# Patient Record
Sex: MALE | Race: WHITE | NOT HISPANIC OR LATINO | URBAN - METROPOLITAN AREA
[De-identification: names, ages, dates, MRNs, and addresses within clinical notes are randomized per-mention and may not be internally consistent; named-entity substitution may affect disease eponyms.]

---

## 2022-11-07 ENCOUNTER — INPATIENT (INPATIENT)
Facility: HOSPITAL | Age: 57
LOS: 4 days | Discharge: HOME | DRG: 988 | End: 2022-11-12
Admitting: STUDENT IN AN ORGANIZED HEALTH CARE EDUCATION/TRAINING PROGRAM
Payer: COMMERCIAL

## 2022-11-07 VITALS
WEIGHT: 199.96 LBS | OXYGEN SATURATION: 98 % | TEMPERATURE: 98 F | RESPIRATION RATE: 16 BRPM | HEIGHT: 72 IN | SYSTOLIC BLOOD PRESSURE: 115 MMHG | DIASTOLIC BLOOD PRESSURE: 64 MMHG | HEART RATE: 62 BPM

## 2022-11-07 DIAGNOSIS — M86.9 OSTEOMYELITIS, UNSPECIFIED: ICD-10-CM

## 2022-11-07 LAB
ALBUMIN SERPL ELPH-MCNC: 4.1 G/DL — SIGNIFICANT CHANGE UP (ref 3.3–5)
ALP SERPL-CCNC: 51 U/L — SIGNIFICANT CHANGE UP (ref 40–120)
ALT FLD-CCNC: 15 U/L — SIGNIFICANT CHANGE UP (ref 10–45)
ANION GAP SERPL CALC-SCNC: 9 MMOL/L — SIGNIFICANT CHANGE UP (ref 5–17)
AST SERPL-CCNC: 23 U/L — SIGNIFICANT CHANGE UP (ref 10–40)
BILIRUB SERPL-MCNC: 0.2 MG/DL — SIGNIFICANT CHANGE UP (ref 0.2–1.2)
BUN SERPL-MCNC: 34 MG/DL — HIGH (ref 7–23)
CALCIUM SERPL-MCNC: 8.5 MG/DL — SIGNIFICANT CHANGE UP (ref 8.4–10.5)
CHLORIDE SERPL-SCNC: 102 MMOL/L — SIGNIFICANT CHANGE UP (ref 96–108)
CO2 SERPL-SCNC: 24 MMOL/L — SIGNIFICANT CHANGE UP (ref 22–31)
CREAT SERPL-MCNC: 1.7 MG/DL — HIGH (ref 0.5–1.3)
CRP SERPL-MCNC: <3 MG/L — SIGNIFICANT CHANGE UP (ref 0–4)
EGFR: 47 ML/MIN/1.73M2 — LOW
GLUCOSE SERPL-MCNC: 199 MG/DL — HIGH (ref 70–99)
HCT VFR BLD CALC: 33.4 % — LOW (ref 39–50)
HGB BLD-MCNC: 11.1 G/DL — LOW (ref 13–17)
MCHC RBC-ENTMCNC: 30.3 PG — SIGNIFICANT CHANGE UP (ref 27–34)
MCHC RBC-ENTMCNC: 33.2 GM/DL — SIGNIFICANT CHANGE UP (ref 32–36)
MCV RBC AUTO: 91.3 FL — SIGNIFICANT CHANGE UP (ref 80–100)
NRBC # BLD: 0 /100 WBCS — SIGNIFICANT CHANGE UP (ref 0–0)
PLATELET # BLD AUTO: 275 K/UL — SIGNIFICANT CHANGE UP (ref 150–400)
POTASSIUM SERPL-MCNC: 4.8 MMOL/L — SIGNIFICANT CHANGE UP (ref 3.5–5.3)
POTASSIUM SERPL-SCNC: 4.8 MMOL/L — SIGNIFICANT CHANGE UP (ref 3.5–5.3)
PROT SERPL-MCNC: 6.8 G/DL — SIGNIFICANT CHANGE UP (ref 6–8.3)
RBC # BLD: 3.66 M/UL — LOW (ref 4.2–5.8)
RBC # FLD: 12.5 % — SIGNIFICANT CHANGE UP (ref 10.3–14.5)
SARS-COV-2 RNA SPEC QL NAA+PROBE: NEGATIVE — SIGNIFICANT CHANGE UP
SODIUM SERPL-SCNC: 135 MMOL/L — SIGNIFICANT CHANGE UP (ref 135–145)
WBC # BLD: 7.15 K/UL — SIGNIFICANT CHANGE UP (ref 3.8–10.5)
WBC # FLD AUTO: 7.15 K/UL — SIGNIFICANT CHANGE UP (ref 3.8–10.5)

## 2022-11-07 PROCEDURE — 73620 X-RAY EXAM OF FOOT: CPT | Mod: 26,RT

## 2022-11-07 PROCEDURE — 99285 EMERGENCY DEPT VISIT HI MDM: CPT

## 2022-11-07 PROCEDURE — 99223 1ST HOSP IP/OBS HIGH 75: CPT | Mod: GC

## 2022-11-07 RX ORDER — SODIUM CHLORIDE 9 MG/ML
1000 INJECTION, SOLUTION INTRAVENOUS
Refills: 0 | Status: DISCONTINUED | OUTPATIENT
Start: 2022-11-07 | End: 2022-11-12

## 2022-11-07 RX ORDER — AMOXICILLIN 250 MG/5ML
1 SUSPENSION, RECONSTITUTED, ORAL (ML) ORAL
Qty: 14 | Refills: 0
Start: 2022-11-07 | End: 2022-11-13

## 2022-11-07 RX ORDER — LANOLIN ALCOHOL/MO/W.PET/CERES
3 CREAM (GRAM) TOPICAL AT BEDTIME
Refills: 0 | Status: DISCONTINUED | OUTPATIENT
Start: 2022-11-07 | End: 2022-11-12

## 2022-11-07 RX ORDER — DEXTROSE 50 % IN WATER 50 %
25 SYRINGE (ML) INTRAVENOUS ONCE
Refills: 0 | Status: DISCONTINUED | OUTPATIENT
Start: 2022-11-07 | End: 2022-11-12

## 2022-11-07 RX ORDER — GLUCAGON INJECTION, SOLUTION 0.5 MG/.1ML
1 INJECTION, SOLUTION SUBCUTANEOUS ONCE
Refills: 0 | Status: DISCONTINUED | OUTPATIENT
Start: 2022-11-07 | End: 2022-11-12

## 2022-11-07 RX ORDER — ONDANSETRON 8 MG/1
4 TABLET, FILM COATED ORAL EVERY 8 HOURS
Refills: 0 | Status: DISCONTINUED | OUTPATIENT
Start: 2022-11-07 | End: 2022-11-12

## 2022-11-07 RX ORDER — DEXTROSE 50 % IN WATER 50 %
12.5 SYRINGE (ML) INTRAVENOUS ONCE
Refills: 0 | Status: DISCONTINUED | OUTPATIENT
Start: 2022-11-07 | End: 2022-11-12

## 2022-11-07 RX ORDER — ATORVASTATIN CALCIUM 80 MG/1
40 TABLET, FILM COATED ORAL AT BEDTIME
Refills: 0 | Status: DISCONTINUED | OUTPATIENT
Start: 2022-11-07 | End: 2022-11-12

## 2022-11-07 RX ORDER — ACETAMINOPHEN 500 MG
650 TABLET ORAL EVERY 6 HOURS
Refills: 0 | Status: DISCONTINUED | OUTPATIENT
Start: 2022-11-07 | End: 2022-11-12

## 2022-11-07 RX ORDER — DEXTROSE 50 % IN WATER 50 %
15 SYRINGE (ML) INTRAVENOUS ONCE
Refills: 0 | Status: DISCONTINUED | OUTPATIENT
Start: 2022-11-07 | End: 2022-11-12

## 2022-11-07 RX ORDER — INSULIN LISPRO 100/ML
VIAL (ML) SUBCUTANEOUS
Refills: 0 | Status: DISCONTINUED | OUTPATIENT
Start: 2022-11-07 | End: 2022-11-12

## 2022-11-07 RX ORDER — INSULIN GLARGINE 100 [IU]/ML
40 INJECTION, SOLUTION SUBCUTANEOUS
Refills: 0 | Status: DISCONTINUED | OUTPATIENT
Start: 2022-11-08 | End: 2022-11-08

## 2022-11-07 NOTE — H&P ADULT - PROBLEM SELECTOR PLAN 5
Well controlled, normotensive on admission. Home medication of lisinopril 25mg daily.   - hold home lisinopril i/s/o NOLAN

## 2022-11-07 NOTE — ED ADULT NURSE NOTE - OBJECTIVE STATEMENT
Pt. a&ox4 ambulatory hx of DM2 and neuropathy comes to ED with worsening ulceration of the 4th toe on the right foot, dxed with osteomyelitis by Podiatrist, put on abx only started one of them @ home. Pt. denies worsening neuropathy / paresthesias, f/c, or additional systemic s/s of infection. Reports checks blood sugars @ home, not well controlled as per pt. Airway patent, breathing spontaneous and unlabored, speaking in clear coherent sentences. Sent in to ED by podiatrist s/p worsening of the infection despite abx initiated, for admission.

## 2022-11-07 NOTE — H&P ADULT - PROBLEM SELECTOR PLAN 3
Unknown control status. Home regimen of Lantus 40U BID, Metformin 1000mg BID and Trulicity weekly.   - f/u A1c  - c/w Lantus 40U BID   - ISS Unknown control status. Home regimen of Lantus 40U BID, Metformin 1000mg BID and Trulicity weekly.   - f/u A1c  - c/w Lantus 40U BID   - ISS  - verify lantus dosing in the AM, pharmacy was closed this PM

## 2022-11-07 NOTE — CONSULT NOTE ADULT - ASSESSMENT
58 y/o male with pmh IDDM with chief complaint of right fourth toe wound. MRI of right foot on 11/4 showed osteomyelitis of the right fourth digit middle and distal phalanx. Pt was instructed to go to ED for admission for IV antibiotics. Podiatry consulted to evaluate OM. Bone biopsy of right distal phalanx performed 11/7.    Plan:  - Wound evaluated, Imaging reviewed & discussed with patient   - Admit to medicine for IV antibiotics.   - Recommend broad spectrum IV antibiotics   - Bone biopsy of Right 4th digit   - F/u bone biopsy path & culture  - Dressed wound with guaze, melany and ACE.  - Pt can WBAT in surgical shoe     Plan discussed with attending and podiatry following 58 y/o male with pmh IDDM with chief complaint of right fourth toe wound. MRI of right foot on 11/4 showed osteomyelitis of the right fourth digit middle and distal phalanx. Pt was instructed to go to ED for admission for IV antibiotics. Podiatry consulted to evaluate OM. Bone biopsy of right distal phalanx performed 11/7.    Plan:  - Wound evaluated, Imaging reviewed & discussed with patient   - Admit to medicine for IV antibiotics.   - Recommend broad spectrum IV antibiotics   - Discussed benefits of bone biopsy with pt. Pt was amenable. Obtained consent.   - Bone biopsy of Right 4th digit performed   - F/u bone biopsy path & culture  - Dressed wound with migue, melany and ACE.  - Pt can WBAT in surgical shoe     Plan discussed with attending and podiatry following

## 2022-11-07 NOTE — ED PROVIDER NOTE - PHYSICAL EXAMINATION
General: Awake, alert and oriented. No acute distress. Well developed, hydrated and nourished. Appears stated age.   Skin: Skin in warm, dry and intact without rashes or lesions. Appropriate color for ethnicity  HENMT: head normocephalic and atraumatic; bilateral external ears without swelling. no nasal discharge. moist oral mucosa. supple neck, trachea midline  EYES: Conjunctiva clear. nonicteric sclera. EOM intact, Eyelids are normal in appearance without swelling or lesions.  Cardiac: well perfused  Respiratory: breathing comfortably on room air. no audible wheezing or stridor  Abdominal: nondistended  MSK: Neck and back are without deformity, visible external skin changes, or signs of trauma. Curvature of the cervical, thoracic, and lumbar spine are within normal limits. open wound to nottom of right 4th toe without significant visible drainage, podiatry at bedside probing wound, detailed exam of wound deferred to experts at bedside. no external signs of trauma. no apparent deficits in ROM of any extremity  Neurological: The patient is awake, alert and oriented to person, place, and time with normal speech. CN 2-12 grossly intact. no apparent deficits. Memory is normal and thought process is intact. No gait abnormalities are appreciated.   Psychiatric: Appropriate mood and affect. Good judgement and insight. No visual or auditory hallucinations. General: Awake, alert and oriented. No acute distress. Well developed, hydrated and nourished. Appears stated age.   Skin: Skin in warm, dry and intact without rashes or lesions. Appropriate color for ethnicity  HENMT: head normocephalic and atraumatic; bilateral external ears without swelling. no nasal discharge. moist oral mucosa. supple neck, trachea midline  EYES: Conjunctiva clear. nonicteric sclera. EOM intact, Eyelids are normal in appearance without swelling or lesions.  Cardiac: well perfused  Respiratory: breathing comfortably on room air. no audible wheezing or stridor  Abdominal: nondistended  MSK: Neck and back are without deformity, visible external skin changes, or signs of trauma. Curvature of the cervical, thoracic, and lumbar spine are within normal limits. 3rd toe on right s/p amputation at ip joint. open wound to bottom of right 4th toe without significant visible drainage, podiatry at bedside probing wound, detailed exam of wound deferred to experts at bedside. no external signs of trauma. no apparent deficits in ROM of any extremity  Neurological: The patient is awake, alert and oriented to person, place, and time with normal speech. CN 2-12 grossly intact. no apparent deficits. Memory is normal and thought process is intact. No gait abnormalities are appreciated.   Psychiatric: Appropriate mood and affect. Good judgement and insight. No visual or auditory hallucinations.

## 2022-11-07 NOTE — ED PROVIDER NOTE - OBJECTIVE STATEMENT
57m hx dm presenting for a wound to the 4th toe of his right foot. wound has progressively worsened, had mri on friday showing osteomyelitis. was prescribed unknown antibiotics but hasn't been able to obtain one of them. sent in by his podiatrist for admission. otherwise feels well.

## 2022-11-07 NOTE — H&P ADULT - PROBLEM SELECTOR PLAN 4
Unknown baseline, Hb of 11.1, normocytic on admission. DDx include hemolysis vs early ARNOL vs ACD.   - f/u iron studies   - no active interventions at this time

## 2022-11-07 NOTE — ED PROVIDER NOTE - CLINICAL SUMMARY MEDICAL DECISION MAKING FREE TEXT BOX
nonseptic, well appearing, podiatry at bedside reccomending against abx for now as they might pursue bone biopsy, as patinet well appearing, no systemic illness will defer care to specialty care. will admit for osteomyelitis

## 2022-11-07 NOTE — H&P ADULT - ATTENDING COMMENTS
#RLE OM: RLE diabetiic foot ulcer w/ cf for OM on outpt MRI. s/p bone biopsy by podiatry in ED. F/up results. Pulses present b/l LE. Pt afebrile, non septic. c/w Vanc/Cefepime for now. ID consult      #NOLAN: vs CKD; s/p 1L NS bolus. F/up UA, urine lytes. Hold lisinopril for now. Collateral in AM. Trend cr      #T2DM: On lantus 40mg BID; missed last nights dose, will cover for now, and continue home lantus. Monitor FSG, f/up A1c.

## 2022-11-07 NOTE — H&P ADULT - HISTORY OF PRESENT ILLNESS
57M with PMH DM, HTN, HLD and anxiety presenting at the behest of outpatient podiatry for MRI indicative of OM. The patient reports development of an ulcer along the right 4th toe a few weeks ago, for which he visited a podiatrist last week who prescribed Abx (topical gentamycin, PO omadacycline and PO cephalexin) and ordered MRI, which has been read as positive for OM. He does not report any purulence over the past few weeks. He denies any ROS of systemic infection. He denies all other ROS at this time.     ED course:   Vitals: 98.4, 62, 115/64, 16, 98% RA   Labs: Hb 11.1, BUN 34, Cr 1.7 (unknown baseline for all)   Imaging: Outpatient MRI of the right foot c/w OM to the 4th digit   Interventions: Podiatry consulted

## 2022-11-07 NOTE — CONSULT NOTE ADULT - SUBJECTIVE AND OBJECTIVE BOX
Attending: Dr. Chakraborty     Patient is a 57y old  Male who presents with a chief complaint of right fourth toe wound     HPI: 58 y/o male with pmh IDDM presents for right fourth toe wound. Pt noticed the toe wound a couple of weeks ago. He states the wound is painful. He has not noticed it worsening. He was given oral antibiotics which did not help. He went for an MRI on Friday (11/4). The MRI showed osteomyelitis of the right fourth digit. Pt was instructed by his podiatrist, Dr. Chakraborty, to go to the ED to get admitted for IV antibiotics. Pt denies numbness or tingling. Pt denies n/v/f/c/sob.       Review of systems negative except per HPI and as stated below  General:	 no weakness; no fevers, no chills  Skin/Breast: no rash  Respiratory and Thorax: no SOB, no cough  Cardiovascular:	No chest pain  Gastrointestinal:	 no nausea, vomiting , diarrhea  Genitourinary:	no dysuria, no difficulty urinating, no hematuria  Musculoskeletal:	no weakness, no joint swelling/pain  Neurological:	no focal weakness/numbness  Endocrine:	no polyuria, no polydipsia    PAST MEDICAL & SURGICAL HISTORY:    Home Medications:    Allergies    No Known Allergies    Intolerances      FAMILY HISTORY:    Social History:       LABS              Vital Signs Last 24 Hrs  T(C): 36.9 (07 Nov 2022 21:33), Max: 36.9 (07 Nov 2022 21:33)  T(F): 98.4 (07 Nov 2022 21:33), Max: 98.4 (07 Nov 2022 21:33)  HR: 62 (07 Nov 2022 21:33) (62 - 62)  BP: 115/64 (07 Nov 2022 21:33) (115/64 - 115/64)  BP(mean): --  RR: 16 (07 Nov 2022 21:33) (16 - 16)  SpO2: 98% (07 Nov 2022 21:33) (98% - 98%)    Parameters below as of 07 Nov 2022 21:33  Patient On (Oxygen Delivery Method): room air        PHYSICAL EXAM  General: NAD, AA0x3    Lower Extremity Focused:  Vasc:  Derm:  Neuro:  MSK:     RADIOLOGY                       Attending: Dr. Chakraborty     Patient is a 57y old  Male who presents with a chief complaint of right fourth toe wound     HPI: 56 y/o male with pmh IDDM presents for right fourth toe wound. Pt noticed the toe wound a couple of weeks ago. He states the wound is painful. He has not noticed it worsening. He was given oral antibiotics which did not help. He went for an MRI on Friday (11/4). The MRI showed osteomyelitis of the right fourth digit. Pt was instructed by his podiatrist, Dr. Chakraborty, to go to the ED to get admitted for IV antibiotics. Pt denies numbness or tingling. Pt denies n/v/f/c/sob.       Review of systems negative except per HPI and as stated below  General:	 no weakness; no fevers, no chills  Skin/Breast: no rash  Respiratory and Thorax: no SOB, no cough  Cardiovascular:	No chest pain  Gastrointestinal:	 no nausea, vomiting , diarrhea  Genitourinary:	no dysuria, no difficulty urinating, no hematuria  Musculoskeletal:	no weakness, no joint swelling/pain  Neurological:	no focal weakness/numbness  Endocrine:	no polyuria, no polydipsia    PAST MEDICAL & SURGICAL HISTORY:    Home Medications:    Allergies    No Known Allergies    Intolerances        LABS                        11.1   7.15  )-----------( 275      ( 07 Nov 2022 22:29 )             33.4       C-Reactive Protein, Serum (11.07.22 @ 22:29)    C-Reactive Protein, Serum: <3.0 mg/L      Vital Signs Last 24 Hrs  T(C): 36.9 (07 Nov 2022 21:33), Max: 36.9 (07 Nov 2022 21:33)  T(F): 98.4 (07 Nov 2022 21:33), Max: 98.4 (07 Nov 2022 21:33)  HR: 62 (07 Nov 2022 21:33) (62 - 62)  BP: 115/64 (07 Nov 2022 21:33) (115/64 - 115/64)  BP(mean): --  RR: 16 (07 Nov 2022 21:33) (16 - 16)  SpO2: 98% (07 Nov 2022 21:33) (98% - 98%)    Parameters below as of 07 Nov 2022 21:33  Patient On (Oxygen Delivery Method): room air        PHYSICAL EXAM  General: NAD, AA0x3    Right Foot Focused Lower Extremity Focused:  Vasc: DP 1/4, PT 2/4, No edema, No erythema, CFT <3 x 5  Derm: 2 mm ulcer on distal tip of 4th digit, +PTB, no pus, no malodor, hyperkeratotic borders; no other open wounds   Neuro: Protective sensation intact  MSK: Partial 3rd digit amputation    RADIOLOGY                         Attending: Dr. Chakraborty     Patient is a 57y old  Male who presents with a chief complaint of right fourth toe wound     HPI: 58 y/o male with pmh IDDM presents for right fourth toe wound. Pt noticed the toe wound a couple of weeks ago. He states the wound is painful. He has not noticed it worsening. He was given oral antibiotics which did not help. He went for an MRI on Friday (11/4). The MRI showed osteomyelitis of the right fourth digit. Pt was instructed by his podiatrist, Dr. Chakraborty, to go to the ED to get admitted for IV antibiotics. Pt denies n/v/f/c/sob.       Review of systems negative except per HPI and as stated below  General:	 no weakness; no fevers, no chills  Skin/Breast: no rash  Respiratory and Thorax: no SOB, no cough  Cardiovascular:	No chest pain  Gastrointestinal:	 no nausea, vomiting , diarrhea  Genitourinary:	no dysuria, no difficulty urinating, no hematuria  Musculoskeletal:	no weakness, no joint swelling/pain  Neurological:	no focal weakness/numbness  Endocrine:	no polyuria, no polydipsia    PAST MEDICAL & SURGICAL HISTORY:    Home Medications:    Allergies    No Known Allergies    Intolerances        LABS                        11.1   7.15  )-----------( 275      ( 07 Nov 2022 22:29 )             33.4       C-Reactive Protein, Serum (11.07.22 @ 22:29)    C-Reactive Protein, Serum: <3.0 mg/L      Vital Signs Last 24 Hrs  T(C): 36.9 (07 Nov 2022 21:33), Max: 36.9 (07 Nov 2022 21:33)  T(F): 98.4 (07 Nov 2022 21:33), Max: 98.4 (07 Nov 2022 21:33)  HR: 62 (07 Nov 2022 21:33) (62 - 62)  BP: 115/64 (07 Nov 2022 21:33) (115/64 - 115/64)  BP(mean): --  RR: 16 (07 Nov 2022 21:33) (16 - 16)  SpO2: 98% (07 Nov 2022 21:33) (98% - 98%)    Parameters below as of 07 Nov 2022 21:33  Patient On (Oxygen Delivery Method): room air        PHYSICAL EXAM  General: NAD, AA0x3    Right Foot Focused Lower Extremity Focused:  Vasc: DP 1/4, PT 2/4, No edema, No erythema, CFT <3 x 5  Derm: 2 mm ulcer on distal tip of 4th digit, +PTB, no pus, no malodor, hyperkeratotic borders; no other open wounds   Neuro: Protective sensation intact  MSK: Partial 3rd digit amputation    RADIOLOGY  X-ray Right Foot:   WET READ: Cortical erosions of right 4th distal phalanx, no signs of gas or foreign body    MRI of Right forefoot: Acute osteomyelitis of 4th distal phalanx. Early OM vs reactive edema in the 4th middle phalanx

## 2022-11-07 NOTE — H&P ADULT - NSHPPHYSICALEXAM_GEN_ALL_CORE
VITAL SIGNS:  T(C): 36.9 (11-07-22 @ 21:33), Max: 36.9 (11-07-22 @ 21:33)  T(F): 98.4 (11-07-22 @ 21:33), Max: 98.4 (11-07-22 @ 21:33)  HR: 62 (11-07-22 @ 21:33) (62 - 62)  BP: 115/64 (11-07-22 @ 21:33) (115/64 - 115/64)  BP(mean): --  RR: 16 (11-07-22 @ 21:33) (16 - 16)  SpO2: 98% (11-07-22 @ 21:33) (98% - 98%)  Wt(kg): --    PHYSICAL EXAM:    Constitutional:  NAD  Head: NC/AT  Eyes: PERRL, EOMI, anicteric sclera  ENT: no nasal discharge;  MMM  Neck: supple; no JVD or thyromegaly  Respiratory: CTA B/L; no W/R/R, no retractions  Cardiac: +S1/S2; RRR; no M/R/G;   Gastrointestinal: soft, NT/ND; no rebound or guarding; +BSx4  Extremities: WWP, no clubbing or cyanosis; RLe wrapped in gauze and ace bandage - can get exam of wound from podiatry note   Musculoskeletal: NROM x4; no joint swelling, tenderness or erythema  Vascular: 2+ radial, PT pulses B/L  Dermatologic: skin warm, dry and intact; no rashes, wounds, or scars  Lymphatic: no submandibular or cervical LAD  Neurologic: AAOx3; CNII-XII grossly intact; no focal deficits  Psychiatric: affect and characteristics of appearance, verbalizations, behaviors are appropriate

## 2022-11-07 NOTE — PROCEDURE NOTE - ADDITIONAL PROCEDURE DETAILS
Patient given digital block around the Right 4th digit consisting of 8 ccs of 2% lidocaine. Prepped area with chlorhexidine. Made incision through wound with sterile #15 blade. Performed bone biopsy using jamshidi needle. Sent one piece of bone for microbiology. Sent one piece of bone for pathology. Dressed wound with sterile gauze, melany, and ACE. Patient tolerated procedure well

## 2022-11-07 NOTE — ED ADULT TRIAGE NOTE - CHIEF COMPLAINT QUOTE
I have osteomyelitis on my right toe; my doctor advised me to come to ER for IV antibiotic;  with  swollen wound to right toe for 2 weeks; denies fever/chills I have osteomyelitis on my right toe; my doctor advised me to come to ER for IV antibiotic, MRI done last Friday that shows osteomyelitis;  with  swollen wound to right toe for 2 weeks; denies fever/chills

## 2022-11-07 NOTE — ED ADULT NURSE NOTE - CHIEF COMPLAINT QUOTE
I have osteomyelitis on my right toe; my doctor advised me to come to ER for IV antibiotic, MRI done last Friday that shows osteomyelitis;  with  swollen wound to right toe for 2 weeks; denies fever/chills

## 2022-11-07 NOTE — H&P ADULT - ASSESSMENT
57M with PMH DM, HTN, HLD and anxiety presenting at the behest of outpatient podiatry for MRI indicative of OM, admitted for OM.

## 2022-11-07 NOTE — H&P ADULT - PROBLEM SELECTOR PLAN 1
Patient with 3 week history of lesion to right 4th toe, confirmed OM on outpatient MRI, sent in by outpatient podiatry for IV Abx and Bone biopsy.   - Podiatry following, performed bone biopsy in the ED   - initiate Vancomycin 15mg/kg q12hrs with trough prior to 4th dose and Cefepime 2g q12hrs (avoid Vanc/Zosyn i/s/o NOLAN), narrow coverage pending bone Cx   - Patient to require PICC line   - Daily ESR/CRP

## 2022-11-07 NOTE — H&P ADULT - NSHPLABSRESULTS_GEN_ALL_CORE
LABS:                         11.1   7.15  )-----------( 275      ( 07 Nov 2022 22:29 )             33.4     11-07    135  |  102  |  34<H>  ----------------------------<  199<H>  4.8   |  24  |  1.70<H>    Ca    8.5      07 Nov 2022 22:29    TPro  6.8  /  Alb  4.1  /  TBili  0.2  /  DBili  x   /  AST  23  /  ALT  15  /  AlkPhos  51  11-07                  RADIOLOGY, EKG & ADDITIONAL TESTS: Reviewed.

## 2022-11-07 NOTE — H&P ADULT - PROBLEM SELECTOR PLAN 2
Unknown baseline renal function, BUN 34 and Cr 1.7 on admission. Could be CKD 2/2 DM vs NOLAN vs NOLAN on CKD. DDx include pre-intra-post renal azotemia if NOLAN.   - follow urine studies to better classify   - ensure adequate hydration

## 2022-11-08 ENCOUNTER — TRANSCRIPTION ENCOUNTER (OUTPATIENT)
Age: 57
End: 2022-11-08

## 2022-11-08 ENCOUNTER — RESULT REVIEW (OUTPATIENT)
Age: 57
End: 2022-11-08

## 2022-11-08 DIAGNOSIS — E11.9 TYPE 2 DIABETES MELLITUS WITHOUT COMPLICATIONS: ICD-10-CM

## 2022-11-08 DIAGNOSIS — N17.9 ACUTE KIDNEY FAILURE, UNSPECIFIED: ICD-10-CM

## 2022-11-08 DIAGNOSIS — E78.5 HYPERLIPIDEMIA, UNSPECIFIED: ICD-10-CM

## 2022-11-08 DIAGNOSIS — R63.8 OTHER SYMPTOMS AND SIGNS CONCERNING FOOD AND FLUID INTAKE: ICD-10-CM

## 2022-11-08 DIAGNOSIS — I10 ESSENTIAL (PRIMARY) HYPERTENSION: ICD-10-CM

## 2022-11-08 DIAGNOSIS — D64.9 ANEMIA, UNSPECIFIED: ICD-10-CM

## 2022-11-08 DIAGNOSIS — F41.9 ANXIETY DISORDER, UNSPECIFIED: ICD-10-CM

## 2022-11-08 LAB
A1C WITH ESTIMATED AVERAGE GLUCOSE RESULT: 11.4 % — HIGH (ref 4–5.6)
ALBUMIN SERPL ELPH-MCNC: 3.7 G/DL — SIGNIFICANT CHANGE UP (ref 3.3–5)
ALP SERPL-CCNC: 49 U/L — SIGNIFICANT CHANGE UP (ref 40–120)
ALT FLD-CCNC: 17 U/L — SIGNIFICANT CHANGE UP (ref 10–45)
ANION GAP SERPL CALC-SCNC: 9 MMOL/L — SIGNIFICANT CHANGE UP (ref 5–17)
AST SERPL-CCNC: 20 U/L — SIGNIFICANT CHANGE UP (ref 10–40)
BASOPHILS # BLD AUTO: 0.03 K/UL — SIGNIFICANT CHANGE UP (ref 0–0.2)
BASOPHILS NFR BLD AUTO: 0.5 % — SIGNIFICANT CHANGE UP (ref 0–2)
BILIRUB SERPL-MCNC: <0.2 MG/DL — SIGNIFICANT CHANGE UP (ref 0.2–1.2)
BUN SERPL-MCNC: 31 MG/DL — HIGH (ref 7–23)
CALCIUM SERPL-MCNC: 8.5 MG/DL — SIGNIFICANT CHANGE UP (ref 8.4–10.5)
CHLORIDE SERPL-SCNC: 104 MMOL/L — SIGNIFICANT CHANGE UP (ref 96–108)
CO2 SERPL-SCNC: 22 MMOL/L — SIGNIFICANT CHANGE UP (ref 22–31)
CREAT ?TM UR-MCNC: 82 MG/DL — SIGNIFICANT CHANGE UP
CREAT SERPL-MCNC: 1.37 MG/DL — HIGH (ref 0.5–1.3)
CRP SERPL-MCNC: <3 MG/L — SIGNIFICANT CHANGE UP (ref 0–4)
EGFR: 61 ML/MIN/1.73M2 — SIGNIFICANT CHANGE UP
EOSINOPHIL # BLD AUTO: 0.33 K/UL — SIGNIFICANT CHANGE UP (ref 0–0.5)
EOSINOPHIL NFR BLD AUTO: 5 % — SIGNIFICANT CHANGE UP (ref 0–6)
ERYTHROCYTE [SEDIMENTATION RATE] IN BLOOD: 15 MM/HR — SIGNIFICANT CHANGE UP
ERYTHROCYTE [SEDIMENTATION RATE] IN BLOOD: 55 MM/HR — HIGH
ESTIMATED AVERAGE GLUCOSE: 280 MG/DL — HIGH (ref 68–114)
FERRITIN SERPL-MCNC: 288 NG/ML — SIGNIFICANT CHANGE UP (ref 30–400)
GLUCOSE BLDC GLUCOMTR-MCNC: 175 MG/DL — HIGH (ref 70–99)
GLUCOSE BLDC GLUCOMTR-MCNC: 55 MG/DL — LOW (ref 70–99)
GLUCOSE BLDC GLUCOMTR-MCNC: 61 MG/DL — LOW (ref 70–99)
GLUCOSE BLDC GLUCOMTR-MCNC: 71 MG/DL — SIGNIFICANT CHANGE UP (ref 70–99)
GLUCOSE BLDC GLUCOMTR-MCNC: 74 MG/DL — SIGNIFICANT CHANGE UP (ref 70–99)
GLUCOSE BLDC GLUCOMTR-MCNC: 75 MG/DL — SIGNIFICANT CHANGE UP (ref 70–99)
GLUCOSE BLDC GLUCOMTR-MCNC: 77 MG/DL — SIGNIFICANT CHANGE UP (ref 70–99)
GLUCOSE SERPL-MCNC: 209 MG/DL — HIGH (ref 70–99)
GRAM STN FLD: SIGNIFICANT CHANGE UP
HCT VFR BLD CALC: 32.2 % — LOW (ref 39–50)
HGB BLD-MCNC: 10.4 G/DL — LOW (ref 13–17)
IMM GRANULOCYTES NFR BLD AUTO: 0.2 % — SIGNIFICANT CHANGE UP (ref 0–0.9)
IRON SATN MFR SERPL: 27 % — SIGNIFICANT CHANGE UP (ref 16–55)
IRON SATN MFR SERPL: 56 UG/DL — SIGNIFICANT CHANGE UP (ref 45–165)
LYMPHOCYTES # BLD AUTO: 1.72 K/UL — SIGNIFICANT CHANGE UP (ref 1–3.3)
LYMPHOCYTES # BLD AUTO: 26.3 % — SIGNIFICANT CHANGE UP (ref 13–44)
MCHC RBC-ENTMCNC: 29.7 PG — SIGNIFICANT CHANGE UP (ref 27–34)
MCHC RBC-ENTMCNC: 32.3 GM/DL — SIGNIFICANT CHANGE UP (ref 32–36)
MCV RBC AUTO: 92 FL — SIGNIFICANT CHANGE UP (ref 80–100)
MONOCYTES # BLD AUTO: 0.43 K/UL — SIGNIFICANT CHANGE UP (ref 0–0.9)
MONOCYTES NFR BLD AUTO: 6.6 % — SIGNIFICANT CHANGE UP (ref 2–14)
NEUTROPHILS # BLD AUTO: 4.02 K/UL — SIGNIFICANT CHANGE UP (ref 1.8–7.4)
NEUTROPHILS NFR BLD AUTO: 61.4 % — SIGNIFICANT CHANGE UP (ref 43–77)
NRBC # BLD: 0 /100 WBCS — SIGNIFICANT CHANGE UP (ref 0–0)
OSMOLALITY UR: 324 MOSM/KG — SIGNIFICANT CHANGE UP (ref 300–900)
PLATELET # BLD AUTO: 248 K/UL — SIGNIFICANT CHANGE UP (ref 150–400)
POTASSIUM SERPL-MCNC: 4.5 MMOL/L — SIGNIFICANT CHANGE UP (ref 3.5–5.3)
POTASSIUM SERPL-SCNC: 4.5 MMOL/L — SIGNIFICANT CHANGE UP (ref 3.5–5.3)
PROT SERPL-MCNC: 6.2 G/DL — SIGNIFICANT CHANGE UP (ref 6–8.3)
RBC # BLD: 3.5 M/UL — LOW (ref 4.2–5.8)
RBC # FLD: 12.5 % — SIGNIFICANT CHANGE UP (ref 10.3–14.5)
SODIUM SERPL-SCNC: 135 MMOL/L — SIGNIFICANT CHANGE UP (ref 135–145)
SODIUM UR-SCNC: 44 MMOL/L — SIGNIFICANT CHANGE UP
SPECIMEN SOURCE: SIGNIFICANT CHANGE UP
TIBC SERPL-MCNC: 211 UG/DL — LOW (ref 220–430)
UIBC SERPL-MCNC: 155 UG/DL — SIGNIFICANT CHANGE UP (ref 110–370)
UUN UR-MCNC: 474 MG/DL — SIGNIFICANT CHANGE UP
WBC # BLD: 6.54 K/UL — SIGNIFICANT CHANGE UP (ref 3.8–10.5)
WBC # FLD AUTO: 6.54 K/UL — SIGNIFICANT CHANGE UP (ref 3.8–10.5)

## 2022-11-08 PROCEDURE — 99233 SBSQ HOSP IP/OBS HIGH 50: CPT | Mod: GC

## 2022-11-08 PROCEDURE — 88305 TISSUE EXAM BY PATHOLOGIST: CPT | Mod: 26

## 2022-11-08 PROCEDURE — 99222 1ST HOSP IP/OBS MODERATE 55: CPT | Mod: GC

## 2022-11-08 PROCEDURE — 88311 DECALCIFY TISSUE: CPT | Mod: 26

## 2022-11-08 RX ORDER — CEFEPIME 1 G/1
2000 INJECTION, POWDER, FOR SOLUTION INTRAMUSCULAR; INTRAVENOUS ONCE
Refills: 0 | Status: COMPLETED | OUTPATIENT
Start: 2022-11-08 | End: 2022-11-08

## 2022-11-08 RX ORDER — CITALOPRAM 10 MG/1
40 TABLET, FILM COATED ORAL DAILY
Refills: 0 | Status: DISCONTINUED | OUTPATIENT
Start: 2022-11-08 | End: 2022-11-12

## 2022-11-08 RX ORDER — HEPARIN SODIUM 5000 [USP'U]/ML
5000 INJECTION INTRAVENOUS; SUBCUTANEOUS EVERY 8 HOURS
Refills: 0 | Status: DISCONTINUED | OUTPATIENT
Start: 2022-11-08 | End: 2022-11-12

## 2022-11-08 RX ORDER — INSULIN LISPRO 100/ML
4 VIAL (ML) SUBCUTANEOUS
Refills: 0 | Status: DISCONTINUED | OUTPATIENT
Start: 2022-11-08 | End: 2022-11-09

## 2022-11-08 RX ORDER — VANCOMYCIN HCL 1 G
1500 VIAL (EA) INTRAVENOUS EVERY 12 HOURS
Refills: 0 | Status: COMPLETED | OUTPATIENT
Start: 2022-11-08 | End: 2022-11-09

## 2022-11-08 RX ORDER — SODIUM CHLORIDE 9 MG/ML
1000 INJECTION, SOLUTION INTRAVENOUS
Refills: 0 | Status: DISCONTINUED | OUTPATIENT
Start: 2022-11-08 | End: 2022-11-10

## 2022-11-08 RX ORDER — SODIUM CHLORIDE 9 MG/ML
1000 INJECTION, SOLUTION INTRAVENOUS
Refills: 0 | Status: DISCONTINUED | OUTPATIENT
Start: 2022-11-08 | End: 2022-11-08

## 2022-11-08 RX ADMIN — INSULIN GLARGINE 40 UNIT(S): 100 INJECTION, SOLUTION SUBCUTANEOUS at 10:14

## 2022-11-08 RX ADMIN — HEPARIN SODIUM 5000 UNIT(S): 5000 INJECTION INTRAVENOUS; SUBCUTANEOUS at 23:05

## 2022-11-08 RX ADMIN — CEFEPIME 100 MILLIGRAM(S): 1 INJECTION, POWDER, FOR SOLUTION INTRAMUSCULAR; INTRAVENOUS at 03:53

## 2022-11-08 RX ADMIN — Medication 1: at 09:38

## 2022-11-08 RX ADMIN — HEPARIN SODIUM 5000 UNIT(S): 5000 INJECTION INTRAVENOUS; SUBCUTANEOUS at 06:12

## 2022-11-08 RX ADMIN — CITALOPRAM 40 MILLIGRAM(S): 10 TABLET, FILM COATED ORAL at 13:32

## 2022-11-08 RX ADMIN — Medication 333.33 MILLIGRAM(S): at 06:12

## 2022-11-08 RX ADMIN — SODIUM CHLORIDE 200 MILLILITER(S): 9 INJECTION, SOLUTION INTRAVENOUS at 04:08

## 2022-11-08 RX ADMIN — HEPARIN SODIUM 5000 UNIT(S): 5000 INJECTION INTRAVENOUS; SUBCUTANEOUS at 13:32

## 2022-11-08 RX ADMIN — ATORVASTATIN CALCIUM 40 MILLIGRAM(S): 80 TABLET, FILM COATED ORAL at 23:05

## 2022-11-08 RX ADMIN — Medication 333.33 MILLIGRAM(S): at 18:13

## 2022-11-08 NOTE — CONSULT NOTE ADULT - ATTENDING COMMENTS
Pt seen on rounds this afternoon.  57-yo man with type 2 DM who was sent to the hospital by his podiatrist after an MRI done for a non-healing ulcer on his R 4th toe showed osteomyelitis.  The pt has already had a bone biopsy done for culture.  Is not clinically septic, and denies fever or chills.    He has a 25-year history of type 2 DM, states that it is chronically under poor control.  He is currently on a regimen of 40 units Lantus BID plus Trulicity (3 mg/week) and metformin.  Will not infrequently miss one of his Lantus doses, but says that when he has been taking both doses during the past 2 weeks he will be hypoglycemic to as low as the 50-60 range in the morning.  Even with these low readings, however, his glucoses will rise to over 200 during the day.    He is not severely non-compliant with his diet, though admits to "occasional sweets" and probably consumes excessive portions of starches.  His diabetes has been complicated by retinopathy (currently requiring intravitreal injections for macular edema) and neuropathy (with subjective numbness in his feet)  Fingersticks in his hospital since admission have been mostly below 100, though these values reflect his having taken lantus last night prior to coming to the hospital, and a 40 unit dose of Lantus this morning.  --Pt's inadequate control is most likely due to lack of premeal insulin--his Lantus dose is almost certainly excessive.  --Will look to stabilize him on a single daily Lantus dose, but would prefer this to be at bedtime.  Will hold off on lantus tonight, give a bridge dose of NPH tomorrow Am, and restart Lantus (prob 40 units) tomorrow night  --Start 4 units of lispro premeal  --Check C-peptide level  --Discussed with the pt his advancing complications, poppy his retinopathy, emphasizing that this is the complication which is most closely tied to glycemic control

## 2022-11-08 NOTE — DISCHARGE NOTE PROVIDER - PROVIDER TOKENS
PROVIDER:[TOKEN:[[id number]]],FREE:[LAST:[Gaming],FIRST:[Tiffanie],PHONE:[(   )    -],FAX:[(   )    -]] PROVIDER:[TOKEN:[[id number]],SCHEDULEDAPPT:[11/30/2022],SCHEDULEDAPPTTIME:[08:00 AM]],FREE:[LAST:[Amberly],FIRST:[Tiffanie],PHONE:[(   )    -],FAX:[(   )    -]] PROVIDER:[TOKEN:[[id number]],SCHEDULEDAPPT:[11/30/2022],SCHEDULEDAPPTTIME:[08:00 AM]],FREE:[LAST:[Amberly],FIRST:[Tiffanie],PHONE:[phone]],FAX:[(   )    -],ADDRESS:[Internal Medicine  [address] [place] [address]],SCHEDULEDAPPT:[11/18/2022],SCHEDULEDAPPTTIME:[11:20 AM]]

## 2022-11-08 NOTE — PROGRESS NOTE ADULT - PROBLEM SELECTOR PLAN 3
Unknown control status. Home regimen of Lantus 40U BID, Metformin 1000mg BID and Trulicity weekly. A1C found to 11.4 this morning with est avg glucose of 280.    - f/u A1c  - c/w Lantus 40U BID   - ISS  - verify lantus dosing in the AM, pharmacy was closed this PM Unknown control status. Home regimen of Lantus 40U BID, Metformin 1000mg BID and Trulicity weekly. A1C found to 11.4 this morning with est avg glucose of 280.    - c/w Lantus 40U BID   - f/u FSG and redose insulin in afternoon  - ISS  - verify lantus dosing in the AM, pharmacy was closed this PM  - endocrine consult

## 2022-11-08 NOTE — DISCHARGE NOTE PROVIDER - NSDCMRMEDTOKEN_GEN_ALL_CORE_FT
atorvastatin 40 mg oral tablet: 1 tab(s) orally once a day  Lantus 100 units/mL subcutaneous solution: 40 unit(s) subcutaneous 2 times a day  lisinopril 40 mg oral tablet: 1 tab(s) orally once a day  metFORMIN 1000 mg oral tablet: 1 tab(s) orally 2 times a day  Trulicity Pen 3 mg/0.5 mL subcutaneous solution: 1 application subcutaneous once a week   citalopram 40 mg oral tablet: 1 tab(s) orally once a day  Lantus 100 units/mL subcutaneous solution: 40 unit(s) subcutaneous 2 times a day  Lipitor 40 mg oral tablet: 1 tab(s) orally once a day  lisinopril 40 mg oral tablet: 1 tab(s) orally once a day  metFORMIN 1000 mg oral tablet: 1 tab(s) orally 2 times a day  Trulicity Pen 3 mg/0.5 mL subcutaneous solution: 1 application subcutaneous once a week   cefTRIAXone: 2 gram(s) intravenous once a day  citalopram 40 mg oral tablet: 1 tab(s) orally once a day  Lipitor 40 mg oral tablet: 1 tab(s) orally once a day  lisinopril 40 mg oral tablet: 1 tab(s) orally once a day  vancomycin 1.25 g intravenous injection: 1.25 gram(s) intravenous every 12 hours   cefTRIAXone: 2 gram(s) intravenous once a day via PICC until 12/20     citalopram 40 mg oral tablet: 1 tab(s) orally once a day  Insulin Lispro KwikPen 100 units/mL injectable solution: 7 unit(s) injectable 3 times a day (before meals)  before meals   Lantus Solostar Pen 100 units/mL subcutaneous solution: 15 unit(s) subcutaneous once a day (at bedtime)  Lipitor 40 mg oral tablet: 1 tab(s) orally once a day  lisinopril 40 mg oral tablet: 1 tab(s) orally once a day  vancomycin 1.25 g intravenous injection: 1.25 gram(s) intravenous every 12 hours via PICC until 12/20   alcohol swabs : Apply topically to affected area 4 times a day   cefTRIAXone: 2 gram(s) intravenous once a day via PICC until 12/20     citalopram 40 mg oral tablet: 1 tab(s) orally once a day  Insulin Lispro KwikPen 100 units/mL injectable solution: 7 unit(s) injectable 3 times a day (before meals)  before meals   lancets: 1 application subcutaneously 4 times a day   Lantus Solostar Pen 100 units/mL subcutaneous solution: 15 unit(s) subcutaneous once a day (at bedtime)  Lipitor 40 mg oral tablet: 1 tab(s) orally once a day  vancomycin 1.25 g intravenous injection: 1.25 gram(s) intravenous every 12 hours via PICC until 12/20   alcohol swabs : Apply topically to affected area 4 times a day   cefTRIAXone: 2 gram(s) intravenous once a day via PICC until 12/20     citalopram 40 mg oral tablet: 1 tab(s) orally once a day  Insulin Lispro KwikPen 100 units/mL injectable solution: 7 unit(s) injectable 3 times a day (before meals)  before meals   lancets: 1 application subcutaneously 4 times a day   Lantus Solostar Pen 100 units/mL subcutaneous solution: 15 unit(s) subcutaneous once a day (at bedtime)  Lipitor 40 mg oral tablet: 1 tab(s) orally once a day  Pen [place] : 1 application subcutaneous 4 times a day   vancomycin 1.25 g intravenous injection: 1.25 gram(s) intravenous every 12 hours via PICC until 12/20

## 2022-11-08 NOTE — DISCHARGE NOTE PROVIDER - CARE PROVIDER_API CALL
Ken Ta)  [address]  [address], [address]  [place] [address]  Phone: [phone]  Fax: [phone]  Follow Up Time:     Tiffanie Gaming  Phone: (   )    -  Fax: (   )    -  Follow Up Time:    Ken Ta)  [address]  [address], [address]  [place] [address]  Phone: [phone]  Fax: [phone]  Scheduled Appointment: 11/30/2022 08:00 AM    Tiffanie Gaming  Phone: (   )    -  Fax: (   )    -  Follow Up Time:    Ken Ta)  [address]  [address], [address]  [place] [address]  Phone: [phone]  Fax: [phone]  Scheduled Appointment: 11/30/2022 08:00 AM    Tiffanie Gaming  Internal Medicine  [address] [place] [address]  Phone: [phone]  Fax: (   )    -  Scheduled Appointment: 11/18/2022 11:20 AM

## 2022-11-08 NOTE — PROGRESS NOTE ADULT - PROBLEM SELECTOR PLAN 4
Unknown baseline, Hb of 11.1 (normocytic) on admission, with follow-up reading 10.4 today. DDx include hemolysis vs early ARNOL vs ACD.   - f/u iron studies   - no active interventions at this time Unknown baseline, Hb of 11.1 (normocytic) on admission, with follow-up reading 10.4 today. DDx include CKD vs hemolysis.   - iron studies show Fe, %sat, ferritin WNL. TIBC low, suggesting etiology less likely ARNOL  - no active interventions at this time

## 2022-11-08 NOTE — DISCHARGE NOTE PROVIDER - HOSPITAL COURSE
#Discharge: do not delete    Patient is __ yo M/F with past medical history of _____, presented with _____, found to have _____      Problem List/Main Diagnoses:     New medications/therapies:   New lines/hardware:  Labs to be followed outpatient:   Exam to be followed outpatient:     Discharge plan: discharge to ______    Physical Exam Upon Discharge:     #Discharge: do not delete    Patient is __ yo M/F with past medical history of _____, presented with _____, found to have _____      Problem List/Main Diagnoses:     #Osteomyelitis    #Type 2 Diabetes, Uncontrolled    New medications/therapies:   New lines/hardware:  Labs to be followed outpatient:   Exam to be followed outpatient:     Discharge plan: discharge to ______    Physical Exam Upon Discharge:     #Discharge: do not delete    Patient is a 57M with PMH DM, HTN, HLD and anxiety disorder presenting from referral by outpatient podiatry due to MRI indicative of OM, admitted for management of osteomyelitis.     PROBLEM LIST:  #Osteomyelitis of fourth toe of right foot  Patient with 3 week history of lesion to right 4th toe, confirmed OM on outpatient MRI, sent in by outpatient podiatry for IV Abx and Bone biopsy. + probe to bone test per podiatry.   - Bone bx and culture: reactive bone, NGTD- likely culture-negative OM given pt was on outpatient Abx in recent weeks  - Treated with Vancomycin 1250mg q12hrs and CTX 2g q12hrs. To be continued for 6 total weeks of antibiotics via PICC  - Follow-up outpatient with infectious disease, podiatry    #DM (diabetes mellitus).   Home regimen of Lantus 40U BID, Metformin 1000mg BID and Trulicity weekly. A1C 11.4.   - Endocrine consulted for regimen management: treated with Lantus and Lispro  - Nutrition consulted for diabetic nutritional counseling  - Discontinue Metformin, Trulicity.  - Manage blood glucose with Lantus 15IU qhs, Lispro 7IU TID with meals, and mISS  - F/u outpatient endocrinology     #Anemia.   Unknown baseline, Hb of 11.1 (normocytic) on admission. Stable, likely 2/2 underlying CKD.   - Iron studies show Fe, %sat, ferritin WNL. TIBC low, suggesting etiology less likely ARNOL  - Follow-up with primary care to ensure age-appropriate screenings (colonoscopy) up to date    #Hypertension  Well controlled, normotensive on admission. Home medication of lisinopril 40mg  - Initially held in setting of NOLAN, resumed with BPs 130s/60s  -c/w home lisinopril 40mg    #HLD (hyperlipidemia).   Well-controlled as per patient.   - c/w home atorvastatin 40mg daily.    #Anxiety Disorder  Well controlled.   - c/w home citalopram 40mg daily.    #Acute Kidney Injury  Unknown baseline renal function, BUN 34 and Cr 1.7 on admission. Downtrended to BUN 19 and Cr 1.09. Likely due to acute infection on CKD  - RESOLVED         New medications/therapies: Vancomycin 1250mg q12h x 6 weeks, Ceftriaxone ____, Lantus 15IU qhs, Lispro 7IU TID before meals, Lispro sliding scale 4x/day  New lines/hardware: PICC line  Labs to be followed outpatient: Vancomycin Trough, BMP  Exam to be followed outpatient: Infectious Disease, Endocrinology, Primary Care    Discharge plan: discharge to home    Physical Exam Upon Discharge:     #Discharge: do not delete    Patient is a 57M with PMH DM, HTN, HLD and anxiety disorder presenting from referral by outpatient podiatry due to MRI indicative of OM, admitted for management of osteomyelitis. Patient was medically optimized, stable and ready for discharge. Plan of care and return precautions were discussed with the patient who verbally stated understanding.     PROBLEM LIST:  #Osteomyelitis of fourth toe of right foot  Patient with 3 week history of lesion to right 4th toe, confirmed OM on outpatient MRI, sent in by outpatient podiatry for IV Abx and Bone biopsy. + probe to bone test per podiatry.   - Bone bx and culture: reactive bone, NGTD- likely culture-negative OM given pt was on outpatient Abx in recent weeks  - Treated with Vancomycin 1250mg q12hrs and CTX 2g qd. To be continued for 6 total weeks of antibiotics via PICC ending on 12/20/2022.   - Follow-up outpatient with infectious disease, podiatry    #DM (diabetes mellitus)  Home regimen of Lantus 40U BID, Metformin 1000mg BID and Trulicity weekly. A1C 11.4.   - Endocrine consulted for regimen management: treated with Lantus and Lispro  - Nutrition consulted for diabetic nutritional counseling  - Discontinue Metformin, Trulicity.  - Manage blood glucose with Lantus 15IU qhs, Lispro 7IU TID with meals, and mISS  - F/u outpatient endocrinology     #Anemia  Unknown baseline, Hb of 11.1 (normocytic) on admission. Stable, likely 2/2 underlying CKD.   - Iron studies show Fe, %sat, ferritin WNL. TIBC low, suggesting etiology less likely ARNOL  - Follow-up with primary care to ensure age-appropriate screenings (colonoscopy) up to date    #Hypertension  Well controlled, normotensive on admission. Home medication of lisinopril 40mg  - Initially held in setting of NOLAN, resumed with BPs 130s/60s  -c/w home lisinopril 40mg    #HLD (hyperlipidemia)  Well-controlled as per patient.   - c/w home atorvastatin 40mg daily.    #Anxiety Disorder  Well controlled.   - c/w home citalopram 40mg daily.    #Acute Kidney Injury  Unknown baseline renal function, BUN 34 and Cr 1.7 on admission. Downtrended to BUN 19 and Cr 1.09. Likely due to acute infection on CKD  - RESOLVED       New medications/therapies: Vancomycin 1250mg q12h x 6 weeks, Ceftriaxone 2g qd x 6 weeks, Lantus 15IU qhs, Lispro 7IU TID before meals, Lispro sliding scale 4x/day  New lines/hardware: PICC line  Labs to be followed outpatient: Weekly CBC, CMP, ESR, CRP, Vancomycin trough. Faxed to Dr. Gallardo: [phone]  Exam to be followed outpatient: Infectious Disease, Endocrinology, Primary Care    Discharge plan: discharge to home    Physical Exam Upon Discharge:     #Discharge: do not delete    Patient is a 57M with PMH DM, HTN, HLD and anxiety disorder presenting from referral by outpatient podiatry due to MRI indicative of OM, admitted for management of osteomyelitis. Patient was medically optimized, stable and ready for discharge. Plan of care and return precautions were discussed with the patient who verbally stated understanding.     PROBLEM LIST:  #Osteomyelitis of fourth toe of right foot  Patient with 3 week history of lesion to right 4th toe, confirmed OM on outpatient MRI, sent in by outpatient podiatry for IV Abx and Bone biopsy. + probe to bone test per podiatry.   - Bone bx and culture: reactive bone, NGTD- likely culture-negative OM given pt was on outpatient Abx in recent weeks  - Treated with Vancomycin 1250mg q12hrs and CTX 2g qd. To be continued for 6 total weeks of antibiotics via PICC ending on 12/20/2022.   - Follow-up outpatient with infectious disease, podiatry  - Wound instructions: Cleanse wound with normal saline daily, pat dry with sterile guaze, apply saline soaked gauze to wound base, cover with dry sterile gauze, ABD pad, wrap with Kerlix and light ACE bandage.   - Patient should follow up with Dr. Jay Chakraborty within 1 week of discharge.    Office information:          [place] Address- [address] [place] [address] (10th Floor [address])[place] [address]          [facility]         [phone]      #DM (diabetes mellitus)  Home regimen of Lantus 40U BID, Metformin 1000mg BID and Trulicity weekly. A1C 11.4.   - Endocrine consulted for regimen management: treated with Lantus and Lispro  - Nutrition consulted for diabetic nutritional counseling  - Discontinue Metformin, Trulicity.  - Manage blood glucose with Lantus 15IU qhs, Lispro 7IU TID with meals, and mISS  - F/u outpatient endocrinology     #Anemia  Unknown baseline, Hb of 11.1 (normocytic) on admission. Stable, likely 2/2 underlying CKD.   - Iron studies show Fe, %sat, ferritin WNL. TIBC low, suggesting etiology less likely ARNOL  - Follow-up with primary care to ensure age-appropriate screenings (colonoscopy) up to date    #Hypertension  Well controlled, normotensive on admission. Home medication of lisinopril 40mg  - Initially held in setting of NOLAN, resumed with BPs 130s/60s  -c/w home lisinopril 40mg    #HLD (hyperlipidemia)  Well-controlled as per patient.   - c/w home atorvastatin 40mg daily.    #Anxiety Disorder  Well controlled.   - c/w home citalopram 40mg daily.    #Acute Kidney Injury  Unknown baseline renal function, BUN 34 and Cr 1.7 on admission. Downtrended to BUN 19 and Cr 1.09. Likely due to acute infection on CKD  - RESOLVED       New medications/therapies: Vancomycin 1250mg q12h x 6 weeks, Ceftriaxone 2g qd x 6 weeks, Lantus 15IU qhs, Lispro 7IU TID before meals, Lispro sliding scale 4x/day  New lines/hardware: PICC line  Labs to be followed outpatient: Weekly CBC, CMP, ESR, CRP, Vancomycin trough. Faxed to Dr. Gallardo: [phone]  Exam to be followed outpatient: Infectious Disease, Endocrinology, Primary Care    Discharge plan: discharge to home    Physical Exam Upon Discharge:     #Discharge: do not delete    Patient is a 57M with PMH DM, HTN, HLD and anxiety disorder presenting from referral by outpatient podiatry due to MRI indicative of OM, admitted for management of osteomyelitis. Patient was medically optimized, stable and ready for discharge. Plan of care and return precautions were discussed with the patient who verbally stated understanding.     PROBLEM LIST:  #Osteomyelitis of fourth toe of right foot  Patient with 3 week history of lesion to right 4th toe, confirmed OM on outpatient MRI, sent in by outpatient podiatry for IV Abx and Bone biopsy. + probe to bone test per podiatry.   - Bone bx and culture: reactive bone, NGTD- likely culture-negative OM given pt was on outpatient Abx in recent weeks  - Treated with Vancomycin 1250mg q12hrs and CTX 2g qd. To be continued for 6 total weeks of antibiotics via PICC ending on 12/20/2022.   - Follow-up outpatient with infectious disease, podiatry  - Wound instructions: Cleanse wound with normal saline daily, pat dry with sterile guaze, apply saline soaked gauze to wound base, cover with dry sterile gauze, ABD pad, wrap with Kerlix and light ACE bandage.   - Patient should follow up with Dr. Jay Chakraborty within 1 week of discharge.    Office information:          [place] Address- [address] [place] [address] (10th Floor [address])[place] [address]          [facility]         [phone]      #DM (diabetes mellitus)  Home regimen of Lantus 40U BID, Metformin 1000mg BID and Trulicity weekly. A1C 11.4.   - Endocrine consulted for regimen management: treated with Lantus and Lispro  - Nutrition consulted for diabetic nutritional counseling  - Discontinue Metformin, Trulicity.  - Manage blood glucose with Lantus 15IU qhs, Lispro 7IU TID with meals, and mISS  - F/u outpatient endocrinology     #Anemia  Unknown baseline, Hb of 11.1 (normocytic) on admission. Stable, likely 2/2 underlying CKD.   - Iron studies show Fe, %sat, ferritin WNL. TIBC low, suggesting etiology less likely ARNOL  - Follow-up with primary care to ensure age-appropriate screenings (colonoscopy) up to date    #Hypertension  Well controlled, normotensive on admission. Home medication of lisinopril 40mg  - Initially held in setting of NOLAN, resumed with BPs 130s/60s  - K was 5.7 upon day of DC. Please continue to hold the lisinopril upon DC and follow up with Dr. Gaming about further adjusting the dose upon discharge.     #HLD (hyperlipidemia)  Well-controlled as per patient.   - c/w home atorvastatin 40mg daily.    #Anxiety Disorder  Well controlled.   - c/w home citalopram 40mg daily.    #Acute Kidney Injury  Unknown baseline renal function, BUN 34 and Cr 1.7 on admission. Downtrended to BUN 19 and Cr 1.09. Likely due to acute infection on CKD  - RESOLVED       New medications/therapies: Vancomycin 1250mg q12h x 6 weeks, Ceftriaxone 2g qd x 6 weeks, Lantus 15IU qhs, Lispro 7IU TID before meals, Lispro sliding scale 4x/day  New lines/hardware: PICC line  Labs to be followed outpatient: Weekly CBC, CMP, ESR, CRP, Vancomycin trough. Faxed to Dr. Gallardo: [phone]  Exam to be followed outpatient: Infectious Disease, Endocrinology, Primary Care    Discharge plan: discharge to home    Physical Exam Upon Discharge:

## 2022-11-08 NOTE — DISCHARGE NOTE PROVIDER - NSDCFUADDAPPT_GEN_ALL_CORE_FT
Please follow-up with the following providers:     1. Dr. Tiffanie Gaming, Primary Care for Post-Hospital Discharge  2. Dr. Ken Ta, Endocrinology for Type 2 Diabetes Management Please follow-up with the following providers:     1. Dr. Tiffanie Gaming, Primary Care for Post-Hospital Discharge  2. Dr. Ken Ta, Endocrinology for Type 2 Diabetes Management    Please bring your Insurance card, Photo ID and Discharge paperwork to the following appointment:    (1) Please follow up with your new Endocrinology Provider, Dr. Ken Ta at [address] [place] [address] on 11/30/2022 at 8:00am.    Appointment was scheduled by Ms. KADIE Kumar, Referral Coordinator.   Please follow-up with the following providers:     1. Dr. Tiffanie Gaming, Primary Care for Post-Hospital Discharge  2. Dr. Ken Ta, Endocrinology for Type 2 Diabetes Management    Please bring your Insurance card, Photo ID and Discharge paperwork to the following appointments:    (1) Please follow up with your Primary Care Provider, Dr. Tiffanie Gaming at [address] [place] [address] on 11/18/2022 at 11:20am.    Appointment was scheduled by Ms. KADIE Kumar, Referral Coordinator.    (2) Please follow up with your new Endocrinology Provider, Dr. Ken Ta at [address] [place] [address], [facility] [place] [address] on 11/30/2022 at 8:00am.    Appointment was scheduled by Ms. KADIE Kumar, Referral Coordinator.   Please follow-up with the following providers:    1. Dr. Jarret Gallardo, Infectious Disease  [address], [place], [place]  Phone: [phone]  *Please call the above phone number to make an appointment and follow-up within 2-3 weeks*    Please bring your Insurance card, Photo ID and Discharge paperwork to the following appointments:    (1) Please follow up with your Primary Care Provider, Dr. Tiffanie Gaming at [address] [place] [address] on 11/18/2022 at 11:20am.    Appointment was scheduled by Ms. KADIE Kumar, Referral Coordinator.    (2) Please follow up with your new Endocrinology Provider, Dr. Ken Ta at [address], [address] [address] on 11/30/2022 at 8:00am.    Appointment was scheduled by Ms. KADIE Kumar, Referral Coordinator.

## 2022-11-08 NOTE — DISCHARGE NOTE PROVIDER - NSDCCPCAREPLAN_GEN_ALL_CORE_FT
PRINCIPAL DISCHARGE DIAGNOSIS  Diagnosis: Acute osteomyelitis  Assessment and Plan of Treatment: Osteomyelitis is inflammation or swelling that occurs in the bone. It can result from an infection somewhere else in the body that has spread to the bone, or it can start in the bone — often as a result of an injury. Treatment of osteomyelitis usually involves long term antibiotics for specific bacteria. In your case, you had osteomyelitis of the bones adjacent to your right shoulder. You were started on IV antibioitcs and will need to continue for 6 weeks to complete the course.      SECONDARY DISCHARGE DIAGNOSES  Diagnosis: Diabetes mellitus type 2, insulin dependent  Assessment and Plan of Treatment: Type 2 diabetes is a disorder that disrupts the way your body uses sugar. It is sometimes called type 2 diabetes mellitus. All the cells in your body need sugar to work normally. Sugar gets into the cells with the help of a hormone called insulin. Insulin is made by the pancreas, an organ in the belly. If there is not enough insulin, or if your body stops responding to insulin, sugar builds up in the blood. That is what happens to people with diabetes.  There are two different types of diabetes:  ?Type 1 diabetes – In type 1 diabetes, the pancreas does not make insulin or makes very little insulin.  ?Type 2 diabetes – In most people with type 2 diabetes, the body stops responding to insulin normally. Then, over time, the pancreas stops making enough insulin.  Being overweight or having obesity increases a person's risk of developing type 2 diabetes. But people who are not overweight can get diabetes, too.  Type 2 diabetes might not make you feel sick. But if it is not managed, it can lead to serious problems over time, such as:  ?Heart attacks  ?Strokes  ?Kidney disease  ?Vision problems (or even blindness)  ?Pain or loss of feeling in the hands and feet  ?Needing to have fingers, toes, or other body parts removed (amputated)  How is type 2 diabetes treated?  The goals of treatment are to control your blood sugar and lower the risk of future problems that can happen in people with diabetes. An important part of managing diabetes is to eat healthy foods and get plenty of physical activity.  There are a few medicines that help control blood sugar. Some people need to take pills that help the body make more insulin or that help insulin do its job. Others need insulin shots.       PRINCIPAL DISCHARGE DIAGNOSIS  Diagnosis: Acute osteomyelitis  Assessment and Plan of Treatment: Osteomyelitis is inflammation or swelling that occurs in the bone. It can result from an infection somewhere else in the body that has spread to the bone, or it can start in the bone — often as a result of an injury or in your case, diabetes mellitus. Treatment of osteomyelitis usually involves long term antibiotics for specific bacteria. In your case, you had osteomyelitis of the bones in your right 4th toe. You were started on IV antibiotics and will need to continue for 6 weeks to complete the course.  -------------------------------------------  You were started on two antibiotics:  1. Ceftriaxone 2g x Once daily  2. Vancomycin 1250mg x every 12 hours  You will receive these antibiotics via a PICC line placed in your arm. You will receive these antibiotics through 12/20/2022. It is very important that you do not miss any of your doses.   You will need to follow up with the infectious disease physicians listed in your follow-up paperwork. You will also need weekly lab work completed and faxed to the Infectious Disease doctor, Dr. Gallardo, at [phone].      SECONDARY DISCHARGE DIAGNOSES  Diagnosis: Diabetes mellitus type 2, insulin dependent  Assessment and Plan of Treatment: Type 2 diabetes is a disorder that disrupts the way your body uses sugar. It is sometimes called type 2 diabetes mellitus. All the cells in your body need sugar to work normally. Sugar gets into the cells with the help of a hormone called insulin. Insulin is made by the pancreas, an organ in the belly. If there is not enough insulin, or if your body stops responding to insulin, sugar builds up in the blood. That is what happens to people with diabetes.  There are two different types of diabetes:  -Type 1 diabetes – In type 1 diabetes, the pancreas does not make insulin or makes very little insulin.  -Type 2 diabetes – In most people with type 2 diabetes, the body stops responding to insulin normally. Then, over time, the pancreas stops making enough insulin.  Type 2 diabetes might not make you feel sick. But if it is not managed, it can lead to serious problems over time, such as: Heart attacks, strokes, kidney disease, vision problems (or even blindness), pain or loss of feeling in the hands or feet, needing to have fingers, toes, or other body parts removed (amputated).   The goals of treatment are to control your blood sugar and lower the risk of future problems that can happen in people with diabetes. An important part of managing diabetes is to eat healthy foods and get plenty of physical activity.  There are a few medicines that help control blood sugar. Some people need to take pills that help the body make more insulin or that help insulin do its job. Others need insulin shots.  -------------------------------------------------  In your case, you need to take insulin. There was a change to your medication regimen. Please follow the below medication plan for your diabetes:  1. STOP taking Metformin  2. STOP taking Trulicity  3. START taking Lantus 15IU once every night  4. START taking Lispro 7IU three times daily before meals  5. START taking Lispro with a sliding scale regimen 4 times daily (3 times with meals, 1 time at bedtime)  It is very important that you atten     PRINCIPAL DISCHARGE DIAGNOSIS  Diagnosis: Acute osteomyelitis  Assessment and Plan of Treatment: Osteomyelitis is inflammation or swelling that occurs in the bone. It can result from an infection somewhere else in the body that has spread to the bone, or it can start in the bone — often as a result of an injury or in your case, diabetes mellitus. Treatment of osteomyelitis usually involves long term antibiotics for specific bacteria. In your case, you had osteomyelitis of the bones in your right 4th toe. You were started on IV antibiotics and will need to continue for 6 weeks to complete the course.  -------------------------------------------  You were started on two antibiotics:  1. Ceftriaxone 2g x Once daily  2. Vancomycin 1250mg x every 12 hours  You will receive these antibiotics via a PICC line placed in your arm. You will receive these antibiotics through 12/20/2022. It is very important that you do not miss any of your doses.   You will need to follow up with the infectious disease physicians listed in your follow-up paperwork. You will also need weekly lab work completed and faxed to the Infectious Disease doctor, Dr. Gallardo, at [phone].      SECONDARY DISCHARGE DIAGNOSES  Diagnosis: Diabetes mellitus type 2, insulin dependent  Assessment and Plan of Treatment: Type 2 diabetes is a disorder that disrupts the way your body uses sugar. It is sometimes called type 2 diabetes mellitus. All the cells in your body need sugar to work normally. Sugar gets into the cells with the help of a hormone called insulin. Insulin is made by the pancreas, an organ in the belly. If there is not enough insulin, or if your body stops responding to insulin, sugar builds up in the blood. That is what happens to people with diabetes.  There are two different types of diabetes:  -Type 1 diabetes – In type 1 diabetes, the pancreas does not make insulin or makes very little insulin.  -Type 2 diabetes – In most people with type 2 diabetes, the body stops responding to insulin normally. Then, over time, the pancreas stops making enough insulin.  Type 2 diabetes might not make you feel sick. But if it is not managed, it can lead to serious problems over time, such as: Heart attacks, strokes, kidney disease, vision problems (or even blindness), pain or loss of feeling in the hands or feet, needing to have fingers, toes, or other body parts removed (amputated).   The goals of treatment are to control your blood sugar and lower the risk of future problems that can happen in people with diabetes. An important part of managing diabetes is to eat healthy foods and get plenty of physical activity.  -------------------------------------------------  In your case, you need to take insulin. There was a change to your medication regimen. Please follow the below medication plan for your diabetes:  1. STOP taking Metformin  2. STOP taking Trulicity  3. START taking Lantus 15IU once every night  4. START taking Lispro 7IU three times daily before meals  5. START taking Lispro with a sliding scale regimen 4 times daily (3 times with meals, 1 time at bedtime)  It is very important that you follow up with an Endocrinologist outpatient for further management.     PRINCIPAL DISCHARGE DIAGNOSIS  Diagnosis: Acute osteomyelitis  Assessment and Plan of Treatment: Osteomyelitis is inflammation or swelling that occurs in the bone. It can result from an infection somewhere else in the body that has spread to the bone, or it can start in the bone — often as a result of an injury or in your case, diabetes mellitus. Treatment of osteomyelitis usually involves long term antibiotics for specific bacteria. In your case, you had osteomyelitis of the bones in your right 4th toe. You were started on IV antibiotics and will need to continue for 6 weeks to complete the course.  -------------------------------------------  You were started on two antibiotics:  1. Ceftriaxone 2g x Once daily  2. Vancomycin 1250mg x every 12 hours  You will receive these antibiotics via a PICC line placed in your arm. You will receive these antibiotics through 12/20/2022. It is very important that you do not miss any of your doses.   You will need to follow up with the infectious disease physicians listed in your follow-up paperwork. You will also need weekly lab work completed and faxed to the Infectious Disease doctor, Dr. Gallardo, at [phone].      SECONDARY DISCHARGE DIAGNOSES  Diagnosis: HTN (hypertension)  Assessment and Plan of Treatment: You have a known history of high blood pressure prior to your admission.  High blood pressure can cause damage to your heart and kidneys and increases your risk of heart attack and stroke. To avoid this, It is important that you continue to take this medication when you are discharged so that you can continue to control your blood pressure. Additionally be sure to follow up with your primary care physician on a regular basis to make sure your blood pressure continues to be well controlled. If you experience symptoms such as but not limited to: sudden onset blurry vision, nausea, vomiting, chest pain, shortness of breath, or palpitations, please go to the nearest emergency room.  Please hold Lisinopril 40mg at this time and follow up with your primary care doctor upon discharge and then re-contiue at possibly lower dose.    Diagnosis: Diabetes mellitus type 2, insulin dependent  Assessment and Plan of Treatment: Type 2 diabetes is a disorder that disrupts the way your body uses sugar. It is sometimes called type 2 diabetes mellitus. All the cells in your body need sugar to work normally. Sugar gets into the cells with the help of a hormone called insulin. Insulin is made by the pancreas, an organ in the belly. If there is not enough insulin, or if your body stops responding to insulin, sugar builds up in the blood. That is what happens to people with diabetes.  There are two different types of diabetes:  -Type 1 diabetes – In type 1 diabetes, the pancreas does not make insulin or makes very little insulin.  -Type 2 diabetes – In most people with type 2 diabetes, the body stops responding to insulin normally. Then, over time, the pancreas stops making enough insulin.  Type 2 diabetes might not make you feel sick. But if it is not managed, it can lead to serious problems over time, such as: Heart attacks, strokes, kidney disease, vision problems (or even blindness), pain or loss of feeling in the hands or feet, needing to have fingers, toes, or other body parts removed (amputated).   The goals of treatment are to control your blood sugar and lower the risk of future problems that can happen in people with diabetes. An important part of managing diabetes is to eat healthy foods and get plenty of physical activity.  -------------------------------------------------  In your case, you need to take insulin. There was a change to your medication regimen. Please follow the below medication plan for your diabetes:  1. STOP taking Metformin  2. STOP taking Trulicity  3. START taking Lantus 15IU once every night  4. START taking Lispro 7IU three times daily before meals  5. START taking Lispro with a sliding scale regimen 4 times daily (3 times with meals, 1 time at bedtime)  It is very important that you follow up with an Endocrinologist outpatient for further management.     PRINCIPAL DISCHARGE DIAGNOSIS  Diagnosis: Acute osteomyelitis  Assessment and Plan of Treatment: Osteomyelitis is inflammation or swelling that occurs in the bone. It can result from an infection somewhere else in the body that has spread to the bone, or it can start in the bone — often as a result of an injury or in your case, diabetes mellitus. Treatment of osteomyelitis usually involves long term antibiotics for specific bacteria. In your case, you had osteomyelitis of the bones in your right 4th toe. You were started on IV antibiotics and will need to continue for 6 weeks to complete the course.  -------------------------------------------  You were started on two antibiotics:  1. Ceftriaxone 2g x Once daily  2. Vancomycin 1250mg x every 12 hours  You will receive these antibiotics via a PICC line placed in your arm. You will receive these antibiotics through 12/20/2022. It is very important that you do not miss any of your doses.   You will need to follow up with the infectious disease physicians listed in your follow-up paperwork. You will also need weekly lab work completed and faxed to the Infectious Disease doctor, Dr. Gallardo, at [phone].  Please continue with the following wound care at home: Cleanse wound with normal saline daily, pat dry with sterile guaze, apply saline soaked gauze to wound base, cover with dry sterile gauze, ABD pad, wrap with Kerlix and light ACE bandage.   Please follow up with Dr. Jay Chakraborty within 1 week of discharge.    Office information:          [place] Address- [address] [place] [address] ([address])[place] [address]          [facility]         [phone]        SECONDARY DISCHARGE DIAGNOSES  Diagnosis: HTN (hypertension)  Assessment and Plan of Treatment: You have a known history of high blood pressure prior to your admission.  High blood pressure can cause damage to your heart and kidneys and increases your risk of heart attack and stroke. To avoid this, It is important that you continue to take this medication when you are discharged so that you can continue to control your blood pressure. Additionally be sure to follow up with your primary care physician on a regular basis to make sure your blood pressure continues to be well controlled. If you experience symptoms such as but not limited to: sudden onset blurry vision, nausea, vomiting, chest pain, shortness of breath, or palpitations, please go to the nearest emergency room.  Please hold Lisinopril 40mg at this time and follow up with your primary care doctor upon discharge and then re-contiue at possibly lower dose.    Diagnosis: Diabetes mellitus type 2, insulin dependent  Assessment and Plan of Treatment: Type 2 diabetes is a disorder that disrupts the way your body uses sugar. It is sometimes called type 2 diabetes mellitus. All the cells in your body need sugar to work normally. Sugar gets into the cells with the help of a hormone called insulin. Insulin is made by the pancreas, an organ in the belly. If there is not enough insulin, or if your body stops responding to insulin, sugar builds up in the blood. That is what happens to people with diabetes.  There are two different types of diabetes:  -Type 1 diabetes – In type 1 diabetes, the pancreas does not make insulin or makes very little insulin.  -Type 2 diabetes – In most people with type 2 diabetes, the body stops responding to insulin normally. Then, over time, the pancreas stops making enough insulin.  Type 2 diabetes might not make you feel sick. But if it is not managed, it can lead to serious problems over time, such as: Heart attacks, strokes, kidney disease, vision problems (or even blindness), pain or loss of feeling in the hands or feet, needing to have fingers, toes, or other body parts removed (amputated).   The goals of treatment are to control your blood sugar and lower the risk of future problems that can happen in people with diabetes. An important part of managing diabetes is to eat healthy foods and get plenty of physical activity.  -------------------------------------------------  In your case, you need to take insulin. There was a change to your medication regimen. Please follow the below medication plan for your diabetes:  1. STOP taking Metformin  2. STOP taking Trulicity  3. START taking Lantus 15IU once every night  4. START taking Lispro 7IU three times daily before meals  5. START taking Lispro with a sliding scale regimen 4 times daily (3 times with meals, 1 time at bedtime)  It is very important that you follow up with an Endocrinologist outpatient for further management.

## 2022-11-08 NOTE — PROGRESS NOTE ADULT - PROBLEM SELECTOR PLAN 2
Unknown baseline renal function, BUN 34 and Cr 1.7 on admission. Could be CKD 2/2 DM vs NOLAN vs NOLAN on CKD. DDx include pre-intra-post renal azotemia if NOLAN.   - follow urine studies to better classify   - ensure adequate hydration  - trend BUN + Cr

## 2022-11-08 NOTE — DISCHARGE NOTE PROVIDER - CARE PROVIDERS DIRECT ADDRESSES
,[email or web address]

## 2022-11-08 NOTE — PROGRESS NOTE ADULT - SUBJECTIVE AND OBJECTIVE BOX
INTERVAL HPI/OVERNIGHT EVENTS:  Patient was seen and examined at bedside. As per nurse and patient, no o/n events, patient resting comfortably. Mild pain in right 3rd toe. Patient denies: fever, chills, lightheadedness, weakness, CP, palpitations, SOB,, N/V. ROS otherwise negative aside from dry cough described by patient as chronic w/ unknown source.     VITAL SIGNS:  T(F): 98 (11-08-22 @ 06:32)  HR: 64 (11-08-22 @ 06:32)  BP: 129/64 (11-08-22 @ 06:32)  RR: 17 (11-08-22 @ 06:32)  SpO2: 100% (11-08-22 @ 06:32)  Wt(kg): --        PHYSICAL EXAM:    Constitutional: resting comfortably in bed; mild distress due to pain in 3rd right toe  HEENT: NC/AT, PER, anicteric sclera, no nasal discharge;   Neck: supple; no JVD or thyromegaly, no lymphadenopathy   Respiratory: CTA B/L; no W/R/R, no retractions, occasional dry non-productive cough most prominent in upper airway  Cardiac: +S1/S2; RRR; no M/R/G  Gastrointestinal: soft, NT/ND; no rebound or guarding, bowel sounds active in all four quadrants   Back: spine midline, no bony tenderness or step-offs; no CVAT B/L  Extremities: WWP, no clubbing or cyanosis; no peripheral edema, + tenderness at 3rd digit of right foot  Musculoskeletal: NROM x4; no joint swelling, tenderness or erythema  Vascular: 2+ radial, DP/PT pulses B/L  Dermatologic: skin warm, dry and intact; no rashes, wounds, or scars  Neurologic: AAOx3; CNII-XII grossly intact; no focal deficits  Psychiatric: affect and characteristics of appearance, verbalizations, behaviors are appropriate, congruent affect     MEDICATIONS  (STANDING):  atorvastatin 40 milliGRAM(s) Oral at bedtime  citalopram 40 milliGRAM(s) Oral daily  dextrose 5%. 1000 milliLiter(s) (100 mL/Hr) IV Continuous <Continuous>  dextrose 5%. 1000 milliLiter(s) (50 mL/Hr) IV Continuous <Continuous>  dextrose 50% Injectable 25 Gram(s) IV Push once  dextrose 50% Injectable 12.5 Gram(s) IV Push once  dextrose 50% Injectable 25 Gram(s) IV Push once  glucagon  Injectable 1 milliGRAM(s) IntraMuscular once  heparin   Injectable 5000 Unit(s) SubCutaneous every 8 hours  insulin glargine Injectable (LANTUS) 40 Unit(s) SubCutaneous two times a day  insulin lispro (ADMELOG) corrective regimen sliding scale   SubCutaneous Before meals and at bedtime  lactated ringers. 1000 milliLiter(s) (200 mL/Hr) IV Continuous <Continuous>  vancomycin  IVPB 1500 milliGRAM(s) IV Intermittent every 12 hours    MEDICATIONS  (PRN):  acetaminophen     Tablet .. 650 milliGRAM(s) Oral every 6 hours PRN Temp greater or equal to 38C (100.4F), Mild Pain (1 - 3)  aluminum hydroxide/magnesium hydroxide/simethicone Suspension 30 milliLiter(s) Oral every 4 hours PRN Dyspepsia  dextrose Oral Gel 15 Gram(s) Oral once PRN Blood Glucose LESS THAN 70 milliGRAM(s)/deciliter  melatonin 3 milliGRAM(s) Oral at bedtime PRN Insomnia  ondansetron Injectable 4 milliGRAM(s) IV Push every 8 hours PRN Nausea and/or Vomiting      Allergies    No Known Allergies    Intolerances        LABS:                        10.4   6.54  )-----------( 248      ( 08 Nov 2022 05:30 )             32.2     11-08    135  |  104  |  31<H>  ----------------------------<  209<H>  4.5   |  22  |  1.37<H>    Ca    8.5      08 Nov 2022 05:30    TPro  6.2  /  Alb  3.7  /  TBili  <0.2  /  DBili  x   /  AST  20  /  ALT  17  /  AlkPhos  49  11-08          RADIOLOGY & ADDITIONAL TESTS:  Reviewed

## 2022-11-08 NOTE — DISCHARGE NOTE PROVIDER - NSDCFUSCHEDAPPT_GEN_ALL_CORE_FT
Ken Ta  [facility]  [facility] [address]  Scheduled Appointment: 11/30/2022     Tiffanie Gaming  [facility]  INTMED [address]  Scheduled Appointment: 11/18/2022    Ken Ta  [facility]  ENDOCRIN [address]  Scheduled Appointment: 11/30/2022

## 2022-11-08 NOTE — PROGRESS NOTE ADULT - SUBJECTIVE AND OBJECTIVE BOX
Patient is a 57y old  Male who presents with a chief complaint of OM (07 Nov 2022 23:40)      INTERVAL HPI/ OVERNIGHT EVENTS: Pt seen and evaluated by podiatry this AM. Pts dressing is C/D/I.       LABS                        10.4   6.54  )-----------( 248      ( 08 Nov 2022 05:30 )             32.2     11-07    135  |  102  |  34<H>  ----------------------------<  199<H>  4.8   |  24  |  1.70<H>    Ca    8.5      07 Nov 2022 22:29    TPro  6.8  /  Alb  4.1  /  TBili  0.2  /  DBili  x   /  AST  23  /  ALT  15  /  AlkPhos  51  11-07      ESR: 55  CRP: --  11-07 @ 22:29    ICU Vital Signs Last 24 Hrs  T(C): 36.7 (08 Nov 2022 06:32), Max: 36.9 (07 Nov 2022 21:33)  T(F): 98 (08 Nov 2022 06:32), Max: 98.4 (07 Nov 2022 21:33)  HR: 64 (08 Nov 2022 06:32) (60 - 64)  BP: 129/64 (08 Nov 2022 06:32) (115/64 - 133/66)  BP(mean): --  ABP: --  ABP(mean): --  RR: 17 (08 Nov 2022 06:32) (16 - 18)  SpO2: 100% (08 Nov 2022 06:32) (98% - 100%)    O2 Parameters below as of 08 Nov 2022 06:32  Patient On (Oxygen Delivery Method): room air            MICROBIOLOGY:  bone biopsy cx and path pending     PHYSICAL EXAM  Lower Extremity Focused  Vasc: DP 1/4, PT 2/4, No edema, No erythema, CFT <3 x 5  Derm: 2 mm ulcer on distal tip of 4th digit, +PTB, no pus, no malodor, hyperkeratotic borders; no other open wounds   Neuro: Protective sensation intact  MSK: Partial 3rd digit amputation    RADIOLOGY  X-ray Right Foot:   WET READ: Cortical erosions of right 4th distal phalanx, no signs of gas or foreign body    outpatient MRI of Right forefoot: Acute osteomyelitis of 4th distal phalanx. Early OM vs reactive edema in the 4th middle phalanx

## 2022-11-08 NOTE — PROGRESS NOTE ADULT - PROBLEM SELECTOR PLAN 1
Patient with 3 week history of lesion to right 4th toe, confirmed OM on outpatient MRI, sent in by outpatient podiatry for IV Abx and Bone biopsy.   - Podiatry following, performed bone biopsy in the ED   - initiate Vancomycin 15mg/kg q12hrs with trough prior to 4th dose and Cefepime 2g q12hrs (avoid Vanc/Zosyn i/s/o NOLAN), narrow coverage pending bone Cx   - Patient to require PICC line   - trend ESR/CRP every other day Patient with 3 week history of lesion to right 4th toe, confirmed OM on outpatient MRI, sent in by outpatient podiatry for IV Abx and Bone biopsy. CRP WNL. ESR downtrended after Abx initiation and fluids.     - Podiatry following, performed bone biopsy in the ED, f/u WCx  - initiate Vancomycin 15mg/kg q12hrs with trough prior to 4th dose and Cefepime 2g q12hrs (avoid Vanc/Zosyn i/s/o NOLAN), narrow coverage pending bone Cx   - Patient to require PICC line

## 2022-11-08 NOTE — CONSULT NOTE ADULT - SUBJECTIVE AND OBJECTIVE BOX
HISTORY OF PRESENT ILLNESS  57M with PMH DM, HTN, HLD and anxiety presenting at the behest of outpatient podiatry for MRI indicative of OM. The patient reports development of an ulcer along the right 4th toe a few weeks ago, for which he visited a podiatrist last week who prescribed Abx (topical gentamycin, PO omadacycline and PO cephalexin) and ordered MRI, which has been read as positive for OM. He does not report any purulence over the past few weeks. He denies any ROS of systemic infection. He denies all other ROS at this time.     ED course:   Vitals: 98.4, 62, 115/64, 16, 98% RA   Labs: Hb 11.1, BUN 34, Cr 1.7 (unknown baseline for all)   Imaging: Outpatient MRI of the right foot c/w OM to the 4th digit   Interventions: Podiatry consulted       DIABETES HISTORY  - Age at diagnosis:   - Symptoms at time of diagnosis:   - Current Therapy:  - History of other regimens:   - History of hypoglycemia:   - History of DKA/HHS:   - Complications:   - Home FSG:        > Fasting: *** mg/dL.        > Before meals: *** mg/dL.        > Bedtime: *** mg/dL.  - Diet:          > Breakfast:         > Lunch:        > Dinner:        > Snacks:  - Physical activity:    - Outpatient follow-up:     PAST MEDICAL & SURGICAL HISTORY  As per history of present illness.     FAMILY HISTORY  - Diabetes:  - Thyroid:  - Autoimmune:  - Other:    SOCIAL HISTORY  - Work:  - Alcohol:  - Smoking:  - Recreational Drugs:    ALLERGIES  No Known Allergies    CURRENT MEDICATIONS  acetaminophen     Tablet .. 650 milliGRAM(s) Oral every 6 hours PRN  aluminum hydroxide/magnesium hydroxide/simethicone Suspension 30 milliLiter(s) Oral every 4 hours PRN  atorvastatin 40 milliGRAM(s) Oral at bedtime  citalopram 40 milliGRAM(s) Oral daily  dextrose 5%. 1000 milliLiter(s) IV Continuous <Continuous>  dextrose 5%. 1000 milliLiter(s) IV Continuous <Continuous>  dextrose 50% Injectable 25 Gram(s) IV Push once  dextrose 50% Injectable 12.5 Gram(s) IV Push once  dextrose 50% Injectable 25 Gram(s) IV Push once  dextrose Oral Gel 15 Gram(s) Oral once PRN  glucagon  Injectable 1 milliGRAM(s) IntraMuscular once  heparin   Injectable 5000 Unit(s) SubCutaneous every 8 hours  insulin glargine Injectable (LANTUS) 40 Unit(s) SubCutaneous two times a day  insulin lispro (ADMELOG) corrective regimen sliding scale   SubCutaneous Before meals and at bedtime  lactated ringers. 1000 milliLiter(s) IV Continuous <Continuous>  melatonin 3 milliGRAM(s) Oral at bedtime PRN  ondansetron Injectable 4 milliGRAM(s) IV Push every 8 hours PRN  vancomycin  IVPB 1500 milliGRAM(s) IV Intermittent every 12 hours    REVIEW OF SYSTEMS  Constitutional:  Negative fever, chills or loss of appetite.  Eyes:  Negative blurry vision or double vision.  Cardiovascular:  Negative for chest pain or palpitations.  Respiratory:  Negative for cough, wheezing, or shortness of breath.   Gastrointestinal:  Negative for nausea, vomiting, diarrhea, constipation, or abdominal pain.  Genitourinary:  Negative frequency, urgency or dysuria.  Neurologic:  No headache, confusion, dizziness, lightheadedness.    PHYSICAL EXAM  Vital Signs Last 24 Hrs  T(C): 36.7 (2022 06:32), Max: 36.9 (2022 21:33)  T(F): 98 (2022 06:32), Max: 98.4 (2022 21:33)  HR: 64 (2022 06:32) (60 - 64)  BP: 129/64 (2022 06:32) (115/64 - 133/66)  BP(mean): --  RR: 17 (2022 06:32) (16 - 18)  SpO2: 100% (2022 06:32) (98% - 100%)    Parameters below as of 2022 06:32  Patient On (Oxygen Delivery Method): room air    Constitutional: Awake, alert, in no acute distress.   HEENT: Normocephalic, atraumatic, HALLEY, no proptosis or lid retraction.   Neck: supple, no acanthosis, no thyromegaly or palpable thyroid nodules.  Respiratory: Lungs clear to ausculation bilaterally.   Cardiovascular: regular rhythm, normal S1 and S2, no audible murmurs.   GI: soft, non-tender, non-distended, bowel sounds present, no masses appreciated.  Extremities: No lower extremity edema, peripheral pulses present.   Skin: no rashes.   Psychiatric: AAO x 3. Normal affect/mood.     LABS  CBC - WBC/HGB/HTC/PLT: 6.54/10.4/32.2/248 (22)  BMP: Na/K/Cl/Bicarb/BUN/Cr/Gluc: 135/4.5/104/22/31/1.37/209 (22)  Anion Gap: 9 (22)  eGFR: 61 (22)  Calcium: 8.5 (22)  Phosphorus: -- (22)  Magnesium: -- (22)  LFT - Alb/Tprot/Tbili/Dbili/AlkPhos/ALT/AST: 3.7/--/<0.2/--/49/ (22)        CAPILLARY BLOOD GLUCOSE & INSULIN RECEIVED  Yesterday  - Dinner FSG: *** mg/dL = *** units of premeal Lispro + *** units of Lispro sliding scale.   - Bedtime FS mg/dL = *** units of Lantus + *** units Lispro sliding scale.     Today  - Breakfast FS mg/dL = *** units of premeal Lispro + *** units of Lispro sliding scale.   - Lunch FS mg/dL = *** units of premeal Lispro + *** units of Lispro sliding scale.     77 mg/dL ( @ 12:22)  175 mg/dL ( @ 09:07)  74 mg/dL ( @ 01:52)    ASSESSMENT / RECOMMENDATIONS  57M with PMH DM, HTN, HLD and anxiety presenting at the behest of outpatient podiatry for MRI indicative of OM, admitted for OM.     A1C: 11.4 %  BUN: 31  Creatinine: 1.37  eGFR: 61Weight (kg): 90.7  BMI (kg/m2): 27.1  Ejection Fraction:     # Type 2 diabetes mellitus  - Please continue lantus *** units at bedtime.   - Continue lispro *** units before each meal.  - Continue lispro moderate / low dose sliding scale four times daily with meals and at bedtime.  - Patient's fingerstick glucose goal is 100-180 mg/dL.    - For discharge, patient can ***.    - Patient can follow up at discharge with [facility] by calling [phone] to make an appointment.      Case discussed with Dr. Bustillo. Primary team updated.       Neema Abernathy   Endocrinology Fellow    Service Pager: [phone]  HISTORY OF PRESENT ILLNESS  57M with PMH DM, HTN, HLD and anxiety presenting at the behest of outpatient podiatry for MRI indicative of OM. The patient reports development of an ulcer along the right 4th toe a few weeks ago, for which he visited a podiatrist last week who prescribed Abx (topical gentamycin, PO omadacycline and PO cephalexin) and ordered MRI, which has been read as positive for OM. He does not report any purulence over the past few weeks. He denies any ROS of systemic infection. He denies all other ROS at this time.     ED course:   Vitals: 98.4, 62, 115/64, 16, 98% RA   Labs: Hb 11.1, BUN 34, Cr 1.7 (unknown baseline for all)   Imaging: Outpatient MRI of the right foot c/w OM to the 4th digit   Interventions: Podiatry consulted       DIABETES HISTORY  - diagnosis: 25 years ago, was on oral regimen but had lantus added on shortly after, reports alway being uncontrolled.   - Current Therapy: Lantus 40u BID , Metformin 1g BID, Trulicity 3mg weekly(Sat), Reports having missed Lantus doses frequently.   - History of other regimens:   - History of hypoglycemia: wakes up occasionally low in 50s, but is mostly   - History of DKA/HHS:   - Complications: +Neuropathy and retinopathy   - Home FSG: has a elizabeth device         > Fastin-100 mg/dL.        > Before meals: 250s mg/dL.        > Bedtime: 200s mg/dL.  - Diet:          > Breakfast:         > Lunch: [place]         > Dinner: Rice, Pasta, protein (does not limit carbs)        > Snacks: chips or cheese and crackers before dinner, only drinks diet soda   - Physical activity:    - Outpatient follow-up: does not have endocrinologist, states practice he goes to in [place] does not have Endo    Additional information: Reports a month ago, went to [place] and forgot to take insulin and elizabeth sensors with him.   Was without insulin for 2 weeks. State when compliant with regimen blood sugars are lower.      PAST MEDICAL & SURGICAL HISTORY  As per history of present illness.     FAMILY HISTORY  - Diabetes:Father, and grandparents   - Thyroid: none  - Autoimmune:  - Other:    SOCIAL HISTORY  - Work:  - Alcohol: drinks 3-4 glasses of red wine 4 days a week, no hard liquor or beer   - Smoking: non-smoker   - Recreational Drugs: none    ALLERGIES  No Known Allergies    CURRENT MEDICATIONS  acetaminophen     Tablet .. 650 milliGRAM(s) Oral every 6 hours PRN  aluminum hydroxide/magnesium hydroxide/simethicone Suspension 30 milliLiter(s) Oral every 4 hours PRN  atorvastatin 40 milliGRAM(s) Oral at bedtime  citalopram 40 milliGRAM(s) Oral daily  dextrose 5%. 1000 milliLiter(s) IV Continuous <Continuous>  dextrose 5%. 1000 milliLiter(s) IV Continuous <Continuous>  dextrose 50% Injectable 25 Gram(s) IV Push once  dextrose 50% Injectable 12.5 Gram(s) IV Push once  dextrose 50% Injectable 25 Gram(s) IV Push once  dextrose Oral Gel 15 Gram(s) Oral once PRN  glucagon  Injectable 1 milliGRAM(s) IntraMuscular once  heparin   Injectable 5000 Unit(s) SubCutaneous every 8 hours  insulin glargine Injectable (LANTUS) 40 Unit(s) SubCutaneous two times a day  insulin lispro (ADMELOG) corrective regimen sliding scale   SubCutaneous Before meals and at bedtime  lactated ringers. 1000 milliLiter(s) IV Continuous <Continuous>  melatonin 3 milliGRAM(s) Oral at bedtime PRN  ondansetron Injectable 4 milliGRAM(s) IV Push every 8 hours PRN  vancomycin  IVPB 1500 milliGRAM(s) IV Intermittent every 12 hours    REVIEW OF SYSTEMS  Constitutional:  Negative fever, chills or loss of appetite.  Eyes:  blurry vision   Cardiovascular:  Negative for chest pain or palpitations.  Respiratory:  Negative for cough, wheezing, or shortness of breath.   Gastrointestinal:  Negative for nausea, vomiting, diarrhea, constipation, or abdominal pain.  Genitourinary:  Negative frequency, urgency or dysuria.  Neurologic:  No headache, confusion, dizziness, lightheadedness. +numbness of feet     PHYSICAL EXAM  Vital Signs Last 24 Hrs  T(C): 36.7 (2022 06:32), Max: 36.9 (2022 21:33)  T(F): 98 (2022 06:32), Max: 98.4 (2022 21:33)  HR: 64 (2022 06:32) (60 - 64)  BP: 129/64 (2022 06:32) (115/64 - 133/66)  BP(mean): --  RR: 17 (2022 06:32) (16 - 18)  SpO2: 100% (2022 06:32) (98% - 100%)    Parameters below as of 2022 06:32  Patient On (Oxygen Delivery Method): room air    Constitutional: Awake, alert, in no acute distress.   HEENT: Normocephalic, atraumatic, HALLEY, no proptosis or lid retraction.   Neck: supple, no acanthosis, no thyromegaly or palpable thyroid nodules.  Respiratory: Lungs clear to ausculation bilaterally.   Cardiovascular: regular rhythm, normal S1 and S2, no audible murmurs.   GI: soft, non-tender, non-distended, bowel sounds present, no masses appreciated.  Extremities: 2 mm ulcer on distal tip of R 4th digit, +PTB, no pus, no malodor, hyperkeratotic borders; no other open wounds   Skin: no rashes.   Psychiatric: AAO x 3. Normal affect/mood.     LABS  CBC - WBC/HGB/HTC/PLT: 6.54/10.4/32.2/248 (22)  BMP: Na/K/Cl/Bicarb/BUN/Cr/Gluc: 135/4.5/104/22/31/1.37/209 (22)  Anion Gap: 9 (22)  eGFR: 61 (22)  Calcium: 8.5 (22)  Phosphorus: -- (22)  Magnesium: -- (22)  LFT - Alb/Tprot/Tbili/Dbili/AlkPhos/ALT/AST: 3.7/--/<0.2/--/49/17/20 (22)        CAPILLARY BLOOD GLUCOSE & INSULIN RECEIVED  Yesterday  - Dinner FSG: *** mg/dL = *** units of premeal Lispro + *** units of Lispro sliding scale.   - Bedtime FS mg/dL = *** units of Lantus + *** units Lispro sliding scale.     Today  - Breakfast FS mg/dL = *** units of premeal Lispro + *** units of Lispro sliding scale.   - Lunch FS mg/dL = *** units of premeal Lispro + *** units of Lispro sliding scale.     77 mg/dL ( @ 12:22)  175 mg/dL ( @ 09:07)  74 mg/dL ( @ 01:52)    ASSESSMENT / RECOMMENDATIONS  57M with PMH DM, HTN, HLD and anxiety presenting at the behest of outpatient podiatry for MRI indicative of OM, admitted for OM.     A1C: 11.4 %  BUN: 31  Creatinine: 1.37  eGFR: 61Weight (kg): 90.7  BMI (kg/m2): 27.1  Ejection Fraction:     # Type 2 diabetes mellitus  - Please continue lantus *** units at bedtime.   - Continue lispro *** units before each meal.  - Continue lispro moderate / low dose sliding scale four times daily with meals and at bedtime.  - Patient's fingerstick glucose goal is 100-180 mg/dL.    - For discharge, patient can ***.    - Patient can follow up at discharge with [facility] by calling [phone] to make an appointment.      Case discussed with Dr. Bustillo. Primary team updated.       Neema Abernathy   Endocrinology Fellow    Service Pager: [phone]  HISTORY OF PRESENT ILLNESS  57M with PMH DM, HTN, HLD and anxiety presenting at the behest of outpatient podiatry for MRI indicative of OM. The patient reports development of an ulcer along the right 4th toe a few weeks ago, for which he visited a podiatrist last week who prescribed Abx (topical gentamycin, PO omadacycline and PO cephalexin) and ordered MRI, which has been read as positive for OM. He does not report any purulence over the past few weeks. He denies any ROS of systemic infection. He denies all other ROS at this time.     ED course:   Vitals: 98.4, 62, 115/64, 16, 98% RA   Labs: Hb 11.1, BUN 34, Cr 1.7 (unknown baseline for all)   Imaging: Outpatient MRI of the right foot c/w OM to the 4th digit   Interventions: Podiatry consulted       DIABETES HISTORY  - diagnosis: 25 years ago, was on oral regimen but had lantus added on shortly after, reports alway being uncontrolled.   - Current Therapy: Lantus 40u BID , Metformin 1g BID, Trulicity 3mg weekly(Sat), Reports having missed Lantus doses frequently.   - History of other regimens:   - History of hypoglycemia: wakes up occasionally low in 50s, but is mostly   - History of DKA/HHS:   - Complications: +Neuropathy and retinopathy   - Home FSG: has a elizabeth device         > Fastin-100 mg/dL.        > Before meals: 250s mg/dL.        > Bedtime: 200s mg/dL.  - Diet:          > Breakfast:         > Lunch: [place]         > Dinner: Rice, Pasta, protein (does not limit carbs)        > Snacks: chips or cheese and crackers before dinner, only drinks diet soda   - Physical activity:    - Outpatient follow-up: does not have endocrinologist, states practice he goes to in [place] does not have Endo    Additional information: Reports a month ago, went to [place] and forgot to take insulin and elizabeth sensors with him.   Was without insulin for 2 weeks. State when compliant with regimen blood sugars are lower.      PAST MEDICAL & SURGICAL HISTORY  As per history of present illness.     FAMILY HISTORY  - Diabetes:Father, and grandparents   - Thyroid: none  - Autoimmune:  - Other:    SOCIAL HISTORY  - Work:  - Alcohol: drinks 3-4 glasses of red wine 4 days a week, no hard liquor or beer   - Smoking: non-smoker   - Recreational Drugs: none    ALLERGIES  No Known Allergies    CURRENT MEDICATIONS  acetaminophen     Tablet .. 650 milliGRAM(s) Oral every 6 hours PRN  aluminum hydroxide/magnesium hydroxide/simethicone Suspension 30 milliLiter(s) Oral every 4 hours PRN  atorvastatin 40 milliGRAM(s) Oral at bedtime  citalopram 40 milliGRAM(s) Oral daily  dextrose 5%. 1000 milliLiter(s) IV Continuous <Continuous>  dextrose 5%. 1000 milliLiter(s) IV Continuous <Continuous>  dextrose 50% Injectable 25 Gram(s) IV Push once  dextrose 50% Injectable 12.5 Gram(s) IV Push once  dextrose 50% Injectable 25 Gram(s) IV Push once  dextrose Oral Gel 15 Gram(s) Oral once PRN  glucagon  Injectable 1 milliGRAM(s) IntraMuscular once  heparin   Injectable 5000 Unit(s) SubCutaneous every 8 hours  insulin glargine Injectable (LANTUS) 40 Unit(s) SubCutaneous two times a day  insulin lispro (ADMELOG) corrective regimen sliding scale   SubCutaneous Before meals and at bedtime  lactated ringers. 1000 milliLiter(s) IV Continuous <Continuous>  melatonin 3 milliGRAM(s) Oral at bedtime PRN  ondansetron Injectable 4 milliGRAM(s) IV Push every 8 hours PRN  vancomycin  IVPB 1500 milliGRAM(s) IV Intermittent every 12 hours    REVIEW OF SYSTEMS  Constitutional:  Negative fever, chills or loss of appetite.  Eyes:  blurry vision   Cardiovascular:  Negative for chest pain or palpitations.  Respiratory:  Negative for cough, wheezing, or shortness of breath.   Gastrointestinal:  Negative for nausea, vomiting, diarrhea, constipation, or abdominal pain.  Genitourinary:  Negative frequency, urgency or dysuria.  Neurologic:  No headache, confusion, dizziness, lightheadedness. +numbness of feet     PHYSICAL EXAM  Vital Signs Last 24 Hrs  T(C): 36.7 (2022 06:32), Max: 36.9 (2022 21:33)  T(F): 98 (2022 06:32), Max: 98.4 (2022 21:33)  HR: 64 (2022 06:32) (60 - 64)  BP: 129/64 (2022 06:32) (115/64 - 133/66)  BP(mean): --  RR: 17 (2022 06:32) (16 - 18)  SpO2: 100% (2022 06:32) (98% - 100%)    Parameters below as of 2022 06:32  Patient On (Oxygen Delivery Method): room air    Constitutional: Awake, alert, in no acute distress.   HEENT: Normocephalic, atraumatic, HALLEY, no proptosis or lid retraction.   Neck: supple, no acanthosis, no thyromegaly or palpable thyroid nodules.  Respiratory: Lungs clear to ausculation bilaterally.   Cardiovascular: regular rhythm, normal S1 and S2, no audible murmurs.   GI: soft, non-tender, non-distended, bowel sounds present, no masses appreciated.  Extremities: 2 mm ulcer on distal tip of R 4th digit, +PTB, no pus, no malodor, hyperkeratotic borders; no other open wounds   Skin: no rashes.   Psychiatric: AAO x 3. Normal affect/mood.     LABS  CBC - WBC/HGB/HTC/PLT: 6.54/10.4/32.2/248 (22)  BMP: Na/K/Cl/Bicarb/BUN/Cr/Gluc: 135/4.5/104/22/31/1.37/209 (22)  Anion Gap: 9 (22)  eGFR: 61 (22)  Calcium: 8.5 (22)  Phosphorus: -- (22)  Magnesium: -- (22)  LFT - Alb/Tprot/Tbili/Dbili/AlkPhos/ALT/AST: 3.7/--/<0.2/--/49/17/20 (22)          77 mg/dL ( @ 12:22)  175 mg/dL ( @ 09:07)  74 mg/dL ( @ 01:52)    ASSESSMENT / RECOMMENDATIONS  57M with PMH DM, HTN, HLD and anxiety presenting at the behest of outpatient podiatry for MRI indicative of OM, admitted for OM.     A1C: 11.4 %  BUN: 31  Creatinine: 1.37  eGFR: 61Weight (kg): 90.7  BMI (kg/m2): 27.1  Ejection Fraction:     # Type 2 diabetes mellitus  -Patient's outpatient regimen is too high of a basal insulin dose  At this time he has total 80 unit Lantus on board and blood sugars are dropping despite good oral intake   Would discontinue the Lantus order at this time. In the morning, will decide on morning NPH dosing to bridge for lower adjusted evening Lantus dose  Please contact Endocrinology tomorrow at 9AM for recommendation.  Start Lispro 4units pre-meal and keep on low dose sliding scale. May change to moderate dose scale with improvement in sugars   - Patient can follow up at discharge with [facility] by calling [phone] to make an appointment.      Case discussed with Dr. Bustillo. Primary team updated.       Neema Abernathy   Endocrinology Fellow    Service Pager: [phone]

## 2022-11-08 NOTE — PATIENT PROFILE ADULT - FALL HARM RISK - UNIVERSAL INTERVENTIONS
Bed in lowest position, wheels locked, appropriate side rails in place/Call bell, personal items and telephone in reach/Instruct patient to call for assistance before getting out of bed or chair/Non-slip footwear when patient is out of bed/[place] to call system/Physically safe environment - no spills, clutter or unnecessary equipment/Purposeful Proactive Rounding/Room/bathroom lighting operational, light cord in reach

## 2022-11-08 NOTE — PROGRESS NOTE ADULT - ASSESSMENT
58 y/o male with pmh IDDM with chief complaint of right fourth toe wound. MRI of right foot on 11/4 showed osteomyelitis of the right fourth digit middle and distal phalanx. Pt was instructed to go to ED for admission for IV antibiotics. Podiatry consulted to evaluate OM. Bone biopsy of right distal phalanx performed 11/7.    Plan:  - Cont broad spectrum IV antibiotics until cx results  - F/u bone biopsy path & culture  - Dressed wound with WTD migue, melany and ACE.  - Pt can WBAT in surgical shoe     Plan discussed with attending and podiatry following.

## 2022-11-09 LAB
ANION GAP SERPL CALC-SCNC: 7 MMOL/L — SIGNIFICANT CHANGE UP (ref 5–17)
BASOPHILS # BLD AUTO: 0.04 K/UL — SIGNIFICANT CHANGE UP (ref 0–0.2)
BASOPHILS NFR BLD AUTO: 0.6 % — SIGNIFICANT CHANGE UP (ref 0–2)
BUN SERPL-MCNC: 19 MG/DL — SIGNIFICANT CHANGE UP (ref 7–23)
C PEPTIDE SERPL-MCNC: 1.7 NG/ML — SIGNIFICANT CHANGE UP (ref 1.1–4.4)
CALCIUM SERPL-MCNC: 8.8 MG/DL — SIGNIFICANT CHANGE UP (ref 8.4–10.5)
CHLORIDE SERPL-SCNC: 109 MMOL/L — HIGH (ref 96–108)
CO2 SERPL-SCNC: 26 MMOL/L — SIGNIFICANT CHANGE UP (ref 22–31)
CREAT SERPL-MCNC: 1.09 MG/DL — SIGNIFICANT CHANGE UP (ref 0.5–1.3)
EGFR: 80 ML/MIN/1.73M2 — SIGNIFICANT CHANGE UP
EOSINOPHIL # BLD AUTO: 0.37 K/UL — SIGNIFICANT CHANGE UP (ref 0–0.5)
EOSINOPHIL NFR BLD AUTO: 5.4 % — SIGNIFICANT CHANGE UP (ref 0–6)
GLUCOSE BLDC GLUCOMTR-MCNC: 128 MG/DL — HIGH (ref 70–99)
GLUCOSE BLDC GLUCOMTR-MCNC: 218 MG/DL — HIGH (ref 70–99)
GLUCOSE BLDC GLUCOMTR-MCNC: 251 MG/DL — HIGH (ref 70–99)
GLUCOSE BLDC GLUCOMTR-MCNC: 83 MG/DL — SIGNIFICANT CHANGE UP (ref 70–99)
GLUCOSE SERPL-MCNC: 65 MG/DL — LOW (ref 70–99)
HCT VFR BLD CALC: 34.3 % — LOW (ref 39–50)
HGB BLD-MCNC: 11 G/DL — LOW (ref 13–17)
IMM GRANULOCYTES NFR BLD AUTO: 0.3 % — SIGNIFICANT CHANGE UP (ref 0–0.9)
LYMPHOCYTES # BLD AUTO: 1.93 K/UL — SIGNIFICANT CHANGE UP (ref 1–3.3)
LYMPHOCYTES # BLD AUTO: 27.9 % — SIGNIFICANT CHANGE UP (ref 13–44)
MAGNESIUM SERPL-MCNC: 2.1 MG/DL — SIGNIFICANT CHANGE UP (ref 1.6–2.6)
MCHC RBC-ENTMCNC: 29.8 PG — SIGNIFICANT CHANGE UP (ref 27–34)
MCHC RBC-ENTMCNC: 32.1 GM/DL — SIGNIFICANT CHANGE UP (ref 32–36)
MCV RBC AUTO: 93 FL — SIGNIFICANT CHANGE UP (ref 80–100)
MONOCYTES # BLD AUTO: 0.51 K/UL — SIGNIFICANT CHANGE UP (ref 0–0.9)
MONOCYTES NFR BLD AUTO: 7.4 % — SIGNIFICANT CHANGE UP (ref 2–14)
NEUTROPHILS # BLD AUTO: 4.04 K/UL — SIGNIFICANT CHANGE UP (ref 1.8–7.4)
NEUTROPHILS NFR BLD AUTO: 58.4 % — SIGNIFICANT CHANGE UP (ref 43–77)
NRBC # BLD: 0 /100 WBCS — SIGNIFICANT CHANGE UP (ref 0–0)
PHOSPHATE SERPL-MCNC: 3.6 MG/DL — SIGNIFICANT CHANGE UP (ref 2.5–4.5)
PLATELET # BLD AUTO: 246 K/UL — SIGNIFICANT CHANGE UP (ref 150–400)
POTASSIUM SERPL-MCNC: 4.7 MMOL/L — SIGNIFICANT CHANGE UP (ref 3.5–5.3)
POTASSIUM SERPL-SCNC: 4.7 MMOL/L — SIGNIFICANT CHANGE UP (ref 3.5–5.3)
RBC # BLD: 3.69 M/UL — LOW (ref 4.2–5.8)
RBC # FLD: 12.4 % — SIGNIFICANT CHANGE UP (ref 10.3–14.5)
SODIUM SERPL-SCNC: 142 MMOL/L — SIGNIFICANT CHANGE UP (ref 135–145)
SURGICAL PATHOLOGY STUDY: SIGNIFICANT CHANGE UP
VANCOMYCIN TROUGH SERPL-MCNC: 20.3 UG/ML — HIGH (ref 10–20)
WBC # BLD: 6.91 K/UL — SIGNIFICANT CHANGE UP (ref 3.8–10.5)
WBC # FLD AUTO: 6.91 K/UL — SIGNIFICANT CHANGE UP (ref 3.8–10.5)

## 2022-11-09 PROCEDURE — 99233 SBSQ HOSP IP/OBS HIGH 50: CPT | Mod: GC

## 2022-11-09 PROCEDURE — 99231 SBSQ HOSP IP/OBS SF/LOW 25: CPT

## 2022-11-09 PROCEDURE — 99222 1ST HOSP IP/OBS MODERATE 55: CPT

## 2022-11-09 RX ORDER — INSULIN GLARGINE 100 [IU]/ML
15 INJECTION, SOLUTION SUBCUTANEOUS AT BEDTIME
Refills: 0 | Status: DISCONTINUED | OUTPATIENT
Start: 2022-11-09 | End: 2022-11-12

## 2022-11-09 RX ORDER — CEFTRIAXONE 500 MG/1
2000 INJECTION, POWDER, FOR SOLUTION INTRAMUSCULAR; INTRAVENOUS EVERY 24 HOURS
Refills: 0 | Status: DISCONTINUED | OUTPATIENT
Start: 2022-11-09 | End: 2022-11-12

## 2022-11-09 RX ORDER — VANCOMYCIN HCL 1 G
1250 VIAL (EA) INTRAVENOUS EVERY 12 HOURS
Refills: 0 | Status: DISCONTINUED | OUTPATIENT
Start: 2022-11-09 | End: 2022-11-10

## 2022-11-09 RX ORDER — LISINOPRIL 2.5 MG/1
40 TABLET ORAL DAILY
Refills: 0 | Status: DISCONTINUED | OUTPATIENT
Start: 2022-11-09 | End: 2022-11-12

## 2022-11-09 RX ORDER — INSULIN LISPRO 100/ML
5 VIAL (ML) SUBCUTANEOUS
Refills: 0 | Status: DISCONTINUED | OUTPATIENT
Start: 2022-11-09 | End: 2022-11-10

## 2022-11-09 RX ADMIN — Medication 2: at 22:31

## 2022-11-09 RX ADMIN — Medication 4 UNIT(S): at 13:02

## 2022-11-09 RX ADMIN — Medication 333.33 MILLIGRAM(S): at 06:10

## 2022-11-09 RX ADMIN — Medication 166.67 MILLIGRAM(S): at 19:43

## 2022-11-09 RX ADMIN — CITALOPRAM 40 MILLIGRAM(S): 10 TABLET, FILM COATED ORAL at 13:44

## 2022-11-09 RX ADMIN — HEPARIN SODIUM 5000 UNIT(S): 5000 INJECTION INTRAVENOUS; SUBCUTANEOUS at 22:32

## 2022-11-09 RX ADMIN — LISINOPRIL 40 MILLIGRAM(S): 2.5 TABLET ORAL at 13:44

## 2022-11-09 RX ADMIN — HEPARIN SODIUM 5000 UNIT(S): 5000 INJECTION INTRAVENOUS; SUBCUTANEOUS at 13:44

## 2022-11-09 RX ADMIN — ATORVASTATIN CALCIUM 40 MILLIGRAM(S): 80 TABLET, FILM COATED ORAL at 22:31

## 2022-11-09 RX ADMIN — Medication 3: at 13:02

## 2022-11-09 RX ADMIN — CEFTRIAXONE 100 MILLIGRAM(S): 500 INJECTION, POWDER, FOR SOLUTION INTRAMUSCULAR; INTRAVENOUS at 18:23

## 2022-11-09 RX ADMIN — Medication 4 UNIT(S): at 17:06

## 2022-11-09 RX ADMIN — HEPARIN SODIUM 5000 UNIT(S): 5000 INJECTION INTRAVENOUS; SUBCUTANEOUS at 06:10

## 2022-11-09 RX ADMIN — INSULIN GLARGINE 15 UNIT(S): 100 INJECTION, SOLUTION SUBCUTANEOUS at 22:32

## 2022-11-09 NOTE — PROGRESS NOTE ADULT - SUBJECTIVE AND OBJECTIVE BOX
OVERNIGHT: No acute events overnight.   SUBJECTIVE: Patient was seen and examined this morning. He didn't have any new concerns or complaints.     CAPILLARY BLOOD GLUCOSE & INSULIN RECEIVED  Yesterday: Lantus 40 units in AM and 40 units the night before.  - Dinner FS-> 75  mg/dL. Can't remember what he ate.  - Bedtime FS ->71 mg/dL Ate ham sandwich.    Today  - Breakfast FS mg/dL. Ate english muffin, overnight oats, and fruit.  - Lunch FS mg/dL.    251 mg/dL ( @ 12:20)  83 mg/dL ( @ 08:20)  71 mg/dL ( @ 22:34)  61 mg/dL ( @ 22:08)  75 mg/dL ( @ 17:14)  55 mg/dL ( @ 16:50)    REVIEW OF SYSTEMS  Constitutional:  Negative fever, chills or loss of appetite.  Eyes:  Negative blurry vision or double vision.  Cardiovascular:  Negative for chest pain or palpitations.  Respiratory:  Negative for cough, wheezing, or shortness of breath.    Gastrointestinal:  Negative for nausea, vomiting, diarrhea, constipation, or abdominal pain.  Genitourinary:  Negative frequency, urgency or dysuria.  Neurologic:  No headache, confusion, dizziness, lightheadedness.    PHYSICAL EXAM  Vital Signs Last 24 Hrs  T(C): 36.9 (2022 13:40), Max: 36.9 (2022 13:40)  T(F): 98.4 (2022 13:40), Max: 98.4 (2022 13:40)  HR: 61 (2022 13:40) (51 - 64)  BP: 126/68 (2022 13:40) (126/68 - 179/79)  BP(mean): --  RR: 18 (2022 13:40) (16 - 18)  SpO2: 98% (2022 13:40) (98% - 99%)    Parameters below as of 2022 13:40  Patient On (Oxygen Delivery Method): room air    Constitutional: Awake, alert, in no acute distress.   HEENT: Normocephalic, atraumatic, HALLEY, no proptosis or lid retraction.   Neck: supple, no acanthosis, no thyromegaly or palpable thyroid nodules.  Respiratory: Lungs clear to ausculation bilaterally.   Cardiovascular: regular rhythm, normal S1 and S2, no audible murmurs.   GI: soft, non-tender, non-distended, bowel sounds present, no masses appreciated.  Extremities: No lower extremity edema, peripheral pulses present.   Skin: no rashes.   Psychiatric: AAO x 3. Normal affect/mood.     LABS  CBC - WBC/HGB/HTC/PLT: 6.91/11.0/34.3/246 (22)  BMP - Na/K/Cl/Bicarb/BUN/Cr/Gluc: 142/4.7/109/26/19/1.09/65 (22)  Anion Gap: 7 (22)  eGFR: 80 (22)  Calcium: 8.8 (22)  Phosphorus: 3.6 (22)  Magnesium: 2.1 (22)  LFT - Alb/Tprot/Tbili/Dbili/AlkPhos/ALT/AST: 3.7/--/<0.2/--/49/17/20 (22)          MEDICATIONS  MEDICATIONS  (STANDING):  atorvastatin 40 milliGRAM(s) Oral at bedtime  citalopram 40 milliGRAM(s) Oral daily  dextrose 5%. 1000 milliLiter(s) (100 mL/Hr) IV Continuous <Continuous>  dextrose 5%. 1000 milliLiter(s) (50 mL/Hr) IV Continuous <Continuous>  dextrose 50% Injectable 25 Gram(s) IV Push once  dextrose 50% Injectable 12.5 Gram(s) IV Push once  dextrose 50% Injectable 25 Gram(s) IV Push once  glucagon  Injectable 1 milliGRAM(s) IntraMuscular once  heparin   Injectable 5000 Unit(s) SubCutaneous every 8 hours  insulin lispro (ADMELOG) corrective regimen sliding scale   SubCutaneous Before meals and at bedtime  insulin lispro Injectable (ADMELOG) 4 Unit(s) SubCutaneous three times a day before meals  lactated ringers. 1000 milliLiter(s) (200 mL/Hr) IV Continuous <Continuous>  lisinopril 40 milliGRAM(s) Oral daily    MEDICATIONS  (PRN):  acetaminophen     Tablet .. 650 milliGRAM(s) Oral every 6 hours PRN Temp greater or equal to 38C (100.4F), Mild Pain (1 - 3)  aluminum hydroxide/magnesium hydroxide/simethicone Suspension 30 milliLiter(s) Oral every 4 hours PRN Dyspepsia  dextrose Oral Gel 15 Gram(s) Oral once PRN Blood Glucose LESS THAN 70 milliGRAM(s)/deciliter  melatonin 3 milliGRAM(s) Oral at bedtime PRN Insomnia  ondansetron Injectable 4 milliGRAM(s) IV Push every 8 hours PRN Nausea and/or Vomiting

## 2022-11-09 NOTE — CONSULT NOTE ADULT - SUBJECTIVE AND OBJECTIVE BOX
HPI:  57M with PMH DM, HTN, HLD and anxiety presenting at the behest of outpatient podiatry for MRI indicative of OM. The patient reports development of an ulcer along the right 4th toe a few weeks ago, for which he visited a podiatrist last week who prescribed Abx (topical gentamycin, PO omadacycline and PO cephalexin) and ordered MRI, which has been read as positive for OM. He does not report any purulence over the past few weeks. He denies any ROS of systemic infection. He denies all other ROS at this time.     ED course:   Vitals: 98.4, 62, 115/64, 16, 98% RA   Labs: Hb 11.1, BUN 34, Cr 1.7 (unknown baseline for all)   Imaging: Outpatient MRI of the right foot c/w OM to the 4th digit   Interventions: Podiatry consulted  (07 Nov 2022 23:40)      PAST MEDICAL & SURGICAL HISTORY:        REVIEW OF SYSTEMS:    General:	 no weakness; no fevers, no chills  Skin/Breast: no rash  Respiratory and Thorax: no SOB, no cough  Cardiovascular:	No chest pain  Gastrointestinal:	 no nausea, vomiting , diarrhea  Genitourinary:	no dysuria, no difficulty urinating, no hematuria  Musculoskeletal:	no weakness, no joint swelling/pain  Neurological:	no focal weakness/numbness  Endocrine:	no polyuria, no polydipsia      ANTIBIOTICS:  MEDICATIONS  (STANDING):  atorvastatin 40 milliGRAM(s) Oral at bedtime  citalopram 40 milliGRAM(s) Oral daily  dextrose 5%. 1000 milliLiter(s) (100 mL/Hr) IV Continuous <Continuous>  dextrose 5%. 1000 milliLiter(s) (50 mL/Hr) IV Continuous <Continuous>  dextrose 50% Injectable 25 Gram(s) IV Push once  dextrose 50% Injectable 12.5 Gram(s) IV Push once  dextrose 50% Injectable 25 Gram(s) IV Push once  glucagon  Injectable 1 milliGRAM(s) IntraMuscular once  heparin   Injectable 5000 Unit(s) SubCutaneous every 8 hours  insulin lispro (ADMELOG) corrective regimen sliding scale   SubCutaneous Before meals and at bedtime  insulin lispro Injectable (ADMELOG) 4 Unit(s) SubCutaneous three times a day before meals  lactated ringers. 1000 milliLiter(s) (200 mL/Hr) IV Continuous <Continuous>  lisinopril 40 milliGRAM(s) Oral daily    MEDICATIONS  (PRN):  acetaminophen     Tablet .. 650 milliGRAM(s) Oral every 6 hours PRN Temp greater or equal to 38C (100.4F), Mild Pain (1 - 3)  aluminum hydroxide/magnesium hydroxide/simethicone Suspension 30 milliLiter(s) Oral every 4 hours PRN Dyspepsia  dextrose Oral Gel 15 Gram(s) Oral once PRN Blood Glucose LESS THAN 70 milliGRAM(s)/deciliter  melatonin 3 milliGRAM(s) Oral at bedtime PRN Insomnia  ondansetron Injectable 4 milliGRAM(s) IV Push every 8 hours PRN Nausea and/or Vomiting      Allergies    No Known Allergies    Intolerances        SOCIAL HISTORY:    FAMILY HISTORY:      Vital Signs Last 24 Hrs  T(C): 36.6 (09 Nov 2022 15:26), Max: 36.9 (09 Nov 2022 13:40)  T(F): 97.9 (09 Nov 2022 15:26), Max: 98.4 (09 Nov 2022 13:40)  HR: 54 (09 Nov 2022 15:26) (51 - 61)  BP: 126/68 (09 Nov 2022 13:40) (126/68 - 179/79)  BP(mean): --  RR: 18 (09 Nov 2022 15:26) (16 - 18)  SpO2: 99% (09 Nov 2022 15:26) (98% - 99%)    Parameters below as of 09 Nov 2022 15:26  Patient On (Oxygen Delivery Method): room air        PHYSICAL EXAM:  Constitutional: NAD  Eyes: HALLEY, EOMI  Ear/Nose/Throat: no oral lesion, no sinus tenderness on percussion	  Neck: no JVD, no lymphadenopathy, supple  Respiratory: CTA sina  Cardiovascular: S1S2 RRR, no murmurs  Gastrointestinal: soft, (+) BS, no HSM  Extremities:no e/e/c  Vascular: DP Pulse: right normal; left normal            LABS:                        11.0   6.91  )-----------( 246      ( 09 Nov 2022 05:30 )             34.3     11-09    142  |  109<H>  |  19  ----------------------------<  65<L>  4.7   |  26  |  1.09    Ca    8.8      09 Nov 2022 05:30  Phos  3.6     11-09  Mg     2.1     11-09    TPro  6.2  /  Alb  3.7  /  TBili  <0.2  /  DBili  x   /  AST  20  /  ALT  17  /  AlkPhos  49  11-08          MICROBIOLOGY: Culture - Tissue with Gram Stain (11.07.22 @ 23:21)    Gram Stain:   No organisms seen  No WBC's seen.    Specimen Source: .Tissue Wound    Culture Results:   No growth to date    Surgical Pathology Report (11.08.22 @ 06:27)    Surgical Pathology Report:   ACCESSION No:  [id number]  Patient:   VALENTIN CABALLERO      Accession:                             [id number]    Collected Date/Time:                   11/8/2022 06:27 EST  Received Date/Time:                    11/8/2022 06:27 EST    Surgical Pathology Report - Auth (Verified)    Specimen(s) Submitted  1  Right bone biopsy right foot 4th digit osteomyelitis    Final Diagnosis  Fourth digit of right foot, bone biopsy:  - Fragmented intact reactive bone    Case reviewed within the department for  purposes on    11/9/2022.  Verified by: Logan Huber M.D.  (Electronic Signature)  Reported on: 11/09/22 14:41 [facility], [facility], [address] [place] [address]  Phone: [phone]   Fax: [phone]  _________________________________________________________________      Clinical Information  Right foot fourth digit osteomyelitis    Gross Description  Received:  In formalin labeled  "right fourth digit"  Size:  One core measuring 0.4 x 0.2 cm  Description:  Red-tan firm bony tissue  Submitted:  Entirely following immunodecalcification in one cassette: 1A    aMry Demar 11/08/2022 04:30 PM    Disclaimer  In addition to other data that may appear on the specimen containers, all  labels have been inspected to confirm the presence of the patient's name  and date of birth.    Histological Processing Performed at [facility], Department of  Pathology, [address] [place] [address].      RADIOLOGY & ADDITIONAL STUDIES: outside MRI 11/4 reportedly with R 4th toe osteomyelitis

## 2022-11-09 NOTE — PROGRESS NOTE ADULT - PROBLEM SELECTOR PLAN 2
Unknown control status. Home regimen of Lantus 40U BID, Metformin 1000mg BID and Trulicity weekly. A1C found to 11.4 yesterday morning with est avg glucose of 280. Endocrine consulted with and feels pt has too high of basal insulin dose at home and wants to start Lispro 4 units pre-meal and continue on low dose sliding scale. D/c Lantus 40 mg BID yesterday.       - f/u FSG and redose insulin in afternoon  - ISS  - f/u with endocrine reccs  - consider nutritional consult for pt dietary counseling

## 2022-11-09 NOTE — PROGRESS NOTE ADULT - SUBJECTIVE AND OBJECTIVE BOX
Patient is a 57y old  Male who presents with a chief complaint of Osteomyelitis (09 Nov 2022 06:45)      INTERVAL HPI/ OVERNIGHT EVENTS: PARKER O/N. Pt doing well, no complaints.       LABS                        11.0   6.91  )-----------( 246      ( 09 Nov 2022 05:30 )             34.3     11-08    135  |  104  |  31<H>  ----------------------------<  209<H>  4.5   |  22  |  1.37<H>    Ca    8.5      08 Nov 2022 05:30    TPro  6.2  /  Alb  3.7  /  TBili  <0.2  /  DBili  x   /  AST  20  /  ALT  17  /  AlkPhos  49  11-08        ICU Vital Signs Last 24 Hrs  T(C): 36.7 (09 Nov 2022 05:11), Max: 36.7 (09 Nov 2022 05:11)  T(F): 98 (09 Nov 2022 05:11), Max: 98 (09 Nov 2022 05:11)  HR: 51 (09 Nov 2022 05:11) (51 - 64)  BP: 161/73 (09 Nov 2022 05:11) (161/73 - 179/79)  BP(mean): --  ABP: --  ABP(mean): --  RR: 16 (09 Nov 2022 05:11) (16 - 18)  SpO2: 98% (09 Nov 2022 05:11) (98% - 99%)    O2 Parameters below as of 09 Nov 2022 05:11  Patient On (Oxygen Delivery Method): room air      RADIOLOGY  < from: Xray Foot AP + Lateral, Right (11.07.22 @ 22:36) >  IMPRESSION: Frontal and lateral views of the right foot demonstrate a   screw traversing the second PIP joint with surrounding lucency,   suggesting loosening. The tarsometatarsal joint is in appropriate   alignment. No fracture. No dislocation. There is amputation of the distal   aspect of the third digit.    < end of copied text >      MICROBIOLOGY  Culture - Tissue with Gram Stain (collected 07 Nov 2022 23:21)  Source: .Tissue Wound  Gram Stain (08 Nov 2022 17:33):    No organisms seen    No WBC's seen.        PHYSICAL EXAM  Right Lower Extremity Focused  Vasc: DP 1/4, PT 2/4, No edema, No erythema, CFT <3 x 5  Derm: 2 mm ulcer on distal tip of 4th digit, +PTB, no pus, no malodor, hyperkeratotic borders; no other open wounds   Neuro: Protective sensation intact  MSK: Partial 3rd digit amputation Patient is a 57y old  Male who presents with a chief complaint of Osteomyelitis (09 Nov 2022 06:45)      INTERVAL HPI/ OVERNIGHT EVENTS: PARKER O/N. Pt doing well, no complaints. Pt seen bedside with Dr. Chakraborty attending podiatrist.       LABS                        11.0   6.91  )-----------( 246      ( 09 Nov 2022 05:30 )             34.3     11-08    135  |  104  |  31<H>  ----------------------------<  209<H>  4.5   |  22  |  1.37<H>    Ca    8.5      08 Nov 2022 05:30    TPro  6.2  /  Alb  3.7  /  TBili  <0.2  /  DBili  x   /  AST  20  /  ALT  17  /  AlkPhos  49  11-08        ICU Vital Signs Last 24 Hrs  T(C): 36.7 (09 Nov 2022 05:11), Max: 36.7 (09 Nov 2022 05:11)  T(F): 98 (09 Nov 2022 05:11), Max: 98 (09 Nov 2022 05:11)  HR: 51 (09 Nov 2022 05:11) (51 - 64)  BP: 161/73 (09 Nov 2022 05:11) (161/73 - 179/79)  BP(mean): --  ABP: --  ABP(mean): --  RR: 16 (09 Nov 2022 05:11) (16 - 18)  SpO2: 98% (09 Nov 2022 05:11) (98% - 99%)    O2 Parameters below as of 09 Nov 2022 05:11  Patient On (Oxygen Delivery Method): room air      RADIOLOGY  < from: Xray Foot AP + Lateral, Right (11.07.22 @ 22:36) >  IMPRESSION: Frontal and lateral views of the right foot demonstrate a   screw traversing the second PIP joint with surrounding lucency,   suggesting loosening. The tarsometatarsal joint is in appropriate   alignment. No fracture. No dislocation. There is amputation of the distal   aspect of the third digit.    < end of copied text >      MICROBIOLOGY  Culture - Tissue with Gram Stain (collected 07 Nov 2022 23:21)  Source: .Tissue Wound  Gram Stain (08 Nov 2022 17:33):    No organisms seen    No WBC's seen.        PHYSICAL EXAM  Right Lower Extremity Focused  Vasc: DP 1/4, PT 2/4, No edema, No erythema, CFT <3 x 5  Derm: 2 mm ulcer on distal tip of 4th digit, +PTB, no pus, no malodor, hyperkeratotic borders; no other open wounds   Neuro: Protective sensation intact  MSK: Partial 3rd digit amputation

## 2022-11-09 NOTE — PROGRESS NOTE ADULT - PROBLEM SELECTOR PLAN 4
Well controlled, normotensive on admission. Home medication of lisinopril 40mg and held i/s/o NOLAN, which is now resolved.   -initiate home lisinopril 40mg today

## 2022-11-09 NOTE — PROGRESS NOTE ADULT - SUBJECTIVE AND OBJECTIVE BOX
** INCOMPLETE **    OVERNIGHT EVENTS:     SUBJECTIVE:    VITAL SIGNS:  Vital Signs Last 24 Hrs  T(C): 36.7 (09 Nov 2022 05:11), Max: 36.7 (09 Nov 2022 05:11)  T(F): 98 (09 Nov 2022 05:11), Max: 98 (09 Nov 2022 05:11)  HR: 51 (09 Nov 2022 05:11) (51 - 64)  BP: 161/73 (09 Nov 2022 05:11) (161/73 - 179/79)  BP(mean): --  RR: 16 (09 Nov 2022 05:11) (16 - 18)  SpO2: 98% (09 Nov 2022 05:11) (98% - 99%)    Parameters below as of 09 Nov 2022 05:11  Patient On (Oxygen Delivery Method): room air        PHYSICAL EXAM:  General: NAD; speaking in full sentences  HEENT: NC/AT; PERRL; EOMI; MMM  Neck: supple; no JVD  Cardiac: RRR; +S1/S2  Pulm: CTA B/L; no W/R/R  GI: soft, NT/ND, +BS  Extremities: WWP; no edema, clubbing or cyanosis  Vasc: 2+ radial, DP pulses B/L  Neuro: AAOx3; no focal deficits    MEDICATIONS:  MEDICATIONS  (STANDING):  atorvastatin 40 milliGRAM(s) Oral at bedtime  citalopram 40 milliGRAM(s) Oral daily  dextrose 5%. 1000 milliLiter(s) (100 mL/Hr) IV Continuous <Continuous>  dextrose 5%. 1000 milliLiter(s) (50 mL/Hr) IV Continuous <Continuous>  dextrose 50% Injectable 25 Gram(s) IV Push once  dextrose 50% Injectable 12.5 Gram(s) IV Push once  dextrose 50% Injectable 25 Gram(s) IV Push once  glucagon  Injectable 1 milliGRAM(s) IntraMuscular once  heparin   Injectable 5000 Unit(s) SubCutaneous every 8 hours  insulin lispro (ADMELOG) corrective regimen sliding scale   SubCutaneous Before meals and at bedtime  insulin lispro Injectable (ADMELOG) 4 Unit(s) SubCutaneous three times a day before meals  lactated ringers. 1000 milliLiter(s) (200 mL/Hr) IV Continuous <Continuous>    MEDICATIONS  (PRN):  acetaminophen     Tablet .. 650 milliGRAM(s) Oral every 6 hours PRN Temp greater or equal to 38C (100.4F), Mild Pain (1 - 3)  aluminum hydroxide/magnesium hydroxide/simethicone Suspension 30 milliLiter(s) Oral every 4 hours PRN Dyspepsia  dextrose Oral Gel 15 Gram(s) Oral once PRN Blood Glucose LESS THAN 70 milliGRAM(s)/deciliter  melatonin 3 milliGRAM(s) Oral at bedtime PRN Insomnia  ondansetron Injectable 4 milliGRAM(s) IV Push every 8 hours PRN Nausea and/or Vomiting      ALLERGIES:  Allergies    No Known Allergies    Intolerances        LABS:                        10.4   6.54  )-----------( 248      ( 08 Nov 2022 05:30 )             32.2     11-08    135  |  104  |  31<H>  ----------------------------<  209<H>  4.5   |  22  |  1.37<H>    Ca    8.5      08 Nov 2022 05:30    TPro  6.2  /  Alb  3.7  /  TBili  <0.2  /  DBili  x   /  AST  20  /  ALT  17  /  AlkPhos  49  11-08        RADIOLOGY & ADDITIONAL TESTS: Reviewed. OVERNIGHT EVENTS:   Pt seen and examined at bedside. As per nurse and patient, no overnight events aside from disturbed sleep due to loud neighboring patient. Resting comfortably. Mild pain in right 4th toe. Pt denies: fever, chills, lightheadedness, weakness, CP, palpitations, SOB, N/V, ROS otherwise negative aside from dry cough described by patient as chronic w/ unknown source.       VITAL SIGNS:  Vital Signs Last 24 Hrs  T(C): 36.7 (09 Nov 2022 05:11), Max: 36.7 (09 Nov 2022 05:11)  T(F): 98 (09 Nov 2022 05:11), Max: 98 (09 Nov 2022 05:11)  HR: 51 (09 Nov 2022 05:11) (51 - 64)  BP: 161/73 (09 Nov 2022 05:11) (161/73 - 179/79)  BP(mean): --  RR: 16 (09 Nov 2022 05:11) (16 - 18)  SpO2: 98% (09 Nov 2022 05:11) (98% - 99%)    Parameters below as of 09 Nov 2022 05:11  Patient On (Oxygen Delivery Method): room air        PHYSICAL EXAM:  Constitutional: resting comfortably in bed; calm, cooperative, NAD  HEENT: NC/AT, PER, anicteric sclera, no nasal discharge;   Neck: supple; no JVD or thyromegaly, no lymphadenopathy   Respiratory: CTA B/L; no W/R/R, no retractions, occasional dry non-productive cough most prominent in upper airway  Cardiac: +S1/S2; RRR; no M/R/G  Gastrointestinal: soft, NT/ND; no rebound or guarding, bowel sounds active in all four quadrants   Back: spine midline, no bony tenderness or step-offs; no CVAT B/L  Extremities: WWP, no clubbing or cyanosis; no peripheral edema, + tenderness at 3rd digit of right foot  Musculoskeletal: NROM x4; no joint swelling, tenderness or erythema  Vascular: 2+ radial, DP/PT pulses B/L  Dermatologic: skin warm, dry and intact; no rashes, wounds, or scars  Neurologic: AAOx3; CNII-XII grossly intact; no focal deficits  Psychiatric: affect and characteristics of appearance, verbalizations, behaviors are appropriate, congruent affect       MEDICATIONS:  MEDICATIONS  (STANDING):  atorvastatin 40 milliGRAM(s) Oral at bedtime  citalopram 40 milliGRAM(s) Oral daily  dextrose 5%. 1000 milliLiter(s) (100 mL/Hr) IV Continuous <Continuous>  dextrose 5%. 1000 milliLiter(s) (50 mL/Hr) IV Continuous <Continuous>  dextrose 50% Injectable 25 Gram(s) IV Push once  dextrose 50% Injectable 12.5 Gram(s) IV Push once  dextrose 50% Injectable 25 Gram(s) IV Push once  glucagon  Injectable 1 milliGRAM(s) IntraMuscular once  heparin   Injectable 5000 Unit(s) SubCutaneous every 8 hours  insulin lispro (ADMELOG) corrective regimen sliding scale   SubCutaneous Before meals and at bedtime  insulin lispro Injectable (ADMELOG) 4 Unit(s) SubCutaneous three times a day before meals  lactated ringers. 1000 milliLiter(s) (200 mL/Hr) IV Continuous <Continuous>    MEDICATIONS  (PRN):  acetaminophen     Tablet .. 650 milliGRAM(s) Oral every 6 hours PRN Temp greater or equal to 38C (100.4F), Mild Pain (1 - 3)  aluminum hydroxide/magnesium hydroxide/simethicone Suspension 30 milliLiter(s) Oral every 4 hours PRN Dyspepsia  dextrose Oral Gel 15 Gram(s) Oral once PRN Blood Glucose LESS THAN 70 milliGRAM(s)/deciliter  melatonin 3 milliGRAM(s) Oral at bedtime PRN Insomnia  ondansetron Injectable 4 milliGRAM(s) IV Push every 8 hours PRN Nausea and/or Vomiting      ALLERGIES:  Allergies    No Known Allergies    Intolerances        LABS:                        10.4   6.54  )-----------( 248      ( 08 Nov 2022 05:30 )             32.2     11-08    135  |  104  |  31<H>  ----------------------------<  209<H>  4.5   |  22  |  1.37<H>    Ca    8.5      08 Nov 2022 05:30    TPro  6.2  /  Alb  3.7  /  TBili  <0.2  /  DBili  x   /  AST  20  /  ALT  17  /  AlkPhos  49  11-08        RADIOLOGY & ADDITIONAL TESTS: Reviewed. OVERNIGHT EVENTS:   Pt seen and examined at bedside. As per nurse and patient, no overnight events aside from disturbed sleep due to loud neighboring patient. Resting comfortably. Mild pain in right 4th toe. Pt denies: fever, chills, lightheadedness, weakness, CP, palpitations, SOB, N/V, ROS otherwise negative aside from dry cough described by patient as chronic w/ unknown source.       VITAL SIGNS:  Vital Signs Last 24 Hrs  T(C): 36.7 (09 Nov 2022 05:11), Max: 36.7 (09 Nov 2022 05:11)  T(F): 98 (09 Nov 2022 05:11), Max: 98 (09 Nov 2022 05:11)  HR: 51 (09 Nov 2022 05:11) (51 - 64)  BP: 161/73 (09 Nov 2022 05:11) (161/73 - 179/79)  BP(mean): --  RR: 16 (09 Nov 2022 05:11) (16 - 18)  SpO2: 98% (09 Nov 2022 05:11) (98% - 99%)    Parameters below as of 09 Nov 2022 05:11  Patient On (Oxygen Delivery Method): room air        PHYSICAL EXAM:  Constitutional: resting comfortably in bed; calm, cooperative, NAD  HEENT: NC/AT, PER, anicteric sclera, no nasal discharge;   Neck: supple; no JVD or thyromegaly, no lymphadenopathy   Respiratory: CTA B/L; no W/R/R, no retractions, occasional dry non-productive cough most prominent in upper airway  Cardiac: +S1/S2; RRR; no M/R/G  Gastrointestinal: soft, NT/ND; no rebound or guarding, bowel sounds active in all four quadrants   Back: spine midline, no bony tenderness or step-offs; no CVAT B/L  Extremities: WWP, no clubbing or cyanosis; no peripheral edema, + tenderness at 3rd digit of right foot  Musculoskeletal: NROM x4; no joint swelling, tenderness or erythema  Vascular: 2+ radial, DP/PT pulses B/L  Psychiatric: affect and characteristics of appearance, verbalizations, behaviors are appropriate, congruent affect       MEDICATIONS:  MEDICATIONS  (STANDING):  atorvastatin 40 milliGRAM(s) Oral at bedtime  citalopram 40 milliGRAM(s) Oral daily  dextrose 5%. 1000 milliLiter(s) (100 mL/Hr) IV Continuous <Continuous>  dextrose 5%. 1000 milliLiter(s) (50 mL/Hr) IV Continuous <Continuous>  dextrose 50% Injectable 25 Gram(s) IV Push once  dextrose 50% Injectable 12.5 Gram(s) IV Push once  dextrose 50% Injectable 25 Gram(s) IV Push once  glucagon  Injectable 1 milliGRAM(s) IntraMuscular once  heparin   Injectable 5000 Unit(s) SubCutaneous every 8 hours  insulin lispro (ADMELOG) corrective regimen sliding scale   SubCutaneous Before meals and at bedtime  insulin lispro Injectable (ADMELOG) 4 Unit(s) SubCutaneous three times a day before meals  lactated ringers. 1000 milliLiter(s) (200 mL/Hr) IV Continuous <Continuous>    MEDICATIONS  (PRN):  acetaminophen     Tablet .. 650 milliGRAM(s) Oral every 6 hours PRN Temp greater or equal to 38C (100.4F), Mild Pain (1 - 3)  aluminum hydroxide/magnesium hydroxide/simethicone Suspension 30 milliLiter(s) Oral every 4 hours PRN Dyspepsia  dextrose Oral Gel 15 Gram(s) Oral once PRN Blood Glucose LESS THAN 70 milliGRAM(s)/deciliter  melatonin 3 milliGRAM(s) Oral at bedtime PRN Insomnia  ondansetron Injectable 4 milliGRAM(s) IV Push every 8 hours PRN Nausea and/or Vomiting      ALLERGIES:  Allergies    No Known Allergies    Intolerances        LABS:                        10.4   6.54  )-----------( 248      ( 08 Nov 2022 05:30 )             32.2     11-08    135  |  104  |  31<H>  ----------------------------<  209<H>  4.5   |  22  |  1.37<H>    Ca    8.5      08 Nov 2022 05:30    TPro  6.2  /  Alb  3.7  /  TBili  <0.2  /  DBili  x   /  AST  20  /  ALT  17  /  AlkPhos  49  11-08        RADIOLOGY & ADDITIONAL TESTS: Reviewed.

## 2022-11-09 NOTE — PROGRESS NOTE ADULT - PROBLEM SELECTOR PLAN 1
- Please continue lantus *** units at bedtime.   - Continue lispro *** units before each meal.  - Continue lispro moderate / low dose sliding scale four times daily with meals and at bedtime.  - Patient's fingerstick glucose goal is 100-180 mg/dL.    - For discharge, patient can ***.    - Patient can follow up at discharge with [facility] by calling [phone] to make an appointment.      Case discussed with Dr. Bustillo. Primary team updated. - Please give lantus 15 units at bedtime.   - Increase lispro to 5 units before each meal.  - Continue lispro  low dose sliding scale four times daily with meals and at bedtime.  - Patient's fingerstick glucose goal is 100-180 mg/dL.    - For discharge, patient can TBD.    - Patient can follow up at discharge with [facility] by calling [phone] to make an appointment.      Case discussed with Dr. Bustillo. Primary team updated.

## 2022-11-09 NOTE — DIETITIAN INITIAL EVALUATION ADULT - PERSON TAUGHT/METHOD
Diabetes education provided. Discussed CHO examples, checking finger sticks, role of fiber, plate model, basic CHO counting. Reviewed healthy eating education. Supplemental nutrition education handouts provided by RD for reinforcement. Pt was receptive and verbalized understanding. RD will remain available per protocol. Additional recommendations below to follow./verbal instruction/patient instructed

## 2022-11-09 NOTE — PROGRESS NOTE ADULT - ASSESSMENT
56 y/o male with pmh IDDM with chief complaint of right fourth toe wound. MRI of right foot on 11/4 showed osteomyelitis of the right fourth digit middle and distal phalanx. Pt was instructed to go to ED for admission for IV antibiotics. Podiatry consulted to evaluate OM. Bone biopsy of right distal phalanx performed 11/7.    Plan:  - Cont broad spectrum IV antibiotics until cx results  - F/u bone biopsy path & culture  - Dressed wound with WTD gauze, melany and ACE.  - Pt can WBAT in surgical shoe     Plan discussed with attending and podiatry following.

## 2022-11-09 NOTE — CONSULT NOTE ADULT - ASSESSMENT
58 yo male with uncontrolled DM A1c 11.4, hx R 3rd toe osteomyelitis s/p amputation, now with R 4th toe diabetic foot ulcer--+PTB, MRI suggestive of underlying osteomyelitis and bone biopsy 11/7 with "reactive bone" presumably consistent with osteomyelitis. Bone culture negative in setting of recent omadacycline exposure (rx ?4d by his podiatrist).  - continue vancomycin 1.5g IV q12h; trough prior to evening dose  - start ceftriaxone 2g IV q24h  - documentation of poor DP pulse--consider vascular evaluation to determine need for reperfusion if indicated    Dr. Gallardo assumes care tomorrow

## 2022-11-09 NOTE — PROGRESS NOTE ADULT - PROBLEM SELECTOR PLAN 3
Unknown baseline, Hb of 11.1 (normocytic) on admission, with follow-up readings of 10.4 yesterday and 11.0 today. DDx include CKD vs hemolysis.   - iron studies show Fe, %sat, ferritin WNL. TIBC low, suggesting etiology less likely ARNOL  - continue to monitor hgb   - no active interventions at this time

## 2022-11-09 NOTE — DIETITIAN INITIAL EVALUATION ADULT - OTHER INFO
58 yo male with PMHx uncontrolled DM sent in from outpatient podiatry office for MRI findings concerning for OM. S/p bone bx in ED, pending final culture data and ID recs.     Pt seen for nutrition interview and education r/t DM management on 4U. Pt endorsed he has a good appetite, which was also good PTA. Pt endorsed he eats all foods, including meat, fish, eggs, fruits/vegetables, pt's wife does most of the cooking and pt and his wife go out to restaurants and eat frequently. Pt had no c/o N/V/D/C, latest BM noted on 11/09/22. Pt endorsed UBW is 210#, no recent known weight changes; wt on admission is 200#, slightly below UBW. IBW is 178#, pt is 112% IBW. Satya: 21. No edema noted. Skin: wound noted to R 4th toe (osteomyelitis). Labs reviewed: pt noted with elevated POCT BG, with fluctuations of hyper and hypoglycemia. Pt is currently receiving a Consistent Carbohydrate diet with no snacks. Pt endorsed he has been eating % of meals overall during current admission and PTA. Pt endorsed he takes insulin/his DM medications but does not monitor his blood sugars closely. Pt noted his portion control is poor and he does not have set meal/snack times or assess the amount of carbohydrates he intakes per meal/snack. Diabetes education provided. Discussed CHO examples, checking finger sticks, role of fiber, plate model, basic CHO counting. Reviewed healthy eating education. Supplemental nutrition education handouts provided by RD for reinforcement of education. Pt was receptive and verbalized understanding. RD will remain available per protocol. Additional recommendations below to follow.

## 2022-11-09 NOTE — PROGRESS NOTE ADULT - PROBLEM SELECTOR PLAN 1
Patient with 3 week history of lesion to right 4th toe, confirmed OM on outpatient MRI, sent in by outpatient podiatry for IV Abx and Bone biopsy. Initial cultures show no organism growth. Negative on gram stain. CRP WNL. ESR downtrended after Abx initiation and fluids.     - Podiatry following, performed bone biopsy in the ED, f/u WCx  - initiate Vancomycin 15mg/kg q12hrs with trough prior to 4th dose and Cefepime 2g q12hrs (avoid Vanc/Zosyn i/s/o NOLAN), narrow coverage pending bone Cx   - Patient to require PICC line

## 2022-11-09 NOTE — PROGRESS NOTE ADULT - NS ATTEND AMEND GEN_ALL_CORE FT
Pt seen on rounds this afternoon.  Glucose was down to 83 this morning with no Lantus having been given last night--just the 40 units he received yesterday AM.  He also reports having eaten a sandwich at bedtime.  Did not receive any pre-breakfast lispro, then spiked to 251 before lunch.    --Very difficult to determine his true basal requirements, though he was clearly over-basalized at home.  Will start 15 units Lantus at bedtime  --Will increase the premeal slightly to 5 units lispro  --Continue low-dose correction scale

## 2022-11-09 NOTE — DIETITIAN INITIAL EVALUATION ADULT - ADD RECOMMEND
1. Continue with current diet order  2. Encourage pt to meet nutritional needs as able   3. Monitor PO intakes, trend weights (weekly), monitor skin integrity, monitor labs (electrolytes, CMP), monitor GI fxn   4. Encourage adherence to diet education (reinforce as able)   5. Continue insulin regimen to promote euglycemia  6. Pain and bowel regimen per team   7. Will continue to assess/honor preferences as able  8. Align nutrition interventions with GOC at all times

## 2022-11-09 NOTE — DIETITIAN INITIAL EVALUATION ADULT - PERTINENT MEDS FT
MEDICATIONS  (STANDING):  atorvastatin 40 milliGRAM(s) Oral at bedtime  citalopram 40 milliGRAM(s) Oral daily  dextrose 5%. 1000 milliLiter(s) (100 mL/Hr) IV Continuous <Continuous>  dextrose 5%. 1000 milliLiter(s) (50 mL/Hr) IV Continuous <Continuous>  dextrose 50% Injectable 25 Gram(s) IV Push once  dextrose 50% Injectable 12.5 Gram(s) IV Push once  dextrose 50% Injectable 25 Gram(s) IV Push once  glucagon  Injectable 1 milliGRAM(s) IntraMuscular once  heparin   Injectable 5000 Unit(s) SubCutaneous every 8 hours  insulin lispro (ADMELOG) corrective regimen sliding scale   SubCutaneous Before meals and at bedtime  insulin lispro Injectable (ADMELOG) 4 Unit(s) SubCutaneous three times a day before meals  lactated ringers. 1000 milliLiter(s) (200 mL/Hr) IV Continuous <Continuous>  lisinopril 40 milliGRAM(s) Oral daily    MEDICATIONS  (PRN):  acetaminophen     Tablet .. 650 milliGRAM(s) Oral every 6 hours PRN Temp greater or equal to 38C (100.4F), Mild Pain (1 - 3)  aluminum hydroxide/magnesium hydroxide/simethicone Suspension 30 milliLiter(s) Oral every 4 hours PRN Dyspepsia  dextrose Oral Gel 15 Gram(s) Oral once PRN Blood Glucose LESS THAN 70 milliGRAM(s)/deciliter  melatonin 3 milliGRAM(s) Oral at bedtime PRN Insomnia  ondansetron Injectable 4 milliGRAM(s) IV Push every 8 hours PRN Nausea and/or Vomiting

## 2022-11-09 NOTE — PROGRESS NOTE ADULT - ASSESSMENT
57M with PMH DM, HTN, HLD and anxiety presenting at the behest of outpatient podiatry for MRI indicative of OM, admitted for OM.       A1C: 11.4 %  BUN: 19 mg/dL  Creatinine: 1.09 mg/dL  GFR: 80 mL/min/1.73m2  Weight (kg): 90.7  BMI (kg/m2): 27.1

## 2022-11-09 NOTE — DIETITIAN INITIAL EVALUATION ADULT - REASON
Observed pt with no overt signs of muscle or fat wasting. Based on ASPEN guidelines, pt does not meet criteria for malnutrition at this time.

## 2022-11-09 NOTE — DIETITIAN INITIAL EVALUATION ADULT - PERTINENT LABORATORY DATA
11-09    142  |  109<H>  |  19  ----------------------------<  65<L>  4.7   |  26  |  1.09    Ca    8.8      09 Nov 2022 05:30  Phos  3.6     11-09  Mg     2.1     11-09    TPro  6.2  /  Alb  3.7  /  TBili  <0.2  /  DBili  x   /  AST  20  /  ALT  17  /  AlkPhos  49  11-08  POCT Blood Glucose.: 251 mg/dL (11-09-22 @ 12:20)  A1C with Estimated Average Glucose Result: 11.4 % (11-08-22 @ 05:30)

## 2022-11-10 LAB
ANION GAP SERPL CALC-SCNC: 6 MMOL/L — SIGNIFICANT CHANGE UP (ref 5–17)
BASOPHILS # BLD AUTO: 0.05 K/UL — SIGNIFICANT CHANGE UP (ref 0–0.2)
BASOPHILS NFR BLD AUTO: 0.7 % — SIGNIFICANT CHANGE UP (ref 0–2)
BUN SERPL-MCNC: 18 MG/DL — SIGNIFICANT CHANGE UP (ref 7–23)
C PEPTIDE SERPL-MCNC: 1.6 NG/ML — SIGNIFICANT CHANGE UP (ref 1.1–4.4)
CALCIUM SERPL-MCNC: 8.9 MG/DL — SIGNIFICANT CHANGE UP (ref 8.4–10.5)
CHLORIDE SERPL-SCNC: 105 MMOL/L — SIGNIFICANT CHANGE UP (ref 96–108)
CO2 SERPL-SCNC: 27 MMOL/L — SIGNIFICANT CHANGE UP (ref 22–31)
CREAT SERPL-MCNC: 1.18 MG/DL — SIGNIFICANT CHANGE UP (ref 0.5–1.3)
EGFR: 73 ML/MIN/1.73M2 — SIGNIFICANT CHANGE UP
EOSINOPHIL # BLD AUTO: 0.35 K/UL — SIGNIFICANT CHANGE UP (ref 0–0.5)
EOSINOPHIL NFR BLD AUTO: 5.1 % — SIGNIFICANT CHANGE UP (ref 0–6)
GLUCOSE BLDC GLUCOMTR-MCNC: 128 MG/DL — HIGH (ref 70–99)
GLUCOSE BLDC GLUCOMTR-MCNC: 151 MG/DL — HIGH (ref 70–99)
GLUCOSE BLDC GLUCOMTR-MCNC: 157 MG/DL — HIGH (ref 70–99)
GLUCOSE BLDC GLUCOMTR-MCNC: 202 MG/DL — HIGH (ref 70–99)
GLUCOSE SERPL-MCNC: 148 MG/DL — HIGH (ref 70–99)
HCT VFR BLD CALC: 35 % — LOW (ref 39–50)
HGB BLD-MCNC: 11.4 G/DL — LOW (ref 13–17)
IMM GRANULOCYTES NFR BLD AUTO: 0.1 % — SIGNIFICANT CHANGE UP (ref 0–0.9)
LYMPHOCYTES # BLD AUTO: 1.97 K/UL — SIGNIFICANT CHANGE UP (ref 1–3.3)
LYMPHOCYTES # BLD AUTO: 28.6 % — SIGNIFICANT CHANGE UP (ref 13–44)
MAGNESIUM SERPL-MCNC: 1.9 MG/DL — SIGNIFICANT CHANGE UP (ref 1.6–2.6)
MCHC RBC-ENTMCNC: 30.2 PG — SIGNIFICANT CHANGE UP (ref 27–34)
MCHC RBC-ENTMCNC: 32.6 GM/DL — SIGNIFICANT CHANGE UP (ref 32–36)
MCV RBC AUTO: 92.8 FL — SIGNIFICANT CHANGE UP (ref 80–100)
MONOCYTES # BLD AUTO: 0.46 K/UL — SIGNIFICANT CHANGE UP (ref 0–0.9)
MONOCYTES NFR BLD AUTO: 6.7 % — SIGNIFICANT CHANGE UP (ref 2–14)
NEUTROPHILS # BLD AUTO: 4.04 K/UL — SIGNIFICANT CHANGE UP (ref 1.8–7.4)
NEUTROPHILS NFR BLD AUTO: 58.8 % — SIGNIFICANT CHANGE UP (ref 43–77)
NRBC # BLD: 0 /100 WBCS — SIGNIFICANT CHANGE UP (ref 0–0)
PHOSPHATE SERPL-MCNC: 3.5 MG/DL — SIGNIFICANT CHANGE UP (ref 2.5–4.5)
PLATELET # BLD AUTO: 249 K/UL — SIGNIFICANT CHANGE UP (ref 150–400)
POTASSIUM SERPL-MCNC: 5.2 MMOL/L — SIGNIFICANT CHANGE UP (ref 3.5–5.3)
POTASSIUM SERPL-SCNC: 5.2 MMOL/L — SIGNIFICANT CHANGE UP (ref 3.5–5.3)
RBC # BLD: 3.77 M/UL — LOW (ref 4.2–5.8)
RBC # FLD: 12.2 % — SIGNIFICANT CHANGE UP (ref 10.3–14.5)
SODIUM SERPL-SCNC: 138 MMOL/L — SIGNIFICANT CHANGE UP (ref 135–145)
VANCOMYCIN TROUGH SERPL-MCNC: 22.2 UG/ML — HIGH (ref 10–20)
WBC # BLD: 6.88 K/UL — SIGNIFICANT CHANGE UP (ref 3.8–10.5)
WBC # FLD AUTO: 6.88 K/UL — SIGNIFICANT CHANGE UP (ref 3.8–10.5)

## 2022-11-10 PROCEDURE — 99233 SBSQ HOSP IP/OBS HIGH 50: CPT | Mod: GC

## 2022-11-10 PROCEDURE — 99232 SBSQ HOSP IP/OBS MODERATE 35: CPT

## 2022-11-10 PROCEDURE — 99231 SBSQ HOSP IP/OBS SF/LOW 25: CPT | Mod: GC

## 2022-11-10 RX ORDER — INSULIN LISPRO 100/ML
7 VIAL (ML) SUBCUTANEOUS
Refills: 0 | Status: DISCONTINUED | OUTPATIENT
Start: 2022-11-10 | End: 2022-11-12

## 2022-11-10 RX ADMIN — HEPARIN SODIUM 5000 UNIT(S): 5000 INJECTION INTRAVENOUS; SUBCUTANEOUS at 05:58

## 2022-11-10 RX ADMIN — Medication 1: at 22:30

## 2022-11-10 RX ADMIN — Medication 5 UNIT(S): at 12:39

## 2022-11-10 RX ADMIN — Medication 166.67 MILLIGRAM(S): at 05:57

## 2022-11-10 RX ADMIN — Medication 1: at 09:20

## 2022-11-10 RX ADMIN — CEFTRIAXONE 100 MILLIGRAM(S): 500 INJECTION, POWDER, FOR SOLUTION INTRAMUSCULAR; INTRAVENOUS at 16:22

## 2022-11-10 RX ADMIN — LISINOPRIL 40 MILLIGRAM(S): 2.5 TABLET ORAL at 05:58

## 2022-11-10 RX ADMIN — Medication 5 UNIT(S): at 17:33

## 2022-11-10 RX ADMIN — ATORVASTATIN CALCIUM 40 MILLIGRAM(S): 80 TABLET, FILM COATED ORAL at 22:30

## 2022-11-10 RX ADMIN — CITALOPRAM 40 MILLIGRAM(S): 10 TABLET, FILM COATED ORAL at 12:38

## 2022-11-10 RX ADMIN — Medication 2: at 12:39

## 2022-11-10 RX ADMIN — HEPARIN SODIUM 5000 UNIT(S): 5000 INJECTION INTRAVENOUS; SUBCUTANEOUS at 22:30

## 2022-11-10 RX ADMIN — INSULIN GLARGINE 15 UNIT(S): 100 INJECTION, SOLUTION SUBCUTANEOUS at 22:30

## 2022-11-10 RX ADMIN — Medication 5 UNIT(S): at 09:20

## 2022-11-10 RX ADMIN — HEPARIN SODIUM 5000 UNIT(S): 5000 INJECTION INTRAVENOUS; SUBCUTANEOUS at 13:06

## 2022-11-10 NOTE — PROGRESS NOTE ADULT - ASSESSMENT
56 y/o male with pmh IDDM with chief complaint of right fourth toe wound. MRI of right foot on 11/4 showed osteomyelitis of the right fourth digit middle and distal phalanx. Pt was instructed to go to ED for admission for IV antibiotics. Podiatry consulted to evaluate OM. Bone biopsy of right distal phalanx performed 11/7.     Plan:  - Cont broad spectrum IV antibiotics per ID recs  - Bone path shows "reactive bone," per ID could be a/w OM  - Bone cx NGTD prelim   - Dressed wound with WTD gauze, melany and ACE.  - Pt can WBAT in surgical shoe     Plan discussed with attending and podiatry following.

## 2022-11-10 NOTE — PROGRESS NOTE ADULT - ASSESSMENT
IMPRESSION:  Concern for osteomyelitis of the lower extremity.  Cultures negative to date    Recommend:  1.  Continue Vancomycin 1250 mg IV q12.  Follow up trough (goal:  13-18)  2.  Continue Ceftriaxone 2 grams IV daily   3. Follow up wound culture  4.  Ok to place PICC.  Will need 6 weeks of IV antibiotics     ID team 2 will follow

## 2022-11-10 NOTE — PROGRESS NOTE ADULT - PROBLEM SELECTOR PLAN 4
Well controlled, normotensive on admission. Home medication of lisinopril 40mg and held i/s/o NOLAN, which is now resolved.   -initiate home lisinopril 40mg today Well controlled, normotensive on admission. Home medication of lisinopril 40mg previously held i/s/o NOLAN, which is now resolved. Initiated home lisinopril 40 mg yesterday: most recent /68.     -c/w home lisinopril 40mg today

## 2022-11-10 NOTE — PROGRESS NOTE ADULT - SUBJECTIVE AND OBJECTIVE BOX
** INCOMPLETE **    OVERNIGHT EVENTS:     SUBJECTIVE:    VITAL SIGNS:  Vital Signs Last 24 Hrs  T(C): 36.6 (10 Nov 2022 05:38), Max: 36.9 (09 Nov 2022 13:40)  T(F): 97.8 (10 Nov 2022 05:38), Max: 98.4 (09 Nov 2022 13:40)  HR: 59 (10 Nov 2022 05:38) (51 - 61)  BP: 134/68 (10 Nov 2022 05:38) (126/68 - 158/67)  BP(mean): --  RR: 16 (10 Nov 2022 05:38) (16 - 18)  SpO2: 97% (10 Nov 2022 05:38) (97% - 99%)    Parameters below as of 10 Nov 2022 05:38  Patient On (Oxygen Delivery Method): room air        PHYSICAL EXAM:  General: NAD; speaking in full sentences  HEENT: NC/AT; PERRL; EOMI; MMM  Neck: supple; no JVD  Cardiac: RRR; +S1/S2  Pulm: CTA B/L; no W/R/R  GI: soft, NT/ND, +BS  Extremities: WWP; no edema, clubbing or cyanosis  Vasc: 2+ radial, DP pulses B/L  Neuro: AAOx3; no focal deficits    MEDICATIONS:  MEDICATIONS  (STANDING):  atorvastatin 40 milliGRAM(s) Oral at bedtime  cefTRIAXone   IVPB 2000 milliGRAM(s) IV Intermittent every 24 hours  citalopram 40 milliGRAM(s) Oral daily  dextrose 5%. 1000 milliLiter(s) (100 mL/Hr) IV Continuous <Continuous>  dextrose 5%. 1000 milliLiter(s) (50 mL/Hr) IV Continuous <Continuous>  dextrose 50% Injectable 25 Gram(s) IV Push once  dextrose 50% Injectable 12.5 Gram(s) IV Push once  dextrose 50% Injectable 25 Gram(s) IV Push once  glucagon  Injectable 1 milliGRAM(s) IntraMuscular once  heparin   Injectable 5000 Unit(s) SubCutaneous every 8 hours  insulin glargine Injectable (LANTUS) 15 Unit(s) SubCutaneous at bedtime  insulin lispro (ADMELOG) corrective regimen sliding scale   SubCutaneous Before meals and at bedtime  insulin lispro Injectable (ADMELOG) 5 Unit(s) SubCutaneous three times a day before meals  lactated ringers. 1000 milliLiter(s) (200 mL/Hr) IV Continuous <Continuous>  lisinopril 40 milliGRAM(s) Oral daily    MEDICATIONS  (PRN):  acetaminophen     Tablet .. 650 milliGRAM(s) Oral every 6 hours PRN Temp greater or equal to 38C (100.4F), Mild Pain (1 - 3)  aluminum hydroxide/magnesium hydroxide/simethicone Suspension 30 milliLiter(s) Oral every 4 hours PRN Dyspepsia  dextrose Oral Gel 15 Gram(s) Oral once PRN Blood Glucose LESS THAN 70 milliGRAM(s)/deciliter  melatonin 3 milliGRAM(s) Oral at bedtime PRN Insomnia  ondansetron Injectable 4 milliGRAM(s) IV Push every 8 hours PRN Nausea and/or Vomiting      ALLERGIES:  Allergies    No Known Allergies    Intolerances        LABS:                        11.4   6.88  )-----------( 249      ( 10 Nov 2022 05:30 )             35.0     11-10    138  |  105  |  18  ----------------------------<  148<H>  5.2   |  27  |  1.18    Ca    8.9      10 Nov 2022 05:30  Phos  3.5     11-10  Mg     1.9     11-10          RADIOLOGY & ADDITIONAL TESTS: Reviewed. OVERNIGHT EVENTS:   Pt seen and examined at bedside. As per nurse and patient, no overnight events aside from occasionally disturbed sleep due to loud neighboring patient. Resting comfortably. Mild 2/10 pain in right 4th toe. Pt denies: fever, chills, lightheadedness, weakness, CP, palpitations, SOB, N/V, ROS otherwise negative aside from intermittent dry cough described by patient as chronic w/ unknown source.     VITAL SIGNS:  Vital Signs Last 24 Hrs  T(C): 36.6 (10 Nov 2022 05:38), Max: 36.9 (09 Nov 2022 13:40)  T(F): 97.8 (10 Nov 2022 05:38), Max: 98.4 (09 Nov 2022 13:40)  HR: 59 (10 Nov 2022 05:38) (51 - 61)  BP: 134/68 (10 Nov 2022 05:38) (126/68 - 158/67)  BP(mean): --  RR: 16 (10 Nov 2022 05:38) (16 - 18)  SpO2: 97% (10 Nov 2022 05:38) (97% - 99%)    Parameters below as of 10 Nov 2022 05:38  Patient On (Oxygen Delivery Method): room air        PHYSICAL EXAM:  Constitutional: resting comfortably in bed; calm, cooperative, NAD  HEENT: NC/AT, PER, anicteric sclera, no nasal discharge;   Neck: supple; no JVD or thyromegaly, no lymphadenopathy   Respiratory: CTA B/L; no W/R/R, no retractions, occasional dry non-productive cough most prominent in upper airway  Cardiac: +S1/S2; RRR; no M/R/G  Gastrointestinal: soft, NT/ND; no rebound or guarding, bowel sounds active in all four quadrants   Back: spine midline, no bony tenderness or step-offs; no CVAT B/L  Extremities: WWP, no clubbing or cyanosis; no peripheral edema, + mild tenderness at 3rd digit of right foot  Musculoskeletal:  no joint swelling, tenderness or erythema  Vascular: 2+ radial, DP/PT pulses B/L  Psychiatric: affect and characteristics of appearance, verbalizations, behaviors are appropriate, congruent affect       MEDICATIONS:  MEDICATIONS  (STANDING):  atorvastatin 40 milliGRAM(s) Oral at bedtime  cefTRIAXone   IVPB 2000 milliGRAM(s) IV Intermittent every 24 hours  citalopram 40 milliGRAM(s) Oral daily  dextrose 5%. 1000 milliLiter(s) (100 mL/Hr) IV Continuous <Continuous>  dextrose 5%. 1000 milliLiter(s) (50 mL/Hr) IV Continuous <Continuous>  dextrose 50% Injectable 25 Gram(s) IV Push once  dextrose 50% Injectable 12.5 Gram(s) IV Push once  dextrose 50% Injectable 25 Gram(s) IV Push once  glucagon  Injectable 1 milliGRAM(s) IntraMuscular once  heparin   Injectable 5000 Unit(s) SubCutaneous every 8 hours  insulin glargine Injectable (LANTUS) 15 Unit(s) SubCutaneous at bedtime  insulin lispro (ADMELOG) corrective regimen sliding scale   SubCutaneous Before meals and at bedtime  insulin lispro Injectable (ADMELOG) 5 Unit(s) SubCutaneous three times a day before meals  lactated ringers. 1000 milliLiter(s) (200 mL/Hr) IV Continuous <Continuous>  lisinopril 40 milliGRAM(s) Oral daily    MEDICATIONS  (PRN):  acetaminophen     Tablet .. 650 milliGRAM(s) Oral every 6 hours PRN Temp greater or equal to 38C (100.4F), Mild Pain (1 - 3)  aluminum hydroxide/magnesium hydroxide/simethicone Suspension 30 milliLiter(s) Oral every 4 hours PRN Dyspepsia  dextrose Oral Gel 15 Gram(s) Oral once PRN Blood Glucose LESS THAN 70 milliGRAM(s)/deciliter  melatonin 3 milliGRAM(s) Oral at bedtime PRN Insomnia  ondansetron Injectable 4 milliGRAM(s) IV Push every 8 hours PRN Nausea and/or Vomiting      ALLERGIES:  Allergies    No Known Allergies    Intolerances        LABS:                        11.4   6.88  )-----------( 249      ( 10 Nov 2022 05:30 )             35.0     11-10    138  |  105  |  18  ----------------------------<  148<H>  5.2   |  27  |  1.18    Ca    8.9      10 Nov 2022 05:30  Phos  3.5     11-10  Mg     1.9     11-10          RADIOLOGY & ADDITIONAL TESTS: Reviewed. OVERNIGHT EVENTS:   Pt seen and examined at bedside. As per nurse and patient, no overnight events.    Resting comfortably. Mild 2/10 pain in right 4th toe. Pt denies: fever, chills, lightheadedness, weakness, CP, palpitations, SOB, N/V, ROS otherwise negative.     VITAL SIGNS:  Vital Signs Last 24 Hrs  T(C): 36.6 (10 Nov 2022 05:38), Max: 36.9 (09 Nov 2022 13:40)  T(F): 97.8 (10 Nov 2022 05:38), Max: 98.4 (09 Nov 2022 13:40)  HR: 59 (10 Nov 2022 05:38) (51 - 61)  BP: 134/68 (10 Nov 2022 05:38) (126/68 - 158/67)  BP(mean): --  RR: 16 (10 Nov 2022 05:38) (16 - 18)  SpO2: 97% (10 Nov 2022 05:38) (97% - 99%)    Parameters below as of 10 Nov 2022 05:38  Patient On (Oxygen Delivery Method): room air        PHYSICAL EXAM:  Constitutional: resting comfortably in bed; calm, cooperative, NAD  HEENT: NC/AT, PER, anicteric sclera, no nasal discharge;   Neck: supple; no JVD or thyromegaly, no lymphadenopathy   Respiratory: CTA B/L; no W/R/R, no retractions, occasional dry non-productive cough most prominent in upper airway  Cardiac: +S1/S2; RRR; no M/R/G  Gastrointestinal: soft, NT/ND; no rebound or guarding, bowel sounds active in all four quadrants   Back: spine midline, no bony tenderness or step-offs; no CVAT B/L  Extremities: WWP, no clubbing or cyanosis; no peripheral edema, + mild tenderness at 3rd digit of right foot  Musculoskeletal:  no joint swelling, tenderness or erythema  Vascular: 2+ radial, DP/PT pulses B/L  Psychiatric: affect and characteristics of appearance, verbalizations, behaviors are appropriate, congruent affect       MEDICATIONS:  MEDICATIONS  (STANDING):  atorvastatin 40 milliGRAM(s) Oral at bedtime  cefTRIAXone   IVPB 2000 milliGRAM(s) IV Intermittent every 24 hours  citalopram 40 milliGRAM(s) Oral daily  dextrose 5%. 1000 milliLiter(s) (100 mL/Hr) IV Continuous <Continuous>  dextrose 5%. 1000 milliLiter(s) (50 mL/Hr) IV Continuous <Continuous>  dextrose 50% Injectable 25 Gram(s) IV Push once  dextrose 50% Injectable 12.5 Gram(s) IV Push once  dextrose 50% Injectable 25 Gram(s) IV Push once  glucagon  Injectable 1 milliGRAM(s) IntraMuscular once  heparin   Injectable 5000 Unit(s) SubCutaneous every 8 hours  insulin glargine Injectable (LANTUS) 15 Unit(s) SubCutaneous at bedtime  insulin lispro (ADMELOG) corrective regimen sliding scale   SubCutaneous Before meals and at bedtime  insulin lispro Injectable (ADMELOG) 5 Unit(s) SubCutaneous three times a day before meals  lactated ringers. 1000 milliLiter(s) (200 mL/Hr) IV Continuous <Continuous>  lisinopril 40 milliGRAM(s) Oral daily    MEDICATIONS  (PRN):  acetaminophen     Tablet .. 650 milliGRAM(s) Oral every 6 hours PRN Temp greater or equal to 38C (100.4F), Mild Pain (1 - 3)  aluminum hydroxide/magnesium hydroxide/simethicone Suspension 30 milliLiter(s) Oral every 4 hours PRN Dyspepsia  dextrose Oral Gel 15 Gram(s) Oral once PRN Blood Glucose LESS THAN 70 milliGRAM(s)/deciliter  melatonin 3 milliGRAM(s) Oral at bedtime PRN Insomnia  ondansetron Injectable 4 milliGRAM(s) IV Push every 8 hours PRN Nausea and/or Vomiting      ALLERGIES:  Allergies    No Known Allergies    Intolerances        LABS:                        11.4   6.88  )-----------( 249      ( 10 Nov 2022 05:30 )             35.0     11-10    138  |  105  |  18  ----------------------------<  148<H>  5.2   |  27  |  1.18    Ca    8.9      10 Nov 2022 05:30  Phos  3.5     11-10  Mg     1.9     11-10          RADIOLOGY & ADDITIONAL TESTS: Reviewed.

## 2022-11-10 NOTE — PROGRESS NOTE ADULT - PROBLEM SELECTOR PLAN 1
Patient with 3 week history of lesion to right 4th toe, confirmed OM on outpatient MRI, sent in by outpatient podiatry for IV Abx and Bone biopsy. Initial cultures show no organism growth  and negative gram stain. +"reactive bone" on biopsy suggestive of OM. CRP WNL. ESR downtrended after Abx initiation and fluids. Began Vancomycin 15mg/kg q12hrs with trough prior to 4th dose (20.3) and ceftriaxone 2g q12hrs. Well-tolerated by pt.       - Podiatry following, performed bone biopsy in the ED, f/u WCx  i/s/o NOLAN), narrow coverage pending bone Cx   - requires PICC line prior to d/c : f/u with PICC team for soonest possible placement Patient with 3 week history of lesion to right 4th toe, confirmed OM on outpatient MRI, sent in by outpatient podiatry for IV Abx and Bone biopsy. Initial cultures show no organism growth  and negative gram stain. +"reactive bone" on biopsy suggestive of OM. CRP WNL. ESR downtrended after Abx initiation and fluids. Began Vancomycin 15mg/kg q12hrs with trough prior to 4th dose (20.3) and ceftriaxone 2g q12hrs. Well-tolerated by pt.        - Podiatry following, performed bone biopsy in the ED  - c/w Vancomycin 15mg/kg q12hrs with trough at 4pm and ceftriaxone 2g q12hrs  - narrow Antbx coverage pending bone Cx   - requires PICC line prior to d/c : f/u with PICC team for soonest possible placement Patient with 3 week history of lesion to right 4th toe, confirmed OM on outpatient MRI, sent in by outpatient podiatry for IV Abx and Bone biopsy. Initial cultures show no organism growth  and negative gram stain. +"reactive bone" on biopsy suggestive of OM. CRP WNL. ESR downtrended after Abx initiation and fluids. Began Vancomycin 1250mg q12h and ceftriaxone 2g q12hrs. Well-tolerated by pt.        - Podiatry following, performed bone biopsy in the ED  - c/w Vancomycin 1250mg q12hrs with trough at 4pm and ceftriaxone 2g q12hrs  - narrow Antbx coverage pending bone Cx   - requires PICC line prior to d/c : f/u with PICC team for soonest possible placement  - f/u ID reccs Patient with 3 week history of lesion to right 4th toe, confirmed OM on outpatient MRI, sent in by outpatient podiatry for IV Abx and Bone biopsy. Initial cultures show no organism growth  and negative gram stain. +"reactive bone" on biopsy suggestive of OM. CRP WNL. ESR downtrended after Abx initiation and fluids.     - Podiatry following, performed bone biopsy in the ED  - c/w Vancomycin 1250mg q12hrs with trough at 4pm and ceftriaxone 2g q12hrs  - narrow Antbx coverage pending bone Cx   - requires PICC line prior to d/c : f/u with PICC team for soonest possible placement  - f/u ID reccs

## 2022-11-10 NOTE — PROGRESS NOTE ADULT - PROBLEM SELECTOR PLAN 2
Unknown control status. Home regimen of Lantus 40U BID, Metformin 1000mg BID and Trulicity weekly. A1C found to 11.4 yesterday morning with est avg glucose of 280. Endocrine consulted with and feels pt has too high of basal insulin dose at home and wants to start Lispro 4 units pre-meal and continue on low dose sliding scale. D/c Lantus 40 mg BID yesterday.       - f/u FSG and redose insulin in afternoon  - ISS  - f/u with endocrine reccs  - consider nutritional consult for pt dietary counseling Unknown control status. Home regimen of Lantus 40U BID, Metformin 1000mg BID and Trulicity weekly. A1C found to 11.4 two days ago with avg glucose of 280. Endocrine consulted with and feels pt has too high of basal insulin dose at home and wants to start Lispro 5 units pre-meal and continue on moderate dose sliding scale. D/c Lantus 40 mg BID yesterday and began Lantus 15 units.       - f/u FSG and redose insulin in afternoon  - ISS  - f/u with endocrine reccs  - consider nutritional consult for pt dietary counseling Unknown control status. Home regimen of Lantus 40U BID, Metformin 1000mg BID and Trulicity weekly. A1C found to 11.4 two days ago with avg glucose of 280. Endocrine consulted with and feels pt has too high of basal insulin dose at home and wants to start Lispro 5 units pre-meal and continue on moderate dose sliding scale. D/c Lantus 40 mg BID yesterday and began Lantus 15 units.       - f/u FSG and redose insulin in afternoon  - ISS  - f/u with endocrine reccs  - f/u outpatient endocrinology   - consider nutritional consult for pt dietary counseling Home regimen of Lantus 40U BID, Metformin 1000mg BID and Trulicity weekly. A1C found to 11.4 two days ago with avg glucose of 280. Endocrine consulted with and feels pt has too high of basal insulin dose at home and wants to start Lispro 5 units pre-meal and continue on moderate dose sliding scale. D/c Lantus 40 mg BID yesterday and began Lantus 15 units.       - f/u FSG  - mISS  - f/u with endocrine reccs  - f/u outpatient endocrinology   - nutrition saw pt for dietary counseling Home regimen of Lantus 40U BID, Metformin 1000mg BID and Trulicity weekly. A1C found to 11.4 two days ago with avg glucose of 280. Endocrine consulted with and feels pt has too high of basal insulin dose at home and wants to start Lispro 5 units pre-meal and continue on moderate dose sliding scale. D/c Lantus 40 mg BID yesterday and began Lantus 15 units.       - f/u FSG  - c/w Lantus 15IU qhs, Lispro 5IU TID with meals   - mISS  - f/u with endocrine reccs  - f/u outpatient endocrinology   - nutrition saw pt for dietary counseling

## 2022-11-10 NOTE — PROGRESS NOTE ADULT - PROBLEM SELECTOR PLAN 3
Unknown baseline, Hb of 11.1 (normocytic) on admission, with follow-up readings of 10.4 yesterday and 11.0 today. DDx include CKD vs hemolysis.   - iron studies show Fe, %sat, ferritin WNL. TIBC low, suggesting etiology less likely ARNOL  - continue to monitor hgb   - no active interventions at this time Unknown baseline, Hb of 11.1 (normocytic) on admission, with follow-up readings of 11 yesterday and 11.4 today. DDx include CKD vs hemolysis.     - iron studies show Fe, %sat, ferritin WNL. TIBC low, suggesting etiology less likely ARNOL  - continue to monitor hgb   - no active interventions at this time

## 2022-11-10 NOTE — PROGRESS NOTE ADULT - ATTENDING COMMENTS
Pt seen on rounds this afternoon.  Still awaiting results of cultures.  He denies any pain in the toe.  Has been eating well, with an appropriate amount of carbs at meals.  Glucoses are still high post-prandially--218 after supper last night, 202 after breakfast today  Will increase the premeal lispro to 7 units
Patient was seen and examined with the resident team today.  I agree with the above assessment and plan with the following exceptions/additions:     Briefly, this is a 56yo gentleman with a PMH of poorly controlled IDDM2 c/b hyperglycemia and prior DM foot infections/OM, essential HTN and HLD who p/w an outpatient MRI c/f R-foot OM, now s/p bone biopsy w/Podiatry on 11/7.  Also noted to have an NOLAN that has now resolved.     #Recurrent DM foot infection c/b OM - Podiatry following, f/u bone biopsy from 11/7; ID consult to assist w/long-term IV abx    #Poorly controlled IDDM2 - f/u Endo recs; Nutrition consult; will need new Endo referral w/in [facility] at time of discharge    #NOLAN - resolving; does not appear to have CKD   #Essential HTN - restart Lisinopril 40mg daily   #HLD - c/w statin  #DVT PPx - SQH  #Dispo - pending long-term plans for IV abx as would need 6wks for OM     Deanne Solitario  [phone]
Patient was seen and examined with the resident team today.  I agree with the above assessment and plan with the following exceptions/additions:     Briefly, this is a 56yo gentleman with a PMH of poorly controlled IDDM2 c/b hyperglycemia and prior DM foot infections/OM, essential HTN and HLD who p/w an outpatient MRI c/f R-foot OM, now s/p bone biopsy w/Podiatry on 11/7.  Also noted to have an NOLAN.     #Recurrent DM foot infection c/b OM - Podiatry following, f/u bone biopsy; ID consult   #Poorly controlled IDDM2 - Endo consult; pt interested in engaging w/outpt care within [facility]   #NOLAN - resolving; however, unclear if he has underlying CKD as well   #Essential HTN - normotensive here; will re-assess as would qualify for an ACEi  #DVT PPx - SQH  #Dispo - pending long-term plans for IV abx as would need 6wks for OM     Deanne Solitario  [phone]
Patient was seen and examined with the resident team today.  I agree with the above assessment and plan with the following exceptions/additions:     Briefly, this is a 58yo gentleman with a PMH of poorly controlled IDDM2 c/b hyperglycemia and prior DM foot infections/OM, essential HTN and HLD who p/w an outpatient MRI c/f R-foot OM, now s/p bone biopsy w/Podiatry on 11/7.  Also noted to have an NOLAN that has now resolved.  Currently awaiting finalization of cultures in order to obtain final ID recs.     #Recurrent DM foot infection c/b OM - Podiatry and ID following; c/w Vanc and CTX; f/u bone biopsy from 11/7; will need PICC and will ask PICC Team to place on schedule for today v tomorrow   #Poorly controlled IDDM2 - Endo and Nutrition consulted; f/u daily Endo recs; needs new Endo referral w/in [facility] at time of discharge    #NOLAN - resolving; does not appear to have CKD   #Essential HTN - c/w Lisinopril 40mg daily   #HLD - c/w statin  #DVT PPx - SQ[facility]  #Dispo - likely home w/home infusions for OM     Deanne Solitario  [phone]

## 2022-11-10 NOTE — PROGRESS NOTE ADULT - ASSESSMENT
57M with PMH DM, HTN, HLD and anxiety presenting at the behest of outpatient podiatry for MRI indicative of OM, admitted for OM.  57M with PMH DM, HTN, HLD and anxiety presenting at the behest of outpatient podiatry for MRI indicative of OM, admitted for management of osteomyelitis.

## 2022-11-10 NOTE — PROGRESS NOTE ADULT - SUBJECTIVE AND OBJECTIVE BOX
Patient is a 57y old  Male who presents with a chief complaint of OM (09 Nov 2022 16:30)      INTERVAL HPI/ OVERNIGHT EVENTS: PARKER O/N. Infectious disease consulted yesterday, path result shows "reactive bone" which is being deemed as possibly a/w OM, microbiology cx still shows NGTD as of 8AM 11/10/22.      LABS                        11.4   6.88  )-----------( 249      ( 10 Nov 2022 05:30 )             35.0     11-10    138  |  105  |  18  ----------------------------<  148<H>  5.2   |  27  |  1.18    Ca    8.9      10 Nov 2022 05:30  Phos  3.5     11-10  Mg     1.9     11-10          ICU Vital Signs Last 24 Hrs  T(C): 36.6 (10 Nov 2022 05:38), Max: 36.9 (09 Nov 2022 13:40)  T(F): 97.8 (10 Nov 2022 05:38), Max: 98.4 (09 Nov 2022 13:40)  HR: 59 (10 Nov 2022 05:38) (51 - 61)  BP: 134/68 (10 Nov 2022 05:38) (126/68 - 158/67)  BP(mean): --  ABP: --  ABP(mean): --  RR: 16 (10 Nov 2022 05:38) (16 - 18)  SpO2: 97% (10 Nov 2022 05:38) (97% - 99%)    O2 Parameters below as of 10 Nov 2022 05:38  Patient On (Oxygen Delivery Method): room air      RADIOLOGY  < from: Xray Foot AP + Lateral, Right (11.07.22 @ 22:36) >    IMPRESSION: Frontal and lateral views of the right foot demonstrate a   screw traversing the second PIP joint with surrounding lucency,   suggesting loosening. The tarsometatarsal joint is in appropriate   alignment. No fracture. No dislocation. There is amputation of the distal   aspect of the third digit.    < end of copied text >      MICROBIOLOGY  Culture - Tissue with Gram Stain (collected 07 Nov 2022 23:21)  Source: .Tissue Wound  Gram Stain (08 Nov 2022 17:33):    No organisms seen    No WBC's seen.  Preliminary Report (09 Nov 2022 08:47):    No growth to date        PHYSICAL EXAM  Lower Extremity Focused  Vasc: DP 1/4, PT 2/4, No edema, No erythema, CFT <3 x 5  Derm: 2 mm ulcer on distal tip of 4th digit, +PTB, no pus, no malodor, hyperkeratotic borders; no other open wounds   Neuro: Protective sensation intact  MSK: Partial 3rd digit amputation

## 2022-11-10 NOTE — PROGRESS NOTE ADULT - SUBJECTIVE AND OBJECTIVE BOX
INTERVAL HPI/OVERNIGHT EVENTS:    Patient was seen and examined at bedside.  No complaints    CONSTITUTIONAL:  Negative fever or chills, feels well, good appetite  EYES:  Negative  blurry vision or double vision  CARDIOVASCULAR:  Negative for chest pain or palpitations  RESPIRATORY:  Negative for cough, wheezing, or SOB   GASTROINTESTINAL:  Negative for nausea, vomiting, diarrhea, constipation, or abdominal pain  GENITOURINARY:  Negative frequency, urgency or dysuria  NEUROLOGIC:  No headache, confusion, dizziness, lightheadedness      ANTIBIOTICS/RELEVANT:    MEDICATIONS  (STANDING):  atorvastatin 40 milliGRAM(s) Oral at bedtime  cefTRIAXone   IVPB 2000 milliGRAM(s) IV Intermittent every 24 hours  citalopram 40 milliGRAM(s) Oral daily  dextrose 5%. 1000 milliLiter(s) (100 mL/Hr) IV Continuous <Continuous>  dextrose 5%. 1000 milliLiter(s) (50 mL/Hr) IV Continuous <Continuous>  dextrose 50% Injectable 25 Gram(s) IV Push once  dextrose 50% Injectable 12.5 Gram(s) IV Push once  dextrose 50% Injectable 25 Gram(s) IV Push once  glucagon  Injectable 1 milliGRAM(s) IntraMuscular once  heparin   Injectable 5000 Unit(s) SubCutaneous every 8 hours  insulin glargine Injectable (LANTUS) 15 Unit(s) SubCutaneous at bedtime  insulin lispro (ADMELOG) corrective regimen sliding scale   SubCutaneous Before meals and at bedtime  insulin lispro Injectable (ADMELOG) 5 Unit(s) SubCutaneous three times a day before meals  lisinopril 40 milliGRAM(s) Oral daily    MEDICATIONS  (PRN):  acetaminophen     Tablet .. 650 milliGRAM(s) Oral every 6 hours PRN Temp greater or equal to 38C (100.4F), Mild Pain (1 - 3)  aluminum hydroxide/magnesium hydroxide/simethicone Suspension 30 milliLiter(s) Oral every 4 hours PRN Dyspepsia  dextrose Oral Gel 15 Gram(s) Oral once PRN Blood Glucose LESS THAN 70 milliGRAM(s)/deciliter  melatonin 3 milliGRAM(s) Oral at bedtime PRN Insomnia  ondansetron Injectable 4 milliGRAM(s) IV Push every 8 hours PRN Nausea and/or Vomiting        Vital Signs Last 24 Hrs  T(C): 36.6 (10 Nov 2022 11:12), Max: 36.9 (09 Nov 2022 21:23)  T(F): 97.9 (10 Nov 2022 11:12), Max: 98.4 (09 Nov 2022 21:23)  HR: 53 (10 Nov 2022 11:12) (53 - 59)  BP: 158/73 (10 Nov 2022 11:12) (134/68 - 158/73)  BP(mean): --  RR: 18 (10 Nov 2022 11:12) (16 - 18)  SpO2: 100% (10 Nov 2022 11:12) (97% - 100%)    Parameters below as of 10 Nov 2022 11:12  Patient On (Oxygen Delivery Method): room air        PHYSICAL EXAM:  Constitutional:Well-developed, well nourished  Eyes:HALLEY, EOMI  Ear/Nose/Throat: no oral lesion  Neck:  supple  Respiratory: CTA sina  Cardiovascular: S1S2 RRR, no murmurs  Gastrointestinal:soft, (+) BS, no HSM  Extremities: left foot with dressing in place   Vascular: DP Pulse:	right normal; left normal      LABS:                        11.4   6.88  )-----------( 249      ( 10 Nov 2022 05:30 )             35.0     11-10    138  |  105  |  18  ----------------------------<  148<H>  5.2   |  27  |  1.18    Ca    8.9      10 Nov 2022 05:30  Phos  3.5     11-10  Mg     1.9     11-10            MICROBIOLOGY:    Culture - Tissue with Gram Stain (11.07.22 @ 23:21)    Gram Stain:   No organisms seen  No WBC's seen.    Specimen Source: .Tissue Wound    Culture Results:   No growth to date        RADIOLOGY & ADDITIONAL STUDIES:

## 2022-11-10 NOTE — PROGRESS NOTE ADULT - SUBJECTIVE AND OBJECTIVE BOX
OVERNIGHT: No acute events overnight.   SUBJECTIVE: Patient was seen and examined this morning. He didn't have any new concerns or complaints.     CAPILLARY BLOOD GLUCOSE & INSULIN RECEIVED  Yesterday  - Dinner FSG: *** mg/dL = *** units of premeal Lispro + *** units of Lispro sliding scale.   - Bedtime FSG: *** mg/dL = *** units of Lantus + *** units Lispro sliding scale.     Today  - Breakfast FSG: *** mg/dL = *** units of premeal Lispro + *** units of Lispro sliding scale.   - Lunch FSG: *** mg/dL = *** units of premeal Lispro + *** units of Lispro sliding scale.     128 mg/dL (11-10 @ 17:16)  202 mg/dL (11-10 @ 12:22)  151 mg/dL (11-10 @ 08:36)  218 mg/dL (11-09 @ 21:57)    REVIEW OF SYSTEMS  Constitutional:  Negative fever, chills or loss of appetite.  Eyes:  Negative blurry vision or double vision.  Cardiovascular:  Negative for chest pain or palpitations.  Respiratory:  Negative for cough, wheezing, or shortness of breath.    Gastrointestinal:  Negative for nausea, vomiting, diarrhea, constipation, or abdominal pain.  Genitourinary:  Negative frequency, urgency or dysuria.  Neurologic:  No headache, confusion, dizziness, lightheadedness.    PHYSICAL EXAM  Vital Signs Last 24 Hrs  T(C): 36.8 (10 Nov 2022 15:25), Max: 36.9 (09 Nov 2022 21:23)  T(F): 98.3 (10 Nov 2022 15:25), Max: 98.4 (09 Nov 2022 21:23)  HR: 53 (10 Nov 2022 15:25) (53 - 59)  BP: 119/60 (10 Nov 2022 15:25) (119/60 - 158/73)  BP(mean): --  RR: 18 (10 Nov 2022 15:25) (16 - 18)  SpO2: 99% (10 Nov 2022 15:25) (97% - 100%)    Parameters below as of 10 Nov 2022 15:25  Patient On (Oxygen Delivery Method): room air        Constitutional: Awake, alert, in no acute distress.   HEENT: Normocephalic, atraumatic, HALLEY, no proptosis or lid retraction.   Neck: supple, no acanthosis, no thyromegaly or palpable thyroid nodules.  Respiratory: Lungs clear to ausculation bilaterally.   Cardiovascular: regular rhythm, normal S1 and S2, no audible murmurs.   GI: soft, non-tender, non-distended, bowel sounds present, no masses appreciated.  Extremities: No lower extremity edema, peripheral pulses present.   Skin: no rashes.   Psychiatric: AAO x 3. Normal affect/mood.     LABS  CBC - WBC/HGB/HTC/PLT: 6.88/11.4/35.0/249 (11-10-22)  BMP - Na/K/Cl/Bicarb/BUN/Cr/Gluc/AG/eGFR: 138/5.2/105/27/18/1.18/148/6/73 (11-10-22)  Ca - 8.9 (11-10-22)  Phos - 3.5 (11-10-22)  Mg - 1.9 (11-10-22)  LFT - Alb/Tprot/Tbili/Dbili/AlkPhos/ALT/AST: 3.7/--/<0.2/--/49/17/20 (11-08-22)        MEDICATIONS  MEDICATIONS  (STANDING):  atorvastatin 40 milliGRAM(s) Oral at bedtime  cefTRIAXone   IVPB 2000 milliGRAM(s) IV Intermittent every 24 hours  citalopram 40 milliGRAM(s) Oral daily  dextrose 5%. 1000 milliLiter(s) (100 mL/Hr) IV Continuous <Continuous>  dextrose 5%. 1000 milliLiter(s) (50 mL/Hr) IV Continuous <Continuous>  dextrose 50% Injectable 25 Gram(s) IV Push once  dextrose 50% Injectable 12.5 Gram(s) IV Push once  dextrose 50% Injectable 25 Gram(s) IV Push once  glucagon  Injectable 1 milliGRAM(s) IntraMuscular once  heparin   Injectable 5000 Unit(s) SubCutaneous every 8 hours  insulin glargine Injectable (LANTUS) 15 Unit(s) SubCutaneous at bedtime  insulin lispro (ADMELOG) corrective regimen sliding scale   SubCutaneous Before meals and at bedtime  insulin lispro Injectable (ADMELOG) 7 Unit(s) SubCutaneous three times a day before meals  lisinopril 40 milliGRAM(s) Oral daily    MEDICATIONS  (PRN):  acetaminophen     Tablet .. 650 milliGRAM(s) Oral every 6 hours PRN Temp greater or equal to 38C (100.4F), Mild Pain (1 - 3)  aluminum hydroxide/magnesium hydroxide/simethicone Suspension 30 milliLiter(s) Oral every 4 hours PRN Dyspepsia  dextrose Oral Gel 15 Gram(s) Oral once PRN Blood Glucose LESS THAN 70 milliGRAM(s)/deciliter  melatonin 3 milliGRAM(s) Oral at bedtime PRN Insomnia  ondansetron Injectable 4 milliGRAM(s) IV Push every 8 hours PRN Nausea and/or Vomiting    ASSESSMENT / RECOMMENDATIONS    A1C: 11.4 %  BUN: 18 mg/dL  Creatinine: 1.18 mg/dL  eGFR: 73 mL/min/1.73m2  Weight (kg): 90.7  BMI (kg/m2): 27.1  Ejection Fraction:     # Type 2 diabetes mellitus  - Please continue lantus *** units at bedtime.   - Continue lispro *** units before each meal.  - Continue lispro moderate / low dose sliding scale four times daily with meals and at bedtime.  - Patient's fingerstick glucose goal is 100-180 mg/dL.    - For discharge, patient can ***.    - Patient can follow up at discharge with [facility] by calling [phone] to make an appointment.      Case discussed with Dr. Bustillo. Primary team updated.       Konrad Turner    Endocrinology Fellow    Service Pager: [phone]    OVERNIGHT: No acute events overnight.   SUBJECTIVE: Patient was seen and examined this morning. He didn't have any new concerns or complaints. Patient's appetite is good, and he reports eating 100% of his meals. He denies eating food from outside.     CAPILLARY BLOOD GLUCOSE & INSULIN RECEIVED  Yesterday  - Dinner FS mg/dL = 4 units of premeal Lispro. He ate chicken, mashed potatoes, vegetables and a fruit cup.   - Bedtime FS mg/dL = 15 units of Lantus + 2 units Lispro sliding scale.     Today  - Breakfast FS mg/dL = 5 units of premeal Lispro + 1 units of Lispro sliding scale. He ate oats, fruit cup and an english muffin.   - Lunch FS mg/dL = 5 units of premeal Lispro + 2 units of Lispro sliding scale.     REVIEW OF SYSTEMS  Constitutional:  Negative fever, chills or loss of appetite.  Eyes:  Negative blurry vision or double vision.  Cardiovascular:  Negative for chest pain or palpitations.  Respiratory:  Negative for cough, wheezing, or shortness of breath.    Gastrointestinal:  Negative for nausea, vomiting, diarrhea, constipation, or abdominal pain.  Neurologic:  No headache, confusion, dizziness, lightheadedness.    PHYSICAL EXAM  Vital Signs Last 24 Hrs  T(C): 36.8 (10 Nov 2022 15:25), Max: 36.9 (2022 21:23)  T(F): 98.3 (10 Nov 2022 15:25), Max: 98.4 (2022 21:23)  HR: 53 (10 Nov 2022 15:25) (53 - 59)  BP: 119/60 (10 Nov 2022 15:25) (119/60 - 158/73)  BP(mean): --  RR: 18 (10 Nov 2022 15:25) (16 - 18)  SpO2: 99% (10 Nov 2022 15:25) (97% - 100%)    Parameters below as of 10 Nov 2022 15:25  Patient On (Oxygen Delivery Method): room air    Constitutional: Awake, alert, male in no acute distress.   HEENT: Normocephalic, atraumatic, HALLEY.  Respiratory: Lungs clear to ausculation bilaterally.   Cardiovascular: regular rhythm, normal S1 and S2, no audible murmurs.   GI: soft, non-tender, non-distended, bowel sounds present, no masses appreciated.  Extremities: No lower extremity edema. Right foot wrapped in ACE bandage.   Psychiatric: AAO x 3. Normal affect/mood.     LABS  CBC - WBC/HGB/HTC/PLT: 6.88/11.4/35.0/249 (11-10-22)  BMP - Na/K/Cl/Bicarb/BUN/Cr/Gluc/AG/eGFR: 138/5.2/105/27/18/1.18/148/6/73 (11-10-22)  Ca - 8.9 (11-10-22)  Phos - 3.5 (11-10-22)  Mg - 1.9 (11-10-22)  LFT - Alb/Tprot/Tbili/Dbili/AlkPhos/ALT/AST: 3.7/--/<0.2/--/49/17/20 (22)    MEDICATIONS  MEDICATIONS  (STANDING):  atorvastatin 40 milliGRAM(s) Oral at bedtime  cefTRIAXone   IVPB 2000 milliGRAM(s) IV Intermittent every 24 hours  citalopram 40 milliGRAM(s) Oral daily  dextrose 5%. 1000 milliLiter(s) (100 mL/Hr) IV Continuous <Continuous>  dextrose 5%. 1000 milliLiter(s) (50 mL/Hr) IV Continuous <Continuous>  dextrose 50% Injectable 25 Gram(s) IV Push once  dextrose 50% Injectable 12.5 Gram(s) IV Push once  dextrose 50% Injectable 25 Gram(s) IV Push once  glucagon  Injectable 1 milliGRAM(s) IntraMuscular once  heparin   Injectable 5000 Unit(s) SubCutaneous every 8 hours  insulin glargine Injectable (LANTUS) 15 Unit(s) SubCutaneous at bedtime  insulin lispro (ADMELOG) corrective regimen sliding scale   SubCutaneous Before meals and at bedtime  insulin lispro Injectable (ADMELOG) 7 Unit(s) SubCutaneous three times a day before meals  lisinopril 40 milliGRAM(s) Oral daily    MEDICATIONS  (PRN):  acetaminophen     Tablet .. 650 milliGRAM(s) Oral every 6 hours PRN Temp greater or equal to 38C (100.4F), Mild Pain (1 - 3)  aluminum hydroxide/magnesium hydroxide/simethicone Suspension 30 milliLiter(s) Oral every 4 hours PRN Dyspepsia  dextrose Oral Gel 15 Gram(s) Oral once PRN Blood Glucose LESS THAN 70 milliGRAM(s)/deciliter  melatonin 3 milliGRAM(s) Oral at bedtime PRN Insomnia  ondansetron Injectable 4 milliGRAM(s) IV Push every 8 hours PRN Nausea and/or Vomiting    ASSESSMENT / RECOMMENDATIONS  57M with PMH DM, HTN, HLD and anxiety presenting at the behest of outpatient podiatry for MRI indicative of OM, admitted for OM.     A1C: 11.4 %  BUN: 18 mg/dL  Creatinine: 1.18 mg/dL  eGFR: 73 mL/min/1.73m2  Weight (kg): 90.7  BMI (kg/m2): 27.1    # Type 2 diabetes mellitus  - Please continue lantus 15 units at bedtime.   - Increase lispro to 8 units before each meal.  - Continue lispro moderate dose sliding scale four times daily with meals and at bedtime.  - Patient's fingerstick glucose goal is 100-180 mg/dL.      Case discussed with Dr. Bustillo. Primary team updated.       Konrad Turner    Endocrinology Fellow    Service Pager: [phone]    OVERNIGHT: No acute events overnight.   SUBJECTIVE: Patient was seen and examined this morning. He didn't have any new concerns or complaints. Patient's appetite is good, and he reports eating 100% of his meals. He denies eating food from outside.     CAPILLARY BLOOD GLUCOSE & INSULIN RECEIVED  Yesterday  - Dinner FS mg/dL = 4 units of premeal Lispro. He ate chicken, mashed potatoes, vegetables and a fruit cup.   - Bedtime FS mg/dL = 15 units of Lantus + 2 units Lispro sliding scale.     Today  - Breakfast FS mg/dL = 5 units of premeal Lispro + 1 units of Lispro sliding scale. He ate oats, fruit cup and an english muffin.   - Lunch FS mg/dL = 5 units of premeal Lispro + 2 units of Lispro sliding scale.     REVIEW OF SYSTEMS  Constitutional:  Negative fever, chills or loss of appetite.  Eyes:  Negative blurry vision or double vision.  Cardiovascular:  Negative for chest pain or palpitations.  Respiratory:  Negative for cough, wheezing, or shortness of breath.    Gastrointestinal:  Negative for nausea, vomiting, diarrhea, constipation, or abdominal pain.  Neurologic:  No headache, confusion, dizziness, lightheadedness.    PHYSICAL EXAM  Vital Signs Last 24 Hrs  T(C): 36.8 (10 Nov 2022 15:25), Max: 36.9 (2022 21:23)  T(F): 98.3 (10 Nov 2022 15:25), Max: 98.4 (2022 21:23)  HR: 53 (10 Nov 2022 15:25) (53 - 59)  BP: 119/60 (10 Nov 2022 15:25) (119/60 - 158/73)  BP(mean): --  RR: 18 (10 Nov 2022 15:25) (16 - 18)  SpO2: 99% (10 Nov 2022 15:25) (97% - 100%)    Parameters below as of 10 Nov 2022 15:25  Patient On (Oxygen Delivery Method): room air    Constitutional: Awake, alert, male in no acute distress.   HEENT: Normocephalic, atraumatic, HALLEY.  Respiratory: Lungs clear to ausculation bilaterally.   Cardiovascular: regular rhythm, normal S1 and S2, no audible murmurs.   GI: soft, non-tender, non-distended, bowel sounds present, no masses appreciated.  Extremities: No lower extremity edema. Right foot wrapped in ACE bandage.   Psychiatric: AAO x 3. Normal affect/mood.     LABS  CBC - WBC/HGB/HTC/PLT: 6.88/11.4/35.0/249 (11-10-22)  BMP - Na/K/Cl/Bicarb/BUN/Cr/Gluc/AG/eGFR: 138/5.2/105/27/18/1.18/148/6/73 (11-10-22)  Ca - 8.9 (11-10-22)  Phos - 3.5 (11-10-22)  Mg - 1.9 (11-10-22)  LFT - Alb/Tprot/Tbili/Dbili/AlkPhos/ALT/AST: 3.7/--/<0.2/--/49/17/20 (22)    MEDICATIONS  MEDICATIONS  (STANDING):  atorvastatin 40 milliGRAM(s) Oral at bedtime  cefTRIAXone   IVPB 2000 milliGRAM(s) IV Intermittent every 24 hours  citalopram 40 milliGRAM(s) Oral daily  dextrose 5%. 1000 milliLiter(s) (100 mL/Hr) IV Continuous <Continuous>  dextrose 5%. 1000 milliLiter(s) (50 mL/Hr) IV Continuous <Continuous>  dextrose 50% Injectable 25 Gram(s) IV Push once  dextrose 50% Injectable 12.5 Gram(s) IV Push once  dextrose 50% Injectable 25 Gram(s) IV Push once  glucagon  Injectable 1 milliGRAM(s) IntraMuscular once  heparin   Injectable 5000 Unit(s) SubCutaneous every 8 hours  insulin glargine Injectable (LANTUS) 15 Unit(s) SubCutaneous at bedtime  insulin lispro (ADMELOG) corrective regimen sliding scale   SubCutaneous Before meals and at bedtime  insulin lispro Injectable (ADMELOG) 7 Unit(s) SubCutaneous three times a day before meals  lisinopril 40 milliGRAM(s) Oral daily    MEDICATIONS  (PRN):  acetaminophen     Tablet .. 650 milliGRAM(s) Oral every 6 hours PRN Temp greater or equal to 38C (100.4F), Mild Pain (1 - 3)  aluminum hydroxide/magnesium hydroxide/simethicone Suspension 30 milliLiter(s) Oral every 4 hours PRN Dyspepsia  dextrose Oral Gel 15 Gram(s) Oral once PRN Blood Glucose LESS THAN 70 milliGRAM(s)/deciliter  melatonin 3 milliGRAM(s) Oral at bedtime PRN Insomnia  ondansetron Injectable 4 milliGRAM(s) IV Push every 8 hours PRN Nausea and/or Vomiting    ASSESSMENT / RECOMMENDATIONS  57M with PMH DM, HTN, HLD and anxiety presenting at the behest of outpatient podiatry for MRI indicative of OM, admitted for OM.     A1C: 11.4 %  BUN: 18 mg/dL  Creatinine: 1.18 mg/dL  eGFR: 73 mL/min/1.73m2  Weight (kg): 90.7  BMI (kg/m2): 27.1    # Type 2 diabetes mellitus  - Please continue lantus 15 units at bedtime.   - Increase lispro to 7 units before each meal.  - Continue lispro moderate dose sliding scale four times daily with meals and at bedtime.  - Patient's fingerstick glucose goal is 100-180 mg/dL.      Case discussed with Dr. Bustillo. Primary team updated.       Konrad Turner    Endocrinology Fellow    Service Pager: [phone]

## 2022-11-11 PROBLEM — Z00.00 ENCOUNTER FOR PREVENTIVE HEALTH EXAMINATION: Status: ACTIVE | Noted: 2022-11-11

## 2022-11-11 LAB
ANION GAP SERPL CALC-SCNC: 7 MMOL/L — SIGNIFICANT CHANGE UP (ref 5–17)
BASOPHILS # BLD AUTO: 0.03 K/UL — SIGNIFICANT CHANGE UP (ref 0–0.2)
BASOPHILS NFR BLD AUTO: 0.4 % — SIGNIFICANT CHANGE UP (ref 0–2)
BUN SERPL-MCNC: 21 MG/DL — SIGNIFICANT CHANGE UP (ref 7–23)
CALCIUM SERPL-MCNC: 9 MG/DL — SIGNIFICANT CHANGE UP (ref 8.4–10.5)
CHLORIDE SERPL-SCNC: 106 MMOL/L — SIGNIFICANT CHANGE UP (ref 96–108)
CO2 SERPL-SCNC: 24 MMOL/L — SIGNIFICANT CHANGE UP (ref 22–31)
CREAT SERPL-MCNC: 1.23 MG/DL — SIGNIFICANT CHANGE UP (ref 0.5–1.3)
EGFR: 69 ML/MIN/1.73M2 — SIGNIFICANT CHANGE UP
EOSINOPHIL # BLD AUTO: 0.38 K/UL — SIGNIFICANT CHANGE UP (ref 0–0.5)
EOSINOPHIL NFR BLD AUTO: 5.6 % — SIGNIFICANT CHANGE UP (ref 0–6)
GLUCOSE BLDC GLUCOMTR-MCNC: 106 MG/DL — HIGH (ref 70–99)
GLUCOSE BLDC GLUCOMTR-MCNC: 122 MG/DL — HIGH (ref 70–99)
GLUCOSE BLDC GLUCOMTR-MCNC: 160 MG/DL — HIGH (ref 70–99)
GLUCOSE BLDC GLUCOMTR-MCNC: 171 MG/DL — HIGH (ref 70–99)
GLUCOSE SERPL-MCNC: 124 MG/DL — HIGH (ref 70–99)
HCT VFR BLD CALC: 35.2 % — LOW (ref 39–50)
HGB BLD-MCNC: 11.3 G/DL — LOW (ref 13–17)
IMM GRANULOCYTES NFR BLD AUTO: 0.3 % — SIGNIFICANT CHANGE UP (ref 0–0.9)
LYMPHOCYTES # BLD AUTO: 1.88 K/UL — SIGNIFICANT CHANGE UP (ref 1–3.3)
LYMPHOCYTES # BLD AUTO: 27.6 % — SIGNIFICANT CHANGE UP (ref 13–44)
MAGNESIUM SERPL-MCNC: 2.1 MG/DL — SIGNIFICANT CHANGE UP (ref 1.6–2.6)
MCHC RBC-ENTMCNC: 29.8 PG — SIGNIFICANT CHANGE UP (ref 27–34)
MCHC RBC-ENTMCNC: 32.1 GM/DL — SIGNIFICANT CHANGE UP (ref 32–36)
MCV RBC AUTO: 92.9 FL — SIGNIFICANT CHANGE UP (ref 80–100)
MONOCYTES # BLD AUTO: 0.45 K/UL — SIGNIFICANT CHANGE UP (ref 0–0.9)
MONOCYTES NFR BLD AUTO: 6.6 % — SIGNIFICANT CHANGE UP (ref 2–14)
NEUTROPHILS # BLD AUTO: 4.05 K/UL — SIGNIFICANT CHANGE UP (ref 1.8–7.4)
NEUTROPHILS NFR BLD AUTO: 59.5 % — SIGNIFICANT CHANGE UP (ref 43–77)
NRBC # BLD: 0 /100 WBCS — SIGNIFICANT CHANGE UP (ref 0–0)
PHOSPHATE SERPL-MCNC: 4.1 MG/DL — SIGNIFICANT CHANGE UP (ref 2.5–4.5)
PLATELET # BLD AUTO: 251 K/UL — SIGNIFICANT CHANGE UP (ref 150–400)
POTASSIUM SERPL-MCNC: 5.2 MMOL/L — SIGNIFICANT CHANGE UP (ref 3.5–5.3)
POTASSIUM SERPL-SCNC: 5.2 MMOL/L — SIGNIFICANT CHANGE UP (ref 3.5–5.3)
RBC # BLD: 3.79 M/UL — LOW (ref 4.2–5.8)
RBC # FLD: 12.2 % — SIGNIFICANT CHANGE UP (ref 10.3–14.5)
SARS-COV-2 RNA SPEC QL NAA+PROBE: NEGATIVE — SIGNIFICANT CHANGE UP
SODIUM SERPL-SCNC: 137 MMOL/L — SIGNIFICANT CHANGE UP (ref 135–145)
VANCOMYCIN FLD-MCNC: 15.8 UG/ML — SIGNIFICANT CHANGE UP
WBC # BLD: 6.81 K/UL — SIGNIFICANT CHANGE UP (ref 3.8–10.5)
WBC # FLD AUTO: 6.81 K/UL — SIGNIFICANT CHANGE UP (ref 3.8–10.5)

## 2022-11-11 PROCEDURE — 99232 SBSQ HOSP IP/OBS MODERATE 35: CPT

## 2022-11-11 PROCEDURE — 99231 SBSQ HOSP IP/OBS SF/LOW 25: CPT

## 2022-11-11 PROCEDURE — 99232 SBSQ HOSP IP/OBS MODERATE 35: CPT | Mod: GC

## 2022-11-11 PROCEDURE — 36573 INSJ PICC RS&I 5 YR+: CPT

## 2022-11-11 RX ORDER — ENOXAPARIN SODIUM 100 MG/ML
15 INJECTION SUBCUTANEOUS
Qty: 450 | Refills: 0
Start: 2022-11-11 | End: 2022-12-10

## 2022-11-11 RX ORDER — ATORVASTATIN CALCIUM 80 MG/1
1 TABLET, FILM COATED ORAL
Qty: 0 | Refills: 0 | DISCHARGE

## 2022-11-11 RX ORDER — LISINOPRIL 2.5 MG/1
1 TABLET ORAL
Qty: 0 | Refills: 0 | DISCHARGE

## 2022-11-11 RX ORDER — INSULIN LISPRO 100/ML
1 VIAL (ML) SUBCUTANEOUS
Qty: 6 | Refills: 0
Start: 2022-11-11

## 2022-11-11 RX ORDER — DULAGLUTIDE 4.5 MG/.5ML
1 INJECTION, SOLUTION SUBCUTANEOUS
Qty: 0 | Refills: 0 | DISCHARGE

## 2022-11-11 RX ORDER — VANCOMYCIN HCL 1 G
1.25 VIAL (EA) INTRAVENOUS
Qty: 0 | Refills: 0 | DISCHARGE
Start: 2022-11-11

## 2022-11-11 RX ORDER — INSULIN LISPRO 100/ML
7 VIAL (ML) SUBCUTANEOUS
Qty: 9 | Refills: 0
Start: 2022-11-11 | End: 2022-12-10

## 2022-11-11 RX ORDER — CEFTRIAXONE 500 MG/1
2 INJECTION, POWDER, FOR SOLUTION INTRAMUSCULAR; INTRAVENOUS
Qty: 0 | Refills: 0 | DISCHARGE
Start: 2022-11-11

## 2022-11-11 RX ORDER — METFORMIN HYDROCHLORIDE 850 MG/1
1 TABLET ORAL
Qty: 0 | Refills: 0 | DISCHARGE

## 2022-11-11 RX ORDER — VANCOMYCIN HCL 1 G
1250 VIAL (EA) INTRAVENOUS EVERY 12 HOURS
Refills: 0 | Status: COMPLETED | OUTPATIENT
Start: 2022-11-11 | End: 2022-11-12

## 2022-11-11 RX ORDER — CITALOPRAM 10 MG/1
1 TABLET, FILM COATED ORAL
Qty: 30 | Refills: 0
Start: 2022-11-11 | End: 2022-12-10

## 2022-11-11 RX ORDER — ATORVASTATIN CALCIUM 80 MG/1
1 TABLET, FILM COATED ORAL
Qty: 30 | Refills: 0
Start: 2022-11-11 | End: 2022-12-10

## 2022-11-11 RX ORDER — LISINOPRIL 2.5 MG/1
1 TABLET ORAL
Qty: 30 | Refills: 0
Start: 2022-11-11 | End: 2022-12-10

## 2022-11-11 RX ORDER — INSULIN GLARGINE 100 [IU]/ML
40 INJECTION, SOLUTION SUBCUTANEOUS
Qty: 0 | Refills: 0 | DISCHARGE

## 2022-11-11 RX ADMIN — HEPARIN SODIUM 5000 UNIT(S): 5000 INJECTION INTRAVENOUS; SUBCUTANEOUS at 07:39

## 2022-11-11 RX ADMIN — Medication 1: at 17:03

## 2022-11-11 RX ADMIN — LISINOPRIL 40 MILLIGRAM(S): 2.5 TABLET ORAL at 07:39

## 2022-11-11 RX ADMIN — Medication 7 UNIT(S): at 08:56

## 2022-11-11 RX ADMIN — Medication 166.67 MILLIGRAM(S): at 22:51

## 2022-11-11 RX ADMIN — INSULIN GLARGINE 15 UNIT(S): 100 INJECTION, SOLUTION SUBCUTANEOUS at 22:50

## 2022-11-11 RX ADMIN — CEFTRIAXONE 100 MILLIGRAM(S): 500 INJECTION, POWDER, FOR SOLUTION INTRAMUSCULAR; INTRAVENOUS at 15:31

## 2022-11-11 RX ADMIN — CITALOPRAM 40 MILLIGRAM(S): 10 TABLET, FILM COATED ORAL at 11:22

## 2022-11-11 RX ADMIN — HEPARIN SODIUM 5000 UNIT(S): 5000 INJECTION INTRAVENOUS; SUBCUTANEOUS at 14:28

## 2022-11-11 RX ADMIN — Medication 166.67 MILLIGRAM(S): at 11:21

## 2022-11-11 RX ADMIN — ATORVASTATIN CALCIUM 40 MILLIGRAM(S): 80 TABLET, FILM COATED ORAL at 22:50

## 2022-11-11 RX ADMIN — Medication 7 UNIT(S): at 17:05

## 2022-11-11 RX ADMIN — Medication 1: at 22:50

## 2022-11-11 RX ADMIN — HEPARIN SODIUM 5000 UNIT(S): 5000 INJECTION INTRAVENOUS; SUBCUTANEOUS at 22:51

## 2022-11-11 NOTE — PROGRESS NOTE ADULT - PROBLEM SELECTOR PLAN 8
F: tolerating PO  E: replete prn   N: consistent carb   DVT: Sub-Q heparin   GI: none   Dispo: home

## 2022-11-11 NOTE — PROGRESS NOTE ADULT - PROBLEM SELECTOR PLAN 3
Unknown baseline, Hb of 11.1 (normocytic) on admission, with follow-up readings of 11 yesterday and 11.4 today. Possibly 2/2 CKD.     - iron studies show Fe, %sat, ferritin WNL. TIBC low, suggesting etiology less likely ARNOL  - continue to monitor hgb   - no active interventions at this time

## 2022-11-11 NOTE — PROGRESS NOTE ADULT - PROBLEM SELECTOR PLAN 4
Well controlled, normotensive on admission. Home medication of lisinopril 40mg previously held i/s/o NOLAN, which is now resolved. Initiated home lisinopril 40 mg yesterday: most recent /68.     -c/w home lisinopril 40mg

## 2022-11-11 NOTE — PROGRESS NOTE ADULT - SUBJECTIVE AND OBJECTIVE BOX
OVERNIGHT: No acute events overnight.   SUBJECTIVE: Patient was seen and examined this morning. He didn't have any new concerns or complaints. Patient's appetite is good, and he reports eating 100% of his meals. Basically eating the same for each meals. Awaiting PICC line placement.    CAPILLARY BLOOD GLUCOSE & INSULIN RECEIVED  Yesterday  - Dinner FS mg/dL = 5 units of premeal Lispro. He ate chicken, mashed potatoes, vegetables and a fruit cup.   - Bedtime FS mg/dL = 15 units of Lantus + 1 units Lispro sliding scale.     Today  - Breakfast FS mg/dL = 7 units of premeal Lispro + 0 units of Lispro sliding scale. He ate oats, fruit cup and an english muffin.   - Lunch FSG: mg/dL = 7 units of premeal Lispro + 2 units of Lispro sliding scale.     REVIEW OF SYSTEMS  Constitutional:  Negative fever, chills or loss of appetite.  Eyes:  Negative blurry vision or double vision.  Cardiovascular:  Negative for chest pain or palpitations.  Respiratory:  Negative for cough, wheezing, or shortness of breath.    Gastrointestinal:  Negative for nausea, vomiting, diarrhea, constipation, or abdominal pain.  Neurologic:  No headache, confusion, dizziness, lightheadedness.      PHYSICAL EXAM  Vital Signs Last 24 Hrs  T(C): 36.6 (2022 08:51), Max: 36.8 (10 Nov 2022 15:25)  T(F): 97.8 (2022 08:51), Max: 98.3 (10 Nov 2022 15:25)  HR: 51 (2022 08:51) (51 - 55)  BP: 139/63 (2022 08:51) (119/60 - 139/63)  BP(mean): --  RR: 16 (2022 08:51) (16 - 18)  SpO2: 99% (2022 08:51) (97% - 99%)    Parameters below as of 2022 08:51  Patient On (Oxygen Delivery Method): room air    Constitutional: Awake, alert, male in no acute distress.   HEENT: Normocephalic, atraumatic, HALLEY.  Respiratory: Lungs clear to ausculation bilaterally.   Cardiovascular: regular rhythm, normal S1 and S2, no audible murmurs.   GI: soft, non-tender, non-distended, bowel sounds present, no masses appreciated.  Extremities: No lower extremity edema. Right foot wrapped in ACE bandage.   Psychiatric: AAO x 3. Normal affect/mood.      LABS  CBC - WBC/HGB/HTC/PLT: 6.81/11.3/35.2/251 (22)  BMP - Na/K/Cl/Bicarb/BUN/Cr/Gluc: 137/5.2/106/24/21/1.23/124 (22)  Anion Gap: 7 (22)  eGFR: 69 (22)  Calcium: 9.0 (22)  Phosphorus: 4.1 (22)  Magnesium: 2.1 (22)  LFT - Alb/Tprot/Tbili/Dbili/AlkPhos/ALT/AST: 3.7/--/<0.2/--/49/17/20 (22)          MEDICATIONS  MEDICATIONS  (STANDING):  atorvastatin 40 milliGRAM(s) Oral at bedtime  cefTRIAXone   IVPB 2000 milliGRAM(s) IV Intermittent every 24 hours  citalopram 40 milliGRAM(s) Oral daily  dextrose 5%. 1000 milliLiter(s) (100 mL/Hr) IV Continuous <Continuous>  dextrose 5%. 1000 milliLiter(s) (50 mL/Hr) IV Continuous <Continuous>  dextrose 50% Injectable 25 Gram(s) IV Push once  dextrose 50% Injectable 12.5 Gram(s) IV Push once  dextrose 50% Injectable 25 Gram(s) IV Push once  glucagon  Injectable 1 milliGRAM(s) IntraMuscular once  heparin   Injectable 5000 Unit(s) SubCutaneous every 8 hours  insulin glargine Injectable (LANTUS) 15 Unit(s) SubCutaneous at bedtime  insulin lispro (ADMELOG) corrective regimen sliding scale   SubCutaneous Before meals and at bedtime  insulin lispro Injectable (ADMELOG) 7 Unit(s) SubCutaneous three times a day before meals  lisinopril 40 milliGRAM(s) Oral daily  vancomycin  IVPB 1250 milliGRAM(s) IV Intermittent every 12 hours    MEDICATIONS  (PRN):  acetaminophen     Tablet .. 650 milliGRAM(s) Oral every 6 hours PRN Temp greater or equal to 38C (100.4F), Mild Pain (1 - 3)  aluminum hydroxide/magnesium hydroxide/simethicone Suspension 30 milliLiter(s) Oral every 4 hours PRN Dyspepsia  dextrose Oral Gel 15 Gram(s) Oral once PRN Blood Glucose LESS THAN 70 milliGRAM(s)/deciliter  melatonin 3 milliGRAM(s) Oral at bedtime PRN Insomnia  ondansetron Injectable 4 milliGRAM(s) IV Push every 8 hours PRN Nausea and/or Vomiting

## 2022-11-11 NOTE — PROGRESS NOTE ADULT - PROBLEM SELECTOR PLAN 7
Unknown baseline renal function, BUN 34 and Cr 1.7 on admission. Could have been CKD 2/2 DM vs NOLAN vs NOLAN on CKD. Downtrended to BUN 19 and Cr 1.09 today.   - RESOLVED   - ensure adequate hydration  - monitor BUN + Cr
Unknown baseline renal function, BUN 34 and Cr 1.7 on admission. Could have been CKD 2/2 DM vs NOLAN vs NOLAN on CKD. Downtrended to BUN 19 and Cr 1.09 today.   - RESOLVED   - ensure adequate hydration  - monitor BUN + Cr
Well controlled.   - c/w home citalopram 40mg daily
Unknown baseline renal function, BUN 34 and Cr 1.7 on admission. Could have been CKD 2/2 DM vs NOLAN vs NOLAN on CKD. Downtrended to BUN 19 and Cr 1.09 today.   - RESOLVED   - ensure adequate hydration  - monitor BUN + Cr

## 2022-11-11 NOTE — PROGRESS NOTE ADULT - PROBLEM SELECTOR PLAN 1
Type 2 diabetes mellitus  - Please continue lantus  units at bedtime.   - Increase lispro to  units before each meal.  - Continue lispro moderate dose sliding scale four times daily with meals and at bedtime.  - Patient's fingerstick glucose goal is 100-180 mg/dL.      Case discussed with Dr. Bustillo. Primary team updated. Type 2 diabetes mellitus  - Please continue lantus 15 units at bedtime.   - Continue lispro 7  units before each meal.  - Continue lispro moderate dose sliding scale four times daily with meals and at bedtime.  - Patient's fingerstick glucose goal is 100-180 mg/dL.      For discharge: Basal/bolus - Please contact endocrine for dosage prior to dc. Stop metformin and trulicity - can be restarted as OP.  Case discussed with Dr. Bustillo. Primary team updated. Type 2 diabetes mellitus  - Please continue lantus 15 units at bedtime.   - Continue lispro 7  units before each meal.  - Continue lispro moderate dose sliding scale four times daily with meals and at bedtime.  - Patient's fingerstick glucose goal is 100-180 mg/dL.      For discharge: Basal/bolus - Please contact endocrine for dosage prior to dc. Stop metformin and trulicity - can be restarted as OP.  Case discussed with Dr. Bustillo. Primary team updated.    Attending:  Above discussed with Ms. Connorpuramilton and with the pt, who seems willing to follow through with the pre-meal doses.  His only concern seems to be whether he will be able to use a pen for these in case he is out of the house--we assured him that he can    DANIEL Bustillo MD

## 2022-11-11 NOTE — PROGRESS NOTE ADULT - PROBLEM SELECTOR PLAN 1
Patient with 3 week history of lesion to right 4th toe, confirmed OM on outpatient MRI, sent in by outpatient podiatry for IV Abx and Bone biopsy. Initial cultures show no organism growth  and negative gram stain. +"reactive bone" on biopsy suggestive of OM.     - Bone bx NGTD- likely culture-negative OM given pt was on outpt Abx in recent weeks  - c/w Vancomycin 1250mg q12hrs with trough at 10am and CTX 2g q12hrs per ID  - PICC placed, pending discharge in AM with 6 weeks of antibiotics

## 2022-11-11 NOTE — PROGRESS NOTE ADULT - SUBJECTIVE AND OBJECTIVE BOX
OVERNIGHT EVENTS: PARKER    SUBJECTIVE: Spoke to pt at bedside where he states he feels unchanged from yesterday. No acute complaints. Denies HA, F/C, chest pain, LE edema/pain.     VITAL SIGNS:  Vital Signs Last 24 Hrs  T(C): 36.8 (11 Nov 2022 15:19), Max: 36.8 (11 Nov 2022 15:19)  T(F): 98.3 (11 Nov 2022 15:19), Max: 98.3 (11 Nov 2022 15:19)  HR: 54 (11 Nov 2022 15:19) (51 - 55)  BP: 141/62 (11 Nov 2022 15:19) (124/66 - 141/62)  BP(mean): --  RR: 17 (11 Nov 2022 15:19) (16 - 18)  SpO2: 99% (11 Nov 2022 15:19) (97% - 99%)    Parameters below as of 11 Nov 2022 15:19  Patient On (Oxygen Delivery Method): room air        PHYSICAL EXAM:  General: NAD; speaking in full sentences  HEENT: NC/AT; PERRL; EOMI; MMM  Neck: supple; no JVD  Cardiac: RRR; +S1/S2  Pulm: CTA B/L; no W/R/R  GI: soft, NT/ND, +BS  Extremities: WWP; no edema, clubbing or cyanosis, R foot wrapped  Vasc: 2+ radial, DP pulses B/L  Neuro: AAOx3; no focal deficits    MEDICATIONS:  MEDICATIONS  (STANDING):  atorvastatin 40 milliGRAM(s) Oral at bedtime  cefTRIAXone   IVPB 2000 milliGRAM(s) IV Intermittent every 24 hours  citalopram 40 milliGRAM(s) Oral daily  dextrose 5%. 1000 milliLiter(s) (100 mL/Hr) IV Continuous <Continuous>  dextrose 5%. 1000 milliLiter(s) (50 mL/Hr) IV Continuous <Continuous>  dextrose 50% Injectable 25 Gram(s) IV Push once  dextrose 50% Injectable 12.5 Gram(s) IV Push once  dextrose 50% Injectable 25 Gram(s) IV Push once  glucagon  Injectable 1 milliGRAM(s) IntraMuscular once  heparin   Injectable 5000 Unit(s) SubCutaneous every 8 hours  insulin glargine Injectable (LANTUS) 15 Unit(s) SubCutaneous at bedtime  insulin lispro (ADMELOG) corrective regimen sliding scale   SubCutaneous Before meals and at bedtime  insulin lispro Injectable (ADMELOG) 7 Unit(s) SubCutaneous three times a day before meals  lisinopril 40 milliGRAM(s) Oral daily  vancomycin  IVPB 1250 milliGRAM(s) IV Intermittent every 12 hours    MEDICATIONS  (PRN):  acetaminophen     Tablet .. 650 milliGRAM(s) Oral every 6 hours PRN Temp greater or equal to 38C (100.4F), Mild Pain (1 - 3)  aluminum hydroxide/magnesium hydroxide/simethicone Suspension 30 milliLiter(s) Oral every 4 hours PRN Dyspepsia  dextrose Oral Gel 15 Gram(s) Oral once PRN Blood Glucose LESS THAN 70 milliGRAM(s)/deciliter  melatonin 3 milliGRAM(s) Oral at bedtime PRN Insomnia  ondansetron Injectable 4 milliGRAM(s) IV Push every 8 hours PRN Nausea and/or Vomiting      ALLERGIES:  Allergies    No Known Allergies    Intolerances        LABS:                        11.3   6.81  )-----------( 251      ( 11 Nov 2022 07:42 )             35.2     11-11    137  |  106  |  21  ----------------------------<  124<H>  5.2   |  24  |  1.23    Ca    9.0      11 Nov 2022 07:42  Phos  4.1     11-11  Mg     2.1     11-11          RADIOLOGY & ADDITIONAL TESTS: Reviewed.

## 2022-11-11 NOTE — PROGRESS NOTE ADULT - PROBLEM SELECTOR PLAN 2
Home regimen of Lantus 40U BID, Metformin 1000mg BID and Trulicity weekly. A1C found to 11.4 two days ago with avg glucose of 280. Endocrine consulted with and feels pt has too high of basal insulin dose at home and wants to start Lispro 5 units pre-meal and continue on moderate dose sliding scale. D/c Lantus 40 mg BID yesterday and began Lantus 15 units.       - c/w Lantus 15IU qhs, Lispro 7IU TID with meals   - mISS  - obtain final endocrine reccs for discharge  - f/u outpatient endocrinology   - nutrition saw pt for dietary counseling

## 2022-11-11 NOTE — PROGRESS NOTE ADULT - PROBLEM SELECTOR PROBLEM 1
DM (diabetes mellitus)
DM (diabetes mellitus)
Osteomyelitis of fourth toe of right foot

## 2022-11-11 NOTE — PROGRESS NOTE ADULT - ASSESSMENT
IMPRESSION:  57 year old male with uncontrolled DM A1c 11.4, hx R 3rd toe osteomyelitis s/p amputation, now with R 4th toe diabetic foot ulcer--+PTB, MRI suggestive of underlying osteomyelitis and bone biopsy 11/7 with "reactive bone" presumably consistent with osteomyelitis. Bone culture negative in setting of recent omadacycline exposure    Recommend:  1.  Continue Vancomycin 1250 mg IV q12 + Ceftriaxone 2 grams IV daily  2.  Will need 6 weeks of IV antibiotics:  day 1 = 11/8/22; final day is 12/20/22  3.  Needs weekly CBC, CMP, ESR, CRP, vanco trough.  Fax to Dr. Gallardo:  [phone]  4.  Can follow up with Dr. Gallardo in 2-3 weeks:  [address] [place] [address], 4th [facility], [place], [phone], option 2    ID team 2 will sign off.  Reconsult as needed

## 2022-11-11 NOTE — PROGRESS NOTE ADULT - PROBLEM SELECTOR PLAN 5
Well-controlled as per patient   - c/w home atorvastatin 40mg daily
Well controlled, normotensive on admission. Home medication of lisinopril 25mg daily.   - hold home lisinopril i/s/o NOLAN
Well-controlled as per patient   - c/w home atorvastatin 40mg daily
Well-controlled as per patient   - c/w home atorvastatin 40mg daily

## 2022-11-11 NOTE — PROGRESS NOTE ADULT - SUBJECTIVE AND OBJECTIVE BOX
INTERVAL HPI/OVERNIGHT EVENTS:    Patient was seen and examined at bedside.  No complaints. Awaiting PICC placement    CONSTITUTIONAL:  Negative fever or chills, feels well, good appetite  EYES:  Negative  blurry vision or double vision  CARDIOVASCULAR:  Negative for chest pain or palpitations  RESPIRATORY:  Negative for cough, wheezing, or SOB   GASTROINTESTINAL:  Negative for nausea, vomiting, diarrhea, constipation, or abdominal pain  GENITOURINARY:  Negative frequency, urgency or dysuria  NEUROLOGIC:  No headache, confusion, dizziness, lightheadedness      ANTIBIOTICS/RELEVANT:    MEDICATIONS  (STANDING):  atorvastatin 40 milliGRAM(s) Oral at bedtime  cefTRIAXone   IVPB 2000 milliGRAM(s) IV Intermittent every 24 hours  citalopram 40 milliGRAM(s) Oral daily  dextrose 5%. 1000 milliLiter(s) (100 mL/Hr) IV Continuous <Continuous>  dextrose 5%. 1000 milliLiter(s) (50 mL/Hr) IV Continuous <Continuous>  dextrose 50% Injectable 25 Gram(s) IV Push once  dextrose 50% Injectable 12.5 Gram(s) IV Push once  dextrose 50% Injectable 25 Gram(s) IV Push once  glucagon  Injectable 1 milliGRAM(s) IntraMuscular once  heparin   Injectable 5000 Unit(s) SubCutaneous every 8 hours  insulin glargine Injectable (LANTUS) 15 Unit(s) SubCutaneous at bedtime  insulin lispro (ADMELOG) corrective regimen sliding scale   SubCutaneous Before meals and at bedtime  insulin lispro Injectable (ADMELOG) 7 Unit(s) SubCutaneous three times a day before meals  lisinopril 40 milliGRAM(s) Oral daily  vancomycin  IVPB 1250 milliGRAM(s) IV Intermittent every 12 hours    MEDICATIONS  (PRN):  acetaminophen     Tablet .. 650 milliGRAM(s) Oral every 6 hours PRN Temp greater or equal to 38C (100.4F), Mild Pain (1 - 3)  aluminum hydroxide/magnesium hydroxide/simethicone Suspension 30 milliLiter(s) Oral every 4 hours PRN Dyspepsia  dextrose Oral Gel 15 Gram(s) Oral once PRN Blood Glucose LESS THAN 70 milliGRAM(s)/deciliter  melatonin 3 milliGRAM(s) Oral at bedtime PRN Insomnia  ondansetron Injectable 4 milliGRAM(s) IV Push every 8 hours PRN Nausea and/or Vomiting        Vital Signs Last 24 Hrs  T(C): 36.6 (11 Nov 2022 08:51), Max: 36.8 (10 Nov 2022 15:25)  T(F): 97.8 (11 Nov 2022 08:51), Max: 98.3 (10 Nov 2022 15:25)  HR: 51 (11 Nov 2022 08:51) (51 - 55)  BP: 139/63 (11 Nov 2022 08:51) (119/60 - 139/63)  BP(mean): --  RR: 16 (11 Nov 2022 08:51) (16 - 18)  SpO2: 99% (11 Nov 2022 08:51) (97% - 99%)    Parameters below as of 11 Nov 2022 08:51  Patient On (Oxygen Delivery Method): room air        PHYSICAL EXAM:  Constitutional:  non-toxic, no distress  Eyes:  no icterus   Ear/Nose/Throat: no oral lesion, no sinus tenderness on percussion	  Neck:  supple  Respiratory: CTA sina  Cardiovascular: S1S2 RRR, no murmurs  Gastrointestinal:soft, (+) BS, no HSM  Extremities:  right foot with dressing in place   Vascular: DP Pulse:	right normal; left normal      LABS:                        11.3   6.81  )-----------( 251      ( 11 Nov 2022 07:42 )             35.2     11-11    137  |  106  |  21  ----------------------------<  124<H>  5.2   |  24  |  1.23    Ca    9.0      11 Nov 2022 07:42  Phos  4.1     11-11  Mg     2.1     11-11            MICROBIOLOGY:    Culture - Blood (11.08.22 @ 01:12)    Specimen Source: .Blood Blood-Peripheral    Culture Results:   No growth at 3 days.    Culture - Blood (11.08.22 @ 01:12)    Specimen Source: .Blood Blood-Peripheral    Culture Results:   No growth at 3 days.        RADIOLOGY & ADDITIONAL STUDIES:

## 2022-11-11 NOTE — PROGRESS NOTE ADULT - ASSESSMENT
56 y/o male with pmh IDDM with chief complaint of right fourth toe wound. MRI of right foot on 11/4 showed osteomyelitis of the right fourth digit middle and distal phalanx. Pt was instructed to go to ED for admission for IV antibiotics. Podiatry consulted to evaluate OM. Bone biopsy of right distal phalanx performed 11/7-NGTD.     Plan:  - Cont broad spectrum IV antibiotics per ID recs  - Bone path shows "reactive bone," per ID could be a/w OM  - Bone cx NGTD prelim   - Dressed wound with WTD gauze, melany and ACE.  -Pt will f/u with Dr. Chakraborty outpatient   - Pt can WBAT in surgical shoe     Discharge dressing instructions: Cleanse wound with normal saline daily, pat dry with sterile guaze, apply saline soaked gauze to wound base, cover with dry sterile gauze, ABD pad, wrap with Kerlix and light ACE bandage.     Plan discussed with attending and podiatry following.    56 y/o male with pmh IDDM with chief complaint of right fourth toe wound. MRI of right foot on 11/4 showed osteomyelitis of the right fourth digit middle and distal phalanx. Pt was instructed to go to ED for admission for IV antibiotics. Podiatry consulted to evaluate OM. Bone biopsy of right distal phalanx performed 11/7-NGTD.     Plan:  - Cont broad spectrum IV antibiotics per ID recs  - Bone path shows "reactive bone," per ID could be a/w OM  - Bone cx NGTD prelim   - Dressed wound with WTD gauze, melany and ACE.  -Pt will f/u with Dr. Chakraborty outpatient   - Pt can WBAT in surgical shoe     Plan discussed with attending and podiatry following.     Will be discharge with PICC line and abx selected per ID.     Discharge dressing instructions: Cleanse wound with normal saline daily, pat dry with sterile guaze, apply saline soaked gauze to wound base, cover with dry sterile gauze, ABD pad, wrap with Kerlix and light ACE bandage.     Patient should follow up with Dr. Jay Chakraborty within 1 week of discharge.    Office information:          [place] Address- [address] ([address]), [place] [address]          [facility]         [phone]           56 y/o male with pmh IDDM with chief complaint of right fourth toe wound. MRI of right foot on 11/4 showed osteomyelitis of the right fourth digit middle and distal phalanx. Pt was instructed to go to ED for admission for IV antibiotics. Podiatry consulted to evaluate OM. Bone biopsy of right distal phalanx performed 11/7-NGTD.     Plan:  - Cont broad spectrum IV antibiotics per ID recs  - Bone path shows "reactive bone," per ID could be a/w OM  - Bone cx NGTD prelim   - Dressed wound with WTD gauze, melany and ACE.  -Pt will f/u with Dr. Chakraborty outpatient   - Pt can WBAT in surgical shoe     Plan discussed with attending and podiatry following.     Will be discharged with PICC line and abx selected per ID.     Discharge dressing instructions: Cleanse wound with normal saline daily, pat dry with sterile guaze, apply saline soaked gauze to wound base, cover with dry sterile gauze, ABD pad, wrap with Kerlix and light ACE bandage.     Patient should follow up with Dr. Jay Chakraborty within 1 week of discharge.    Office information:          [place] Address- [address] ([address]), [place] [address]          [facility]         [phone]

## 2022-11-11 NOTE — PROGRESS NOTE ADULT - ASSESSMENT
57M with PMH DM, HTN, HLD and anxiety presenting at the behest of outpatient podiatry for MRI indicative of OM, admitted for management of osteomyelitis.

## 2022-11-11 NOTE — PROGRESS NOTE ADULT - PROBLEM SELECTOR PLAN 6
Well-controlled as per patient   - c/w home atorvastatin 40mg daily
Well controlled.   - c/w home citalopram 40mg daily

## 2022-11-11 NOTE — PROGRESS NOTE ADULT - ASSESSMENT
57M with PMH DM, HTN, HLD and anxiety presenting at the behest of outpatient podiatry for MRI indicative of OM, admitted for OM.     A1C: 11.4 %  BUN: 21 mg/dL  Creatinine: 1.23 mg/dL  GFR: 69 mL/min/1.73m2  Weight (kg): 90.7  BMI (kg/m2): 27.1  C-peptide 1.6 with

## 2022-11-11 NOTE — PROGRESS NOTE ADULT - SUBJECTIVE AND OBJECTIVE BOX
Patient is a 57y old  Male who presents with a chief complaint of OM (10 Nov 2022 18:56)      INTERVAL HPI/ OVERNIGHT EVENTS: Pt seen and examined bedside by podiatry. Pts dressing is clean, dry and intact. Pt understands he is waiting for PICC line placement.       LABS                        11.3   6.81  )-----------( 251      ( 11 Nov 2022 07:42 )             35.2     11-11    137  |  106  |  21  ----------------------------<  124<H>  5.2   |  24  |  1.23    Ca    9.0      11 Nov 2022 07:42  Phos  4.1     11-11  Mg     2.1     11-11          ICU Vital Signs Last 24 Hrs  T(C): 36.6 (11 Nov 2022 08:51), Max: 36.8 (10 Nov 2022 15:25)  T(F): 97.8 (11 Nov 2022 08:51), Max: 98.3 (10 Nov 2022 15:25)  HR: 51 (11 Nov 2022 08:51) (51 - 55)  BP: 139/63 (11 Nov 2022 08:51) (119/60 - 158/73)  BP(mean): --  ABP: --  ABP(mean): --  RR: 16 (11 Nov 2022 08:51) (16 - 18)  SpO2: 99% (11 Nov 2022 08:51) (97% - 100%)    O2 Parameters below as of 11 Nov 2022 08:51  Patient On (Oxygen Delivery Method): room air      RADIOLOGY:  < from: Xray Foot AP + Lateral, Right (11.07.22 @ 22:36) >    IMPRESSION: Frontal and lateral views of the right foot demonstrate a   screw traversing the second PIP joint with surrounding lucency,   suggesting loosening. The tarsometatarsal joint is in appropriate   alignment. No fracture. No dislocation. There is amputation of the distal   aspect of the third digit.      < end of copied text >      MICROBIOLOGY:  Culture - Tissue with Gram Stain (11.07.22 @ 23:21)    Gram Stain:   No organisms seen  No WBC's seen.    Specimen Source: .Tissue Wound    Culture Results:   No growth to date        Surgical Pathology Report (11.08.22 @ 06:27)    Surgical Pathology Report:   ACCESSION No:  [id number]  Patient:   VALENTIN CABALLERO      Accession:                             [id number]    Collected Date/Time:                   11/8/2022 06:27 EST  Received Date/Time:                    11/8/2022 06:27 EST    Surgical Pathology Report - Auth (Verified)    Specimen(s) Submitted  1  Right bone biopsy right foot 4th digit osteomyelitis    Final Diagnosis  Fourth digit of right foot, bone biopsy:  - Fragmented intact reactive bone    Case reviewed within the department for  purposes on    11/9/2022.  Verified by: Logan Huber M.D.  (Electronic Signature)  Reported on: 11/09/22 14:41 EST, [facility], [address] [place] [address]  Phone: [phone]   Fax: [phone]  _________________________________________________________________      Clinical Information  Right foot fourth digit osteomyelitis    Gross Description  Received:  In formalin labeled  "right fourth digit"  Size:  One core measuring 0.4 x 0.2 cm  Description:  Red-tan firm bony tissue  Submitted:  Entirely following immunodecalcification in one cassette: 1A    Mary Benz 11/08/2022 04:30 PM    Disclaimer  In addition to other data that may appear on the specimen containers, all  labels have been inspected to confirm the presence of the patient's name  and date of birth.    Histological Processing Performed at [facility], Department of  Pathology, [address] [place] [address].    PHYSICAL EXAM  Lower Extremity Focused  Vasc: DP 1/4, PT 2/4, No edema, No erythema, CFT <3 x 5  Derm: 2 mm ulcer on distal tip of 4th digit, no pus, no malodor, hyperkeratotic borders; no other open wounds   Neuro: Protective sensation intact  MSK: Partial 3rd digit amputation   Patient is a 57y old  Male who presents with a chief complaint of OM (10 Nov 2022 18:56)      INTERVAL HPI/ OVERNIGHT EVENTS: Pt seen and examined bedside by podiatry with attending Dr. Chakraborty. Pts dressing is clean, dry and intact. Pt understands he is waiting for PICC line placement.       LABS                        11.3   6.81  )-----------( 251      ( 11 Nov 2022 07:42 )             35.2     11-11    137  |  106  |  21  ----------------------------<  124<H>  5.2   |  24  |  1.23    Ca    9.0      11 Nov 2022 07:42  Phos  4.1     11-11  Mg     2.1     11-11          ICU Vital Signs Last 24 Hrs  T(C): 36.6 (11 Nov 2022 08:51), Max: 36.8 (10 Nov 2022 15:25)  T(F): 97.8 (11 Nov 2022 08:51), Max: 98.3 (10 Nov 2022 15:25)  HR: 51 (11 Nov 2022 08:51) (51 - 55)  BP: 139/63 (11 Nov 2022 08:51) (119/60 - 158/73)  BP(mean): --  ABP: --  ABP(mean): --  RR: 16 (11 Nov 2022 08:51) (16 - 18)  SpO2: 99% (11 Nov 2022 08:51) (97% - 100%)    O2 Parameters below as of 11 Nov 2022 08:51  Patient On (Oxygen Delivery Method): room air      RADIOLOGY:  < from: Xray Foot AP + Lateral, Right (11.07.22 @ 22:36) >    IMPRESSION: Frontal and lateral views of the right foot demonstrate a   screw traversing the second PIP joint with surrounding lucency,   suggesting loosening. The tarsometatarsal joint is in appropriate   alignment. No fracture. No dislocation. There is amputation of the distal   aspect of the third digit.      < end of copied text >      MICROBIOLOGY:  Culture - Tissue with Gram Stain (11.07.22 @ 23:21)    Gram Stain:   No organisms seen  No WBC's seen.    Specimen Source: .Tissue Wound    Culture Results:   No growth to date        Surgical Pathology Report (11.08.22 @ 06:27)    Surgical Pathology Report:   ACCESSION No:  [id number]  Patient:   VALENTIN CABALLERO      Accession:                             [id number]    Collected Date/Time:                   11/8/2022 06:27 EST  Received Date/Time:                    11/8/2022 06:27 EST    Surgical Pathology Report - Auth (Verified)    Specimen(s) Submitted  1  Right bone biopsy right foot 4th digit osteomyelitis    Final Diagnosis  Fourth digit of right foot, bone biopsy:  - Fragmented intact reactive bone    Case reviewed within the department for  purposes on    11/9/2022.  Verified by: Logan Huber M.D.  (Electronic Signature)  Reported on: 11/09/22 14:41 [facility], [facility], [address] [place] [address]  Phone: [phone]   Fax: [phone]  _________________________________________________________________      Clinical Information  Right foot fourth digit osteomyelitis    Gross Description  Received:  In formalin labeled  "right fourth digit"  Size:  One core measuring 0.4 x 0.2 cm  Description:  Red-tan firm bony tissue  Submitted:  Entirely following immunodecalcification in one cassette: BIJU Hernandez Benz 11/08/2022 04:30 PM    Disclaimer  In addition to other data that may appear on the specimen containers, all  labels have been inspected to confirm the presence of the patient's name  and date of birth.    Histological Processing Performed at [facility], Department of  Pathology, [address] [place] [address].    PHYSICAL EXAM  Lower Extremity Focused  Vasc: DP 1/4, PT 2/4, No edema, No erythema, CFT <3 x 5  Derm: 2 mm ulcer on distal tip of 4th digit, no pus, no malodor, hyperkeratotic borders; no other open wounds   Neuro: Protective sensation intact  MSK: Partial 3rd digit amputation

## 2022-11-12 ENCOUNTER — TRANSCRIPTION ENCOUNTER (OUTPATIENT)
Age: 57
End: 2022-11-12

## 2022-11-12 VITALS
DIASTOLIC BLOOD PRESSURE: 57 MMHG | SYSTOLIC BLOOD PRESSURE: 131 MMHG | RESPIRATION RATE: 16 BRPM | HEART RATE: 49 BPM | OXYGEN SATURATION: 99 % | TEMPERATURE: 98 F

## 2022-11-12 DIAGNOSIS — I10 ESSENTIAL (PRIMARY) HYPERTENSION: ICD-10-CM

## 2022-11-12 LAB
ANION GAP SERPL CALC-SCNC: 8 MMOL/L — SIGNIFICANT CHANGE UP (ref 5–17)
BASOPHILS # BLD AUTO: 0.03 K/UL — SIGNIFICANT CHANGE UP (ref 0–0.2)
BASOPHILS NFR BLD AUTO: 0.4 % — SIGNIFICANT CHANGE UP (ref 0–2)
BUN SERPL-MCNC: 22 MG/DL — SIGNIFICANT CHANGE UP (ref 7–23)
CALCIUM SERPL-MCNC: 9.1 MG/DL — SIGNIFICANT CHANGE UP (ref 8.4–10.5)
CHLORIDE SERPL-SCNC: 101 MMOL/L — SIGNIFICANT CHANGE UP (ref 96–108)
CO2 SERPL-SCNC: 27 MMOL/L — SIGNIFICANT CHANGE UP (ref 22–31)
CREAT SERPL-MCNC: 1.22 MG/DL — SIGNIFICANT CHANGE UP (ref 0.5–1.3)
EGFR: 70 ML/MIN/1.73M2 — SIGNIFICANT CHANGE UP
EOSINOPHIL # BLD AUTO: 0.35 K/UL — SIGNIFICANT CHANGE UP (ref 0–0.5)
EOSINOPHIL NFR BLD AUTO: 4.9 % — SIGNIFICANT CHANGE UP (ref 0–6)
GLUCOSE BLDC GLUCOMTR-MCNC: 162 MG/DL — HIGH (ref 70–99)
GLUCOSE BLDC GLUCOMTR-MCNC: 209 MG/DL — HIGH (ref 70–99)
GLUCOSE SERPL-MCNC: 257 MG/DL — HIGH (ref 70–99)
HCT VFR BLD CALC: 38.3 % — LOW (ref 39–50)
HGB BLD-MCNC: 12.2 G/DL — LOW (ref 13–17)
IMM GRANULOCYTES NFR BLD AUTO: 0.1 % — SIGNIFICANT CHANGE UP (ref 0–0.9)
LYMPHOCYTES # BLD AUTO: 1.26 K/UL — SIGNIFICANT CHANGE UP (ref 1–3.3)
LYMPHOCYTES # BLD AUTO: 17.7 % — SIGNIFICANT CHANGE UP (ref 13–44)
MCHC RBC-ENTMCNC: 30 PG — SIGNIFICANT CHANGE UP (ref 27–34)
MCHC RBC-ENTMCNC: 31.9 GM/DL — LOW (ref 32–36)
MCV RBC AUTO: 94.3 FL — SIGNIFICANT CHANGE UP (ref 80–100)
MONOCYTES # BLD AUTO: 0.47 K/UL — SIGNIFICANT CHANGE UP (ref 0–0.9)
MONOCYTES NFR BLD AUTO: 6.6 % — SIGNIFICANT CHANGE UP (ref 2–14)
NEUTROPHILS # BLD AUTO: 5.01 K/UL — SIGNIFICANT CHANGE UP (ref 1.8–7.4)
NEUTROPHILS NFR BLD AUTO: 70.3 % — SIGNIFICANT CHANGE UP (ref 43–77)
NRBC # BLD: 0 /100 WBCS — SIGNIFICANT CHANGE UP (ref 0–0)
PLATELET # BLD AUTO: 268 K/UL — SIGNIFICANT CHANGE UP (ref 150–400)
POTASSIUM SERPL-MCNC: 5.7 MMOL/L — HIGH (ref 3.5–5.3)
POTASSIUM SERPL-SCNC: 5.7 MMOL/L — HIGH (ref 3.5–5.3)
RBC # BLD: 4.06 M/UL — LOW (ref 4.2–5.8)
RBC # FLD: 12.2 % — SIGNIFICANT CHANGE UP (ref 10.3–14.5)
SODIUM SERPL-SCNC: 136 MMOL/L — SIGNIFICANT CHANGE UP (ref 135–145)
VANCOMYCIN TROUGH SERPL-MCNC: 14.3 UG/ML — SIGNIFICANT CHANGE UP (ref 10–20)
WBC # BLD: 7.13 K/UL — SIGNIFICANT CHANGE UP (ref 3.8–10.5)
WBC # FLD AUTO: 7.13 K/UL — SIGNIFICANT CHANGE UP (ref 3.8–10.5)

## 2022-11-12 PROCEDURE — 87070 CULTURE OTHR SPECIMN AEROBIC: CPT

## 2022-11-12 PROCEDURE — 82728 ASSAY OF FERRITIN: CPT

## 2022-11-12 PROCEDURE — 83935 ASSAY OF URINE OSMOLALITY: CPT

## 2022-11-12 PROCEDURE — 85025 COMPLETE CBC W/AUTO DIFF WBC: CPT

## 2022-11-12 PROCEDURE — 88311 DECALCIFY TISSUE: CPT

## 2022-11-12 PROCEDURE — 84681 ASSAY OF C-PEPTIDE: CPT

## 2022-11-12 PROCEDURE — 99285 EMERGENCY DEPT VISIT HI MDM: CPT | Mod: 25

## 2022-11-12 PROCEDURE — 84540 ASSAY OF URINE/UREA-N: CPT

## 2022-11-12 PROCEDURE — 36573 INSJ PICC RS&I 5 YR+: CPT

## 2022-11-12 PROCEDURE — 88305 TISSUE EXAM BY PATHOLOGIST: CPT

## 2022-11-12 PROCEDURE — 87040 BLOOD CULTURE FOR BACTERIA: CPT

## 2022-11-12 PROCEDURE — 82962 GLUCOSE BLOOD TEST: CPT

## 2022-11-12 PROCEDURE — 85027 COMPLETE CBC AUTOMATED: CPT

## 2022-11-12 PROCEDURE — 83735 ASSAY OF MAGNESIUM: CPT

## 2022-11-12 PROCEDURE — 84300 ASSAY OF URINE SODIUM: CPT

## 2022-11-12 PROCEDURE — 73620 X-RAY EXAM OF FOOT: CPT

## 2022-11-12 PROCEDURE — C1751: CPT

## 2022-11-12 PROCEDURE — 80202 ASSAY OF VANCOMYCIN: CPT

## 2022-11-12 PROCEDURE — 36415 COLL VENOUS BLD VENIPUNCTURE: CPT

## 2022-11-12 PROCEDURE — 85652 RBC SED RATE AUTOMATED: CPT

## 2022-11-12 PROCEDURE — 80048 BASIC METABOLIC PNL TOTAL CA: CPT

## 2022-11-12 PROCEDURE — 87075 CULTR BACTERIA EXCEPT BLOOD: CPT

## 2022-11-12 PROCEDURE — 83550 IRON BINDING TEST: CPT

## 2022-11-12 PROCEDURE — 82570 ASSAY OF URINE CREATININE: CPT

## 2022-11-12 PROCEDURE — 83036 HEMOGLOBIN GLYCOSYLATED A1C: CPT

## 2022-11-12 PROCEDURE — 84100 ASSAY OF PHOSPHORUS: CPT

## 2022-11-12 PROCEDURE — 99239 HOSP IP/OBS DSCHRG MGMT >30: CPT | Mod: GC

## 2022-11-12 PROCEDURE — 80053 COMPREHEN METABOLIC PANEL: CPT

## 2022-11-12 PROCEDURE — 83540 ASSAY OF IRON: CPT

## 2022-11-12 PROCEDURE — 87635 SARS-COV-2 COVID-19 AMP PRB: CPT

## 2022-11-12 PROCEDURE — 86140 C-REACTIVE PROTEIN: CPT

## 2022-11-12 RX ORDER — INSULIN LISPRO 100/ML
7 VIAL (ML) SUBCUTANEOUS
Qty: 1 | Refills: 0
Start: 2022-11-12 | End: 2022-12-11

## 2022-11-12 RX ORDER — INSULIN GLARGINE 100 [IU]/ML
15 INJECTION, SOLUTION SUBCUTANEOUS
Qty: 450 | Refills: 0
Start: 2022-11-12 | End: 2022-12-11

## 2022-11-12 RX ORDER — ISOPROPYL ALCOHOL, BENZOCAINE .7; .06 ML/ML; ML/ML
1 SWAB TOPICAL
Qty: 100 | Refills: 1
Start: 2022-11-12 | End: 2022-12-31

## 2022-11-12 RX ORDER — INSULIN LISPRO 100/ML
5 VIAL (ML) SUBCUTANEOUS
Qty: 0 | Refills: 0 | DISCHARGE
Start: 2022-11-12 | End: 2022-12-11

## 2022-11-12 RX ADMIN — Medication 166.67 MILLIGRAM(S): at 13:25

## 2022-11-12 RX ADMIN — Medication 7 UNIT(S): at 11:49

## 2022-11-12 RX ADMIN — CITALOPRAM 40 MILLIGRAM(S): 10 TABLET, FILM COATED ORAL at 11:54

## 2022-11-12 RX ADMIN — LISINOPRIL 40 MILLIGRAM(S): 2.5 TABLET ORAL at 06:13

## 2022-11-12 RX ADMIN — Medication 1: at 09:02

## 2022-11-12 RX ADMIN — Medication 2: at 11:50

## 2022-11-12 RX ADMIN — HEPARIN SODIUM 5000 UNIT(S): 5000 INJECTION INTRAVENOUS; SUBCUTANEOUS at 14:21

## 2022-11-12 RX ADMIN — Medication 7 UNIT(S): at 09:02

## 2022-11-12 RX ADMIN — HEPARIN SODIUM 5000 UNIT(S): 5000 INJECTION INTRAVENOUS; SUBCUTANEOUS at 06:13

## 2022-11-12 NOTE — PROGRESS NOTE ADULT - SUBJECTIVE AND OBJECTIVE BOX
Patient is a 57y old  Male who presents with a chief complaint of Osteomyelitis (11 Nov 2022 17:01)      INTERVAL HPI/ OVERNIGHT EVENTS: Pt seen and evaluated bedside this AM. Pts dressing is C/D/I. Pt understands we are waiting on his vancomycin trough this AM.       LABS                        11.3   6.81  )-----------( 251      ( 11 Nov 2022 07:42 )             35.2     11-11    137  |  106  |  21  ----------------------------<  124<H>  5.2   |  24  |  1.23    Ca    9.0      11 Nov 2022 07:42  Phos  4.1     11-11  Mg     2.1     11-11          ICU Vital Signs Last 24 Hrs  T(C): 36.4 (12 Nov 2022 09:08), Max: 36.8 (11 Nov 2022 15:19)  T(F): 97.6 (12 Nov 2022 09:08), Max: 98.3 (11 Nov 2022 15:19)  HR: 49 (12 Nov 2022 09:08) (49 - 54)  BP: 131/57 (12 Nov 2022 09:08) (131/57 - 180/63)  BP(mean): --  ABP: --  ABP(mean): --  RR: 16 (12 Nov 2022 09:08) (16 - 18)  SpO2: 99% (12 Nov 2022 09:08) (97% - 99%)    O2 Parameters below as of 12 Nov 2022 09:08  Patient On (Oxygen Delivery Method): room air      RADIOLOGY:  reviewed     MICROBIOLOGY:  Culture - Tissue with Gram Stain (11.07.22 @ 23:21)    Gram Stain:   No organisms seen  No WBC's seen.    Specimen Source: .Tissue Wound    Culture Results:   No growth to date        PHYSICAL EXAM  Lower Extremity Focused  Vasc: DP 1/4, PT 2/4, No edema, No erythema, CFT <3 x 5  Derm: 1 mm ulcer on distal tip of 4th digit, no pus, no malodor, hyperkeratotic borders; no other open wounds   Neuro: Protective sensation intact  MSK: Partial 3rd digit amputation

## 2022-11-12 NOTE — DISCHARGE NOTE NURSING/CASE MANAGEMENT/SOCIAL WORK - NSDCFUADDAPPT_GEN_ALL_CORE_FT
Please follow-up with the following providers:    1. Dr. Jarret Gallardo, Infectious Disease  [address] Floor, [place], [place]  Phone: [phone]  *Please call the above phone number to make an appointment and follow-up within 2-3 weeks*    Please bring your Insurance card, Photo ID and Discharge paperwork to the following appointments:    (1) Please follow up with your Primary Care Provider, Dr. Tiffanie Gaming at [address] [place] [address] on 11/18/2022 at 11:20am.    Appointment was scheduled by Ms. KADIE Kumar, Referral Coordinator.    (2) Please follow up with your new Endocrinology Provider, Dr. Ken Ta at [address], [address], [place] [address] on 11/30/2022 at 8:00am.    Appointment was scheduled by Ms. KADIE Kumar, Referral Coordinator.

## 2022-11-12 NOTE — DISCHARGE NOTE NURSING/CASE MANAGEMENT/SOCIAL WORK - NSDCPEFALRISK_GEN_ALL_CORE
For information on Fall & Injury Prevention, visit: [email or web address] OR  [email or web address] OR  https://www.cdc.gov/steadi/patient.html

## 2022-11-12 NOTE — PROGRESS NOTE ADULT - PROVIDER SPECIALTY LIST ADULT
Internal Medicine
Podiatry
Endocrinology
Endocrinology
Podiatry
Podiatry
Infectious Disease
Internal Medicine
Infectious Disease
Podiatry
Podiatry
Endocrinology
Internal Medicine
Internal Medicine

## 2022-11-12 NOTE — DISCHARGE NOTE NURSING/CASE MANAGEMENT/SOCIAL WORK - PATIENT PORTAL LINK FT
You can access the FollowMyHealth Patient Portal offered by [facility] by registering at the following website: [email or web address]. By joining [employer]’s FollowMyHealth portal, you will also be able to view your health information using other applications (apps) compatible with our system.

## 2022-11-12 NOTE — PROGRESS NOTE ADULT - ASSESSMENT
58 y/o male with pmh IDDM with chief complaint of right fourth toe wound. MRI of right foot on 11/4 showed osteomyelitis of the right fourth digit middle and distal phalanx. Pt was instructed to go to ED for admission for IV antibiotics. Podiatry consulted to evaluate OM. Bone biopsy of right distal phalanx performed 11/7-NGTD, pathology showing reactive bone will treat as OM.     Plan:  - Cont broad spectrum IV antibiotics per ID recs  - Bone cx NGTD prelim   - Dressed wound with WTD gauze, melany and ACE.  - Pt will f/u with Dr. Chakraborty outpatient   - Pt can WBAT in surgical shoe     Plan discussed with attending and podiatry following.     Will be discharged with PICC line and abx selected per ID.     Discharge dressing instructions: Cleanse wound with normal saline daily, pat dry with sterile guaze, apply saline soaked gauze to wound base, cover with dry sterile gauze, ABD pad, wrap with Kerlix and light ACE bandage.     Patient should follow up with Dr. Jay Chakraborty within 1 week of discharge.    Office information:          [place] Address- [address] [place] [address] (10th Floor [address])[place] [address]          [facility] [employer]         [phone]

## 2022-11-13 LAB
CULTURE RESULTS: SIGNIFICANT CHANGE UP
CULTURE RESULTS: SIGNIFICANT CHANGE UP
SPECIMEN SOURCE: SIGNIFICANT CHANGE UP
SPECIMEN SOURCE: SIGNIFICANT CHANGE UP

## 2022-11-14 LAB
CULTURE RESULTS: NO GROWTH — SIGNIFICANT CHANGE UP
SPECIMEN SOURCE: SIGNIFICANT CHANGE UP

## 2022-11-15 ENCOUNTER — NON-APPOINTMENT (OUTPATIENT)
Age: 57
End: 2022-11-15

## 2022-11-17 DIAGNOSIS — N17.9 ACUTE KIDNEY FAILURE, UNSPECIFIED: ICD-10-CM

## 2022-11-17 DIAGNOSIS — E11.40 TYPE 2 DIABETES MELLITUS WITH DIABETIC NEUROPATHY, UNSPECIFIED: ICD-10-CM

## 2022-11-17 DIAGNOSIS — E78.5 HYPERLIPIDEMIA, UNSPECIFIED: ICD-10-CM

## 2022-11-17 DIAGNOSIS — E11.69 TYPE 2 DIABETES MELLITUS WITH OTHER SPECIFIED COMPLICATION: ICD-10-CM

## 2022-11-17 DIAGNOSIS — Z79.4 LONG TERM (CURRENT) USE OF INSULIN: ICD-10-CM

## 2022-11-17 DIAGNOSIS — D63.1 ANEMIA IN CHRONIC KIDNEY DISEASE: ICD-10-CM

## 2022-11-17 DIAGNOSIS — E11.621 TYPE 2 DIABETES MELLITUS WITH FOOT ULCER: ICD-10-CM

## 2022-11-17 DIAGNOSIS — F41.9 ANXIETY DISORDER, UNSPECIFIED: ICD-10-CM

## 2022-11-17 DIAGNOSIS — M86.171 OTHER ACUTE OSTEOMYELITIS, RIGHT ANKLE AND FOOT: ICD-10-CM

## 2022-11-17 DIAGNOSIS — E11.65 TYPE 2 DIABETES MELLITUS WITH HYPERGLYCEMIA: ICD-10-CM

## 2022-11-17 DIAGNOSIS — L97.519 NON-PRESSURE CHRONIC ULCER OF OTHER PART OF RIGHT FOOT WITH UNSPECIFIED SEVERITY: ICD-10-CM

## 2022-11-17 DIAGNOSIS — I10 ESSENTIAL (PRIMARY) HYPERTENSION: ICD-10-CM

## 2022-11-18 ENCOUNTER — APPOINTMENT (OUTPATIENT)
Dept: INTERNAL MEDICINE | Facility: CLINIC | Age: 57
End: 2022-11-18

## 2022-11-18 VITALS
OXYGEN SATURATION: 99 % | BODY MASS INDEX: 28.04 KG/M2 | HEIGHT: 72 IN | WEIGHT: 207 LBS | HEART RATE: 47 BPM | TEMPERATURE: 97.3 F | DIASTOLIC BLOOD PRESSURE: 75 MMHG | SYSTOLIC BLOOD PRESSURE: 192 MMHG

## 2022-11-18 VITALS — SYSTOLIC BLOOD PRESSURE: 160 MMHG | DIASTOLIC BLOOD PRESSURE: 74 MMHG

## 2022-11-18 DIAGNOSIS — N13.8 BENIGN PROSTATIC HYPERPLASIA WITH LOWER URINARY TRACT SYMPMS: ICD-10-CM

## 2022-11-18 DIAGNOSIS — N40.1 BENIGN PROSTATIC HYPERPLASIA WITH LOWER URINARY TRACT SYMPMS: ICD-10-CM

## 2022-11-18 DIAGNOSIS — F41.9 ANXIETY DISORDER, UNSPECIFIED: ICD-10-CM

## 2022-11-18 PROCEDURE — 99496 TRANSJ CARE MGMT HIGH F2F 7D: CPT | Mod: 25

## 2022-11-18 PROCEDURE — 36415 COLL VENOUS BLD VENIPUNCTURE: CPT

## 2022-11-18 RX ORDER — DULAGLUTIDE 3 MG/.5ML
3 INJECTION, SOLUTION SUBCUTANEOUS
Refills: 0 | Status: COMPLETED | COMMUNITY
Start: 2022-02-14 | End: 2022-11-18

## 2022-11-18 RX ORDER — METFORMIN ER 500 MG 500 MG/1
500 TABLET ORAL
Refills: 0 | Status: COMPLETED | COMMUNITY
Start: 2022-02-14 | End: 2022-11-18

## 2022-11-18 NOTE — PHYSICAL EXAM
[Pedal Pulses Present] : the pedal pulses are present [No Edema] : there was no peripheral edema [Soft] : abdomen soft [Non Tender] : non-tender [Non-distended] : non-distended [Normal] : no joint swelling and grossly normal strength and tone [de-identified] : +right fourth toe with healing ulder on tip, nontender, no erythema, drainage

## 2022-11-18 NOTE — HISTORY OF PRESENT ILLNESS
[Post-hospitalization from ___ Hospital] : Post-hospitalization from [unfilled] Hospital [Admitted on: ___] : The patient was admitted on [unfilled] [Discharged on ___] : discharged on [unfilled] [Discharge Summary] : discharge summary [Pertinent Labs] : pertinent labs [Discharge Med List] : discharge medication list [FreeTextEntry2] : 57M with PMH DM, HTN, HLD and anxiety disorder presenting for post discharge check. \par Recently admitted for OM for fourth toe of right foot, DC on IV vanc and CTX infusions at home, following with ID.\par Notes minimal pain, has been able to bear weight, no discharge noted from toe. Overall feels well \par \par #OM- takes CTX and vanc at home infusions, has five more weeks of therapy, followed by ID \par #HTN- anti-htn held during hospitalization 2/2 NOLAN, nolan resolved during hospitalization, has not restarted at home \par #IDDM- notes has been on insulin for 2-+ years, regimen adjustment inaptient with endo, has f/u in 2 weeks \par #HLD- states statin daily \par #Dry cough- notes has had it for many years, has tried nasal sprays in the past, CXRs have been negative in the past, worse in morning, constant in nature, sometimes notes morning coffee gets stuck in his throat in AM

## 2022-11-18 NOTE — HEALTH RISK ASSESSMENT
[Never] : Never [Yes] : Yes [4 or more  times a week (4 pts)] : 4 or more  times a week (4 points) [1 or 2 (0 pts)] : 1 or 2 (0 points) [Never (0 pts)] : Never (0 points) [No falls in past year] : Patient reported no falls in the past year [0] : 2) Feeling down, depressed, or hopeless: Not at all (0) [PHQ-2 Negative - No further assessment needed] : PHQ-2 Negative - No further assessment needed [Audit-CScore] : 4 [[id number]] : 0 [Language] : denies difficulty with language [None] : None [Feels Safe at Home] : Feels safe at home [Fully functional (bathing, dressing, toileting, transferring, walking, feeding)] : Fully functional (bathing, dressing, toileting, transferring, walking, feeding) [Fully functional (using the telephone, shopping, preparing meals, housekeeping, doing laundry, using] : Fully functional and needs no help or supervision to perform IADLs (using the telephone, shopping, preparing meals, housekeeping, doing laundry, using transportation, managing medications and managing finances)

## 2022-11-21 LAB
ANION GAP SERPL CALC-SCNC: 8 MMOL/L
BUN SERPL-MCNC: 23 MG/DL
CALCIUM SERPL-MCNC: 9.5 MG/DL
CHLORIDE SERPL-SCNC: 107 MMOL/L
CO2 SERPL-SCNC: 26 MMOL/L
CREAT SERPL-MCNC: 1.25 MG/DL
EGFR: 68 ML/MIN/1.73M2
GLUCOSE SERPL-MCNC: 140 MG/DL
POTASSIUM SERPL-SCNC: 5.1 MMOL/L
SODIUM SERPL-SCNC: 141 MMOL/L

## 2022-11-30 ENCOUNTER — APPOINTMENT (OUTPATIENT)
Dept: ENDOCRINOLOGY | Facility: CLINIC | Age: 57
End: 2022-11-30

## 2022-12-03 ENCOUNTER — INPATIENT (INPATIENT)
Facility: HOSPITAL | Age: 57
LOS: 6 days | Discharge: HOME | DRG: 246 | End: 2022-12-10
Attending: INTERNAL MEDICINE | Admitting: INTERNAL MEDICINE
Payer: COMMERCIAL

## 2022-12-03 VITALS
OXYGEN SATURATION: 99 % | WEIGHT: 233.69 LBS | SYSTOLIC BLOOD PRESSURE: 198 MMHG | TEMPERATURE: 98 F | RESPIRATION RATE: 17 BRPM | HEIGHT: 72 IN | DIASTOLIC BLOOD PRESSURE: 78 MMHG | HEART RATE: 57 BPM

## 2022-12-03 DIAGNOSIS — E11.9 TYPE 2 DIABETES MELLITUS WITHOUT COMPLICATIONS: ICD-10-CM

## 2022-12-03 DIAGNOSIS — E78.5 HYPERLIPIDEMIA, UNSPECIFIED: ICD-10-CM

## 2022-12-03 DIAGNOSIS — I16.0 HYPERTENSIVE URGENCY: ICD-10-CM

## 2022-12-03 DIAGNOSIS — M86.9 OSTEOMYELITIS, UNSPECIFIED: ICD-10-CM

## 2022-12-03 DIAGNOSIS — I50.9 HEART FAILURE, UNSPECIFIED: ICD-10-CM

## 2022-12-03 LAB
ALBUMIN SERPL ELPH-MCNC: 3.7 G/DL — SIGNIFICANT CHANGE UP (ref 3.3–5)
ALP SERPL-CCNC: 98 U/L — SIGNIFICANT CHANGE UP (ref 40–120)
ALT FLD-CCNC: 72 U/L — HIGH (ref 10–45)
ANION GAP SERPL CALC-SCNC: 6 MMOL/L — SIGNIFICANT CHANGE UP (ref 5–17)
APTT BLD: 29.9 SEC — SIGNIFICANT CHANGE UP (ref 27.5–35.5)
AST SERPL-CCNC: 35 U/L — SIGNIFICANT CHANGE UP (ref 10–40)
B-OH-BUTYR SERPL-SCNC: 0.1 MMOL/L — SIGNIFICANT CHANGE UP
BASOPHILS # BLD AUTO: 0.06 K/UL — SIGNIFICANT CHANGE UP (ref 0–0.2)
BASOPHILS NFR BLD AUTO: 0.8 % — SIGNIFICANT CHANGE UP (ref 0–2)
BILIRUB SERPL-MCNC: 0.2 MG/DL — SIGNIFICANT CHANGE UP (ref 0.2–1.2)
BUN SERPL-MCNC: 27 MG/DL — HIGH (ref 7–23)
CALCIUM SERPL-MCNC: 8.6 MG/DL — SIGNIFICANT CHANGE UP (ref 8.4–10.5)
CHLORIDE SERPL-SCNC: 105 MMOL/L — SIGNIFICANT CHANGE UP (ref 96–108)
CK MB CFR SERPL CALC: 5.4 NG/ML — SIGNIFICANT CHANGE UP (ref 0–6.7)
CK SERPL-CCNC: 208 U/L — HIGH (ref 30–200)
CO2 SERPL-SCNC: 26 MMOL/L — SIGNIFICANT CHANGE UP (ref 22–31)
CREAT SERPL-MCNC: 1.3 MG/DL — SIGNIFICANT CHANGE UP (ref 0.5–1.3)
EGFR: 65 ML/MIN/1.73M2 — SIGNIFICANT CHANGE UP
EOSINOPHIL # BLD AUTO: 1 K/UL — HIGH (ref 0–0.5)
EOSINOPHIL NFR BLD AUTO: 13 % — HIGH (ref 0–6)
GAS PNL BLDV: SIGNIFICANT CHANGE UP
GLUCOSE BLDC GLUCOMTR-MCNC: 90 MG/DL — SIGNIFICANT CHANGE UP (ref 70–99)
GLUCOSE SERPL-MCNC: 351 MG/DL — HIGH (ref 70–99)
HCT VFR BLD CALC: 30.7 % — LOW (ref 39–50)
HGB BLD-MCNC: 10 G/DL — LOW (ref 13–17)
IMM GRANULOCYTES NFR BLD AUTO: 0.4 % — SIGNIFICANT CHANGE UP (ref 0–0.9)
INR BLD: 1.05 — SIGNIFICANT CHANGE UP (ref 0.88–1.16)
LACTATE SERPL-SCNC: 1.1 MMOL/L — SIGNIFICANT CHANGE UP (ref 0.5–2)
LYMPHOCYTES # BLD AUTO: 1.11 K/UL — SIGNIFICANT CHANGE UP (ref 1–3.3)
LYMPHOCYTES # BLD AUTO: 14.4 % — SIGNIFICANT CHANGE UP (ref 13–44)
MCHC RBC-ENTMCNC: 30.7 PG — SIGNIFICANT CHANGE UP (ref 27–34)
MCHC RBC-ENTMCNC: 32.6 GM/DL — SIGNIFICANT CHANGE UP (ref 32–36)
MCV RBC AUTO: 94.2 FL — SIGNIFICANT CHANGE UP (ref 80–100)
MONOCYTES # BLD AUTO: 0.64 K/UL — SIGNIFICANT CHANGE UP (ref 0–0.9)
MONOCYTES NFR BLD AUTO: 8.3 % — SIGNIFICANT CHANGE UP (ref 2–14)
NEUTROPHILS # BLD AUTO: 4.88 K/UL — SIGNIFICANT CHANGE UP (ref 1.8–7.4)
NEUTROPHILS NFR BLD AUTO: 63.1 % — SIGNIFICANT CHANGE UP (ref 43–77)
NRBC # BLD: 0 /100 WBCS — SIGNIFICANT CHANGE UP (ref 0–0)
PLATELET # BLD AUTO: 267 K/UL — SIGNIFICANT CHANGE UP (ref 150–400)
POTASSIUM SERPL-MCNC: 4.8 MMOL/L — SIGNIFICANT CHANGE UP (ref 3.5–5.3)
POTASSIUM SERPL-SCNC: 4.8 MMOL/L — SIGNIFICANT CHANGE UP (ref 3.5–5.3)
PROT SERPL-MCNC: 6.2 G/DL — SIGNIFICANT CHANGE UP (ref 6–8.3)
PROTHROM AB SERPL-ACNC: 12.5 SEC — SIGNIFICANT CHANGE UP (ref 10.5–13.4)
RBC # BLD: 3.26 M/UL — LOW (ref 4.2–5.8)
RBC # FLD: 13.9 % — SIGNIFICANT CHANGE UP (ref 10.3–14.5)
SARS-COV-2 RNA SPEC QL NAA+PROBE: NEGATIVE — SIGNIFICANT CHANGE UP
SODIUM SERPL-SCNC: 137 MMOL/L — SIGNIFICANT CHANGE UP (ref 135–145)
TROPONIN T SERPL-MCNC: 0.01 NG/ML — SIGNIFICANT CHANGE UP (ref 0–0.01)
VANCOMYCIN TROUGH SERPL-MCNC: 18 UG/ML — SIGNIFICANT CHANGE UP (ref 10–20)
WBC # BLD: 7.72 K/UL — SIGNIFICANT CHANGE UP (ref 3.8–10.5)
WBC # FLD AUTO: 7.72 K/UL — SIGNIFICANT CHANGE UP (ref 3.8–10.5)

## 2022-12-03 PROCEDURE — 99291 CRITICAL CARE FIRST HOUR: CPT

## 2022-12-03 PROCEDURE — 93010 ELECTROCARDIOGRAM REPORT: CPT

## 2022-12-03 PROCEDURE — 71045 X-RAY EXAM CHEST 1 VIEW: CPT | Mod: 26

## 2022-12-03 RX ORDER — INSULIN LISPRO 100/ML
VIAL (ML) SUBCUTANEOUS
Refills: 0 | Status: DISCONTINUED | OUTPATIENT
Start: 2022-12-03 | End: 2022-12-10

## 2022-12-03 RX ORDER — INSULIN LISPRO 100/ML
7 VIAL (ML) SUBCUTANEOUS
Refills: 0 | Status: DISCONTINUED | OUTPATIENT
Start: 2022-12-03 | End: 2022-12-05

## 2022-12-03 RX ORDER — LISINOPRIL 2.5 MG/1
40 TABLET ORAL DAILY
Refills: 0 | Status: DISCONTINUED | OUTPATIENT
Start: 2022-12-03 | End: 2022-12-07

## 2022-12-03 RX ORDER — CITALOPRAM 10 MG/1
40 TABLET, FILM COATED ORAL DAILY
Refills: 0 | Status: DISCONTINUED | OUTPATIENT
Start: 2022-12-04 | End: 2022-12-10

## 2022-12-03 RX ORDER — CEFTRIAXONE 500 MG/1
2000 INJECTION, POWDER, FOR SOLUTION INTRAMUSCULAR; INTRAVENOUS EVERY 24 HOURS
Refills: 0 | Status: DISCONTINUED | OUTPATIENT
Start: 2022-12-04 | End: 2022-12-10

## 2022-12-03 RX ORDER — ATORVASTATIN CALCIUM 80 MG/1
40 TABLET, FILM COATED ORAL AT BEDTIME
Refills: 0 | Status: DISCONTINUED | OUTPATIENT
Start: 2022-12-03 | End: 2022-12-10

## 2022-12-03 RX ORDER — VANCOMYCIN HCL 1 G
1250 VIAL (EA) INTRAVENOUS EVERY 12 HOURS
Refills: 0 | Status: DISCONTINUED | OUTPATIENT
Start: 2022-12-03 | End: 2022-12-07

## 2022-12-03 RX ORDER — GLUCAGON INJECTION, SOLUTION 0.5 MG/.1ML
1 INJECTION, SOLUTION SUBCUTANEOUS ONCE
Refills: 0 | Status: DISCONTINUED | OUTPATIENT
Start: 2022-12-03 | End: 2022-12-06

## 2022-12-03 RX ORDER — FUROSEMIDE 40 MG
20 TABLET ORAL ONCE
Refills: 0 | Status: COMPLETED | OUTPATIENT
Start: 2022-12-03 | End: 2022-12-03

## 2022-12-03 RX ORDER — CHLORHEXIDINE GLUCONATE 213 G/1000ML
1 SOLUTION TOPICAL DAILY
Refills: 0 | Status: DISCONTINUED | OUTPATIENT
Start: 2022-12-03 | End: 2022-12-10

## 2022-12-03 RX ORDER — INSULIN GLARGINE 100 [IU]/ML
15 INJECTION, SOLUTION SUBCUTANEOUS AT BEDTIME
Refills: 0 | Status: DISCONTINUED | OUTPATIENT
Start: 2022-12-03 | End: 2022-12-06

## 2022-12-03 RX ORDER — SODIUM CHLORIDE 9 MG/ML
1000 INJECTION, SOLUTION INTRAVENOUS
Refills: 0 | Status: DISCONTINUED | OUTPATIENT
Start: 2022-12-03 | End: 2022-12-06

## 2022-12-03 RX ORDER — CEFTRIAXONE 500 MG/1
2000 INJECTION, POWDER, FOR SOLUTION INTRAMUSCULAR; INTRAVENOUS ONCE
Refills: 0 | Status: COMPLETED | OUTPATIENT
Start: 2022-12-03 | End: 2022-12-03

## 2022-12-03 RX ORDER — DEXTROSE 50 % IN WATER 50 %
12.5 SYRINGE (ML) INTRAVENOUS ONCE
Refills: 0 | Status: DISCONTINUED | OUTPATIENT
Start: 2022-12-03 | End: 2022-12-06

## 2022-12-03 RX ORDER — HYDRALAZINE HCL 50 MG
5 TABLET ORAL ONCE
Refills: 0 | Status: COMPLETED | OUTPATIENT
Start: 2022-12-03 | End: 2022-12-03

## 2022-12-03 RX ORDER — FUROSEMIDE 40 MG
40 TABLET ORAL
Refills: 0 | Status: DISCONTINUED | OUTPATIENT
Start: 2022-12-04 | End: 2022-12-06

## 2022-12-03 RX ORDER — VANCOMYCIN HCL 1 G
VIAL (EA) INTRAVENOUS
Refills: 0 | Status: DISCONTINUED | OUTPATIENT
Start: 2022-12-03 | End: 2022-12-03

## 2022-12-03 RX ORDER — DEXTROSE 50 % IN WATER 50 %
15 SYRINGE (ML) INTRAVENOUS ONCE
Refills: 0 | Status: DISCONTINUED | OUTPATIENT
Start: 2022-12-03 | End: 2022-12-06

## 2022-12-03 RX ORDER — DEXTROSE 50 % IN WATER 50 %
25 SYRINGE (ML) INTRAVENOUS ONCE
Refills: 0 | Status: DISCONTINUED | OUTPATIENT
Start: 2022-12-03 | End: 2022-12-06

## 2022-12-03 RX ORDER — CEFTRIAXONE 500 MG/1
INJECTION, POWDER, FOR SOLUTION INTRAMUSCULAR; INTRAVENOUS
Refills: 0 | Status: DISCONTINUED | OUTPATIENT
Start: 2022-12-03 | End: 2022-12-10

## 2022-12-03 RX ADMIN — ATORVASTATIN CALCIUM 40 MILLIGRAM(S): 80 TABLET, FILM COATED ORAL at 22:23

## 2022-12-03 RX ADMIN — Medication 20 MILLIGRAM(S): at 17:03

## 2022-12-03 RX ADMIN — CEFTRIAXONE 100 MILLIGRAM(S): 500 INJECTION, POWDER, FOR SOLUTION INTRAMUSCULAR; INTRAVENOUS at 19:53

## 2022-12-03 RX ADMIN — Medication 166.67 MILLIGRAM(S): at 23:24

## 2022-12-03 RX ADMIN — Medication 20 MILLIGRAM(S): at 19:06

## 2022-12-03 RX ADMIN — INSULIN GLARGINE 15 UNIT(S): 100 INJECTION, SOLUTION SUBCUTANEOUS at 22:23

## 2022-12-03 RX ADMIN — Medication 5 MILLIGRAM(S): at 17:32

## 2022-12-03 RX ADMIN — LISINOPRIL 40 MILLIGRAM(S): 2.5 TABLET ORAL at 19:08

## 2022-12-03 RX ADMIN — Medication 7 UNIT(S): at 19:53

## 2022-12-03 NOTE — H&P ADULT - ASSESSMENT
58 yo M with PMHx HTN, HLD, IDDM, anxiety, OM R 4th toe (undergoing treatment with Vanc [id number], CTX 2gm IV qD x 6 weeks via PICC, ending 12/20/22) who presented to [facility] ED c/o progressively worsening ROBBINS on minimal exertion, LE edema, feeling of generalized fatigue over past 2 weeks but progressively worsening over past 1 week. Also endorses increasing abdominal girth with 25# weight gain over past 3 weeks. Admitted to [facility] for management of management of hypertensive urgency & acute decompensated CHF.

## 2022-12-03 NOTE — H&P ADULT - PROBLEM SELECTOR PLAN 5
OM R 4th toe, without leukocytosis, afebrile  - f/u Vanc trough and then c/w Vanc [id number]. c/w CTX 2 gm IV QD (plan to continue x 6 week course through 12/20/22)  - Consult ID - known to Dr. Gallardo in AM    VTE ppx: low risk  Dispo: pending clinical course

## 2022-12-03 NOTE — H&P ADULT - NSHPSOCIALHISTORY_GEN_ALL_CORE
- Alcohol: drinks 3-4 glasses of red wine 4 days a week, no hard liquor or beer   - Smoking: non-smoker   - Denies recreational drug use

## 2022-12-03 NOTE — H&P ADULT - NSICDXFAMILYHX_GEN_ALL_CORE_FT
FAMILY HISTORY:  Father  Still living? Unknown  FH: myocardial infarction, Age at diagnosis: 81-90    Grandparent  Still living? Unknown  FH: myocardial infarction, Age at diagnosis: 51-60

## 2022-12-03 NOTE — H&P ADULT - GASTROINTESTINAL
normal/soft/nontender/nondistended/normal active bowel sounds nontender/normal active bowel sounds/ascites

## 2022-12-03 NOTE — H&P ADULT - PROBLEM SELECTOR PLAN 3
FS 200s-300s  - f/u AM HgbA1C, HgbA1C 11.4 11/8/22  - c/w home Lispro 7 units TID, Lantus 15 units qHS for now (Metformin & Trulicity previously discharged)  - Has appointment with o/p Endocrinologist next week. Seen by inpatient Endocrinologist last admit.

## 2022-12-03 NOTE — ED ADULT NURSE NOTE - OBJECTIVE STATEMENT
57y M, PMHx osteomyelitis (PICC line in rt arm), presents to the ED SOB x1 week. Pt states,  "I have shortness of breath for almost a week and I was admitted recently for osteomyelitis and I am still getting antibiotics but I just don't feel well". Denies chest pain, palpitations, fever, chills, dizziness, lightheadedness. Pt A&Ox4, ambulatory with steady gait, speaking in clear/full sentences.

## 2022-12-03 NOTE — H&P ADULT - HISTORY OF PRESENT ILLNESS
INCOMPLETE    56 yo M with PMHx HTN, HLD, IDDM, anxiety, OM R 4th toe (undergoing treatment with Vanc [id number], CTX 2gm IV qD x 6 weeks via PICC, ending 12/20/22) who presented to [facility] ED c/o progressively worsening SOB and increasing abdominal girth x 1 week with 25# weight gain over past 3 weeks. Endorses compliance with medications.     Denies CP, dizziness, diaphoresis, palpitations, fatigue, LE edema, orthopnea, PND, syncope, N/V, abdominal pain.     On arrival, VS - T 97.9F,  /78, HR 57, RR 17, SpO2 99% on RA.  Labs significant for H/H 10.0/37.7 (baseline 10-11s),  fingerstick 313,  Trop/CKMB negative, , BNP 1536, Covid-19 negative. CXR 12/3/22: possible small R pleural effusion. While in ED, pt received Lasix 20 mg IVP x1, Hydralazine 5 mg IVP x1. Admitted to [facility] for management of hypertensive urgency, new CHF.       Citalopram 40 mg PO QD  Lispro 7 units TID, Lantus 15 units qHS  Lipitor 40 mg PO QD    Still off Lisinopril?   Still off ?Trulicity/metformin  What is Insulin dose     58 yo M with PMHx HTN, HLD, IDDM, anxiety, OM R 4th toe (undergoing treatment with Vanc [id number], CTX 2gm IV qD x 6 weeks via PICC, ending 12/20/22) who presented to [facility] ED c/o progressively worsening ROBBINS on minimal exertion, LE edema, feeling of generalized fatigue over past 2 weeks but progressively worsening over past 1 week. Also endorses increasing abdominal girth with 25# weight gain over past 3 weeks. Denies CP, dizziness, diaphoresis, palpitations, PND, syncope, N/V, abdominal pain, BRBPR, melena, recent flight/travel. On arrival, VS - T 97.9F,  /78, HR 57, RR 17, SpO2 99% on RA.  Labs significant for H/H 10.0/37.7 (baseline 10-11s),  fingerstick 313,  Trop/CKMB negative, , BNP 1536, Covid-19 negative. CXR 12/3/22: possible small R pleural effusion. EKG 12/3/22: SB @ 55 without ST/T segment changes. While in ED, pt received Lasix 20 mg IVP x1, Hydralazine 5 mg IVP x1.  Admitted to [facility] for management of hypertensive urgency, acute decompensated CHF.      Citalopram 40 mg PO QD  Lispro 7 units TID, Lantus 15 units qHS  Lipitor 40 mg PO QD AM  Still onLisinopri     56 yo M with PMHx HTN, HLD, IDDM, anxiety, OM R 4th toe (undergoing treatment with Vanc [id number], CTX 2gm IV qD x 6 weeks via PICC, ending 12/20/22) who presented to [facility] ED c/o progressively worsening ROBBINS on minimal exertion, LE edema, feeling of generalized fatigue over past 2 weeks but progressively worsening over past 1 week. Also endorses increasing abdominal girth with 25# weight gain over past 3 weeks. Denies CP, dizziness, diaphoresis, palpitations, PND, syncope, N/V, abdominal pain, BRBPR, melena, recent flight/travel. On arrival, VS - T 97.9F,  /78, HR 57, RR 17, SpO2 99% on RA.  Labs significant for H/H 10.0/37.7 (baseline 10-11s),  fingerstick 313,  Trop/CKMB negative, , BNP 1536, Covid-19 negative. CXR 12/3/22: possible small R pleural effusion. EKG 12/3/22: SB @ 55 without ST/T segment changes. While in ED, pt received Lasix 20 mg IVP x1, Hydralazine 5 mg IVP x1.  Admitted to [facility] for management of hypertensive urgency, acute decompensated CHF.

## 2022-12-03 NOTE — H&P ADULT - NSHPLABSRESULTS_GEN_ALL_CORE
10.0   7.72  )-----------( 267      ( 03 Dec 2022 16:08 )             30.7       12-03    137  |  105  |  27<H>  ----------------------------<  351<H>  4.8   |  26  |  1.30    Ca    8.6      03 Dec 2022 16:08    TPro  6.2  /  Alb  3.7  /  TBili  0.2  /  DBili  x   /  AST  35  /  ALT  72<H>  /  AlkPhos  98  12-03      PT/INR - ( 03 Dec 2022 16:08 )   PT: 12.5 sec;   INR: 1.05          PTT - ( 03 Dec 2022 16:08 )  PTT:29.9 sec    CARDIAC MARKERS ( 03 Dec 2022 16:08 )  x     / 0.01 ng/mL / 207 U/L / x     / 5.5 ng/mL

## 2022-12-03 NOTE — PATIENT PROFILE ADULT - FUNCTIONAL ASSESSMENT - DAILY ACTIVITY 1.
Was seen on 2/12 for the flu    Dr Tessie Thao wrote a note for her to return to work 2/14  He wanted her to stay out until Monday  She thought she would be better  Still not feeling up to par  Can he redo the letter stating that she may return to work on 2/17  Thanks  Please fax it to [phone]    4 = No assist / stand by assistance

## 2022-12-03 NOTE — ED PROVIDER NOTE - CARE PLAN
1 Principal Discharge DX:	Shortness of breath   Principal Discharge DX:	Shortness of breath  Secondary Diagnosis:	Acute systolic congestive heart failure  Secondary Diagnosis:	HTN (hypertension)

## 2022-12-03 NOTE — H&P ADULT - CARDIOVASCULAR
normal/regular rate and rhythm/S1 S2 present/no murmur/no JVD/no pedal edema +JVD, 2+ pitting edema up into B/L thighs/normal/regular rate and rhythm/S1 S2 present/no murmur

## 2022-12-03 NOTE — H&P ADULT - PROBLEM SELECTOR PLAN 2
Patient with anasarca, +JVD, +Ascites, 2+ pitting edema up into dependent thighs, 25# weight gain over past 3 weeks, saturating well on RA  - Trop neg x1, f/u CE 8pm, AM  - EKG 12/3/22: SB @ 55 BPM without ST/T segment changes  - CXR 12/3/22: possible small R pleural effusion  - ECHO ordered, f/u  - Last EST 25 years ago. Will likely need ischemic w/u prior to discharge.  Grandfather with MI 50s. Father with MI 80s.  - Started on home Lisinopril 40 mg PO QD.   - Plan to initiate BB.   - c/w Lasix 40 mg IV BID.   - Strict I&Os, daily weights, 1.5L fluid restriction, core measures ordered Patient with anasarca, +JVD, +Ascites, 2+ pitting edema up into dependent thighs, 25# weight gain over past 3 weeks, saturating well on RA  - Trop neg x1, f/u CE 8pm, AM  - EKG 12/3/22: SB @ 55 BPM without ST/T segment changes  - CXR 12/3/22: possible small R pleural effusion  - ECHO ordered, f/u  - Last EST 25 years ago. Will likely need ischemic w/u prior to discharge.  Grandfather with MI 50s. Father with MI 80s.  - Started on home Lisinopril 40 mg PO QD.   - Plan to initiate BB when nearing euvolemia.  - c/w Lasix 40 mg IV BID.   - Strict I&Os, daily weights, 1.5L fluid restriction, core measures ordered

## 2022-12-03 NOTE — ED ADULT TRIAGE NOTE - ARRIVAL FROM
INTERVENTIONAL RADIOLOGY CLINIC FOLLOWUP VISIT  Dipti Mayorga DO   Performed and dictated by Raiza Olivo PA-C      20 minutes of time was spent with this patient with majority of the time pent counseling, coordinating care and clinical decision-making.    PMH:  Past Medical History:   Diagnosis Date   • Sinusitis, chronic    • Varicosity     Right leg        HISTORY:   41 YOF with PMH of varicose vein disease s/p EVLA of right GSV with supplemental sclerotherapy and microphlebectomy to include anterior accessory GSV in [id number] by Dr Sue, s/p sclerotherapy of right anterior and lateral shin varicosities by Dr Camejo on 2/15/21 presents to clinic for interval follow up. She did complete her compression stocking therapy post treatment without issue. Initially noted resolution of prior right lower extremity pain (with exception of healing in procedure area) however, the past few days pain has returned. She does note sometimes this pain would coincide with her menstrual cycle, unsure if that is related. Notes veins appear much less prominent. No wound issues present.     PHYSICAL EXAMINATION:  VITAL SIGNS :    Visit Vitals  /70 (BP Location: LUE - Left upper extremity, Patient Position: Sitting)   Pulse 80   Temp 96.1 °F (35.6 °C) (Temporal)   Resp 16   LMP 2020 (Approximate)   SpO2 100%       GENERAL:  Alert and oriented x3. Ambulating on own accord. In no apparent acute distress  Pulmonary: Clear lung sounds, no wheezes, rales, or crackles.  No accessory muscle use.  Symmetric expansion  Cardiac: Regular rate and rhythm  Abdomen: soft non-tender.  No rebound tenderness or guarding  Procedure Site: right le+ pulse DP and PT. No edema. No wounds. Note varicosities on anterior shin and anterior foot appear much less prominent. Still some tenderness to touch in lateral shin area        MEDICATIONS:   Prior to Admission medications    Medication Sig Start Date End Date Taking? Authorizing Provider    Omega-3 Fatty Acids (FISH OIL) 1000 MG CAPSULE DELAYED RELEASE     Outside Provider   Multiple Vitamin (MULTI-VITAMIN DAILY PO) Take 1 tablet by mouth daily.    Outside Provider   Cholecalciferol (VITAMIN D3) 5000 UNITS Cap Take 1 tablet by mouth daily.     Outside Provider        LABS :  Lab Results   Component Value Date    SODIUM 142 08/26/2020    POTASSIUM 4.2 08/26/2020    BUN 11 08/26/2020    CREATININE 0.72 08/26/2020    WBC 5.5 02/15/2021    HCT 41.2 02/15/2021    HGB 13.6 02/15/2021     02/15/2021    INR 1.0 02/15/2021    GLUCOSE 86 08/26/2020    TSH 2.452 08/26/2020         Imaging:  US LOWER EXTREMITY VENOUS DUPLEX RIGHT     Clinical History: Varicose veins of right lower extremity with other  complications     Comparison: 1/21/2021     TECHNIQUE:  Real-time duplex venous ultrasound is performed of the right lower  extremity deep venous system. Grayscale, color flow, and spectral Doppler  images were obtained.      FINDINGS:   Color-flow is noted within the proximal great saphenous and epigastric  pain. The great saphenous vein is not visualized distal to the proximal  thigh. The patient has a history of prior PSV ablation.     Right:  Common femoral vein: Compressible, internal color flow  Deep femoral vein: Compressible, internal color flow  Proximal femoral vein: Compressible, internal color flow  Mid femoral vein: Compressible, internal color flow  Distal femoral vein: Compressible, internal color flow  Popliteal vein: Compressible, internal color flow  Infrapopliteal deep veins: Internal color flow     Popliteal cyst: No     Contralateral common femoral vein: Compressible, internal color flow        IMPRESSION:   1.  Post ablation changes noted within the right great saphenous vein.  2.  No evidence of deep vein thrombosis.    Assessment/Plan:  41 YOF with PMH of varicose vein disease s/p EVLA of right GSV with supplemental sclerotherapy and microphlebectomy to include anterior accessory GSV  in [id number] by Dr Sue, s/p sclerotherapy of right anterior and lateral shin varicosities by Dr Camejo on 2/15/21 presents to clinic for interval follow up.    Patient seen in conjunction with Dr Camejo. Follow up ultrasound without any evidence of DVT. Veins appear improved on exam. Pain was improved prior to last few days. At this juncture, would just monitor and hold on any further treatment. Recommend continuing to wear compression therapy on more active days to try to promote vein health. She will return to clinic three months post treatment to reassess or sooner should symptoms dictate.    Raiza Olivo PA-C  For Dr Camejo       Home

## 2022-12-03 NOTE — H&P ADULT - PROBLEM SELECTOR PLAN 1
SBP 190s-200s on arrival s/p Hydralazine 5 mg IVP x1 in ED with little response  - Restarted home Lisinopril 40 mg PO QD (held during previous admit for hyperkalemia but resumed after discharge, monitor K+)  - Lasix 40 mg IVP BID SBP 190s-200s on arrival s/p Hydralazine 5 mg IVP x1 in ED with little response  - Restarted home Lisinopril 40 mg PO QD (held during previous admit for hyperkalemia but resumed after discharge, monitor K+)  - Plan to intiate BB when nearing euvolemia  - Lasix 40 mg IVP BID SBP 190s-200s on arrival s/p Hydralazine 5 mg IVP x1 in ED with little response  - Restarted home Lisinopril 40 mg PO QD (held during previous admit for hyperkalemia but resumed after discharge, monitor K+)  - Plan to initiate BB when nearing euvolemia  - Lasix 40 mg IVP BID  - Avoid reduction in BP of no more than 25% in 1st 24 hours.

## 2022-12-03 NOTE — ED ADULT TRIAGE NOTE - CHIEF COMPLAINT QUOTE
"I have shortness of breath for almost a week and I was admitted here not to long ago for osteomyelitis and I am still getting antibiotics but I just don't feel well". Patient has PICC in right arm

## 2022-12-03 NOTE — ED PROVIDER NOTE - CLINICAL SUMMARY MEDICAL DECISION MAKING FREE TEXT BOX
57M with PMH DM, HTN, HLD, anxiety disorder, osteomyelitis of fourth toe of right foot (being treated with Vancomycin 1250mg q12hrs and CTX 2g qd for 6 weeks total via PICC ending on 12/20/2022) reports he has been f/u with Dr. Jay Chakraborty weekly and healing well, who p/w worsening SOB and increasing abdominal distention for the past week. Pt states that when he bends over to put his shoes on he gets very SOB. He also endorses 25lb weight gain in the past 3 weeks. He has been compliant with his medications. State he had an unexpected lunch PTA and didn't take his insulin yet which is why his POC Glu was elevated (pt just took his own 10U on insulin). He denies CP, no dizziness or syncope, no f/c, denies hx of CAD or CHF. No other complaints.    Prior EMR notes as well as external notes reviewed. No recent cardiac w/u noted. Clinical picture c/f new onset CHF, plan for labs, CXR, BP and glu control (pt took his own insulin- will recheck POC glu).  VS notable for HTN, no focal neuro deficits. Per my interpretation: EKG NSR, TWI V2, CXR wnl.  Independent interpretation of labs and imaging performed by me. CXR shows CM, pulm vasc congestion and ?small pleural effusion. 20mg IV lasix ordered.     Case discussed with consultant/PMD.  Shared decision making regarding plan and need for escalation of care/hospitalization discussed with patient.    Pt stable on reevaluation, plan for DC with outpt follow up recommended and return precautions discussed. Pt verbalized understanding DC instruction. 57M with PMH DM, HTN, HLD, anxiety disorder, osteomyelitis of fourth toe of right foot (being treated with Vancomycin 1250mg q12hrs and CTX 2g qd for 6 weeks total via PICC ending on 12/20/2022) reports he has been f/u with Dr. Jya Chakraborty weekly and healing well, who p/w worsening SOB and increasing abdominal distention for the past week. Pt states that when he bends over to put his shoes on he gets very SOB. He also endorses 25lb weight gain in the past 3 weeks. He has been compliant with his medications. State he had an unexpected lunch PTA and didn't take his insulin yet which is why his POC Glu was elevated (pt just took his own 10U on insulin). He denies CP, no dizziness or syncope, no f/c, denies hx of CAD or CHF. No other complaints.    Prior EMR notes as well as external notes reviewed. No recent cardiac w/u noted. Clinical picture c/f new onset CHF, plan for labs, CXR, BP and glu control (pt took his own insulin- will recheck POC glu).  VS notable for HTN, no focal neuro deficits. Per my interpretation: EKG NSR, TWI V2, CXR wnl.  Independent interpretation of labs and imaging performed by me. CXR shows CM, pulm vasc congestion and ?small pleural effusion. 20mg IV lasix ordered.   Pt still HTN so IV hydralazine ordered as well. Of note, pt was on 20mg lisinopril but this was stopped at last admission d/t Мария and hyperK.   Case discussed with consultant/cards and pt admitted to [facility] for further mgmt of new onset CHF and HTN.

## 2022-12-03 NOTE — H&P ADULT - RESPIRATORY
normal/clear to auscultation bilaterally/no wheezes/no rales/no rhonchi Decreased BS to bases R>L/no wheezes/no rales/no rhonchi

## 2022-12-03 NOTE — ED PROVIDER NOTE - OBJECTIVE STATEMENT
57M with PMH DM, HTN, HLD and anxiety disorder, osteomyelitis of fourth toe of right foot (treated with Vancomycin 1250mg q12hrs and CTX 2g qd. To be continued for 6 total weeks of antibiotics via PICC ending on 12/20/2022 (following with Dr. Jay Chakraborty           [place] Address- [address] ([address]), [place] [id number]          [facility]         [phone] 57M with PMH DM, HTN, HLD, anxiety disorder, osteomyelitis of fourth toe of right foot (being treated with Vancomycin 1250mg q12hrs and CTX 2g qd for 6 weeks total via PICC ending on 12/20/2022) reports he has been f/u with Dr. Jay Chkaraborty weekly and healing well, who p/w worsening SOB and increasing abdominal distention for the past week. Pt states that when he bends over to put his shoes on he gets very SOB. He also endorses 25lb weight gain in the past 3 weeks. He has been compliant with his medications. State he had an unexpected lunch PTA and didn't take his insulin yet which is why his POC Glu was elevated (pt just took his own 10U on insulin). He denies CP, no dizziness or syncope, no f/c, denies hx of CAD or CHF. No other complaints.

## 2022-12-04 LAB
A1C WITH ESTIMATED AVERAGE GLUCOSE RESULT: 9.8 % — HIGH (ref 4–5.6)
ALBUMIN SERPL ELPH-MCNC: 3.6 G/DL — SIGNIFICANT CHANGE UP (ref 3.3–5)
ALP SERPL-CCNC: 90 U/L — SIGNIFICANT CHANGE UP (ref 40–120)
ALT FLD-CCNC: 61 U/L — HIGH (ref 10–45)
ANION GAP SERPL CALC-SCNC: 12 MMOL/L — SIGNIFICANT CHANGE UP (ref 5–17)
AST SERPL-CCNC: 28 U/L — SIGNIFICANT CHANGE UP (ref 10–40)
BILIRUB SERPL-MCNC: 0.4 MG/DL — SIGNIFICANT CHANGE UP (ref 0.2–1.2)
BUN SERPL-MCNC: 24 MG/DL — HIGH (ref 7–23)
CALCIUM SERPL-MCNC: 9.2 MG/DL — SIGNIFICANT CHANGE UP (ref 8.4–10.5)
CHLORIDE SERPL-SCNC: 105 MMOL/L — SIGNIFICANT CHANGE UP (ref 96–108)
CHOLEST SERPL-MCNC: 157 MG/DL — SIGNIFICANT CHANGE UP
CK MB CFR SERPL CALC: 4.1 NG/ML — SIGNIFICANT CHANGE UP (ref 0–6.7)
CK SERPL-CCNC: 154 U/L — SIGNIFICANT CHANGE UP (ref 30–200)
CO2 SERPL-SCNC: 23 MMOL/L — SIGNIFICANT CHANGE UP (ref 22–31)
CREAT SERPL-MCNC: 1.23 MG/DL — SIGNIFICANT CHANGE UP (ref 0.5–1.3)
EGFR: 69 ML/MIN/1.73M2 — SIGNIFICANT CHANGE UP
ESTIMATED AVERAGE GLUCOSE: 235 MG/DL — HIGH (ref 68–114)
GLUCOSE BLDC GLUCOMTR-MCNC: 134 MG/DL — HIGH (ref 70–99)
GLUCOSE BLDC GLUCOMTR-MCNC: 143 MG/DL — HIGH (ref 70–99)
GLUCOSE BLDC GLUCOMTR-MCNC: 230 MG/DL — HIGH (ref 70–99)
GLUCOSE BLDC GLUCOMTR-MCNC: 261 MG/DL — HIGH (ref 70–99)
GLUCOSE SERPL-MCNC: 143 MG/DL — HIGH (ref 70–99)
HCT VFR BLD CALC: 32.6 % — LOW (ref 39–50)
HDLC SERPL-MCNC: 53 MG/DL — SIGNIFICANT CHANGE UP
HGB BLD-MCNC: 10.6 G/DL — LOW (ref 13–17)
LIPID PNL WITH DIRECT LDL SERPL: 91 MG/DL — SIGNIFICANT CHANGE UP
MAGNESIUM SERPL-MCNC: 2 MG/DL — SIGNIFICANT CHANGE UP (ref 1.6–2.6)
MCHC RBC-ENTMCNC: 30.3 PG — SIGNIFICANT CHANGE UP (ref 27–34)
MCHC RBC-ENTMCNC: 32.5 GM/DL — SIGNIFICANT CHANGE UP (ref 32–36)
MCV RBC AUTO: 93.1 FL — SIGNIFICANT CHANGE UP (ref 80–100)
NON HDL CHOLESTEROL: 104 MG/DL — SIGNIFICANT CHANGE UP
NRBC # BLD: 0 /100 WBCS — SIGNIFICANT CHANGE UP (ref 0–0)
PLATELET # BLD AUTO: 299 K/UL — SIGNIFICANT CHANGE UP (ref 150–400)
POTASSIUM SERPL-MCNC: 3.9 MMOL/L — SIGNIFICANT CHANGE UP (ref 3.5–5.3)
POTASSIUM SERPL-SCNC: 3.9 MMOL/L — SIGNIFICANT CHANGE UP (ref 3.5–5.3)
PROT SERPL-MCNC: 6.4 G/DL — SIGNIFICANT CHANGE UP (ref 6–8.3)
RBC # BLD: 3.5 M/UL — LOW (ref 4.2–5.8)
RBC # FLD: 13.9 % — SIGNIFICANT CHANGE UP (ref 10.3–14.5)
SODIUM SERPL-SCNC: 140 MMOL/L — SIGNIFICANT CHANGE UP (ref 135–145)
TRIGL SERPL-MCNC: 63 MG/DL — SIGNIFICANT CHANGE UP
TROPONIN T SERPL-MCNC: 0.01 NG/ML — SIGNIFICANT CHANGE UP (ref 0–0.01)
TSH SERPL-MCNC: 1.88 UIU/ML — SIGNIFICANT CHANGE UP (ref 0.27–4.2)
WBC # BLD: 8.83 K/UL — SIGNIFICANT CHANGE UP (ref 3.8–10.5)
WBC # FLD AUTO: 8.83 K/UL — SIGNIFICANT CHANGE UP (ref 3.8–10.5)

## 2022-12-04 PROCEDURE — 99233 SBSQ HOSP IP/OBS HIGH 50: CPT

## 2022-12-04 PROCEDURE — 93010 ELECTROCARDIOGRAM REPORT: CPT

## 2022-12-04 RX ORDER — AMLODIPINE BESYLATE 2.5 MG/1
5 TABLET ORAL DAILY
Refills: 0 | Status: DISCONTINUED | OUTPATIENT
Start: 2022-12-04 | End: 2022-12-05

## 2022-12-04 RX ORDER — HEPARIN SODIUM 5000 [USP'U]/ML
5000 INJECTION INTRAVENOUS; SUBCUTANEOUS EVERY 8 HOURS
Refills: 0 | Status: DISCONTINUED | OUTPATIENT
Start: 2022-12-04 | End: 2022-12-06

## 2022-12-04 RX ORDER — HYDRALAZINE HCL 50 MG
5 TABLET ORAL ONCE
Refills: 0 | Status: COMPLETED | OUTPATIENT
Start: 2022-12-04 | End: 2022-12-04

## 2022-12-04 RX ORDER — HYDRALAZINE HCL 50 MG
25 TABLET ORAL
Refills: 0 | Status: DISCONTINUED | OUTPATIENT
Start: 2022-12-04 | End: 2022-12-04

## 2022-12-04 RX ORDER — ACETAMINOPHEN 500 MG
650 TABLET ORAL ONCE
Refills: 0 | Status: COMPLETED | OUTPATIENT
Start: 2022-12-04 | End: 2022-12-04

## 2022-12-04 RX ORDER — HYDRALAZINE HCL 50 MG
25 TABLET ORAL THREE TIMES A DAY
Refills: 0 | Status: DISCONTINUED | OUTPATIENT
Start: 2022-12-04 | End: 2022-12-05

## 2022-12-04 RX ADMIN — Medication 40 MILLIGRAM(S): at 13:49

## 2022-12-04 RX ADMIN — Medication 7 UNIT(S): at 17:50

## 2022-12-04 RX ADMIN — Medication 7 UNIT(S): at 08:41

## 2022-12-04 RX ADMIN — Medication 7 UNIT(S): at 13:20

## 2022-12-04 RX ADMIN — CEFTRIAXONE 100 MILLIGRAM(S): 500 INJECTION, POWDER, FOR SOLUTION INTRAMUSCULAR; INTRAVENOUS at 18:53

## 2022-12-04 RX ADMIN — ATORVASTATIN CALCIUM 40 MILLIGRAM(S): 80 TABLET, FILM COATED ORAL at 21:45

## 2022-12-04 RX ADMIN — CHLORHEXIDINE GLUCONATE 1 APPLICATION(S): 213 SOLUTION TOPICAL at 11:10

## 2022-12-04 RX ADMIN — HEPARIN SODIUM 5000 UNIT(S): 5000 INJECTION INTRAVENOUS; SUBCUTANEOUS at 13:49

## 2022-12-04 RX ADMIN — HEPARIN SODIUM 5000 UNIT(S): 5000 INJECTION INTRAVENOUS; SUBCUTANEOUS at 21:45

## 2022-12-04 RX ADMIN — Medication 5 MILLIGRAM(S): at 00:23

## 2022-12-04 RX ADMIN — Medication 166.67 MILLIGRAM(S): at 21:45

## 2022-12-04 RX ADMIN — Medication 25 MILLIGRAM(S): at 21:45

## 2022-12-04 RX ADMIN — Medication 6: at 13:19

## 2022-12-04 RX ADMIN — Medication 166.67 MILLIGRAM(S): at 09:50

## 2022-12-04 RX ADMIN — Medication 4: at 21:45

## 2022-12-04 RX ADMIN — Medication 25 MILLIGRAM(S): at 09:50

## 2022-12-04 RX ADMIN — Medication 25 MILLIGRAM(S): at 14:19

## 2022-12-04 RX ADMIN — INSULIN GLARGINE 15 UNIT(S): 100 INJECTION, SOLUTION SUBCUTANEOUS at 21:46

## 2022-12-04 RX ADMIN — Medication 40 MILLIGRAM(S): at 05:19

## 2022-12-04 RX ADMIN — Medication 650 MILLIGRAM(S): at 11:03

## 2022-12-04 RX ADMIN — CITALOPRAM 40 MILLIGRAM(S): 10 TABLET, FILM COATED ORAL at 11:03

## 2022-12-04 RX ADMIN — Medication 650 MILLIGRAM(S): at 13:00

## 2022-12-04 RX ADMIN — AMLODIPINE BESYLATE 5 MILLIGRAM(S): 2.5 TABLET ORAL at 04:11

## 2022-12-04 NOTE — PROGRESS NOTE ADULT - SUBJECTIVE AND OBJECTIVE BOX
Interventional Cardiology PA Adult Progress Note    Subjective Assessment:    ROS neg except as per subjective HPI.     	  MEDICATIONS:  amLODIPine   Tablet 5 milliGRAM(s) Oral daily  furosemide   Injectable 40 milliGRAM(s) IV Push two times a day  lisinopril 40 milliGRAM(s) Oral daily    cefTRIAXone   IVPB      cefTRIAXone   IVPB 2000 milliGRAM(s) IV Intermittent every 24 hours  vancomycin  IVPB 1250 milliGRAM(s) IV Intermittent every 12 hours      citalopram 40 milliGRAM(s) Oral daily      atorvastatin 40 milliGRAM(s) Oral at bedtime  dextrose 50% Injectable 25 Gram(s) IV Push once  dextrose 50% Injectable 12.5 Gram(s) IV Push once  dextrose 50% Injectable 25 Gram(s) IV Push once  dextrose Oral Gel 15 Gram(s) Oral once PRN  glucagon  Injectable 1 milliGRAM(s) IntraMuscular once  insulin glargine Injectable (LANTUS) 15 Unit(s) SubCutaneous at bedtime  insulin lispro (ADMELOG) corrective regimen sliding scale   SubCutaneous Before meals and at bedtime  insulin lispro Injectable (ADMELOG) 7 Unit(s) SubCutaneous three times a day before meals    chlorhexidine 2% Cloths 1 Application(s) Topical daily  dextrose 5%. 1000 milliLiter(s) IV Continuous <Continuous>  dextrose 5%. 1000 milliLiter(s) IV Continuous <Continuous>      	    [PHYSICAL EXAM:  TELEMETRY:  T(C): 36.7 (12-04-22 @ 05:23), Max: 36.8 (12-03-22 @ 22:25)  HR: 62 (12-04-22 @ 05:19) (55 - 62)  BP: 176/73 (12-04-22 @ 05:19) (158/71 - 219/98)  RR: 17 (12-04-22 @ 04:07) (17 - 19)  SpO2: 99% (12-04-22 @ 04:07) (97% - 100%)  Wt(kg): --  I&O's Summary    03 Dec 2022 07:01  -  04 Dec 2022 07:00  --------------------------------------------------------  IN: 250 mL / OUT: 3525 mL / NET: -3275 mL      Height (cm): 182.9 (12-03 @ 14:59)  Weight (kg): 106 (12-03 @ 14:59)  BMI (kg/m2): 31.7 (12-03 @ 14:59)  BSA (m2): 2.28 (12-03 @ 14:59)  Perez:  Central/PICC/Mid Line:                                               	    ECG:  	  RADIOLOGY:   DIAGNOSTIC TESTING:  [ ] Echocardiogram:  [ ]  Catheterization:  [ ] Stress Test:    [ ] LORENE  OTHER: 	    LABS:	 	  CARDIAC MARKERS:                                  10.6   8.83  )-----------( 299      ( 04 Dec 2022 05:30 )             32.6     12-04    140  |  105  |  24<H>  ----------------------------<  143<H>  3.9   |  23  |  1.23    Ca    9.2      04 Dec 2022 05:30  Mg     2.0     12-04    TPro  6.4  /  Alb  3.6  /  TBili  0.4  /  DBili  x   /  AST  28  /  ALT  61<H>  /  AlkPhos  90  12-04    proBNP: Serum Pro-Brain Natriuretic Peptide: 1536 pg/mL (12-03 @ 16:08)    Lipid Profile:   HgA1c:   TSH:   PT/INR - ( 03 Dec 2022 16:08 )   PT: 12.5 sec;   INR: 1.05          PTT - ( 03 Dec 2022 16:08 )  PTT:29.9 sec    ASSESSMENT/PLAN: 	        DVT ppx:  Dispo:     Interventional Cardiology PA Adult Progress Note    Subjective Assessment: Pt seen and examined at bedside today am. Pt denies any CP, SOB, dizziness, palpitation.     ROS neg except as per subjective HPI.     	  MEDICATIONS:  amLODIPine   Tablet 5 milliGRAM(s) Oral daily  furosemide   Injectable 40 milliGRAM(s) IV Push two times a day  lisinopril 40 milliGRAM(s) Oral daily    cefTRIAXone   IVPB      cefTRIAXone   IVPB 2000 milliGRAM(s) IV Intermittent every 24 hours  vancomycin  IVPB 1250 milliGRAM(s) IV Intermittent every 12 hours      citalopram 40 milliGRAM(s) Oral daily      atorvastatin 40 milliGRAM(s) Oral at bedtime  dextrose 50% Injectable 25 Gram(s) IV Push once  dextrose 50% Injectable 12.5 Gram(s) IV Push once  dextrose 50% Injectable 25 Gram(s) IV Push once  dextrose Oral Gel 15 Gram(s) Oral once PRN  glucagon  Injectable 1 milliGRAM(s) IntraMuscular once  insulin glargine Injectable (LANTUS) 15 Unit(s) SubCutaneous at bedtime  insulin lispro (ADMELOG) corrective regimen sliding scale   SubCutaneous Before meals and at bedtime  insulin lispro Injectable (ADMELOG) 7 Unit(s) SubCutaneous three times a day before meals    chlorhexidine 2% Cloths 1 Application(s) Topical daily  dextrose 5%. 1000 milliLiter(s) IV Continuous <Continuous>  dextrose 5%. 1000 milliLiter(s) IV Continuous <Continuous>      	    [PHYSICAL EXAM:  TELEMETRY:  T(C): 36.7 (12-04-22 @ 05:23), Max: 36.8 (12-03-22 @ 22:25)  HR: 62 (12-04-22 @ 05:19) (55 - 62)  BP: 176/73 (12-04-22 @ 05:19) (158/71 - 219/98)  RR: 17 (12-04-22 @ 04:07) (17 - 19)  SpO2: 99% (12-04-22 @ 04:07) (97% - 100%)  Wt(kg): --  I&O's Summary    03 Dec 2022 07:01  -  04 Dec 2022 07:00  --------------------------------------------------------  IN: 250 mL / OUT: 3525 mL / NET: -3275 mL      Height (cm): 182.9 (12-03 @ 14:59)  Weight (kg): 106 (12-03 @ 14:59)  BMI (kg/m2): 31.7 (12-03 @ 14:59)  BSA (m2): 2.28 (12-03 @ 14:59)  Perez:  Central/PICC/Mid Line:                                           Gen: NAD  Neck: no JVD  CV: RRR, s1 and s2 positive, no murmurs appreciated  Pulm: CTA B/L; no w/r/r  GI: soft, NT/ND, + BS X 4  extremities: no clubbing, cyanosis, trace b/l LE edema.   Neuro: AO X 3, no focal deficits appreciated    	    ECG:  	  RADIOLOGY:   DIAGNOSTIC TESTING:  [ ] Echocardiogram:  [ ]  Catheterization:  [ ] Stress Test:    [ ] LORENE  OTHER: 	    LABS:	 	  CARDIAC MARKERS:                                  10.6   8.83  )-----------( 299      ( 04 Dec 2022 05:30 )             32.6     12-04    140  |  105  |  24<H>  ----------------------------<  143<H>  3.9   |  23  |  1.23    Ca    9.2      04 Dec 2022 05:30  Mg     2.0     12-04    TPro  6.4  /  Alb  3.6  /  TBili  0.4  /  DBili  x   /  AST  28  /  ALT  61<H>  /  AlkPhos  90  12-04    proBNP: Serum Pro-Brain Natriuretic Peptide: 1536 pg/mL (12-03 @ 16:08)    Lipid Profile:   HgA1c:   TSH:   PT/INR - ( 03 Dec 2022 16:08 )   PT: 12.5 sec;   INR: 1.05          PTT - ( 03 Dec 2022 16:08 )  PTT:29.9 sec    ASSESSMENT/PLAN: 	        DVT ppx:  Dispo:

## 2022-12-04 NOTE — PROGRESS NOTE ADULT - PROBLEM SELECTOR PLAN 3
- HgbAc 9.8 this admission,  HgbA1C 11.4 11/8/22  - c/w home Lispro 7 units TID, Lantus 15 units qHS for now (Metformin & Trulicity d/c on prior admission)  - Has appointment with o/p Endocrinologist next week.   - endo consulted.

## 2022-12-04 NOTE — PROGRESS NOTE ADULT - PROBLEM SELECTOR PLAN 2
Patient with anasarca, +JVD, +Ascites, 2+ pitting edema up into dependent thighs, 25# weight gain over past 3 weeks, saturating well on RA  - Trop neg x 3   - EKG 12/3/22: SB @ 55 BPM without ST/T segment changes  - CXR 12/3/22: possible small R pleural effusion  - ECHO ordered, f/u  - Last EST 25 years ago.  Grandfather with MI 50s. Father with MI 80s. consider ischemic eval  - Started on home Lisinopril 40 mg PO QD.   - Plan to initiate BB when nearing euvolemia.  - c/w Lasix 40 mg IV BID.   - Strict I&Os, daily weights, 1.5L fluid restriction, core measures ordered Patient with anasarca, +JVD, +Ascites, 2+ pitting edema up into dependent thighs, 25# weight gain over past 3 weeks, saturating well on RA  - Trop neg x 3   - EKG 12/3/22: SB @ 55 BPM without ST/T segment changes  - CXR 12/3/22: possible small R pleural effusion  - ECHO ordered, f/u  - Last EST 25 years ago.  Grandfather with MI 50s. Father with MI 80s. consider ischemic eval  - Started on home Lisinopril 40 mg PO QD.   - Plan to initiate BB when nearing euvolemia.  - c/w Lasix 40 mg IV BID. plan to transition to PO lasix 12/5 .  - Strict I&Os, daily weights, 1.5L fluid restriction, core measures ordered Patient with anasarca, +JVD, +Ascites, 2+ pitting edema up into dependent thighs, 25# weight gain over past 3 weeks, saturating well on RA  - Trop neg x 3   - EKG 12/3/22: SB @ 55 BPM without ST/T segment changes  - CXR 12/3/22: possible small R pleural effusion  - ECHO ordered, f/u  - Last EST 25 years ago.  Grandfather with MI 50s. Father with MI 80s. consider ischemic eval  - Started on home Lisinopril 40 mg PO QD.   - Plan to initiate BB when nearing euvolemia.  - c/w Lasix 40 mg IV BID   - Strict I&Os, daily weights, 1.5L fluid restriction, core measures ordered

## 2022-12-04 NOTE — PROGRESS NOTE ADULT - ASSESSMENT
58 yo M with PMHx HTN, HLD, IDDM, anxiety, OM R 4th toe (undergoing treatment with Vanc [id number], CTX 2gm IV qD x 6 weeks via PICC, ending 12/20/22) who  is admitted to cardiac tele for management of hypertensive urgency & acute decompensated CHF.

## 2022-12-04 NOTE — PROGRESS NOTE ADULT - NS ATTEND AMEND GEN_ALL_CORE FT
Patient seen and examined. Case discussed with ACP    57M HTN HL DM OM toe on abx recent admission presents with worsening dyspnea and LE edema with weight gain  Found to be hypertensive to SBP 200s  admitted for diuresis and CHF work-up  Reports dyspnea has improved but with CP   Today SBP 180s, per pt baseline SBPs are 200s, and that BP meds 'don't do anything' but he is compliant  EKG: SB, nonspecific STA V1-V2  Labs with stable hgb/Cr  Given additional amlodipine and hydral for BP control  On exam NAD lungs clear, + LE edema  -continue IV diuresis today, switch to PO tomorrow  -standing weights  -CP: repeat EKG, trops  -TTE  -BP: amlodipine and hydralazine added today, per pt baseline has been 200s so will not lower aggressively, check renal artery Doppler given sig elevation, outpt sleep study  -ID re: abx regimen  -dispo pending volume and BP optimization, TTE    Karla Gay MD

## 2022-12-04 NOTE — PROGRESS NOTE ADULT - PROBLEM SELECTOR PLAN 5
OM R 4th toe, without leukocytosis, afebrile  - vanc trough 18 12/3 pm, c/w Vanc [id number]. c/w CTX 2 gm IV QD (plan to continue x 6 week course through 12/20/22)  - next vanc trough 12/5 9:30 pm prior to 10 pm dose( needs to be ordered).   - Consult ID - known to Dr. Gallardo in AM    VTE ppx: Heparin subQ  Dispo: pending clinical course

## 2022-12-04 NOTE — PROGRESS NOTE ADULT - PROBLEM SELECTOR PLAN 1
SBP 190s-200s on arrival s/p Hydralazine 5 mg IVP x1 in ED with little response  - Restarted home Lisinopril 40 mg PO QD (held during previous admit for hyperkalemia but resumed after discharge, monitor K+)  - Plan to initiate BB when nearing euvolemia  - Lasix 40 mg IVP BID SBP 190s-200s on arrival s/p Hydralazine 5 mg IVP x1 in ED with little response  - Restarted home Lisinopril 40 mg PO QD (held during previous admit for hyperkalemia but resumed after discharge, monitor K+)  - Plan to initiate BB when nearing euvolemia  - c/w Lasix 40 mg IVP BID  - started Hydralazine 25 mg PO BID (titrate up as tolerated) SBP 190s-200s on arrival s/p Hydralazine 5 mg IVP x1 in ED with little response  - Restarted home Lisinopril 40 mg PO QD (held during previous admit for hyperkalemia but resumed after discharge, monitor K+)  - Plan to initiate BB when nearing euvolemia  - c/w Lasix 40 mg IVP BID  - started Hydralazine 25 mg PO TID (titrate up as tolerated)  - Pt reports BP elevated to 200s at home for a while now ; Retroperoteneal US ordered to r/o ABHINAV.

## 2022-12-05 LAB
ANION GAP SERPL CALC-SCNC: 9 MMOL/L — SIGNIFICANT CHANGE UP (ref 5–17)
BUN SERPL-MCNC: 20 MG/DL — SIGNIFICANT CHANGE UP (ref 7–23)
CALCIUM SERPL-MCNC: 9 MG/DL — SIGNIFICANT CHANGE UP (ref 8.4–10.5)
CHLORIDE SERPL-SCNC: 104 MMOL/L — SIGNIFICANT CHANGE UP (ref 96–108)
CK MB CFR SERPL CALC: 3.4 NG/ML — SIGNIFICANT CHANGE UP (ref 0–6.7)
CK MB CFR SERPL CALC: 3.6 NG/ML — SIGNIFICANT CHANGE UP (ref 0–6.7)
CK SERPL-CCNC: 113 U/L — SIGNIFICANT CHANGE UP (ref 30–200)
CK SERPL-CCNC: 114 U/L — SIGNIFICANT CHANGE UP (ref 30–200)
CO2 SERPL-SCNC: 28 MMOL/L — SIGNIFICANT CHANGE UP (ref 22–31)
CREAT SERPL-MCNC: 1.17 MG/DL — SIGNIFICANT CHANGE UP (ref 0.5–1.3)
EGFR: 74 ML/MIN/1.73M2 — SIGNIFICANT CHANGE UP
GLUCOSE BLDC GLUCOMTR-MCNC: 154 MG/DL — HIGH (ref 70–99)
GLUCOSE BLDC GLUCOMTR-MCNC: 189 MG/DL — HIGH (ref 70–99)
GLUCOSE BLDC GLUCOMTR-MCNC: 270 MG/DL — HIGH (ref 70–99)
GLUCOSE BLDC GLUCOMTR-MCNC: 324 MG/DL — HIGH (ref 70–99)
GLUCOSE SERPL-MCNC: 160 MG/DL — HIGH (ref 70–99)
HCT VFR BLD CALC: 33.5 % — LOW (ref 39–50)
HGB BLD-MCNC: 10.8 G/DL — LOW (ref 13–17)
MAGNESIUM SERPL-MCNC: 1.8 MG/DL — SIGNIFICANT CHANGE UP (ref 1.6–2.6)
MCHC RBC-ENTMCNC: 30.1 PG — SIGNIFICANT CHANGE UP (ref 27–34)
MCHC RBC-ENTMCNC: 32.2 GM/DL — SIGNIFICANT CHANGE UP (ref 32–36)
MCV RBC AUTO: 93.3 FL — SIGNIFICANT CHANGE UP (ref 80–100)
NRBC # BLD: 0 /100 WBCS — SIGNIFICANT CHANGE UP (ref 0–0)
PLATELET # BLD AUTO: 306 K/UL — SIGNIFICANT CHANGE UP (ref 150–400)
POTASSIUM SERPL-MCNC: 3.6 MMOL/L — SIGNIFICANT CHANGE UP (ref 3.5–5.3)
POTASSIUM SERPL-SCNC: 3.6 MMOL/L — SIGNIFICANT CHANGE UP (ref 3.5–5.3)
RBC # BLD: 3.59 M/UL — LOW (ref 4.2–5.8)
RBC # FLD: 13.8 % — SIGNIFICANT CHANGE UP (ref 10.3–14.5)
SODIUM SERPL-SCNC: 141 MMOL/L — SIGNIFICANT CHANGE UP (ref 135–145)
TROPONIN T SERPL-MCNC: 0.02 NG/ML — HIGH (ref 0–0.01)
TROPONIN T SERPL-MCNC: 0.02 NG/ML — HIGH (ref 0–0.01)
VANCOMYCIN TROUGH SERPL-MCNC: 14.7 UG/ML — SIGNIFICANT CHANGE UP (ref 10–20)
WBC # BLD: 6.84 K/UL — SIGNIFICANT CHANGE UP (ref 3.8–10.5)
WBC # FLD AUTO: 6.84 K/UL — SIGNIFICANT CHANGE UP (ref 3.8–10.5)

## 2022-12-05 PROCEDURE — 99233 SBSQ HOSP IP/OBS HIGH 50: CPT

## 2022-12-05 PROCEDURE — 93306 TTE W/DOPPLER COMPLETE: CPT | Mod: 26

## 2022-12-05 PROCEDURE — 99222 1ST HOSP IP/OBS MODERATE 55: CPT | Mod: GC

## 2022-12-05 PROCEDURE — 93010 ELECTROCARDIOGRAM REPORT: CPT

## 2022-12-05 RX ORDER — HYDRALAZINE HCL 50 MG
25 TABLET ORAL ONCE
Refills: 0 | Status: COMPLETED | OUTPATIENT
Start: 2022-12-05 | End: 2022-12-05

## 2022-12-05 RX ORDER — HYDRALAZINE HCL 50 MG
50 TABLET ORAL THREE TIMES A DAY
Refills: 0 | Status: DISCONTINUED | OUTPATIENT
Start: 2022-12-05 | End: 2022-12-07

## 2022-12-05 RX ORDER — AMLODIPINE BESYLATE 2.5 MG/1
5 TABLET ORAL DAILY
Refills: 0 | Status: DISCONTINUED | OUTPATIENT
Start: 2022-12-06 | End: 2022-12-06

## 2022-12-05 RX ORDER — AMLODIPINE BESYLATE 2.5 MG/1
2.5 TABLET ORAL ONCE
Refills: 0 | Status: COMPLETED | OUTPATIENT
Start: 2022-12-05 | End: 2022-12-05

## 2022-12-05 RX ORDER — POTASSIUM CHLORIDE 20 MEQ
40 PACKET (EA) ORAL ONCE
Refills: 0 | Status: COMPLETED | OUTPATIENT
Start: 2022-12-05 | End: 2022-12-05

## 2022-12-05 RX ORDER — INSULIN LISPRO 100/ML
10 VIAL (ML) SUBCUTANEOUS
Refills: 0 | Status: DISCONTINUED | OUTPATIENT
Start: 2022-12-05 | End: 2022-12-06

## 2022-12-05 RX ORDER — MAGNESIUM OXIDE 400 MG ORAL TABLET 241.3 MG
800 TABLET ORAL ONCE
Refills: 0 | Status: COMPLETED | OUTPATIENT
Start: 2022-12-05 | End: 2022-12-05

## 2022-12-05 RX ORDER — AMLODIPINE BESYLATE 2.5 MG/1
5 TABLET ORAL ONCE
Refills: 0 | Status: DISCONTINUED | OUTPATIENT
Start: 2022-12-05 | End: 2022-12-05

## 2022-12-05 RX ADMIN — CEFTRIAXONE 100 MILLIGRAM(S): 500 INJECTION, POWDER, FOR SOLUTION INTRAMUSCULAR; INTRAVENOUS at 19:40

## 2022-12-05 RX ADMIN — INSULIN GLARGINE 15 UNIT(S): 100 INJECTION, SOLUTION SUBCUTANEOUS at 22:03

## 2022-12-05 RX ADMIN — HEPARIN SODIUM 5000 UNIT(S): 5000 INJECTION INTRAVENOUS; SUBCUTANEOUS at 13:21

## 2022-12-05 RX ADMIN — AMLODIPINE BESYLATE 2.5 MILLIGRAM(S): 2.5 TABLET ORAL at 18:30

## 2022-12-05 RX ADMIN — Medication 7 UNIT(S): at 06:54

## 2022-12-05 RX ADMIN — Medication 8: at 12:15

## 2022-12-05 RX ADMIN — Medication 25 MILLIGRAM(S): at 10:22

## 2022-12-05 RX ADMIN — Medication 40 MILLIGRAM(S): at 13:21

## 2022-12-05 RX ADMIN — MAGNESIUM OXIDE 400 MG ORAL TABLET 800 MILLIGRAM(S): 241.3 TABLET ORAL at 10:22

## 2022-12-05 RX ADMIN — Medication 10 UNIT(S): at 18:12

## 2022-12-05 RX ADMIN — Medication 166.67 MILLIGRAM(S): at 13:20

## 2022-12-05 RX ADMIN — Medication 6: at 22:04

## 2022-12-05 RX ADMIN — CHLORHEXIDINE GLUCONATE 1 APPLICATION(S): 213 SOLUTION TOPICAL at 18:11

## 2022-12-05 RX ADMIN — LISINOPRIL 40 MILLIGRAM(S): 2.5 TABLET ORAL at 06:48

## 2022-12-05 RX ADMIN — Medication 40 MILLIGRAM(S): at 06:49

## 2022-12-05 RX ADMIN — Medication 50 MILLIGRAM(S): at 22:02

## 2022-12-05 RX ADMIN — Medication 2: at 06:54

## 2022-12-05 RX ADMIN — HEPARIN SODIUM 5000 UNIT(S): 5000 INJECTION INTRAVENOUS; SUBCUTANEOUS at 06:48

## 2022-12-05 RX ADMIN — ATORVASTATIN CALCIUM 40 MILLIGRAM(S): 80 TABLET, FILM COATED ORAL at 22:03

## 2022-12-05 RX ADMIN — HEPARIN SODIUM 5000 UNIT(S): 5000 INJECTION INTRAVENOUS; SUBCUTANEOUS at 22:03

## 2022-12-05 RX ADMIN — Medication 7 UNIT(S): at 12:16

## 2022-12-05 RX ADMIN — Medication 2: at 18:11

## 2022-12-05 RX ADMIN — Medication 25 MILLIGRAM(S): at 06:48

## 2022-12-05 RX ADMIN — Medication 40 MILLIEQUIVALENT(S): at 10:22

## 2022-12-05 RX ADMIN — Medication 166.67 MILLIGRAM(S): at 23:31

## 2022-12-05 RX ADMIN — AMLODIPINE BESYLATE 5 MILLIGRAM(S): 2.5 TABLET ORAL at 06:48

## 2022-12-05 RX ADMIN — Medication 50 MILLIGRAM(S): at 13:21

## 2022-12-05 RX ADMIN — CITALOPRAM 40 MILLIGRAM(S): 10 TABLET, FILM COATED ORAL at 12:19

## 2022-12-05 NOTE — PROGRESS NOTE ADULT - PROBLEM SELECTOR PLAN 3
- HgbAc 9.8 this admission,  HgbA1C 11.4 11/8/22  - endo consulted. - home Lispro 7 units TID increased to 10 TID ,c/w home Lantus 15 units qHS for now (Metformin & Trulicity d/c on prior admission)

## 2022-12-05 NOTE — PROGRESS NOTE ADULT - ASSESSMENT
56 yo M with PMHx HTN, HLD, IDDM, anxiety, OM R 4th toe (undergoing treatment with Vanc [id number], CTX 2gm IV qD x 6 weeks via PICC, ending 12/20/22) who  is admitted to cardiac tele for management of hypertensive urgency & acute decompensated CHF.

## 2022-12-05 NOTE — PROGRESS NOTE ADULT - SUBJECTIVE AND OBJECTIVE BOX
Interventional Cardiology PA Adult Progress Note    Subjective Assessment: Pt. seen and examined at bedside today am. Pt. reports some chest discomfort in am which has now resolved; pt denies any CP, SOB, dizziness, palpitations, N/V.    	  ROS negative except as per subjective HPI.     MEDICATIONS:  amLODIPine   Tablet 2.5 milliGRAM(s) Oral once  furosemide   Injectable 40 milliGRAM(s) IV Push two times a day  hydrALAZINE 50 milliGRAM(s) Oral three times a day  lisinopril 40 milliGRAM(s) Oral daily  cefTRIAXone   IVPB      cefTRIAXone   IVPB 2000 milliGRAM(s) IV Intermittent every 24 hours  vancomycin  IVPB 1250 milliGRAM(s) IV Intermittent every 12 hours  citalopram 40 milliGRAM(s) Oral daily  atorvastatin 40 milliGRAM(s) Oral at bedtime  dextrose 50% Injectable 25 Gram(s) IV Push once  dextrose 50% Injectable 12.5 Gram(s) IV Push once  dextrose 50% Injectable 25 Gram(s) IV Push once  dextrose Oral Gel 15 Gram(s) Oral once PRN  glucagon  Injectable 1 milliGRAM(s) IntraMuscular once  insulin glargine Injectable (LANTUS) 15 Unit(s) SubCutaneous at bedtime  insulin lispro (ADMELOG) corrective regimen sliding scale   SubCutaneous Before meals and at bedtime  insulin lispro Injectable (ADMELOG) 7 Unit(s) SubCutaneous three times a day before meals    chlorhexidine 2% Cloths 1 Application(s) Topical daily  dextrose 5%. 1000 milliLiter(s) IV Continuous <Continuous>  dextrose 5%. 1000 milliLiter(s) IV Continuous <Continuous>  heparin   Injectable 5000 Unit(s) SubCutaneous every 8 hours      	    [PHYSICAL EXAM:  TELEMETRY:  T(C): 37.4 (12-05-22 @ 13:26), Max: 37.4 (12-05-22 @ 13:26)  HR: 65 (12-05-22 @ 13:01) (53 - 66)  BP: 173/72 (12-05-22 @ 14:32) (157/71 - 198/84)  RR: 18 (12-05-22 @ 13:01) (17 - 18)  SpO2: 98% (12-05-22 @ 13:01) (93% - 98%)  Wt(kg): --  I&O's Summary    04 Dec 2022 07:01  -  05 Dec 2022 07:00  --------------------------------------------------------  IN: 730 mL / OUT: 4350 mL / NET: -3620 mL    05 Dec 2022 07:01  -  05 Dec 2022 16:48  --------------------------------------------------------  IN: 330 mL / OUT: 1565 mL / NET: -1235 mL        Perez:  Central/PICC/Mid Line:                                         Gen: NAD  Neck: no JVD  CV: RRR, s1 and s2 positive, no murmurs appreciated  Pulm: CTA B/L; no w/r/r  GI: soft, NT/ND, + BS X 4  extremities: no clubbing, cyanosis, trace b/l LE edema.   Neuro: AO X 3, no focal deficits appreciated        	    ECG:  	  RADIOLOGY:   DIAGNOSTIC TESTING:  [ ] Echocardiogram:  [ ]  Catheterization:  [ ] Stress Test:    [ ] LORENE  OTHER: 	    LABS:	 	  CARDIAC MARKERS:                                  10.8   6.84  )-----------( 306      ( 05 Dec 2022 05:30 )             33.5     12-05    141  |  104  |  20  ----------------------------<  160<H>  3.6   |  28  |  1.17    Ca    9.0      05 Dec 2022 05:30  Mg     1.8     12-05    TPro  6.4  /  Alb  3.6  /  TBili  0.4  /  DBili  x   /  AST  28  /  ALT  61<H>  /  AlkPhos  90  12-04    proBNP:   Lipid Profile:   HgA1c:   TSH:       ASSESSMENT/PLAN: 	        DVT ppx:  Dispo:

## 2022-12-05 NOTE — CONSULT NOTE ADULT - SUBJECTIVE AND OBJECTIVE BOX
HISTORY OF PRESENT ILLNESS  VALENTIN CABALLERO is a 57y Male with a past medical history of HTN, HLD, DM, and anxiety who prsented with SOB and was admitted for hypertensive urgency and acute decompensated heart failure    Pt had SOB since this past Friday, was admitted on  because he was very SOB and was concerned about this. He also was having insomnia. Currently feeling better, endocrine team consulted for diabetes co-management. Of note, no steroids administered this admission.    Further Diabetes History:  Pt was diagnosed ~25 yrs ago, type II, symptoms at the time were polyuria, weight loss. Currnetly on insulin lispro 7 premeal and lantus 15 at bedtime. This is his most recent regimen since 2 weeks ago when he was admitted for osteomyelitis. He missed ~2 doses per week of premeals in last 2 weeks. he has been on insulin for 20 yrs, was previously on metformin. Has never had severe low or high glucoses, never DKA. Complications include OM x2 (last 4 yrs ago and currently). Uses glucometer, glucose numbers as below. Rest of diabetes history as below.    DIABETES HISTORY  - Age at diagnosis: 32 yrs old  - Symptoms at time of diagnosis: polyuria, weight loss  - Current Therapy: lispro 7 premeal, lantus 15 at bedtime  - History of other regimens: metformin  - History of hypoglycemia: none known; occasionally lightheaded  - History of DKA/HHS: none known  - Complications: osteomyelitis x2  - Home FSG:        > Fastins-150s+mg/dL.        > Before meals: low 200s mg/dL.        > Bedtime: mid 200s mg/dL.  - Diet:          > Breakfast: scrambled eggs, and either toast with butter or English muffin        > Lunch: ham/cheese or other type of sandwhich on wheat bread        > Dinner: pasta or protein such as fish, chicken, or steak vs. spinach salad        > Snacks: cheese and crackers, fruit (banana, apple, bear), or chips; typically snacks before dinner        > drinks: water, coffee, or diet pepsi  - Physical activity:  minimal  - Outpatient follow-up: unsure of provider, but has seen endocrinologist for  ~10 yrs    CAPILLARY BLOOD GLUCOSE & INSULIN RECEIVED  Yesterday  - Dinner FS mg/dL = 7 units of premeal Lispro + 0 units of Lispro sliding scale.   - Bedtime FS mg/dL = 15 units of Lantus + 4 units Lispro sliding scale.     Today  - Breakfast FSG: 160 mg/dL = 7 units of premeal Lispro + 2 units of Lispro sliding scale.   - Lunch FS mg/dL = 7 units of premeal Lispro + 8 units of Lispro sliding scale.     261 mg/dL ( @ 13:13)  134 mg/dL ( @ 17:04)  230 mg/dL ( @ 21:43)  160 mg/dL ( @ 05:30)  154 mg/dL ( @ 06:35)  324 mg/dL ( @ 11:41)    ROS:  General: (+) sweats when eats; (+) ~20 lb weight gain in last week, now improving; (+) fatigue/insomnia; no fevers, chills, or sweats; no dizziness  Neuro: occasional headaches  CV: no chest pain or palpitations  Pulm: (+) dry cough x5 yrs; (+) SOB per HPI  GI: (+) vomiting in AM ~once per week when has a coughing fit; no dysphagia, no abdominal pain, no diarrhea, constipation, or blood in stools  : no dysuria or hematuria  MSK: no arthralgias or myalgias; (+) leg cramps      PAST MEDICAL HISTORY:  - HTN  - HLD  - DM  - anxiety    PAST SURGICAL HISTORY:  - amputation of tip of R 3rd toe (~)  - urethra replacement (~)  - abdominal hernia repair (~)    HOME MEDICATIONS:  - insulin as per HPI  - celexa  - lisinopril  - atorvastatin  - flomax    SOCIAL HISTORY  - Work: owns a small finance company  - Alcohol: drinks ~2 glasses o fwine ~3x/week  - Smoking: never smooker  - Recreational Drugs: no drugs or marijuana    FAMILY HISTORY  - Diabetes: DM in paternal grandparents and father  - Autoimmune: psoriasis in father and brother; Crohns in a niece  - Other:    ALLERGIES  No Known Allergies    CURRENT MEDICATIONS  amLODIPine   Tablet 5 milliGRAM(s) Oral daily  atorvastatin 40 milliGRAM(s) Oral at bedtime  cefTRIAXone   IVPB      cefTRIAXone   IVPB 2000 milliGRAM(s) IV Intermittent every 24 hours  chlorhexidine 2% Cloths 1 Application(s) Topical daily  citalopram 40 milliGRAM(s) Oral daily  dextrose 5%. 1000 milliLiter(s) IV Continuous <Continuous>  dextrose 5%. 1000 milliLiter(s) IV Continuous <Continuous>  dextrose 50% Injectable 25 Gram(s) IV Push once  dextrose 50% Injectable 12.5 Gram(s) IV Push once  dextrose 50% Injectable 25 Gram(s) IV Push once  dextrose Oral Gel 15 Gram(s) Oral once PRN  furosemide   Injectable 40 milliGRAM(s) IV Push two times a day  glucagon  Injectable 1 milliGRAM(s) IntraMuscular once  heparin   Injectable 5000 Unit(s) SubCutaneous every 8 hours  hydrALAZINE 50 milliGRAM(s) Oral three times a day  insulin glargine Injectable (LANTUS) 15 Unit(s) SubCutaneous at bedtime  insulin lispro (ADMELOG) corrective regimen sliding scale   SubCutaneous Before meals and at bedtime  insulin lispro Injectable (ADMELOG) 7 Unit(s) SubCutaneous three times a day before meals  lisinopril 40 milliGRAM(s) Oral daily  vancomycin  IVPB 1250 milliGRAM(s) IV Intermittent every 12 hours    PHYSICAL EXAM  Vital Signs Last 24 Hrs  T(C): 36.7 (05 Dec 2022 11:03), Max: 36.8 (04 Dec 2022 21:45)  T(F): 98.1 (05 Dec 2022 11:03), Max: 98.2 (04 Dec 2022 21:45)  HR: 66 (05 Dec 2022 08:58) (53 - 66)  BP: 172/74 (05 Dec 2022 10:21) (157/71 - 200/91)  BP(mean): 131 (04 Dec 2022 13:54) (131 - 131)  RR: 18 (05 Dec 2022 08:58) (15 - 18)  SpO2: 95% (05 Dec 2022 08:58) (93% - 97%)    Parameters below as of 05 Dec 2022 08:58  Patient On (Oxygen Delivery Method): room air    Constitutional: Awake, alert, in no acute distress.   HEENT: MMM  CV: (+) holosystolic murmur best heard at RUSB and radiating to both carotids  Lungs: CTAB  GI: abd obese, soft, NTND  Extremities: legs wwp, 1+ edema to knees  Vascular: 1+ DP on R, unable to palpate on L  Neuro: A&O x3    LABS  CBC - WBC/HGB/HTC/PLT: 6.84/10.8/33.5/306 (22)  BMP: Na/K/Cl/Bicarb/BUN/Cr/Gluc: 141/3.6/104/28/20/1.17/160 (22)  Anion Gap: 9 (22)  eGFR: 74 (22)  Calcium: 9.0 (22)  Phosphorus: -- (22)  Magnesium: 1.8 (22)  LFT - Alb/Tprot/Tbili/Dbili/AlkPhos/ALT/AST: 3.6/--/0.4/--/90/61/28 (22)  PT/aPTT/INR: 12.5/29.9/1.05 (22)  Thyroid Stimulating Hormone, Serum: 1.880 (22)      ASSESSMENT / RECOMMENDATIONS    A1C: 9.8 %  BUN: 20  Creatinine: 1.17  GFR: 74  Weight: 106  BMI: 31.7  EF:     # Type 2 diabetes mellitus  - Please continue lantus 15 units at bedtime.   - Continue lispro 7 units before each meal.  - Continue lispro moderate / low dose sliding scale before meals and at bedtime.  - Patient's fingerstick glucose goal is 100-180 mg/dL.    - For discharge, patient can ***.    - Patient can follow up at discharge with [facility] by calling [phone] to make an appointment.      Note preliminary until attending attestation. Rest of care per primary team.    Jarrod Brock  Internal Medicine Resident, PGY-3  Service Pager: [phone]  HISTORY OF PRESENT ILLNESS  VALENTIN CABALLERO is a 57y Male with a past medical history of HTN, HLD, DM, and anxiety who prsented with SOB and was admitted for hypertensive urgency and acute decompensated heart failure    Pt had SOB since this past Friday, was admitted on  because he was very SOB and was concerned about this. He also was having insomnia. Currently feeling better, endocrine team consulted for diabetes co-management. Of note, no steroids administered this admission.    Further Diabetes History:  Pt was diagnosed ~25 yrs ago, type II, symptoms at the time were polyuria, weight loss. Currnetly on insulin lispro 7 premeal and lantus 15 at bedtime. This is his most recent regimen since 2 weeks ago when he was admitted for osteomyelitis. He missed ~2 doses per week of premeals in last 2 weeks. he has been on insulin for 20 yrs, was previously on metformin. Has never had severe low or high glucoses, never DKA. Complications include OM x2 (last 4 yrs ago and currently). Uses glucometer, glucose numbers as below. Rest of diabetes history as below.    DIABETES HISTORY  - Age at diagnosis: 32 yrs old  - Symptoms at time of diagnosis: polyuria, weight loss  - Current Therapy: lispro 7 premeal, lantus 15 at bedtime  - History of other regimens: metformin  - History of hypoglycemia: none known; occasionally lightheaded  - History of DKA/HHS: none known  - Complications: osteomyelitis x2, neuropathy, retinopathy  - Home FSG:        > Fastins-150s+mg/dL.        > Before meals: low 200s mg/dL.        > Bedtime: mid 200s mg/dL.  - Diet:          > Breakfast: scrambled eggs, and either toast with butter or English muffin        > Lunch: ham/cheese or other type of sandwhich on wheat bread        > Dinner: pasta or protein such as fish, chicken, or steak vs. spinach salad        > Snacks: cheese and crackers, fruit (banana, apple, bear), or chips; typically snacks before dinner        > drinks: water, coffee, or diet pepsi  - Physical activity:  minimal  - Outpatient follow-up: unsure of provider, but has seen endocrinologist for  ~10 yrs    CAPILLARY BLOOD GLUCOSE & INSULIN RECEIVED  Yesterday  - Dinner FS mg/dL = 7 units of premeal Lispro + 0 units of Lispro sliding scale.   - Bedtime FS mg/dL = 15 units of Lantus + 4 units Lispro sliding scale.     Today  - Breakfast FSG: 160 mg/dL = 7 units of premeal Lispro + 2 units of Lispro sliding scale.   - Lunch FS mg/dL = 7 units of premeal Lispro + 8 units of Lispro sliding scale.     261 mg/dL ( @ 13:13)  134 mg/dL ( @ 17:04)  230 mg/dL ( @ 21:43)  160 mg/dL ( @ 05:30)  154 mg/dL ( @ 06:35)  324 mg/dL ( @ 11:41)    ROS:  General: (+) sweats when eats; (+) ~20 lb weight gain in last week, now improving; (+) fatigue/insomnia; no fevers, chills, or sweats; no dizziness  Neuro: (+) occasional headaches; (+) neuropathy and numbness in feet  CV: no chest pain or palpitations  Pulm: (+) dry cough x5 yrs; (+) SOB per HPI  GI: (+) vomiting in AM ~once per week when has a coughing fit; no dysphagia, no abdominal pain, no diarrhea, constipation, or blood in stools  : no dysuria or hematuria  MSK: no arthralgias or myalgias; (+) leg cramps      PAST MEDICAL HISTORY:  - HTN  - HLD  - DM  - anxiety    PAST SURGICAL HISTORY:  - amputation of tip of R 3rd toe (~)  - urethra replacement (~)  - abdominal hernia repair (~)    HOME MEDICATIONS:  - insulin as per HPI  - celexa  - lisinopril  - atorvastatin  - flomax    SOCIAL HISTORY  - Work: owns a small finance company  - Alcohol: drinks ~2 glasses o fwine ~3x/week  - Smoking: never smooker  - Recreational Drugs: no drugs or marijuana    FAMILY HISTORY  - Diabetes: DM in paternal grandparents and father  - Autoimmune: psoriasis in father and brother; Crohns in a niece  - Other:    ALLERGIES  No Known Allergies    CURRENT MEDICATIONS  amLODIPine   Tablet 5 milliGRAM(s) Oral daily  atorvastatin 40 milliGRAM(s) Oral at bedtime  cefTRIAXone   IVPB      cefTRIAXone   IVPB 2000 milliGRAM(s) IV Intermittent every 24 hours  chlorhexidine 2% Cloths 1 Application(s) Topical daily  citalopram 40 milliGRAM(s) Oral daily  dextrose 5%. 1000 milliLiter(s) IV Continuous <Continuous>  dextrose 5%. 1000 milliLiter(s) IV Continuous <Continuous>  dextrose 50% Injectable 25 Gram(s) IV Push once  dextrose 50% Injectable 12.5 Gram(s) IV Push once  dextrose 50% Injectable 25 Gram(s) IV Push once  dextrose Oral Gel 15 Gram(s) Oral once PRN  furosemide   Injectable 40 milliGRAM(s) IV Push two times a day  glucagon  Injectable 1 milliGRAM(s) IntraMuscular once  heparin   Injectable 5000 Unit(s) SubCutaneous every 8 hours  hydrALAZINE 50 milliGRAM(s) Oral three times a day  insulin glargine Injectable (LANTUS) 15 Unit(s) SubCutaneous at bedtime  insulin lispro (ADMELOG) corrective regimen sliding scale   SubCutaneous Before meals and at bedtime  insulin lispro Injectable (ADMELOG) 7 Unit(s) SubCutaneous three times a day before meals  lisinopril 40 milliGRAM(s) Oral daily  vancomycin  IVPB 1250 milliGRAM(s) IV Intermittent every 12 hours    PHYSICAL EXAM  Vital Signs Last 24 Hrs  T(C): 36.7 (05 Dec 2022 11:03), Max: 36.8 (04 Dec 2022 21:45)  T(F): 98.1 (05 Dec 2022 11:03), Max: 98.2 (04 Dec 2022 21:45)  HR: 66 (05 Dec 2022 08:58) (53 - 66)  BP: 172/74 (05 Dec 2022 10:21) (157/71 - 200/91)  BP(mean): 131 (04 Dec 2022 13:54) (131 - 131)  RR: 18 (05 Dec 2022 08:58) (15 - 18)  SpO2: 95% (05 Dec 2022 08:58) (93% - 97%)    Parameters below as of 05 Dec 2022 08:58  Patient On (Oxygen Delivery Method): room air    Constitutional: Awake, alert, in no acute distress.   HEENT: MMM  CV: (+) holosystolic murmur best heard at RUSB and radiating to both carotids  Lungs: CTAB  GI: abd obese, soft, NTND  Extremities: legs wwp, 1+ edema to knees  Vascular: 1+ DP on R, unable to palpate on L  Neuro: A&O x3    LABS  CBC - WBC/HGB/HTC/PLT: 6.84/10.8/33.5/306 (22)  BMP: Na/K/Cl/Bicarb/BUN/Cr/Gluc: 141/3.6/104/28/20/1.17/160 (22)  Anion Gap: 9 (22)  eGFR: 74 (22)  Calcium: 9.0 (22)  Phosphorus: -- (22)  Magnesium: 1.8 (22)  LFT - Alb/Tprot/Tbili/Dbili/AlkPhos/ALT/AST: 3.6/--/0.4/--/90/61/28 (22)  PT/aPTT/INR: 12.5/29.9/1.05 (22)  Thyroid Stimulating Hormone, Serum: 1.880 (22)      ASSESSMENT / RECOMMENDATIONS    A1C: 9.8 %  BUN: 20  Creatinine: 1.17  GFR: 74  Weight: 106  BMI: 31.7  EF:  TTE: LV EF (MOD BP):  71 %      # Type 2 diabetes mellitus  - Please continue lantus 15 units at bedtime.   - Increase premeal lispro from 7 units to 10 units before each meal.  - Continue lispro moderate dose sliding scale before meals and at bedtime.  - Patient's fingerstick glucose goal is 100-180 mg/dL.    - Discharge regimen to be determined in next few days    - Patient has home endocrinologist to follow up with     Note preliminary until attending attestation. Rest of care per primary team.    Jarrod Brock  Internal Medicine Resident, PGY-3  Service Pager: [phone]

## 2022-12-05 NOTE — PROGRESS NOTE ADULT - PROBLEM SELECTOR PLAN 5
OM R 4th toe, without leukocytosis, afebrile  - vanc trough 18 12/3 pm, c/w Vanc [id number]. c/w CTX 2 gm IV QD (plan to continue x 6 week course through 12/20/22)  - next vanc trough 12/6 9:30 pm prior to 10 pm dose( needs to be ordered). needs vanc trough prior to 4th dose every time inpatient  -  known to Dr. Gallardo, ID consulted 12/5--- stable; will not f/u inpatient as per ID team.     VTE ppx: Heparin subQ  Dispo: pending clinical course

## 2022-12-05 NOTE — PROGRESS NOTE ADULT - PROBLEM SELECTOR PLAN 2
On admission; Patient with anasarca, +JVD, +Ascites, 2+ pitting edema up into dependent thighs, 25# weight gain over past 3 weeks, saturating well on RA  - Trop neg x 3   - EKG 12/3/22: SB @ 55 BPM without ST/T segment changes  - CXR 12/3/22: possible small R pleural effusion  - ECHO 12/5: mild LVH; EF 71 %, mildly dilated LA, PASP 47  - Last EST 25 years ago.  NPO after MN for CCTA  - Started on home Lisinopril 40 mg PO QD.   - Plan to initiate BB when nearing euvolemia.  - c/w Lasix 40 mg IV BID   - Strict I&Os, daily weights, 1.5L fluid restriction, core measures ordered

## 2022-12-05 NOTE — CONSULT NOTE ADULT - ATTENDING COMMENTS
Pt seen on rounds this afternoon.  Is known to me from his previous admission a month ago.  Was admitted that time for toe infection, found to have OM of the R 4th toe on imaging.  Was started on IV antibiotics which has been continued at home.    He is now admitted with worsening SOB, with severe orthopnea, intermittent PND and edema.  Symptoms were fairly acute in onset, started 3 days PTA.  Prior to his last admission DM was being treated with a combination of a large dose of Lantus (40 units BID) plus metformin and Trulicity.  He was controlled in the hospital and discharged on 15 units Lantus, 7 units premeal insulin.  Glucoses at home are apparently in the low 100 range in the AM, but rise to the 200s later in the day.  Review of his diet indicates appropriate choices for his main meals, but what is probably excessive snacking in the late afternoon.  Has been on his home regimen for the past 24 hours, with only mild elevation of his FBS (160 today) but significant post-prandial hyperglycemia.    The affected toe on the R foot is only moderately erythematous, without skin breakdown.  The DP pulse in that foot is 3+  Will continue the Lantus at 15 units given the overnight decrease in his blood sugar, but increase the premeal to 10 units lispro  Given the relative suddenness of his cardiac symptoms and his excellent EF on echo, to consider whether his CHF is on an ischemic basis

## 2022-12-05 NOTE — PROGRESS NOTE ADULT - PROBLEM SELECTOR PLAN 1
SBP 190s-200s on arrival s/p IV hydralazine, -170   - Restarted home Lisinopril 40 mg PO QD (held during previous admit for hyperkalemia but resumed after discharge, monitor K+)  - Plan to initiate BB when nearing euvolemia  - c/w Lasix 40 mg IVP BID  - c/w Hydralazine 50 mg PO TID (titrate up as tolerated)  - c/w Norvasc 5 mg daily; given additional norvasc 2.5 mg PO X 1 12/5.   - Pt reports BP elevated to 200s at home for a while now ; Retroperoteneal US ordered to r/o ABHINAV.    Trop T 0.01 X 3> 0.02 X 2 , no need to trend unless patient has CP  - NPO after MN for CCTA.  please r/o ABHINAV prior to CCTA.

## 2022-12-06 DIAGNOSIS — I50.31 ACUTE DIASTOLIC (CONGESTIVE) HEART FAILURE: ICD-10-CM

## 2022-12-06 DIAGNOSIS — R77.8 OTHER SPECIFIED ABNORMALITIES OF PLASMA PROTEINS: ICD-10-CM

## 2022-12-06 LAB
ANION GAP SERPL CALC-SCNC: 9 MMOL/L — SIGNIFICANT CHANGE UP (ref 5–17)
BUN SERPL-MCNC: 21 MG/DL — SIGNIFICANT CHANGE UP (ref 7–23)
CALCIUM SERPL-MCNC: 9.2 MG/DL — SIGNIFICANT CHANGE UP (ref 8.4–10.5)
CHLORIDE SERPL-SCNC: 102 MMOL/L — SIGNIFICANT CHANGE UP (ref 96–108)
CO2 SERPL-SCNC: 29 MMOL/L — SIGNIFICANT CHANGE UP (ref 22–31)
CREAT SERPL-MCNC: 1.24 MG/DL — SIGNIFICANT CHANGE UP (ref 0.5–1.3)
EGFR: 69 ML/MIN/1.73M2 — SIGNIFICANT CHANGE UP
GLUCOSE BLDC GLUCOMTR-MCNC: 113 MG/DL — HIGH (ref 70–99)
GLUCOSE BLDC GLUCOMTR-MCNC: 142 MG/DL — HIGH (ref 70–99)
GLUCOSE BLDC GLUCOMTR-MCNC: 183 MG/DL — HIGH (ref 70–99)
GLUCOSE BLDC GLUCOMTR-MCNC: 232 MG/DL — HIGH (ref 70–99)
GLUCOSE SERPL-MCNC: 136 MG/DL — HIGH (ref 70–99)
HCT VFR BLD CALC: 35.4 % — LOW (ref 39–50)
HGB BLD-MCNC: 11.7 G/DL — LOW (ref 13–17)
MAGNESIUM SERPL-MCNC: 1.8 MG/DL — SIGNIFICANT CHANGE UP (ref 1.6–2.6)
MCHC RBC-ENTMCNC: 30.4 PG — SIGNIFICANT CHANGE UP (ref 27–34)
MCHC RBC-ENTMCNC: 33.1 GM/DL — SIGNIFICANT CHANGE UP (ref 32–36)
MCV RBC AUTO: 91.9 FL — SIGNIFICANT CHANGE UP (ref 80–100)
NRBC # BLD: 0 /100 WBCS — SIGNIFICANT CHANGE UP (ref 0–0)
NT-PROBNP SERPL-SCNC: 890 PG/ML — HIGH (ref 0–300)
PLATELET # BLD AUTO: 351 K/UL — SIGNIFICANT CHANGE UP (ref 150–400)
POTASSIUM SERPL-MCNC: 3.7 MMOL/L — SIGNIFICANT CHANGE UP (ref 3.5–5.3)
POTASSIUM SERPL-SCNC: 3.7 MMOL/L — SIGNIFICANT CHANGE UP (ref 3.5–5.3)
RBC # BLD: 3.85 M/UL — LOW (ref 4.2–5.8)
RBC # FLD: 13.7 % — SIGNIFICANT CHANGE UP (ref 10.3–14.5)
SODIUM SERPL-SCNC: 140 MMOL/L — SIGNIFICANT CHANGE UP (ref 135–145)
VANCOMYCIN TROUGH SERPL-MCNC: 28.2 UG/ML — CRITICAL HIGH (ref 10–20)
WBC # BLD: 7.97 K/UL — SIGNIFICANT CHANGE UP (ref 3.8–10.5)
WBC # FLD AUTO: 7.97 K/UL — SIGNIFICANT CHANGE UP (ref 3.8–10.5)

## 2022-12-06 PROCEDURE — 99231 SBSQ HOSP IP/OBS SF/LOW 25: CPT

## 2022-12-06 PROCEDURE — 93976 VASCULAR STUDY: CPT | Mod: 26

## 2022-12-06 PROCEDURE — 76775 US EXAM ABDO BACK WALL LIM: CPT | Mod: 26,59

## 2022-12-06 PROCEDURE — 99233 SBSQ HOSP IP/OBS HIGH 50: CPT

## 2022-12-06 PROCEDURE — 75574 CT ANGIO HRT W/3D IMAGE: CPT | Mod: 26

## 2022-12-06 RX ORDER — MAGNESIUM OXIDE 400 MG ORAL TABLET 241.3 MG
800 TABLET ORAL ONCE
Refills: 0 | Status: DISCONTINUED | OUTPATIENT
Start: 2022-12-06 | End: 2022-12-06

## 2022-12-06 RX ORDER — CLOPIDOGREL BISULFATE 75 MG/1
600 TABLET, FILM COATED ORAL ONCE
Refills: 0 | Status: COMPLETED | OUTPATIENT
Start: 2022-12-07 | End: 2022-12-07

## 2022-12-06 RX ORDER — AMLODIPINE BESYLATE 2.5 MG/1
10 TABLET ORAL DAILY
Refills: 0 | Status: DISCONTINUED | OUTPATIENT
Start: 2022-12-07 | End: 2022-12-10

## 2022-12-06 RX ORDER — ASPIRIN/CALCIUM CARB/MAGNESIUM 324 MG
325 TABLET ORAL ONCE
Refills: 0 | Status: COMPLETED | OUTPATIENT
Start: 2022-12-07 | End: 2022-12-07

## 2022-12-06 RX ORDER — INSULIN GLARGINE 100 [IU]/ML
15 INJECTION, SOLUTION SUBCUTANEOUS AT BEDTIME
Refills: 0 | Status: DISCONTINUED | OUTPATIENT
Start: 2022-12-06 | End: 2022-12-08

## 2022-12-06 RX ORDER — CARVEDILOL PHOSPHATE 80 MG/1
3.12 CAPSULE, EXTENDED RELEASE ORAL EVERY 12 HOURS
Refills: 0 | Status: DISCONTINUED | OUTPATIENT
Start: 2022-12-07 | End: 2022-12-10

## 2022-12-06 RX ORDER — INSULIN LISPRO 100/ML
13 VIAL (ML) SUBCUTANEOUS
Refills: 0 | Status: DISCONTINUED | OUTPATIENT
Start: 2022-12-06 | End: 2022-12-08

## 2022-12-06 RX ORDER — POTASSIUM CHLORIDE 20 MEQ
40 PACKET (EA) ORAL ONCE
Refills: 0 | Status: COMPLETED | OUTPATIENT
Start: 2022-12-06 | End: 2022-12-06

## 2022-12-06 RX ORDER — MAGNESIUM OXIDE 400 MG ORAL TABLET 241.3 MG
800 TABLET ORAL ONCE
Refills: 0 | Status: COMPLETED | OUTPATIENT
Start: 2022-12-06 | End: 2022-12-06

## 2022-12-06 RX ORDER — POTASSIUM CHLORIDE 20 MEQ
40 PACKET (EA) ORAL ONCE
Refills: 0 | Status: DISCONTINUED | OUTPATIENT
Start: 2022-12-06 | End: 2022-12-06

## 2022-12-06 RX ORDER — SPIRONOLACTONE 25 MG/1
25 TABLET, FILM COATED ORAL DAILY
Refills: 0 | Status: DISCONTINUED | OUTPATIENT
Start: 2022-12-06 | End: 2022-12-06

## 2022-12-06 RX ORDER — AMLODIPINE BESYLATE 2.5 MG/1
5 TABLET ORAL ONCE
Refills: 0 | Status: COMPLETED | OUTPATIENT
Start: 2022-12-06 | End: 2022-12-06

## 2022-12-06 RX ADMIN — INSULIN GLARGINE 15 UNIT(S): 100 INJECTION, SOLUTION SUBCUTANEOUS at 22:51

## 2022-12-06 RX ADMIN — HEPARIN SODIUM 5000 UNIT(S): 5000 INJECTION INTRAVENOUS; SUBCUTANEOUS at 14:11

## 2022-12-06 RX ADMIN — Medication 40 MILLIGRAM(S): at 05:56

## 2022-12-06 RX ADMIN — Medication 166.67 MILLIGRAM(S): at 10:37

## 2022-12-06 RX ADMIN — ATORVASTATIN CALCIUM 40 MILLIGRAM(S): 80 TABLET, FILM COATED ORAL at 22:47

## 2022-12-06 RX ADMIN — Medication 40 MILLIEQUIVALENT(S): at 08:46

## 2022-12-06 RX ADMIN — CITALOPRAM 40 MILLIGRAM(S): 10 TABLET, FILM COATED ORAL at 12:26

## 2022-12-06 RX ADMIN — MAGNESIUM OXIDE 400 MG ORAL TABLET 800 MILLIGRAM(S): 241.3 TABLET ORAL at 08:46

## 2022-12-06 RX ADMIN — CEFTRIAXONE 100 MILLIGRAM(S): 500 INJECTION, POWDER, FOR SOLUTION INTRAMUSCULAR; INTRAVENOUS at 19:10

## 2022-12-06 RX ADMIN — HEPARIN SODIUM 5000 UNIT(S): 5000 INJECTION INTRAVENOUS; SUBCUTANEOUS at 05:57

## 2022-12-06 RX ADMIN — LISINOPRIL 40 MILLIGRAM(S): 2.5 TABLET ORAL at 05:56

## 2022-12-06 RX ADMIN — Medication 10 UNIT(S): at 18:12

## 2022-12-06 RX ADMIN — Medication 4: at 22:48

## 2022-12-06 RX ADMIN — Medication 50 MILLIGRAM(S): at 05:56

## 2022-12-06 RX ADMIN — Medication 50 MILLIGRAM(S): at 22:47

## 2022-12-06 RX ADMIN — Medication 2: at 18:11

## 2022-12-06 RX ADMIN — AMLODIPINE BESYLATE 5 MILLIGRAM(S): 2.5 TABLET ORAL at 08:45

## 2022-12-06 RX ADMIN — SPIRONOLACTONE 25 MILLIGRAM(S): 25 TABLET, FILM COATED ORAL at 14:11

## 2022-12-06 RX ADMIN — Medication 50 MILLIGRAM(S): at 14:11

## 2022-12-06 RX ADMIN — AMLODIPINE BESYLATE 5 MILLIGRAM(S): 2.5 TABLET ORAL at 06:52

## 2022-12-06 NOTE — PROGRESS NOTE ADULT - PROBLEM SELECTOR PLAN 2
near euvolemic and HD stable, SpO2 94% RA, net neg 1.7L/24H  -BNP downtrending to 890 today  -TTE 12/5: normal LVEF, mild LVH, mildly dilated LA, PASP 47mmHG  -NPO for CCTA today  -Continue Lasix 40mg IV BID, transition to PO in AM  -Continue Hydralazine 50mg TID and Lisinopril 40mg QD  -Consider starting Coreg in AM  -Core measure, daily weight, strict I&Os and 1.5L fluid restriction

## 2022-12-06 NOTE — PROGRESS NOTE ADULT - ASSESSMENT
56 yo M with PMHx HTN, HLD, IDDM, anxiety, OM R 4th toe (undergoing treatment with Vanc [id number], CTX 2gm IV qD x 6 weeks via PICC, ending 12/20/22) who  is admitted to cardiac tele for management of hypertensive urgency & acute decompensated CHF, also with hyperglycemia.    A1C: 9.8 %  BUN: 20  Creatinine: 1.17  GFR: 74  Weight: 106  BMI: 31.7  EF: 12/5 TTE: LV EF (MOD BP):  71 %

## 2022-12-06 NOTE — PROGRESS NOTE ADULT - PROBLEM SELECTOR PLAN 4
h/o OM R 4th toe on IV Abx via PICC - currently non toxic appearing and afebrile  -Continue Vanc 1250mg BID, next trough 12/6 @ 930pm - prior to 10pm dose  -CTX IV 2g QD  -continue w/ outpt follow w/ Dr. Gallardo

## 2022-12-06 NOTE — PROGRESS NOTE ADULT - ASSESSMENT
57M w/ PMHx HTN, HLD, DM-II, OM of R 4th (on Vanc and CTX via PICC, last dose 12/20/22), and Anxiety, presented to [facility] ED w/ HTN Urgency and flash pulm edema, pt now admitted to cardiac telemetry for further management, pending ischemic w/u.

## 2022-12-06 NOTE — PROGRESS NOTE ADULT - PROBLEM SELECTOR PLAN 1
presented w/ SBP 109s, currently 150s - asymptomatic   -Increase Amlodipine to 10mg QD  -Continue Lasix 40mg IV BID and Hydralazine 50mg TID  -Consider starting coreg in AM  -Renal US ordered to r/o ABHINAV

## 2022-12-06 NOTE — PROGRESS NOTE ADULT - SUBJECTIVE AND OBJECTIVE BOX
Cardiology PA Adult Progress Note    SUBJECTIVE ASSESSMENT:  	  MEDICATIONS:  furosemide   Injectable 40 milliGRAM(s) IV Push two times a day  hydrALAZINE 50 milliGRAM(s) Oral three times a day  lisinopril 40 milliGRAM(s) Oral daily  spironolactone 25 milliGRAM(s) Oral daily  cefTRIAXone   IVPB      cefTRIAXone   IVPB 2000 milliGRAM(s) IV Intermittent every 24 hours  vancomycin  IVPB 1250 milliGRAM(s) IV Intermittent every 12 hours  citalopram 40 milliGRAM(s) Oral daily  atorvastatin 40 milliGRAM(s) Oral at bedtime  insulin glargine Injectable (LANTUS) 15 Unit(s) SubCutaneous at bedtime  insulin lispro (ADMELOG) corrective regimen sliding scale   SubCutaneous Before meals and at bedtime  insulin lispro Injectable (ADMELOG) 10 Unit(s) SubCutaneous three times a day before meals  heparin   Injectable 5000 Unit(s) SubCutaneous every 8 hours  	  VITAL SIGNS:  T(C): 36.2 (12-06-22 @ 09:04), Max: 37.4 (12-05-22 @ 13:26)  HR: 69 (12-06-22 @ 08:45) (62 - 69)  BP: 152/65 (12-06-22 @ 08:45) (152/65 - 194/83)  RR: 16 (12-06-22 @ 08:45) (16 - 18)  SpO2: 98% (12-06-22 @ 08:45) (95% - 98%)    I&O's Summary  05 Dec 2022 07:01  -  06 Dec 2022 07:00  --------------------------------------------------------  IN: 820 mL / OUT: 2585 mL / NET: -1765 mL    06 Dec 2022 07:01  -  06 Dec 2022 12:33  --------------------------------------------------------  IN: 0 mL / OUT: 800 mL / NET: -800 mL                                     PHYSICAL EXAM:  Appearance: Normal	  HEENT: Normal oral mucosa, PERRL, EOMI	  Neck: Supple, + JVD/ - JVD; ___ Carotid Bruit   Cardiovascular: Normal S1 S2, No murmurs  Respiratory: Lungs clear to auscultation/Decreased Breath Sounds/No Rales, Rhonchi, Wheezing	  Gastrointestinal:  Soft, Non-tender, + BS	  Skin: No rashes, No ecchymoses, No cyanosis  Extremities: Normal range of motion, No clubbing, cyanosis or edema  Vascular: Peripheral pulses palpable 2+ bilaterally  Neurologic: Non-focal  Psychiatry: A & O x 3, Mood & affect appropriate    LABS:	 	             11.7   7.97  )-----------( 351      ( 06 Dec 2022 07:30 )             35.4     12-06    140  |  102  |  21  ----------------------------<  136<H>  3.7   |  29  |  1.24    Ca    9.2      06 Dec 2022 07:30  Mg     1.8     12-06    proBNP: Serum Pro-Brain Natriuretic Peptide: 890 pg/mL (12-06 @ 07:30) Cardiology PA Adult Progress Note    SUBJECTIVE ASSESSMENT: Pt seen and examined at bedside this AM laying comfortably in bed w/o any complaints or events overnight. He endorses one episode of mild substernal chest pressure when leaning forward to pick something off the floor, but denied any further episodes. He denies any palpitations, dizziness/lightheadedness, fatigue, diaphoresis, SOB, orthopnea, PND, N/V or abd pain.  	  MEDICATIONS:  furosemide   Injectable 40 milliGRAM(s) IV Push two times a day  hydrALAZINE 50 milliGRAM(s) Oral three times a day  lisinopril 40 milliGRAM(s) Oral daily  spironolactone 25 milliGRAM(s) Oral daily  cefTRIAXone   IVPB      cefTRIAXone   IVPB 2000 milliGRAM(s) IV Intermittent every 24 hours  vancomycin  IVPB 1250 milliGRAM(s) IV Intermittent every 12 hours  citalopram 40 milliGRAM(s) Oral daily  atorvastatin 40 milliGRAM(s) Oral at bedtime  insulin glargine Injectable (LANTUS) 15 Unit(s) SubCutaneous at bedtime  insulin lispro (ADMELOG) corrective regimen sliding scale   SubCutaneous Before meals and at bedtime  insulin lispro Injectable (ADMELOG) 10 Unit(s) SubCutaneous three times a day before meals  heparin   Injectable 5000 Unit(s) SubCutaneous every 8 hours  	  VITAL SIGNS:  T(C): 36.2 (12-06-22 @ 09:04), Max: 37.4 (12-05-22 @ 13:26)  HR: 69 (12-06-22 @ 08:45) (62 - 69)  BP: 152/65 (12-06-22 @ 08:45) (152/65 - 194/83)  RR: 16 (12-06-22 @ 08:45) (16 - 18)  SpO2: 98% (12-06-22 @ 08:45) (95% - 98%)    I&O's Summary  05 Dec 2022 07:01  -  06 Dec 2022 07:00  --------------------------------------------------------  IN: 820 mL / OUT: 2585 mL / NET: -1765 mL    06 Dec 2022 07:01  -  06 Dec 2022 12:33  --------------------------------------------------------  IN: 0 mL / OUT: 800 mL / NET: -800 mL                                     PHYSICAL EXAM:  Appearance: Normal	  HEENT: Normal oral mucosa, PERRL, EOMI	  Neck: Supple, - JVD; no Carotid Bruit   Cardiovascular: Normal S1 S2, No murmurs  Respiratory: Decreased Breath Sounds, No Rales, Rhonchi, Wheezing	  Gastrointestinal:  Soft, Non-tender, + BS	  Skin: No rashes, No ecchymoses, No cyanosis  Extremities: Normal range of motion, No clubbing, cyanosis or edema  Vascular: Peripheral pulses palpable 2+ bilaterally  Neurologic: Non-focal  Psychiatry: A & O x 3, Mood & affect appropriate    LABS:	 	             11.7   7.97  )-----------( 351      ( 06 Dec 2022 07:30 )             35.4     12-06    140  |  102  |  21  ----------------------------<  136<H>  3.7   |  29  |  1.24    Ca    9.2      06 Dec 2022 07:30  Mg     1.8     12-06    proBNP: Serum Pro-Brain Natriuretic Peptide: 890 pg/mL (12-06 @ 07:30)

## 2022-12-06 NOTE — PROGRESS NOTE ADULT - PROBLEM SELECTOR PLAN 1
# Type 2 diabetes mellitus  - Please continue lantus 15 units at bedtime.   - Increase premeal lispro to 13 units before each meal.  - Continue lispro moderate dose sliding scale before meals and at bedtime.  - Patient's fingerstick glucose goal is 100-180 mg/dL.    - Discharge regimen to be determined in next few days    - Patient has home endocrinologist to follow up with

## 2022-12-06 NOTE — PROGRESS NOTE ADULT - PROBLEM SELECTOR PLAN 3
Pt endorses exertional substernal chest pressure, currently CP free and HD stable  -Trop 0.02 x 2, EKG non ischemic  -TTE results above  -NPO for CCTA today  -Continue Lipitor 40mg HS  -Consider starting ASA 81mg

## 2022-12-06 NOTE — PROGRESS NOTE ADULT - SUBJECTIVE AND OBJECTIVE BOX
OVERNIGHT: No acute events overnight.   SUBJECTIVE: Patient was seen and examined this morning. He didn't have any new concerns or complaints. CTA today. NPO until dinner.     CAPILLARY BLOOD GLUCOSE & INSULIN RECEIVED  Yesterday  - Dinner FS mg/dL = 10 units of premeal Lispro + 2 units of Lispro sliding scale. Ate lexi, orzo, fruit.  - Bedtime FS mg/dL =15 units of Lantus + 6 units Lispro sliding scale.     Today  - Breakfast FS mg/dL. NPO  - Lunch FS mg/dL. NPO  - Dinner FS mg/dL.    183 mg/dL ( @ 18:01)  142 mg/dL ( @ 12:08)  113 mg/dL ( @ 06:34)  270 mg/dL ( @ 21:53)    REVIEW OF SYSTEMS  Constitutional:  Negative fever, chills or loss of appetite.  Eyes:  Negative blurry vision or double vision.  Cardiovascular:  Negative for chest pain or palpitations.  Respiratory:  Negative for cough, wheezing, or shortness of breath.    Gastrointestinal:  Negative for nausea, vomiting, diarrhea, constipation, or abdominal pain.  Genitourinary:  Negative frequency, urgency or dysuria.  Neurologic:  No headache, confusion, dizziness, lightheadedness.    PHYSICAL EXAM  Vital Signs Last 24 Hrs  T(C): 36.2 (06 Dec 2022 09:04), Max: 36.8 (06 Dec 2022 06:51)  T(F): 97.2 (06 Dec 2022 09:04), Max: 98.3 (06 Dec 2022 06:51)  HR: 88 (06 Dec 2022 18:14) (62 - 90)  BP: 167/70 (06 Dec 2022 18:14) (152/65 - 173/77)  BP(mean): --  RR: 16 (06 Dec 2022 18:14) (16 - 18)  SpO2: 98% (06 Dec 2022 18:14) (95% - 98%)    Parameters below as of 06 Dec 2022 18:14  Patient On (Oxygen Delivery Method): room air    Constitutional: Awake, alert, in no acute distress.   HEENT: Normocephalic, atraumatic, HALLEY, no proptosis or lid retraction.   Neck: supple, no acanthosis, no thyromegaly or palpable thyroid nodules.  Respiratory: Lungs clear to ausculation bilaterally.   Cardiovascular: regular rhythm, normal S1 and S2, no audible murmurs.   GI: soft, non-tender, non-distended, bowel sounds present, no masses appreciated.  Extremities: No lower extremity edema, peripheral pulses present.   Skin: no rashes.   Psychiatric: AAO x 3. Normal affect/mood.     LABS  CBC - WBC/HGB/HTC/PLT: 7.97/11.7/35.4/351 (22)  BMP - Na/K/Cl/Bicarb/BUN/Cr/Gluc: 140/3.7/102/29/21/1.24/136 (22)  Anion Gap: 9 (22)  eGFR: 69 (22)  Calcium: 9.2 (22)  Phosphorus: -- (22)  Magnesium: 1.8 (22)  LFT - Alb/Tprot/Tbili/Dbili/AlkPhos/ALT/AST: 3.6/--/0.4/--/90/61/28 (22)  PT/aPTT/INR: 12.5/29.9/1.05 (22)   Thyroid Stimulating Hormone, Serum: 1.880 (22)        MEDICATIONS  MEDICATIONS  (STANDING):  atorvastatin 40 milliGRAM(s) Oral at bedtime  cefTRIAXone   IVPB      cefTRIAXone   IVPB 2000 milliGRAM(s) IV Intermittent every 24 hours  chlorhexidine 2% Cloths 1 Application(s) Topical daily  citalopram 40 milliGRAM(s) Oral daily  heparin   Injectable 5000 Unit(s) SubCutaneous every 8 hours  hydrALAZINE 50 milliGRAM(s) Oral three times a day  insulin glargine Injectable (LANTUS) 15 Unit(s) SubCutaneous at bedtime  insulin lispro (ADMELOG) corrective regimen sliding scale   SubCutaneous Before meals and at bedtime  insulin lispro Injectable (ADMELOG) 10 Unit(s) SubCutaneous three times a day before meals  lisinopril 40 milliGRAM(s) Oral daily  spironolactone 25 milliGRAM(s) Oral daily  vancomycin  IVPB 1250 milliGRAM(s) IV Intermittent every 12 hours

## 2022-12-07 ENCOUNTER — TRANSCRIPTION ENCOUNTER (OUTPATIENT)
Age: 57
End: 2022-12-07

## 2022-12-07 DIAGNOSIS — N17.9 ACUTE KIDNEY FAILURE, UNSPECIFIED: ICD-10-CM

## 2022-12-07 LAB
ANION GAP SERPL CALC-SCNC: 10 MMOL/L — SIGNIFICANT CHANGE UP (ref 5–17)
APPEARANCE UR: CLEAR — SIGNIFICANT CHANGE UP
APTT BLD: 31.6 SEC — SIGNIFICANT CHANGE UP (ref 27.5–35.5)
BACTERIA # UR AUTO: PRESENT /HPF
BASOPHILS # BLD AUTO: 0.04 K/UL — SIGNIFICANT CHANGE UP (ref 0–0.2)
BASOPHILS NFR BLD AUTO: 0.5 % — SIGNIFICANT CHANGE UP (ref 0–2)
BILIRUB UR-MCNC: NEGATIVE — SIGNIFICANT CHANGE UP
BUN SERPL-MCNC: 33 MG/DL — HIGH (ref 7–23)
CALCIUM SERPL-MCNC: 8.8 MG/DL — SIGNIFICANT CHANGE UP (ref 8.4–10.5)
CHLORIDE SERPL-SCNC: 101 MMOL/L — SIGNIFICANT CHANGE UP (ref 96–108)
CO2 SERPL-SCNC: 30 MMOL/L — SIGNIFICANT CHANGE UP (ref 22–31)
COLOR SPEC: YELLOW — SIGNIFICANT CHANGE UP
CREAT ?TM UR-MCNC: 166 MG/DL — SIGNIFICANT CHANGE UP
CREAT SERPL-MCNC: 1.69 MG/DL — HIGH (ref 0.5–1.3)
DIFF PNL FLD: ABNORMAL
EGFR: 47 ML/MIN/1.73M2 — LOW
EOSINOPHIL # BLD AUTO: 0.32 K/UL — SIGNIFICANT CHANGE UP (ref 0–0.5)
EOSINOPHIL NFR BLD AUTO: 4 % — SIGNIFICANT CHANGE UP (ref 0–6)
GLUCOSE BLDC GLUCOMTR-MCNC: 159 MG/DL — HIGH (ref 70–99)
GLUCOSE BLDC GLUCOMTR-MCNC: 163 MG/DL — HIGH (ref 70–99)
GLUCOSE BLDC GLUCOMTR-MCNC: 167 MG/DL — HIGH (ref 70–99)
GLUCOSE BLDC GLUCOMTR-MCNC: 185 MG/DL — HIGH (ref 70–99)
GLUCOSE SERPL-MCNC: 159 MG/DL — HIGH (ref 70–99)
GLUCOSE UR QL: NEGATIVE — SIGNIFICANT CHANGE UP
HCT VFR BLD CALC: 34.5 % — LOW (ref 39–50)
HGB BLD-MCNC: 11.2 G/DL — LOW (ref 13–17)
IMM GRANULOCYTES NFR BLD AUTO: 0.3 % — SIGNIFICANT CHANGE UP (ref 0–0.9)
INR BLD: 1.05 — SIGNIFICANT CHANGE UP (ref 0.88–1.16)
KETONES UR-MCNC: ABNORMAL MG/DL
LEUKOCYTE ESTERASE UR-ACNC: NEGATIVE — SIGNIFICANT CHANGE UP
LYMPHOCYTES # BLD AUTO: 1.31 K/UL — SIGNIFICANT CHANGE UP (ref 1–3.3)
LYMPHOCYTES # BLD AUTO: 16.4 % — SIGNIFICANT CHANGE UP (ref 13–44)
MAGNESIUM SERPL-MCNC: 2 MG/DL — SIGNIFICANT CHANGE UP (ref 1.6–2.6)
MCHC RBC-ENTMCNC: 30.3 PG — SIGNIFICANT CHANGE UP (ref 27–34)
MCHC RBC-ENTMCNC: 32.5 GM/DL — SIGNIFICANT CHANGE UP (ref 32–36)
MCV RBC AUTO: 93.2 FL — SIGNIFICANT CHANGE UP (ref 80–100)
MONOCYTES # BLD AUTO: 0.75 K/UL — SIGNIFICANT CHANGE UP (ref 0–0.9)
MONOCYTES NFR BLD AUTO: 9.4 % — SIGNIFICANT CHANGE UP (ref 2–14)
NEUTROPHILS # BLD AUTO: 5.53 K/UL — SIGNIFICANT CHANGE UP (ref 1.8–7.4)
NEUTROPHILS NFR BLD AUTO: 69.4 % — SIGNIFICANT CHANGE UP (ref 43–77)
NITRITE UR-MCNC: NEGATIVE — SIGNIFICANT CHANGE UP
NRBC # BLD: 0 /100 WBCS — SIGNIFICANT CHANGE UP (ref 0–0)
OSMOLALITY UR: 497 MOSM/KG — SIGNIFICANT CHANGE UP (ref 300–900)
PH UR: 6 — SIGNIFICANT CHANGE UP (ref 5–8)
PLATELET # BLD AUTO: 344 K/UL — SIGNIFICANT CHANGE UP (ref 150–400)
POTASSIUM SERPL-MCNC: 4.3 MMOL/L — SIGNIFICANT CHANGE UP (ref 3.5–5.3)
POTASSIUM SERPL-SCNC: 4.3 MMOL/L — SIGNIFICANT CHANGE UP (ref 3.5–5.3)
POTASSIUM UR-SCNC: 58 MMOL/L — SIGNIFICANT CHANGE UP
PROT ?TM UR-MCNC: 104 MG/DL — HIGH (ref 0–12)
PROT UR-MCNC: 30 MG/DL
PROT/CREAT UR-RTO: 0.6 RATIO — HIGH (ref 0–0.2)
PROTHROM AB SERPL-ACNC: 12.5 SEC — SIGNIFICANT CHANGE UP (ref 10.5–13.4)
RBC # BLD: 3.7 M/UL — LOW (ref 4.2–5.8)
RBC # FLD: 13.8 % — SIGNIFICANT CHANGE UP (ref 10.3–14.5)
RBC CASTS # UR COMP ASSIST: < 5 /HPF — SIGNIFICANT CHANGE UP
SODIUM SERPL-SCNC: 141 MMOL/L — SIGNIFICANT CHANGE UP (ref 135–145)
SODIUM UR-SCNC: 50 MMOL/L — SIGNIFICANT CHANGE UP
SP GR SPEC: 1.02 — SIGNIFICANT CHANGE UP (ref 1–1.03)
UROBILINOGEN FLD QL: 0.2 E.U./DL — SIGNIFICANT CHANGE UP
UUN UR-MCNC: 760 MG/DL — SIGNIFICANT CHANGE UP
VANCOMYCIN FLD-MCNC: 23.1 UG/ML — SIGNIFICANT CHANGE UP
WBC # BLD: 7.97 K/UL — SIGNIFICANT CHANGE UP (ref 3.8–10.5)
WBC # FLD AUTO: 7.97 K/UL — SIGNIFICANT CHANGE UP (ref 3.8–10.5)
WBC UR QL: < 5 /HPF — SIGNIFICANT CHANGE UP

## 2022-12-07 PROCEDURE — 99222 1ST HOSP IP/OBS MODERATE 55: CPT

## 2022-12-07 PROCEDURE — 99231 SBSQ HOSP IP/OBS SF/LOW 25: CPT

## 2022-12-07 PROCEDURE — 99233 SBSQ HOSP IP/OBS HIGH 50: CPT

## 2022-12-07 RX ORDER — ASPIRIN/CALCIUM CARB/MAGNESIUM 324 MG
81 TABLET ORAL DAILY
Refills: 0 | Status: DISCONTINUED | OUTPATIENT
Start: 2022-12-08 | End: 2022-12-10

## 2022-12-07 RX ORDER — HYDRALAZINE HCL 50 MG
50 TABLET ORAL THREE TIMES A DAY
Refills: 0 | Status: DISCONTINUED | OUTPATIENT
Start: 2022-12-07 | End: 2022-12-10

## 2022-12-07 RX ORDER — CLOPIDOGREL BISULFATE 75 MG/1
75 TABLET, FILM COATED ORAL DAILY
Refills: 0 | Status: DISCONTINUED | OUTPATIENT
Start: 2022-12-08 | End: 2022-12-10

## 2022-12-07 RX ADMIN — Medication 2: at 07:09

## 2022-12-07 RX ADMIN — Medication 2: at 11:58

## 2022-12-07 RX ADMIN — CEFTRIAXONE 100 MILLIGRAM(S): 500 INJECTION, POWDER, FOR SOLUTION INTRAMUSCULAR; INTRAVENOUS at 19:07

## 2022-12-07 RX ADMIN — CLOPIDOGREL BISULFATE 600 MILLIGRAM(S): 75 TABLET, FILM COATED ORAL at 07:10

## 2022-12-07 RX ADMIN — CARVEDILOL PHOSPHATE 3.12 MILLIGRAM(S): 80 CAPSULE, EXTENDED RELEASE ORAL at 22:46

## 2022-12-07 RX ADMIN — CITALOPRAM 40 MILLIGRAM(S): 10 TABLET, FILM COATED ORAL at 12:42

## 2022-12-07 RX ADMIN — ATORVASTATIN CALCIUM 40 MILLIGRAM(S): 80 TABLET, FILM COATED ORAL at 22:46

## 2022-12-07 RX ADMIN — LISINOPRIL 40 MILLIGRAM(S): 2.5 TABLET ORAL at 05:55

## 2022-12-07 RX ADMIN — INSULIN GLARGINE 15 UNIT(S): 100 INJECTION, SOLUTION SUBCUTANEOUS at 22:46

## 2022-12-07 RX ADMIN — Medication 13 UNIT(S): at 12:42

## 2022-12-07 RX ADMIN — Medication 13 UNIT(S): at 17:36

## 2022-12-07 RX ADMIN — Medication 2: at 17:36

## 2022-12-07 RX ADMIN — AMLODIPINE BESYLATE 10 MILLIGRAM(S): 2.5 TABLET ORAL at 05:55

## 2022-12-07 RX ADMIN — Medication 325 MILLIGRAM(S): at 07:09

## 2022-12-07 RX ADMIN — Medication 2: at 22:47

## 2022-12-07 RX ADMIN — Medication 50 MILLIGRAM(S): at 22:46

## 2022-12-07 RX ADMIN — CARVEDILOL PHOSPHATE 3.12 MILLIGRAM(S): 80 CAPSULE, EXTENDED RELEASE ORAL at 09:34

## 2022-12-07 NOTE — PROGRESS NOTE ADULT - PROBLEM SELECTOR PLAN 1
presented w/ SBP 190s, currently 130s  -Continue Amlodipine 10mg QD, Coreg 12.5mg BID and Hydralazine 50mg TID  -Holding Lasix and Lisinopril 40mg i/s/o up coming cath and NOLAN  -Renal duplex negative for ABHINAV

## 2022-12-07 NOTE — PROGRESS NOTE ADULT - ASSESSMENT
57M w/ PMHx HTN, HLD, DM-II, OM of R 4th (on Vanc and CTX via PICC, last dose 12/20/22), and Anxiety, presented to [facility] ED w/ HTN Urgency and flash pulm edema, pt now admitted to cardiac telemetry for further management. Pt now s/p IV lasix and pending cardiac cath i/s/o abnormal CCTA. Hospital course c/b NOLAN, likely 2/2 IV diuresis.

## 2022-12-07 NOTE — DISCHARGE NOTE PROVIDER - NSDCMRMEDTOKEN_GEN_ALL_CORE_FT
cefTRIAXone: 2 gram(s) intravenous once a day via PICC until 12/20     citalopram 40 mg oral tablet: 1 tab(s) orally once a day  Insulin Lispro KwikPen 100 units/mL injectable solution: 7 unit(s) injectable 3 times a day (before meals)  before meals   Lantus Solostar Pen 100 units/mL subcutaneous solution: 15 unit(s) subcutaneous once a day (at bedtime)  Lipitor 40 mg oral tablet: 1 tab(s) orally once a day  lisinopril 40 mg oral tablet: 1 tab(s) orally once a day  vancomycin 1.25 g intravenous injection: 1.25 gram(s) intravenous every 12 hours via PICC until 12/20   amLODIPine 10 mg oral tablet: 1 tab(s) orally once a day  aspirin 81 mg oral delayed release tablet: 1 tab(s) orally once a day  carvedilol 3.125 mg oral tablet: 1 tab(s) orally every 12 hours  cefTRIAXone: 2 gram(s) intravenous once a day via PICC until 12/20     citalopram 40 mg oral tablet: 1 tab(s) orally once a day  hydrALAZINE 50 mg oral tablet: 1 tab(s) orally 3 times a day  Insulin Lispro KwikPen 100 units/mL injectable solution: 7 unit(s) injectable 3 times a day (before meals)  before meals   Lantus Solostar Pen 100 units/mL subcutaneous solution: 15 unit(s) subcutaneous once a day (at bedtime)  Lipitor 40 mg oral tablet: 1 tab(s) orally once a day  lisinopril 40 mg oral tablet: 1 tab(s) orally once a day  vancomycin 1 g intravenous injection: 1 gram(s) intravenous once a day via PICC until 12/20/22   amLODIPine 10 mg oral tablet: 1 tab(s) orally once a day  aspirin 81 mg oral delayed release tablet: 1 tab(s) orally once a day  carvedilol 3.125 mg oral tablet: 1 tab(s) orally every 12 hours  cefTRIAXone: 2 gram(s) intravenous once a day via PICC until 12/20     citalopram 40 mg oral tablet: 1 tab(s) orally once a day  hydrALAZINE 50 mg oral tablet: 1 tab(s) orally 3 times a day  Insulin Lispro KwikPen 100 units/mL injectable solution: 7 unit(s) injectable 3 times a day (before meals)  before meals   Lantus Solostar Pen 100 units/mL subcutaneous solution: 15 unit(s) subcutaneous once a day (at bedtime)  Lipitor 40 mg oral tablet: 1 tab(s) orally once a day  vancomycin 1 g intravenous injection: 1 gram(s) intravenous once a day via PICC until 12/20/22   amLODIPine 10 mg oral tablet: 1 tab(s) orally once a day  cardiac rehab 3 times a week for 12 weeks: cardiac rehab 3  times a week for 12 weeks      Karla Gay MD  Cardiology, Internal Medicine  [phone] [address], [place] [address]    carvedilol 3.125 mg oral tablet: 1 tab(s) orally every 12 hours  cefTRIAXone: 2 gram(s) intravenous once a day via PICC until 12/20     citalopram 40 mg oral tablet: 1 tab(s) orally once a day  doxycycline hyclate 100 mg oral capsule: 1 cap(s) orally 2 times a day  hydrALAZINE 50 mg oral tablet: 1 tab(s) orally 3 times a day  Insulin Lispro KwikPen 100 units/mL injectable solution: 7 unit(s) injectable 3 times a day (before meals)  before meals   Lantus Solostar Pen 100 units/mL subcutaneous solution: 15 unit(s) subcutaneous once a day (at bedtime)  Lipitor 40 mg oral tablet: 1 tab(s) orally once a day  losartan 50 mg oral tablet: 1 tab(s) orally 2 times a day    amLODIPine 10 mg oral tablet: 1 tab(s) orally once a day  carvedilol 3.125 mg oral tablet: 1 tab(s) orally every 12 hours  cefTRIAXone: 2 gram(s) intravenous once a day via PICC until 12/20     citalopram 40 mg oral tablet: 1 tab(s) orally once a day  doxycycline hyclate 100 mg oral capsule: 1 cap(s) orally 2 times a day  hydrALAZINE 50 mg oral tablet: 1 tab(s) orally 3 times a day  Insulin Lispro KwikPen 100 units/mL injectable solution: 7 unit(s) injectable 3 times a day (before meals)  before meals   Lantus Solostar Pen 100 units/mL subcutaneous solution: 15 unit(s) subcutaneous once a day (at bedtime)  Lipitor 40 mg oral tablet: 1 tab(s) orally once a day  losartan 50 mg oral tablet: 1 tab(s) orally 2 times a day    amLODIPine 10 mg oral tablet: 1 tab(s) orally once a day  Aspirin Enteric Coated 81 mg oral delayed release tablet: 1 tab(s) orally once a day   carvedilol 3.125 mg oral tablet: 1 tab(s) orally every 12 hours  cefTRIAXone: 2 gram(s) intravenous once a day via PICC until 12/20     citalopram 40 mg oral tablet: 1 tab(s) orally once a day  clopidogrel 75 mg oral tablet: 1 tab(s) orally once a day   doxycycline hyclate 100 mg oral capsule: 1 cap(s) orally 2 times a day  hydrALAZINE 50 mg oral tablet: 1 tab(s) orally 3 times a day  Insulin Lispro KwikPen 100 units/mL injectable solution: 7 unit(s) injectable 3 times a day (before meals)  before meals   Lantus Solostar Pen 100 units/mL subcutaneous solution: 15 unit(s) subcutaneous once a day (at bedtime)  Lipitor 40 mg oral tablet: 1 tab(s) orally once a day  losartan 50 mg oral tablet: 1 tab(s) orally 2 times a day

## 2022-12-07 NOTE — DIETITIAN INITIAL EVALUATION ADULT - HEIGHT FOR BMI (INCHES)
[facility]  Respiratory Therapy  Assessment Protocol        Name:  Ladonna [address] [address] Record Number:  [id number]  Age: 70 y.o. Gender: female  : 1940  Today's Date:  10/6/2018  Room:  X9F-7902/4271-01    Assessment   Patient Admission Diagnosis      Allergies  Allergies   Allergen Reactions    Ampicillin Hives    Ciprofloxacin Other (See Comments)     Sores in mouth      Nitrofurantoin Other (See Comments)     Unable to recall reaction    Sulfa Antibiotics Other (See Comments)     Unable to recall reaction    Penicillins Hives and Rash       Pulmonary History: Pulmonary Fibrosis   Home Respiratory Therapy Medications None  Home Oxygen Therapy: 4 l/min    Current Respiratory Therapy: Bronchodilator BID  Treatment Type: [facility]  Medications: Albuterol/Ipratropium    Respiratory Severity Index(RSI)   Patients with orders for inhalation medications, oxygen, or any therapeutic treatment modality will be placed on Respiratory Protocol. They will be assessed with the first treatment and at least every 72 hours thereafter. The following severity scale will be used to determine frequency of treatment intervention.     Smoking History: No Smoking History = 0    Social History  Social History   Substance Use Topics    Smoking status: Never Smoker    Smokeless tobacco: Never Used      Comment: encouraged to never smoke     Alcohol use No       Recent Surgical : None = 0  Past Surgical History  Past Surgical History:   Procedure Laterality Date    BACK SURGERY  2004,OCT 02, [place] 09    x4--fusions    BACK SURGERY  2000    laminectomy-lumbar    CARPAL TUNNEL RELEASE  11    Left    COLONOSCOPY      CYSTOSCOPY Left 2016    cysto left ureteral stent placement    CYSTOSCOPY  01/15/2018    Left Stent Insertion    CYSTOSCOPY  2018    cysto, left uretero with laser litho, left stent excahnge    EYE SURGERY Bilateral     cataract removed with lens implants    FRACTURE 0

## 2022-12-07 NOTE — DIETITIAN INITIAL EVALUATION ADULT - OTHER INFO
56 yo M with PMHx HTN, HLD, IDDM, anxiety, OM R 4th toe (undergoing treatment with Vanc [id number], CTX 2gm IV qD x 6 weeks via PICC, ending 12/20/22) who presented to [facility] ED c/o progressively worsening ROBBINS on minimal exertion, LE edema, feeling of generalized fatigue over past 2 weeks but progressively worsening over past 1 week. Admitted to [facility] for management of hypertensive urgency, acute decompensated CHF. S/p TTE 12/5: normal LVEF, mild LVH, mildly dilated LA, PASP 47mmHG. Pt now s/p IV lasix and pending cardiac cath i/s/o abnormal CCTA. Hospital course c/b NOLAN, likely 2/2 IV diuresis-cardiac cath now pending AM Cr.    Pt seen at bedside this AM on 5UR. Good PO PTA/at this time. PTA reports use of salt which he is aware he could use less of. Also eats ham/cheese sandwiches, soups, eggs, toast, salads. Drinks diet soda.  pounds and feels stable however admit wt 233 pounds noted - does report wt varying as of recent in setting of fluid shifts. Ongoing daily wts with most recent wt closer to stated UBW, 205.9 pounds. No edema at this time, +edema o/a. Denies taking daily wts PTA, seen with [facility] scale at bedside. Lipids WDL, A1c 9.8%. No pain. Satya 21. No pressure ulcers. BM+ 12/5.   Please see below for nutritions recommendations.

## 2022-12-07 NOTE — CONSULT NOTE ADULT - SUBJECTIVE AND OBJECTIVE BOX
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  ID consult for antibiotic management.  He had developed swelling and an ulcer of R 4th toe, had seen Podiatry, had MRI that showed OM.  He was prescribed cephalexin (which he took) and omadacycline (did not take).  He was admitted on  for initiation of antibiotics.  Bone biopsy on  showed fragmented intact reactive bone, culture showed NG.  He was seen by ID on  – was started on vancomycin and ceftriaxone with plan for 6-week course (-).  He was d/kelby on  and is being followed by Dr. Gallardo.  Vancomycin dose was changed from 1250 mg q12h to 1000 mg q12h on 11/15.  On 12/3, he was admitted for ROBBINS and increasing abd girth. Had SBP 190s-200s on arrival.  He was found to have acute CHF.  His vancomycin was restarted at 1250 mg IV q12h.  After 2 administrations at this dose, vanc trough was 14.7 (appropriately timed) and the dose was continued.  Last night, he had vanc trough 28.2 (prior dose 10:37) and the dose was held.  He is being diuresed and cath was planned for today but was noted to have NOLAN (Cr 1.7 ß 1.2) with vancomycin level of 23.1 this morning.  ID consult requested.  He has had no fever or chills.  Feels toe is ~80% better.       PAST MEDICAL & SURGICAL HISTORY:  Osteomyelitis      Hypertension      Hyperlipidemia      Diabetes mellitus      No significant past surgical history              MEDICATIONS  (STANDING):  amLODIPine   Tablet 10 milliGRAM(s) Oral daily  atorvastatin 40 milliGRAM(s) Oral at bedtime  carvedilol 3.125 milliGRAM(s) Oral every 12 hours  cefTRIAXone   IVPB      cefTRIAXone   IVPB 2000 milliGRAM(s) IV Intermittent every 24 hours  chlorhexidine 2% Cloths 1 Application(s) Topical daily  citalopram 40 milliGRAM(s) Oral daily  hydrALAZINE 50 milliGRAM(s) Oral three times a day  insulin glargine Injectable (LANTUS) 15 Unit(s) SubCutaneous at bedtime  insulin lispro (ADMELOG) corrective regimen sliding scale   SubCutaneous Before meals and at bedtime  insulin lispro Injectable (ADMELOG) 13 Unit(s) SubCutaneous three times a day before meals    MEDICATIONS  (PRN):      Allergies    No Known Allergies    Intolerances        SOCIAL HISTORY:  , 2 children, no pets/animal contact.  Works as a salesman.  No tobacco, occ alcohol, no recreational drug use.    FAMILY HISTORY:  FH: myocardial infarction (Father, Grandparent)    ROS:  No HA, photophobia, neck stiffness, rhinorrhea, sore throat, has chronic cough X 5 years - etiology unknown, no N, Vomits when coughs, no diarrhea, abd pain, dysuria, hematuria, frequency, muscle/joint symptoms, bruising/bleeding.      Vital Signs Last 24 Hrs  T(C): 36.4 (07 Dec 2022 13:16), Max: 37.1 (07 Dec 2022 09:49)  T(F): 97.5 (07 Dec 2022 13:16), Max: 98.8 (07 Dec 2022 09:49)  HR: 58 (07 Dec 2022 14:30) (55 - 88)  BP: 102/54 (07 Dec 2022 14:30) (102/54 - 167/70)  BP(mean): --  RR: 17 (07 Dec 2022 14:30) (16 - 18)  SpO2: 98% (07 Dec 2022 14:30) (95% - 98%)    Parameters below as of 07 Dec 2022 14:30  Patient On (Oxygen Delivery Method): room air        PE:  WDWN in no distress  HEENT:  NC, PERRL, sclerae anicteric, conjunctivae clear, EOMI.  Sinuses nontender, no nasal exudate.  No buccal or pharyngeal lesions, erythema or exudate  Neck:  Supple, no adenopathy  Lungs:  Clear to auscultation  Cor:  RRR, S1, S2, no murmur appreciated  Abd:  Symmetric, normoactive BS.  Soft, nontender, no masses, guarding or rebound.  Liver and spleen not enlarged  Extrem:  No cyanosis or edema.  s/p R 3rd distal digit amputation, 4th digit with mild edema/erythema.  RUE PICC exit site with mild erythema, no drainage/edema/warmth  Skin:  No rashes.    LABS:                        11.2   7.97  )-----------( 344      ( 07 Dec 2022 05:30 )             34.5     12-    141  |  101  |  33<H>  ----------------------------<  159<H>  4.3   |  30  |  1.69<H>    Ca    8.8      07 Dec 2022 05:30  Mg     2.0     12-      Urinalysis Basic - ( 07 Dec 2022 15:39 )    Color: Yellow / Appearance: Clear / S.025 / pH: x  Gluc: x / Ketone: Trace mg/dL  / Bili: Negative / Urobili: 0.2 E.U./dL   Blood: x / Protein: 30 mg/dL / Nitrite: NEGATIVE   Leuk Esterase: NEGATIVE / RBC: < 5 /HPF / WBC < 5 /HPF   Sq Epi: x / Non Sq Epi: x / Bacteria: Present /HPF    MICROBIOLOGY:    Blood cultures:  12/3 - NGTD    RADIOLOGY & ADDITIONAL STUDIES:

## 2022-12-07 NOTE — DISCHARGE NOTE PROVIDER - HOSPITAL COURSE
***incomplete****      57M w/ PMHx HTN, HLD, DM-II, OM of R 4th (on Vanc and CTX via PICC, last dose 12/20/22), and Anxiety, presented to [facility] ED c/o worsening ROBBINS, LE edema, fatigue and weight gain x 2 weeks. In ED, /78, other VSS, labs significant for Trop neg x 1, BNP 1536, CXR w/ small pleural effusion, EKG non ischemic and pt admitted to cardiac telemetry for HTN urgency w/ flash pulmonary edema.     Pt started on Amlodipine 10mg, Coreg 12.5mg BID, Losartan 40mg QD and Hydralazine 50mg TID w/ improvement of SBP. Pt diuresed well on lasix 40mg IV BID x 2 days, net neg 10L, and pt transitioned to ___ prior to dc. TTE revealed normal LVEF, mild LVH and mildly dilated LA. CCTA revealed severe stenosis of OM1 and other vessels non obstructive. Pt underwent cardiac cath 12/8 ____________. Hospital course c/b NOLAN w/ Cr peak ___, likely 2/2 over diuresis, lasix and losartan was discontinued and Cr downtrended to __ prior to dc. Hospital course also significant for elevated Vanc trough and home dose Vanc decreased to ___ prior to dc.    Pt seen and examined at bedside this AM without any complaints or events overnight, VSS, labs and telemetry reviewed and pt stable for discharge as discussed with  ___. Pt has received appropriate discharge instructions, including medication regimen, access site management and follow up with  __ in 1-2 weeks.    Discharge medications: ASA 81mg QD, Plavix 75mg QD, ______________.       ***incomplete****      57M w/ PMHx HTN, HLD, DM-II, OM of R 4th (on Vanc and CTX via PICC, last dose 12/20/22), and Anxiety, presented to [facility] ED c/o worsening ROBBINS, LE edema, fatigue and weight gain x 2 weeks. In ED, /78, other VSS, labs significant for Trop neg x 1, BNP 1536, CXR w/ small pleural effusion, EKG non ischemic and pt admitted to cardiac telemetry for HTN urgency w/ flash pulmonary edema.     Pt started on Amlodipine 10mg, Coreg 12.5mg BID, Losartan 40mg QD and Hydralazine 50mg TID w/ improvement of SBP. Pt diuresed well on lasix 40mg IV BID x 2 days, net neg 10L, and pt transitioned to ___ prior to dc. TTE revealed normal LVEF, mild LVH and mildly dilated LA. CCTA revealed severe stenosis of OM1 and other vessels non obstructive. Pt underwent cardiac cath 12/8 ____________. Hospital course c/b NOLAN w/ Cr peak ___, likely 2/2 over diuresis, lasix and losartan was discontinued and Cr downtrended to __ prior to dc. Hospital course also significant for elevated Vanc trough and home dose Vanc decreased to ___ prior to dc.    Pt seen and examined at bedside this AM without any complaints or events overnight, VSS, labs and telemetry reviewed and pt stable for discharge as discussed with  ___. Pt has received appropriate discharge instructions, including medication regimen, access site management and follow up with  __ in 1-2 weeks.  ASA 81mg QD, Plavix 75mg QD, ______________.       ***incomplete****      57M w/ PMHx HTN, HLD, DM-II, OM of R 4th (on Vanc and CTX via PICC, last dose 12/20/22), and Anxiety, presented to [facility] ED c/o worsening ROBBINS, LE edema, fatigue and weight gain x 2 weeks. In ED, /78, other VSS, labs significant for Trop neg x 1, BNP 1536, CXR w/ small pleural effusion, EKG non ischemic and pt admitted to cardiac telemetry for HTN urgency w/ flash pulmonary edema.     Pt started on Amlodipine 10mg, Coreg 12.5mg BID, Losartan 40mg QD and Hydralazine 50mg TID w/ improvement of SBP. Pt diuresed well on lasix 40mg IV BID x 2 days, net neg 10L, and pt transitioned to ___ prior to dc. TTE revealed normal LVEF, mild LVH and mildly dilated LA. CCTA revealed severe stenosis of OM1 and other vessels non obstructive. Pt underwent cardiac cath 12/8 ____________. Hospital course c/b NOLAN w/ Cr peak ___, likely 2/2 over diuresis, lasix and losartan was discontinued and Cr downtrended to __ prior to dc. Hospital course also significant for elevated Vanc trough and home dose Vanc decreased to ___ prior to dc.    Pt seen and examined at bedside this AM without any complaints or events overnight, VSS, labs and telemetry reviewed and pt stable for discharge as discussed with  ___. Pt has received appropriate discharge instructions, including medication regimen, access site management and follow up with  __ in 1-2 weeks. 57M w/ PMHx HTN, HLD, DM-II, OM of R 4th (on Vanc and CTX via PICC, last dose 12/20/22), and Anxiety, presented to [facility] ED c/o worsening ROBBINS, LE edema, fatigue and weight gain x 2 weeks. In ED, /78, other VSS, labs significant for Trop neg x 1, BNP 1536, CXR w/ small pleural effusion, EKG non ischemic and pt admitted to cardiac telemetry for HTN urgency w/ flash pulmonary edema.     Pt started on Amlodipine 10mg, Coreg 12.5mg BID, Losartan 40mg QD and Hydralazine 50mg TID w/ improvement of SBP. Pt diuresed well on lasix 40mg IV BID x 2 days, net neg 10L. Pt remained euvolemic off diuretics. TTE revealed normal LVEF, mild LVH and mildly dilated LA. CCTA revealed severe stenosis of OM1 and other vessels non obstructive. Pt underwent cardiac cath 12/9 and received 1 PEPE x 1 OM1 (100% ). Other cath findings: LM: Luminal irregularities, LCx: small, mild diffuse, pRamus: 50%, LAD: mild luminal irregularities, Diag: mild diffuse disease, RCA: 30-50% w/ R--> L collaterals, EDP: 9. RFA AS.   Hospital course c/b NOLAN w/ Cr peak 1.74, likely 2/2 over diuresis, lasix and losartan were discontinued and Cr downtrended to 1.55 prior to dc. Hospital course also significant for elevated Vanc trough. Decision made by ID to discontinue Vancomycin and transition patient from Vanc to Doxycyline 100 mg q12 for 10 days. Given elevated BP (sytolics ranging from 160s-130s) and improved kidney function, patient to resume Losartan 50 mg at time of discharge. Dose can be adjusted in outpatient setting. Decision to initiate PO diuretics also to be discussed in the outpatient setting.     Pt seen and examined at bedside this AM without any complaints or events overnight, VSS, labs and telemetry reviewed and pt stable for discharge as discussed with Dr. Horne. Pt has received appropriate discharge instructions, including medication regimen, access site management and follow up with Dr. Gay and Dr. Gallardo in 1-2 weeks. 57M w/ PMHx HTN, HLD, DM-II, OM of R 4th (on Vanc and CTX via PICC, last dose 12/20/22), and Anxiety, presented to [facility] ED c/o worsening ROBBINS, LE edema, fatigue and weight gain x 2 weeks. In ED, /78, other VSS, labs significant for Trop neg x 1, BNP 1536, CXR w/ small pleural effusion, EKG non ischemic and pt admitted to cardiac telemetry for HTN urgency w/ flash pulmonary edema.     Pt started on Amlodipine 10mg, Coreg 12.5mg BID, Losartan 40mg QD and Hydralazine 50mg TID w/ improvement of SBP. Pt diuresed well on lasix 40mg IV BID x 2 days, net neg 10L. Pt remained euvolemic off diuretics. TTE revealed normal LVEF, mild LVH and mildly dilated LA. CCTA revealed severe stenosis of OM1 and other vessels non obstructive. Pt underwent cardiac cath 12/9 and received 1 PEPE x 1 OM1 (100% ). Other cath findings: LM: Luminal irregularities, LCx: small, mild diffuse, pRamus: 50%, LAD: mild luminal irregularities, Diag: mild diffuse disease, RCA: 30-50% w/ R--> L collaterals, EDP: 9. RFA AS.   Hospital course c/b NOLAN w/ Cr peak 1.74, likely 2/2 over diuresis, lasix and losartan were discontinued and Cr downtrended to 1.55 prior to dc. Hospital course also significant for elevated Vanc trough. Decision made by ID to discontinue Vancomycin and transition patient from Vanc to Doxycyline 100 mg q12 for 10 days. Given elevated BP (sytolics ranging from 160s-130s) and improved kidney function, patient to resume Losartan 50 mg at time of discharge. Dose can be adjusted in outpatient setting. Decision to initiate PO diuretics also to be discussed in the outpatient setting.     Pt seen and examined at bedside this AM without any complaints or events overnight, VSS, labs and telemetry reviewed and pt stable for discharge as discussed with Dr. Horne. Pt has received appropriate discharge instructions, including medication regimen, access site management and follow up with Dr. Gay and Dr. Gallardo in 1-2 weeks.         Cardiac Rehab Indications (Post PCI):            *Education on benefits of Cardiac Rehab provided to patient: Yes         *Referral and Prescription Given for Cardiac Rehab: Yes         *Pt given list of locations & instructed to contact their insurance company to review list of participating providers. Yes         *Pt instructed to bring Cardiac Rehab prescription with them to Cardiology Follow up appointment for assistance with enrollment: Yes    Statin Prescribed (STEMI/NSTEMI/ACS/UA &/OR Post PCI this admission):  Yes  DAPT: Prescriptions for Aspirin/Plavix e-prescribed to patient's pharmacy: Yes                   57M w/ PMHx HTN, HLD, DM-II, OM of R 4th (on Vanc and CTX via PICC, last dose 12/20/22), and Anxiety, presented to [facility] ED c/o worsening ROBBINS, LE edema, fatigue and weight gain x 2 weeks. In ED, /78, other VSS, labs significant for Trop neg x 1, BNP 1536, CXR w/ small pleural effusion, EKG non ischemic and pt admitted to cardiac telemetry for HTN urgency w/ flash pulmonary edema.     Pt started on Amlodipine 10mg, Coreg 12.5mg BID, Losartan 40mg QD and Hydralazine 50mg TID w/ improvement of SBP. Pt diuresed well on lasix 40mg IV BID x 2 days, net neg 10L. Pt remained euvolemic off diuretics. TTE revealed normal LVEF, mild LVH and mildly dilated LA. CCTA revealed severe stenosis of OM1 and other vessels non obstructive. Pt underwent cardiac cath 12/9 and received 1 PEPE x 1 OM1 (100% ). Other cath findings: LM: Luminal irregularities, LCx: small, mild diffuse, pRamus: 50%, LAD: mild luminal irregularities, Diag: mild diffuse disease, RCA: 30-50% w/ R--> L collaterals, EDP: 9. RFA AS.   Hospital course c/b NOLAN w/ Cr peak 1.74, likely 2/2 over diuresis, lasix and losartan were discontinued and Cr downtrended to 1.55 prior to dc. Hospital course also significant for elevated Vanc trough. Decision made by ID to discontinue Vancomycin and transition patient from Vanc to Doxycyline 100 mg q12 for 10 days. Given elevated BP (sytolics ranging from 160s-130s) and improved kidney function, patient to resume Losartan 50 mg at time of discharge. Dose can be adjusted in outpatient setting. Decision to initiate PO diuretics also to be discussed in the outpatient setting. Patient to continue ASA and Plavix.     Pt seen and examined at bedside this AM without any complaints or events overnight, VSS, labs and telemetry reviewed and pt stable for discharge as discussed with Dr. Horne. Pt has received appropriate discharge instructions, including medication regimen, access site management and follow up with Dr. Gay and Dr. Gallardo in 1-2 weeks.         Cardiac Rehab Indications (Post PCI):            *Education on benefits of Cardiac Rehab provided to patient: Yes         *Referral and Prescription Given for Cardiac Rehab: Yes         *Pt given list of locations & instructed to contact their insurance company to review list of participating providers. Yes         *Pt instructed to bring Cardiac Rehab prescription with them to Cardiology Follow up appointment for assistance with enrollment: Yes    Statin Prescribed (STEMI/NSTEMI/ACS/UA &/OR Post PCI this admission):  Yes  DAPT: Prescriptions for Aspirin/Plavix e-prescribed to patient's pharmacy: Yes                   57M w/ PMHx HTN, HLD, DM-II, OM of R 4th (on Vanc and CTX via PICC, last dose 12/20/22), and Anxiety, presented to [facility] ED c/o worsening ROBBINS, LE edema, fatigue and weight gain x 2 weeks. In ED, /78, other VSS, labs significant for Trop neg x 1, BNP 1536, CXR w/ small pleural effusion, EKG non ischemic and pt admitted to cardiac telemetry for HTN urgency w/ flash pulmonary edema.     Pt started on Amlodipine 10mg, Coreg 12.5mg BID, Losartan 40mg QD and Hydralazine 50mg TID w/ improvement of SBP. Pt diuresed well on lasix 40mg IV BID x 2 days, net neg 10L. Pt remained euvolemic off diuretics. TTE revealed normal LVEF, mild LVH and mildly dilated LA. CCTA revealed severe stenosis of OM1 and other vessels non obstructive. Pt underwent cardiac cath 12/9 and received 1 PEPE x 1 OM1 (100% ). Other cath findings: LM: Luminal irregularities, LCx: small, mild diffuse, pRamus: 50%, LAD: mild luminal irregularities, Diag: mild diffuse disease, RCA: 30-50% w/ R--> L collaterals, EDP: 9. RFA AS.   Hospital course c/b NOLAN w/ Cr peak 1.74, likely 2/2 over diuresis, lasix and lisinopril were discontinued and Cr downtrended to 1.55 prior to dc. Hospital course also significant for elevated Vanc trough. Decision made by ID to discontinue Vancomycin and transition patient from Vanc to Doxycyline 100 mg q12 for 10 days. Given elevated BP (sytolics ranging from 160s-130s) and improved kidney function, patient to start Losartan 50 mg at time of discharge. Dose can be adjusted in outpatient setting. Decision to initiate PO diuretics also to be discussed in the outpatient setting. Patient to continue ASA and Plavix.     Pt seen and examined at bedside this AM without any complaints or events overnight, VSS, labs and telemetry reviewed and pt stable for discharge as discussed with Dr. Horne. Pt has received appropriate discharge instructions, including medication regimen, access site management and follow up with Dr. Gay and Dr. Gallardo in 1-2 weeks.         Cardiac Rehab Indications (Post PCI):            *Education on benefits of Cardiac Rehab provided to patient: Yes         *Referral and Prescription Given for Cardiac Rehab: Yes         *Pt given list of locations & instructed to contact their insurance company to review list of participating providers. Yes         *Pt instructed to bring Cardiac Rehab prescription with them to Cardiology Follow up appointment for assistance with enrollment: Yes    Statin Prescribed (STEMI/NSTEMI/ACS/UA &/OR Post PCI this admission):  Yes  DAPT: Prescriptions for Aspirin/Plavix e-prescribed to patient's pharmacy: Yes

## 2022-12-07 NOTE — PROVIDER CONTACT NOTE (OTHER) - ASSESSMENT
See flowsheet for vitals. Pt found sleeping, denies chest pain, SOB, palpitations.
Patient resting comfortably asymptomatic with SBP in the 190's
done

## 2022-12-07 NOTE — PROGRESS NOTE ADULT - PROBLEM SELECTOR PLAN 7
A1c 9.8%  -Continue Lantus 15u HS, Lispro 10u TID and mISS    F: None  E: Replete if K<4 or Mag<2  N: DASH Diet  VTEppx: heparin SQ  Dispo: cardiac tele A1c 9.8%  -Continue Lantus 15u HS, Lispro 13u TID and mISS    F: None  E: Replete if K<4 or Mag<2  N: DASH Diet  VTEppx: heparin SQ  Dispo: cardiac tele A1c 9.8%  -Continue Lantus 15u HS, Lispro 13u TID and mISS    F: None  E: Replete if K<4 or Mag<2  N: DASH Diet  VTEppx: none 2/2 cath  Dispo: cardiac tele

## 2022-12-07 NOTE — DIETITIAN INITIAL EVALUATION ADULT - PERTINENT MEDS FT
MEDICATIONS  (STANDING):  amLODIPine   Tablet 10 milliGRAM(s) Oral daily  atorvastatin 40 milliGRAM(s) Oral at bedtime  carvedilol 3.125 milliGRAM(s) Oral every 12 hours  cefTRIAXone   IVPB      cefTRIAXone   IVPB 2000 milliGRAM(s) IV Intermittent every 24 hours  chlorhexidine 2% Cloths 1 Application(s) Topical daily  citalopram 40 milliGRAM(s) Oral daily  hydrALAZINE 50 milliGRAM(s) Oral three times a day  insulin glargine Injectable (LANTUS) 15 Unit(s) SubCutaneous at bedtime  insulin lispro (ADMELOG) corrective regimen sliding scale   SubCutaneous Before meals and at bedtime  insulin lispro Injectable (ADMELOG) 13 Unit(s) SubCutaneous three times a day before meals  vancomycin  IVPB 1250 milliGRAM(s) IV Intermittent every 12 hours    MEDICATIONS  (PRN):

## 2022-12-07 NOTE — PROGRESS NOTE ADULT - SUBJECTIVE AND OBJECTIVE BOX
OVERNIGHT: No acute events overnight.   SUBJECTIVE: Patient was seen and examined this morning. He didn't have any new concerns or complaints. Cath postponed due to mild NOLAN, Cr 1.69.    CAPILLARY BLOOD GLUCOSE & INSULIN RECEIVED  Yesterday  - Dinner FS mg/dL = 10 units of premeal Lispro + 2 units of Lispro sliding scale. Ate chicken, pot, green beans, fruit.  - Bedtime FS mg/dL =15 units of Lantus + 4 units Lispro sliding scale.     Today  - Breakfast FS mg/dL.  2 units of Lispro sliding scale. NPO  - Lunch FS mg/dL. 13 units of premeal Lispro + 2 units of Lispro sliding scale.   - Dinner FSG:  mg/dL.    167 mg/dL ( @ 11:09)  163 mg/dL ( @ 06:47)  232 mg/dL ( @ 22:03)  183 mg/dL ( @ 18:01)    REVIEW OF SYSTEMS  Constitutional:  Negative fever, chills or loss of appetite.  Eyes:  Negative blurry vision or double vision.  Cardiovascular:  Negative for chest pain or palpitations.  Respiratory:  Negative for cough, wheezing, or shortness of breath.    Gastrointestinal:  Negative for nausea, vomiting, diarrhea, constipation, or abdominal pain.  Genitourinary:  Negative frequency, urgency or dysuria.  Neurologic:  No headache, confusion, dizziness, lightheadedness.    PHYSICAL EXAM  Vital Signs Last 24 Hrs  T(C): 36.4 (07 Dec 2022 13:16), Max: 37.1 (07 Dec 2022 09:49)  T(F): 97.5 (07 Dec 2022 13:16), Max: 98.8 (07 Dec 2022 09:49)  HR: 62 (07 Dec 2022 09:44) (55 - 90)  BP: 156/65 (07 Dec 2022 09:44) (123/57 - 167/70)  BP(mean): --  RR: 17 (07 Dec 2022 09:44) (16 - 18)  SpO2: 97% (07 Dec 2022 09:44) (95% - 98%)    Parameters below as of 07 Dec 2022 09:44  Patient On (Oxygen Delivery Method): room air    Constitutional: Awake, alert, in no acute distress.   HEENT: Normocephalic, atraumatic, HALLEY, no proptosis or lid retraction.   Neck: supple, no acanthosis, no thyromegaly or palpable thyroid nodules.  Respiratory: Lungs clear to ausculation bilaterally.   Cardiovascular: regular rhythm, normal S1 and S2, no audible murmurs.   GI: soft, non-tender, non-distended, bowel sounds present, no masses appreciated.  Extremities: No lower extremity edema, peripheral pulses present.   Skin: no rashes.   Psychiatric: AAO x 3. Normal affect/mood.     LABS  CBC - WBC/HGB/HTC/PLT: 7.97/11.2/34.5/344 (22)  BMP - Na/K/Cl/Bicarb/BUN/Cr/Gluc: 141/4.3/101/30/33/1.69/159 (22)  Anion Gap: 10 (22)  eGFR: 47 (22)  Calcium: 8.8 (22)  Phosphorus: -- (22)  Magnesium: 2.0 (22)  LFT - Alb/Tprot/Tbili/Dbili/AlkPhos/ALT/AST: 3.6/--/0.4/--/90/61/28 (22)  PT/aPTT/INR: 12.5/31.6/1.05 (22)   Thyroid Stimulating Hormone, Serum: 1.880 (22)        MEDICATIONS  MEDICATIONS  (STANDING):  amLODIPine   Tablet 10 milliGRAM(s) Oral daily  atorvastatin 40 milliGRAM(s) Oral at bedtime  carvedilol 3.125 milliGRAM(s) Oral every 12 hours  cefTRIAXone   IVPB      cefTRIAXone   IVPB 2000 milliGRAM(s) IV Intermittent every 24 hours  chlorhexidine 2% Cloths 1 Application(s) Topical daily  citalopram 40 milliGRAM(s) Oral daily  hydrALAZINE 50 milliGRAM(s) Oral three times a day  insulin glargine Injectable (LANTUS) 15 Unit(s) SubCutaneous at bedtime  insulin lispro (ADMELOG) corrective regimen sliding scale   SubCutaneous Before meals and at bedtime  insulin lispro Injectable (ADMELOG) 13 Unit(s) SubCutaneous three times a day before meals  vancomycin  IVPB 1250 milliGRAM(s) IV Intermittent every 12 hours

## 2022-12-07 NOTE — CONSULT NOTE ADULT - ASSESSMENT
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  He has been on empiric vancomycin and ceftriaxone.  Vancomycin dose had been decreased as an outpatient.  Initial trough was okay but after only 2 doses at the higher dose and the level was drawn a little late.  Now supratherapeutic, appropriately timed and with NOLAN in setting of concomitant diuresis.    Suggest:  - Continue to hold vancomycin (d/c current order)  - Random vancomycin level in the morning  - Further dosing to be dependent upon level and serum Cr  - Continue ceftriaxone   Recommendations discussed with primary team.  Will follow with you - team 1.  Dr. Gallardo will resume care tomorrow.

## 2022-12-07 NOTE — DISCHARGE NOTE PROVIDER - NSDCFUSCHEDAPPT_GEN_ALL_CORE_FT
[facility]  [facility] [address]  Scheduled Appointment: 12/22/2022     [facility] Gaming  [facility]  [facility] [address]  Scheduled Appointment: 12/22/2022    Karla Gay  [facility]  [facility] [address]  Scheduled Appointment: 01/04/2023

## 2022-12-07 NOTE — DIETITIAN INITIAL EVALUATION ADULT - PERTINENT LABORATORY DATA
12-07    141  |  101  |  33<H>  ----------------------------<  159<H>  4.3   |  30  |  1.69<H>    Ca    8.8      07 Dec 2022 05:30  Mg     2.0     12-07    POCT Blood Glucose.: 167 mg/dL (12-07-22 @ 11:09)  A1C with Estimated Average Glucose Result: 9.8 % (12-04-22 @ 05:30)  A1C with Estimated Average Glucose Result: 11.4 % (11-08-22 @ 05:30)

## 2022-12-07 NOTE — PROGRESS NOTE ADULT - PROBLEM SELECTOR PLAN 4
uptrending Cr to 1.69 today, likely i/s/o cardio-renal 2/2 IV diuresis  -UA/Ulytes ordered  -Holding Lasix and Lisinopril  -Continue to monitor UOP and BP, avoid nephrotoxic agents

## 2022-12-07 NOTE — DIETITIAN INITIAL EVALUATION ADULT - PERSON TAUGHT/METHOD
CHF diet education provided. Discussed importance of daily weights, choosing low salt foods, following MD determined fluid restrictions. Reviewed foods high/low in salt./verbal instruction/written material/teach back - (Patient repeats in own words)/patient instructed

## 2022-12-07 NOTE — PROGRESS NOTE ADULT - PROBLEM SELECTOR PLAN 2
euvolemic and HD stable, SpO2 94% RA, net neg 10L overall  -BNP 1536 > 890  -TTE 12/5: normal LVEF, mild LVH, mildly dilated LA, PASP 47mmHG  -Continue Lasix 40mg IV BID, transition to PO in AM  -Continue Hydralazine 50mg TID and Lisinopril 40mg QD  -Consider starting Coreg in AM  -Core measure, daily weight, strict I&Os and 1.5L fluid restriction euvolemic and HD stable, SpO2 94% RA, net neg 10L overall  -BNP 1536 > 890  -TTE 12/5: normal LVEF, mild LVH, mildly dilated LA, PASP 47mmHG  -s/p Lasix 40mg IV BID, now held i/s/o upcoming LHC and NOLAN  -Continue Hydralazine 50mg TID and Coreg 3.125mg BID  -Holding Lisinopril 40mg QD 2/2 NOLAN  -Core measure, daily weight, strict I&Os and 1.5L fluid restriction

## 2022-12-07 NOTE — PROGRESS NOTE ADULT - PROBLEM SELECTOR PLAN 5
h/o OM R 4th toe on IV Abx via PICC - currently non toxic appearing and afebrile  -Continue Vanc 1250mg BID, next trough 12/6 @ 930pm - prior to 10pm dose  -CTX IV 2g QD  -continue w/ outpt follow w/ Dr. Gallardo h/o OM R 4th toe on IV Abx via PICC - currently non toxic appearing and afebrile  -Vanc trough 12/6 evening 28.1 and evening dose held, repeat AM random trough level 23.1, will consult ID for decreased dose recommendation  -Decrease Vanc to   -Continue CTX IV 2g QD  -Continue w/ outpt follow w/ Dr. Gallardo h/o OM R 4th toe on IV Abx via PICC until 12/20/22- currently non toxic appearing and afebrile  -Pt on Vanc 1250mg QD at home, on 12/6 evening Vanc trough 28.1 so evening dose held. Repeat 12/7 AM vanc trough 23.1 and ID consulted - will continue to hold Vanc today and f/u random trough in AM  -Continue CTX IV 2g QD  -Outpt ID - Dr. Gallardo

## 2022-12-07 NOTE — DIETITIAN INITIAL EVALUATION ADULT - OTHER CALCULATIONS
6'0''  pounds +-10%  Wt 233 pounds BMI 31.6 %[id number]  IBW used to calculate energy needs due to pt's current body weight exceeding 120% of IBW  Adjust for age CHF/Renal+DM (lower end prot needs); Fluids per team

## 2022-12-07 NOTE — PROGRESS NOTE ADULT - ASSESSMENT
58 yo M with PMHx HTN, HLD, IDDM, anxiety, OM R 4th toe (undergoing treatment with Vanc [id number], CTX 2gm IV qD x 6 weeks via PICC, ending 12/20/22) who  is admitted to cardiac tele for management of hypertensive urgency & acute decompensated CHF, also with hyperglycemia.    A1C: 9.8 %  BUN: 20  Creatinine: 1.17  GFR: 74  Weight: 106  BMI: 31.7  EF: 12/5 TTE: LV EF (MOD BP):  71 %

## 2022-12-07 NOTE — PROGRESS NOTE ADULT - PROBLEM SELECTOR PLAN 3
Pt endorses exertional substernal chest pressure, currently CP free and HD stable  -Trop 0.02 x 2, EKG non ischemic  -TTE results above  -NPO for CCTA today  -Continue Lipitor 40mg HS  -Consider starting ASA 81mg Pt presented w/ exertional substernal chest pressure, currently CP free and HD stable  -Trop 0.02 x 2, EKG non ischemic  -TTE results above  -CCTA 12/6: mod Ca+ score, severe stenosis of OM1, remaining segments non obstructive   -NPO after MN for cardiac cath pending AM Cr, pt consented and s/p ASA 325mg and Plavix 600mg on 12/7  -Continue ASA 81mg QD, Plavix 75mg QD, Lipitor 40mg HS, and Coreg 3.125mg BID

## 2022-12-07 NOTE — PROGRESS NOTE ADULT - PROBLEM SELECTOR PLAN 1
Type 2 diabetes mellitus  - Please continue lantus  units at bedtime.   - Increase premeal lispro to  units before each meal.  - Continue lispro moderate dose sliding scale before meals and at bedtime.  - Patient's fingerstick glucose goal is 100-180 mg/dL.    - Discharge regimen to be determined in next few days    - Patient has home endocrinologist to follow up with Type 2 diabetes mellitus  - Please continue [id number]  units at bedtime. Would increase to 18, but in setting of NPO after MN will keep at 15 tonight.  - Continue premeal lispro 13 units before each meal.  - Continue lispro moderate dose sliding scale before meals and at bedtime.  - Patient's fingerstick glucose goal is 100-180 mg/dL.    - Discharge regimen to be determined in next few days    - Patient has home endocrinologist to follow up with

## 2022-12-07 NOTE — DISCHARGE NOTE PROVIDER - PROVIDER TOKENS
FREE:[LAST:[Victo rHugo],FIRST:[Karla],PHONE:[phone]],FAX:[(   )    -],ADDRESS:[[address] [place] [address]],SCHEDULEDAPPT:[01/04/2023],SCHEDULEDAPPTTIME:[12:30 PM]]

## 2022-12-07 NOTE — PROGRESS NOTE ADULT - SUBJECTIVE AND OBJECTIVE BOX
Cardiology PA Adult Progress Note    SUBJECTIVE ASSESSMENT:  	  MEDICATIONS:  amLODIPine   Tablet 10 milliGRAM(s) Oral daily  carvedilol 3.125 milliGRAM(s) Oral every 12 hours  hydrALAZINE 50 milliGRAM(s) Oral three times a day  cefTRIAXone   IVPB      cefTRIAXone   IVPB 2000 milliGRAM(s) IV Intermittent every 24 hours  vancomycin  IVPB 1250 milliGRAM(s) IV Intermittent every 12 hours  citalopram 40 milliGRAM(s) Oral daily  atorvastatin 40 milliGRAM(s) Oral at bedtime  insulin glargine Injectable (LANTUS) 15 Unit(s) SubCutaneous at bedtime  insulin lispro (ADMELOG) corrective regimen sliding scale   SubCutaneous Before meals and at bedtime  insulin lispro Injectable (ADMELOG) 13 Unit(s) SubCutaneous three times a day before meals  	  VITAL SIGNS:  T(C): 37.1 (12-07-22 @ 09:49), Max: 37.1 (12-07-22 @ 09:49)  HR: 62 (12-07-22 @ 09:44) (55 - 90)  BP: 156/65 (12-07-22 @ 09:44) (123/57 - 167/70)  RR: 17 (12-07-22 @ 09:44) (16 - 18)  SpO2: 97% (12-07-22 @ 09:44) (95% - 98%)    Height (cm): 182.9 (12-06 @ 23:43)  Weight (kg): 106 (12-06 @ 23:43)  BMI (kg/m2): 31.7 (12-06 @ 23:43)  BSA (m2): 2.28 (12-06 @ 23:43)    I&O's Summary  06 Dec 2022 07:01  -  07 Dec 2022 07:00  --------------------------------------------------------  IN: 240 mL / OUT: 1450 mL / NET: -1210 mL    07 Dec 2022 07:01  -  07 Dec 2022 11:44  --------------------------------------------------------  IN: 240 mL / OUT: 600 mL / NET: -360 mL                                     PHYSICAL EXAM:  Appearance: Normal	  HEENT: Normal oral mucosa, PERRL, EOMI	  Neck: Supple, + JVD/ - JVD; ___ Carotid Bruit   Cardiovascular: Normal S1 S2, No murmurs  Respiratory: Lungs clear to auscultation/Decreased Breath Sounds/No Rales, Rhonchi, Wheezing	  Gastrointestinal:  Soft, Non-tender, + BS	  Skin: No rashes, No ecchymoses, No cyanosis  Extremities: Normal range of motion, No clubbing, cyanosis or edema  Vascular: Peripheral pulses palpable 2+ bilaterally  Neurologic: Non-focal  Psychiatry: A & O x 3, Mood & affect appropriate    LABS:	 	             11.2   7.97  )-----------( 344      ( 07 Dec 2022 05:30 )             34.5     12-07    141  |  101  |  33<H>  ----------------------------<  159<H>  4.3   |  30  |  1.69<H>    Ca    8.8      07 Dec 2022 05:30  Mg     2.0     12-07    PT/INR - ( 07 Dec 2022 05:30 )   PT: 12.5 sec;   INR: 1.05       PTT - ( 07 Dec 2022 05:30 )  PTT:31.6 sec Cardiology PA Adult Progress Note    SUBJECTIVE ASSESSMENT: Pt seen and examined at bedside this AM laying comfortably in   	  MEDICATIONS:  amLODIPine   Tablet 10 milliGRAM(s) Oral daily  carvedilol 3.125 milliGRAM(s) Oral every 12 hours  hydrALAZINE 50 milliGRAM(s) Oral three times a day  cefTRIAXone   IVPB      cefTRIAXone   IVPB 2000 milliGRAM(s) IV Intermittent every 24 hours  vancomycin  IVPB 1250 milliGRAM(s) IV Intermittent every 12 hours  citalopram 40 milliGRAM(s) Oral daily  atorvastatin 40 milliGRAM(s) Oral at bedtime  insulin glargine Injectable (LANTUS) 15 Unit(s) SubCutaneous at bedtime  insulin lispro (ADMELOG) corrective regimen sliding scale   SubCutaneous Before meals and at bedtime  insulin lispro Injectable (ADMELOG) 13 Unit(s) SubCutaneous three times a day before meals  	  VITAL SIGNS:  T(C): 37.1 (12-07-22 @ 09:49), Max: 37.1 (12-07-22 @ 09:49)  HR: 62 (12-07-22 @ 09:44) (55 - 90)  BP: 156/65 (12-07-22 @ 09:44) (123/57 - 167/70)  RR: 17 (12-07-22 @ 09:44) (16 - 18)  SpO2: 97% (12-07-22 @ 09:44) (95% - 98%)    Height (cm): 182.9 (12-06 @ 23:43)  Weight (kg): 106 (12-06 @ 23:43)  BMI (kg/m2): 31.7 (12-06 @ 23:43)  BSA (m2): 2.28 (12-06 @ 23:43)    I&O's Summary  06 Dec 2022 07:01  -  07 Dec 2022 07:00  --------------------------------------------------------  IN: 240 mL / OUT: 1450 mL / NET: -1210 mL    07 Dec 2022 07:01  -  07 Dec 2022 11:44  --------------------------------------------------------  IN: 240 mL / OUT: 600 mL / NET: -360 mL                                     PHYSICAL EXAM:  Appearance: Normal	  HEENT: Normal oral mucosa, PERRL, EOMI	  Neck: Supple, + JVD/ - JVD; ___ Carotid Bruit   Cardiovascular: Normal S1 S2, No murmurs  Respiratory: Lungs clear to auscultation/Decreased Breath Sounds/No Rales, Rhonchi, Wheezing	  Gastrointestinal:  Soft, Non-tender, + BS	  Skin: No rashes, No ecchymoses, No cyanosis  Extremities: Normal range of motion, No clubbing, cyanosis or edema  Vascular: Peripheral pulses palpable 2+ bilaterally  Neurologic: Non-focal  Psychiatry: A & O x 3, Mood & affect appropriate    LABS:	 	             11.2   7.97  )-----------( 344      ( 07 Dec 2022 05:30 )             34.5     12-07    141  |  101  |  33<H>  ----------------------------<  159<H>  4.3   |  30  |  1.69<H>    Ca    8.8      07 Dec 2022 05:30  Mg     2.0     12-07    PT/INR - ( 07 Dec 2022 05:30 )   PT: 12.5 sec;   INR: 1.05       PTT - ( 07 Dec 2022 05:30 )  PTT:31.6 sec Cardiology PA Adult Progress Note    SUBJECTIVE ASSESSMENT: Pt seen and examined at bedside this AM laying comfortably in bed w/o any complaints or events overnight. He states that he feels well and denies any CP, palpitations, dizziness/lightheadedness, SOB, orthopnea, PND, fatigue, N/V or abd pain.    overnight events: N/A  Telemetry: Bradycardia ~40s  	  MEDICATIONS:  amLODIPine   Tablet 10 milliGRAM(s) Oral daily  carvedilol 3.125 milliGRAM(s) Oral every 12 hours  hydrALAZINE 50 milliGRAM(s) Oral three times a day  cefTRIAXone   IVPB      cefTRIAXone   IVPB 2000 milliGRAM(s) IV Intermittent every 24 hours  vancomycin  IVPB 1250 milliGRAM(s) IV Intermittent every 12 hours  citalopram 40 milliGRAM(s) Oral daily  atorvastatin 40 milliGRAM(s) Oral at bedtime  insulin glargine Injectable (LANTUS) 15 Unit(s) SubCutaneous at bedtime  insulin lispro (ADMELOG) corrective regimen sliding scale   SubCutaneous Before meals and at bedtime  insulin lispro Injectable (ADMELOG) 13 Unit(s) SubCutaneous three times a day before meals  	  VITAL SIGNS:  T(C): 37.1 (12-07-22 @ 09:49), Max: 37.1 (12-07-22 @ 09:49)  HR: 62 (12-07-22 @ 09:44) (55 - 90)  BP: 156/65 (12-07-22 @ 09:44) (123/57 - 167/70)  RR: 17 (12-07-22 @ 09:44) (16 - 18)  SpO2: 97% (12-07-22 @ 09:44) (95% - 98%)    Height (cm): 182.9 (12-06 @ 23:43)  Weight (kg): 106 (12-06 @ 23:43)  BMI (kg/m2): 31.7 (12-06 @ 23:43)  BSA (m2): 2.28 (12-06 @ 23:43)    I&O's Summary  06 Dec 2022 07:01  -  07 Dec 2022 07:00  --------------------------------------------------------  IN: 240 mL / OUT: 1450 mL / NET: -1210 mL    07 Dec 2022 07:01  -  07 Dec 2022 11:44  --------------------------------------------------------  IN: 240 mL / OUT: 600 mL / NET: -360 mL                                     PHYSICAL EXAM:  Appearance: Normal	  HEENT: Normal oral mucosa, PERRL, EOMI	  Neck: Supple, - JVD; no Carotid Bruit   Cardiovascular: Normal S1 S2, No murmurs  Respiratory: Lungs clear to auscultation, No Rales, Rhonchi, Wheezing	  Gastrointestinal:  Soft, Non-tender, + BS	  Skin: No rashes, No ecchymoses, No cyanosis  Extremities: Normal range of motion, No clubbing, cyanosis or edema  Vascular: Peripheral pulses palpable 2+ bilaterally  Neurologic: Non-focal  Psychiatry: A & O x 3, Mood & affect appropriate    LABS:	 	             11.2   7.97  )-----------( 344      ( 07 Dec 2022 05:30 )             34.5     12-07    141  |  101  |  33<H>  ----------------------------<  159<H>  4.3   |  30  |  1.69<H>    Ca    8.8      07 Dec 2022 05:30  Mg     2.0     12-07    PT/INR - ( 07 Dec 2022 05:30 )   PT: 12.5 sec;   INR: 1.05       PTT - ( 07 Dec 2022 05:30 )  PTT:31.6 sec

## 2022-12-07 NOTE — DISCHARGE NOTE PROVIDER - NSDCCPCAREPLAN_GEN_ALL_CORE_FT
PRINCIPAL DISCHARGE DIAGNOSIS  Diagnosis: Hypertensive urgency  Assessment and Plan of Treatment:        PRINCIPAL DISCHARGE DIAGNOSIS  Diagnosis: Hypertensive urgency  Assessment and Plan of Treatment: You were admitted to the hospital for elevated blood pressure of 190/100s.   Please START Hydralazine 50mg three times a day, Carvedilol 3.125mg twice a day and Amlodipine 10mg daily to keep your blood pressure controlled.  Please follow up with Dr. Mohan__ on __.  For blood pressure that is too high or too low please see your doctor or go to the emergency room as necessary.      SECONDARY DISCHARGE DIAGNOSES  Diagnosis: CAD (coronary artery disease)  Assessment and Plan of Treatment: You had a CT of your heart that revealed a severe blockage in an artery of your heart. We recommend you have a cardiac angiogram to better look at the artery and determine if you need a stent, however you left the hospital against medical advice. Please START Aspirin 81mg daily and Atorvastatin (Lipitor) 40mg at bedtime.  Please follow up with  __ to make an appointment and return to have the angiogram performed.    Diagnosis: NOLAN (acute kidney injury)  Assessment and Plan of Treatment: Your Kidney function (Creatitine) was elevated due to the IV diuretics you received in the hospital. It is important that you drink enough fluid to help hydrate the kidneys. Please follow up with  __ for repeat blood work.    Diagnosis: Osteomyelitis  Assessment and Plan of Treatment: You have a history osteomyelitis of your right 4th toe. Please continue with your IV antibiotics as directed via your PICC line and follow up with Dr. Gallardo as scheduled.     PRINCIPAL DISCHARGE DIAGNOSIS  Diagnosis: Hypertensive urgency  Assessment and Plan of Treatment: You were admitted to the hospital for elevated blood pressure of 190/100s.   Please START Hydralazine 50mg three times a day, Carvedilol 3.125mg twice a day and Amlodipine 10mg daily to keep your blood pressure controlled. Please STOP Lisinopril until you have repeat blood work.  Please follow up with Dr. Gay in 1-2 weeks, for now the soonest appointment she has is 1/4/22 at 12:30pm, her office will call you with a sooner appointment when available.  For blood pressure that is too high or too low please see your doctor or go to the emergency room as necessary.      SECONDARY DISCHARGE DIAGNOSES  Diagnosis: CAD (coronary artery disease)  Assessment and Plan of Treatment: You had a CT of your heart that revealed a severe blockage in an artery of your heart. We recommend you have a cardiac angiogram to better look at the artery and determine if you need a stent, however you left the hospital against medical advice. Please START Aspirin 81mg daily and Atorvastatin (Lipitor) 40mg at bedtime.  Please follow up with Dr. Gay in 1-2 weeks, for now the soonest appointment she has is 1/4/22 at 12:30pm, her office will call you with a sooner appointment when available. You return to have the angiogram performed.    Diagnosis: NOLAN (acute kidney injury)  Assessment and Plan of Treatment: Your Kidney function (Creatitine) was elevated due to the IV diuretics you received in the hospital. It is important that you drink enough fluid to help hydrate the kidneys. Please STOP Lisinopril until you see the cardiologist and have repeat blood work.    Diagnosis: Osteomyelitis  Assessment and Plan of Treatment: You have a history osteomyelitis of your right 4th toe. Please continue with your IV antibiotics as directed via your PICC line and follow up with Dr. Gallardo as scheduled.     PRINCIPAL DISCHARGE DIAGNOSIS  Diagnosis: Hypertensive urgency  Assessment and Plan of Treatment:       SECONDARY DISCHARGE DIAGNOSES  Diagnosis: CAD (coronary artery disease)  Assessment and Plan of Treatment:     Diagnosis: NOLAN (acute kidney injury)  Assessment and Plan of Treatment:     Diagnosis: Osteomyelitis  Assessment and Plan of Treatment:      PRINCIPAL DISCHARGE DIAGNOSIS  Diagnosis: Hypertensive urgency  Assessment and Plan of Treatment: Your blood pressure was dangerously high. Please take your medications daily as listed to keep your blood pressure controlled. You shoudll be taking amlodipine 10 mg daily, carvedilol 3.125 mg twice daily, hydralazine 50 mg three times daily, and losartan 50 mg daily. These medications have been sent to your pharmacy. For blood pressure that is too high or too low please see your doctor or go to the emergency room as necessary.        SECONDARY DISCHARGE DIAGNOSES  Diagnosis: CAD (coronary artery disease)  Assessment and Plan of Treatment: -You underwent a cardiac catheterization 12/9 and the blockage in your artery was opened with stent placement. You are to take ASPIRIN 81 mg daily and Plavix 75 mg daily. These medications work to keep your stents open.  NEVER MISS A DOSE OF ASPIRIN OR PLAVIX; IF YOU DO, YOU ARE AT RISK OF YOUR STENT CLOSING AND HAVING A HEART ATTACK. DO NOT STOP THESE TWO MEDICATIONS UNLESS INSTRUCTED TO DO SO BY YOUR CARDIOLOGIST.  Your procedure was done through your groin or your wrist. You do not need to keep this area covered and you may shower. Please avoid any heavy lifting  (no more than 3 to 5 lbs) or strenuous activity for five days. If you develop any swelling, bleeding, hardening of the skin (hematoma formation), acute pain, numbness/tingling  in your arm or leg please contact your doctor immediately or call our 24/7 line: [phone] Please return to the hospital/seek immediate medical attention if worsening of symptoms- including not limited to chest pain, shortness of breath. Please follow up with Dr. Bailey in 1-2 weeks. Please call his office and make an appointment to see him. Please continue a heart healthy diet low in sodium, cholesterol, and fat.      Diagnosis: NOLAN (acute kidney injury)  Assessment and Plan of Treatment: Please follow up with your PCP to monitor your kidney function    Diagnosis: Osteomyelitis  Assessment and Plan of Treatment: Please continue to take infuse Ceftriaxone 2grams daily for the next 10 days. Stop date: 12/20. You have been prescribed Doxycyline 100 mg twice a day for 10 days. This has been sent to your pharmacy, please  this prescription and begin taking. You can stop your vancomycin infusions.

## 2022-12-07 NOTE — DIETITIAN INITIAL EVALUATION ADULT - PROBLEM/PLAN-3
"Subjective     Darien Martinez is a 54 y.o. male who presents with Eye Problem (X2weeks ago white head on right eye lid, might be a stye )            Eye Problem   The right eye is affected. Episode onset: about 3 weeks. The problem has been gradually worsening. There was no injury mechanism. He does not wear contacts. Associated symptoms include eye redness. Pertinent negatives include no blurred vision, eye discharge, foreign body sensation or itching.   Patient notes longstanding movable lump to right upper eyelid region.  Over the last 3 weeks has become more swollen with overlying redness.  Also notes chronic eye irritation related to dryness/allergies.    Review of Systems   Eyes: Positive for redness. Negative for blurred vision, pain, discharge and itching.              Objective     /82 (BP Location: Right arm, Patient Position: Sitting, BP Cuff Size: Adult)   Pulse 63   Temp 36.7 °C (98 °F) (Temporal)   Resp 16   Ht 1.727 m (5' 8\")   Wt 87.1 kg (192 lb)   SpO2 95%   BMI 29.19 kg/m²      Physical Exam  Constitutional:       Appearance: Normal appearance. He is well-developed.   HENT:      Head: Normocephalic.   Eyes:      General: Lids are everted, no foreign bodies appreciated.         Right eye: No discharge or hordeolum.      Extraocular Movements: Extraocular movements intact.      Conjunctiva/sclera: Conjunctivae normal.      Pupils: Pupils are equal, round, and reactive to light.   Skin:     Comments: Right upper eyelid with apparent subcutaneous, movable nodular approximately 5 mm mass with associated erythema.  No pointing.   Neurological:      Mental Status: He is alert.   Psychiatric:         Behavior: Behavior is cooperative.                             Assessment & Plan        1. Epidermoid cyst of eyelid, right  Warm compresses.  May use OTC ketotifen as needed  - Referral to Ophthalmology  Due to apparent exacerbation:  - doxycycline (VIBRAMYCIN) 100 MG Tab; Take 1 Tablet by " mouth 2 times a day for 5 days.  Dispense: 10 Tablet; Refill: 0                 DISPLAY PLAN FREE TEXT

## 2022-12-07 NOTE — DISCHARGE NOTE PROVIDER - CARE PROVIDER_API CALL
Karla Gay  [address]  Phone: [phone]  Fax: (   )    -  Scheduled Appointment: 01/04/2023 12:30 PM

## 2022-12-08 PROBLEM — E11.9 TYPE 2 DIABETES MELLITUS WITHOUT COMPLICATIONS: Chronic | Status: ACTIVE | Noted: 2022-12-03

## 2022-12-08 PROBLEM — M86.9 OSTEOMYELITIS, UNSPECIFIED: Chronic | Status: ACTIVE | Noted: 2022-12-03

## 2022-12-08 PROBLEM — I10 ESSENTIAL (PRIMARY) HYPERTENSION: Chronic | Status: ACTIVE | Noted: 2022-12-03

## 2022-12-08 PROBLEM — E78.5 HYPERLIPIDEMIA, UNSPECIFIED: Chronic | Status: ACTIVE | Noted: 2022-12-03

## 2022-12-08 LAB
ANION GAP SERPL CALC-SCNC: 8 MMOL/L — SIGNIFICANT CHANGE UP (ref 5–17)
APTT BLD: 30.3 SEC — SIGNIFICANT CHANGE UP (ref 27.5–35.5)
BUN SERPL-MCNC: 36 MG/DL — HIGH (ref 7–23)
CALCIUM SERPL-MCNC: 8.5 MG/DL — SIGNIFICANT CHANGE UP (ref 8.4–10.5)
CHLORIDE SERPL-SCNC: 102 MMOL/L — SIGNIFICANT CHANGE UP (ref 96–108)
CO2 SERPL-SCNC: 28 MMOL/L — SIGNIFICANT CHANGE UP (ref 22–31)
CREAT SERPL-MCNC: 1.74 MG/DL — HIGH (ref 0.5–1.3)
CULTURE RESULTS: SIGNIFICANT CHANGE UP
CULTURE RESULTS: SIGNIFICANT CHANGE UP
EGFR: 46 ML/MIN/1.73M2 — LOW
GLUCOSE BLDC GLUCOMTR-MCNC: 141 MG/DL — HIGH (ref 70–99)
GLUCOSE BLDC GLUCOMTR-MCNC: 190 MG/DL — HIGH (ref 70–99)
GLUCOSE BLDC GLUCOMTR-MCNC: 202 MG/DL — HIGH (ref 70–99)
GLUCOSE BLDC GLUCOMTR-MCNC: 240 MG/DL — HIGH (ref 70–99)
GLUCOSE SERPL-MCNC: 198 MG/DL — HIGH (ref 70–99)
HCT VFR BLD CALC: 31.8 % — LOW (ref 39–50)
HGB BLD-MCNC: 10.3 G/DL — LOW (ref 13–17)
INR BLD: 1.07 — SIGNIFICANT CHANGE UP (ref 0.88–1.16)
MAGNESIUM SERPL-MCNC: 2.1 MG/DL — SIGNIFICANT CHANGE UP (ref 1.6–2.6)
MCHC RBC-ENTMCNC: 30.1 PG — SIGNIFICANT CHANGE UP (ref 27–34)
MCHC RBC-ENTMCNC: 32.4 GM/DL — SIGNIFICANT CHANGE UP (ref 32–36)
MCV RBC AUTO: 93 FL — SIGNIFICANT CHANGE UP (ref 80–100)
NRBC # BLD: 0 /100 WBCS — SIGNIFICANT CHANGE UP (ref 0–0)
PLATELET # BLD AUTO: 292 K/UL — SIGNIFICANT CHANGE UP (ref 150–400)
POTASSIUM SERPL-MCNC: 4.6 MMOL/L — SIGNIFICANT CHANGE UP (ref 3.5–5.3)
POTASSIUM SERPL-SCNC: 4.6 MMOL/L — SIGNIFICANT CHANGE UP (ref 3.5–5.3)
PROTHROM AB SERPL-ACNC: 12.8 SEC — SIGNIFICANT CHANGE UP (ref 10.5–13.4)
RBC # BLD: 3.42 M/UL — LOW (ref 4.2–5.8)
RBC # FLD: 13.2 % — SIGNIFICANT CHANGE UP (ref 10.3–14.5)
SODIUM SERPL-SCNC: 138 MMOL/L — SIGNIFICANT CHANGE UP (ref 135–145)
SPECIMEN SOURCE: SIGNIFICANT CHANGE UP
SPECIMEN SOURCE: SIGNIFICANT CHANGE UP
VANCOMYCIN FLD-MCNC: 12.3 UG/ML — SIGNIFICANT CHANGE UP
WBC # BLD: 6.52 K/UL — SIGNIFICANT CHANGE UP (ref 3.8–10.5)
WBC # FLD AUTO: 6.52 K/UL — SIGNIFICANT CHANGE UP (ref 3.8–10.5)

## 2022-12-08 PROCEDURE — 99233 SBSQ HOSP IP/OBS HIGH 50: CPT

## 2022-12-08 PROCEDURE — 99232 SBSQ HOSP IP/OBS MODERATE 35: CPT

## 2022-12-08 PROCEDURE — 99231 SBSQ HOSP IP/OBS SF/LOW 25: CPT | Mod: GC

## 2022-12-08 RX ORDER — CARVEDILOL PHOSPHATE 80 MG/1
1 CAPSULE, EXTENDED RELEASE ORAL
Qty: 60 | Refills: 2
Start: 2022-12-08 | End: 2023-03-07

## 2022-12-08 RX ORDER — HYDRALAZINE HCL 50 MG
1 TABLET ORAL
Qty: 90 | Refills: 2
Start: 2022-12-08 | End: 2023-03-07

## 2022-12-08 RX ORDER — VANCOMYCIN HCL 1 G
1000 VIAL (EA) INTRAVENOUS ONCE
Refills: 0 | Status: COMPLETED | OUTPATIENT
Start: 2022-12-08 | End: 2022-12-08

## 2022-12-08 RX ORDER — HYDRALAZINE HCL 50 MG
1 TABLET ORAL
Qty: 0 | Refills: 2 | DISCHARGE
Start: 2022-12-08 | End: 2023-03-07

## 2022-12-08 RX ORDER — LISINOPRIL 2.5 MG/1
1 TABLET ORAL
Qty: 0 | Refills: 0 | DISCHARGE

## 2022-12-08 RX ORDER — AMLODIPINE BESYLATE 2.5 MG/1
1 TABLET ORAL
Qty: 30 | Refills: 2
Start: 2022-12-08 | End: 2023-03-07

## 2022-12-08 RX ORDER — INSULIN GLARGINE 100 [IU]/ML
18 INJECTION, SOLUTION SUBCUTANEOUS AT BEDTIME
Refills: 0 | Status: DISCONTINUED | OUTPATIENT
Start: 2022-12-08 | End: 2022-12-09

## 2022-12-08 RX ORDER — VANCOMYCIN HCL 1 G
1 VIAL (EA) INTRAVENOUS
Qty: 0 | Refills: 0 | DISCHARGE
Start: 2022-12-08

## 2022-12-08 RX ORDER — ASPIRIN/CALCIUM CARB/MAGNESIUM 324 MG
1 TABLET ORAL
Qty: 30 | Refills: 2
Start: 2022-12-08 | End: 2023-03-07

## 2022-12-08 RX ORDER — INSULIN LISPRO 100/ML
15 VIAL (ML) SUBCUTANEOUS
Refills: 0 | Status: DISCONTINUED | OUTPATIENT
Start: 2022-12-08 | End: 2022-12-09

## 2022-12-08 RX ADMIN — Medication 4: at 17:45

## 2022-12-08 RX ADMIN — INSULIN GLARGINE 18 UNIT(S): 100 INJECTION, SOLUTION SUBCUTANEOUS at 22:30

## 2022-12-08 RX ADMIN — AMLODIPINE BESYLATE 10 MILLIGRAM(S): 2.5 TABLET ORAL at 05:51

## 2022-12-08 RX ADMIN — CEFTRIAXONE 100 MILLIGRAM(S): 500 INJECTION, POWDER, FOR SOLUTION INTRAMUSCULAR; INTRAVENOUS at 19:23

## 2022-12-08 RX ADMIN — Medication 50 MILLIGRAM(S): at 05:51

## 2022-12-08 RX ADMIN — Medication 250 MILLIGRAM(S): at 13:05

## 2022-12-08 RX ADMIN — CLOPIDOGREL BISULFATE 75 MILLIGRAM(S): 75 TABLET, FILM COATED ORAL at 05:51

## 2022-12-08 RX ADMIN — CITALOPRAM 40 MILLIGRAM(S): 10 TABLET, FILM COATED ORAL at 10:34

## 2022-12-08 RX ADMIN — Medication 13 UNIT(S): at 18:10

## 2022-12-08 RX ADMIN — CARVEDILOL PHOSPHATE 3.12 MILLIGRAM(S): 80 CAPSULE, EXTENDED RELEASE ORAL at 10:34

## 2022-12-08 RX ADMIN — Medication 50 MILLIGRAM(S): at 22:29

## 2022-12-08 RX ADMIN — Medication 4: at 13:14

## 2022-12-08 RX ADMIN — ATORVASTATIN CALCIUM 40 MILLIGRAM(S): 80 TABLET, FILM COATED ORAL at 22:30

## 2022-12-08 RX ADMIN — CARVEDILOL PHOSPHATE 3.12 MILLIGRAM(S): 80 CAPSULE, EXTENDED RELEASE ORAL at 22:29

## 2022-12-08 RX ADMIN — Medication 81 MILLIGRAM(S): at 05:51

## 2022-12-08 RX ADMIN — CHLORHEXIDINE GLUCONATE 1 APPLICATION(S): 213 SOLUTION TOPICAL at 05:39

## 2022-12-08 RX ADMIN — Medication 13 UNIT(S): at 13:48

## 2022-12-08 RX ADMIN — Medication 50 MILLIGRAM(S): at 14:39

## 2022-12-08 NOTE — PROGRESS NOTE ADULT - ASSESSMENT
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  He has been on empiric vancomycin and ceftriaxone.        Recommend:  1.  Continue Ceftriaxone 2 grams IV daily  2.  Given Vancomycin 1 gram IV x 1 today.  Check random level at 1 pm on 12/9/22.  Further dosing based on that level and tomorrow's creatinine clearance  3.  End date for antibiotics is 12/20/22  4. No ID contraindication to catheterization    ID team 1 will follow

## 2022-12-08 NOTE — PROGRESS NOTE ADULT - PROBLEM SELECTOR PLAN 3
Pt presented w/ exertional substernal chest pressure, currently CP free and HD stable  -Trop 0.02 x 2, EKG non ischemic  -TTE results above  -CCTA 12/6: mod Ca+ score, severe stenosis of OM1, remaining segments non obstructive   -NPO after MN for cardiac cath pending AM Cr, pt consented and s/p ASA 325mg and Plavix 600mg on 12/7  -Continue ASA 81mg QD, Plavix 75mg QD, Lipitor 40mg HS, and Coreg 3.125mg BID Pt presented w/ exertional substernal chest pressure, currently CP free and HD stable  -Trop 0.02 x 2, EKG non ischemic  -TTE results above  -CCTA 12/6: mod Ca+ score, severe stenosis of OM1, remaining segments non obstructive   -NPO after MN for cardiac cath pending AM Cr, pt consented and s/p ASA 325mg and Plavix 600mg load  -Continue ASA 81mg QD, Plavix 75mg QD, Lipitor 40mg HS, and Coreg 3.125mg BID

## 2022-12-08 NOTE — PROGRESS NOTE ADULT - PROBLEM SELECTOR PLAN 5
h/o OM R 4th toe on IV Abx via PICC until 12/20/22- currently non toxic appearing and afebrile  -Vanc random trough in AM 12.3, decrease Vanc to 1g QD and f/u AM random trough at 1pm  -Continue CTX IV 2g QD  -ID following h/o OM R 4th toe on IV Abx via PICC until 12/20/22- currently non toxic appearing and afebrile  -Vanc random trough in AM 12.3, decrease Vanc to 1g and f/u AM random trough at 1pm  -Continue CTX IV 2g QD  -ID following

## 2022-12-08 NOTE — PROGRESS NOTE ADULT - PROBLEM SELECTOR PLAN 4
uptrending Cr to 1.69 today, likely i/s/o cardio-renal 2/2 IV diuresis  -UA/Ulytes ordered  -Holding Lasix and Lisinopril  -Continue to monitor UOP and BP, avoid nephrotoxic agents uptrending Cr to 1.69 today, likely i/s/o cardio-renal 2/2 IV diuresis  -UA/Ulytes unremarkable   -Holding Lasix and Lisinopril  -Continue to monitor UOP and BP, avoid nephrotoxic agents

## 2022-12-08 NOTE — PROGRESS NOTE ADULT - ASSESSMENT
57M w/ PMHx HTN, HLD, DM-II, OM of R 4th (on Vanc and CTX via PICC, last dose 12/20/22), and Anxiety, presented to [facility] ED w/ HTN Urgency and flash pulm edema, pt now admitted to cardiac telemetry for further management. Pt now s/p IV lasix and pending cardiac cath i/s/o abnormal CCTA. Hospital course c/b NOLAN, likely 2/2 IV diuresis, pending improvement prior to [facility]. 57M w/ PMHx HTN, HLD, DM-II, OM of R 4th (on Vanc and CTX via PICC, last dose 12/20/22), and Anxiety, presented to [facility] ED w/ HTN Urgency and flash pulm edema, pt now admitted to cardiac telemetry for further management. Hospital course c/b NOLAN 2/2 IV diuresis, pt currently euvolemic off diuretics and pending cardiac cath i/s/o abnormal CCTA, pending Cr improvement.

## 2022-12-08 NOTE — PROGRESS NOTE ADULT - SUBJECTIVE AND OBJECTIVE BOX
INTERVAL HPI/OVERNIGHT EVENTS:    Patient was seen and examined at bedside.  No events    CONSTITUTIONAL:  Negative fever or chills, feels well, good appetite  EYES:  Negative  blurry vision or double vision  CARDIOVASCULAR:  Negative for chest pain or palpitations  RESPIRATORY:  Negative for cough, wheezing, or SOB   GASTROINTESTINAL:  Negative for nausea, vomiting, diarrhea, constipation, or abdominal pain  GENITOURINARY:  Negative frequency, urgency or dysuria  NEUROLOGIC:  No headache, confusion, dizziness, lightheadedness      ANTIBIOTICS/RELEVANT:    MEDICATIONS  (STANDING):  amLODIPine   Tablet 10 milliGRAM(s) Oral daily  aspirin enteric coated 81 milliGRAM(s) Oral daily  atorvastatin 40 milliGRAM(s) Oral at bedtime  carvedilol 3.125 milliGRAM(s) Oral every 12 hours  cefTRIAXone   IVPB      cefTRIAXone   IVPB 2000 milliGRAM(s) IV Intermittent every 24 hours  chlorhexidine 2% Cloths 1 Application(s) Topical daily  citalopram 40 milliGRAM(s) Oral daily  clopidogrel Tablet 75 milliGRAM(s) Oral daily  hydrALAZINE 50 milliGRAM(s) Oral three times a day  insulin glargine Injectable (LANTUS) 15 Unit(s) SubCutaneous at bedtime  insulin lispro (ADMELOG) corrective regimen sliding scale   SubCutaneous Before meals and at bedtime  insulin lispro Injectable (ADMELOG) 13 Unit(s) SubCutaneous three times a day before meals    MEDICATIONS  (PRN):        Vital Signs Last 24 Hrs  T(C): 37.5 (08 Dec 2022 10:14), Max: 37.5 (08 Dec 2022 10:14)  T(F): 99.5 (08 Dec 2022 10:14), Max: 99.5 (08 Dec 2022 10:14)  HR: 60 (08 Dec 2022 09:15) (51 - 60)  BP: 151/65 (08 Dec 2022 09:15) (102/54 - 154/70)  BP(mean): 98 (08 Dec 2022 05:31) (91 - 101)  RR: 18 (08 Dec 2022 09:15) (16 - 18)  SpO2: 97% (08 Dec 2022 09:15) (96% - 99%)    Parameters below as of 08 Dec 2022 09:15  Patient On (Oxygen Delivery Method): room air        PHYSICAL EXAM:  Constitutional:  non-toxic, no distress  Eyes:  no icterus   Ear/Nose/Throat: no oral lesion  Neck:  supple  Respiratory: CTA sina  Cardiovascular: S1S2 RRR, no murmurs  Gastrointestinal:soft, (+) BS, no HSM  Extremities:no edema; right 4th toe without erythema, drainage, warmth, tenderness   Vascular: DP Pulse:	right normal; left normal      LABS:                        10.3   6.52  )-----------( 292      ( 08 Dec 2022 06:46 )             31.8     12    138  |  102  |  36<H>  ----------------------------<  198<H>  4.6   |  28  |  1.74<H>    Ca    8.5      08 Dec 2022 06:46  Mg     2.1     12      PT/INR - ( 08 Dec 2022 06:46 )   PT: 12.8 sec;   INR: 1.07          PTT - ( 08 Dec 2022 06:46 )  PTT:30.3 sec  Urinalysis Basic - ( 07 Dec 2022 15:39 )    Color: Yellow / Appearance: Clear / S.025 / pH: x  Gluc: x / Ketone: Trace mg/dL  / Bili: Negative / Urobili: 0.2 E.U./dL   Blood: x / Protein: 30 mg/dL / Nitrite: NEGATIVE   Leuk Esterase: NEGATIVE / RBC: < 5 /HPF / WBC < 5 /HPF   Sq Epi: x / Non Sq Epi: x / Bacteria: Present /HPF        MICROBIOLOGY:    Culture - Blood (22 @ 15:50)    Specimen Source: .Blood Blood-Peripheral    Culture Results:   No growth at 4 days.        Culture - Blood (22 @ 15:35)    Specimen Source: .Blood Blood-Peripheral    Culture Results:   No growth at 4 days.    RADIOLOGY & ADDITIONAL STUDIES:

## 2022-12-08 NOTE — PROGRESS NOTE ADULT - PROBLEM SELECTOR PLAN 2
euvolemic and HD stable, SpO2 94% RA, net neg 10L overall  -BNP 1536 > 890  -TTE 12/5: normal LVEF, mild LVH, mildly dilated LA, PASP 47mmHG  -s/p Lasix 40mg IV BID, now held i/s/o upcoming LHC and NOLAN  -Continue Hydralazine 50mg TID and Coreg 3.125mg BID  -Holding Lisinopril 40mg QD 2/2 NOLAN  -Core measure, daily weight, strict I&Os and 1.5L fluid restriction

## 2022-12-08 NOTE — PROGRESS NOTE ADULT - PROBLEM SELECTOR PLAN 7
A1c 9.8%  -Continue Lantus 15u HS, Lispro 13u TID and mISS    F: None  E: Replete if K<4 or Mag<2  N: DASH Diet  VTEppx: none 2/2 cath  Dispo: cardiac tele

## 2022-12-08 NOTE — PROGRESS NOTE ADULT - PROBLEM SELECTOR PLAN 1
presented w/ SBP 190s, currently 140s  -Continue Amlodipine 10mg QD, Coreg 12.5mg BID and Hydralazine 50mg TID  -Holding Lasix and Lisinopril i/s/o up coming cath and NOLAN  -Renal duplex negative for ABHINAV

## 2022-12-08 NOTE — PROGRESS NOTE ADULT - SUBJECTIVE AND OBJECTIVE BOX
OVERNIGHT: No acute events overnight.   SUBJECTIVE / INTERVAL HPI: Patient was seen and examined this morning.     CAPILLARY BLOOD GLUCOSE & INSULIN RECEIVED  Yesterday  - Dinner FS mg/dL = *** units of Lispro + *** units of sliding scale.   - Bedtime FS mg/dL = *** units of Lantus + *** units sliding scale.     Today  - Breakfast FS mg/dL = *** units of Lispro + *** units of sliding scale.   - Lunch FSG: *** mg/dL = *** units of Lispro + *** units of sliding scale.     REVIEW OF SYSTEMS  Constitutional:  Negative fever, chills or loss of appetite.  Eyes:  Negative blurry vision or double vision.  Cardiovascular:  Negative for chest pain or palpitations.  Respiratory:  Negative for cough, wheezing, or shortness of breath.    Gastrointestinal:  Negative for nausea, vomiting, diarrhea, constipation, or abdominal pain.  Genitourinary:  Negative frequency, urgency or dysuria.  Neurologic:  No headache, confusion, dizziness, lightheadedness.    PHYSICAL EXAM  Vital Signs Last 24 Hrs  T(C): 37.5 (08 Dec 2022 10:14), Max: 37.5 (08 Dec 2022 10:14)  T(F): 99.5 (08 Dec 2022 10:14), Max: 99.5 (08 Dec 2022 10:14)  HR: 60 (08 Dec 2022 09:15) (51 - 60)  BP: 151/65 (08 Dec 2022 09:15) (102/54 - 154/70)  BP(mean): 98 (08 Dec 2022 05:31) (91 - 101)  RR: 18 (08 Dec 2022 09:15) (16 - 18)  SpO2: 97% (08 Dec 2022 09:15) (96% - 99%)    Parameters below as of 08 Dec 2022 09:15  Patient On (Oxygen Delivery Method): room air        Constitutional: Awake, alert, in no acute distress.   HEENT: Normocephalic, atraumatic, HALLEY, no proptosis or lid retraction.   Neck: supple, no acanthosis, no thyromegaly or palpable thyroid nodules.  Respiratory: Lungs clear to ausculation bilaterally.   Cardiovascular: regular rhythm, normal S1 and S2, no audible murmurs.   GI: soft, non-tender, non-distended, bowel sounds present, no masses appreciated.  Extremities: No lower extremity edema, peripheral pulses present.   Skin: no rashes.   Psychiatric: AAO x 3. Normal affect/mood.     LABS  CBC - WBC/HGB/HTC/PLT: 6.52/10.3/31.8/292 (22)  BMP - Na/K/Cl/Bicarb/BUN/Cr/Gluc/AG/eGFR: 138/4.6/102/28/36/1.74/198/8/46 (22)  Ca - 8.5 (22)  Phos - -- (22)  Mg - 2.1 (22)  LFT - Alb/Tprot/Tbili/Dbili/AlkPhos/ALT/AST: 3.6/--/0.4/--/90/61/28 (22)  PT/aPTT/INR: 12.8/30.3/1.07 (22)   Thyroid Stimulating Hormone, Serum: 1.880 (22)      MEDICATIONS  MEDICATIONS  (STANDING):  amLODIPine   Tablet 10 milliGRAM(s) Oral daily  aspirin enteric coated 81 milliGRAM(s) Oral daily  atorvastatin 40 milliGRAM(s) Oral at bedtime  carvedilol 3.125 milliGRAM(s) Oral every 12 hours  cefTRIAXone   IVPB      cefTRIAXone   IVPB 2000 milliGRAM(s) IV Intermittent every 24 hours  chlorhexidine 2% Cloths 1 Application(s) Topical daily  citalopram 40 milliGRAM(s) Oral daily  clopidogrel Tablet 75 milliGRAM(s) Oral daily  hydrALAZINE 50 milliGRAM(s) Oral three times a day  insulin glargine Injectable (LANTUS) 15 Unit(s) SubCutaneous at bedtime  insulin lispro (ADMELOG) corrective regimen sliding scale   SubCutaneous Before meals and at bedtime  insulin lispro Injectable (ADMELOG) 13 Unit(s) SubCutaneous three times a day before meals  vancomycin  IVPB 1000 milliGRAM(s) IV Intermittent once    MEDICATIONS  (PRN):    ASSESSMENT / RECOMMENDATIONS    A1C: 9.8 %  BUN: 36  Creatinine: 1.74  GFR: 46  Weight: 106  BMI: 31.7  EF: 71 %      # Type 2 diabetes mellitus  - Please continue lantus *** units at bedtime.   - Continue lispro *** units before each meal.  - Continue lispro moderate / low dose sliding scale before meals and at bedtime.  - Patient's fingerstick glucose goal is 100-180 mg/dL.    - For discharge, patient can ***.    - Patient can follow up at discharge with [facility] by calling [phone] to make an appointment.      Case discussed with Dr. Bustillo. Primary team updated.       Konrad Turner    Endocrinology Fellow    Service Pager: [phone]    OVERNIGHT: No acute events overnight.   SUBJECTIVE / INTERVAL HPI: Patient was seen and examined this morning. The patient was kept NPO overnight for possible cath; however, the procedure was cancelled due to elevated creatinine. He will be kept NPO past midnight again, with plans for procedure tentatively tomorrow, pending his creatinine level. Creatinine level is slightly higher than yesterday, but likely starting to plateau (1.74 mg/dL from 1.69 mg/dL).     CAPILLARY BLOOD GLUCOSE & INSULIN RECEIVED  - Dinner FS mg/dL = 13 units of Lispro + 2 units of sliding scale.    - Bedtime FS mg/dL = 15 units of Lantus + 2 units sliding scale.      Today   - Breakfast FS mg/dL = He didn’t received insulin (NPO).    - Lunch FS mg/dL = 13 units of Lispro + 4 units of sliding scale.     REVIEW OF SYSTEMS  Constitutional:  Negative fever, chills or loss of appetite.  Eyes:  Negative blurry vision or double vision.  Cardiovascular:  Negative for chest pain or palpitations.  Respiratory:  Negative for cough, wheezing, or shortness of breath.    Gastrointestinal:  Negative for nausea, vomiting, diarrhea, constipation, or abdominal pain.  Genitourinary:  Negative frequency, urgency or dysuria.  Neurologic:  No headache, confusion, dizziness, lightheadedness.    PHYSICAL EXAM  Vital Signs Last 24 Hrs  T(C): 37.5 (08 Dec 2022 10:14), Max: 37.5 (08 Dec 2022 10:14)  T(F): 99.5 (08 Dec 2022 10:14), Max: 99.5 (08 Dec 2022 10:14)  HR: 60 (08 Dec 2022 09:15) (51 - 60)  BP: 151/65 (08 Dec 2022 09:15) (102/54 - 154/70)  BP(mean): 98 (08 Dec 2022 05:31) (91 - 101)  RR: 18 (08 Dec 2022 09:15) (16 - 18)  SpO2: 97% (08 Dec 2022 09:15) (96% - 99%)    Parameters below as of 08 Dec 2022 09:15  Patient On (Oxygen Delivery Method): room air    Constitutional: Awake, alert, male in no acute distress.   HEENT: Normocephalic, atraumatic, HALLEY.  Respiratory: Lungs clear to ausculation bilaterally.   Cardiovascular: regular rhythm, normal S1 and S2, no audible murmurs.   GI: soft, non-tender, non-distended, bowel sounds present.  Extremities: No lower extremity edema.  Psychiatric: AAO x 3. Normal affect/mood.     LABS  CBC - WBC/HGB/HTC/PLT: 6.52/10.3/31.8/292 (22)  BMP - Na/K/Cl/Bicarb/BUN/Cr/Gluc/AG/eGFR: 138/4.6/102/28/36/1.74/198/8/46 (22)  Ca - 8.5 (22)  Phos - -- (22)  Mg - 2.1 (22)  LFT - Alb/Tprot/Tbili/Dbili/AlkPhos/ALT/AST: 3.6/--/0.4/--/90/61/28 (22)  PT/aPTT/INR: 12.8/30.3/1.07 (22)   Thyroid Stimulating Hormone, Serum: 1.880 (22)    MEDICATIONS  MEDICATIONS  (STANDING):  amLODIPine   Tablet 10 milliGRAM(s) Oral daily  aspirin enteric coated 81 milliGRAM(s) Oral daily  atorvastatin 40 milliGRAM(s) Oral at bedtime  carvedilol 3.125 milliGRAM(s) Oral every 12 hours  cefTRIAXone   IVPB      cefTRIAXone   IVPB 2000 milliGRAM(s) IV Intermittent every 24 hours  chlorhexidine 2% Cloths 1 Application(s) Topical daily  citalopram 40 milliGRAM(s) Oral daily  clopidogrel Tablet 75 milliGRAM(s) Oral daily  hydrALAZINE 50 milliGRAM(s) Oral three times a day  insulin glargine Injectable (LANTUS) 15 Unit(s) SubCutaneous at bedtime  insulin lispro (ADMELOG) corrective regimen sliding scale   SubCutaneous Before meals and at bedtime  insulin lispro Injectable (ADMELOG) 13 Unit(s) SubCutaneous three times a day before meals  vancomycin  IVPB 1000 milliGRAM(s) IV Intermittent once    MEDICATIONS  (PRN):    ASSESSMENT / RECOMMENDATIONS  Mr. Tse is a 58 yo M with PMHx HTN, HLD, IDDM, anxiety, OM R 4th toe (undergoing treatment with Vanc [id number], CTX 2gm IV qD x 6 weeks via PICC, ending 22) who was admitted to cardiac telemetry for management of hypertensive urgency & acute decompensated CHF, also with hyperglycemia. Endocrinology following for recommendations regarding glycemic control.     A1C: 9.8 %  BUN: 36  Creatinine: 1.74  GFR: 46  Weight: 106  BMI: 31.7  EF: 71 %      # Type 2 diabetes mellitus  - Please increase lantus to 18 units at bedtime.   - Increase lispro to 15 units before each meal.  - Continue lispro moderate dose sliding scale before meals and at bedtime.  - Patient's fingerstick glucose goal is 100-180 mg/dL.    - Patient can follow up at discharge with [facility] by calling [phone] to make an appointment.      Case discussed with Dr. Bustillo. Primary team updated.       Konrad Turner    Endocrinology Fellow    Service Pager: [phone]

## 2022-12-08 NOTE — PROGRESS NOTE ADULT - SUBJECTIVE AND OBJECTIVE BOX
Cardiology PA Adult Progress Note    SUBJECTIVE ASSESSMENT:  	  MEDICATIONS:  amLODIPine   Tablet 10 milliGRAM(s) Oral daily  carvedilol 3.125 milliGRAM(s) Oral every 12 hours  hydrALAZINE 50 milliGRAM(s) Oral three times a day   cefTRIAXone   IVPB 2000 milliGRAM(s) IV Intermittent every 24 hours  citalopram 40 milliGRAM(s) Oral daily  atorvastatin 40 milliGRAM(s) Oral at bedtime  insulin glargine Injectable (LANTUS) 15 Unit(s) SubCutaneous at bedtime  insulin lispro (ADMELOG) corrective regimen sliding scale   SubCutaneous Before meals and at bedtime  insulin lispro Injectable (ADMELOG) 13 Unit(s) SubCutaneous three times a day before meals  aspirin enteric coated 81 milliGRAM(s) Oral daily  chlorhexidine 2% Cloths 1 Application(s) Topical daily  clopidogrel Tablet 75 milliGRAM(s) Oral daily  	  VITAL SIGNS:  T(C): 36.2 (12-08-22 @ 14:05), Max: 37.5 (12-08-22 @ 10:14)  HR: 60 (12-08-22 @ 09:15) (51 - 60)  BP: 151/65 (12-08-22 @ 09:15) (125/58 - 154/70)  RR: 18 (12-08-22 @ 09:15) (16 - 18)  SpO2: 97% (12-08-22 @ 09:15) (96% - 99%)                                     PHYSICAL EXAM:  Appearance: Normal	  HEENT: Normal oral mucosa, PERRL, EOMI	  Neck: Supple, + JVD/ - JVD; ___ Carotid Bruit   Cardiovascular: Normal S1 S2, No murmurs  Respiratory: Lungs clear to auscultation/Decreased Breath Sounds/No Rales, Rhonchi, Wheezing	  Gastrointestinal:  Soft, Non-tender, + BS	  Skin: No rashes, No ecchymoses, No cyanosis  Extremities: Normal range of motion, No clubbing, cyanosis or edema  Vascular: Peripheral pulses palpable 2+ bilaterally  Neurologic: Non-focal  Psychiatry: A & O x 3, Mood & affect appropriate    LABS:	 	               10.3   6.52  )-----------( 292      ( 08 Dec 2022 06:46 )             31.8     12-08    138  |  102  |  36<H>  ----------------------------<  198<H>  4.6   |  28  |  1.74<H>    Ca    8.5      08 Dec 2022 06:46  Mg     2.1     12-08    PT/INR - ( 08 Dec 2022 06:46 )   PT: 12.8 sec;   INR: 1.07          PTT - ( 08 Dec 2022 06:46 )  PTT:30.3 sec Cardiology PA Adult Progress Note    SUBJECTIVE ASSESSMENT: Pt seen and examined at bedside this AM sitting up in bed comfortably in bed w/o any complaints or events overnight. He states that he feels fine and denies any CP, palpitations, SOB, orthopnea, PND, fatigue, HA, dizziness/lightheadedness, N/V or abd pain.  	  MEDICATIONS:  amLODIPine   Tablet 10 milliGRAM(s) Oral daily  carvedilol 3.125 milliGRAM(s) Oral every 12 hours  hydrALAZINE 50 milliGRAM(s) Oral three times a day   cefTRIAXone   IVPB 2000 milliGRAM(s) IV Intermittent every 24 hours  citalopram 40 milliGRAM(s) Oral daily  atorvastatin 40 milliGRAM(s) Oral at bedtime  insulin glargine Injectable (LANTUS) 15 Unit(s) SubCutaneous at bedtime  insulin lispro (ADMELOG) corrective regimen sliding scale   SubCutaneous Before meals and at bedtime  insulin lispro Injectable (ADMELOG) 13 Unit(s) SubCutaneous three times a day before meals  aspirin enteric coated 81 milliGRAM(s) Oral daily  chlorhexidine 2% Cloths 1 Application(s) Topical daily  clopidogrel Tablet 75 milliGRAM(s) Oral daily  	  VITAL SIGNS:  T(C): 36.2 (12-08-22 @ 14:05), Max: 37.5 (12-08-22 @ 10:14)  HR: 60 (12-08-22 @ 09:15) (51 - 60)  BP: 151/65 (12-08-22 @ 09:15) (125/58 - 154/70)  RR: 18 (12-08-22 @ 09:15) (16 - 18)  SpO2: 97% (12-08-22 @ 09:15) (96% - 99%)                                     PHYSICAL EXAM:  Appearance: Normal	  HEENT: Normal oral mucosa, PERRL, EOMI	  Neck: Supple,  - JVD; no Carotid Bruit   Cardiovascular: Normal S1 S2, No murmurs  Respiratory: Lungs clear to auscultation, No Rales, Rhonchi, Wheezing	  Gastrointestinal:  Soft, Non-tender, + BS	  Skin: No rashes, No ecchymoses, No cyanosis  Extremities: Normal range of motion, No clubbing, cyanosis or edema  Vascular: Peripheral pulses palpable 2+ bilaterally  Neurologic: Non-focal  Psychiatry: A & O x 3, Mood & affect appropriate    LABS:	 	               10.3   6.52  )-----------( 292      ( 08 Dec 2022 06:46 )             31.8     12-08    138  |  102  |  36<H>  ----------------------------<  198<H>  4.6   |  28  |  1.74<H>    Ca    8.5      08 Dec 2022 06:46  Mg     2.1     12-08    PT/INR - ( 08 Dec 2022 06:46 )   PT: 12.8 sec;   INR: 1.07          PTT - ( 08 Dec 2022 06:46 )  PTT:30.3 sec

## 2022-12-09 DIAGNOSIS — I25.10 ATHEROSCLEROTIC HEART DISEASE OF NATIVE CORONARY ARTERY WITHOUT ANGINA PECTORIS: ICD-10-CM

## 2022-12-09 LAB
ANION GAP SERPL CALC-SCNC: 10 MMOL/L — SIGNIFICANT CHANGE UP (ref 5–17)
APTT BLD: 31.5 SEC — SIGNIFICANT CHANGE UP (ref 27.5–35.5)
BUN SERPL-MCNC: 41 MG/DL — HIGH (ref 7–23)
CALCIUM SERPL-MCNC: 8.7 MG/DL — SIGNIFICANT CHANGE UP (ref 8.4–10.5)
CHLORIDE SERPL-SCNC: 100 MMOL/L — SIGNIFICANT CHANGE UP (ref 96–108)
CO2 SERPL-SCNC: 26 MMOL/L — SIGNIFICANT CHANGE UP (ref 22–31)
COHGB MFR BLDV: 1.8 % — SIGNIFICANT CHANGE UP
COHGB MFR BLDV: 2 % — SIGNIFICANT CHANGE UP
CREAT SERPL-MCNC: 1.59 MG/DL — HIGH (ref 0.5–1.3)
EGFR: 51 ML/MIN/1.73M2 — LOW
GLUCOSE BLDC GLUCOMTR-MCNC: 145 MG/DL — HIGH (ref 70–99)
GLUCOSE BLDC GLUCOMTR-MCNC: 152 MG/DL — HIGH (ref 70–99)
GLUCOSE BLDC GLUCOMTR-MCNC: 155 MG/DL — HIGH (ref 70–99)
GLUCOSE BLDC GLUCOMTR-MCNC: 173 MG/DL — HIGH (ref 70–99)
GLUCOSE BLDC GLUCOMTR-MCNC: 178 MG/DL — HIGH (ref 70–99)
GLUCOSE BLDC GLUCOMTR-MCNC: 203 MG/DL — HIGH (ref 70–99)
GLUCOSE SERPL-MCNC: 190 MG/DL — HIGH (ref 70–99)
HCT VFR BLD CALC: 33.3 % — LOW (ref 39–50)
HGB BLD CALC-MCNC: 10.7 G/DL — LOW (ref 12.6–17.4)
HGB BLD CALC-MCNC: 10.9 G/DL — LOW (ref 12.6–17.4)
HGB BLD-MCNC: 11 G/DL — LOW (ref 13–17)
INR BLD: 1.02 — SIGNIFICANT CHANGE UP (ref 0.88–1.16)
ISTAT ACTK (ACTIVATED CLOTTING TIME KAOLIN): 233 SEC — HIGH (ref 74–137)
ISTAT ACTK (ACTIVATED CLOTTING TIME KAOLIN): 251 SEC — HIGH (ref 74–137)
ISTAT ACTK (ACTIVATED CLOTTING TIME KAOLIN): 269 SEC — HIGH (ref 74–137)
MAGNESIUM SERPL-MCNC: 2.2 MG/DL — SIGNIFICANT CHANGE UP (ref 1.6–2.6)
MCHC RBC-ENTMCNC: 30.8 PG — SIGNIFICANT CHANGE UP (ref 27–34)
MCHC RBC-ENTMCNC: 33 GM/DL — SIGNIFICANT CHANGE UP (ref 32–36)
MCV RBC AUTO: 93.3 FL — SIGNIFICANT CHANGE UP (ref 80–100)
METHGB MFR BLDV: 0.5 % — SIGNIFICANT CHANGE UP
METHGB MFR BLDV: 0.9 % — SIGNIFICANT CHANGE UP
NRBC # BLD: 0 /100 WBCS — SIGNIFICANT CHANGE UP (ref 0–0)
PLATELET # BLD AUTO: 307 K/UL — SIGNIFICANT CHANGE UP (ref 150–400)
POTASSIUM SERPL-MCNC: 4.8 MMOL/L — SIGNIFICANT CHANGE UP (ref 3.5–5.3)
POTASSIUM SERPL-SCNC: 4.8 MMOL/L — SIGNIFICANT CHANGE UP (ref 3.5–5.3)
PROTHROM AB SERPL-ACNC: 12.2 SEC — SIGNIFICANT CHANGE UP (ref 10.5–13.4)
RBC # BLD: 3.57 M/UL — LOW (ref 4.2–5.8)
RBC # FLD: 13.1 % — SIGNIFICANT CHANGE UP (ref 10.3–14.5)
SAO2 % BLDV: 72 % — SIGNIFICANT CHANGE UP (ref 67–88)
SAO2 % BLDV: 97 % — HIGH (ref 67–88)
SARS-COV-2 RNA SPEC QL NAA+PROBE: SIGNIFICANT CHANGE UP
SODIUM SERPL-SCNC: 136 MMOL/L — SIGNIFICANT CHANGE UP (ref 135–145)
VANCOMYCIN FLD-MCNC: 10.5 UG/ML — SIGNIFICANT CHANGE UP
WBC # BLD: 9.14 K/UL — SIGNIFICANT CHANGE UP (ref 3.8–10.5)
WBC # FLD AUTO: 9.14 K/UL — SIGNIFICANT CHANGE UP (ref 3.8–10.5)

## 2022-12-09 PROCEDURE — 99152 MOD SED SAME PHYS/QHP 5/>YRS: CPT

## 2022-12-09 PROCEDURE — 93460 R&L HRT ART/VENTRICLE ANGIO: CPT | Mod: 26,59

## 2022-12-09 PROCEDURE — 99231 SBSQ HOSP IP/OBS SF/LOW 25: CPT | Mod: GC

## 2022-12-09 PROCEDURE — 99233 SBSQ HOSP IP/OBS HIGH 50: CPT

## 2022-12-09 PROCEDURE — 99232 SBSQ HOSP IP/OBS MODERATE 35: CPT

## 2022-12-09 PROCEDURE — 93308 TTE F-UP OR LMTD: CPT | Mod: 26

## 2022-12-09 PROCEDURE — 92943 PRQ TRLUML REVSC CH OCC ANT: CPT | Mod: LC

## 2022-12-09 RX ORDER — LIDOCAINE HYDROCHLORIDE AND EPINEPHRINE 10; 10 MG/ML; UG/ML
1.5 INJECTION, SOLUTION INFILTRATION; PERINEURAL ONCE
Refills: 0 | Status: DISCONTINUED | OUTPATIENT
Start: 2022-12-09 | End: 2022-12-09

## 2022-12-09 RX ORDER — SODIUM CHLORIDE 9 MG/ML
500 INJECTION INTRAMUSCULAR; INTRAVENOUS; SUBCUTANEOUS
Refills: 0 | Status: DISCONTINUED | OUTPATIENT
Start: 2022-12-09 | End: 2022-12-10

## 2022-12-09 RX ORDER — SODIUM CHLORIDE 9 MG/ML
500 INJECTION INTRAMUSCULAR; INTRAVENOUS; SUBCUTANEOUS
Refills: 0 | Status: DISCONTINUED | OUTPATIENT
Start: 2022-12-09 | End: 2022-12-09

## 2022-12-09 RX ORDER — VANCOMYCIN HCL 1 G
1000 VIAL (EA) INTRAVENOUS ONCE
Refills: 0 | Status: COMPLETED | OUTPATIENT
Start: 2022-12-09 | End: 2022-12-09

## 2022-12-09 RX ORDER — INSULIN LISPRO 100/ML
14 VIAL (ML) SUBCUTANEOUS
Refills: 0 | Status: DISCONTINUED | OUTPATIENT
Start: 2022-12-09 | End: 2022-12-10

## 2022-12-09 RX ORDER — VANCOMYCIN HCL 1 G
1500 VIAL (EA) INTRAVENOUS EVERY 12 HOURS
Refills: 0 | Status: DISCONTINUED | OUTPATIENT
Start: 2022-12-09 | End: 2022-12-09

## 2022-12-09 RX ORDER — INSULIN GLARGINE 100 [IU]/ML
22 INJECTION, SOLUTION SUBCUTANEOUS AT BEDTIME
Refills: 0 | Status: DISCONTINUED | OUTPATIENT
Start: 2022-12-09 | End: 2022-12-10

## 2022-12-09 RX ADMIN — Medication 2: at 17:18

## 2022-12-09 RX ADMIN — Medication 2: at 09:04

## 2022-12-09 RX ADMIN — Medication 50 MILLIGRAM(S): at 17:21

## 2022-12-09 RX ADMIN — CHLORHEXIDINE GLUCONATE 1 APPLICATION(S): 213 SOLUTION TOPICAL at 06:27

## 2022-12-09 RX ADMIN — AMLODIPINE BESYLATE 10 MILLIGRAM(S): 2.5 TABLET ORAL at 06:10

## 2022-12-09 RX ADMIN — Medication 50 MILLIGRAM(S): at 06:10

## 2022-12-09 RX ADMIN — SODIUM CHLORIDE 100 MILLILITER(S): 9 INJECTION INTRAMUSCULAR; INTRAVENOUS; SUBCUTANEOUS at 14:35

## 2022-12-09 RX ADMIN — CARVEDILOL PHOSPHATE 3.12 MILLIGRAM(S): 80 CAPSULE, EXTENDED RELEASE ORAL at 21:39

## 2022-12-09 RX ADMIN — Medication 2: at 11:26

## 2022-12-09 RX ADMIN — CITALOPRAM 40 MILLIGRAM(S): 10 TABLET, FILM COATED ORAL at 11:27

## 2022-12-09 RX ADMIN — CARVEDILOL PHOSPHATE 3.12 MILLIGRAM(S): 80 CAPSULE, EXTENDED RELEASE ORAL at 09:07

## 2022-12-09 RX ADMIN — Medication 81 MILLIGRAM(S): at 06:10

## 2022-12-09 RX ADMIN — INSULIN GLARGINE 22 UNIT(S): 100 INJECTION, SOLUTION SUBCUTANEOUS at 22:46

## 2022-12-09 RX ADMIN — Medication 50 MILLIGRAM(S): at 21:39

## 2022-12-09 RX ADMIN — ATORVASTATIN CALCIUM 40 MILLIGRAM(S): 80 TABLET, FILM COATED ORAL at 21:39

## 2022-12-09 RX ADMIN — Medication 15 UNIT(S): at 17:57

## 2022-12-09 RX ADMIN — CEFTRIAXONE 100 MILLIGRAM(S): 500 INJECTION, POWDER, FOR SOLUTION INTRAMUSCULAR; INTRAVENOUS at 19:07

## 2022-12-09 RX ADMIN — SODIUM CHLORIDE 100 MILLILITER(S): 9 INJECTION INTRAMUSCULAR; INTRAVENOUS; SUBCUTANEOUS at 15:06

## 2022-12-09 RX ADMIN — Medication 250 MILLIGRAM(S): at 17:14

## 2022-12-09 RX ADMIN — CLOPIDOGREL BISULFATE 75 MILLIGRAM(S): 75 TABLET, FILM COATED ORAL at 06:10

## 2022-12-09 RX ADMIN — SODIUM CHLORIDE 75 MILLILITER(S): 9 INJECTION INTRAMUSCULAR; INTRAVENOUS; SUBCUTANEOUS at 10:29

## 2022-12-09 RX ADMIN — Medication 4: at 22:48

## 2022-12-09 NOTE — PROGRESS NOTE ADULT - ATTENDING COMMENTS
PT seen on rounds this afternoon.  Underwent successful PCI/stenting of the lesion in the proximal OM1.  Tolerated the procedure well, and had no new complaints.  HIs 4th toe is only slightly erythematous.  There is no skin breakdown or necrosis  Glucose was down to 141 after supper last night, but he had received a total of 17 units lispro (13 standing plus 4 correction).  He then sheila to 178 this morning despite the increase in Lantus ot 18 units.  Will therefore increase the Lantus further to 22 units  Will also increase the premeal slightly to 14 units lispro
Agree with above.  Continue Ceftriaxone.  Follow up vancomycin random level at 1 pm today.  If < 20, give vancomycin 1 gram IV x 1 and check level 24 hrs later. If creatinine continues to improve, he will need q12 hrs dosing
Pt seen on rounds this afternoon.  Cath postponed due to elevated creatinine, though this may be plateauing.  He had no new complaints  Glucoses have been stable but moderately elevated into the 160-200 range, more suggestive of a need for a higher basal insulin dose.    Will increase the Lantus ot 18 units, but leave the premeal at 15 units for now

## 2022-12-09 NOTE — PROGRESS NOTE ADULT - PROBLEM SELECTOR PLAN 5
h/o OM R 4th toe on IV Abx via PICC until 12/20/22- currently non toxic appearing and afebrile  -Vanc random trough in AM 12.3, decrease Vanc to 1g and f/u AM random trough at 1pm  -Continue CTX IV 2g QD  -ID following h/o OM R 4th toe on IV Abx via PICC until 12/20/22, known to Dr. Gallardo - currently non toxic appearing and afebrile  - ID following   -Continue CTX IV 2g QD; Vanc dosing based on Vanc level, dose decreased to 1G on 12.8. F/u Vanc trough 12/10 @ 4:30 PM   - If creatinine continues to improve, he will need q12 hrs dosing

## 2022-12-09 NOTE — PROGRESS NOTE ADULT - PROBLEM SELECTOR PLAN 3
Pt presented w/ exertional substernal chest pressure, currently CP free and HD stable  -Trop 0.02 x 2, EKG non ischemic  -TTE results above  -CCTA 12/6: mod Ca+ score, severe stenosis of OM1, remaining segments non obstructive   -NPO after MN for cardiac cath pending AM Cr, pt consented and s/p ASA 325mg and Plavix 600mg load  -Continue ASA 81mg QD, Plavix 75mg QD, Lipitor 40mg HS, and Coreg 3.125mg BID euvolemic and HD stable, SpO2 94% RA, net neg 11.9L overall  -BNP 1536 > 890  -TTE 12/5: normal LVEF, mild LVH, mildly dilated LA, PASP 47mmHG  -s/p Lasix 40mg IV BID, now held i/s/o NOLAN  -Continue Hydralazine 50mg TID and Coreg 3.125mg BID  -Holding Lisinopril 40mg QD 2/2 NOLAN  -Core measure, daily weight, strict I&Os and 1.5L fluid restriction

## 2022-12-09 NOTE — PROGRESS NOTE ADULT - ASSESSMENT
57M w/ PMHx HTN, HLD, DM-II, OM of R 4th (on Vanc and CTX via PICC, last dose 12/20/22), and Anxiety, presented to [facility] ED w/ HTN Urgency and flash pulm edema, pt now admitted to cardiac telemetry for further management. Hospital course c/b NOLAN 2/2 IV diuresis, pt currently euvolemic off diuretics and pending cardiac cath i/s/o abnormal CCTA, pending Cr improvement.  57M w/ PMHx HTN, HLD, DM-II, OM of R 4th (on Vanc and CTX via PICC, last dose 12/20/22), and Anxiety, presented to [facility] ED w/ HTN Urgency and flash pulm edema, pt now admitted to cardiac telemetry for further management. Hospital course c/b NOLAN 2/2 IV diuresis, pt currently euvolemic off diuretics. CCTA 12/6 was abnormal and pt subsequently underwent cardiac cath 12/9/22 resulting in PEPE OM1. Plan for d/c this weekend pending ID recs regarding Vanc dosing.

## 2022-12-09 NOTE — PROGRESS NOTE ADULT - SUBJECTIVE AND OBJECTIVE BOX
S: Pt seen and examined bedside.  Patient denies C/P, SOB, N/V, dizziness, palpitations, and diaphoresis.  Pt denies fever/chills, dysuria, abdominal pain, diarrhea, and cough  12 Point ROS otherwise negative except as per HPI/subjective.     O: Vital Signs Last 24 Hrs  T(C): 36.9 (09 Dec 2022 09:33), Max: 37 (08 Dec 2022 21:58)  T(F): 98.4 (09 Dec 2022 09:33), Max: 98.6 (08 Dec 2022 21:58)  HR: 60 (09 Dec 2022 08:58) (51 - 66)  BP: 148/68 (09 Dec 2022 08:58) (131/63 - 155/70)  BP(mean): 98 (09 Dec 2022 08:58) (91 - 101)  RR: 18 (09 Dec 2022 08:58) (16 - 18)  SpO2: 96% (09 Dec 2022 08:58) (96% - 98%)    Parameters below as of 09 Dec 2022 08:58  Patient On (Oxygen Delivery Method): room air        PHYSICAL EXAM:  GEN: NAD  HEENT: No JVD  PULM:  CTA B/L  CARD:  RRR, S1 and S2   ABD: +BS, NT, soft/ND	  EXT: No Edema B/L LE  NEURO: A+Ox3, no focal deficit  PSYCH: Mood Appropriate    LABS:                        11.0   9.14  )-----------( 307      ( 09 Dec 2022 04:06 )             33.3         136  |  100  |  41<H>  ----------------------------<  190<H>  4.8   |  26  |  1.59<H>    Ca    8.7      09 Dec 2022 04:06  Mg     2.2     12-      PT/INR - ( 09 Dec 2022 04:06 )   PT: 12.2 sec;   INR: 1.02          PTT - ( 09 Dec 2022 04:06 )  PTT:31.5 sec  Troponin T, Serum: 0.02 ng/mL (22 @ 13:47)  Troponin T, Serum: 0.02 ng/mL (22 @ 10:26)  Troponin T, Serum: 0.01 ng/mL (22 @ 05:30)  Troponin T, Serum: 0.01 ng/mL (22 @ 20:42)  Troponin T, Serum: 0.01 ng/mL (22 @ 16:08)       @ 07:01  -   @ 07:00  --------------------------------------------------------  IN: 240 mL / OUT: 300 mL / NET: -60 mL     @ 07:01  -   @ 12:54  --------------------------------------------------------  IN: 0 mL / OUT: 950 mL / NET: -950 mL      Daily     Daily Weight in k.2 (09 Dec 2022 06:50)   S: Pt seen and examined bedside. Pt denies any complaints, aware of plan for cardiac catheterization today.   Patient denies C/P, SOB, N/V, dizziness, palpitations, and diaphoresis.  Pt denies fever/chills, dysuria, abdominal pain, diarrhea, and cough  12 Point ROS otherwise negative except as per HPI/subjective.     O: Vital Signs Last 24 Hrs  T(C): 36.9 (09 Dec 2022 09:33), Max: 37 (08 Dec 2022 21:58)  T(F): 98.4 (09 Dec 2022 09:33), Max: 98.6 (08 Dec 2022 21:58)  HR: 60 (09 Dec 2022 08:58) (51 - 66)  BP: 148/68 (09 Dec 2022 08:58) (131/63 - 155/70)  BP(mean): 98 (09 Dec 2022 08:58) (91 - 101)  RR: 18 (09 Dec 2022 08:58) (16 - 18)  SpO2: 96% (09 Dec 2022 08:58) (96% - 98%)    Parameters below as of 09 Dec 2022 08:58  Patient On (Oxygen Delivery Method): room air        PHYSICAL EXAM:  Appearance: Normal	  HEENT: Normal oral mucosa, PERRL, EOMI	  Neck: Supple,  - JVD; no Carotid Bruit   Cardiovascular: Normal S1 S2, No murmurs  Respiratory: Lungs clear to auscultation, No Rales, Rhonchi, Wheezing	  Gastrointestinal:  Soft, Non-tender, + BS	  Skin: No rashes, No ecchymoses, No cyanosis  Extremities: Normal range of motion, No clubbing, cyanosis or edema  Vascular: Peripheral pulses palpable 2+ bilaterally  Neurologic: Non-focal  Psychiatry: A & O x 3, Mood & affect appropriate    LABS:                        11.0   9.14  )-----------( 307      ( 09 Dec 2022 04:06 )             33.3         136  |  100  |  41<H>  ----------------------------<  190<H>  4.8   |  26  |  1.59<H>    Ca    8.7      09 Dec 2022 04:06  Mg     2.2           PT/INR - ( 09 Dec 2022 04:06 )   PT: 12.2 sec;   INR: 1.02          PTT - ( 09 Dec 2022 04:06 )  PTT:31.5 sec  Troponin T, Serum: 0.02 ng/mL (22 @ 13:47)  Troponin T, Serum: 0.02 ng/mL (22 @ 10:26)  Troponin T, Serum: 0.01 ng/mL (22 @ 05:30)  Troponin T, Serum: 0.01 ng/mL (22 @ 20:42)  Troponin T, Serum: 0.01 ng/mL (22 @ 16:08)       @ 07:01  -   @ 07:00  --------------------------------------------------------  IN: 240 mL / OUT: 300 mL / NET: -60 mL     @ 07:01  -   @ 12:54  --------------------------------------------------------  IN: 0 mL / OUT: 950 mL / NET: -950 mL      Daily     Daily Weight in k.2 (09 Dec 2022 06:50)

## 2022-12-09 NOTE — PROGRESS NOTE ADULT - PROBLEM SELECTOR PROBLEM 4
Osteomyelitis
NOLAN (acute kidney injury)
Hyperlipidemia
Hyperlipidemia
ONLAN (acute kidney injury)
NOLAN (acute kidney injury)

## 2022-12-09 NOTE — PROGRESS NOTE ADULT - PROBLEM SELECTOR PLAN 2
euvolemic and HD stable, SpO2 94% RA, net neg 10L overall  -BNP 1536 > 890  -TTE 12/5: normal LVEF, mild LVH, mildly dilated LA, PASP 47mmHG  -s/p Lasix 40mg IV BID, now held i/s/o upcoming LHC and NOLAN  -Continue Hydralazine 50mg TID and Coreg 3.125mg BID  -Holding Lisinopril 40mg QD 2/2 NOLAN  -Core measure, daily weight, strict I&Os and 1.5L fluid restriction Pt presented w/ exertional substernal chest pressure, currently CP free and HD stable  -Trop 0.02 x 2, EKG non ischemic  -TTE results above  -CCTA 12/6: mod Ca+ score, severe stenosis of OM1, remaining segments non obstructive   - s/p LHC 12/09/22: PEPE x 1 OM1 (100% ), LM: Luminal irregularities, LCx: small, mild diffuse, pRamus: 50%, LAD: mild luminal irregularities, Diag: mild diffuse disease, RCA: 30-50% w/ R--> L collaterals, EDP: 9. RFA AS.   - RHC: RA: 10, RV: 41/3/12, PA: 39/15/30; PCWP: 17, PASat: 72.4%, Ao: 97.6%, CO/CI: 7/6/3.3; R groin V[place].    - Limited ECHo post procedure neg for pericardial effusion  -Continue ASA 81mg QD, Plavix 75mg QD, Lipitor 40mg HS, and Coreg 3.125mg BID

## 2022-12-09 NOTE — PRE-OP CHECKLIST - SELECT TESTS ORDERED
BMP/CBC/PT/PTT/INR/Hepatic Function/Type and Cross/Type and Screen/Urinalysis/EKG/POCT Blood Glucose/COVID-19
BMP/CBC/PT/PTT/INR/Hepatic Function/Type and Cross/Type and Screen/Urinalysis/EKG/POCT Blood Glucose

## 2022-12-09 NOTE — PROGRESS NOTE ADULT - PROBLEM SELECTOR PLAN 4
uptrending Cr to 1.69 today, likely i/s/o cardio-renal 2/2 IV diuresis  -UA/Ulytes unremarkable   -Holding Lasix and Lisinopril  -Continue to monitor UOP and BP, avoid nephrotoxic agents Cr peaked at 1.74, likely i/s/o cardio-renal 2/2 IV diuresis, now downtrending to 1.59  -UA/Ulytes unremarkable   -Holding Lasix and Lisinopril, resume when appropriate   -Continue to monitor UOP and BP, avoid nephrotoxic agents

## 2022-12-09 NOTE — PROGRESS NOTE ADULT - ASSESSMENT
56yo male with PMHx HTN, DM2, HLD, s/p urethral reconstruction (2021), distal R 3rd toe amputation (2019) for OM, currently being treated for OM R 4th toe admitted 12/3 admitted 12/3 with hypertensive urgency, acute, decompensated CHF.  He has been on empiric vancomycin and ceftriaxone. Plan for cath today.       Plan:   - c/w ceftriaxone 2g IV q24h through 12/20  - f/u random level at 1 pm on 12/9/22.  Further dosing based on that level  - End date for antibiotics is 12/20/22  - No ID contraindication to catheterization    ID team 1 will continue to follow.

## 2022-12-09 NOTE — PROGRESS NOTE ADULT - PROBLEM SELECTOR PLAN 6
lipids wnl  -Continue Lipitor 40mg HS
lipids wnl  -Continue Lipitor 40mg HS
A1c 9.8%  -Continue Lantus 15u HS, Lispro 10u TID and mISS    F: None  E: Replete if K<4 or Mag<2  N: DASH Diet  VTEppx: heparin SQ  Dispo: cardiac tele
lipids wnl  -Continue Lipitor 40mg HS

## 2022-12-09 NOTE — PROGRESS NOTE ADULT - PROBLEM SELECTOR PLAN 7
A1c 9.8%  -Continue Lantus 15u HS, Lispro 13u TID and mISS    F: None  E: Replete if K<4 or Mag<2  N: DASH Diet  VTEppx: none 2/2 cath  Dispo: cardiac tele A1c 9.8%  -Continue Lantus 15u HS, Lispro 13u TID and mISS    F: None  E: Replete if K<4 or Mag<2  N: DASH Diet  VTEppx: none post cath   Dispo: cardiac tele

## 2022-12-09 NOTE — PROGRESS NOTE ADULT - SUBJECTIVE AND OBJECTIVE BOX
OVERNIGHT: No acute events overnight.   SUBJECTIVE / INTERVAL HPI: Patient was seen and examined this morning. His creatinine started to trend down, now 1.59 from 1.74 mg/dL. He has been NPO since midnight as he's planned for cath today.     CAPILLARY BLOOD GLUCOSE & INSULIN RECEIVED  Yesterday  - Dinner FS mg/dL = 13 units of Lispro + 4 units of sliding scale.   - Bedtime FS mg/dL = 18 units of Lantus.    Today  - Breakfast FS mg/dL (NPO) = 2 units of sliding scale.   - Lunch FS mg/dL (NPO) = 2 units of sliding scale.     REVIEW OF SYSTEMS  Constitutional:  Negative fever, chills or loss of appetite.  Eyes:  Negative blurry vision or double vision.  Cardiovascular:  Negative for chest pain or palpitations.  Respiratory:  Negative for cough, wheezing, or shortness of breath.    Gastrointestinal:  Negative for nausea, vomiting, diarrhea, constipation, or abdominal pain.  Genitourinary:  Negative frequency, urgency or dysuria.  Neurologic:  No headache, confusion, dizziness, lightheadedness.    PHYSICAL EXAM  Vital Signs Last 24 Hrs  T(C): 36.9 (09 Dec 2022 09:33), Max: 37 (08 Dec 2022 21:58)  T(F): 98.4 (09 Dec 2022 09:33), Max: 98.6 (08 Dec 2022 21:58)  HR: 60 (09 Dec 2022 08:58) (51 - 66)  BP: 148/68 (09 Dec 2022 08:58) (131/63 - 155/70)  BP(mean): 98 (09 Dec 2022 08:58) (91 - 101)  RR: 18 (09 Dec 2022 08:58) (16 - 18)  SpO2: 96% (09 Dec 2022 08:58) (96% - 98%)    Parameters below as of 09 Dec 2022 08:58  Patient On (Oxygen Delivery Method): room air        Constitutional: Awake, alert, in no acute distress.   HEENT: Normocephalic, atraumatic, HALLEY, no proptosis or lid retraction.   Neck: supple, no acanthosis, no thyromegaly or palpable thyroid nodules.  Respiratory: Lungs clear to ausculation bilaterally.   Cardiovascular: regular rhythm, normal S1 and S2, no audible murmurs.   GI: soft, non-tender, non-distended, bowel sounds present, no masses appreciated.  Extremities: No lower extremity edema, peripheral pulses present.   Skin: no rashes.   Psychiatric: AAO x 3. Normal affect/mood.     LABS  CBC - WBC/HGB/HTC/PLT: 9.14/11.0/33.3/307 (22)  BMP - Na/K/Cl/Bicarb/BUN/Cr/Gluc/AG/eGFR: 136/4.8/100/26/41/1.59/190/10/51 (22)  Ca - 8.7 (22)  Phos - -- (22)  Mg - 2.2 (22)  LFT - Alb/Tprot/Tbili/Dbili/AlkPhos/ALT/AST: 3.6/--/0.4/--/90/61/28 (22)  PT/aPTT/INR: 12.2/31.5/1.02 (22)   Thyroid Stimulating Hormone, Serum: 1.880 (22)      MEDICATIONS  MEDICATIONS  (STANDING):  amLODIPine   Tablet 10 milliGRAM(s) Oral daily  aspirin enteric coated 81 milliGRAM(s) Oral daily  atorvastatin 40 milliGRAM(s) Oral at bedtime  carvedilol 3.125 milliGRAM(s) Oral every 12 hours  cefTRIAXone   IVPB      cefTRIAXone   IVPB 2000 milliGRAM(s) IV Intermittent every 24 hours  chlorhexidine 2% Cloths 1 Application(s) Topical daily  citalopram 40 milliGRAM(s) Oral daily  clopidogrel Tablet 75 milliGRAM(s) Oral daily  hydrALAZINE 50 milliGRAM(s) Oral three times a day  insulin glargine Injectable (LANTUS) 18 Unit(s) SubCutaneous at bedtime  insulin lispro (ADMELOG) corrective regimen sliding scale   SubCutaneous Before meals and at bedtime  insulin lispro Injectable (ADMELOG) 15 Unit(s) SubCutaneous three times a day before meals  sodium chloride 0.9%. 500 milliLiter(s) (75 mL/Hr) IV Continuous <Continuous>    MEDICATIONS  (PRN):    ASSESSMENT / RECOMMENDATIONS    A1C: 9.8 %  BUN: 41  Creatinine: 1.59  GFR: 51  Weight: 106  BMI: 31.7  EF:     # Type 2 diabetes mellitus  - Please continue lantus *** units at bedtime.   - Continue lispro *** units before each meal.  - Continue lispro moderate / low dose sliding scale before meals and at bedtime.  - Patient's fingerstick glucose goal is 100-180 mg/dL.    - For discharge, patient can ***.    - Patient can follow up at discharge with [facility] by calling [phone] to make an appointment.      Case discussed with Dr. Bustillo. Primary team updated.       Konrad Turner    Endocrinology Fellow    Service Pager: [phone]    OVERNIGHT: No acute events overnight.   SUBJECTIVE / INTERVAL HPI: Patient was seen and examined this morning. His creatinine started to trend down, now 1.59 from 1.74 mg/dL. He was made NPO after midnight and underwent PCI with PEPE x 1 OM1. He denied having any complaints at time of evaluation.     CAPILLARY BLOOD GLUCOSE & INSULIN RECEIVED  Yesterday  - Dinner FS mg/dL = 13 units of Lispro + 4 units of sliding scale.   - Bedtime FS mg/dL = 18 units of Lantus.    Today  - Breakfast FS mg/dL (NPO) = 2 units of sliding scale.   - Lunch FS mg/dL (NPO) = 2 units of sliding scale.     REVIEW OF SYSTEMS  Constitutional:  Negative fever, chills or loss of appetite.  Eyes:  Negative blurry vision or double vision.  Cardiovascular:  Negative for chest pain or palpitations.  Respiratory:  Negative for cough, wheezing, or shortness of breath.    Gastrointestinal:  Negative for nausea, vomiting, diarrhea, constipation, or abdominal pain.  Genitourinary:  Negative frequency, urgency or dysuria.  Neurologic:  No headache, confusion, dizziness, lightheadedness.    PHYSICAL EXAM  Vital Signs Last 24 Hrs  T(C): 36.9 (09 Dec 2022 09:33), Max: 37 (08 Dec 2022 21:58)  T(F): 98.4 (09 Dec 2022 09:33), Max: 98.6 (08 Dec 2022 21:58)  HR: 60 (09 Dec 2022 08:58) (51 - 66)  BP: 148/68 (09 Dec 2022 08:58) (131/63 - 155/70)  BP(mean): 98 (09 Dec 2022 08:58) (91 - 101)  RR: 18 (09 Dec 2022 08:58) (16 - 18)  SpO2: 96% (09 Dec 2022 08:58) (96% - 98%)    Parameters below as of 09 Dec 2022 08:58  Patient On (Oxygen Delivery Method): room air    Constitutional: Awake, alert, male in no acute distress.   HEENT: Normocephalic, atraumatic, HALLEY.  Respiratory: Lungs clear to ausculation bilaterally.   Cardiovascular: regular rhythm, normal S1 and S2, no audible murmurs.   GI: soft, non-tender, non-distended, bowel sounds present.  Extremities: No lower extremity edema.  Psychiatric: AAO x 3. Normal affect/mood.     LABS  CBC - WBC/HGB/HTC/PLT: 9.14/11.0/33.3/307 (22)  BMP - Na/K/Cl/Bicarb/BUN/Cr/Gluc/AG/eGFR: 136/4.8/100/26/41/1.59/190/10/51 (22)  Ca - 8.7 (22)  Phos - -- (22)  Mg - 2.2 (22)  LFT - Alb/Tprot/Tbili/Dbili/AlkPhos/ALT/AST: 3.6/--/0.4/--/90/61/28 (22)  PT/aPTT/INR: 12.2/31.5/1.02 (22)   Thyroid Stimulating Hormone, Serum: 1.880 (22)    MEDICATIONS  MEDICATIONS  (STANDING):  amLODIPine   Tablet 10 milliGRAM(s) Oral daily  aspirin enteric coated 81 milliGRAM(s) Oral daily  atorvastatin 40 milliGRAM(s) Oral at bedtime  carvedilol 3.125 milliGRAM(s) Oral every 12 hours  cefTRIAXone   IVPB      cefTRIAXone   IVPB 2000 milliGRAM(s) IV Intermittent every 24 hours  chlorhexidine 2% Cloths 1 Application(s) Topical daily  citalopram 40 milliGRAM(s) Oral daily  clopidogrel Tablet 75 milliGRAM(s) Oral daily  hydrALAZINE 50 milliGRAM(s) Oral three times a day  insulin glargine Injectable (LANTUS) 18 Unit(s) SubCutaneous at bedtime  insulin lispro (ADMELOG) corrective regimen sliding scale   SubCutaneous Before meals and at bedtime  insulin lispro Injectable (ADMELOG) 15 Unit(s) SubCutaneous three times a day before meals  sodium chloride 0.9%. 500 milliLiter(s) (75 mL/Hr) IV Continuous <Continuous>    MEDICATIONS  (PRN):    ASSESSMENT / RECOMMENDATIONS  Mr. Tse is a 58 yo M with PMHx HTN, HLD, IDDM, anxiety, OM R 4th toe (undergoing treatment with Vanc [id number], CTX 2gm IV qD x 6 weeks via PICC, ending 22) who was admitted to cardiac telemetry for management of hypertensive urgency & acute decompensated CHF. Endocrinology following for recommendations regarding glycemic control.     A1C: 9.8 %  BUN: 41  Creatinine: 1.59  GFR: 51  Weight: 106  BMI: 31.7  EF: 71%.    # Type 2 diabetes mellitus  - Patient's glucose levels sheila overnight despite receiving 18 of Lantus at bedtime (denies having snacks overnight). Therefore, will recommend to increase his basal insulin for now.   - Please increase lantus to 22 units at bedtime.   - Decrease lispro to 14 units before each meal.  - Continue lispro moderate dose sliding scale before meals and at bedtime.  - Patient's fingerstick glucose goal is 100-180 mg/dL.    - Patient can follow up at discharge with [facility] by calling [phone] to make an appointment.      Case discussed with Dr. Bustillo. Primary team updated.       Konrad Turner    Endocrinology Fellow    Service Pager: [phone]

## 2022-12-09 NOTE — PROGRESS NOTE ADULT - SUBJECTIVE AND OBJECTIVE BOX
INFECTIOUS DISEASES CONSULT FOLLOW-UP NOTE    INTERVAL HPI/OVERNIGHT EVENTS: No overnight event.    Subjective: Patient seen and examined at bedside. Feels well and without complaints. Denies fevers, chills, cough, abdominal pain, diarrhea.      ANTIBIOTICS/RELEVANT:    MEDICATIONS  (STANDING):  amLODIPine   Tablet 10 milliGRAM(s) Oral daily  aspirin enteric coated 81 milliGRAM(s) Oral daily  atorvastatin 40 milliGRAM(s) Oral at bedtime  carvedilol 3.125 milliGRAM(s) Oral every 12 hours  cefTRIAXone   IVPB      cefTRIAXone   IVPB 2000 milliGRAM(s) IV Intermittent every 24 hours  chlorhexidine 2% Cloths 1 Application(s) Topical daily  citalopram 40 milliGRAM(s) Oral daily  clopidogrel Tablet 75 milliGRAM(s) Oral daily  hydrALAZINE 50 milliGRAM(s) Oral three times a day  insulin glargine Injectable (LANTUS) 18 Unit(s) SubCutaneous at bedtime  insulin lispro (ADMELOG) corrective regimen sliding scale   SubCutaneous Before meals and at bedtime  insulin lispro Injectable (ADMELOG) 15 Unit(s) SubCutaneous three times a day before meals  sodium chloride 0.9%. 500 milliLiter(s) (75 mL/Hr) IV Continuous <Continuous>    MEDICATIONS  (PRN):        Vital Signs Last 24 Hrs  T(C): 36.9 (09 Dec 2022 09:33), Max: 37 (08 Dec 2022 21:58)  T(F): 98.4 (09 Dec 2022 09:33), Max: 98.6 (08 Dec 2022 21:58)  HR: 60 (09 Dec 2022 08:58) (51 - 66)  BP: 148/68 (09 Dec 2022 08:58) (131/63 - 155/70)  BP(mean): 98 (09 Dec 2022 08:58) (91 - 101)  RR: 18 (09 Dec 2022 08:58) (16 - 18)  SpO2: 96% (09 Dec 2022 08:58) (96% - 98%)    Parameters below as of 09 Dec 2022 08:58  Patient On (Oxygen Delivery Method): room air        22 @ 07:01  -  22 @ 07:00  --------------------------------------------------------  IN: 240 mL / OUT: 300 mL / NET: -60 mL    22 @ 07:01  -  22 @ 12:30  --------------------------------------------------------  IN: 0 mL / OUT: 950 mL / NET: -950 mL        PHYSICAL EXAM  Constitutional: alert, NAD  Eyes: the sclera and conjunctiva were normal.   ENT: the ears and nose were normal in appearance.   Neck: the appearance of the neck was normal and the neck was supple.   Pulmonary: no respiratory distress and lungs CTA bilaterally.   Heart: heart rate was normal and rhythm regular, normal S1 and S2  Vascular: no peripheral edema  Abdomen: normal bowel sounds, soft, non-tender  Neurological: no focal deficits  Psychiatric: the affect was normal      LABS:                        11.0   9.14  )-----------( 307      ( 09 Dec 2022 04:06 )             33.3     12    136  |  100  |  41<H>  ----------------------------<  190<H>  4.8   |  26  |  1.59<H>    Ca    8.7      09 Dec 2022 04:06  Mg     2.2     12-09      PT/INR - ( 09 Dec 2022 04:06 )   PT: 12.2 sec;   INR: 1.02          PTT - ( 09 Dec 2022 04:06 )  PTT:31.5 sec  Urinalysis Basic - ( 07 Dec 2022 15:39 )    Color: Yellow / Appearance: Clear / S.025 / pH: x  Gluc: x / Ketone: Trace mg/dL  / Bili: Negative / Urobili: 0.2 E.U./dL   Blood: x / Protein: 30 mg/dL / Nitrite: NEGATIVE   Leuk Esterase: NEGATIVE / RBC: < 5 /HPF / WBC < 5 /HPF   Sq Epi: x / Non Sq Epi: x / Bacteria: Present /HPF        MICROBIOLOGY:      RADIOLOGY & ADDITIONAL STUDIES:  Reviewed

## 2022-12-09 NOTE — PROGRESS NOTE ADULT - PROBLEM SELECTOR PLAN 1
presented w/ SBP 190s, currently 140s  -Continue Amlodipine 10mg QD, Coreg 12.5mg BID and Hydralazine 50mg TID  -Holding Lasix and Lisinopril i/s/o up coming cath and NOLAN  -Renal duplex negative for ABHINAV presented w/ SBP 190s, currently 140s-160's   -Continue Amlodipine 10mg QD, Coreg 12.5mg BID and Hydralazine 50mg TID  -Holding Lasix and Lisinopril i/s/o NOLAN  -Renal duplex negative for ABHINAV

## 2022-12-09 NOTE — PROGRESS NOTE ADULT - PROBLEM SELECTOR PROBLEM 1
Hypertensive urgency
DM (diabetes mellitus)
DM (diabetes mellitus)
Hypertensive urgency

## 2022-12-10 ENCOUNTER — TRANSCRIPTION ENCOUNTER (OUTPATIENT)
Age: 57
End: 2022-12-10

## 2022-12-10 VITALS — TEMPERATURE: 98 F

## 2022-12-10 LAB
ANION GAP SERPL CALC-SCNC: 11 MMOL/L — SIGNIFICANT CHANGE UP (ref 5–17)
BASOPHILS # BLD AUTO: 0.04 K/UL — SIGNIFICANT CHANGE UP (ref 0–0.2)
BASOPHILS NFR BLD AUTO: 0.5 % — SIGNIFICANT CHANGE UP (ref 0–2)
BUN SERPL-MCNC: 33 MG/DL — HIGH (ref 7–23)
CALCIUM SERPL-MCNC: 8.6 MG/DL — SIGNIFICANT CHANGE UP (ref 8.4–10.5)
CHLORIDE SERPL-SCNC: 100 MMOL/L — SIGNIFICANT CHANGE UP (ref 96–108)
CO2 SERPL-SCNC: 24 MMOL/L — SIGNIFICANT CHANGE UP (ref 22–31)
CREAT SERPL-MCNC: 1.55 MG/DL — HIGH (ref 0.5–1.3)
EGFR: 53 ML/MIN/1.73M2 — LOW
EOSINOPHIL # BLD AUTO: 0.35 K/UL — SIGNIFICANT CHANGE UP (ref 0–0.5)
EOSINOPHIL NFR BLD AUTO: 3.9 % — SIGNIFICANT CHANGE UP (ref 0–6)
GLUCOSE BLDC GLUCOMTR-MCNC: 114 MG/DL — HIGH (ref 70–99)
GLUCOSE BLDC GLUCOMTR-MCNC: 162 MG/DL — HIGH (ref 70–99)
GLUCOSE BLDC GLUCOMTR-MCNC: 341 MG/DL — HIGH (ref 70–99)
GLUCOSE BLDC GLUCOMTR-MCNC: 354 MG/DL — HIGH (ref 70–99)
GLUCOSE SERPL-MCNC: 183 MG/DL — HIGH (ref 70–99)
HCT VFR BLD CALC: 32.6 % — LOW (ref 39–50)
HGB BLD-MCNC: 10.5 G/DL — LOW (ref 13–17)
IMM GRANULOCYTES NFR BLD AUTO: 0.2 % — SIGNIFICANT CHANGE UP (ref 0–0.9)
LYMPHOCYTES # BLD AUTO: 1.12 K/UL — SIGNIFICANT CHANGE UP (ref 1–3.3)
LYMPHOCYTES # BLD AUTO: 12.6 % — LOW (ref 13–44)
MAGNESIUM SERPL-MCNC: 2.1 MG/DL — SIGNIFICANT CHANGE UP (ref 1.6–2.6)
MCHC RBC-ENTMCNC: 30.3 PG — SIGNIFICANT CHANGE UP (ref 27–34)
MCHC RBC-ENTMCNC: 32.2 GM/DL — SIGNIFICANT CHANGE UP (ref 32–36)
MCV RBC AUTO: 93.9 FL — SIGNIFICANT CHANGE UP (ref 80–100)
MONOCYTES # BLD AUTO: 0.81 K/UL — SIGNIFICANT CHANGE UP (ref 0–0.9)
MONOCYTES NFR BLD AUTO: 9.1 % — SIGNIFICANT CHANGE UP (ref 2–14)
NEUTROPHILS # BLD AUTO: 6.54 K/UL — SIGNIFICANT CHANGE UP (ref 1.8–7.4)
NEUTROPHILS NFR BLD AUTO: 73.7 % — SIGNIFICANT CHANGE UP (ref 43–77)
NRBC # BLD: 0 /100 WBCS — SIGNIFICANT CHANGE UP (ref 0–0)
PLATELET # BLD AUTO: 315 K/UL — SIGNIFICANT CHANGE UP (ref 150–400)
POTASSIUM SERPL-MCNC: 4.7 MMOL/L — SIGNIFICANT CHANGE UP (ref 3.5–5.3)
POTASSIUM SERPL-SCNC: 4.7 MMOL/L — SIGNIFICANT CHANGE UP (ref 3.5–5.3)
RBC # BLD: 3.47 M/UL — LOW (ref 4.2–5.8)
RBC # FLD: 13.2 % — SIGNIFICANT CHANGE UP (ref 10.3–14.5)
SODIUM SERPL-SCNC: 135 MMOL/L — SIGNIFICANT CHANGE UP (ref 135–145)
VANCOMYCIN TROUGH SERPL-MCNC: 13.3 UG/ML — SIGNIFICANT CHANGE UP (ref 10–20)
WBC # BLD: 8.88 K/UL — SIGNIFICANT CHANGE UP (ref 3.8–10.5)
WBC # FLD AUTO: 8.88 K/UL — SIGNIFICANT CHANGE UP (ref 3.8–10.5)

## 2022-12-10 PROCEDURE — 82553 CREATINE MB FRACTION: CPT

## 2022-12-10 PROCEDURE — 80053 COMPREHEN METABOLIC PANEL: CPT

## 2022-12-10 PROCEDURE — 80202 ASSAY OF VANCOMYCIN: CPT

## 2022-12-10 PROCEDURE — 80061 LIPID PANEL: CPT

## 2022-12-10 PROCEDURE — 81001 URINALYSIS AUTO W/SCOPE: CPT

## 2022-12-10 PROCEDURE — 83036 HEMOGLOBIN GLYCOSYLATED A1C: CPT

## 2022-12-10 PROCEDURE — 84133 ASSAY OF URINE POTASSIUM: CPT

## 2022-12-10 PROCEDURE — 93321 DOPPLER ECHO F-UP/LMTD STD: CPT

## 2022-12-10 PROCEDURE — 84540 ASSAY OF URINE/UREA-N: CPT

## 2022-12-10 PROCEDURE — C1889: CPT

## 2022-12-10 PROCEDURE — C1769: CPT

## 2022-12-10 PROCEDURE — U0003: CPT

## 2022-12-10 PROCEDURE — 96374 THER/PROPH/DIAG INJ IV PUSH: CPT

## 2022-12-10 PROCEDURE — C1894: CPT

## 2022-12-10 PROCEDURE — 36415 COLL VENOUS BLD VENIPUNCTURE: CPT

## 2022-12-10 PROCEDURE — 83735 ASSAY OF MAGNESIUM: CPT

## 2022-12-10 PROCEDURE — 84300 ASSAY OF URINE SODIUM: CPT

## 2022-12-10 PROCEDURE — 85027 COMPLETE CBC AUTOMATED: CPT

## 2022-12-10 PROCEDURE — 82550 ASSAY OF CK (CPK): CPT

## 2022-12-10 PROCEDURE — 82803 BLOOD GASES ANY COMBINATION: CPT

## 2022-12-10 PROCEDURE — 99232 SBSQ HOSP IP/OBS MODERATE 35: CPT

## 2022-12-10 PROCEDURE — 80048 BASIC METABOLIC PNL TOTAL CA: CPT

## 2022-12-10 PROCEDURE — 82962 GLUCOSE BLOOD TEST: CPT

## 2022-12-10 PROCEDURE — 96375 TX/PRO/DX INJ NEW DRUG ADDON: CPT

## 2022-12-10 PROCEDURE — 83935 ASSAY OF URINE OSMOLALITY: CPT

## 2022-12-10 PROCEDURE — 99239 HOSP IP/OBS DSCHRG MGMT >30: CPT

## 2022-12-10 PROCEDURE — 87635 SARS-COV-2 COVID-19 AMP PRB: CPT

## 2022-12-10 PROCEDURE — C1874: CPT

## 2022-12-10 PROCEDURE — 93976 VASCULAR STUDY: CPT

## 2022-12-10 PROCEDURE — C1725: CPT

## 2022-12-10 PROCEDURE — 99291 CRITICAL CARE FIRST HOUR: CPT

## 2022-12-10 PROCEDURE — 84156 ASSAY OF PROTEIN URINE: CPT

## 2022-12-10 PROCEDURE — 85025 COMPLETE CBC W/AUTO DIFF WBC: CPT

## 2022-12-10 PROCEDURE — 82570 ASSAY OF URINE CREATININE: CPT

## 2022-12-10 PROCEDURE — 85610 PROTHROMBIN TIME: CPT

## 2022-12-10 PROCEDURE — 87040 BLOOD CULTURE FOR BACTERIA: CPT

## 2022-12-10 PROCEDURE — 84484 ASSAY OF TROPONIN QUANT: CPT

## 2022-12-10 PROCEDURE — C1760: CPT

## 2022-12-10 PROCEDURE — 93306 TTE W/DOPPLER COMPLETE: CPT

## 2022-12-10 PROCEDURE — 84443 ASSAY THYROID STIM HORMONE: CPT

## 2022-12-10 PROCEDURE — U0005: CPT

## 2022-12-10 PROCEDURE — C1887: CPT

## 2022-12-10 PROCEDURE — 93308 TTE F-UP OR LMTD: CPT | Mod: 26

## 2022-12-10 PROCEDURE — 84295 ASSAY OF SERUM SODIUM: CPT

## 2022-12-10 PROCEDURE — 85347 COAGULATION TIME ACTIVATED: CPT

## 2022-12-10 PROCEDURE — 93005 ELECTROCARDIOGRAM TRACING: CPT

## 2022-12-10 PROCEDURE — 85730 THROMBOPLASTIN TIME PARTIAL: CPT

## 2022-12-10 PROCEDURE — 75574 CT ANGIO HRT W/3D IMAGE: CPT

## 2022-12-10 PROCEDURE — 84132 ASSAY OF SERUM POTASSIUM: CPT

## 2022-12-10 PROCEDURE — 83605 ASSAY OF LACTIC ACID: CPT

## 2022-12-10 PROCEDURE — 71045 X-RAY EXAM CHEST 1 VIEW: CPT

## 2022-12-10 PROCEDURE — 93321 DOPPLER ECHO F-UP/LMTD STD: CPT | Mod: 26

## 2022-12-10 PROCEDURE — 76775 US EXAM ABDO BACK WALL LIM: CPT

## 2022-12-10 PROCEDURE — 82010 KETONE BODYS QUAN: CPT

## 2022-12-10 PROCEDURE — 83880 ASSAY OF NATRIURETIC PEPTIDE: CPT

## 2022-12-10 PROCEDURE — 82330 ASSAY OF CALCIUM: CPT

## 2022-12-10 RX ORDER — LOSARTAN POTASSIUM 100 MG/1
1 TABLET, FILM COATED ORAL
Qty: 60 | Refills: 3
Start: 2022-12-10 | End: 2023-04-08

## 2022-12-10 RX ORDER — ASPIRIN/CALCIUM CARB/MAGNESIUM 324 MG
1 TABLET ORAL
Qty: 30 | Refills: 11
Start: 2022-12-10 | End: 2023-12-04

## 2022-12-10 RX ORDER — CLOPIDOGREL BISULFATE 75 MG/1
1 TABLET, FILM COATED ORAL
Qty: 30 | Refills: 11
Start: 2022-12-10 | End: 2023-12-04

## 2022-12-10 RX ADMIN — Medication 14 UNIT(S): at 14:02

## 2022-12-10 RX ADMIN — Medication 50 MILLIGRAM(S): at 14:02

## 2022-12-10 RX ADMIN — CLOPIDOGREL BISULFATE 75 MILLIGRAM(S): 75 TABLET, FILM COATED ORAL at 12:07

## 2022-12-10 RX ADMIN — CARVEDILOL PHOSPHATE 3.12 MILLIGRAM(S): 80 CAPSULE, EXTENDED RELEASE ORAL at 10:25

## 2022-12-10 RX ADMIN — Medication 8: at 12:59

## 2022-12-10 RX ADMIN — CITALOPRAM 40 MILLIGRAM(S): 10 TABLET, FILM COATED ORAL at 12:07

## 2022-12-10 RX ADMIN — CHLORHEXIDINE GLUCONATE 1 APPLICATION(S): 213 SOLUTION TOPICAL at 06:18

## 2022-12-10 RX ADMIN — Medication 2: at 07:01

## 2022-12-10 RX ADMIN — Medication 100 MILLIGRAM(S): at 18:27

## 2022-12-10 RX ADMIN — Medication 81 MILLIGRAM(S): at 12:07

## 2022-12-10 RX ADMIN — Medication 50 MILLIGRAM(S): at 05:59

## 2022-12-10 RX ADMIN — Medication 14 UNIT(S): at 08:37

## 2022-12-10 RX ADMIN — AMLODIPINE BESYLATE 10 MILLIGRAM(S): 2.5 TABLET ORAL at 05:59

## 2022-12-10 NOTE — PROVIDER CONTACT NOTE (OTHER) - SITUATION
Patient stated he does not want to wait for the trough laboratory draw at 16:30. Patient request discharge at this time and awaiting MD. Patient removed heplock himself and removed telemetry monitor.
Pt converted to junctional rhythm

## 2022-12-10 NOTE — PROGRESS NOTE ADULT - ASSESSMENT
IMPRESSION:  56yo male with PMHx HTN, DM2, HLD, s/p urethral reconstruction (2021), distal R 3rd toe amputation (2019) for OM, currently being treated for OM R 4th toe admitted 12/3 admitted 12/3 with hypertensive urgency, acute, decompensated CHF.  He has been on empiric vancomycin and ceftriaxone      Recommend:  1.  Given difficulty obtaining therapeutic vanco trough and recent renal failure, ok to stop vancomycin at this time and switch to Doxycycline 100 mg PO q12 for staph coverage  2.  Continue Ceftriaxone 2 grams IV daily  3.  Recommend continuing Ceftriaxone + Doxycycline until 12/20/22  4.  Needs weekly CBC, CMP, ESR, CRP.  Can fax to Dr. Gallardo:  [phone]  5.  Can follow up with Dr. Gallardo as an outpatient  6. Ok to discharge from my standpoint provided infusion services are set up    ID team 1 will sign off.  Reconsult as needed

## 2022-12-10 NOTE — PROGRESS NOTE ADULT - PROVIDER SPECIALTY LIST ADULT
Cardiology
Cardiology
Endocrinology
Endocrinology
Cardiology
Infectious Disease
Endocrinology
Infectious Disease
Endocrinology
Infectious Disease
Cardiology

## 2022-12-10 NOTE — DISCHARGE NOTE NURSING/CASE MANAGEMENT/SOCIAL WORK - PATIENT PORTAL LINK FT
You can access the FollowMyHealth Patient Portal offered by [facility] by registering at the following website: [email or web address]. By joining [employer]’s FollowMyHealth portal, you will also be able to view your health information using other applications (apps) compatible with our system.

## 2022-12-10 NOTE — PROGRESS NOTE ADULT - SUBJECTIVE AND OBJECTIVE BOX
INTERVAL HPI/OVERNIGHT EVENTS:    Patient was seen and examined at bedside.  No complaints     CONSTITUTIONAL:  Negative fever or chills, feels well, good appetite  EYES:  Negative  blurry vision or double vision  CARDIOVASCULAR:  Negative for chest pain or palpitations  RESPIRATORY:  Negative for cough, wheezing, or SOB   GASTROINTESTINAL:  Negative for nausea, vomiting, diarrhea, constipation, or abdominal pain  GENITOURINARY:  Negative frequency, urgency or dysuria  NEUROLOGIC:  No headache, confusion, dizziness, lightheadedness      ANTIBIOTICS/RELEVANT:    MEDICATIONS  (STANDING):  amLODIPine   Tablet 10 milliGRAM(s) Oral daily  aspirin enteric coated 81 milliGRAM(s) Oral daily  atorvastatin 40 milliGRAM(s) Oral at bedtime  carvedilol 3.125 milliGRAM(s) Oral every 12 hours  cefTRIAXone   IVPB      cefTRIAXone   IVPB 2000 milliGRAM(s) IV Intermittent every 24 hours  chlorhexidine 2% Cloths 1 Application(s) Topical daily  citalopram 40 milliGRAM(s) Oral daily  clopidogrel Tablet 75 milliGRAM(s) Oral daily  hydrALAZINE 50 milliGRAM(s) Oral three times a day  insulin glargine Injectable (LANTUS) 22 Unit(s) SubCutaneous at bedtime  insulin lispro (ADMELOG) corrective regimen sliding scale   SubCutaneous Before meals and at bedtime  insulin lispro Injectable (ADMELOG) 14 Unit(s) SubCutaneous three times a day before meals  sodium chloride 0.9%. 500 milliLiter(s) (100 mL/Hr) IV Continuous <Continuous>    MEDICATIONS  (PRN):        Vital Signs Last 24 Hrs  T(C): 36.6 (10 Dec 2022 13:39), Max: 37.2 (10 Dec 2022 09:37)  T(F): 97.8 (10 Dec 2022 13:39), Max: 98.9 (10 Dec 2022 09:37)  HR: 57 (10 Dec 2022 17:48) (50 - 62)  BP: 140/63 (10 Dec 2022 17:48) (114/57 - 161/72)  BP(mean): 92 (10 Dec 2022 14:02) (82 - 103)  RR: 18 (10 Dec 2022 17:48) (16 - 18)  SpO2: 97% (10 Dec 2022 17:48) (95% - 98%)    Parameters below as of 10 Dec 2022 17:48  Patient On (Oxygen Delivery Method): room air        PHYSICAL EXAM:  Constitutional:Well-developed, well nourished  Eyes:HALLEY, EOMI  Ear/Nose/Throat: no oral lesion, no sinus tenderness on percussion	  Neck   supple  Respiratory: CTA sina  Cardiovascular: S1S2 RRR, no murmurs  Gastrointestinal:soft, (+) BS, no HSM  Extremities:no e/e/c  Vascular: DP Pulse:	right normal; left normal      LABS:                        10.5   8.88  )-----------( 315      ( 10 Dec 2022 05:30 )             32.6     12-10    135  |  100  |  33<H>  ----------------------------<  183<H>  4.7   |  24  |  1.55<H>    Ca    8.6      10 Dec 2022 05:30  Mg     2.1     12-10      PT/INR - ( 09 Dec 2022 04:06 )   PT: 12.2 sec;   INR: 1.02          PTT - ( 09 Dec 2022 04:06 )  PTT:31.5 sec    Vancomycin Level, Trough (12.10.22 @ 16:40)    Vancomycin Level, Trough: 13.3: Vancomycin trough levels should be rapidly reached and maintained at  15-20 ug/ml for life threatening MRSA  infections such as sepsis, endocarditis, osteomyelitis and pneumonia. A  first trough level should be drawn  before the 3rd or 4th dose.  Risk of renal toxicity is increased for levels >15 ug/ml, in patients on  other nephrotoxic drugs, who are  hemodynamically unstable, have unstable renal function, or are on  Vancomycin therapy for >14 days. Renal function with  creatinine levels should be monitored for those patients. ug/mL        MICROBIOLOGY:    RADIOLOGY & ADDITIONAL STUDIES:

## 2022-12-10 NOTE — PROGRESS NOTE ADULT - TIME BILLING
treatment of osteomyelitis
treatment of osteomyelitis
Insulin adjustment
treatment of osteomyelitis
Insulin adjustment
as above

## 2022-12-10 NOTE — PROVIDER CONTACT NOTE (OTHER) - ACTION/TREATMENT ORDERED:
Notified MELONIE Mcduffie. Placed gauze and no bleeding at heplock site.
PA aware. Will continue to monitor pt.
Hydralazine 5mg IV Push given. BP to be repeated,

## 2022-12-10 NOTE — DISCHARGE NOTE NURSING/CASE MANAGEMENT/SOCIAL WORK - NSDCPEFALRISK_GEN_ALL_CORE
For information on Fall & Injury Prevention, visit: [email or web address] OR  [email or web address] OR  https://www.cdc.gov/steadi/patient.html

## 2022-12-12 ENCOUNTER — NON-APPOINTMENT (OUTPATIENT)
Age: 57
End: 2022-12-12

## 2022-12-16 ENCOUNTER — APPOINTMENT (OUTPATIENT)
Dept: HEART AND VASCULAR | Facility: CLINIC | Age: 57
End: 2022-12-16

## 2022-12-16 ENCOUNTER — NON-APPOINTMENT (OUTPATIENT)
Age: 57
End: 2022-12-16

## 2022-12-16 VITALS — SYSTOLIC BLOOD PRESSURE: 89 MMHG | DIASTOLIC BLOOD PRESSURE: 39 MMHG

## 2022-12-16 VITALS
SYSTOLIC BLOOD PRESSURE: 103 MMHG | DIASTOLIC BLOOD PRESSURE: 45 MMHG | HEART RATE: 54 BPM | BODY MASS INDEX: 28.71 KG/M2 | WEIGHT: 212 LBS | HEIGHT: 72 IN

## 2022-12-16 VITALS — SYSTOLIC BLOOD PRESSURE: 119 MMHG | DIASTOLIC BLOOD PRESSURE: 64 MMHG

## 2022-12-16 DIAGNOSIS — Z78.9 OTHER SPECIFIED HEALTH STATUS: ICD-10-CM

## 2022-12-16 PROCEDURE — 99214 OFFICE O/P EST MOD 30 MIN: CPT | Mod: 25

## 2022-12-16 PROCEDURE — 93000 ELECTROCARDIOGRAM COMPLETE: CPT

## 2022-12-16 PROCEDURE — 36415 COLL VENOUS BLD VENIPUNCTURE: CPT

## 2022-12-16 RX ORDER — LISINOPRIL 40 MG/1
40 TABLET ORAL
Refills: 0 | Status: COMPLETED | COMMUNITY
Start: 2022-11-11 | End: 2022-12-16

## 2022-12-16 NOTE — CARDIOLOGY SUMMARY
[de-identified] : \par 12/16/22 EKG: likely ectopic atrial rhythm at 52bpm, no ischemic ST-T deviations [de-identified] : \par 12/9/22 Limited TTE: normal LV and RV size and fxn, mildly dilated LA, aortic sclerosis, no pericardial effusion\par 12/9/22 Limited TTE: normal LV size/fxn, no pericardial effusion\par 12/5/22 TTE: normal LV size/fxn, normal RV, mild LAE, aortic sclerosis, PASP 47mmHg, no pericardial effusion\par  [de-identified] : \par 12/9/22 LHC: 1vCAD, 100%  OM1, 30-50% pRamus, s/p PEPE of OM; RHC RA 10, RV 41/3/12, PA 39/15/33, PCWP 17, CI 3.3

## 2022-12-16 NOTE — REVIEW OF SYSTEMS
[Heartburn] : heartburn [Fever] : no fever [Chills] : no chills [Earache] : no earache [SOB] : no shortness of breath [Dyspnea on exertion] : not dyspnea during exertion [Chest Discomfort] : no chest discomfort [Lower Ext Edema] : no extremity edema [Leg Claudication] : no intermittent leg claudication [Palpitations] : no palpitations [Orthopnea] : no orthopnea [PND] : no PND [Cough] : no cough [Abdominal Pain] : no abdominal pain [Nausea] : no nausea [Vomiting] : no vomiting [Diarrhea] : diarrhea [Dizziness] : no dizziness [Numbness (Hypoesthesia)] : no numbness [Weakness] : no weakness [Speech Disturbance] : no speech disturbance [Easy Bleeding] : no tendency for easy bleeding

## 2022-12-16 NOTE — HISTORY OF PRESENT ILLNESS
[FreeTextEntry1] : Mr. Tse is a 56yo M with CAD (recent PEPE to OM1 Dec 2022), HTN HL OM of R 4th toe on abx, anxiety who recently was admitted to [facility] and presents to establish care.\par \par Patient was recently went to [facility] for ROBBINS. ROBBINS noted at 50feet, bending forward and orthopnea. \par \par Hospital course from d/c summary: Pt started on Amlodipine 10mg, Coreg 12.5mg BID, Losartan 40mg QD and Hydralazine 50mg TID w/ improvement of SBP. Pt diuresed well on lasix 40mg IV BID x 2 days, net neg 10L. Pt remained euvolemic off diuretics. TTE revealed normal LVEF, mild LVH and mildly dilated LA. CCTA revealed severe stenosis of OM1 and other vessels non obstructive. Pt underwent cardiac cath 12/9 and received 1 PEPE x 1 OM1 (100% ). Other cath findings: LM: Luminal irregularities, LCx: small, mild diffuse, pRamus: 50%, LAD: mild luminal irregularities, Diag: mild diffuse disease, RCA: 30-50% w/ R--> L collaterals, EDP: 9. RFA AS. \par Hospital course c/b NOLAN w/ Cr peak 1.74, likely 2/2 over diuresis, lasix and lisinopril were discontinued and Cr downtrended to 1.55 prior to dc. Hospital course also significant for elevated Vanc trough. Decision made by ID to \par discontinue Vancomycin and transition patient from Vanc to Doxycyline 100 mg q12 for 10 days. Given elevated BP (sytolics ranging from 160s-130s) and improved kidney function, patient to start Losartan 50 mg at time of discharge. Dose can be adjusted in outpatient setting. Decision to initiate PO diuretics also to be discussed in the outpatient setting. Patient to continue ASA and Plavix. \par \par Since discharge:\par \par Patient overall feeling well.  Patient noted to have low blood pressure but denies any fatigue, weakness, dizziness or syncope. He held his Losartan 50mg BID as instructed. \par \par Increase in weight noted but denies any edema, orthopnea, shortness of breath or change in diet. \par \par Patient has been somewhat active. He recently walked 1 mile including subway stairs. He reports no chest pains, out of breath or claudications. No palpitations, dizziness, syncope or neuro focal deficits. \par \par He completed his IV abx for OM. He continues with Doxycycline. He has followed up with Endo for DM. He is following up with PCP next week. \par \par \par PMH/PSH:\par as above\par \par FH:\par Father CAD 70's yo\par \par SH:\par Non smoker, Occasional ETOH 3-4 drinks / week\par \par Dec 2022 Labs:\par Na 135\par K 4.7\par Cr 1.55\par AST 28\par ALT 61\par A1c 9.8%\par Chol 157\par TG 63\par HDL 53\par LDL 91

## 2022-12-16 NOTE — DISCUSSION/SUMMARY
[FreeTextEntry1] : Mr. Tse is a 56yo M with CAD (recent PEPE to OM1 Dec 2022), HTN HL OM of R 4th toe on abx, anxiety who recently was admitted to [facility] and presents to \[facility]\"" care.\par \par CAD: Asymptomatic. EKG HR 52 with no ischemic changes. Continue with ASA 81 mg, Plavix 75mg and Carvedilol 3.125mg BID. They will start looking into cardiac rehab - will hold off on referring until BP stabilized.\par HTN: Low, asymptomatic. Repeat BP improved however low end of normal. Will reduce Hydralazine to BID from TID. Consider ACE / ARB in the future if Cr levels normal. Will check BP at follow up with PCP next week. Start home BP checks with monitor. \par Weight Increase: PE revealed no signs of fluid overload. Will check BNP\par HLD: Goal LDL < 70. Currently at 91 (12/22). Continue with Atorvastatin 40mg and repeat in 2-3 months\par Cr: Trending down with last Cr at 1.51. Labs sent today. \par \par Follow up in 3-4 weeks for BP or sooner as needed.  [EKG obtained to assist in diagnosis and management of assessed problem(s)] : EKG obtained to assist in diagnosis and management of assessed problem(s)

## 2022-12-16 NOTE — PHYSICAL EXAM
[Well Developed] : well developed [Well Nourished] : well nourished [No Acute Distress] : no acute distress [No Carotid Bruit] : no carotid bruit [Normal S1, S2] : normal S1, S2 [No Murmur] : no murmur [Clear Lung Fields] : clear lung fields [Good Air Entry] : good air entry [No Respiratory Distress] : no respiratory distress  [Soft] : abdomen soft [Non Tender] : non-tender [Normal Gait] : normal gait [No Edema] : no edema [Moves all extremities] : moves all extremities [No Focal Deficits] : no focal deficits [Normal Speech] : normal speech [Alert and Oriented] : alert and oriented [Normal memory] : normal memory

## 2022-12-19 ENCOUNTER — APPOINTMENT (OUTPATIENT)
Dept: HEART AND VASCULAR | Facility: CLINIC | Age: 57
End: 2022-12-19

## 2022-12-19 LAB
ANION GAP SERPL CALC-SCNC: 11 MMOL/L
BUN SERPL-MCNC: 57 MG/DL
CALCIUM SERPL-MCNC: 8.7 MG/DL
CHLORIDE SERPL-SCNC: 103 MMOL/L
CO2 SERPL-SCNC: 23 MMOL/L
CREAT SERPL-MCNC: 1.87 MG/DL
EGFR: 42 ML/MIN/1.73M2
GLUCOSE SERPL-MCNC: 53 MG/DL
NT-PROBNP SERPL-MCNC: 623 PG/ML
POTASSIUM SERPL-SCNC: 5.4 MMOL/L
SODIUM SERPL-SCNC: 136 MMOL/L

## 2022-12-19 PROCEDURE — 36415 COLL VENOUS BLD VENIPUNCTURE: CPT

## 2022-12-20 DIAGNOSIS — I11.0 HYPERTENSIVE HEART DISEASE WITH HEART FAILURE: ICD-10-CM

## 2022-12-20 DIAGNOSIS — M86.9 OSTEOMYELITIS, UNSPECIFIED: ICD-10-CM

## 2022-12-20 DIAGNOSIS — I50.33 ACUTE ON CHRONIC DIASTOLIC (CONGESTIVE) HEART FAILURE: ICD-10-CM

## 2022-12-20 DIAGNOSIS — R18.8 OTHER ASCITES: ICD-10-CM

## 2022-12-20 DIAGNOSIS — I16.0 HYPERTENSIVE URGENCY: ICD-10-CM

## 2022-12-20 DIAGNOSIS — I25.10 ATHEROSCLEROTIC HEART DISEASE OF NATIVE CORONARY ARTERY WITHOUT ANGINA PECTORIS: ICD-10-CM

## 2022-12-20 DIAGNOSIS — T46.4X5A ADVERSE EFFECT OF ANGIOTENSIN-CONVERTING-ENZYME INHIBITORS, INITIAL ENCOUNTER: ICD-10-CM

## 2022-12-20 DIAGNOSIS — E11.65 TYPE 2 DIABETES MELLITUS WITH HYPERGLYCEMIA: ICD-10-CM

## 2022-12-20 DIAGNOSIS — R77.8 OTHER SPECIFIED ABNORMALITIES OF PLASMA PROTEINS: ICD-10-CM

## 2022-12-20 DIAGNOSIS — Z20.822 CONTACT WITH AND (SUSPECTED) EXPOSURE TO COVID-19: ICD-10-CM

## 2022-12-20 DIAGNOSIS — E11.69 TYPE 2 DIABETES MELLITUS WITH OTHER SPECIFIED COMPLICATION: ICD-10-CM

## 2022-12-20 DIAGNOSIS — N17.9 ACUTE KIDNEY FAILURE, UNSPECIFIED: ICD-10-CM

## 2022-12-20 DIAGNOSIS — Z91.14 PATIENT'S OTHER NONCOMPLIANCE WITH MEDICATION REGIMEN: ICD-10-CM

## 2022-12-20 DIAGNOSIS — Z89.421 ACQUIRED ABSENCE OF OTHER RIGHT TOE(S): ICD-10-CM
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
## 2022-12-20 DIAGNOSIS — R00.1 BRADYCARDIA, UNSPECIFIED: ICD-10-CM

## 2022-12-20 DIAGNOSIS — J81.0 ACUTE PULMONARY EDEMA: ICD-10-CM

## 2022-12-20 DIAGNOSIS — F41.9 ANXIETY DISORDER, UNSPECIFIED: ICD-10-CM

## 2022-12-20 DIAGNOSIS — T50.2X5A ADVERSE EFFECT OF CARBONIC-ANHYDRASE INHIBITORS, BENZOTHIADIAZIDES AND OTHER DIURETICS, INITIAL ENCOUNTER: ICD-10-CM

## 2022-12-20 DIAGNOSIS — Z79.4 LONG TERM (CURRENT) USE OF INSULIN: ICD-10-CM

## 2022-12-20 DIAGNOSIS — E78.5 HYPERLIPIDEMIA, UNSPECIFIED: ICD-10-CM

## 2022-12-20 DIAGNOSIS — G47.00 INSOMNIA, UNSPECIFIED: ICD-10-CM

## 2022-12-22 ENCOUNTER — APPOINTMENT (OUTPATIENT)
Dept: INTERNAL MEDICINE | Facility: CLINIC | Age: 57
End: 2022-12-22

## 2022-12-22 VITALS
DIASTOLIC BLOOD PRESSURE: 64 MMHG | TEMPERATURE: 97.3 F | HEART RATE: 65 BPM | SYSTOLIC BLOOD PRESSURE: 124 MMHG | OXYGEN SATURATION: 97 % | HEIGHT: 72 IN | WEIGHT: 219 LBS | BODY MASS INDEX: 29.66 KG/M2

## 2022-12-22 PROCEDURE — 36415 COLL VENOUS BLD VENIPUNCTURE: CPT

## 2022-12-22 PROCEDURE — 99214 OFFICE O/P EST MOD 30 MIN: CPT | Mod: 25

## 2022-12-22 NOTE — PHYSICAL EXAM
[Pedal Pulses Present] : the pedal pulses are present [Soft] : abdomen soft [Non Tender] : non-tender [Coordination Grossly Intact] : coordination grossly intact [No Focal Deficits] : no focal deficits [Normal] : affect was normal and insight and judgment were intact [de-identified] : 1+ pitting edema b/l LE

## 2022-12-22 NOTE — HISTORY OF PRESENT ILLNESS
[Post-hospitalization from ___ Hospital] : Post-hospitalization from [unfilled] Hospital [Admitted on: ___] : The patient was admitted on [unfilled] [Discharged on ___] : discharged on [unfilled] [Discharge Summary] : discharge summary [Pertinent Labs] : pertinent labs [Discharge Med List] : discharge medication list [Med Reconciliation] : medication reconciliation has been completed [FreeTextEntry2] : 56yo M with CAD (recent PEPE to OM1 Dec 2022), HTN HL OM of R 4th toe on abx, anxiety who presents for followup after DC from [facility]\par \par Hospital course from d/c summary: Pt started on Amlodipine 10mg, Coreg 12.5mg BID, Losartan 40mg QD and Hydralazine 50mg TID w/ improvement of SBP. Pt diuresed well on lasix 40mg IV BID x 2 days, net neg 10L. Pt remained euvolemic off diuretics. TTE revealed normal LVEF, mild LVH and mildly dilated LA. CCTA revealed severe stenosis of OM1 and other vessels non obstructive. Pt underwent cardiac cath 12/9 and received 1 PEPE x 1 OM1 (100% ). Other cath findings: LM: Luminal irregularities, LCx: small, mild diffuse, pRamus: 50%, LAD: mild luminal irregularities, Diag: mild diffuse disease, RCA: 30-50% w/ R--> L collaterals, EDP: 9. RFA AS. \par Hospital course c/b NOLAN w/ Cr peak 1.74, likely 2/2 over diuresis, lasix and lisinopril were discontinued and Cr downtrended to 1.55 prior to dc. Hospital course also significant for elevated Vanc trough. Decision made by ID to \par discontinue Vancomycin and transition patient from Vanc to Doxycyline 100 mg q12 for 10 days. Given elevated BP (sytolics ranging from 160s-130s) and improved kidney function, patient to start Losartan 50 mg at time of discharge. Dose can be adjusted in outpatient setting. Decision to initiate PO diuretics also to be discussed in the outpatient setting. Patient to continue ASA and Plavix. \par \par Since his discharge, patient has been feeling about the same. Notes that he still feels intermittently SOB, more run down, has had trouble sleeping as he gets nervous he is SOB. Established care with cardiology. Losartan has been held 2/2 NOLAN, with borderline low BP, therefore hydral was cut down to BID. Missed endo appt last time as he went to the wrong address, still looking to establish care. Noticed he has gained weight over the week, however SOB, ROBBINS at baseline has not worsened. No CP, palpitations, n/v/d/c, HA present

## 2022-12-23 LAB
ANION GAP SERPL CALC-SCNC: 10 MMOL/L
ANION GAP SERPL CALC-SCNC: 10 MMOL/L
BUN SERPL-MCNC: 42 MG/DL
BUN SERPL-MCNC: 47 MG/DL
CALCIUM SERPL-MCNC: 9.2 MG/DL
CALCIUM SERPL-MCNC: 9.3 MG/DL
CHLORIDE SERPL-SCNC: 103 MMOL/L
CHLORIDE SERPL-SCNC: 104 MMOL/L
CO2 SERPL-SCNC: 21 MMOL/L
CO2 SERPL-SCNC: 21 MMOL/L
CREAT SERPL-MCNC: 1.56 MG/DL
CREAT SERPL-MCNC: 1.67 MG/DL
EGFR: 48 ML/MIN/1.73M2
EGFR: 52 ML/MIN/1.73M2
GLUCOSE SERPL-MCNC: 247 MG/DL
GLUCOSE SERPL-MCNC: 271 MG/DL
POTASSIUM SERPL-SCNC: 5.7 MMOL/L
POTASSIUM SERPL-SCNC: 6.3 MMOL/L
SODIUM SERPL-SCNC: 134 MMOL/L
SODIUM SERPL-SCNC: 134 MMOL/L

## 2022-12-23 RX ORDER — SODIUM ZIRCONIUM CYCLOSILICATE 10 G/10G
10 POWDER, FOR SUSPENSION ORAL
Qty: 5 | Refills: 0 | Status: COMPLETED | COMMUNITY
Start: 2022-12-22 | End: 2022-12-23

## 2022-12-27 ENCOUNTER — NON-APPOINTMENT (OUTPATIENT)
Age: 57
End: 2022-12-27

## 2022-12-28 ENCOUNTER — INPATIENT (INPATIENT)
Facility: HOSPITAL | Age: 57
LOS: 1 days | Discharge: HOME | DRG: 154 | End: 2022-12-30
Attending: INTERNAL MEDICINE | Admitting: INTERNAL MEDICINE
Payer: COMMERCIAL

## 2022-12-28 VITALS
DIASTOLIC BLOOD PRESSURE: 73 MMHG | HEART RATE: 54 BPM | RESPIRATION RATE: 18 BRPM | SYSTOLIC BLOOD PRESSURE: 125 MMHG | HEIGHT: 72 IN | OXYGEN SATURATION: 97 % | WEIGHT: 220.02 LBS

## 2022-12-28 DIAGNOSIS — I10 ESSENTIAL (PRIMARY) HYPERTENSION: ICD-10-CM

## 2022-12-28 DIAGNOSIS — N17.9 ACUTE KIDNEY FAILURE, UNSPECIFIED: ICD-10-CM

## 2022-12-28 DIAGNOSIS — E11.9 TYPE 2 DIABETES MELLITUS WITHOUT COMPLICATIONS: ICD-10-CM

## 2022-12-28 DIAGNOSIS — I25.10 ATHEROSCLEROTIC HEART DISEASE OF NATIVE CORONARY ARTERY WITHOUT ANGINA PECTORIS: ICD-10-CM

## 2022-12-28 DIAGNOSIS — I50.33 ACUTE ON CHRONIC DIASTOLIC (CONGESTIVE) HEART FAILURE: ICD-10-CM

## 2022-12-28 DIAGNOSIS — D64.9 ANEMIA, UNSPECIFIED: ICD-10-CM

## 2022-12-28 DIAGNOSIS — E87.5 HYPERKALEMIA: ICD-10-CM

## 2022-12-28 DIAGNOSIS — R55 SYNCOPE AND COLLAPSE: ICD-10-CM

## 2022-12-28 DIAGNOSIS — M86.9 OSTEOMYELITIS, UNSPECIFIED: ICD-10-CM

## 2022-12-28 LAB
ALBUMIN SERPL ELPH-MCNC: 3.7 G/DL — SIGNIFICANT CHANGE UP (ref 3.3–5)
ALP SERPL-CCNC: 92 U/L — SIGNIFICANT CHANGE UP (ref 40–120)
ALT FLD-CCNC: 75 U/L — HIGH (ref 10–45)
ANION GAP SERPL CALC-SCNC: 8 MMOL/L — SIGNIFICANT CHANGE UP (ref 5–17)
ANION GAP SERPL CALC-SCNC: 9 MMOL/L — SIGNIFICANT CHANGE UP (ref 5–17)
APPEARANCE UR: CLEAR — SIGNIFICANT CHANGE UP
APTT BLD: 28.5 SEC — SIGNIFICANT CHANGE UP (ref 27.5–35.5)
AST SERPL-CCNC: 46 U/L — HIGH (ref 10–40)
BASOPHILS # BLD AUTO: 0.06 K/UL — SIGNIFICANT CHANGE UP (ref 0–0.2)
BASOPHILS NFR BLD AUTO: 0.9 % — SIGNIFICANT CHANGE UP (ref 0–2)
BILIRUB SERPL-MCNC: 0.2 MG/DL — SIGNIFICANT CHANGE UP (ref 0.2–1.2)
BILIRUB UR-MCNC: NEGATIVE — SIGNIFICANT CHANGE UP
BUN SERPL-MCNC: 65 MG/DL — HIGH (ref 7–23)
BUN SERPL-MCNC: 68 MG/DL — HIGH (ref 7–23)
CALCIUM SERPL-MCNC: 8.6 MG/DL — SIGNIFICANT CHANGE UP (ref 8.4–10.5)
CALCIUM SERPL-MCNC: 8.6 MG/DL — SIGNIFICANT CHANGE UP (ref 8.4–10.5)
CHLORIDE SERPL-SCNC: 107 MMOL/L — SIGNIFICANT CHANGE UP (ref 96–108)
CHLORIDE SERPL-SCNC: 110 MMOL/L — HIGH (ref 96–108)
CK MB CFR SERPL CALC: 7.2 NG/ML — HIGH (ref 0–6.7)
CK SERPL-CCNC: 196 U/L — SIGNIFICANT CHANGE UP (ref 30–200)
CO2 SERPL-SCNC: 19 MMOL/L — LOW (ref 22–31)
CO2 SERPL-SCNC: 19 MMOL/L — LOW (ref 22–31)
COLOR SPEC: YELLOW — SIGNIFICANT CHANGE UP
CREAT SERPL-MCNC: 2.14 MG/DL — HIGH (ref 0.5–1.3)
CREAT SERPL-MCNC: 2.3 MG/DL — HIGH (ref 0.5–1.3)
DIFF PNL FLD: NEGATIVE — SIGNIFICANT CHANGE UP
EGFR: 33 ML/MIN/1.73M2 — LOW
EGFR: 36 ML/MIN/1.73M2 — LOW
EOSINOPHIL # BLD AUTO: 0.32 K/UL — SIGNIFICANT CHANGE UP (ref 0–0.5)
EOSINOPHIL NFR BLD AUTO: 4.8 % — SIGNIFICANT CHANGE UP (ref 0–6)
FLUAV AG NPH QL: SIGNIFICANT CHANGE UP
FLUBV AG NPH QL: SIGNIFICANT CHANGE UP
GLUCOSE SERPL-MCNC: 110 MG/DL — HIGH (ref 70–99)
GLUCOSE SERPL-MCNC: 202 MG/DL — HIGH (ref 70–99)
GLUCOSE UR QL: NEGATIVE — SIGNIFICANT CHANGE UP
HCT VFR BLD CALC: 28.7 % — LOW (ref 39–50)
HGB BLD-MCNC: 9.1 G/DL — LOW (ref 13–17)
IMM GRANULOCYTES NFR BLD AUTO: 0.3 % — SIGNIFICANT CHANGE UP (ref 0–0.9)
INR BLD: 1.06 — SIGNIFICANT CHANGE UP (ref 0.88–1.16)
KETONES UR-MCNC: NEGATIVE — SIGNIFICANT CHANGE UP
LEUKOCYTE ESTERASE UR-ACNC: NEGATIVE — SIGNIFICANT CHANGE UP
LYMPHOCYTES # BLD AUTO: 1.06 K/UL — SIGNIFICANT CHANGE UP (ref 1–3.3)
LYMPHOCYTES # BLD AUTO: 15.9 % — SIGNIFICANT CHANGE UP (ref 13–44)
MAGNESIUM SERPL-MCNC: 2 MG/DL — SIGNIFICANT CHANGE UP (ref 1.6–2.6)
MCHC RBC-ENTMCNC: 30 PG — SIGNIFICANT CHANGE UP (ref 27–34)
MCHC RBC-ENTMCNC: 31.7 GM/DL — LOW (ref 32–36)
MCV RBC AUTO: 94.7 FL — SIGNIFICANT CHANGE UP (ref 80–100)
MONOCYTES # BLD AUTO: 0.54 K/UL — SIGNIFICANT CHANGE UP (ref 0–0.9)
MONOCYTES NFR BLD AUTO: 8.1 % — SIGNIFICANT CHANGE UP (ref 2–14)
NEUTROPHILS # BLD AUTO: 4.65 K/UL — SIGNIFICANT CHANGE UP (ref 1.8–7.4)
NEUTROPHILS NFR BLD AUTO: 70 % — SIGNIFICANT CHANGE UP (ref 43–77)
NITRITE UR-MCNC: NEGATIVE — SIGNIFICANT CHANGE UP
NRBC # BLD: 0 /100 WBCS — SIGNIFICANT CHANGE UP (ref 0–0)
PH UR: 6 — SIGNIFICANT CHANGE UP (ref 5–8)
PLATELET # BLD AUTO: 248 K/UL — SIGNIFICANT CHANGE UP (ref 150–400)
POTASSIUM SERPL-MCNC: 5.8 MMOL/L — HIGH (ref 3.5–5.3)
POTASSIUM SERPL-MCNC: 6.5 MMOL/L — CRITICAL HIGH (ref 3.5–5.3)
POTASSIUM SERPL-SCNC: 5.8 MMOL/L — HIGH (ref 3.5–5.3)
POTASSIUM SERPL-SCNC: 6.5 MMOL/L — CRITICAL HIGH (ref 3.5–5.3)
PROT SERPL-MCNC: 6.3 G/DL — SIGNIFICANT CHANGE UP (ref 6–8.3)
PROT UR-MCNC: NEGATIVE MG/DL — SIGNIFICANT CHANGE UP
PROTHROM AB SERPL-ACNC: 12.6 SEC — SIGNIFICANT CHANGE UP (ref 10.5–13.4)
RBC # BLD: 3.03 M/UL — LOW (ref 4.2–5.8)
RBC # FLD: 13.8 % — SIGNIFICANT CHANGE UP (ref 10.3–14.5)
RSV RNA NPH QL NAA+NON-PROBE: SIGNIFICANT CHANGE UP
SARS-COV-2 RNA SPEC QL NAA+PROBE: SIGNIFICANT CHANGE UP
SODIUM SERPL-SCNC: 135 MMOL/L — SIGNIFICANT CHANGE UP (ref 135–145)
SODIUM SERPL-SCNC: 137 MMOL/L — SIGNIFICANT CHANGE UP (ref 135–145)
SP GR SPEC: 1.02 — SIGNIFICANT CHANGE UP (ref 1–1.03)
TROPONIN T SERPL-MCNC: 0.01 NG/ML — SIGNIFICANT CHANGE UP (ref 0–0.01)
UROBILINOGEN FLD QL: 0.2 E.U./DL — SIGNIFICANT CHANGE UP
WBC # BLD: 6.65 K/UL — SIGNIFICANT CHANGE UP (ref 3.8–10.5)
WBC # FLD AUTO: 6.65 K/UL — SIGNIFICANT CHANGE UP (ref 3.8–10.5)

## 2022-12-28 PROCEDURE — 93010 ELECTROCARDIOGRAM REPORT: CPT

## 2022-12-28 PROCEDURE — 71045 X-RAY EXAM CHEST 1 VIEW: CPT | Mod: 26

## 2022-12-28 PROCEDURE — 99291 CRITICAL CARE FIRST HOUR: CPT

## 2022-12-28 RX ORDER — GLUCAGON INJECTION, SOLUTION 0.5 MG/.1ML
1 INJECTION, SOLUTION SUBCUTANEOUS ONCE
Refills: 0 | Status: DISCONTINUED | OUTPATIENT
Start: 2022-12-28 | End: 2022-12-30

## 2022-12-28 RX ORDER — DEXTROSE 50 % IN WATER 50 %
12.5 SYRINGE (ML) INTRAVENOUS ONCE
Refills: 0 | Status: DISCONTINUED | OUTPATIENT
Start: 2022-12-28 | End: 2022-12-30

## 2022-12-28 RX ORDER — SODIUM ZIRCONIUM CYCLOSILICATE 10 G/10G
10 POWDER, FOR SUSPENSION ORAL ONCE
Refills: 0 | Status: COMPLETED | OUTPATIENT
Start: 2022-12-28 | End: 2022-12-28

## 2022-12-28 RX ORDER — DEXTROSE 50 % IN WATER 50 %
25 SYRINGE (ML) INTRAVENOUS ONCE
Refills: 0 | Status: DISCONTINUED | OUTPATIENT
Start: 2022-12-28 | End: 2022-12-30

## 2022-12-28 RX ORDER — INSULIN GLARGINE 100 [IU]/ML
15 INJECTION, SOLUTION SUBCUTANEOUS AT BEDTIME
Refills: 0 | Status: DISCONTINUED | OUTPATIENT
Start: 2022-12-28 | End: 2022-12-30

## 2022-12-28 RX ORDER — INSULIN LISPRO 100/ML
15 VIAL (ML) SUBCUTANEOUS
Refills: 0 | Status: DISCONTINUED | OUTPATIENT
Start: 2022-12-28 | End: 2022-12-29

## 2022-12-28 RX ORDER — SODIUM CHLORIDE 9 MG/ML
1000 INJECTION, SOLUTION INTRAVENOUS
Refills: 0 | Status: DISCONTINUED | OUTPATIENT
Start: 2022-12-28 | End: 2022-12-30

## 2022-12-28 RX ORDER — SODIUM CHLORIDE 9 MG/ML
500 INJECTION INTRAMUSCULAR; INTRAVENOUS; SUBCUTANEOUS ONCE
Refills: 0 | Status: COMPLETED | OUTPATIENT
Start: 2022-12-28 | End: 2022-12-28

## 2022-12-28 RX ORDER — INSULIN LISPRO 100/ML
VIAL (ML) SUBCUTANEOUS
Refills: 0 | Status: DISCONTINUED | OUTPATIENT
Start: 2022-12-28 | End: 2022-12-30

## 2022-12-28 RX ORDER — CLOPIDOGREL BISULFATE 75 MG/1
75 TABLET, FILM COATED ORAL DAILY
Refills: 0 | Status: DISCONTINUED | OUTPATIENT
Start: 2022-12-28 | End: 2022-12-30

## 2022-12-28 RX ORDER — INSULIN HUMAN 100 [IU]/ML
10 INJECTION, SOLUTION SUBCUTANEOUS ONCE
Refills: 0 | Status: COMPLETED | OUTPATIENT
Start: 2022-12-28 | End: 2022-12-28

## 2022-12-28 RX ORDER — AMLODIPINE BESYLATE 2.5 MG/1
10 TABLET ORAL DAILY
Refills: 0 | Status: DISCONTINUED | OUTPATIENT
Start: 2022-12-28 | End: 2022-12-30

## 2022-12-28 RX ORDER — CALCIUM GLUCONATE 100 MG/ML
1 VIAL (ML) INTRAVENOUS ONCE
Refills: 0 | Status: COMPLETED | OUTPATIENT
Start: 2022-12-28 | End: 2022-12-28

## 2022-12-28 RX ORDER — CARVEDILOL PHOSPHATE 80 MG/1
3.12 CAPSULE, EXTENDED RELEASE ORAL EVERY 12 HOURS
Refills: 0 | Status: DISCONTINUED | OUTPATIENT
Start: 2022-12-28 | End: 2022-12-28

## 2022-12-28 RX ORDER — ASPIRIN/CALCIUM CARB/MAGNESIUM 324 MG
81 TABLET ORAL DAILY
Refills: 0 | Status: DISCONTINUED | OUTPATIENT
Start: 2022-12-28 | End: 2022-12-30

## 2022-12-28 RX ORDER — DEXTROSE 50 % IN WATER 50 %
50 SYRINGE (ML) INTRAVENOUS ONCE
Refills: 0 | Status: COMPLETED | OUTPATIENT
Start: 2022-12-28 | End: 2022-12-28

## 2022-12-28 RX ORDER — DEXTROSE 50 % IN WATER 50 %
15 SYRINGE (ML) INTRAVENOUS ONCE
Refills: 0 | Status: DISCONTINUED | OUTPATIENT
Start: 2022-12-28 | End: 2022-12-30

## 2022-12-28 RX ORDER — ATORVASTATIN CALCIUM 80 MG/1
40 TABLET, FILM COATED ORAL AT BEDTIME
Refills: 0 | Status: DISCONTINUED | OUTPATIENT
Start: 2022-12-28 | End: 2022-12-30

## 2022-12-28 RX ORDER — HYDRALAZINE HCL 50 MG
50 TABLET ORAL
Refills: 0 | Status: DISCONTINUED | OUTPATIENT
Start: 2022-12-28 | End: 2022-12-30

## 2022-12-28 RX ORDER — CITALOPRAM 10 MG/1
40 TABLET, FILM COATED ORAL DAILY
Refills: 0 | Status: DISCONTINUED | OUTPATIENT
Start: 2022-12-28 | End: 2022-12-30

## 2022-12-28 RX ADMIN — Medication 50 GRAM(S): at 18:12

## 2022-12-28 RX ADMIN — Medication 100 GRAM(S): at 18:11

## 2022-12-28 RX ADMIN — SODIUM CHLORIDE 1000 MILLILITER(S): 9 INJECTION INTRAMUSCULAR; INTRAVENOUS; SUBCUTANEOUS at 16:53

## 2022-12-28 RX ADMIN — INSULIN HUMAN 10 UNIT(S): 100 INJECTION, SOLUTION SUBCUTANEOUS at 18:11

## 2022-12-28 RX ADMIN — Medication 50 MILLILITER(S): at 19:00

## 2022-12-28 RX ADMIN — SODIUM ZIRCONIUM CYCLOSILICATE 10 GRAM(S): 10 POWDER, FOR SUSPENSION ORAL at 20:41

## 2022-12-28 RX ADMIN — Medication 50 MILLIGRAM(S): at 23:58

## 2022-12-28 RX ADMIN — SODIUM CHLORIDE 1000 MILLILITER(S): 9 INJECTION INTRAMUSCULAR; INTRAVENOUS; SUBCUTANEOUS at 18:12

## 2022-12-28 NOTE — ED ADULT NURSE NOTE - OBJECTIVE STATEMENT
Pt received aaox3 c/o multiple syncopal episodes today with N/V. Pt also c/o new onset cough. Pt received aaox3 c/o 2x syncopal episodes each lasting 30 sec today with N/V. Pt also c/o new onset cough. Pt noted to be bradycardic on arrival HR in 40's. Pt c/o fatigue. Pt and wife deny head strike/ fall. Pt denies chest pain or SOB. States he just feels weak. PIV placed, labs drawn and sent.

## 2022-12-28 NOTE — H&P ADULT - PROBLEM SELECTOR PLAN 2
+JVD, 2+ bilateral LE pitting edema, BNP uptrended since discharge 890-->1284  --prescribed Lasix 40mg PO daily (on 12/22/22) as outpatient, will start Lasix 40mg IV q 12hrs for now.  --Recent Echo 12/5: normal LVEF, mild LVH, mildly dilated LA, PASP 47mmHG.  --continue Hydralazine 50mg PO TID. Holding home Coreg 2/2 bradycardia. No ACE/ARB 2/2 hyperkalemia/NOLAN.  --Core measures: daily weight, strict I&Os and 1.5L fluid restriction.

## 2022-12-28 NOTE — H&P ADULT - PROBLEM SELECTOR PLAN 7
H/H 9.1/28.7, baseline Hgb 10-11 during recent admission.  --no signs/symptoms of active bleeding.  --will obtain iron studies, fecal occult blood and continue to monitor closely.

## 2022-12-28 NOTE — H&P ADULT - PROBLEM SELECTOR PLAN 1
currently asymptomatic, EKG consistent with junctional bradycardia upon arrival to ED.  --patient now in sinus bradycardia, will continue cardiac telemetry.  --will hold BB for now. Obtain orthostatics.

## 2022-12-28 NOTE — H&P ADULT - NSICDXPASTMEDICALHX_GEN_ALL_CORE_FT
PAST MEDICAL HISTORY:  CAD (coronary artery disease)     Chronic diastolic congestive heart failure     Diabetes mellitus     Hyperlipidemia     Hypertension     Osteomyelitis

## 2022-12-28 NOTE — H&P ADULT - PROBLEM SELECTOR PLAN 6
K 6.5, treated with 1 liter IVFs, Insulin 10units IV, Dextrose 50%, Calcium Gluconate 1g IV x 1 dose and Lokelma 10mg PO x 1 dose in ED.   --repeat K 5.8, will F/U repeat labs @10PM and treat accordingly.

## 2022-12-28 NOTE — H&P ADULT - PROBLEM SELECTOR PLAN 9
-Continue Lantus 15 units qHS, Lispro 15units subcut TID and mISS      N: DASH with consistent carb, 1.5 liter fluid restriction  E: Maintain K>4.0 and Mg >2.0  VTE PPx: Heparin subcut  Code: Full

## 2022-12-28 NOTE — H&P ADULT - PROBLEM SELECTOR PLAN 5
BUN/Cr 68/2.3, Cr peaked to 1.74 during recent admission 12/3-12/10.   --will obtain UA/urine lytes.  --avoid nephrotoxic agents and monitor closely.  --Will consult Renal service in AM given NOLAN and electrolyte imbalance.

## 2022-12-28 NOTE — PATIENT PROFILE ADULT - FLU SEASON?
TRANSFER - OUT REPORT:    Verbal report given to Luciano on Gladis [place]  being transferred to [facility] [place]  for routine progression of care       Report consisted of patients Situation, Background, Assessment and   Recommendations(SBAR). Information from the following report(s) SBAR, Kardex, [facility] and Recent Results was reviewed with the receiving nurse. Lines:   Peripheral IV 67/53/31 Left Cephalic (Active)   Site Assessment Clean, dry, & intact 12/07/20 1124   Phlebitis Assessment 0 12/07/20 1124   Infiltration Assessment 0 12/07/20 1124   Dressing Status Clean, dry, & intact 12/07/20 1124   Dressing Type Tape;Transparent 12/07/20 1124   Hub Color/Line Status Infusing 12/07/20 1124   Action Taken Blood drawn 12/07/20 1124        Opportunity for questions and clarification was provided.       Patient transported with:   Voxel Yes...

## 2022-12-28 NOTE — ED ADULT TRIAGE NOTE - CHIEF COMPLAINT QUOTE
Pt BIBEMS c/o syncope. Had a cardiac stent placed 3 weeks ago. On EMS arrival, pt was hypotensive, diaphoretic, bradycardic. 1mg atropine given by EMS PTA. Pt verbalized feels better on arrival. 12 lead EKG/CCM initiated on arrival.

## 2022-12-28 NOTE — H&P ADULT - ASSESSMENT
57 yr old M with PMHx HTN, hyperlipidemia, DM-II, OM of Right 4th (on Doxycycline PO and CTX via PICC, course completed on 12/20/22), recent admission to [facility] with acute HFpEF (12/3-12/10/22), CAD s/p PCI (12/9/22) who presents to [facility] ED 12/28/22 with syncope and bradycardia PTA. Patient admitted to [facility] cardiac telemetry to R/O cardiac origin of syncope, management of acute on chronic HFpEF and electrolyte imbalance.

## 2022-12-28 NOTE — ED ADULT NURSE NOTE - NSICDXPASTMEDICALHX_GEN_ALL_CORE_FT
PAST MEDICAL HISTORY:  Diabetes mellitus     Hyperlipidemia     Hypertension     Osteomyelitis        PAST MEDICAL HISTORY:  Diabetes mellitus     Hyperlipidemia     Hypertension     Osteomyelitis

## 2022-12-28 NOTE — H&P ADULT - PROBLEM SELECTOR PLAN 8
Known to Dr. Gallardo, hx of OM R 4th toe was on IV Ceftriaxone via picc until 12/20/22 and 10 day course of Doxycycline PO (12/10-12/20).  --currently afebrile, without leukocytosis, nontoxic appearing.

## 2022-12-28 NOTE — H&P ADULT - NSHPADDITIONALINFOADULT_GEN_ALL_CORE
Attending Attestation:  I was physically present for the key portions of the evaluation and management (E/M) service provided.  I agree with the above history, physical, and plan which I have reviewed with the following edits/addendum:    57M w HTN HLP DM2 Osteomyelitis w recent antibx IV tx, recent ADHFpEF admission, CAD s/p PCI 12/9/22 who p/w syncope and bradycardia. Wife witnessed LOC for < 1min x 2 simultaneously at home while sitting on side of bed. Coughed and passed out. EMS found pt bradycardic in the 40s and hypotensive and was given atropine 1mg IV x 1. ED ECG narrow QRS junctional rhythm with no signs of ischemia w a HR of 53 BPM. Other strips reviewed showed isorhythmic dissociation but demonstrates competent sinus node. Crea 1.69, ? new baseline CKD. BNP 1284 from 890 prior. CXR clear. Anemia chronic but slightly lower than baseline. On exam, appears euvolemic. [facility] 12/09/22:  PEPE x 1 OM1 (100% ), LM: Luminal irregularities, LCx: small, mild diffuse, pRamus: 50%, LAD: mild luminal irregularities, Diag: mild diffuse disease, RCA: 30-50% w/ R--> L collaterals. Repeat TTE today 12/29 no pericardial effusion with normal LVEF.    #Syncope - vasovagal ? EP consulted for opinion if may benefit from PPM  #HFpEF - mild vol up. IV diuresis. Reassess daily need.  #CAD - continue med mgt  #NOLAN, CKD, electrolyte disturbance - pre-renal hypotn likely contributed. ? new baseline crea. no MRA  #DMT2 #HTN - cont home mgt  #OM - no sx of active infection    Pt of Dr Gay. Will arrange for within 1 week FU for ADHF.    Darwin Dinero MD  Cardiology    50 minutes spent on total encounter; more than 50% of the visit was spent counseling and/or coordinating care by the attending physician. Attending Attestation:  I was physically present for the key portions of the evaluation and management (E/M) service provided.  I agree with the above history, physical, and plan which I have reviewed with the following edits/addendum:    57M w HTN HLP DM2 Osteomyelitis w recent antibx IV tx, recent ADHFpEF admission, CAD s/p PCI 12/9/22 who p/w syncope and bradycardia. Wife witnessed LOC for < 1min x 2 simultaneously at home while sitting on side of bed. Coughed and passed out. EMS found pt bradycardic in the 40s and hypotensive and was given atropine 1mg IV x 1. ED ECG narrow QRS junctional rhythm with no signs of ischemia w a HR of 53 BPM. Other strips reviewed showed isorhythmic dissociation but demonstrates competent sinus node. Crea 1.69, ? new baseline CKD. BNP 1284 from 890 prior. CXR clear. Anemia chronic but slightly lower than baseline. On exam, appears euvolemic. [facility] 12/09/22:  PEPE x 1 OM1 (100% ), LM: Luminal irregularities, LCx: small, mild diffuse, pRamus: 50%, LAD: mild luminal irregularities, Diag: mild diffuse disease, RCA: 30-50% w/ R--> L collaterals. Repeat TTE today 12/29 no pericardial effusion with normal LVEF.    #Syncope - vasovagal ? EP consulted for opinion if may benefit from PPM  #HFpEF - mild vol up. IV diuresis. Reassess daily need.  #CAD - continue med mgt  #NOLAN, CKD, electrolyte disturbance - pre-renal hypotn likely contributed. ? new baseline crea. no MRA  #DMT2 #HTN - cont home mgt  #OM - no sx of active infection    Pt of Dr Gay. Will arrange for within 1 week FU for ADHF.    Darwin Dinero MD  Cardiology    50 minutes spent on total encounter; more than 50% of the visit was spent counseling and/or coordinating care by the attending physician.  Intial encounter date 12/29

## 2022-12-28 NOTE — ED PROVIDER NOTE - EMPLOYMENT
CHIEF COMPLAINT  Pain left great toe    HISTORY OF PRESENT ILLNESS:  Larry is here for follow up after being in urgent care on 03/13/2022 for pain and swelling of the left great toe. He states that on 03/10 he woke up during the night with pain in the great toe, it was so sensitive that he could not tolerate a sheet touching his foot and had to sleep with his foot uncovered. The next morning the toe was red and swollen, over the course of the next 3 days the pain and swelling increased, it was difficult to wear a sock or shoe and hurt to bear weight. On 03/13 he went to urgent care for evaluation since the pain was so significant, he was diagnosed with gout and started on prednisone. Since starting the prednisone the swelling of the foot has started to improve, the left great toe remains very sensitive but he can bear weight now if he does not have a shoe on. The redness has started to improve but has not fully resolved. He does report a history of trauma to the left great toe 2 years ago and since then the nail has not grown out the same. No recent trauma. He denies any fever or chills. His diet has not changed, in fact they have been avoiding alcohol nearly completely in their home in recent weeks. He did eat sardines two weeks ago with a friend of his.       Past Medical History:   Diagnosis Date   • Allergic rhinitis due to other allergen    • Benign neoplasm of colon 10/27/03    tubular adenoma polyp   • Diverticulosis 08/30/2019    Dr Sung   • Family history of malignant neoplasm of gastrointestinal tract    • Hypercholesterolemia 08/05/2013   • Inguinal hernia without mention of obstruction or gangrene, unilateral or unspecified, (not specified as recurrent) 06/13/2000    left;  s/p repair of LIH   • Presence of dental prosthetic device     lower bridge   • Prostate cancer (CMS/HCC) 02/27/2019   • Residual foreign body in soft tissue 02/12/2019     Social History     Socioeconomic History   • Marital  status: /Civil Union     Spouse name: Carmencita   • Number of children: 3   • Years of education: 14   • Highest education level: High school graduate   Occupational History   • Occupation: mangager for [employer]     Employer: OTHER   Tobacco Use   • Smoking status: Never Smoker   • Smokeless tobacco: Never Used   Substance and Sexual Activity   • Alcohol use: Yes     Alcohol/week: 7.0 standard drinks     Types: 7 Standard drinks or equivalent per week     Comment: 1 wine glasses/day   • Drug use: No   • Sexual activity: Yes     Partners: Female   Other Topics Concern   •  Service Not Asked   • Blood Transfusions Not Asked   • Caffeine Concern Not Asked   • Occupational Exposure Not Asked   • Hobby Hazards Not Asked   • Sleep Concern Not Asked   • Stress Concern Not Asked   • Weight Concern Not Asked   • Special Diet Not Asked   • Back Care Not Asked   • Exercise Not Asked   • Bike Helmet Not Asked   • Seat Belt Yes   • Self-Exams Not Asked   Social History Narrative   • Not on file     Social Determinants of Health     Financial Resource Strain: Not on file   Food Insecurity: Not on file   Transportation Needs: Not on file   Physical Activity: Not on file   Stress: Not on file   Social Connections: Not on file   Intimate Partner Violence: Not on file     ALLERGIES:   Allergen Reactions   • Seasonal Runny Nose       REVIEW OF SYSTEMS:  Constitutional:  Negative for fever and chills. Negative for excessive fatigue or pain.   Skin:  Negative for rash. Negative for open or unhealing skin lesions. Negative for changes to hair pattern or texture.   HEENT:  Negative for eye drainage, rhinorrhea, ear pain or sore throat. Negative for chronic sinus pressure, congestion or pain. Negative for changes to hearing or vision. Negative for oral lesions or dental caries. Negative for tempomandibular pain or locking.   Extremities:  Positive for pain, redness and swelling of the left great toe. Negative for peripheral  edema.   Neurologic:  Negative for change in sensory or motor function.  Negative for headache, dizziness, lightheadedness, syncope or weakness. Negative for changes in speech, memory, gait or balance.   Endocrine:  Negative for heat or cold intolerance, weight loss or gain.  Hematological:  Negative for bleeding, bruising or adenopathy.  Psychiatric:  Negative for change in affect, change in mentation or sleep disturbance. Negative for concerns about depression or anxiety, no thoughts of self harm or concerns about safety in the home or relationships.       HEALTH MAINTENANCE:  Health Maintenance Summary     DTaP/Tdap/Td Vaccine (2 - Td or Tdap)  Overdue since 7/28/2021    COVID-19 Vaccine (3 - Moderna risk 4-dose series)  Overdue since 1/6/2022    Medicare Advantage- Medicare Wellness Visit (Yearly - January to December)  Due since 1/1/2022    Depression Screening (Yearly)  Next due on 11/19/2022    Colonoscopy Risk (Every 5 Years)  Next due on 8/30/2024    Hepatitis C Screening   Completed    Pneumococcal Vaccine 65+   Completed    Shingles Vaccine   Completed    Influenza Vaccine   Completed    Hepatitis B Vaccine   Aged Out    Meningococcal Vaccine   Aged Out    HPV Vaccine   Aged Out          PHYSICAL EXAM:  Vital Signs:    Visit Vitals  BP (!) 142/76   Pulse 82   Wt 95.3 kg (210 lb)   BMI 31.93 kg/m²     General:   Alert, cooperative, conversive in no acute distress.  Skin:  Warm and dry without rash.    Musculoskeletal: The left great toe is swollen and red and purplish in color, the nail is deformed but he indicates that this has been present for 2 years. The toe can be flexed and extended without significant pain and he is able to bear weight without a shoe on. No pain over the metatarsals.   Back:  Normal alignment.  No costovertebral angle tenderness.  Neurologic:  Oriented x4.  No focal deficits.  Psychiatric:  Cooperative.  Appropriate mood and affect.      ASSESSMENT/PLAN:  1. Acute gout involving toe  of left foot, unspecified cause  Slow to resolve with medrol dose omar, discussed and examined with Dr Jeffery who agrees and will add colchicine to help improve his pain and bring about resolution. Will check uric acid level today, if elevated may need to consider allopurinol to help prevent additional flares.   - colchicine (COLCRYS) 0.6 MG tablet; Take 1 tablet by mouth 2 times daily for 6 days.  Dispense: 12 tablet; Refill: 0  - URIC ACID; Future         N/A

## 2022-12-28 NOTE — ED PROVIDER NOTE - OBJECTIVE STATEMENT
56 y/o M with PMHx HTN, HLD, DM-II, OM of R 4th (on Vanc and CTX via PICC, last dose 12/20/22), and anxiety presents to ED with bradycardia pta. Per wife, pt was tired appearing and coughing. He had 2 episodes of syncope, each lasting 30s. No head injury. Wife states pt was out walking for a long period of time last night and had a long shower prior to episodes of syncope. When EMS arrived, pt was hypotensive and bradycardic in 40s though wife states his normal HR is 50s. Denies chest pain or SOB. States he just feels weak. Pt was admitted earlier in December and discharged on 12/7 for flash pulmonary edema. He had stent placed on 12/9.

## 2022-12-28 NOTE — H&P ADULT - PROBLEM SELECTOR PLAN 4
stable, continue HOME MED: Hydralazine 50mg PO BID and Norvasc 10mg PO daily. Holding BB 2/2 junctional bradycardia.

## 2022-12-28 NOTE — H&P ADULT - HISTORY OF PRESENT ILLNESS
57 yr old M with PMHx HTN, hyperlipidemia, DM-II, OM of R 4th (on Doxycycline PO and CTX via PICC, course completed on 12/20/22), recent admission to [facility] with acute HFpEF (12/3-12/10/22), CAD s/p PCI (12/9/22) who presents to [facility] ED 12/28/22 with syncope and bradycardia PTA. Patient had 2 episodes of syncope, each lasting ~30 seconds. Denies head trauma. As per patients wife, patient had went out for a walk and took a long shower prior to syncopal episodes. When EMS arrived, pt was hypotensive and bradycardic in 40s treated with Atropine 1mg IV x 1 dose with improvement. Patient states he just feels weak, denies chest pain, dizziness, SOB, palpitations, headache or visual changes.         In ED, BP: 125/73, HR: 50s, RR:18, O2 sat: 97% RA. EKG revealed Junctional bradycardia@52BPM, no acute ST/T wave changes. CXR unremarkable.    Labs notable for: H/H 9.1/28.7, K 6.5, BUN/Cr 68/2.3, AST 46, ALT 75, Trop 0.01, CKMB 7.2, , BNP 1284, COVID PCR negative. UA negative.     Patient treated with 1 liter IVFs, Insulin 10units IV, Dextrose 50%, Calcium Gluconate 1g IV x 1 dose and Lokelma 10mg PO x 1 dose.    Patient currently stable, admitted to [facility] for cardiac telemetry and management of NOLAN and electrolyte imbalance.   57 yr old M with PMHx HTN, hyperlipidemia, DM-II, OM of Right 4th (on Doxycycline PO and CTX via PICC, course completed on 12/20/22), recent admission to [facility] with acute HFpEF (12/3-12/10/22), CAD s/p PCI (12/9/22) who presents to [facility] ED 12/28/22 with syncope and bradycardia PTA. As per patients wife, patient was sitting on the side of his bed after taking a shower when "he suddenly started coughing and his eyes rolled back for a few seconds". Patient recalls feeling tired more than usual today after walking ~20,000 steps yesterday. When EMS arrived, pt was hypotensive and bradycardic in 40s treated with Atropine 1mg IV x 1 dose with improvement. Patient states he just feels weak and has noticed his bilateral LE have become edematous in the past week. Patient denies chest pain, dizziness, SOB, palpitations, headache or visual changes. Patient was seen by his PMD Dr. Tiffanie Gaming on 12/22 and labs returned with K 6.3, patient was recommended to restart Lasix 40mg PO daily and take Kayexalate 15g PO daily x 5 days.    In ED, BP: 125/73, HR: 50s, RR:18, O2 sat: 97% RA. EKG revealed Junctional bradycardia@53BPM, no acute ST/T wave changes. CXR unremarkable. Labs notable for: H/H 9.1/28.7, K 6.5, BUN/Cr 68/2.3, AST 46, ALT 75, Trop 0.01, CKMB 7.2, , BNP 1284, COVID PCR negative. UA negative. Patient treated with 1 liter IVFs, Insulin 10units IV, Dextrose 50%, Calcium Gluconate 1g IV x 1 dose and Lokelma 10mg PO x 1 dose.    Patient currently stable, [facility] R/O cardiac origin of syncope, management of acute on chronic HFpEF and electrolyte imbalance.

## 2022-12-28 NOTE — ED PROVIDER NOTE - NSICDXPASTMEDICALHX_GEN_ALL_CORE_FT
PAST MEDICAL HISTORY:  Diabetes mellitus     Hyperlipidemia     Hypertension     Osteomyelitis

## 2022-12-28 NOTE — ED PROVIDER NOTE - CLINICAL SUMMARY MEDICAL DECISION MAKING FREE TEXT BOX
Pt with cough, bradycardia, and weakness. Will obtain labs, CXR, EKG, continue to assess on cardiac monitor, and reassess. Pt with cough, bradycardia, and weakness. afebrile, no resp distress, s/p atropine by EMS, junctional on EKG, Will obtain labs, CXR, continue to assess on cardiac monitor, and reassess. treated for hyperK, will admit for further mgt.

## 2022-12-28 NOTE — H&P ADULT - PROBLEM SELECTOR PLAN 3
chest pain free, EKG revealed Junctional bradycardia@53BPM, no acute ST/T wave changes.  --Initial Troponin 0.01, will F/U CE@10PM.  -- Recent [facility] 12/09/22: PEPE x 1 OM1 (100% ), LM: Luminal irregularities, LCx: small, mild diffuse, pRamus: 50%, LAD: mild luminal irregularities, Diag: mild diffuse disease, RCA: 30-50% w/ R--> L collaterals, EDP: 9mmHg.  -- Continue ASA/Plavix/Statin.

## 2022-12-28 NOTE — ED ADULT NURSE NOTE - NSIMPLEMENTINTERV_GEN_ALL_ED
Implemented All Universal Safety Interventions:  [place] to call system. Call bell, personal items and telephone within reach. Instruct patient to call for assistance. Room bathroom lighting operational. Non-slip footwear when patient is off stretcher. Physically safe environment: no spills, clutter or unnecessary equipment. Stretcher in lowest position, wheels locked, appropriate side rails in place.

## 2022-12-29 LAB
ANION GAP SERPL CALC-SCNC: 11 MMOL/L — SIGNIFICANT CHANGE UP (ref 5–17)
ANION GAP SERPL CALC-SCNC: 12 MMOL/L — SIGNIFICANT CHANGE UP (ref 5–17)
BUN SERPL-MCNC: 52 MG/DL — HIGH (ref 7–23)
BUN SERPL-MCNC: 63 MG/DL — HIGH (ref 7–23)
CALCIUM SERPL-MCNC: 8.8 MG/DL — SIGNIFICANT CHANGE UP (ref 8.4–10.5)
CALCIUM SERPL-MCNC: 9.4 MG/DL — SIGNIFICANT CHANGE UP (ref 8.4–10.5)
CHLORIDE SERPL-SCNC: 104 MMOL/L — SIGNIFICANT CHANGE UP (ref 96–108)
CHLORIDE SERPL-SCNC: 108 MMOL/L — SIGNIFICANT CHANGE UP (ref 96–108)
CHOLEST SERPL-MCNC: 150 MG/DL — SIGNIFICANT CHANGE UP
CK MB CFR SERPL CALC: 5.6 NG/ML — SIGNIFICANT CHANGE UP (ref 0–6.7)
CK SERPL-CCNC: 151 U/L — SIGNIFICANT CHANGE UP (ref 30–200)
CO2 SERPL-SCNC: 15 MMOL/L — LOW (ref 22–31)
CO2 SERPL-SCNC: 20 MMOL/L — LOW (ref 22–31)
CREAT ?TM UR-MCNC: 50 MG/DL — SIGNIFICANT CHANGE UP
CREAT SERPL-MCNC: 1.69 MG/DL — HIGH (ref 0.5–1.3)
CREAT SERPL-MCNC: 1.93 MG/DL — HIGH (ref 0.5–1.3)
EGFR: 40 ML/MIN/1.73M2 — LOW
EGFR: 47 ML/MIN/1.73M2 — LOW
FERRITIN SERPL-MCNC: 270 NG/ML — SIGNIFICANT CHANGE UP (ref 30–400)
GLUCOSE BLDC GLUCOMTR-MCNC: 122 MG/DL — HIGH (ref 70–99)
GLUCOSE BLDC GLUCOMTR-MCNC: 126 MG/DL — HIGH (ref 70–99)
GLUCOSE BLDC GLUCOMTR-MCNC: 188 MG/DL — HIGH (ref 70–99)
GLUCOSE BLDC GLUCOMTR-MCNC: 52 MG/DL — CRITICAL LOW (ref 70–99)
GLUCOSE BLDC GLUCOMTR-MCNC: 64 MG/DL — LOW (ref 70–99)
GLUCOSE BLDC GLUCOMTR-MCNC: 85 MG/DL — SIGNIFICANT CHANGE UP (ref 70–99)
GLUCOSE BLDC GLUCOMTR-MCNC: 99 MG/DL — SIGNIFICANT CHANGE UP (ref 70–99)
GLUCOSE SERPL-MCNC: 109 MG/DL — HIGH (ref 70–99)
GLUCOSE SERPL-MCNC: 131 MG/DL — HIGH (ref 70–99)
HCT VFR BLD CALC: 28.5 % — LOW (ref 39–50)
HDLC SERPL-MCNC: 52 MG/DL — SIGNIFICANT CHANGE UP
HGB BLD-MCNC: 9.1 G/DL — LOW (ref 13–17)
IRON SATN MFR SERPL: 29 % — SIGNIFICANT CHANGE UP (ref 16–55)
IRON SATN MFR SERPL: 77 UG/DL — SIGNIFICANT CHANGE UP (ref 45–165)
LIPID PNL WITH DIRECT LDL SERPL: 84 MG/DL — SIGNIFICANT CHANGE UP
MAGNESIUM SERPL-MCNC: 1.9 MG/DL — SIGNIFICANT CHANGE UP (ref 1.6–2.6)
MAGNESIUM SERPL-MCNC: 1.9 MG/DL — SIGNIFICANT CHANGE UP (ref 1.6–2.6)
MCHC RBC-ENTMCNC: 29.9 PG — SIGNIFICANT CHANGE UP (ref 27–34)
MCHC RBC-ENTMCNC: 31.9 GM/DL — LOW (ref 32–36)
MCV RBC AUTO: 93.8 FL — SIGNIFICANT CHANGE UP (ref 80–100)
NON HDL CHOLESTEROL: 98 MG/DL — SIGNIFICANT CHANGE UP
NRBC # BLD: 0 /100 WBCS — SIGNIFICANT CHANGE UP (ref 0–0)
OSMOLALITY UR: 430 MOSM/KG — SIGNIFICANT CHANGE UP (ref 300–900)
PLATELET # BLD AUTO: 268 K/UL — SIGNIFICANT CHANGE UP (ref 150–400)
POTASSIUM SERPL-MCNC: 4.8 MMOL/L — SIGNIFICANT CHANGE UP (ref 3.5–5.3)
POTASSIUM SERPL-MCNC: 5.1 MMOL/L — SIGNIFICANT CHANGE UP (ref 3.5–5.3)
POTASSIUM SERPL-SCNC: 4.8 MMOL/L — SIGNIFICANT CHANGE UP (ref 3.5–5.3)
POTASSIUM SERPL-SCNC: 5.1 MMOL/L — SIGNIFICANT CHANGE UP (ref 3.5–5.3)
POTASSIUM UR-SCNC: 16 MMOL/L — SIGNIFICANT CHANGE UP
PROT ?TM UR-MCNC: 6 MG/DL — SIGNIFICANT CHANGE UP (ref 0–12)
PROT/CREAT UR-RTO: 0.1 RATIO — SIGNIFICANT CHANGE UP (ref 0–0.2)
RBC # BLD: 3.04 M/UL — LOW (ref 4.2–5.8)
RBC # FLD: 14 % — SIGNIFICANT CHANGE UP (ref 10.3–14.5)
SODIUM SERPL-SCNC: 135 MMOL/L — SIGNIFICANT CHANGE UP (ref 135–145)
SODIUM SERPL-SCNC: 135 MMOL/L — SIGNIFICANT CHANGE UP (ref 135–145)
SODIUM UR-SCNC: 91 MMOL/L — SIGNIFICANT CHANGE UP
TIBC SERPL-MCNC: 262 UG/DL — SIGNIFICANT CHANGE UP (ref 220–430)
TRIGL SERPL-MCNC: 69 MG/DL — SIGNIFICANT CHANGE UP
TROPONIN T SERPL-MCNC: <0.01 NG/ML — SIGNIFICANT CHANGE UP (ref 0–0.01)
UIBC SERPL-MCNC: 185 UG/DL — SIGNIFICANT CHANGE UP (ref 110–370)
UUN UR-MCNC: 546 MG/DL — SIGNIFICANT CHANGE UP
WBC # BLD: 7.19 K/UL — SIGNIFICANT CHANGE UP (ref 3.8–10.5)
WBC # FLD AUTO: 7.19 K/UL — SIGNIFICANT CHANGE UP (ref 3.8–10.5)

## 2022-12-29 PROCEDURE — 93308 TTE F-UP OR LMTD: CPT | Mod: 26

## 2022-12-29 PROCEDURE — 99222 1ST HOSP IP/OBS MODERATE 55: CPT

## 2022-12-29 PROCEDURE — 93010 ELECTROCARDIOGRAM REPORT: CPT

## 2022-12-29 RX ORDER — FUROSEMIDE 40 MG
40 TABLET ORAL EVERY 12 HOURS
Refills: 0 | Status: DISCONTINUED | OUTPATIENT
Start: 2022-12-29 | End: 2022-12-30

## 2022-12-29 RX ORDER — CARVEDILOL PHOSPHATE 80 MG/1
3.12 CAPSULE, EXTENDED RELEASE ORAL EVERY 12 HOURS
Refills: 0 | Status: DISCONTINUED | OUTPATIENT
Start: 2022-12-29 | End: 2022-12-30

## 2022-12-29 RX ORDER — HEPARIN SODIUM 5000 [USP'U]/ML
5000 INJECTION INTRAVENOUS; SUBCUTANEOUS EVERY 8 HOURS
Refills: 0 | Status: DISCONTINUED | OUTPATIENT
Start: 2022-12-29 | End: 2022-12-30

## 2022-12-29 RX ADMIN — HEPARIN SODIUM 5000 UNIT(S): 5000 INJECTION INTRAVENOUS; SUBCUTANEOUS at 06:19

## 2022-12-29 RX ADMIN — Medication 50 MILLIGRAM(S): at 18:01

## 2022-12-29 RX ADMIN — CITALOPRAM 40 MILLIGRAM(S): 10 TABLET, FILM COATED ORAL at 12:03

## 2022-12-29 RX ADMIN — CLOPIDOGREL BISULFATE 75 MILLIGRAM(S): 75 TABLET, FILM COATED ORAL at 12:03

## 2022-12-29 RX ADMIN — Medication 15 UNIT(S): at 13:37

## 2022-12-29 RX ADMIN — HEPARIN SODIUM 5000 UNIT(S): 5000 INJECTION INTRAVENOUS; SUBCUTANEOUS at 21:40

## 2022-12-29 RX ADMIN — INSULIN GLARGINE 15 UNIT(S): 100 INJECTION, SOLUTION SUBCUTANEOUS at 21:40

## 2022-12-29 RX ADMIN — Medication 40 MILLIGRAM(S): at 18:01

## 2022-12-29 RX ADMIN — Medication 50 MILLIGRAM(S): at 06:19

## 2022-12-29 RX ADMIN — Medication 81 MILLIGRAM(S): at 12:03

## 2022-12-29 RX ADMIN — HEPARIN SODIUM 5000 UNIT(S): 5000 INJECTION INTRAVENOUS; SUBCUTANEOUS at 13:57

## 2022-12-29 RX ADMIN — INSULIN GLARGINE 15 UNIT(S): 100 INJECTION, SOLUTION SUBCUTANEOUS at 01:26

## 2022-12-29 RX ADMIN — ATORVASTATIN CALCIUM 40 MILLIGRAM(S): 80 TABLET, FILM COATED ORAL at 21:40

## 2022-12-29 RX ADMIN — Medication 2: at 21:39

## 2022-12-29 RX ADMIN — Medication 40 MILLIGRAM(S): at 06:19

## 2022-12-29 RX ADMIN — AMLODIPINE BESYLATE 10 MILLIGRAM(S): 2.5 TABLET ORAL at 06:19

## 2022-12-29 NOTE — PROGRESS NOTE ADULT - PROBLEM SELECTOR PLAN 2
+JVD, 1+ bilateral LE pitting edema, lungs CTA, BNP uptrended since discharge 890-->1284  --prescribed Lasix 40mg PO daily (on 12/22/22) but was not taking per Dr Gay; started on Lasix 40mg IVP BID.  --Recent Echo 12/5: normal LVEF, mild LVH, mildly dilated LA, PASP 47mmHG.  --Limited echo 12/29: normal LVEF, no pericardial effusion  --continue Hydralazine 50mg PO TID, ______ Coreg 3.125 mg BID; No ACE/ARB 2/2 hyperkalemia/NOLAN.  --Core measures: daily weight, strict I&Os and 1.5L fluid restriction.  --Will need HF appt set up 12/29 (home likely over weekend) +JVD, 1+ bilateral LE pitting edema, lungs CTA, BNP uptrended since discharge 890-->1284  --prescribed Lasix 40mg PO daily (on 12/22/22) but was not taking per Dr Gay; started on Lasix 40mg IVP BID.  --Recent Echo 12/5: normal LVEF, mild LVH, mildly dilated LA, PASP 47mmHG.  --Limited echo 12/29: normal LVEF, no pericardial effusion  --continue Hydralazine 50mg PO TID, Coreg 3.125 mg BID; No ACE/ARB 2/2 hyperkalemia/NOLAN.  --Core measures: daily weight, strict I&Os and 1.5L fluid restriction.  --Will need HF appt set up 12/29 (home likely over weekend)

## 2022-12-29 NOTE — DIETITIAN INITIAL EVALUATION ADULT - ENERGY INTAKE
57 yr old M PMHx HTN, hyperlipidemia, DM-II, OM of Right 4th, recent admission to [facility] with acute HFpEF (12/3-12/10/22), CAD s/p PCI (12/9/22) who presents to [facility] ED 12/28/22 with syncope and bradycardia PTA. When EMS arrived, pt was hypotensive and bradycardic in 40s treated with Atropine 1mg IV x 1 dose with improvement. Admitted to [facility] cardiac telemetry to R/O cardiac origin of syncope, management of acute on chronic HFpEF NOLAN and electrolyte imbalance (Hyperkalemia).    Pt seen this AM on 5[facility]. Known to RD from prior admit in which diet education provided. Pt reports diet change since time of prior admit - has been having less deli meats, eating more fish and baked chicken. Does dine out from time to time. Pt taking daily wts however not recording them. Reports wt gain 2/2 fluids;  pounds, now admitted at 219 pounds suggesting 11 pound wt gain. Pt remains with 2+BL ankle edema presently. Reports improved BG PTA, ~. POCT 85 52 99 - assume running low 2/2 insulin use for hyperkalemia o/a (Insulin 10units IV). K now WDL. Other labs of note: Lipids WDL 12/4/2022, BUN/Cr 63/1.93, , AST SGOT 46, ALT SGPT 75, BNP 1284. Good PO this AM. NKFA. No issues chewing/swallowing. No pain. Satya 20. No pressure ulcers.   Please see below for nutritions recommendations.

## 2022-12-29 NOTE — PROGRESS NOTE ADULT - PROBLEM SELECTOR PLAN 4
SBP ranges are 135-154  --continue HOME MED: Hydralazine 50mg PO BID and Norvasc 10mg PO daily, _____ coreg 3.125 mg PO BID SBP ranges are 135-154  --continue HOME MED: Hydralazine 50mg PO BID and Norvasc 10mg PO daily, coreg 3.125 mg PO BID

## 2022-12-29 NOTE — PROGRESS NOTE ADULT - PROBLEM SELECTOR PLAN 7
H/H 9.1/28.7, baseline Hgb 10-11 during recent admission.  --Most recent H/H 9.1 (12/29)  --no signs/symptoms of active bleeding.  --iron studies within normal limits  --f/u fecal occult blood test and continue to monitor closely.

## 2022-12-29 NOTE — PROGRESS NOTE ADULT - NSPROGADDITIONALINFOA_GEN_ALL_CORE
Attending Attestation:  I was physically present for the key portions of the evaluation and management (E/M) service provided.  I agree with the above history, physical, and plan which I have reviewed with the following edits/addendum:    See HnP attestation. initial encounter 12/29/22    Darwin Dinero MD  Cardiology    50 minutes spent on total encounter; more than 50% of the visit was spent counseling and/or coordinating care by the attending physician.

## 2022-12-29 NOTE — PROGRESS NOTE ADULT - PROBLEM SELECTOR PLAN 3
chest pain free, EKG revealed Junctional bradycardia@53BPM, no acute ST/T wave changes.  --trop neg x2  -- Recent [facility] 12/09/22: PEPE x 1 OM1 (100% ), LM: Luminal irregularities, LCx: small, mild diffuse, pRamus: 50%, LAD: mild luminal irregularities, Diag: mild diffuse disease, RCA: 30-50% w/ R--> L collaterals, EDP: 9mmHg.  -- Continue ASA/Plavix/Statin.

## 2022-12-29 NOTE — PROGRESS NOTE ADULT - SUBJECTIVE AND OBJECTIVE BOX
Cardiology PA Adult Progress Note    SUBJECTIVE ASSESSMENT:  	  MEDICATIONS:  amLODIPine   Tablet 10 milliGRAM(s) Oral daily  furosemide   Injectable 40 milliGRAM(s) IV Push every 12 hours  hydrALAZINE 50 milliGRAM(s) Oral two times a day        citalopram 40 milliGRAM(s) Oral daily      atorvastatin 40 milliGRAM(s) Oral at bedtime  dextrose 50% Injectable 25 Gram(s) IV Push once  dextrose 50% Injectable 12.5 Gram(s) IV Push once  dextrose 50% Injectable 25 Gram(s) IV Push once  dextrose Oral Gel 15 Gram(s) Oral once PRN  glucagon  Injectable 1 milliGRAM(s) IntraMuscular once  insulin glargine Injectable (LANTUS) 15 Unit(s) SubCutaneous at bedtime  insulin lispro (ADMELOG) corrective regimen sliding scale   SubCutaneous Before meals and at bedtime    aspirin enteric coated 81 milliGRAM(s) Oral daily  clopidogrel Tablet 75 milliGRAM(s) Oral daily  dextrose 5%. 1000 milliLiter(s) IV Continuous <Continuous>  dextrose 5%. 1000 milliLiter(s) IV Continuous <Continuous>  heparin   Injectable 5000 Unit(s) SubCutaneous every 8 hours    	  VITAL SIGNS:  T(C): 36.4 (12-29-22 @ 13:51), Max: 36.9 (12-29-22 @ 04:20)  HR: 51 (12-29-22 @ 16:13) (51 - 66)  BP: 154/68 (12-29-22 @ 16:13) (135/61 - 154/69)  RR: 17 (12-29-22 @ 16:13) (16 - 18)  SpO2: 97% (12-29-22 @ 16:13) (96% - 98%)  Wt(kg): --    I&O's Summary    28 Dec 2022 07:01  -  29 Dec 2022 07:00  --------------------------------------------------------  IN: 0 mL / OUT: 900 mL / NET: -900 mL    29 Dec 2022 07:01  -  29 Dec 2022 17:31  --------------------------------------------------------  IN: 240 mL / OUT: 2120 mL / NET: -1880 mL      Height (cm): 182.9 (12-28 @ 23:44)  Weight (kg): 99.4 (12-29 @ 06:14)  BMI (kg/m2): 29.7 (12-29 @ 06:14)  BSA (m2): 2.21 (12-29 @ 06:14)                                     PHYSICAL EXAM:  Appearance: Normal	  HEENT: Normal oral mucosa, PERRL, EOMI	  Neck: Supple, + JVD; no Carotid Bruit   Cardiovascular: Normal S1 S2, No murmurs  Respiratory: Lungs clear to auscultation, No Rales, Rhonchi, Wheezing	  Gastrointestinal:  Soft, Non-tender, + BS	  Skin: No rashes, No ecchymoses, No cyanosis  Extremities: 1+ pitting edema bilaterally, Normal range of motion, No clubbing, cyanosis   Vascular: Peripheral pulses palpable 1+ bilateral LE  Neurologic: Non-focal  Psychiatry: A & O x 3, Mood & affect appropriate    LABS:	 	                       9.1    7.19  )-----------( 268      ( 29 Dec 2022 05:30 )             28.5     12-29    135  |  104  |  52<H>  ----------------------------<  131<H>  4.8   |  20<L>  |  1.69<H>    Ca    9.4      29 Dec 2022 05:30  Mg     1.9     12-29    TPro  6.3  /  Alb  3.7  /  TBili  0.2  /  DBili  x   /  AST  46<H>  /  ALT  75<H>  /  AlkPhos  92  12-28  proBNP: 1284  Lipid Profile: TChol 150, TG 69, HDL 52, LDL 84  HgA1c: 11.4  PT/INR - ( 28 Dec 2022 16:47 )   PT: 12.6 sec;   INR: 1.06          PTT - ( 28 Dec 2022 16:47 )  PTT:28.5 sec Cardiology PA Adult Progress Note    SUBJECTIVE ASSESSMENT: Overnight no acute events; Patient laying comfortably in bed. Currently denies CP, dizziness, palpitations, SOB, dyspnea, coughing, headaches, N/V/D/abdominal pain. Patient understands plan for the day.  	  MEDICATIONS:  amLODIPine   Tablet 10 milliGRAM(s) Oral daily  furosemide   Injectable 40 milliGRAM(s) IV Push every 12 hours  hydrALAZINE 50 milliGRAM(s) Oral two times a day  citalopram 40 milliGRAM(s) Oral daily  atorvastatin 40 milliGRAM(s) Oral at bedtime  dextrose 50% Injectable 25 Gram(s) IV Push once  dextrose 50% Injectable 12.5 Gram(s) IV Push once  dextrose 50% Injectable 25 Gram(s) IV Push once  dextrose Oral Gel 15 Gram(s) Oral once PRN  glucagon  Injectable 1 milliGRAM(s) IntraMuscular once  insulin glargine Injectable (LANTUS) 15 Unit(s) SubCutaneous at bedtime  insulin lispro (ADMELOG) corrective regimen sliding scale   SubCutaneous Before meals and at bedtime  aspirin enteric coated 81 milliGRAM(s) Oral daily  clopidogrel Tablet 75 milliGRAM(s) Oral daily  dextrose 5%. 1000 milliLiter(s) IV Continuous <Continuous>  dextrose 5%. 1000 milliLiter(s) IV Continuous <Continuous>  heparin   Injectable 5000 Unit(s) SubCutaneous every 8 hours    	  VITAL SIGNS:  T(C): 36.4 (12-29-22 @ 13:51), Max: 36.9 (12-29-22 @ 04:20)  HR: 51 (12-29-22 @ 16:13) (51 - 66)  BP: 154/68 (12-29-22 @ 16:13) (135/61 - 154/69)  RR: 17 (12-29-22 @ 16:13) (16 - 18)  SpO2: 97% (12-29-22 @ 16:13) (96% - 98%)  Wt(kg): --    I&O's Summary    28 Dec 2022 07:01  -  29 Dec 2022 07:00  --------------------------------------------------------  IN: 0 mL / OUT: 900 mL / NET: -900 mL    29 Dec 2022 07:01  -  29 Dec 2022 17:31  --------------------------------------------------------  IN: 240 mL / OUT: 2120 mL / NET: -1880 mL      Height (cm): 182.9 (12-28 @ 23:44)  Weight (kg): 99.4 (12-29 @ 06:14)  BMI (kg/m2): 29.7 (12-29 @ 06:14)  BSA (m2): 2.21 (12-29 @ 06:14)                                     PHYSICAL EXAM:  Appearance: Normal	  HEENT: Normal oral mucosa, PERRL, EOMI	  Neck: Supple, + JVD; no Carotid Bruit   Cardiovascular: Normal S1 S2, No murmurs  Respiratory: Lungs clear to auscultation, No Rales, Rhonchi, Wheezing	  Gastrointestinal:  Soft, Non-tender, + BS	  Skin: No rashes, No ecchymoses, No cyanosis  Extremities: 1+ pitting edema bilaterally, Normal range of motion, No clubbing, cyanosis   Vascular: Peripheral pulses palpable 1+ bilateral LE  Neurologic: Non-focal  Psychiatry: A & O x 3, Mood & affect appropriate    LABS:	 	                       9.1    7.19  )-----------( 268      ( 29 Dec 2022 05:30 )             28.5     12-29    135  |  104  |  52<H>  ----------------------------<  131<H>  4.8   |  20<L>  |  1.69<H>    Ca    9.4      29 Dec 2022 05:30  Mg     1.9     12-29    TPro  6.3  /  Alb  3.7  /  TBili  0.2  /  DBili  x   /  AST  46<H>  /  ALT  75<H>  /  AlkPhos  92  12-28  proBNP: 1284  Lipid Profile: TChol 150, TG 69, HDL 52, LDL 84  HgA1c: 11.4  PT/INR - ( 28 Dec 2022 16:47 )   PT: 12.6 sec;   INR: 1.06          PTT - ( 28 Dec 2022 16:47 )  PTT:28.5 sec

## 2022-12-29 NOTE — PROGRESS NOTE ADULT - PROBLEM SELECTOR PLAN 1
currently asymptomatic, EKG consistent with junctional bradycardia upon arrival to ED.  --patient now in sinus bradycardia, will continue cardiac telemetry.  --will hold BB for now.   --orthostatics negative  --EP following - patient appears to alternate between NSR and junctional bradycardia; during EP's conversation with patient, he was dosing off, and EP noted that patient went into juncional bradycardia while patient was falling asleep.  --Trial CPAP OVN; can continue home coreg.   --PPM unnecessary at this time as patient does maintain NSR.

## 2022-12-29 NOTE — DIETITIAN INITIAL EVALUATION ADULT - NS FNS DIET ORDER
Diet, DASH/TLC:   Sodium & Cholesterol Restricted  Consistent Carbohydrate {No Snacks} (CSTCHO)  1500mL Fluid Restriction ([id number]) (12-28-22 @ 23:50)

## 2022-12-29 NOTE — DIETITIAN INITIAL EVALUATION ADULT - OTHER CALCULATIONS
6'0''  pounds +-10%  Wt 219 pounds BMI 29.6 %[id number]  IBW used to calculate energy needs due to pt's current body weight exceeding 120% of IBW  Adjust for age CHF renal; fluids per team

## 2022-12-29 NOTE — PROGRESS NOTE ADULT - ASSESSMENT
57 yr old M with PMHx HTN, hyperlipidemia, DM-II, OM of Right 4th (on Doxycycline PO and CTX via PICC, course completed on 12/20/22), recent admission to [facility] with acute HFpEF (12/3-12/10/22), CAD s/p PCI (12/9/22) who presents to [facility] ED 12/28/22 with syncope and bradycardia; Patient admitted to [facility]S cardiac telemetry to R/O cardiac origin of syncope as well as  management of acute on chronic HFpEF

## 2022-12-29 NOTE — DIETITIAN INITIAL EVALUATION ADULT - PERTINENT MEDS FT
MEDICATIONS  (STANDING):  amLODIPine   Tablet 10 milliGRAM(s) Oral daily  aspirin enteric coated 81 milliGRAM(s) Oral daily  atorvastatin 40 milliGRAM(s) Oral at bedtime  citalopram 40 milliGRAM(s) Oral daily  clopidogrel Tablet 75 milliGRAM(s) Oral daily  dextrose 5%. 1000 milliLiter(s) (50 mL/Hr) IV Continuous <Continuous>  dextrose 5%. 1000 milliLiter(s) (100 mL/Hr) IV Continuous <Continuous>  dextrose 50% Injectable 25 Gram(s) IV Push once  dextrose 50% Injectable 12.5 Gram(s) IV Push once  dextrose 50% Injectable 25 Gram(s) IV Push once  furosemide   Injectable 40 milliGRAM(s) IV Push every 12 hours  glucagon  Injectable 1 milliGRAM(s) IntraMuscular once  heparin   Injectable 5000 Unit(s) SubCutaneous every 8 hours  hydrALAZINE 50 milliGRAM(s) Oral two times a day  insulin glargine Injectable (LANTUS) 15 Unit(s) SubCutaneous at bedtime  insulin lispro (ADMELOG) corrective regimen sliding scale   SubCutaneous Before meals and at bedtime  insulin lispro Injectable (ADMELOG) 15 Unit(s) SubCutaneous three times a day before meals    MEDICATIONS  (PRN):  dextrose Oral Gel 15 Gram(s) Oral once PRN Blood Glucose LESS THAN 70 milliGRAM(s)/deciliter

## 2022-12-29 NOTE — PROGRESS NOTE ADULT - PROBLEM SELECTOR PLAN 8
Known to Dr. Gallardo, hx of  R 4th toe, completed IV Ceftriaxone via picc as of 12/20/22 and 10 day course of Doxycycline PO (12/10-12/20).  --currently afebrile, without leukocytosis, nontoxic appearing.

## 2022-12-29 NOTE — DIETITIAN INITIAL EVALUATION ADULT - PERSON TAUGHT/METHOD
CHF diet education given./verbal instruction/teach back - (Patient repeats in own words)/patient instructed

## 2022-12-29 NOTE — CONSULT NOTE ADULT - SUBJECTIVE AND OBJECTIVE BOX
HPI:    57 year old male with BMI 30, HTN, HLD, DM2, OM of the right 4th toe (finished abx on 12/20), recent admission for HFpEF, CAD s/p PCI of OM1  on 12/9, who presents with syncope.    Day prior to event patient had a very strenuous day and walked 20k steps. Then yesterday pt noted that he was very tired, even walking around the house was difficult for him. After he showered he was sitting on the bed and his wife was there, reports that patient lost consciousness for about 20 seconds. He did not fall to the ground, but when he came to he noted he was laying in the bed, then a similar episode happened afterwards.    Denies dizziness, palpitations, chest pain, nausea, vomiting. denies any previous syncopal episodes.    upon arrival to ED patient was noted to be hyperkalemic to 6.5 which was treated, K+ now normalized. Baseline EKG at that time showed junctional rhythm @55bpm. QRS narrow.     Telemetry review reveals mostly sinus rhythm 1:1 conducting with a normal DE interval with HR in the 60s with frequent periods of competing junctional rhythm in the 50s when he has sinus slowing.    During my interview with the patient, he fell asleep multiple times and found it difficult to stay awake even after prompting. When looking at telemetry during the time of the interview he did have intermittent junctional rhythm competing with sinus.     Patient states he "thinks he has" sleep apnea, has a home test at home that he hasnt done yet.       PAST MEDICAL & SURGICAL HISTORY:  Osteomyelitis  Hypertension  Hyperlipidemia  Diabetes mellitus  CAD (coronary artery disease)  Chronic diastolic congestive heart failure    No significant past surgical history    FH: myocardial infarction (Father, Grandparent)    Social History:no smoking, no drugs, no algohol    pertinent home medications:    Inpatient Medications:   amLODIPine   Tablet 10 milliGRAM(s) Oral daily  aspirin enteric coated 81 milliGRAM(s) Oral daily  atorvastatin 40 milliGRAM(s) Oral at bedtime  citalopram 40 milliGRAM(s) Oral daily  clopidogrel Tablet 75 milliGRAM(s) Oral daily  furosemide   Injectable 40 milliGRAM(s) IV Push every 12 hours  glucagon  Injectable 1 milliGRAM(s) IntraMuscular once  heparin   Injectable 5000 Unit(s) SubCutaneous every 8 hours  hydrALAZINE 50 milliGRAM(s) Oral two times a day  insulin glargine Injectable (LANTUS) 15 Unit(s) SubCutaneous at bedtime  insulin lispro (ADMELOG) corrective regimen sliding scale   SubCutaneous Before meals and at bedtime  insulin lispro Injectable (ADMELOG) 15 Unit(s) SubCutaneous three times a day before meals      Allergies: No Known Allergies      ROS:   CONSTITUTIONAL: No fever, weight loss + fatigue  EYES: Pt denies  RESPIRATORY: No cough, wheezing, chills or hemoptysis; No Shortness of Breath  CARDIOVASCULAR: see HPI  GASTROINTESTINAL: Pt denies  NEUROLOGICAL: Pt denies  SKIN: Pt denies   PSYCHIATRIC: Pt denies  HEME/LYMPH: Pt denies    PHYSICAL:  T(C): 36.4 (12-29-22 @ 13:51), Max: 36.9 (12-29-22 @ 04:20)  HR: 53 (12-29-22 @ 12:02) (53 - 66)  BP: 146/66 (12-29-22 @ 12:02) (125/73 - 154/69)  RR: 16 (12-29-22 @ 12:02) (16 - 18)  SpO2: 97% (12-29-22 @ 12:02) (96% - 98%)  Appearance: No acute distress, well developed  Eyes: normal appearing conjunctiva, pupils and eyelids  Cardiovascular: Normal S1 S2, No JVD, No murmurs, No edema  Respiratory: Lungs clear to auscultation	bilaterally.  No wheeze, rhonchi, rales noted  Gastrointestinal:  Soft, NT/ND 	  Neurologic:  No deficit noted  Psych: A&Ox3, normal mood/affect  Musculoskeletal: normal gait  Skin: no rash noted, normal color and pigmentation.        LABS:                        9.1    7.19  )-----------( 268      ( 29 Dec 2022 05:30 )             28.5     12-29    135  |  104  |  52<H>  ----------------------------<  131<H>  4.8   |  20<L>  |  1.69<H>    Ca    9.4      29 Dec 2022 05:30  Mg     1.9     12-29    TPro  6.3  /  Alb  3.7  /  TBili  0.2  /  DBili  x   /  AST  46<H>  /  ALT  75<H>  /  AlkPhos  92  12-28    PT/INR - ( 28 Dec 2022 16:47 )   PT: 12.6 sec;   INR: 1.06          PTT - ( 28 Dec 2022 16:47 )  PTT:28.5 sec  TSH  Troponin- neg     ECHO: 12/9/22  1. Limited study obtained for evaluation of pericardial effusion performed by cardiology fellow on call.  2. Normal left and right ventricular size and systolic function.  3. Mildly dilated left atrium.  4. Aortic sclerosis without significant stenosis.  5. No pericardial effusion.  Cath- s/p PCI of OM1 , 30-50% mRCA disease    Assessment Plan:  57 year old male with BMI 30, HTN, HLD, DM2, OM of the right 4th toe (finished abx on 12/20), recent admission for HFpEF, CAD s/p PCI of OM1  on 12/9, who presents with syncope. Orthostats are negative. EKG and tele with sinus rhythm and intermittent competing junctional rhythm, likely all related to obstructive sleep apnea. Junctional rhythm @50 likely not related to syncopal episode.     -No indication for pacemaker at this time  -as sinus rates are good would continue beta blockers  -trial of CPAP to see if telemetry improves with treatment of possible ANGELICA.  -d/w Dr. Germain

## 2022-12-29 NOTE — DIETITIAN INITIAL EVALUATION ADULT - PERTINENT LABORATORY DATA
12-29    135  |  108  |  63<H>  ----------------------------<  109<H>  5.1   |  15<L>  |  1.93<H>    Ca    8.8      29 Dec 2022 00:20  Mg     1.9     12-29    TPro  6.3  /  Alb  3.7  /  TBili  0.2  /  DBili  x   /  AST  46<H>  /  ALT  75<H>  /  AlkPhos  92  12-28  POCT Blood Glucose.: 85 mg/dL (12-29-22 @ 07:17)  A1C with Estimated Average Glucose Result: 9.8 % (12-04-22 @ 05:30)  A1C with Estimated Average Glucose Result: 11.4 % (11-08-22 @ 05:30)

## 2022-12-29 NOTE — PROGRESS NOTE ADULT - PROBLEM SELECTOR PLAN 6
RESOLVED  --On admission, K 6.5, treated with 1 liter IVFs, Insulin 10units IV, Dextrose 50%, Calcium Gluconate 1g IV x 1 dose and Lokelma 10mg PO x 1 dose in ED.   --downtrended to 4.8

## 2022-12-29 NOTE — PROGRESS NOTE ADULT - PROBLEM SELECTOR PLAN 5
BUN/Cr 68/2.3 on admission, Cr peaked to 1.74 during recent admission 12/3-12/10.   --Cr downtrended, currently 1.69 (peak 2.2)  --avoid nephrotoxic agents and monitor closely.  --Will consult Renal service in AM given NOLAN and electrolyte imbalance.

## 2022-12-30 ENCOUNTER — TRANSCRIPTION ENCOUNTER (OUTPATIENT)
Age: 57
End: 2022-12-30

## 2022-12-30 VITALS — OXYGEN SATURATION: 96 % | RESPIRATION RATE: 18 BRPM | HEART RATE: 61 BPM

## 2022-12-30 LAB
ALBUMIN SERPL ELPH-MCNC: 3.8 G/DL — SIGNIFICANT CHANGE UP (ref 3.3–5)
ALP SERPL-CCNC: 78 U/L — SIGNIFICANT CHANGE UP (ref 40–120)
ALT FLD-CCNC: 51 U/L — HIGH (ref 10–45)
ANION GAP SERPL CALC-SCNC: 9 MMOL/L — SIGNIFICANT CHANGE UP (ref 5–17)
AST SERPL-CCNC: 21 U/L — SIGNIFICANT CHANGE UP (ref 10–40)
BASOPHILS # BLD AUTO: 0.07 K/UL — SIGNIFICANT CHANGE UP (ref 0–0.2)
BASOPHILS NFR BLD AUTO: 1.2 % — SIGNIFICANT CHANGE UP (ref 0–2)
BILIRUB SERPL-MCNC: 0.3 MG/DL — SIGNIFICANT CHANGE UP (ref 0.2–1.2)
BUN SERPL-MCNC: 48 MG/DL — HIGH (ref 7–23)
CALCIUM SERPL-MCNC: 9.1 MG/DL — SIGNIFICANT CHANGE UP (ref 8.4–10.5)
CHLORIDE SERPL-SCNC: 104 MMOL/L — SIGNIFICANT CHANGE UP (ref 96–108)
CO2 SERPL-SCNC: 24 MMOL/L — SIGNIFICANT CHANGE UP (ref 22–31)
CREAT SERPL-MCNC: 1.6 MG/DL — HIGH (ref 0.5–1.3)
EGFR: 51 ML/MIN/1.73M2 — LOW
EOSINOPHIL # BLD AUTO: 0.33 K/UL — SIGNIFICANT CHANGE UP (ref 0–0.5)
EOSINOPHIL NFR BLD AUTO: 5.9 % — SIGNIFICANT CHANGE UP (ref 0–6)
GLUCOSE BLDC GLUCOMTR-MCNC: 129 MG/DL — HIGH (ref 70–99)
GLUCOSE BLDC GLUCOMTR-MCNC: 272 MG/DL — HIGH (ref 70–99)
GLUCOSE SERPL-MCNC: 127 MG/DL — HIGH (ref 70–99)
HCT VFR BLD CALC: 28.4 % — LOW (ref 39–50)
HGB BLD-MCNC: 9.1 G/DL — LOW (ref 13–17)
IMM GRANULOCYTES NFR BLD AUTO: 0.4 % — SIGNIFICANT CHANGE UP (ref 0–0.9)
INR BLD: 1.12 — SIGNIFICANT CHANGE UP (ref 0.88–1.16)
LYMPHOCYTES # BLD AUTO: 1.39 K/UL — SIGNIFICANT CHANGE UP (ref 1–3.3)
LYMPHOCYTES # BLD AUTO: 24.6 % — SIGNIFICANT CHANGE UP (ref 13–44)
MAGNESIUM SERPL-MCNC: 1.9 MG/DL — SIGNIFICANT CHANGE UP (ref 1.6–2.6)
MCHC RBC-ENTMCNC: 30.4 PG — SIGNIFICANT CHANGE UP (ref 27–34)
MCHC RBC-ENTMCNC: 32 GM/DL — SIGNIFICANT CHANGE UP (ref 32–36)
MCV RBC AUTO: 95 FL — SIGNIFICANT CHANGE UP (ref 80–100)
MONOCYTES # BLD AUTO: 0.51 K/UL — SIGNIFICANT CHANGE UP (ref 0–0.9)
MONOCYTES NFR BLD AUTO: 9 % — SIGNIFICANT CHANGE UP (ref 2–14)
NEUTROPHILS # BLD AUTO: 3.32 K/UL — SIGNIFICANT CHANGE UP (ref 1.8–7.4)
NEUTROPHILS NFR BLD AUTO: 58.9 % — SIGNIFICANT CHANGE UP (ref 43–77)
NRBC # BLD: 0 /100 WBCS — SIGNIFICANT CHANGE UP (ref 0–0)
PLATELET # BLD AUTO: 236 K/UL — SIGNIFICANT CHANGE UP (ref 150–400)
POTASSIUM SERPL-MCNC: 4.8 MMOL/L — SIGNIFICANT CHANGE UP (ref 3.5–5.3)
POTASSIUM SERPL-SCNC: 4.8 MMOL/L — SIGNIFICANT CHANGE UP (ref 3.5–5.3)
PROT SERPL-MCNC: 6.2 G/DL — SIGNIFICANT CHANGE UP (ref 6–8.3)
PROTHROM AB SERPL-ACNC: 13.3 SEC — SIGNIFICANT CHANGE UP (ref 10.5–13.4)
RBC # BLD: 2.99 M/UL — LOW (ref 4.2–5.8)
RBC # FLD: 13.8 % — SIGNIFICANT CHANGE UP (ref 10.3–14.5)
SODIUM SERPL-SCNC: 137 MMOL/L — SIGNIFICANT CHANGE UP (ref 135–145)
WBC # BLD: 5.64 K/UL — SIGNIFICANT CHANGE UP (ref 3.8–10.5)
WBC # FLD AUTO: 5.64 K/UL — SIGNIFICANT CHANGE UP (ref 3.8–10.5)

## 2022-12-30 PROCEDURE — 82570 ASSAY OF URINE CREATININE: CPT

## 2022-12-30 PROCEDURE — 84156 ASSAY OF PROTEIN URINE: CPT

## 2022-12-30 PROCEDURE — 83735 ASSAY OF MAGNESIUM: CPT

## 2022-12-30 PROCEDURE — 93005 ELECTROCARDIOGRAM TRACING: CPT

## 2022-12-30 PROCEDURE — 85610 PROTHROMBIN TIME: CPT

## 2022-12-30 PROCEDURE — 96374 THER/PROPH/DIAG INJ IV PUSH: CPT

## 2022-12-30 PROCEDURE — 83550 IRON BINDING TEST: CPT

## 2022-12-30 PROCEDURE — 83935 ASSAY OF URINE OSMOLALITY: CPT

## 2022-12-30 PROCEDURE — 96375 TX/PRO/DX INJ NEW DRUG ADDON: CPT

## 2022-12-30 PROCEDURE — 94660 CPAP INITIATION&MGMT: CPT

## 2022-12-30 PROCEDURE — 80053 COMPREHEN METABOLIC PANEL: CPT

## 2022-12-30 PROCEDURE — 82550 ASSAY OF CK (CPK): CPT

## 2022-12-30 PROCEDURE — 84300 ASSAY OF URINE SODIUM: CPT

## 2022-12-30 PROCEDURE — 82728 ASSAY OF FERRITIN: CPT

## 2022-12-30 PROCEDURE — 93321 DOPPLER ECHO F-UP/LMTD STD: CPT

## 2022-12-30 PROCEDURE — 85730 THROMBOPLASTIN TIME PARTIAL: CPT

## 2022-12-30 PROCEDURE — 80048 BASIC METABOLIC PNL TOTAL CA: CPT

## 2022-12-30 PROCEDURE — 85027 COMPLETE CBC AUTOMATED: CPT

## 2022-12-30 PROCEDURE — 82553 CREATINE MB FRACTION: CPT

## 2022-12-30 PROCEDURE — 84484 ASSAY OF TROPONIN QUANT: CPT

## 2022-12-30 PROCEDURE — 84540 ASSAY OF URINE/UREA-N: CPT

## 2022-12-30 PROCEDURE — 87637 SARSCOV2&INF A&B&RSV AMP PRB: CPT

## 2022-12-30 PROCEDURE — 36415 COLL VENOUS BLD VENIPUNCTURE: CPT

## 2022-12-30 PROCEDURE — 80061 LIPID PANEL: CPT

## 2022-12-30 PROCEDURE — 84133 ASSAY OF URINE POTASSIUM: CPT

## 2022-12-30 PROCEDURE — 85025 COMPLETE CBC W/AUTO DIFF WBC: CPT

## 2022-12-30 PROCEDURE — 83880 ASSAY OF NATRIURETIC PEPTIDE: CPT

## 2022-12-30 PROCEDURE — 83540 ASSAY OF IRON: CPT

## 2022-12-30 PROCEDURE — 81003 URINALYSIS AUTO W/O SCOPE: CPT

## 2022-12-30 PROCEDURE — 71045 X-RAY EXAM CHEST 1 VIEW: CPT

## 2022-12-30 PROCEDURE — 99291 CRITICAL CARE FIRST HOUR: CPT | Mod: 25

## 2022-12-30 PROCEDURE — 82962 GLUCOSE BLOOD TEST: CPT

## 2022-12-30 PROCEDURE — 96376 TX/PRO/DX INJ SAME DRUG ADON: CPT

## 2022-12-30 PROCEDURE — 99239 HOSP IP/OBS DSCHRG MGMT >30: CPT

## 2022-12-30 RX ORDER — SODIUM POLYSTYRENE SULFONATE 4.1 MEQ/G
15 POWDER, FOR SUSPENSION ORAL
Qty: 0 | Refills: 0 | DISCHARGE

## 2022-12-30 RX ADMIN — AMLODIPINE BESYLATE 10 MILLIGRAM(S): 2.5 TABLET ORAL at 05:53

## 2022-12-30 RX ADMIN — HEPARIN SODIUM 5000 UNIT(S): 5000 INJECTION INTRAVENOUS; SUBCUTANEOUS at 05:54

## 2022-12-30 RX ADMIN — CLOPIDOGREL BISULFATE 75 MILLIGRAM(S): 75 TABLET, FILM COATED ORAL at 12:35

## 2022-12-30 RX ADMIN — Medication 40 MILLIGRAM(S): at 05:53

## 2022-12-30 RX ADMIN — CARVEDILOL PHOSPHATE 3.12 MILLIGRAM(S): 80 CAPSULE, EXTENDED RELEASE ORAL at 05:52

## 2022-12-30 RX ADMIN — CITALOPRAM 40 MILLIGRAM(S): 10 TABLET, FILM COATED ORAL at 15:27

## 2022-12-30 RX ADMIN — Medication 50 MILLIGRAM(S): at 05:52

## 2022-12-30 RX ADMIN — Medication 81 MILLIGRAM(S): at 12:34

## 2022-12-30 RX ADMIN — Medication 6: at 12:33

## 2022-12-30 NOTE — DISCHARGE NOTE NURSING/CASE MANAGEMENT/SOCIAL WORK - NSDCPEFALRISK_GEN_ALL_CORE
For information on Fall & Injury Prevention, visit: [email or web address] OR  [email or web address] OR  https://www.cdc.gov/steadi/patient.html

## 2022-12-30 NOTE — DISCHARGE NOTE PROVIDER - PROVIDER TOKENS
PROVIDER:[TOKEN:[[id number]]],PROVIDER:[TOKEN:[[id number]]],PROVIDER:[TOKEN:[[id number]]]

## 2022-12-30 NOTE — DISCHARGE NOTE PROVIDER - NSDCMRMEDTOKEN_GEN_ALL_CORE_FT
amLODIPine 10 mg oral tablet: 1 tab(s) orally once a day  Aspirin Enteric Coated 81 mg oral delayed release tablet: 1 tab(s) orally once a day   carvedilol 3.125 mg oral tablet: 1 tab(s) orally every 12 hours  citalopram 40 mg oral tablet: 1 tab(s) orally once a day  clopidogrel 75 mg oral tablet: 1 tab(s) orally once a day   hydrALAZINE 50 mg oral tablet: 1 tab(s) orally 2 times a day  Insulin Lispro KwikPen 100 units/mL injectable solution: 15 unit(s) injectable 3 times a day (before meals)  Lantus Solostar Pen 100 units/mL subcutaneous solution: 15 unit(s) subcutaneous once a day (at bedtime)  Lasix 40 mg oral tablet: 1 tab(s) orally once a day  Lipitor 40 mg oral tablet: 1 tab(s) orally once a day  sodium polystyrene sulfonate 15 g/60 mL oral suspension: 15 gram(s) orally once a day x 5 days (12/23-12/28)   amLODIPine 10 mg oral tablet: 1 tab(s) orally once a day  Aspirin Enteric Coated 81 mg oral delayed release tablet: 1 tab(s) orally once a day   carvedilol 3.125 mg oral tablet: 1 tab(s) orally every 12 hours  citalopram 40 mg oral tablet: 1 tab(s) orally once a day  clopidogrel 75 mg oral tablet: 1 tab(s) orally once a day   hydrALAZINE 50 mg oral tablet: 1 tab(s) orally 2 times a day  Insulin Lispro KwikPen 100 units/mL injectable solution: 15 unit(s) injectable 3 times a day (before meals)  Lantus Solostar Pen 100 units/mL subcutaneous solution: 15 unit(s) subcutaneous once a day (at bedtime)  Lasix 40 mg oral tablet: 1 tab(s) orally once a day  Lipitor 40 mg oral tablet: 1 tab(s) orally once a day

## 2022-12-30 NOTE — DISCHARGE NOTE PROVIDER - HOSPITAL COURSE
57 year old male with BMI 30, HTN, HLD, DM2, OM of the right 4th toe (finished abx on 12/20), recent admission for HFpEF, CAD s/p PCI of OM1  on 12/9, who presents with syncope. Day prior to event patient had a very strenuous day and walked 20k steps. Then yesterday pt noted that he was very tired, even walking around the house was difficult for him. After he showered he was sitting on the bed and his wife was there, reports that patient lost consciousness for about 20 seconds. He did not fall to the ground, but when he came to he noted he was laying in the bed, then a similar episode happened afterwards. Denies dizziness, palpitations, chest pain, nausea, vomiting. denies any previous syncopal episodes. Upon arrival to ED patient was noted to be hyperkalemic to 6.5 which was treated, K+ now normalized. Baseline EKG at that time showed junctional rhythm @55bpm. QRS narrow.  Telemetry review reveals mostly sinus rhythm 1:1 conducting with a normal MI interval with HR in the 60s with frequent periods of competing junctional rhythm in the 50s when he has sinus slowing. When looking at telemetry during the time of the interview he did have intermittent junctional rhythm competing with sinus. PPM unnecessary at this time as patient does maintain NSR per EP     Pt had echo on last admission 12.5.2022 normal LVEF mild LVH, mild dilated LA, PASP 47mmg Hg. Pt had repeat echo done this admission which was limited on the 12/29 and showed no Pericardial Effusion and normal LVEF. Pt had been DC on Lasix 40mg PO but per Cardiologist Dr. Gay was not taking and was mildly overloaded and given Lasix 40mg IV BID,. Pt had hyperkalemic on admission K was 6.5 with 1 Liter of IVF, insulin, calcium gluconate and dextrose with lokelma 10mg PO. K now downtrended to WNL. 57 year old male with BMI 30, HTN, HLD, DM2, OM of the right 4th toe (finished abx on 12/20), recent admission for HFpEF, CAD s/p PCI of OM1  on 12/9, who presents with syncope. Day prior to event patient had a very strenuous day and walked 20k steps. Then yesterday pt noted that he was very tired, even walking around the house was difficult for him. After he showered he was sitting on the bed and his wife was there, reports that patient lost consciousness for about 20 seconds. He did not fall to the ground, but when he came to he noted he was laying in the bed, then a similar episode happened afterwards. Denies dizziness, palpitations, chest pain, nausea, vomiting. denies any previous syncopal episodes. Upon arrival to ED patient was noted to be hyperkalemic to 6.5 which was treated, K+ now normalized. Baseline EKG at that time showed junctional rhythm @55bpm. QRS narrow.  Telemetry review reveals mostly sinus rhythm 1:1 conducting with a normal NJ interval with HR in the 60s with frequent periods of competing junctional rhythm in the 50s when he has sinus slowing. When looking at telemetry during the time of the interview he did have intermittent junctional rhythm competing with sinus. PPM unnecessary at this time as patient does maintain NSR per EP. Pt had trial of CPAP overnight and rates remained stable. Will be given a ANGELICA sleep study referral outpatient.    Pt had Echo on last admission 12.5.2022 normal LVEF mild LVH, mild dilated LA, PASP 47mmg Hg. Pt had repeat echo done this admission which was limited on the 12/29 and showed no Pericardial Effusion and normal LVEF. Pt had been DC on Lasix 40mg PO but per Cardiologist Dr. Gay was not taking and was mildly overloaded and given Lasix 40mg IV BID,. Pt had hyperkalemic on admission K was 6.5 with 1 Liter of IVF, insulin, calcium gluconate and dextrose with lokelma 10mg PO. K now downtrended to WNL. On day of discharge K was 4.8. And patient DC on home Lasix 40mg PO, Norvasc 10mg, Coreg 3.125 q12, Hydral 50mg, ASA, Plavix and Lipitor 40mg. Pt set up with DC follow up appointment January 19th, 10:00 AM at [address]      57 year old male with BMI 30, HTN, HLD, DM2, OM of the right 4th toe (finished abx on 12/20), recent admission for HFpEF, CAD s/p PCI of OM1  on 12/9, who presents with syncope. Day prior to event patient had a very strenuous day and walked 20k steps. Then yesterday pt noted that he was very tired, even walking around the house was difficult for him. After he showered he was sitting on the bed and his wife was there, reports that patient lost consciousness for about 20 seconds. He did not fall to the ground, but when he came to he noted he was laying in the bed, then a similar episode happened afterwards. Denies dizziness, palpitations, chest pain, nausea, vomiting. denies any previous syncopal episodes. Upon arrival to ED patient was noted to be hyperkalemic to 6.5 which was treated, K+ now normalized. Baseline EKG at that time showed junctional rhythm @55bpm. QRS narrow.  Telemetry review reveals mostly sinus rhythm 1:1 conducting with a normal SD interval with HR in the 60s with frequent periods of competing junctional rhythm in the 50s when he has sinus slowing. When looking at telemetry during the time of the interview he did have intermittent junctional rhythm competing with sinus. PPM unnecessary at this time as patient does maintain NSR per EP. Pt had trial of CPAP overnight and rates remained stable. Will be given a ANGELICA sleep study referral outpatient.    Pt had Echo on last admission 12.5.2022 normal LVEF mild LVH, mild dilated LA, PASP 47mmg Hg. Pt had repeat echo done this admission which was limited on the 12/29 and showed no Pericardial Effusion and normal LVEF. Pt had been DC on Lasix 40mg PO but per Cardiologist Dr. Gay was not taking and was mildly overloaded and given Lasix 40mg IV BID,. Pt had hyperkalemic on admission K was 6.5 with 1 Liter of IVF, insulin, calcium gluconate and dextrose with lokelma 10mg PO. K now downtrended to WNL. On day of discharge K was 4.8 and Cr downtrending on admission from peak 2.3 from to now at 1.60 DC. And patient DC on home Lasix 40mg PO, Norvasc 10mg, Coreg 3.125 q12, Hydral 50mg, ASA, Plavix and Lipitor 40mg. Pt set up with DC follow up appointment January 19th, 10:00 AM at [address].     57 year old male with BMI 30, HTN, HLD, DM2, OM of the right 4th toe (finished abx on 12/20), recent admission for HFpEF, CAD s/p PCI of OM1  on 12/9, who presents with syncope. Day prior to event patient had a very strenuous day and walked 20k steps. Then yesterday pt noted that he was very tired, even walking around the house was difficult for him. After he showered he was sitting on the bed and his wife was there, reports that patient lost consciousness for about 20 seconds. He did not fall to the ground, but when he came to he noted he was laying in the bed, then a similar episode happened afterwards. Denies dizziness, palpitations, chest pain, nausea, vomiting. denies any previous syncopal episodes. Upon arrival to ED patient was noted to be hyperkalemic to 6.5 which was treated, K+ now normalized. Baseline EKG at that time showed junctional rhythm @55bpm. QRS narrow.  Telemetry review reveals mostly sinus rhythm 1:1 conducting with a normal UT interval with HR in the 60s with frequent periods of competing junctional rhythm in the 50s when he has sinus slowing. When looking at telemetry during the time of the interview he did have intermittent junctional rhythm competing with sinus. PPM unnecessary at this time as patient does maintain NSR per EP. Pt had trial of CPAP overnight and rates remained stable. Will be given a ANGELICA sleep study referral outpatient.    Pt had Echo on last admission 12.5.2022 normal LVEF mild LVH, mild dilated LA, PASP 47mmg Hg. Pt had repeat echo done this admission which was limited on the 12/29 and showed no Pericardial Effusion and normal LVEF. Pt had been DC on Lasix 40mg PO but per Cardiologist Dr. Gay was not taking and was mildly overloaded and given Lasix 40mg IV BID,. Pt had hyperkalemic on admission K was 6.5 with 1 Liter of IVF, insulin, calcium gluconate and dextrose with lokelma 10mg PO. K now downtrended to WNL. On day of discharge K was 4.8 and Cr downtrending on admission from peak 2.3 from to now at 1.60 DC. And patient DC on home Lasix 40mg PO, Norvasc 10mg, Coreg 3.125 q12, Hydral 50mg, ASA, Plavix and Lipitor 40mg. Pt set up with DC follow up appointment January 19th, 10:00 AM at [address].  Office will call patient if there is an opening next week.

## 2022-12-30 NOTE — DISCHARGE NOTE PROVIDER - CARE PROVIDER_API CALL
Tiffanie Gaming)  Internal Medicine  [address] [facility], [address]  [place] [address]  Phone: [phone]  Fax: [phone]  Follow Up Time:     Karla Gay)  Cardiology; Internal Medicine  [address], [address] [place] [address]  Phone: [phone]  Fax: [phone]  Follow Up Time:     Kait Winkler)  Critical Care Medicine; Pulmonary Disease  [address], [address] [place] [address]  Phone: [phone]  Fax: [phone]  Follow Up Time:

## 2022-12-30 NOTE — DISCHARGE NOTE PROVIDER - NSDCCPCAREPLAN_GEN_ALL_CORE_FT
PRINCIPAL DISCHARGE DIAGNOSIS  Diagnosis: Syncope  Assessment and Plan of Treatment: You were admitted to the hospital because you lost consciousness. Your heart enzymes (markers of heart injury in the blood) were negative. Your Echocardiogram  (heart Ultrasound) 12/29 was repeated and showed was repeated on showed normal wall motion no changes since. You were seen by the Electrophysiology team who reviewed your heart rates and did not feel the need to to place a pacemaker as your rates were stable and felt it may have been related to undagnosed sleep apnea. You were trialed on CPAP overnight and your heart rate remained stable. Please follow up with your Cardiologist Dr. Gay. You have an appointment made for January 19th at 10:00 AM at the [address] [place] [address] address.      SECONDARY DISCHARGE DIAGNOSES  Diagnosis: Acute on chronic diastolic congestive heart failure  Assessment and Plan of Treatment:     Diagnosis: Hyperkalemia  Assessment and Plan of Treatment:      PRINCIPAL DISCHARGE DIAGNOSIS  Diagnosis: Syncope  Assessment and Plan of Treatment: You were admitted to the hospital because you lost consciousness. Your heart enzymes (markers of heart injury in the blood) were negative. Your Echocardiogram  (heart Ultrasound) 12/29 was repeated and showed was repeated on showed normal wall motion no changes since. You were seen by the Electrophysiology team who reviewed your heart rates and did not feel the need to to place a pacemaker as your rates were stable and felt it may have been related to undagnosed sleep apnea. You were trialed on CPAP overnight and your heart rate remained stable. Please follow up with your Cardiologist Dr. Gay. You have an appointment made for January 19th at 10:00 AM at the [address] [place] [address] address. Someone from the office will call you to set up an earlier appointment.      SECONDARY DISCHARGE DIAGNOSES  Diagnosis: Acute on chronic diastolic congestive heart failure  Assessment and Plan of Treatment: You have a history of heart failure and should continue. You should weigh yourself daily and if you notice a weight gain of more than 2-3 pounds in 2 days, shortness of breath, or lower extremity swelling you should contact your doctor immediately. Please restrict your fluid intake to 1-2L daily. Please continue with your Home Lasix 40 PO daily, Carvedilol 3.125 Twice daily, Hydralazine 50mg    Diagnosis: Hyperkalemia  Assessment and Plan of Treatment: You were noted to have an elevated Potassium upon admission and were treated for it upon this hospital stay. Please follow up with your cardiologist and repeat blood work.     PRINCIPAL DISCHARGE DIAGNOSIS  Diagnosis: Syncope  Assessment and Plan of Treatment: You were admitted to the hospital because you lost consciousness. Your heart enzymes (markers of heart injury in the blood) were negative. Your Echocardiogram  (heart Ultrasound) 12/29 was repeated and showed was repeated on showed normal wall motion no changes since. You were seen by the Electrophysiology team who reviewed your heart rates and did not feel the need to to place a pacemaker as your rates were stable and felt it may have been related to undagnosed sleep apnea. You were trialed on CPAP overnight and your heart rate remained stable. Please follow up with your Cardiologist Dr. Gay. You have an appointment made for January 19th at 10:00 AM at the [address] [place] [address] address. Someone from the office will call you to set up an earlier appointment.      SECONDARY DISCHARGE DIAGNOSES  Diagnosis: Acute on chronic diastolic congestive heart failure  Assessment and Plan of Treatment: You have a history of heart failure and should continue. You should weigh yourself daily and if you notice a weight gain of more than 2-3 pounds in 2 days, shortness of breath, or lower extremity swelling you should contact your doctor immediately. Please restrict your fluid intake to 1-2L daily. Please continue with your Home Lasix 40 PO daily, Carvedilol 3.125 Twice daily, Hydralazine 50mg. Please follow up with Dr. Gay    Diagnosis: Hyperkalemia  Assessment and Plan of Treatment: You were noted to have an elevated Potassium upon admission and were treated for it upon this hospital stay. Please follow up with your cardiologist and repeat blood work.    Diagnosis: CAD (coronary artery disease)  Assessment and Plan of Treatment: You had underwent stent placement on your last admission. You cardiac enzymes were negative and no EKG changes were noted on your admission. Please continue to take your Aspirin, Plavix and Lipitor and follow up with Dr. Gay    Diagnosis: DM (diabetes mellitus)  Assessment and Plan of Treatment: Your Hemoglobin A1c is 9.8 and your goal A1c is less than 7.0%. This number measures your average blood sugar level over the last three months.  Please continue your home regimen of insulin as listed for diabetes. Maintain a low carbohydrate, low sugar diet, exercise, monitor your fingerstick blood sugars regarly and follow up with your Endocrinologist/Primary Care Doctor.    Diagnosis: NOLAN (acute kidney injury)  Assessment and Plan of Treatment: You were noted to have a history of chronic kidney disease (CKD). Chronic kidney disease is the gradual and/or permanent loss of kidney function. Normally, the kidneys remove fluids, chemicals, and waste from your blood and turn these waste chemicals into urine. As kidney disease worsens, one may lose the ability to remove waste from the body. Please continue with a renal diet and follow-up with your primary care physician and/or nephrologist.    Diagnosis: Anemia of chronic disease  Assessment and Plan of Treatment: Hemoglobin is a protein in your red blood cells that carries oxygen to your body's organs and tissues and transports carbon dioxide from your organs and tissues back to your lungs. It was slightly low on this admission from your baseline level. However your Iron studies were normal and no signs of bleeding were noted. Please follow up your bloodwork with your primary doctor

## 2022-12-30 NOTE — DISCHARGE NOTE PROVIDER - NSDCFUSCHEDAPPT_GEN_ALL_CORE_FT
Karla Gay  [facility] Physician [facility]  HEART[facility] [address]  Scheduled Appointment: 01/19/2023

## 2022-12-30 NOTE — DISCHARGE NOTE NURSING/CASE MANAGEMENT/SOCIAL WORK - PATIENT PORTAL LINK FT
You can access the FollowMyHealth Patient Portal offered by [facility] by registering at the following website: [email or web address]. By joining [employer]’s FollowMyHealth portal, you will also be able to view your health information using other applications (apps) compatible with our system.

## 2022-12-30 NOTE — DISCHARGE NOTE PROVIDER - CARE PROVIDERS DIRECT ADDRESSES
,DirectAddress_Unknown,DirectAddress_Unknown,[email or web address]

## 2023-01-03 ENCOUNTER — NON-APPOINTMENT (OUTPATIENT)
Age: 58
End: 2023-01-03

## 2023-01-04 ENCOUNTER — APPOINTMENT (OUTPATIENT)
Dept: HEART AND VASCULAR | Facility: CLINIC | Age: 58
End: 2023-01-04

## 2023-01-05 ENCOUNTER — APPOINTMENT (OUTPATIENT)
Dept: HEART AND VASCULAR | Facility: CLINIC | Age: 58
End: 2023-01-05
Payer: COMMERCIAL

## 2023-01-05 ENCOUNTER — NON-APPOINTMENT (OUTPATIENT)
Age: 58
End: 2023-01-05

## 2023-01-05 VITALS
DIASTOLIC BLOOD PRESSURE: 60 MMHG | OXYGEN SATURATION: 99 % | WEIGHT: 204.38 LBS | HEART RATE: 50 BPM | BODY MASS INDEX: 27.68 KG/M2 | SYSTOLIC BLOOD PRESSURE: 126 MMHG | TEMPERATURE: 97.4 F | HEIGHT: 72 IN

## 2023-01-05 DIAGNOSIS — R55 SYNCOPE AND COLLAPSE: ICD-10-CM

## 2023-01-05 DIAGNOSIS — I13.0 HYPERTENSIVE HEART AND CHRONIC KIDNEY DISEASE WITH HEART FAILURE AND STAGE 1 THROUGH STAGE 4 CHRONIC KIDNEY DISEASE, OR UNSPECIFIED CHRONIC KIDNEY DISEASE: ICD-10-CM

## 2023-01-05 DIAGNOSIS — R00.1 BRADYCARDIA, UNSPECIFIED: ICD-10-CM

## 2023-01-05 DIAGNOSIS — I25.10 ATHEROSCLEROTIC HEART DISEASE OF NATIVE CORONARY ARTERY WITHOUT ANGINA PECTORIS: ICD-10-CM

## 2023-01-05 DIAGNOSIS — E78.5 HYPERLIPIDEMIA, UNSPECIFIED: ICD-10-CM

## 2023-01-05 DIAGNOSIS — Z98.61 CORONARY ANGIOPLASTY STATUS: ICD-10-CM

## 2023-01-05 DIAGNOSIS — E87.5 HYPERKALEMIA: ICD-10-CM

## 2023-01-05 DIAGNOSIS — G47.30 SLEEP APNEA, UNSPECIFIED: ICD-10-CM

## 2023-01-05 DIAGNOSIS — N17.9 ACUTE KIDNEY FAILURE, UNSPECIFIED: ICD-10-CM

## 2023-01-05 DIAGNOSIS — D63.1 ANEMIA IN CHRONIC KIDNEY DISEASE: ICD-10-CM

## 2023-01-05 DIAGNOSIS — E11.22 TYPE 2 DIABETES MELLITUS WITH DIABETIC CHRONIC KIDNEY DISEASE: ICD-10-CM

## 2023-01-05 DIAGNOSIS — I50.33 ACUTE ON CHRONIC DIASTOLIC (CONGESTIVE) HEART FAILURE: ICD-10-CM

## 2023-01-05 DIAGNOSIS — N18.9 CHRONIC KIDNEY DISEASE, UNSPECIFIED: ICD-10-CM

## 2023-01-05 PROBLEM — I50.32 CHRONIC DIASTOLIC (CONGESTIVE) HEART FAILURE: Chronic | Status: ACTIVE | Noted: 2022-12-28

## 2023-01-05 PROCEDURE — 99214 OFFICE O/P EST MOD 30 MIN: CPT | Mod: 25

## 2023-01-05 PROCEDURE — 93000 ELECTROCARDIOGRAM COMPLETE: CPT

## 2023-01-05 PROCEDURE — 36415 COLL VENOUS BLD VENIPUNCTURE: CPT

## 2023-01-06 ENCOUNTER — NON-APPOINTMENT (OUTPATIENT)
Age: 58
End: 2023-01-06

## 2023-01-06 ENCOUNTER — INPATIENT (INPATIENT)
Facility: HOSPITAL | Age: 58
LOS: 0 days | Discharge: HOME | DRG: 683 | End: 2023-01-07
Attending: HOSPITALIST | Admitting: INTERNAL MEDICINE
Payer: COMMERCIAL

## 2023-01-06 VITALS
TEMPERATURE: 98 F | SYSTOLIC BLOOD PRESSURE: 90 MMHG | WEIGHT: 205.03 LBS | DIASTOLIC BLOOD PRESSURE: 50 MMHG | RESPIRATION RATE: 18 BRPM | HEIGHT: 72 IN | OXYGEN SATURATION: 98 % | HEART RATE: 48 BPM

## 2023-01-06 DIAGNOSIS — I50.32 CHRONIC DIASTOLIC (CONGESTIVE) HEART FAILURE: ICD-10-CM

## 2023-01-06 DIAGNOSIS — E11.9 TYPE 2 DIABETES MELLITUS WITHOUT COMPLICATIONS: ICD-10-CM

## 2023-01-06 DIAGNOSIS — I10 ESSENTIAL (PRIMARY) HYPERTENSION: ICD-10-CM

## 2023-01-06 DIAGNOSIS — Z98.890 OTHER SPECIFIED POSTPROCEDURAL STATES: Chronic | ICD-10-CM

## 2023-01-06 DIAGNOSIS — E87.5 HYPERKALEMIA: ICD-10-CM

## 2023-01-06 DIAGNOSIS — Z29.9 ENCOUNTER FOR PROPHYLACTIC MEASURES, UNSPECIFIED: ICD-10-CM

## 2023-01-06 DIAGNOSIS — R77.8 OTHER SPECIFIED ABNORMALITIES OF PLASMA PROTEINS: ICD-10-CM

## 2023-01-06 DIAGNOSIS — N17.9 ACUTE KIDNEY FAILURE, UNSPECIFIED: ICD-10-CM

## 2023-01-06 DIAGNOSIS — E78.5 HYPERLIPIDEMIA, UNSPECIFIED: ICD-10-CM

## 2023-01-06 DIAGNOSIS — D64.9 ANEMIA, UNSPECIFIED: ICD-10-CM

## 2023-01-06 LAB
ANION GAP SERPL CALC-SCNC: 10 MMOL/L — SIGNIFICANT CHANGE UP (ref 5–17)
ANION GAP SERPL CALC-SCNC: 10 MMOL/L — SIGNIFICANT CHANGE UP (ref 5–17)
ANION GAP SERPL CALC-SCNC: 11 MMOL/L
ANION GAP SERPL CALC-SCNC: 8 MMOL/L — SIGNIFICANT CHANGE UP (ref 5–17)
BASE EXCESS BLDV CALC-SCNC: -6.7 MMOL/L — LOW (ref -2–3)
BASOPHILS # BLD AUTO: 0.06 K/UL — SIGNIFICANT CHANGE UP (ref 0–0.2)
BASOPHILS NFR BLD AUTO: 0.7 % — SIGNIFICANT CHANGE UP (ref 0–2)
BUN SERPL-MCNC: 54 MG/DL — HIGH (ref 7–23)
BUN SERPL-MCNC: 58 MG/DL — HIGH (ref 7–23)
BUN SERPL-MCNC: 59 MG/DL — HIGH (ref 7–23)
BUN SERPL-MCNC: 61 MG/DL
CA-I SERPL-SCNC: 1.27 MMOL/L — SIGNIFICANT CHANGE UP (ref 1.15–1.33)
CALCIUM SERPL-MCNC: 9 MG/DL — SIGNIFICANT CHANGE UP (ref 8.4–10.5)
CALCIUM SERPL-MCNC: 9.1 MG/DL — SIGNIFICANT CHANGE UP (ref 8.4–10.5)
CALCIUM SERPL-MCNC: 9.5 MG/DL
CALCIUM SERPL-MCNC: 9.6 MG/DL — SIGNIFICANT CHANGE UP (ref 8.4–10.5)
CHLORIDE SERPL-SCNC: 104 MMOL/L
CHLORIDE SERPL-SCNC: 104 MMOL/L — SIGNIFICANT CHANGE UP (ref 96–108)
CHLORIDE SERPL-SCNC: 105 MMOL/L — SIGNIFICANT CHANGE UP (ref 96–108)
CHLORIDE SERPL-SCNC: 106 MMOL/L — SIGNIFICANT CHANGE UP (ref 96–108)
CO2 BLDV-SCNC: 20.4 MMOL/L — LOW (ref 22–26)
CO2 SERPL-SCNC: 19 MMOL/L — LOW (ref 22–31)
CO2 SERPL-SCNC: 20 MMOL/L
CO2 SERPL-SCNC: 21 MMOL/L — LOW (ref 22–31)
CO2 SERPL-SCNC: 23 MMOL/L — SIGNIFICANT CHANGE UP (ref 22–31)
CREAT SERPL-MCNC: 1.99 MG/DL
CREAT SERPL-MCNC: 2.06 MG/DL — HIGH (ref 0.5–1.3)
CREAT SERPL-MCNC: 2.19 MG/DL — HIGH (ref 0.5–1.3)
CREAT SERPL-MCNC: 2.21 MG/DL — HIGH (ref 0.5–1.3)
EGFR: 34 ML/MIN/1.73M2 — LOW
EGFR: 35 ML/MIN/1.73M2 — LOW
EGFR: 37 ML/MIN/1.73M2 — LOW
EGFR: 39 ML/MIN/1.73M2
EOSINOPHIL # BLD AUTO: 0.46 K/UL — SIGNIFICANT CHANGE UP (ref 0–0.5)
EOSINOPHIL NFR BLD AUTO: 5.4 % — SIGNIFICANT CHANGE UP (ref 0–6)
GAS PNL BLDV: 135 MMOL/L — LOW (ref 136–145)
GAS PNL BLDV: SIGNIFICANT CHANGE UP
GAS PNL BLDV: SIGNIFICANT CHANGE UP
GLUCOSE BLDC GLUCOMTR-MCNC: 127 MG/DL — HIGH (ref 70–99)
GLUCOSE BLDC GLUCOMTR-MCNC: 153 MG/DL — HIGH (ref 70–99)
GLUCOSE SERPL-MCNC: 107 MG/DL — HIGH (ref 70–99)
GLUCOSE SERPL-MCNC: 128 MG/DL — HIGH (ref 70–99)
GLUCOSE SERPL-MCNC: 185 MG/DL
GLUCOSE SERPL-MCNC: 190 MG/DL — HIGH (ref 70–99)
HCO3 BLDV-SCNC: 19 MMOL/L — LOW (ref 22–29)
HCT VFR BLD CALC: 30.2 % — LOW (ref 39–50)
HGB BLD-MCNC: 9.7 G/DL — LOW (ref 13–17)
IMM GRANULOCYTES NFR BLD AUTO: 0.2 % — SIGNIFICANT CHANGE UP (ref 0–0.9)
LACTATE SERPL-SCNC: 0.7 MMOL/L — SIGNIFICANT CHANGE UP (ref 0.5–2)
LYMPHOCYTES # BLD AUTO: 0.95 K/UL — LOW (ref 1–3.3)
LYMPHOCYTES # BLD AUTO: 11.2 % — LOW (ref 13–44)
MAGNESIUM SERPL-MCNC: 2.4 MG/DL — SIGNIFICANT CHANGE UP (ref 1.6–2.6)
MCHC RBC-ENTMCNC: 30.3 PG — SIGNIFICANT CHANGE UP (ref 27–34)
MCHC RBC-ENTMCNC: 32.1 GM/DL — SIGNIFICANT CHANGE UP (ref 32–36)
MCV RBC AUTO: 94.4 FL — SIGNIFICANT CHANGE UP (ref 80–100)
MONOCYTES # BLD AUTO: 0.7 K/UL — SIGNIFICANT CHANGE UP (ref 0–0.9)
MONOCYTES NFR BLD AUTO: 8.3 % — SIGNIFICANT CHANGE UP (ref 2–14)
NEUTROPHILS # BLD AUTO: 6.26 K/UL — SIGNIFICANT CHANGE UP (ref 1.8–7.4)
NEUTROPHILS NFR BLD AUTO: 74.2 % — SIGNIFICANT CHANGE UP (ref 43–77)
NRBC # BLD: 0 /100 WBCS — SIGNIFICANT CHANGE UP (ref 0–0)
NT-PROBNP SERPL-SCNC: 1136 PG/ML — HIGH (ref 0–300)
PCO2 BLDV: 39 MMHG — LOW (ref 42–55)
PH BLDV: 7.3 — LOW (ref 7.32–7.43)
PLATELET # BLD AUTO: 232 K/UL — SIGNIFICANT CHANGE UP (ref 150–400)
PO2 BLDV: 38 MMHG — SIGNIFICANT CHANGE UP (ref 25–45)
POTASSIUM BLDV-SCNC: 5.7 MMOL/L — HIGH (ref 3.5–5.1)
POTASSIUM SERPL-MCNC: 4.8 MMOL/L — SIGNIFICANT CHANGE UP (ref 3.5–5.3)
POTASSIUM SERPL-MCNC: 5.4 MMOL/L — HIGH (ref 3.5–5.3)
POTASSIUM SERPL-MCNC: 5.5 MMOL/L — HIGH (ref 3.5–5.3)
POTASSIUM SERPL-SCNC: 4.8 MMOL/L — SIGNIFICANT CHANGE UP (ref 3.5–5.3)
POTASSIUM SERPL-SCNC: 5.4 MMOL/L — HIGH (ref 3.5–5.3)
POTASSIUM SERPL-SCNC: 5.5 MMOL/L — HIGH (ref 3.5–5.3)
POTASSIUM SERPL-SCNC: 6.2 MMOL/L
RBC # BLD: 3.2 M/UL — LOW (ref 4.2–5.8)
RBC # FLD: 13.2 % — SIGNIFICANT CHANGE UP (ref 10.3–14.5)
SAO2 % BLDV: 63.3 % — LOW (ref 67–88)
SARS-COV-2 RNA SPEC QL NAA+PROBE: SIGNIFICANT CHANGE UP
SODIUM SERPL-SCNC: 133 MMOL/L — LOW (ref 135–145)
SODIUM SERPL-SCNC: 135 MMOL/L
SODIUM SERPL-SCNC: 136 MMOL/L — SIGNIFICANT CHANGE UP (ref 135–145)
SODIUM SERPL-SCNC: 137 MMOL/L — SIGNIFICANT CHANGE UP (ref 135–145)
TROPONIN T SERPL-MCNC: 0.01 NG/ML — SIGNIFICANT CHANGE UP (ref 0–0.01)
TROPONIN T SERPL-MCNC: 0.02 NG/ML — HIGH (ref 0–0.01)
WBC # BLD: 8.45 K/UL — SIGNIFICANT CHANGE UP (ref 3.8–10.5)
WBC # FLD AUTO: 8.45 K/UL — SIGNIFICANT CHANGE UP (ref 3.8–10.5)

## 2023-01-06 PROCEDURE — 99222 1ST HOSP IP/OBS MODERATE 55: CPT

## 2023-01-06 PROCEDURE — 99223 1ST HOSP IP/OBS HIGH 75: CPT

## 2023-01-06 PROCEDURE — 99285 EMERGENCY DEPT VISIT HI MDM: CPT

## 2023-01-06 RX ORDER — SODIUM CHLORIDE 9 MG/ML
1000 INJECTION, SOLUTION INTRAVENOUS
Refills: 0 | Status: DISCONTINUED | OUTPATIENT
Start: 2023-01-06 | End: 2023-01-07

## 2023-01-06 RX ORDER — DEXTROSE 50 % IN WATER 50 %
25 SYRINGE (ML) INTRAVENOUS ONCE
Refills: 0 | Status: DISCONTINUED | OUTPATIENT
Start: 2023-01-06 | End: 2023-01-07

## 2023-01-06 RX ORDER — ALBUTEROL 90 UG/1
2.5 AEROSOL, METERED ORAL
Refills: 0 | Status: DISCONTINUED | OUTPATIENT
Start: 2023-01-06 | End: 2023-01-06

## 2023-01-06 RX ORDER — AMLODIPINE BESYLATE 2.5 MG/1
10 TABLET ORAL DAILY
Refills: 0 | Status: DISCONTINUED | OUTPATIENT
Start: 2023-01-06 | End: 2023-01-07

## 2023-01-06 RX ORDER — CALCIUM GLUCONATE 100 MG/ML
2 VIAL (ML) INTRAVENOUS ONCE
Refills: 0 | Status: COMPLETED | OUTPATIENT
Start: 2023-01-06 | End: 2023-01-06

## 2023-01-06 RX ORDER — INSULIN LISPRO 100/ML
VIAL (ML) SUBCUTANEOUS
Refills: 0 | Status: DISCONTINUED | OUTPATIENT
Start: 2023-01-06 | End: 2023-01-07

## 2023-01-06 RX ORDER — INSULIN GLARGINE 100 [IU]/ML
12 INJECTION, SOLUTION SUBCUTANEOUS AT BEDTIME
Refills: 0 | Status: DISCONTINUED | OUTPATIENT
Start: 2023-01-06 | End: 2023-01-07

## 2023-01-06 RX ORDER — SODIUM CHLORIDE 9 MG/ML
500 INJECTION INTRAMUSCULAR; INTRAVENOUS; SUBCUTANEOUS
Refills: 0 | Status: DISCONTINUED | OUTPATIENT
Start: 2023-01-06 | End: 2023-01-07

## 2023-01-06 RX ORDER — ATORVASTATIN CALCIUM 80 MG/1
40 TABLET, FILM COATED ORAL AT BEDTIME
Refills: 0 | Status: DISCONTINUED | OUTPATIENT
Start: 2023-01-06 | End: 2023-01-07

## 2023-01-06 RX ORDER — HYDRALAZINE HCL 50 MG
50 TABLET ORAL
Refills: 0 | Status: DISCONTINUED | OUTPATIENT
Start: 2023-01-06 | End: 2023-01-07

## 2023-01-06 RX ORDER — GLUCAGON INJECTION, SOLUTION 0.5 MG/.1ML
1 INJECTION, SOLUTION SUBCUTANEOUS ONCE
Refills: 0 | Status: DISCONTINUED | OUTPATIENT
Start: 2023-01-06 | End: 2023-01-07

## 2023-01-06 RX ORDER — DEXTROSE 50 % IN WATER 50 %
12.5 SYRINGE (ML) INTRAVENOUS ONCE
Refills: 0 | Status: DISCONTINUED | OUTPATIENT
Start: 2023-01-06 | End: 2023-01-07

## 2023-01-06 RX ORDER — DEXTROSE 50 % IN WATER 50 %
15 SYRINGE (ML) INTRAVENOUS ONCE
Refills: 0 | Status: DISCONTINUED | OUTPATIENT
Start: 2023-01-06 | End: 2023-01-07

## 2023-01-06 RX ORDER — INSULIN LISPRO 100/ML
12 VIAL (ML) SUBCUTANEOUS
Refills: 0 | Status: DISCONTINUED | OUTPATIENT
Start: 2023-01-06 | End: 2023-01-07

## 2023-01-06 RX ORDER — FUROSEMIDE 40 MG
40 TABLET ORAL ONCE
Refills: 0 | Status: COMPLETED | OUTPATIENT
Start: 2023-01-06 | End: 2023-01-06

## 2023-01-06 RX ORDER — TAMSULOSIN HYDROCHLORIDE 0.4 MG/1
0.4 CAPSULE ORAL AT BEDTIME
Refills: 0 | Status: DISCONTINUED | OUTPATIENT
Start: 2023-01-06 | End: 2023-01-07

## 2023-01-06 RX ORDER — CLOPIDOGREL BISULFATE 75 MG/1
75 TABLET, FILM COATED ORAL DAILY
Refills: 0 | Status: DISCONTINUED | OUTPATIENT
Start: 2023-01-07 | End: 2023-01-07

## 2023-01-06 RX ORDER — CITALOPRAM 10 MG/1
40 TABLET, FILM COATED ORAL DAILY
Refills: 0 | Status: DISCONTINUED | OUTPATIENT
Start: 2023-01-06 | End: 2023-01-07

## 2023-01-06 RX ORDER — SODIUM ZIRCONIUM CYCLOSILICATE 10 G/10G
10 POWDER, FOR SUSPENSION ORAL ONCE
Refills: 0 | Status: DISCONTINUED | OUTPATIENT
Start: 2023-01-06 | End: 2023-01-06

## 2023-01-06 RX ORDER — ASPIRIN/CALCIUM CARB/MAGNESIUM 324 MG
81 TABLET ORAL DAILY
Refills: 0 | Status: DISCONTINUED | OUTPATIENT
Start: 2023-01-07 | End: 2023-01-07

## 2023-01-06 RX ORDER — SODIUM POLYSTYRENE SULFONATE 4.1 MEQ/G
30 POWDER, FOR SUSPENSION ORAL ONCE
Refills: 0 | Status: COMPLETED | OUTPATIENT
Start: 2023-01-06 | End: 2023-01-06

## 2023-01-06 RX ORDER — ALBUTEROL 90 UG/1
2.5 AEROSOL, METERED ORAL
Refills: 0 | Status: COMPLETED | OUTPATIENT
Start: 2023-01-06 | End: 2023-01-06

## 2023-01-06 RX ADMIN — ALBUTEROL 2.5 MILLIGRAM(S): 90 AEROSOL, METERED ORAL at 10:58

## 2023-01-06 RX ADMIN — ATORVASTATIN CALCIUM 40 MILLIGRAM(S): 80 TABLET, FILM COATED ORAL at 22:33

## 2023-01-06 RX ADMIN — ALBUTEROL 2.5 MILLIGRAM(S): 90 AEROSOL, METERED ORAL at 10:55

## 2023-01-06 RX ADMIN — ALBUTEROL 2.5 MILLIGRAM(S): 90 AEROSOL, METERED ORAL at 11:39

## 2023-01-06 RX ADMIN — Medication 12 UNIT(S): at 17:48

## 2023-01-06 RX ADMIN — Medication 40 MILLIGRAM(S): at 11:33

## 2023-01-06 RX ADMIN — Medication 200 GRAM(S): at 10:54

## 2023-01-06 RX ADMIN — Medication 50 MILLIGRAM(S): at 17:49

## 2023-01-06 RX ADMIN — ALBUTEROL 2.5 MILLIGRAM(S): 90 AEROSOL, METERED ORAL at 11:19

## 2023-01-06 RX ADMIN — SODIUM POLYSTYRENE SULFONATE 30 GRAM(S): 4.1 POWDER, FOR SUSPENSION ORAL at 11:33

## 2023-01-06 RX ADMIN — TAMSULOSIN HYDROCHLORIDE 0.4 MILLIGRAM(S): 0.4 CAPSULE ORAL at 22:33

## 2023-01-06 RX ADMIN — SODIUM CHLORIDE 40 MILLILITER(S): 9 INJECTION INTRAMUSCULAR; INTRAVENOUS; SUBCUTANEOUS at 19:47

## 2023-01-06 NOTE — ED ADULT TRIAGE NOTE - CHIEF COMPLAINT QUOTE
Pt presents to ED c/o abnormal lab result. States "I had a stent placed a couple of weeks ago, I went to the cardiologist yesterday and my potassium and creatinine are high". Pt reports generalized weakness, fatigue and "just not feeling right". Denies chest pain, shortness of breath, palpitations. 12 lead EKG done.

## 2023-01-06 NOTE — ED PROVIDER NOTE - OBJECTIVE STATEMENT
57M with recent HF admission found CAD s/p stent, hx urethral replacement for urethral stricture, recent admissions for NOLAN and hyperkalemia 12/28-12/30, discharged with 3 day course of lokema, saw cardiologist with repeat labs showing recurrent Cr 2 and potassium 6.2-6.3, sent to ED for further workup and management.  Patient feels fatigued.  Has been urinating normally.

## 2023-01-06 NOTE — H&P ADULT - PROBLEM SELECTOR PLAN 7
F: None.   E: Replete carefully.   N: consistent carb and low salts.  DVT prophylaxis:   Dispo: Patient with long term IDDM, reports last A1c in the 7 range. Lantus 15 and Lispro 15 TID at home.   - restart Lantus at 12 units and Lispro at 12 units TID  - Sliding scale and Accuchecks  - Consistent carb diet with Low salt/ low potassium restrictions, Home medication of Lipitor 40 PO daily.   - Continue home medication.

## 2023-01-06 NOTE — H&P ADULT - PROBLEM SELECTOR PLAN 9
F: None.   E: Replete carefully.   N: consistent carb and low salts.  DVT prophylaxis: Heparin  Dispo: ??? F: None.   E: Replete carefully.   N: consistent carb, DASH, low potassium.  DVT prophylaxis: Heparin  Dispo: F

## 2023-01-06 NOTE — DISCUSSION/SUMMARY
[FreeTextEntry1] : Mr. Tse is a 56yo M with CAD (recent PEPE to OM1 Dec 2022), HTN HL OM of R 4th toe on abx, anxiety, hospitalization in December initially for HFpEF and CAD s/p diuresis and PCI who presents for follow-up after another hospitalization for syncope/bradycardia/hyperkalemia/NOLAN. \par \par Feeling well today\par Weight stable\par Euvolemic on exam\par \par Continue current meds including furosemide\par Given bradycardia to 50s, though asymptomatic, 24hr monitor placed to assess HR trend - will stop carvedilol if lower HRs while awake\par Continue DAPT, he will look into choosing cardiac rehab center\par BP at goal on current regimen\par Continue statin, check lipid profile in 1-2 months\par BMP checked - noted to have NOLAN and hyperkalemia again to 6.2. Referred to ER on 1/6 -- pt agreeable. Spoke to ER and updated re: pt history and concerns. \par \par Follow-up 1 month or sooner as needed [EKG obtained to assist in diagnosis and management of assessed problem(s)] : EKG obtained to assist in diagnosis and management of assessed problem(s)

## 2023-01-06 NOTE — H&P ADULT - PROBLEM SELECTOR PLAN 6
Patient with newly diagnosed heart failure at previous admission requiring placement of a stent in the OM.   - Continue Aspirin and Plavix.   - Patient with newly diagnosed heart failure at previous admission requiring placement of a stent in the OM.   - Continue Aspirin and Plavix, last dose 1/6 AM.   - GDMT with Losartan and Carvedilol.   - Hydralazine for afterload reduction. Home medication of Lipitor 40 PO daily.   - Continue home medication. Patient with history of significant hypertension with newly diagnosed heart failure. Home meds of Amlodipine 10 PO daily, Losartan 40 mg PO daily, Carvedilol 3.325 mg PO BID and Hydralazine 50 mg PO BID.   - Continue Amlodipine and Hydralazine for blood pressure management.   - Hold Losartan for NOLAN and Carvedilol for Bradycardia.

## 2023-01-06 NOTE — H&P ADULT - NSHPSOCIALHISTORY_GEN_ALL_CORE
Patient reports significantly reduced ETOH intake since december, Exports no smoking/recreational drug use.

## 2023-01-06 NOTE — ED ADULT NURSE NOTE - HISTORY OF COVID-19 VACCINATION
Continue: Pred Forte (prednisolone acetate): drops,suspension: 1% 1 drop three times a day as directed into right eye 02- Yes

## 2023-01-06 NOTE — H&P ADULT - PROBLEM SELECTOR PLAN 5
Patient with long term IDDM, reports last A1c in the 7 range. Lantus 15 and Lispro 15 TID at home.   - restart Lantus at 12 units and Lispro at 12 units TID  - Sliding scale and Accuchecks  - Consistent carb diet with Low salt restrictions, low potassium Patient with long term IDDM, reports last A1c in the 7 range. Lantus 15 and Lispro 15 TID at home.   - restart Lantus at 12 units and Lispro at 12 units TID  - Sliding scale and Accuchecks  - Consistent carb diet with Low salt/ low potassium restrictions, Patient with history of significant hypertension with newly diagnosed heart failure. Home meds of Amlodipine 10 PO daily, Losartan 40 mg PO daily, Carvedilol 3.325 mg PO BID and Hydralazine 50 mg PO BID.   - Continue Amlodipine and Hydralazine for blood pressure management.   - Hold Losartan for NOLAN and Carvedilol for Bradycardia. Patient with history of significant hypertension with newly diagnosed heart failure. Home meds of Amlodipine 10 PO daily, Losartan 40 mg PO daily, Carvedilol 3.325 mg PO BID and Hydralazine 50 mg PO BID. Patient unsure if he is currently taking Losartan at home but planned for this medication in discharge summary.   - Continue Amlodipine and Hydralazine for blood pressure management.   - Hold Losartan for NOLAN and Carvedilol for Bradycardia. Hgb of 9, borderline macrocytic, which appears to be baseline over last few admissions, Iron studies WNL.   - Check Folate and B12 levels. Hgb of 9, borderline macrocytic, which appears to be baseline over last few admissions, Iron studies WNL.   - Check Folate and B12 levels in AM.

## 2023-01-06 NOTE — CARDIOLOGY SUMMARY
[de-identified] : \par 12/16/22 EKG: likely ectopic atrial rhythm at 52bpm, no ischemic ST-T deviations\par 1/5/23 EKG: sinus bradycardia at 50bpm, prominent T waves [de-identified] : \par 12/9/22 Limited TTE: normal LV and RV size and fxn, mildly dilated LA, aortic sclerosis, no pericardial effusion\par 12/9/22 Limited TTE: normal LV size/fxn, no pericardial effusion\par 12/5/22 TTE: normal LV size/fxn, normal RV, mild LAE, aortic sclerosis, PASP 47mmHg, no pericardial effusion\par 12/29/22 TTE: limited study, normal LV and RV size and function, no pericardial effusion [de-identified] : \par 12/9/22 LHC: 1vCAD, 100%  OM1, 30-50% pRamus, s/p PEPE of OM; RHC RA 10, RV 41/3/12, PA 39/15/33, PCWP 17, CI 3.3

## 2023-01-06 NOTE — H&P ADULT - ASSESSMENT
Mr. Hamm is a 57 year old male with a past medical history of recent HFpEF admission (12/3 - 12/10) found CAD s/p stent (12/9), HTN, hyperlipidemia, IDDM-II, OM of the 4 metatarsal, and hx urethral replacement for urethral stricture, recent admissions for NOLAN and hyperkalemia 12/28-12/30, discharged with 3 day course of Lokelma. Patient presents today after seeing his cardiologist Dr. Gay and being noted to have a creatinine elevation with hyperkalemia. Patient was recently admitted last week after syncope with workup and had a complication of NOLAN and hyperkalemia over the course of hospitalization. Patient reports that since his hospitalization his swelling is significantly improved with a reported dry weight of 205 pounds.  Mr. Tse is a 57 year old male with a past medical history of recent HFpEF admission (12/3 - 12/10) found CAD s/p stent (12/9), HTN, hyperlipidemia, IDDM-II, OM of the 4 metatarsal, and hx urethral replacement for urethral stricture, recent admissions for NOLAN and hyperkalemia 12/28-12/30, discharged with 3 day course of Lokelma. Patient presents today after seeing his cardiologist Dr. Gay and being noted to have a creatinine elevation with hyperkalemia. Patient was recently admitted last week after syncope with workup and had a complication of NOLAN and hyperkalemia over the course of hospitalization. Patient reports that since his hospitalization his swelling is significantly improved with a reported dry weight of 205 pounds.

## 2023-01-06 NOTE — H&P ADULT - PROBLEM SELECTOR PLAN 3
Patient with history of significant hypertension with newly diagnosed heart failure. Home meds of Amlodipine 10 PO daily, Losartan 40 mg PO daily, Carvedilol 3.325 mg PO BID and Hydralazine 50 mg PO BID.   - Continue Amlodipine and Hydralazine in the setting of NOLAN and bradycardia.   - Hold Losartan and Carvedilol Patient with history of significant hypertension with newly diagnosed heart failure. Home meds of Amlodipine 10 PO daily, Losartan 40 mg PO daily, Carvedilol 3.325 mg PO BID and Hydralazine 50 mg PO BID.   - Continue Amlodipine and Hydralazine for blood pressure management.   - Hold Losartan for NOLAN and Carvedilol for Bradycardia. Patient with newly diagnosed heart failure at previous admission requiring placement of a stent in the OM.   - Continue Aspirin and Plavix, last dose 1/6 AM.   - GDMT with Losartan and Carvedilol.   - Hydralazine for afterload reduction. Patient with newly diagnosed heart failure at previous admission requiring placement of a stent in the OM.   - Continue Aspirin and Plavix, last dose 1/6 AM.   - GDMT with Losartan and Carvedilol, plan to hold losartan.   - Hydralazine for afterload reduction. Patient with newly diagnosed heart failure at previous admission requiring placement of a stent in the OM. Prior to discharge at last admission the patient had an EF >65%.   - Continue Aspirin and Plavix, last dose 1/6 AM.   - GDMT with Losartan and Carvedilol, plan to hold losartan.   - Hydralazine for afterload reduction. Patient with newly diagnosed heart failure at previous admission requiring placement of a stent in the OM. Prior to discharge at last admission the patient had an EF >65%. Holter monitor in place at this time to determine risk/benefit of carvedilol given recurrent bradycardia.   - Continue Aspirin and Plavix, last dose 1/6 AM.   - GDMT with Losartan and Carvedilol, plan to hold both medications at this time.   - Consult cardiology for Carvedilol plans given 24 hour holter monitor.   - Continue Hydralazine for afterload reduction.

## 2023-01-06 NOTE — H&P ADULT - PROBLEM SELECTOR PLAN 4
Home medication of Lipitor 40 PO daily.   - Continue home medication. Troponin with a mild elevation to 0.02, patient is asymptomatic in the ED with an NOLAN at this time. Patient without symptoms at this time, EKG without ST elevation.  - Plan to repeat with Ck/CKMB to ensure non-uptrending values. Troponin with a mild elevation to 0.02, patient is asymptomatic in the ED with an NOLAN at this time. Patient without symptoms at this time, EKG without ST elevation.  - Plan to repeat with Ck/CKMB to ensure normalized values.    # Bradycardia: Patient with intermittent junctional vs sinus bradycardia, today noted to have hypotension on arrival. Patient is asymptomatic with bradycardia.   - Hold Carvedilol pending cardiology discussion and holter monitor reading.

## 2023-01-06 NOTE — H&P ADULT - NSHPLABSRESULTS_GEN_ALL_CORE
LABS:                         9.7    8.45  )-----------( 232      ( 06 Jan 2023 10:42 )             30.2     01-06    137  |  106  |  59<H>  ----------------------------<  107<H>  5.4<H>   |  23  |  2.21<H>    Ca    9.6      06 Jan 2023 13:10  Mg     2.4     01-06          CARDIAC MARKERS ( 06 Jan 2023 10:42 )  x     / 0.02 ng/mL / x     / x     / x          Serum Pro-Brain Natriuretic Peptide: 1136 pg/mL (01-06 @ 10:42)    Lactate, Blood: 0.7 mmol/L (01-06 @ 10:42)      RADIOLOGY, EKG & ADDITIONAL TESTS: Reviewed.

## 2023-01-06 NOTE — H&P ADULT - NSHPREVIEWOFSYSTEMS_GEN_ALL_CORE
REVIEW OF SYSTEMS:    CONSTITUTIONAL: (+) fatigue. No fever, chills, or weakness.   EYES/ENT: No visual changes;  No ear pain, runny nose, or sore throat.   NECK: No pain or stiffness.  RESPIRATORY: No cough, wheezing, hemoptysis; No shortness of breath.  CARDIOVASCULAR: (+) chest discomfort with coughing and sneezing, No dyspnea on exertion, or palpitations.  GASTROINTESTINAL: No abdominal or epigastric pain. No nausea, vomiting, or hematemesis; No diarrhea or constipation. No melena or hematochezia.  GENITOURINARY: No dysuria, frequency or hematuria.  NEUROLOGICAL: No numbness or weakness.  SKIN: No itching, rashes.

## 2023-01-06 NOTE — H&P ADULT - PROBLEM SELECTOR PLAN 1
Cr of 2.2 today, previously 1.6 on discharge 1 week PTA, baseline Cr of 1.09 in november 2022. Patient is spontaneously urinating at this time. Patient with recurrent NOLAN over the last couple of weeks post PCI placement.   - Urinalysis and urine lytes ordered.   - Ensure adequate hydration. Cr of 2.2 today, previously 1.6 on discharge 1 week PTA, baseline Cr of 1.09 in november 2022 but possibly worsened baseline post CHF development. Patient is spontaneously urinating at this time. Patient with recurrent NOLAN over the last couple of weeks post PCI placement.   - Urinalysis and urine lytes ordered.   - Ensure adequate PO hydration.   - Cr of 2.2 today, previously 1.6 on discharge 1 week PTA, baseline Cr of 1.09 in november 2022 but possibly worsened baseline post CHF development. Patient is spontaneously urinating at this time. Patient with recurrent NOLAN over the last couple of weeks post PCI placement.   - Urinalysis and urine lytes ordered.   - Ensure adequate PO hydration.   - Hold nephrotoxics agents including this patient's Losartan/Lisinopril. Cr of 2.2 today, previously 1.6 on discharge 1 week PTA, baseline Cr of 1.09 in november 2022 but possibly worsened baseline post CHF development. Patient is spontaneously urinating at this time. Patient with recurrent NOLAN over the last couple of weeks post PCI placement.   - Urinalysis and urine lytes ordered.   - Ensure adequate PO hydration.   - Hold nephrotoxics agents including this patient's Losartan. Cr of 2.2 today, previously 1.6 on discharge 1 week PTA, baseline Cr of 1.09 in november 2022 but possibly worsened baseline post CHF development. Patient is spontaneously urinating at this time. Patient with recurrent NOLAN over the last couple of weeks post PCI placement.   - Urinalysis and urine lytes ordered.   - Ensure adequate PO hydration.   - Hold nephrotoxics agents including this patient's Losartan.  - Patient received one dose of lasix in the ED.  - Follow up Cr of 2.2 today, previously 1.6 on discharge 1 week PTA, baseline Cr of 1.09 in november 2022 but possibly worsened baseline post CHF development. Patient is spontaneously urinating at this time. Patient with recurrent NOLAN over the last couple of weeks post PCI placement.   - Urinalysis and urine lytes ordered.   - Ensure adequate PO hydration.   - Hold nephrotoxics agents including this patient's Losartan.  - Patient received one dose of lasix in the ED.  - Follow up nephrology recommendations.

## 2023-01-06 NOTE — PATIENT PROFILE ADULT - LEGAL HELP
Patient seen and examined  Has back and anterior thigh pain worse when laying flat  Has T-12 burst fracture  Needs open MRI with contrast T11-T12 region with pre procedure meds  Not a kyphoplasty candidate (burst fracture) sed rate and crp elevated  Need to rule out infection (discitis/osteomyelitis)  Will order custom fit TLSO brace  Consult ID no

## 2023-01-06 NOTE — H&P ADULT - PROBLEM SELECTOR PLAN 8
F: None.   E: Replete carefully.   N: consistent carb and low salts.  DVT prophylaxis:   Dispo: Patient with long term IDDM, reports last A1c in the 7 range. Lantus 15 and Lispro 15 TID at home.   - restart Lantus at 12 units and Lispro at 12 units TID  - Sliding scale and Accuchecks  - Consistent carb diet with Low salt/ low potassium restrictions,

## 2023-01-06 NOTE — CONSULT NOTE ADULT - ASSESSMENT
58 yo M with recent HFpEF, recent stenting for CAD 12/9, HLD, DM2, hx of uretheral replacement for urtheral stricture admitted for abnormal labs showing elevated Cr and hyperkalemia. Pt with multiple episodes of NOLAN in the recent past. Nephrology consulted for NOLAN and hyperkalemia      #Non-oliguric NOLAN on CKD 3  BCr 1.2-1.3  Admitted with Cr 2.19  Electrolytes significant for K 5.5 and bicarb 19  BP 90/50 on admission  Likely etiology is pre-renal vs iATN due to hypotension       Recommend:   Gentle hydration, NS @ 40 ml/hr for 12 hours  UA  Urine lytes   Renal sono  Ensure no urinary obstruction. PVR Bladder scan   strict I/Os   renal diet   avoid nephrotoxic meds     #Hyperkalemia  Due to NOLAN  No EKG changes  Give lokelma 10g. Can repeat dose Q8hr for K >5.5      Thank you for the opportunity to participate in the care of your patient. The nephrology service remains available to assist with any questions or concerns. Please feel free to reach us by paging the on-call nephrology fellow for urgent issues or as below.     Shaka Mcclendon M.D.  PGY 4 - Nephrology Fellow  [phone]

## 2023-01-06 NOTE — ED ADULT NURSE NOTE - OBJECTIVE STATEMENT
Pt presents to ED c/o abnormal lab result per cardiologist this am. Pt saw cardiologist yesterday who placed Holter monitor for low heart rate per patient. Stent placed several weeks ago. Pt states HR is usually in 50's. /52 upon assessment. Denies pain, dizziness, loss of vision, palpitations. Endorses some chest discomfort "if I take a full breath." Generally feeling "not right." Placed on cardiac monitor and continuous pulse renu garrison at bedside, MD at bedside. Pt presents to ED c/o abnormal lab result per cardiologist this am. Pt saw cardiologist yesterday who placed Holter monitor for low heart rate per patient. Stent placed several weeks ago. Pt states HR is usually in 50's. /52 upon assessment. Denies pain, dizziness, loss of vision, palpitations. Endorses some chest discomfort "if I take a full breath" but denies SOB or difficulty breathing. Generally feeling "not right." Placed on cardiac monitor and continuous pulse renu garrison at bedside, MD at bedside. AAOx4 speaking in full sentences.

## 2023-01-06 NOTE — PHYSICAL EXAM
[Well Developed] : well developed [Well Nourished] : well nourished [No Acute Distress] : no acute distress [No Carotid Bruit] : no carotid bruit [No Murmur] : no murmur [Clear Lung Fields] : clear lung fields [Good Air Entry] : good air entry [No Respiratory Distress] : no respiratory distress  [Soft] : abdomen soft [Non Tender] : non-tender [Normal Gait] : normal gait [No Edema] : no edema [Moves all extremities] : moves all extremities [No Focal Deficits] : no focal deficits [Normal Speech] : normal speech [Alert and Oriented] : alert and oriented [Normal memory] : normal memory [de-identified] : bradycardic

## 2023-01-06 NOTE — CONSULT NOTE ADULT - ATTENDING COMMENTS
I agree with the fellow's findings and plans as written above with the following additions/amendments:    Seen and examined at bedside. Feels overall well, discussed likely underlying kidney disease making him prone to AKIs and hyperkalemia, likely to need close monitoring when current NOLAN resolves to prevent recurrence. Further workup and recs as above

## 2023-01-06 NOTE — H&P ADULT - HISTORY OF PRESENT ILLNESS
Mr. Hamm is a 57 year old male with a past medical history of recent HFpEF admission (12/3 - 12/10) found CAD s/p stent (12/9), HTN, hyperlipidemia, IDDM-II, OM of the 4 metatarsal, and hx urethral replacement for urethral stricture, recent admissions for NOLAN and hyperkalemia 12/28-12/30, discharged with 3 day course of Lokelma. Patient presents today after seeing his cardiologist Dr. Gay and being noted to have a creatinine elevation with hyperkalemia. Patient was recently admitted last week after syncope with workup and had a complication of NOLAN and hyperkalemia over the course of hospitalization. Patient reports that since his hospitalization his swelling is significantly improved with a reported dry weight of 205 pounds.     Patient states underlying fatigue since his hospitalization December 9th but states that it is more like being tired and that he has not been sleeping well recently. Patient states that he has been able to accomplish all of his ADLs, even walking ~10 miles in one day prior to last admission. Patient is also reporting chest discomfort with hiccups and coughing since December 9th.     Patient follows with Dr. Gaming, PCP, and Dr. Gay, cardiology, whom he began seeing post early-December admission. Prior to that only followed with doctors in [place] during the summer. Prior to last admission patient denies history of NOLAN or hyperkalemia. Patient states that he eats normal, healthy diet. Patient has a 24 hour holter monitor in place by Dr. Gay to determine if the patient should continue B-blocker therapy.     In the Ed the patient was found to be afebrile, bradycardic with junctional rhythm noted on EKG 45-50, BP of 90/50 on admission with increase to 164/51, saturating 100% on room air. Patient treated with calcium, albuterol, adn kayexelate for hyperkalemia, now noted to be in the 5 range. Patient with increased cr to 2.2 rom 1.6 on discharge and 1.6 on discharge 1 week PTA, and 1.09 in november 2022.      Mr. Tse is a 57 year old male with a past medical history of recent HFpEF admission (12/3 - 12/10) found CAD s/p stent (12/9), HTN, hyperlipidemia, IDDM-II, OM of the 4 metatarsal, and hx urethral replacement for urethral stricture, recent admissions for NOLAN and hyperkalemia 12/28-12/30, discharged with 3 day course of Lokelma. Patient presents today after seeing his cardiologist Dr. Gay and being noted to have a creatinine elevation with hyperkalemia. Patient was recently admitted last week after syncope with workup and had a complication of NOLAN and hyperkalemia over the course of hospitalization. Patient reports that since his hospitalization his swelling is significantly improved with a reported dry weight of 205 pounds.     Patient states underlying fatigue since his hospitalization December 9th but states that it is more like being tired and that he has not been sleeping well recently. Patient states that he has been able to accomplish all of his ADLs, even walking ~10 miles in one day prior to last admission. Patient is also reporting chest discomfort with hiccups and coughing since December 9th.     Patient follows with Dr. Gaming, PCP, and Dr. Gay, cardiology, whom he began seeing post early-December admission. Prior to that only followed with doctors in [place] during the summer. Prior to last admission patient denies history of NOLAN or hyperkalemia. Patient states that he eats normal, healthy diet. Patient has a 24 hour holter monitor in place by Dr. Gay to determine if the patient should continue B-blocker therapy.     In the Ed the patient was found to be afebrile, bradycardic with junctional rhythm noted on EKG 45-50, BP of 90/50 on admission with increase to 164/51, saturating 100% on room air. Patient treated with calcium, albuterol, and kayexelate for hyperkalemia, now noted to be in the 5 range. Patient with increased cr to 2.2 rom 1.6 on discharge and 1.6 on discharge 1 week PTA, and 1.09 in november 2022.

## 2023-01-06 NOTE — ED PROVIDER NOTE - PROGRESS NOTE DETAILS
Repeat BMP with slightly downtrending potassium level.  Will admit for further workup and management of NOLAN/hyperkalemia.

## 2023-01-06 NOTE — CONSULT NOTE ADULT - SUBJECTIVE AND OBJECTIVE BOX
HPI:  Mr. Tse is a 57 year old male with a past medical history of recent HFpEF admission (12/3 - 12/10) found CAD s/p stent (12/9), HTN, hyperlipidemia, IDDM-II, OM of the 4 metatarsal, and hx urethral replacement for urethral stricture, recent admissions for NOLAN and hyperkalemia 12/28-12/30, discharged with 3 day course of Lokelma. Patient presents today after seeing his cardiologist Dr. Gay and being noted to have a creatinine elevation with hyperkalemia. Patient was recently admitted last week after syncope with workup and had a complication of NOLAN and hyperkalemia over the course of hospitalization. Patient reports that since his hospitalization his swelling is significantly improved with a reported dry weight of 205 pounds.     Patient states underlying fatigue since his hospitalization December 9th but states that it is more like being tired and that he has not been sleeping well recently. Patient states that he has been able to accomplish all of his ADLs. Patient is also reporting chest discomfort with hiccups and coughing since December 9th.     Prior to last admission patient denies history of NOLAN or hyperkalemia. Patient has a 24 hour holter monitor in place by Dr. Gay to determine if the patient should continue B-blocker therapy.     In the ED the patient was found to be afebrile, bradycardic with junctional rhythm noted on EKG 45-50, BP of 90/50 on admission with increase to 164/51, saturating 100% on room air. Patient treated with calcium, albuterol, and kayexelate for hyperkalemia, now noted to be in the 5 range. Patient with increased cr to 2.2 rom 1.6 on discharge and 1.6 on discharge 1 week PTA, and 1.09 in november 2022.    Nephrology conuslted for NOLAN and hyperkalemia. Pt says he has not been taking his Lokelma at home. Also says his BP mostly runs 120s/80s and the 90/50 in the ED was low for him    PAST MEDICAL & SURGICAL HISTORY:  Osteomyelitis      Hypertension      Hyperlipidemia      Diabetes mellitus      CAD (coronary artery disease)      Chronic diastolic congestive heart failure      S/P release of urethral stricture            Allergies:  No Known Allergies      Home Medications:   amLODIPine 10 mg oral tablet: 1 tab(s) orally once a day  Aspirin Enteric Coated 81 mg oral delayed release tablet: 1 tab(s) orally once a day   citalopram 40 mg oral tablet: 1 tab(s) orally once a day  clopidogrel 75 mg oral tablet: 1 tab(s) orally once a day   hydrALAZINE 50 mg oral tablet: 1 tab(s) orally 2 times a day  Insulin Lispro KwikPen 100 units/mL injectable solution: 15 unit(s) injectable 3 times a day (before meals)  Lantus Solostar Pen 100 units/mL subcutaneous solution: 15 unit(s) subcutaneous once a day (at bedtime)  Lasix 40 mg oral tablet: 1 tab(s) orally once a day  Lipitor 40 mg oral tablet: 1 tab(s) orally once a day  losartan 50 mg oral tablet: 1 tab(s) orally once a day  tamsulosin 0.4 mg oral capsule: 1 cap(s) orally once a day      Hospital Medications:   MEDICATIONS  (STANDING):  amLODIPine   Tablet 10 milliGRAM(s) Oral daily  atorvastatin 40 milliGRAM(s) Oral at bedtime  citalopram 40 milliGRAM(s) Oral daily  dextrose 5%. 1000 milliLiter(s) (100 mL/Hr) IV Continuous <Continuous>  dextrose 5%. 1000 milliLiter(s) (50 mL/Hr) IV Continuous <Continuous>  dextrose 50% Injectable 25 Gram(s) IV Push once  dextrose 50% Injectable 12.5 Gram(s) IV Push once  dextrose 50% Injectable 25 Gram(s) IV Push once  glucagon  Injectable 1 milliGRAM(s) IntraMuscular once  hydrALAZINE 50 milliGRAM(s) Oral two times a day  insulin glargine Injectable (LANTUS) 12 Unit(s) SubCutaneous at bedtime  insulin lispro (ADMELOG) corrective regimen sliding scale   SubCutaneous three times a day before meals  insulin lispro Injectable (ADMELOG) 12 Unit(s) SubCutaneous three times a day before meals  tamsulosin 0.4 milliGRAM(s) Oral at bedtime      SOCIAL HISTORY:  Denies ETOh, Smoking    Family History:  FAMILY HISTORY:  FH: myocardial infarction (Father, Grandparent)          VITALS:  T(F): 98.1 (01-06-23 @ 18:02), Max: 98.5 (01-06-23 @ 13:44)  HR: 62 (01-06-23 @ 18:02)  BP: 157/58 (01-06-23 @ 18:02)  RR: 16 (01-06-23 @ 18:02)  SpO2: 98% (01-06-23 @ 18:02)  Wt(kg): --    Height (cm): 182.9 (01-06 @ 10:14)  Weight (kg): 93 (01-06 @ 10:14)  BMI (kg/m2): 27.8 (01-06 @ 10:14)  BSA (m2): 2.15 (01-06 @ 10:14)  CAPILLARY BLOOD GLUCOSE      POCT Blood Glucose.: 127 mg/dL (06 Jan 2023 17:44)      Review of Systems:  Negative except as mentioned in HPI    PHYSICAL EXAM:  GENERAL: Alert, awake, oriented x3   HEENT: EOMI, neck supple, no JVP  CHEST/LUNG: Bilateral clear breath sounds, no accessory muscle use, on room air  HEART: Regular rate and rhythm, no murmur  ABDOMEN: Soft, nontender, non distended  : No flank or supra pubic tenderness  EXTREMITIES: trace pedal edema, warm  Neurology: AAOx3, no focal neurological deficit      LABS:  01-06    137  |  106  |  59<H>  ----------------------------<  107<H>  5.4<H>   |  23  |  2.21<H>    Ca    9.6      06 Jan 2023 13:10  Mg     2.4     01-06      Creatinine Trend: 2.21 <--, 2.19 <--                        9.7    8.45  )-----------( 232      ( 06 Jan 2023 10:42 )             30.2     Urine Studies:

## 2023-01-06 NOTE — HISTORY OF PRESENT ILLNESS
[FreeTextEntry1] : Mr. Tse is a 58yo M with CAD (recent PEPE to OM1 Dec 2022), HTN HL OM of R 4th toe on abx, anxiety, hospitalization in December initially for HFpEF and CAD s/p diuresis and PCI who presents for follow-up after another hospitalization for syncope/bradycardia/hyperkalemia/NOLAN. \par \par Since last visit, labs showed hyperkalemia. Readmitted bc of episode of LOC and bradycardia with junctional rhythm -- found to have NOLAN to Cr 2.3 which improved to 1.6 on discharge and K of 6.5 on presentation which improved to 4.8 on discharge. Seen by EP - thought no need for PPM and ok to continue BB. Given referral for outpt sleep study. Had limited echo done on 12/29 showing normal LV function and no pericardial. Was started on furosemide 40mg daily. \par \par Since then:\par -reports for last hospitalization had been taking Lokelma after K was 6.3 but when presented to ER (had taken 3 days of Lokelma) K was 6.5\par -overall doing ok\par -no chest pain\par -feels heart beating more\par -home BPs are SBP 120s-130s\par -weights are stable at home\par -compliant with meds\par \par PMH/PSH:\par as above\par \par FH:\par Father CAD 70's yo\par \par SH:\par Non smoker, Occasional ETOH 3-4 drinks / week\par \par Dec 2022 Labs:\par Na 135\par K 4.7\par Cr 1.55\par AST 28\par ALT 61\par A1c 9.8%\par Chol 157\par TG 63\par HDL 53\par LDL 91

## 2023-01-06 NOTE — H&P ADULT - NSHPPHYSICALEXAM_GEN_ALL_CORE
VITAL SIGNS:  T(C): 36.9 (01-06-23 @ 13:44), Max: 36.9 (01-06-23 @ 13:44)  T(F): 98.5 (01-06-23 @ 13:44), Max: 98.5 (01-06-23 @ 13:44)  HR: 50 (01-06-23 @ 13:44) (45 - 50)  BP: 164/51 (01-06-23 @ 13:44) (90/50 - 164/51)  BP(mean): --  RR: 16 (01-06-23 @ 13:44) (16 - 18)  SpO2: 100% (01-06-23 @ 13:44) (98% - 100%)  Wt(kg): --    PHYSICAL EXAM:  Constitutional: Male lying in bed in no acute distress, resting comfortably in bed.  HEENT: Atraumatic/normocephalic. PERRL, anicteric sclera, no nasal discharge; uvula midline, no oropharyngeal erythema or exudates; mucous membranes moist.   Neck: supple; no lymphadenopathy.  Respiratory: Clear to auscultation bilaterally; no Wheezing/Crackles/Ronchi, no accessory muscle use.   Cardiac: Regular rate and rhythm, S1/S2; no Murmur/Rub/Gallop;   Gastrointestinal: abdomen firm, non-tender, mildly distended; no rebound or guarding; Normoactive bowel sounds.   Extremities: Warm and Well perfused, no clubbing or cyanosis; cap refill <2, skin turgor normal. Trace pitting edema to the bilateral mid shin, 2+ radial, Dorsalis pedis and posterior tibial pulses bilaterally.  Derm: skin warm, dry and intact; no rashes, wounds, or scars.  Neurologic: AAOx3; no focal deficits.  Psychiatric: affect and characteristics of appearance, verbalizations, behaviors are appropriate

## 2023-01-06 NOTE — H&P ADULT - PROBLEM SELECTOR PLAN 2
Patient received Kayexalate, Albuterol, and calcium. Potassium on repeat noted to be in the 5 range. EKG with junctional bradycardia with no significant T wave elevations. Patient with no bowel movements since Kayexalate.   - Pending nephrology recommendations.  - Continue to assess for the need for insulin or albuterol therapy. Patient received Kayexalate, Albuterol, and calcium. Potassium on repeat noted to be in the 5 range. EKG with junctional bradycardia with no significant T wave elevations. Patient with no bowel movements since Kayexalate.   - Pending nephrology recommendations.  - Continue to assess for the need for insulin, Bicarbonate, or Albuterol.

## 2023-01-06 NOTE — ED PROVIDER NOTE - CLINICAL SUMMARY MEDICAL DECISION MAKING FREE TEXT BOX
Concern for recurrent episodes of NOLAN / hyperkalemia, will initiate hyperkalemia treatment.  Will repeat labs for trend, r/o other gross metabolic abnormality ?anemia ?hypernatremia ?hypoglycemia.  No gross arrythmia on EKG, however patient bradycardic, will monitor in ED.  Unlikely postrenal cause of NOLAN given patient is urinating normally, despite hx urethral replacement.  Case discussed with nephrology, team to evaluate patient.

## 2023-01-06 NOTE — PATIENT PROFILE ADULT - FALL HARM RISK - HARM RISK INTERVENTIONS

## 2023-01-07 ENCOUNTER — TRANSCRIPTION ENCOUNTER (OUTPATIENT)
Age: 58
End: 2023-01-07

## 2023-01-07 VITALS
TEMPERATURE: 98 F | DIASTOLIC BLOOD PRESSURE: 57 MMHG | SYSTOLIC BLOOD PRESSURE: 102 MMHG | RESPIRATION RATE: 18 BRPM | HEART RATE: 60 BPM | OXYGEN SATURATION: 96 %

## 2023-01-07 LAB
ANION GAP SERPL CALC-SCNC: 11 MMOL/L — SIGNIFICANT CHANGE UP (ref 5–17)
APPEARANCE UR: CLEAR — SIGNIFICANT CHANGE UP
BILIRUB UR-MCNC: NEGATIVE — SIGNIFICANT CHANGE UP
BUN SERPL-MCNC: 43 MG/DL — HIGH (ref 7–23)
CALCIUM SERPL-MCNC: 9.3 MG/DL — SIGNIFICANT CHANGE UP (ref 8.4–10.5)
CHLORIDE SERPL-SCNC: 103 MMOL/L — SIGNIFICANT CHANGE UP (ref 96–108)
CO2 SERPL-SCNC: 22 MMOL/L — SIGNIFICANT CHANGE UP (ref 22–31)
COLOR SPEC: YELLOW — SIGNIFICANT CHANGE UP
CREAT ?TM UR-MCNC: 71 MG/DL — SIGNIFICANT CHANGE UP
CREAT SERPL-MCNC: 1.69 MG/DL — HIGH (ref 0.5–1.3)
DIFF PNL FLD: NEGATIVE — SIGNIFICANT CHANGE UP
EGFR: 47 ML/MIN/1.73M2 — LOW
GLUCOSE BLDC GLUCOMTR-MCNC: 139 MG/DL — HIGH (ref 70–99)
GLUCOSE BLDC GLUCOMTR-MCNC: 147 MG/DL — HIGH (ref 70–99)
GLUCOSE BLDC GLUCOMTR-MCNC: 166 MG/DL — HIGH (ref 70–99)
GLUCOSE SERPL-MCNC: 176 MG/DL — HIGH (ref 70–99)
GLUCOSE UR QL: NEGATIVE — SIGNIFICANT CHANGE UP
HCT VFR BLD CALC: 29.4 % — LOW (ref 39–50)
HGB BLD-MCNC: 9.7 G/DL — LOW (ref 13–17)
KETONES UR-MCNC: NEGATIVE — SIGNIFICANT CHANGE UP
LEUKOCYTE ESTERASE UR-ACNC: NEGATIVE — SIGNIFICANT CHANGE UP
MAGNESIUM SERPL-MCNC: 2 MG/DL — SIGNIFICANT CHANGE UP (ref 1.6–2.6)
MCHC RBC-ENTMCNC: 30.5 PG — SIGNIFICANT CHANGE UP (ref 27–34)
MCHC RBC-ENTMCNC: 33 GM/DL — SIGNIFICANT CHANGE UP (ref 32–36)
MCV RBC AUTO: 92.5 FL — SIGNIFICANT CHANGE UP (ref 80–100)
NITRITE UR-MCNC: NEGATIVE — SIGNIFICANT CHANGE UP
NRBC # BLD: 0 /100 WBCS — SIGNIFICANT CHANGE UP (ref 0–0)
OSMOLALITY UR: 423 MOSM/KG — SIGNIFICANT CHANGE UP (ref 300–900)
PH UR: 6 — SIGNIFICANT CHANGE UP (ref 5–8)
PHOSPHATE SERPL-MCNC: 4.2 MG/DL — SIGNIFICANT CHANGE UP (ref 2.5–4.5)
PLATELET # BLD AUTO: 209 K/UL — SIGNIFICANT CHANGE UP (ref 150–400)
POTASSIUM SERPL-MCNC: 4.9 MMOL/L — SIGNIFICANT CHANGE UP (ref 3.5–5.3)
POTASSIUM SERPL-SCNC: 4.9 MMOL/L — SIGNIFICANT CHANGE UP (ref 3.5–5.3)
POTASSIUM UR-SCNC: 20 MMOL/L — SIGNIFICANT CHANGE UP
PROT UR-MCNC: NEGATIVE MG/DL — SIGNIFICANT CHANGE UP
RBC # BLD: 3.18 M/UL — LOW (ref 4.2–5.8)
RBC # FLD: 13.1 % — SIGNIFICANT CHANGE UP (ref 10.3–14.5)
SODIUM SERPL-SCNC: 136 MMOL/L — SIGNIFICANT CHANGE UP (ref 135–145)
SODIUM UR-SCNC: 71 MMOL/L — SIGNIFICANT CHANGE UP
SP GR SPEC: 1.02 — SIGNIFICANT CHANGE UP (ref 1–1.03)
UROBILINOGEN FLD QL: 0.2 E.U./DL — SIGNIFICANT CHANGE UP
UUN UR-MCNC: 641 MG/DL — SIGNIFICANT CHANGE UP
WBC # BLD: 5.74 K/UL — SIGNIFICANT CHANGE UP (ref 3.8–10.5)
WBC # FLD AUTO: 5.74 K/UL — SIGNIFICANT CHANGE UP (ref 3.8–10.5)

## 2023-01-07 PROCEDURE — 84133 ASSAY OF URINE POTASSIUM: CPT

## 2023-01-07 PROCEDURE — 84132 ASSAY OF SERUM POTASSIUM: CPT

## 2023-01-07 PROCEDURE — 81003 URINALYSIS AUTO W/O SCOPE: CPT

## 2023-01-07 PROCEDURE — U0003: CPT

## 2023-01-07 PROCEDURE — 93010 ELECTROCARDIOGRAM REPORT: CPT

## 2023-01-07 PROCEDURE — 84300 ASSAY OF URINE SODIUM: CPT

## 2023-01-07 PROCEDURE — 85027 COMPLETE CBC AUTOMATED: CPT

## 2023-01-07 PROCEDURE — 83880 ASSAY OF NATRIURETIC PEPTIDE: CPT

## 2023-01-07 PROCEDURE — 82330 ASSAY OF CALCIUM: CPT

## 2023-01-07 PROCEDURE — 80048 BASIC METABOLIC PNL TOTAL CA: CPT

## 2023-01-07 PROCEDURE — 84295 ASSAY OF SERUM SODIUM: CPT

## 2023-01-07 PROCEDURE — 94640 AIRWAY INHALATION TREATMENT: CPT

## 2023-01-07 PROCEDURE — 85025 COMPLETE CBC W/AUTO DIFF WBC: CPT

## 2023-01-07 PROCEDURE — 93005 ELECTROCARDIOGRAM TRACING: CPT

## 2023-01-07 PROCEDURE — U0005: CPT

## 2023-01-07 PROCEDURE — 36415 COLL VENOUS BLD VENIPUNCTURE: CPT

## 2023-01-07 PROCEDURE — 82803 BLOOD GASES ANY COMBINATION: CPT

## 2023-01-07 PROCEDURE — 96374 THER/PROPH/DIAG INJ IV PUSH: CPT

## 2023-01-07 PROCEDURE — 99239 HOSP IP/OBS DSCHRG MGMT >30: CPT | Mod: GC

## 2023-01-07 PROCEDURE — 84540 ASSAY OF URINE/UREA-N: CPT

## 2023-01-07 PROCEDURE — 84100 ASSAY OF PHOSPHORUS: CPT

## 2023-01-07 PROCEDURE — 83605 ASSAY OF LACTIC ACID: CPT

## 2023-01-07 PROCEDURE — 84484 ASSAY OF TROPONIN QUANT: CPT

## 2023-01-07 PROCEDURE — 83735 ASSAY OF MAGNESIUM: CPT

## 2023-01-07 PROCEDURE — 83935 ASSAY OF URINE OSMOLALITY: CPT

## 2023-01-07 PROCEDURE — 96375 TX/PRO/DX INJ NEW DRUG ADDON: CPT

## 2023-01-07 PROCEDURE — 82570 ASSAY OF URINE CREATININE: CPT

## 2023-01-07 PROCEDURE — 99285 EMERGENCY DEPT VISIT HI MDM: CPT | Mod: 25

## 2023-01-07 PROCEDURE — 99233 SBSQ HOSP IP/OBS HIGH 50: CPT

## 2023-01-07 PROCEDURE — 82962 GLUCOSE BLOOD TEST: CPT

## 2023-01-07 RX ORDER — SODIUM ZIRCONIUM CYCLOSILICATE 10 G/10G
10 POWDER, FOR SUSPENSION ORAL EVERY 24 HOURS
Refills: 0 | Status: DISCONTINUED | OUTPATIENT
Start: 2023-01-07 | End: 2023-01-07

## 2023-01-07 RX ORDER — SODIUM ZIRCONIUM CYCLOSILICATE 10 G/10G
1 POWDER, FOR SUSPENSION ORAL
Qty: 0 | Refills: 0 | DISCHARGE
Start: 2023-01-07

## 2023-01-07 RX ORDER — CARVEDILOL PHOSPHATE 80 MG/1
1 CAPSULE, EXTENDED RELEASE ORAL
Qty: 0 | Refills: 0 | DISCHARGE

## 2023-01-07 RX ORDER — SODIUM ZIRCONIUM CYCLOSILICATE 10 G/10G
10 POWDER, FOR SUSPENSION ORAL
Qty: 70 | Refills: 0
Start: 2023-01-07 | End: 2023-01-13

## 2023-01-07 RX ORDER — SODIUM ZIRCONIUM CYCLOSILICATE 10 G/10G
10 POWDER, FOR SUSPENSION ORAL
Qty: 300 | Refills: 0
Start: 2023-01-07 | End: 2023-02-05

## 2023-01-07 RX ADMIN — Medication 12 UNIT(S): at 09:06

## 2023-01-07 RX ADMIN — CLOPIDOGREL BISULFATE 75 MILLIGRAM(S): 75 TABLET, FILM COATED ORAL at 12:26

## 2023-01-07 RX ADMIN — AMLODIPINE BESYLATE 10 MILLIGRAM(S): 2.5 TABLET ORAL at 06:16

## 2023-01-07 RX ADMIN — Medication 50 MILLIGRAM(S): at 17:43

## 2023-01-07 RX ADMIN — Medication 50 MILLIGRAM(S): at 06:16

## 2023-01-07 RX ADMIN — INSULIN GLARGINE 12 UNIT(S): 100 INJECTION, SOLUTION SUBCUTANEOUS at 00:26

## 2023-01-07 RX ADMIN — Medication 81 MILLIGRAM(S): at 12:26

## 2023-01-07 RX ADMIN — Medication 2: at 09:06

## 2023-01-07 RX ADMIN — CITALOPRAM 40 MILLIGRAM(S): 10 TABLET, FILM COATED ORAL at 12:26

## 2023-01-07 RX ADMIN — Medication 12 UNIT(S): at 12:56

## 2023-01-07 NOTE — DISCHARGE NOTE PROVIDER - NSDCCPCAREPLAN_GEN_ALL_CORE_FT
PRINCIPAL DISCHARGE DIAGNOSIS  Diagnosis: Hyperkalemia  Assessment and Plan of Treatment: Hyperkalemia is the medical term that describes a potassium level in your blood that's higher than normal. Potassium is a chemical that is critical to the function of nerve and muscle cells, including those in your heart. Your potassium level was elevated because of injury to your kidneys. You were treated with fluids and diuretics and your kidney function has improved.  -please continue taking your lokelma 10mg every day  -please call your primary care doctor on Monday to schedule an appointment within one 1 WEEK to recheck your kidney levels  If you have any chest pain, palpitations, inability to urinate, please call 911 or go to the nearest emergency room       PRINCIPAL DISCHARGE DIAGNOSIS  Diagnosis: Hyperkalemia  Assessment and Plan of Treatment: Hyperkalemia is the medical term that describes a potassium level in your blood that's higher than normal. Potassium is a chemical that is critical to the function of nerve and muscle cells, including those in your heart. Your potassium level was elevated because of injury to your kidneys. You were treated with fluids and diuretics and your kidney function has improved.  -please CONTINUE taking your lokelma 10mg every day  -STOP taking your beta blocker (carvedilol)  -please call your primary care doctor on Monday to schedule an appointment within one 1 WEEK to recheck your kidney levels  -follow up with with your cardiologist Dr. Gay  If you have any chest pain, palpitations, inability to urinate, please call 911 or go to the nearest emergency room

## 2023-01-07 NOTE — DISCHARGE NOTE NURSING/CASE MANAGEMENT/SOCIAL WORK - PATIENT PORTAL LINK FT
You can access the FollowMyHealth Patient Portal offered by [facility] by registering at the following website: [email or web address]. By joining [facility]’s FollowMyHealth portal, you will also be able to view your health information using other applications (apps) compatible with our system.

## 2023-01-07 NOTE — DISCHARGE NOTE PROVIDER - NSDCMRMEDTOKEN_GEN_ALL_CORE_FT
amLODIPine 10 mg oral tablet: 1 tab(s) orally once a day  Aspirin Enteric Coated 81 mg oral delayed release tablet: 1 tab(s) orally once a day   citalopram 40 mg oral tablet: 1 tab(s) orally once a day  clopidogrel 75 mg oral tablet: 1 tab(s) orally once a day   hydrALAZINE 50 mg oral tablet: 1 tab(s) orally 2 times a day  Insulin Lispro KwikPen 100 units/mL injectable solution: 15 unit(s) injectable 3 times a day (before meals)  Lantus Solostar Pen 100 units/mL subcutaneous solution: 15 unit(s) subcutaneous once a day (at bedtime)  Lasix 40 mg oral tablet: 1 tab(s) orally once a day  Lipitor 40 mg oral tablet: 1 tab(s) orally once a day  losartan 50 mg oral tablet: 1 tab(s) orally once a day  tamsulosin 0.4 mg oral capsule: 1 cap(s) orally once a day   amLODIPine 10 mg oral tablet: 1 tab(s) orally once a day  Aspirin Enteric Coated 81 mg oral delayed release tablet: 1 tab(s) orally once a day   citalopram 40 mg oral tablet: 1 tab(s) orally once a day  clopidogrel 75 mg oral tablet: 1 tab(s) orally once a day   hydrALAZINE 50 mg oral tablet: 1 tab(s) orally 2 times a day  Insulin Lispro KwikPen 100 units/mL injectable solution: 15 unit(s) injectable 3 times a day (before meals)  Lantus Solostar Pen 100 units/mL subcutaneous solution: 15 unit(s) subcutaneous once a day (at bedtime)  Lasix 40 mg oral tablet: 1 tab(s) orally once a day  Lipitor 40 mg oral tablet: 1 tab(s) orally once a day  Lokelma 10 g oral powder for reconstitution: 10 gram(s) orally once a day   losartan 50 mg oral tablet: 1 tab(s) orally once a day  tamsulosin 0.4 mg oral capsule: 1 cap(s) orally once a day

## 2023-01-07 NOTE — CONSULT NOTE ADULT - TIME BILLING
-Bradycardia - Pt. p/w junctional bradycardia(HR 40s-50s) i/s/o hyperkalemia and NOLAN. Pt. also w/ recent admission 12/28-12/30 w/ exact same combination of NOLAN, hyperkalemia and Bradycardia - This pattern is highly suspicious for BRASH syndrome in which AV Jose blocking agents(Carvedilol) lead to bradycardia -> renal hypoperfusion -> acute kidney injury -> hyperkalemia -> worsening bradycardia. On both hospitalizations, treatment of hyperkalemia and holding Carvedilol resulted improvement of HR and resolution of NOLAN.  Currently HD stable, HR 70s, SBPs 130s-140s, euvolemic on exam and otherwise asymptomatic. Cr. downtrending 2.2->1.69, K+5.5-> 4.9   -Please repeat EKG today to confirm NSR  -c/w Amlodipine 10 and Hydral 50 BID for BP control  -c/w ASA/Plavix and Lipitor  -Will continue to follow while inpatient  -Pt. will f/u w/ outpatient Cardiologist Dr. Victor Hugo Palumbo MD  Cardiology Attending

## 2023-01-07 NOTE — DISCHARGE NOTE PROVIDER - HOSPITAL COURSE
#Discharge: do not delete    Mr. Tse is a 57 year old male with a past medical history of recent HFpEF admission (12/3 - 12/10) found CAD s/p stent (12/9), HTN, hyperlipidemia, IDDM-II, OM of the 4 metatarsal, and hx urethral replacement for urethral stricture, recent admissions for NOLAN and hyperkalemia 12/28-12/30, admitted with hyperkalemia and NOLAN  Problem List/Main Diagnoses (system-based):     #NOLAN (acute kidney injury) with hyperkalemia  Cr of 2.2 today, previously 1.6 on discharge 1 week PTA, baseline Cr of 1.09 in november 2022 but possibly worsened baseline post CHF development. Patient given 40mg IV lasix, gentle hydration, lokelma, albuterol, calcium. Nephrology consulted, followed during admission. Creatinine improved to 1.69, potassium improved to 4.9.  -follow up with outpatient PCP within 1 week to have BMP rechecked  -reinforced to patient need to take lokelma, [facility]  -okay to restart home losartan and lasix now that creatinine has returned to new baseline    Labs to be followed outpatient: lokelma 10mg    Physical exam at time of discharge:    Constitutional: resting comfortably in bed; NAD  Head: NC/AT  Eyes: anicteric sclera  Respiratory: CTA B/L; no W/R/R, no retractions  Cardiac: +S1/S2; RRR; no M/R/G;   Gastrointestinal: soft, NT/ND; no rebound or guarding;   Extremities: WWP, no clubbing or cyanosis; no peripheral edema  Neurologic: AAOx3; moving all 4 extremeties  Psychiatric: affect and characteristics of appearance, verbalizations, behaviors are appropriate #Discharge: do not delete    Mr. Tse is a 57 year old male with a past medical history of recent HFpEF admission (12/3 - 12/10) found CAD s/p stent (12/9), HTN, hyperlipidemia, IDDM-II, OM of the 4 metatarsal, and hx urethral replacement for urethral stricture, recent admissions for NOLAN and hyperkalemia 12/28-12/30, admitted with hyperkalemia and NOLAN  Problem List/Main Diagnoses (system-based):     #NOLAN (acute kidney injury) with hyperkalemia  Cr of 2.2 today, previously 1.6 on discharge 1 week PTA, baseline Cr of 1.09 in november 2022 but possibly worsened baseline post CHF development. Patient given 40mg IV lasix, gentle hydration, lokelma, albuterol, calcium. Nephrology consulted, followed during admission. Creatinine improved to 1.69, potassium improved to 4.9.  -follow up with outpatient PCP within 1 week to have BMP rechecked  -reinforced to patient need to take lokelma, [facility]  -stop beta blocker  -okay to restart home losartan and lasix now that creatinine has returned to new baseline    #hfpef  Patient presented with bradycardia, cardiology consulted, concern for possible BRASH syndrome. Beta blocker stopped during admission  -carvedilol discontinued, do not restart  -f/u with Dr. Gay and PCP as outpatient    Labs to be followed outpatient: lokelma 10mg    Physical exam at time of discharge:    Constitutional: resting comfortably in bed; NAD  Head: NC/AT  Eyes: anicteric sclera  Respiratory: CTA B/L; no W/R/R, no retractions  Cardiac: +S1/S2; RRR; no M/R/G;   Gastrointestinal: soft, NT/ND; no rebound or guarding;   Extremities: WWP, no clubbing or cyanosis; no peripheral edema  Neurologic: AAOx3; moving all 4 extremeties  Psychiatric: affect and characteristics of appearance, verbalizations, behaviors are appropriate

## 2023-01-07 NOTE — CONSULT NOTE ADULT - SUBJECTIVE AND OBJECTIVE BOX
HPI    ROS: A 10-point review of systems was otherwise negative.    PAST MEDICAL & SURGICAL HISTORY:  Osteomyelitis      Hypertension      Hyperlipidemia      Diabetes mellitus      CAD (coronary artery disease)      Chronic diastolic congestive heart failure      S/P release of urethral stricture          SOCIAL HISTORY:  FAMILY HISTORY:  FH: myocardial infarction (Father, Grandparent)        ALLERGIES: 	  No Known Allergies            MEDICATIONS:  amLODIPine   Tablet 10 milliGRAM(s) Oral daily  aspirin enteric coated 81 milliGRAM(s) Oral daily  atorvastatin 40 milliGRAM(s) Oral at bedtime  citalopram 40 milliGRAM(s) Oral daily  clopidogrel Tablet 75 milliGRAM(s) Oral daily  dextrose 5%. 1000 milliLiter(s) IV Continuous <Continuous>  dextrose 5%. 1000 milliLiter(s) IV Continuous <Continuous>  dextrose 50% Injectable 25 Gram(s) IV Push once  dextrose 50% Injectable 12.5 Gram(s) IV Push once  dextrose 50% Injectable 25 Gram(s) IV Push once  dextrose Oral Gel 15 Gram(s) Oral once PRN  glucagon  Injectable 1 milliGRAM(s) IntraMuscular once  hydrALAZINE 50 milliGRAM(s) Oral two times a day  insulin glargine Injectable (LANTUS) 12 Unit(s) SubCutaneous at bedtime  insulin lispro (ADMELOG) corrective regimen sliding scale   SubCutaneous three times a day before meals  insulin lispro Injectable (ADMELOG) 12 Unit(s) SubCutaneous three times a day before meals  sodium chloride 0.9%. 500 milliLiter(s) IV Continuous <Continuous>  tamsulosin 0.4 milliGRAM(s) Oral at bedtime      PHYSICAL EXAM:  T(C): 36.5 (01-06-23 @ 21:25), Max: 36.9 (01-06-23 @ 13:44)  HR: 57 (01-06-23 @ 21:25) (45 - 67)  BP: 146/62 (01-06-23 @ 21:25) (90/50 - 173/66)  RR: 18 (01-06-23 @ 21:25) (16 - 18)  SpO2: 98% (01-06-23 @ 21:25) (98% - 100%)  Wt(kg): --    GEN: Awake, comfortable. NAD.   HEENT: NCAT, PERRL, EOMI. Mucosa moist. No JVD.   RESP: CTA b/l  CV: RRR, normal s1/s2. No m/r/g.  ABD: Soft, NTND. BS+  EXT: Warm. No edema, clubbing, or cyanosis.   NEURO: AAOx3. No focal deficits.    I&O's Summary    Height (cm): 182.9 (01-06 @ 10:14)  Weight (kg): 93 (01-06 @ 10:14)  BMI (kg/m2): 27.8 (01-06 @ 10:14)  BSA (m2): 2.15 (01-06 @ 10:14)  	  LABS:	 	    CARDIAC MARKERS:                                  9.7    8.45  )-----------( 232      ( 06 Jan 2023 10:42 )             30.2     01-06    136  |  105  |  54<H>  ----------------------------<  190<H>  4.8   |  21<L>  |  2.06<H>    Ca    9.0      06 Jan 2023 20:01  Mg     2.4     01-06      proBNP: Serum Pro-Brain Natriuretic Peptide: 1136 pg/mL (01-06 @ 10:42)    Lipid Profile:   HgA1c:   TSH:     TELEMETRY: 	    ECG:  	  RADIOLOGY:   ECHO:  STRESS:  CATH:   HPI    57M was asked by Dr Gay (his cardiologist) to present to ER after outpt bloodwork showed K of 6.2 and NOLAN. He reports feeling fatigued and hazy since his last admission to hospital recently. Denied significant CP or SOB.   Currently reports feeling better regards to the fatigue. Was admitted last week with syncope and had NOLAN + hyperkalemia at that time as well.   In the ER bradycardic (junctional) to 48 and BP 90/50.     ROS: A 10-point review of systems was otherwise negative.    PAST MEDICAL & SURGICAL HISTORY:  Osteomyelitis      Hypertension      Hyperlipidemia      Diabetes mellitus      CAD (coronary artery disease)      Chronic diastolic congestive heart failure      S/P release of urethral stricture          SOCIAL HISTORY: non smoker  FAMILY HISTORY:  FH: myocardial infarction (Father, Grandparent)        ALLERGIES: 	  No Known Allergies            MEDICATIONS:  amLODIPine   Tablet 10 milliGRAM(s) Oral daily  aspirin enteric coated 81 milliGRAM(s) Oral daily  atorvastatin 40 milliGRAM(s) Oral at bedtime  citalopram 40 milliGRAM(s) Oral daily  clopidogrel Tablet 75 milliGRAM(s) Oral daily  dextrose 5%. 1000 milliLiter(s) IV Continuous <Continuous>  dextrose 5%. 1000 milliLiter(s) IV Continuous <Continuous>  dextrose 50% Injectable 25 Gram(s) IV Push once  dextrose 50% Injectable 12.5 Gram(s) IV Push once  dextrose 50% Injectable 25 Gram(s) IV Push once  dextrose Oral Gel 15 Gram(s) Oral once PRN  glucagon  Injectable 1 milliGRAM(s) IntraMuscular once  hydrALAZINE 50 milliGRAM(s) Oral two times a day  insulin glargine Injectable (LANTUS) 12 Unit(s) SubCutaneous at bedtime  insulin lispro (ADMELOG) corrective regimen sliding scale   SubCutaneous three times a day before meals  insulin lispro Injectable (ADMELOG) 12 Unit(s) SubCutaneous three times a day before meals  sodium chloride 0.9%. 500 milliLiter(s) IV Continuous <Continuous>  tamsulosin 0.4 milliGRAM(s) Oral at bedtime      PHYSICAL EXAM:  T(C): 36.5 (01-06-23 @ 21:25), Max: 36.9 (01-06-23 @ 13:44)  HR: 57 (01-06-23 @ 21:25) (45 - 67)  BP: 146/62 (01-06-23 @ 21:25) (90/50 - 173/66)  RR: 18 (01-06-23 @ 21:25) (16 - 18)  SpO2: 98% (01-06-23 @ 21:25) (98% - 100%)  Wt(kg): --    GEN: Awake, NAD.   HEENTNo JVD.   RESP: CTA b/l  CV: RRR, normal s1/s2.   ABD: Soft,   EXT: Warm. No edema  NEURO: AAOx3.     I&O's Summary    Height (cm): 182.9 (01-06 @ 10:14)  Weight (kg): 93 (01-06 @ 10:14)  BMI (kg/m2): 27.8 (01-06 @ 10:14)  BSA (m2): 2.15 (01-06 @ 10:14)  	  LABS:	 	    CARDIAC MARKERS:                                  9.7    8.45  )-----------( 232      ( 06 Jan 2023 10:42 )             30.2     01-06    136  |  105  |  54<H>  ----------------------------<  190<H>  4.8   |  21<L>  |  2.06<H>    Ca    9.0      06 Jan 2023 20:01  Mg     2.4     01-06      proBNP: Serum Pro-Brain Natriuretic Peptide: 1136 pg/mL (01-06 @ 10:42)    Lipid Profile:   HgA1c:   TSH:     TELEMETRY: 	    ECG:  	  RADIOLOGY:   ECHO:  STRESS:  CATH:

## 2023-01-07 NOTE — DISCHARGE NOTE NURSING/CASE MANAGEMENT/SOCIAL WORK - NSDCPEFALRISK_GEN_ALL_CORE
For information on Fall & Injury Prevention, visit: [email or web address] OR  [email or web address] OR  https://www.cdc.gov/steadi/patient.html

## 2023-01-07 NOTE — CONSULT NOTE ADULT - ASSESSMENT
57M w/ CAD s/p PEPE to OM1  (12/9/22), HFpEF, HTN, HLD, Dm2 p/w hyperkalemia and NOLAN with associated junctional rythmn.     ECG: junctional w/ retrograde p waves    #Junctional rythmn -  HR now 60s. 48 on admission  Rpt ecg in the AM  Pt had holter for 24 hours - he should mail this back as per instruction.   Reasonable to hold carvedilol for now as patient was symptomatic w/ bradycardia - feeling fatigued and "hazy" -- now improved w/ HR resolving.  If anginal symptoms then can trial nitrates  Can consider restarting outpatient when seen by Dr Gay and holter reviewed   Hyperkalemia can cause junctional rythmn and may be the etiology for this    HFpEF - euvolemic  HTN - largely controlled. Can be optimized outpatient. Amlodipine 10mg and hydral 50 bid  CAD s/p PEPE - cont asa, clopidogrel, atorvastatin 40    Hyperkalemia/NOLAN - Mx per nephrology.   57M w/ CAD s/p PEPE to OM1  (12/9/22), HFpEF, HTN, HLD, Dm2 p/w hyperkalemia and NOLAN with associated junctional rythmn.     ECG: junctional w/ retrograde p waves    #Junctional rythmn -  HR now 60s. 48 on admission  Rpt ecg in the AM  Pt had holter for 24 hours - he should mail this back as per instruction.   Reasonable to hold carvedilol for now as patient was symptomatic w/ bradycardia - feeling fatigued and "hazy" -- now improved w/ HR resolving.  If anginal symptoms then can trial nitrates  Can consider restarting outpatient when seen by Dr Gay and holter reviewed   Hyperkalemia can cause junctional rythmn and may be the etiology for this    HFpEF - euvolemic. Not candidate for SGLT-2 w/ labile renal fnx .  HTN - largely controlled. Can be optimized outpatient. Amlodipine 10mg and hydral 50 bid  CAD s/p PEPE - cont asa, clopidogrel, atorvastatin 40    Hyperkalemia/NOLAN - Mx per nephrology.

## 2023-01-09 ENCOUNTER — NON-APPOINTMENT (OUTPATIENT)
Age: 58
End: 2023-01-09

## 2023-01-11 DIAGNOSIS — Z79.82 LONG TERM (CURRENT) USE OF ASPIRIN: ICD-10-CM

## 2023-01-11 DIAGNOSIS — E78.5 HYPERLIPIDEMIA, UNSPECIFIED: ICD-10-CM

## 2023-01-11 DIAGNOSIS — I13.0 HYPERTENSIVE HEART AND CHRONIC KIDNEY DISEASE WITH HEART FAILURE AND STAGE 1 THROUGH STAGE 4 CHRONIC KIDNEY DISEASE, OR UNSPECIFIED CHRONIC KIDNEY DISEASE: ICD-10-CM

## 2023-01-11 DIAGNOSIS — Z79.4 LONG TERM (CURRENT) USE OF INSULIN: ICD-10-CM

## 2023-01-11 DIAGNOSIS — R77.8 OTHER SPECIFIED ABNORMALITIES OF PLASMA PROTEINS: ICD-10-CM

## 2023-01-11 DIAGNOSIS — I25.10 ATHEROSCLEROTIC HEART DISEASE OF NATIVE CORONARY ARTERY WITHOUT ANGINA PECTORIS: ICD-10-CM

## 2023-01-11 DIAGNOSIS — I50.32 CHRONIC DIASTOLIC (CONGESTIVE) HEART FAILURE: ICD-10-CM

## 2023-01-11 DIAGNOSIS — N18.30 CHRONIC KIDNEY DISEASE, STAGE 3 UNSPECIFIED: ICD-10-CM

## 2023-01-11 DIAGNOSIS — N17.9 ACUTE KIDNEY FAILURE, UNSPECIFIED: ICD-10-CM

## 2023-01-11 DIAGNOSIS — E87.5 HYPERKALEMIA: ICD-10-CM

## 2023-01-11 DIAGNOSIS — E11.22 TYPE 2 DIABETES MELLITUS WITH DIABETIC CHRONIC KIDNEY DISEASE: ICD-10-CM

## 2023-01-11 DIAGNOSIS — Z95.5 PRESENCE OF CORONARY ANGIOPLASTY IMPLANT AND GRAFT: ICD-10-CM

## 2023-01-11 DIAGNOSIS — D53.9 NUTRITIONAL ANEMIA, UNSPECIFIED: ICD-10-CM

## 2023-01-13 ENCOUNTER — APPOINTMENT (OUTPATIENT)
Dept: INTERNAL MEDICINE | Facility: CLINIC | Age: 58
End: 2023-01-13
Payer: COMMERCIAL

## 2023-01-13 ENCOUNTER — APPOINTMENT (OUTPATIENT)
Dept: PULMONOLOGY | Facility: CLINIC | Age: 58
End: 2023-01-13
Payer: COMMERCIAL

## 2023-01-13 VITALS
OXYGEN SATURATION: 99 % | SYSTOLIC BLOOD PRESSURE: 142 MMHG | WEIGHT: 208 LBS | HEART RATE: 55 BPM | BODY MASS INDEX: 28.21 KG/M2 | DIASTOLIC BLOOD PRESSURE: 63 MMHG | TEMPERATURE: 97.4 F

## 2023-01-13 VITALS
TEMPERATURE: 98.6 F | BODY MASS INDEX: 24.56 KG/M2 | HEIGHT: 77 IN | DIASTOLIC BLOOD PRESSURE: 57 MMHG | OXYGEN SATURATION: 98 % | HEART RATE: 57 BPM | SYSTOLIC BLOOD PRESSURE: 101 MMHG | WEIGHT: 208 LBS

## 2023-01-13 DIAGNOSIS — R00.1 BRADYCARDIA, UNSPECIFIED: ICD-10-CM

## 2023-01-13 PROCEDURE — 99496 TRANSJ CARE MGMT HIGH F2F 7D: CPT | Mod: 25

## 2023-01-13 PROCEDURE — 99204 OFFICE O/P NEW MOD 45 MIN: CPT

## 2023-01-13 PROCEDURE — 36415 COLL VENOUS BLD VENIPUNCTURE: CPT

## 2023-01-13 RX ORDER — CARVEDILOL 3.12 MG/1
3.12 TABLET, FILM COATED ORAL
Refills: 0 | Status: COMPLETED | COMMUNITY
End: 2023-01-13

## 2023-01-13 NOTE — PHYSICAL EXAM
[Pedal Pulses Present] : the pedal pulses are present [No Edema] : there was no peripheral edema [Soft] : abdomen soft [Non Tender] : non-tender [Coordination Grossly Intact] : coordination grossly intact [No Focal Deficits] : no focal deficits [Normal] : affect was normal and insight and judgment were intact [de-identified] : Euvolemic on exam

## 2023-01-13 NOTE — PHYSICAL EXAM
[General Appearance - Well Developed] : well developed [Normal Appearance] : normal appearance [Well Groomed] : well groomed [General Appearance - Well Nourished] : well nourished [No Deformities] : no deformities [General Appearance - In No Acute Distress] : no acute distress [Low Lying Soft Palate] : low lying soft palate [Erythema] : no erythema of the pharynx [IV] : IV [FreeTextEntry1] : tonsils absent  [Neck Appearance] : the appearance of the neck was normal [Neck Cervical Mass (___cm)] : no neck mass was observed [Jugular Venous Distention Increased] : there was no jugular-venous distention [Thyroid Diffuse Enlargement] : the thyroid was not enlarged [Thyroid Nodule] : there were no palpable thyroid nodules [Heart Rate And Rhythm] : heart rate was normal and rhythm regular [Heart Sounds] : normal S1 and S2 [Heart Sounds Gallop] : no gallops [Murmurs] : no murmurs [Heart Sounds Pericardial Friction Rub] : no pericardial rub [Auscultation Breath Sounds / Voice Sounds] : lungs were clear to auscultation bilaterally [Lungs Percussion] : the lungs were normal to percussion [Abnormal Walk] : normal gait [Musculoskeletal - Swelling] : no joint swelling seen [Motor Tone] : muscle strength and tone were normal [Nail Clubbing] : no clubbing of the fingernails [Cyanosis, Localized] : no localized cyanosis [Petechial Hemorrhages (___cm)] : no petechial hemorrhages [] : no ischemic changes [FreeTextEntry2] : no edema

## 2023-01-13 NOTE — ASSESSMENT
[FreeTextEntry1] : Snoring, witnessed apnea and sleep, but no complaints of daytime sleepiness.  Evidently he was observed to have snoring and irregular breathing while admitted for congestive heart failure, much of this may have resolved.  Low he denies daytime sleepiness he was yawning frequently during this morning's interview/\par \par The patient is advised that in addition to worsening sleep leading to daytime sleepiness, sleep apnea, at least when severe, may be associated with worsening hypertension and diabetes, and may be a risk factor for cardiovascular disease and stroke.  Based on history and physical exam, sleep disordered breathing is  likely.  The patient was advised to have overnight unattended home sleep testing as a first approach.  If this is not definitive may need overnight polysomnography. Will be seen in follow up after testing.

## 2023-01-13 NOTE — HISTORY OF PRESENT ILLNESS
[Post-hospitalization from ___ Hospital] : Post-hospitalization from [unfilled] Hospital [Discharged on ___] : discharged on [unfilled] [Discharge Summary] : discharge summary [Pertinent Labs] : pertinent labs [Discharge Med List] : discharge medication list [Med Reconciliation] : medication reconciliation has been completed [Patient Contacted By: ____] : and contacted by [unfilled] [FreeTextEntry2] : Hospital course in brief: 57 year old male with a past medical history of recent HFpEF, CAD s/p stent (12/9), HTN, hyperlipidemia, IDDM-II, OM of the 4 metatarsal, and hx urethral replacement for urethral stricture, recent admissions for NOLAN and hyperkalemia 12/28-12/30, admitted with hyperkalemia and NOLAN. Was DC on lokelma daily, started on lasix 40mgQd and cw losartan. Beta blocker was DC during hospitalization due to concern for BRASH syndrome \par \par Since DC- feels well overall, fatigue has improved slightly however still has trouble sleeping, establishing care with sleep doctor today. No CP, palpitations, HA, dizziness, weakness, muscle aches today. endorses compliance to medications and taking lokelma daily\par

## 2023-01-13 NOTE — HISTORY OF PRESENT ILLNESS
[FreeTextEntry1] : 01/13/2023 :  VALENTIN CABALLERO is a 57 year old male who is being evaluated for possible sleep disordered breathing.  He has a history of heart failure with preserved ejection fraction and had coronary stenting December 9.  He was admitted again December 28 with hyperkalemia and acute renal failure.  During his recent hospitalization was noted to be snoring severely and was treated with nasal CPAP empirically for at least 1 night.  He found that relatively comfortable and thought it improved his sleep.\par \par His usual bedtime is 10 PM, sleep latency 15 minutes, awakens 4-5 times on a typical night before getting up at 8 AM.  He has been told of severe snoring apnea has been observed and he wakes up choking or gasping on occasion.  He denies significant daytime sleepiness.  There is no history of morning headache, nasal or sinus congestion, sleep paralysis, parasomnias, or cataplexy.  There is no family history of sleep apnea or other sleep problem.  No recent change in weight.

## 2023-01-14 ENCOUNTER — TRANSCRIPTION ENCOUNTER (OUTPATIENT)
Age: 58
End: 2023-01-14

## 2023-01-15 ENCOUNTER — RX CHANGE (OUTPATIENT)
Age: 58
End: 2023-01-15

## 2023-01-15 ENCOUNTER — NON-APPOINTMENT (OUTPATIENT)
Age: 58
End: 2023-01-15

## 2023-01-15 ENCOUNTER — TRANSCRIPTION ENCOUNTER (OUTPATIENT)
Age: 58
End: 2023-01-15

## 2023-01-15 LAB
ALBUMIN SERPL ELPH-MCNC: 4.2 G/DL
ALP BLD-CCNC: 74 U/L
ALT SERPL-CCNC: 26 U/L
ANION GAP SERPL CALC-SCNC: 13 MMOL/L
AST SERPL-CCNC: 20 U/L
BILIRUB SERPL-MCNC: 0.2 MG/DL
BUN SERPL-MCNC: 44 MG/DL
CALCIUM SERPL-MCNC: 9.4 MG/DL
CHLORIDE SERPL-SCNC: 105 MMOL/L
CO2 SERPL-SCNC: 19 MMOL/L
CREAT SERPL-MCNC: 1.69 MG/DL
EGFR: 47 ML/MIN/1.73M2
GLUCOSE SERPL-MCNC: 157 MG/DL
POTASSIUM SERPL-SCNC: 4.9 MMOL/L
PROT SERPL-MCNC: 6.6 G/DL
SODIUM SERPL-SCNC: 137 MMOL/L

## 2023-01-15 RX ORDER — FUROSEMIDE 40 MG/1
40 TABLET ORAL
Qty: 30 | Refills: 0 | Status: DISCONTINUED | COMMUNITY
Start: 2023-01-05 | End: 2023-01-15

## 2023-01-16 ENCOUNTER — TRANSCRIPTION ENCOUNTER (OUTPATIENT)
Age: 58
End: 2023-01-16

## 2023-01-17 ENCOUNTER — TRANSCRIPTION ENCOUNTER (OUTPATIENT)
Age: 58
End: 2023-01-17

## 2023-01-20 ENCOUNTER — APPOINTMENT (OUTPATIENT)
Dept: INTERNAL MEDICINE | Facility: CLINIC | Age: 58
End: 2023-01-20
Payer: COMMERCIAL

## 2023-01-20 PROCEDURE — 36415 COLL VENOUS BLD VENIPUNCTURE: CPT

## 2023-01-21 ENCOUNTER — EMERGENCY (EMERGENCY)
Facility: HOSPITAL | Age: 58
LOS: 1 days | Discharge: HOME | End: 2023-01-21
Attending: EMERGENCY MEDICINE | Admitting: EMERGENCY MEDICINE
Payer: COMMERCIAL

## 2023-01-21 VITALS
HEART RATE: 51 BPM | OXYGEN SATURATION: 99 % | TEMPERATURE: 98 F | SYSTOLIC BLOOD PRESSURE: 155 MMHG | HEIGHT: 72 IN | DIASTOLIC BLOOD PRESSURE: 65 MMHG | RESPIRATION RATE: 16 BRPM | WEIGHT: 210.98 LBS

## 2023-01-21 VITALS
HEART RATE: 59 BPM | SYSTOLIC BLOOD PRESSURE: 152 MMHG | RESPIRATION RATE: 18 BRPM | OXYGEN SATURATION: 96 % | DIASTOLIC BLOOD PRESSURE: 58 MMHG | TEMPERATURE: 97 F

## 2023-01-21 DIAGNOSIS — Z98.890 OTHER SPECIFIED POSTPROCEDURAL STATES: Chronic | ICD-10-CM

## 2023-01-21 LAB
ANION GAP SERPL CALC-SCNC: 7 MMOL/L
ANION GAP SERPL CALC-SCNC: 9 MMOL/L — SIGNIFICANT CHANGE UP (ref 5–17)
BASE EXCESS BLDV CALC-SCNC: -4.3 MMOL/L — LOW (ref -2–3)
BUN SERPL-MCNC: 39 MG/DL — HIGH (ref 7–23)
BUN SERPL-MCNC: 40 MG/DL
CA-I SERPL-SCNC: 1.28 MMOL/L — SIGNIFICANT CHANGE UP (ref 1.15–1.33)
CALCIUM SERPL-MCNC: 10 MG/DL — SIGNIFICANT CHANGE UP (ref 8.4–10.5)
CALCIUM SERPL-MCNC: 9.4 MG/DL
CHLORIDE SERPL-SCNC: 106 MMOL/L
CHLORIDE SERPL-SCNC: 106 MMOL/L — SIGNIFICANT CHANGE UP (ref 96–108)
CO2 BLDV-SCNC: 24.5 MMOL/L — SIGNIFICANT CHANGE UP (ref 22–26)
CO2 SERPL-SCNC: 21 MMOL/L
CO2 SERPL-SCNC: 23 MMOL/L — SIGNIFICANT CHANGE UP (ref 22–31)
CREAT SERPL-MCNC: 1.51 MG/DL — HIGH (ref 0.5–1.3)
CREAT SERPL-MCNC: 1.56 MG/DL
EGFR: 52 ML/MIN/1.73M2
EGFR: 54 ML/MIN/1.73M2 — LOW
GAS PNL BLDV: 134 MMOL/L — LOW (ref 136–145)
GAS PNL BLDV: SIGNIFICANT CHANGE UP
GLUCOSE SERPL-MCNC: 357 MG/DL
GLUCOSE SERPL-MCNC: 80 MG/DL — SIGNIFICANT CHANGE UP (ref 70–99)
HCO3 BLDV-SCNC: 23 MMOL/L — SIGNIFICANT CHANGE UP (ref 22–29)
HCT VFR BLD CALC: 31.6 % — LOW (ref 39–50)
HGB BLD-MCNC: 10 G/DL — LOW (ref 13–17)
MCHC RBC-ENTMCNC: 29.7 PG — SIGNIFICANT CHANGE UP (ref 27–34)
MCHC RBC-ENTMCNC: 31.6 GM/DL — LOW (ref 32–36)
MCV RBC AUTO: 93.8 FL — SIGNIFICANT CHANGE UP (ref 80–100)
NRBC # BLD: 0 /100 WBCS — SIGNIFICANT CHANGE UP (ref 0–0)
PCO2 BLDV: 50 MMHG — SIGNIFICANT CHANGE UP (ref 42–55)
PH BLDV: 7.27 — LOW (ref 7.32–7.43)
PLATELET # BLD AUTO: 244 K/UL — SIGNIFICANT CHANGE UP (ref 150–400)
PO2 BLDV: <33 MMHG — LOW (ref 25–45)
POTASSIUM BLDV-SCNC: 7.8 MMOL/L — CRITICAL HIGH (ref 3.5–5.1)
POTASSIUM SERPL-MCNC: 5.3 MMOL/L — SIGNIFICANT CHANGE UP (ref 3.5–5.3)
POTASSIUM SERPL-SCNC: 5.3 MMOL/L — SIGNIFICANT CHANGE UP (ref 3.5–5.3)
POTASSIUM SERPL-SCNC: 7.2 MMOL/L
RBC # BLD: 3.37 M/UL — LOW (ref 4.2–5.8)
RBC # FLD: 13.6 % — SIGNIFICANT CHANGE UP (ref 10.3–14.5)
SAO2 % BLDV: 28.7 % — LOW (ref 67–88)
SARS-COV-2 RNA SPEC QL NAA+PROBE: NEGATIVE — SIGNIFICANT CHANGE UP
SODIUM SERPL-SCNC: 137 MMOL/L
SODIUM SERPL-SCNC: 138 MMOL/L — SIGNIFICANT CHANGE UP (ref 135–145)
WBC # BLD: 6.96 K/UL — SIGNIFICANT CHANGE UP (ref 3.8–10.5)
WBC # FLD AUTO: 6.96 K/UL — SIGNIFICANT CHANGE UP (ref 3.8–10.5)

## 2023-01-21 PROCEDURE — 82803 BLOOD GASES ANY COMBINATION: CPT

## 2023-01-21 PROCEDURE — 80048 BASIC METABOLIC PNL TOTAL CA: CPT

## 2023-01-21 PROCEDURE — 84132 ASSAY OF SERUM POTASSIUM: CPT

## 2023-01-21 PROCEDURE — 99285 EMERGENCY DEPT VISIT HI MDM: CPT

## 2023-01-21 PROCEDURE — 84295 ASSAY OF SERUM SODIUM: CPT

## 2023-01-21 PROCEDURE — 99283 EMERGENCY DEPT VISIT LOW MDM: CPT

## 2023-01-21 PROCEDURE — 82330 ASSAY OF CALCIUM: CPT

## 2023-01-21 PROCEDURE — 85027 COMPLETE CBC AUTOMATED: CPT

## 2023-01-21 PROCEDURE — 93005 ELECTROCARDIOGRAM TRACING: CPT

## 2023-01-21 PROCEDURE — 36415 COLL VENOUS BLD VENIPUNCTURE: CPT

## 2023-01-21 PROCEDURE — 87635 SARS-COV-2 COVID-19 AMP PRB: CPT

## 2023-01-21 RX ORDER — SODIUM ZIRCONIUM CYCLOSILICATE 10 G/10G
10 POWDER, FOR SUSPENSION ORAL ONCE
Refills: 0 | Status: COMPLETED | OUTPATIENT
Start: 2023-01-21 | End: 2023-01-21

## 2023-01-21 RX ADMIN — SODIUM ZIRCONIUM CYCLOSILICATE 10 GRAM(S): 10 POWDER, FOR SUSPENSION ORAL at 13:52

## 2023-01-21 NOTE — ED ADULT TRIAGE NOTE - CHIEF COMPLAINT QUOTE
Pt states he had blood drawn on Friday and his potassium is "in the mid 7s." Hx of hyperkalemia, was told to stop taking Lokelma. Denies chest pain, shortness of breath.

## 2023-01-21 NOTE — ED PROVIDER NOTE - CARE PROVIDER_API CALL
Mikael Anand)  [facility] Hypertension  [address] [place], [place] [address]  Phone: [phone]  Fax: [phone]  Follow Up Time:

## 2023-01-21 NOTE — ED PROVIDER NOTE - PROGRESS NOTE DETAILS
Cr 1.51, K 5.3.  Pt discussed w renal - recommend restarting lokelma and outpt fu in renal clinic; pt given contact info but will also flag for referral's coordinator.  Also to fu w pmd. Pt states he has a lokelma rx that will be ready today at [facility] and does not need new rx sent now.  Will dc.

## 2023-01-21 NOTE — ED PROVIDER NOTE - OBJECTIVE STATEMENT
Patient is a 57-year-old man with a history of HFpEF, CAD status post stent, HTN, HLD, diabetes osteomyelitis, CKD (Cr 1.6's), s/p recent admissions 12/28 through 30 and 1/6 through 7 for NOLAN and hyperkalemia returning today for high potassium, found on outpatient labs patient states that he had outpatient labs drawn yesterday and was contacted today because of a high potassium 9 and the patient states that his doctor told him to stop taking Lokelma 1 week ago patient is also noted increased lower extremity swelling near his ankles for the past few days without associated dyspnea on exertion shortness of breath chest pain patient denies chest pain palpitations muscle cramps or weakness states he is feeling overall well in addition to stopping little, he was also told to stop his carvedilol patient has been compliant with his Lasix.  1/20 labs w Cr 1.56, K 7.2.

## 2023-01-21 NOTE — ED PROVIDER NOTE - PATIENT PORTAL LINK FT
You can access the FollowMyHealth Patient Portal offered by [facility] by registering at the following website: [email or web address]. By joining [facility]’s FollowMyHealth portal, you will also be able to view your health information using other applications (apps) compatible with our system.

## 2023-01-21 NOTE — ED ADULT NURSE NOTE - OBJECTIVE STATEMENT
Pt with pmx of  HTN, hyperlipidemia, IDDM-II, OM of the 4 metatarsal, and hx urethral replacement for urethral stricture, recent admissions for NOLAN and hyperkalemia 12/28-12/30, discharged with 3 day course of Lokelma, which was discontinued aprox a week ago presented to ED with c/o of elevated K after recent blood draw. AOX4. VSS.  Patient denies chest pain, discomfort, shortness of breath, difficulty breathing and any form of distress not noted. Patient oriented to ED area. All needs attended. Pt on cardiac monitor. Rounding in progress. Fall risk precautions maintained. Pt with pmx of  HTN, hyperlipidemia, IDDM-II, OM of the 4 metatarsal, and hx urethral replacement for urethral stricture, recent admissions for NOLAN and hyperkalemia 12/28-12/30, discharged with 3 day course of [facility], presented to ED with c/o of elevated K after blood draw this Friday. AOX4. Patient denies chest pain, discomfort, shortness of breath, difficulty breathing and any form of distress not noted. Patient oriented to ED area. All needs attended. Pt on cardiac monitor. Rounding in progress. Fall risk precautions maintained.

## 2023-01-21 NOTE — ED PROVIDER NOTE - NSFOLLOWUPINSTRUCTIONS_ED_ALL_ED_FT
Potassium check    Your K was 5.3 today.  The renal doctor recommends you take lokelma 10mg daily and follow up with nephrology - call [phone] to make an appointment.    Follow up with your pmd.    Avoid high potassium foods (citrus, potatoes, dried fruits, bananas - do an internet search for a full list)    Continue your other medications.

## 2023-01-21 NOTE — ED PROVIDER NOTE - CLINICAL SUMMARY MEDICAL DECISION MAKING FREE TEXT BOX
Pt sent for high K on outpt labs, h/o similar 2/2 NOLAN and dc'd w lokelma but stopped lokelma x 1 wk.  ? hemolyzed outpt sample vs true hyperkalemia.  Pt asymptomatic w/o ekg changes.  Plan rpt labs; meds for hyperK if present.  Reassess. See progress notes for further mdm related documentation.

## 2023-01-21 NOTE — ED PROVIDER NOTE - NSICDXPASTMEDICALHX_GEN_ALL_CORE_FT
PAST MEDICAL HISTORY:  CAD (coronary artery disease)     Chronic diastolic congestive heart failure     CKD (chronic kidney disease)     Diabetes mellitus     Hyperlipidemia     Hypertension     Osteomyelitis

## 2023-01-21 NOTE — ED PROVIDER NOTE - PHYSICAL EXAMINATION
VITAL SIGNS: I have reviewed nursing notes and confirm.  CONSTITUTIONAL:  in no acute distress.   SKIN:  warm and dry, no acute rash.   HEAD:  normocephalic, atraumatic.  EYES: PERRL, EOM intact; conjunctiva and sclera clear.  ENT: No nasal discharge; airway clear.   NECK: Supple; non tender.  CARD: S1, S2 normal; no murmurs, gallops, or rubs. Regular rate and rhythm.   RESP:  Clear to auscultation b/l, no wheezes, rales or rhonchi.  ABD: Normal bowel sounds; soft; non-distended; non-tender; no guarding/ rebound.  MSK: Normal ROM. No clubbing, cyanosis. 1+ bilat le edema. no ttp bilat le  NEURO: Alert, oriented, grossly unremarkable  PSYCH: Cooperative, mood and affect appropriate.

## 2023-01-21 NOTE — CHART NOTE - NSCHARTNOTEFT_GEN_A_CORE
The [facility] hospitalist phone was notified of a critical lab value for Joey Pulidoi - potassium of 7.2 without hemolysis.  This lab test was ordered by Dr. Tiffanie Gaming and the critical value was inappropriately relayed to the hospitalist phone.  I was unable to get a hold of Dr. Gaming via her office phone number ([phone]) or the urgent number provided on the office's voicemail ([phone]).  I was able to reach Dr. Gaming via her cell phone.  Dr. Gaming was notified of the critical value and will call the patient for further guidance.

## 2023-01-23 ENCOUNTER — TRANSCRIPTION ENCOUNTER (OUTPATIENT)
Age: 58
End: 2023-01-23

## 2023-01-23 DIAGNOSIS — Z20.822 CONTACT WITH AND (SUSPECTED) EXPOSURE TO COVID-19: ICD-10-CM

## 2023-01-23 DIAGNOSIS — Z79.4 LONG TERM (CURRENT) USE OF INSULIN: ICD-10-CM

## 2023-01-23 DIAGNOSIS — R00.1 BRADYCARDIA, UNSPECIFIED: ICD-10-CM

## 2023-01-23 DIAGNOSIS — Z95.5 PRESENCE OF CORONARY ANGIOPLASTY IMPLANT AND GRAFT: ICD-10-CM

## 2023-01-23 DIAGNOSIS — E87.5 HYPERKALEMIA: ICD-10-CM

## 2023-01-23 DIAGNOSIS — Z79.82 LONG TERM (CURRENT) USE OF ASPIRIN: ICD-10-CM

## 2023-01-23 DIAGNOSIS — I25.10 ATHEROSCLEROTIC HEART DISEASE OF NATIVE CORONARY ARTERY WITHOUT ANGINA PECTORIS: ICD-10-CM

## 2023-01-23 DIAGNOSIS — I13.0 HYPERTENSIVE HEART AND CHRONIC KIDNEY DISEASE WITH HEART FAILURE AND STAGE 1 THROUGH STAGE 4 CHRONIC KIDNEY DISEASE, OR UNSPECIFIED CHRONIC KIDNEY DISEASE: ICD-10-CM

## 2023-01-23 DIAGNOSIS — E11.22 TYPE 2 DIABETES MELLITUS WITH DIABETIC CHRONIC KIDNEY DISEASE: ICD-10-CM

## 2023-01-23 DIAGNOSIS — E11.69 TYPE 2 DIABETES MELLITUS WITH OTHER SPECIFIED COMPLICATION: ICD-10-CM

## 2023-01-23 DIAGNOSIS — E78.5 HYPERLIPIDEMIA, UNSPECIFIED: ICD-10-CM

## 2023-01-23 DIAGNOSIS — M86.9 OSTEOMYELITIS, UNSPECIFIED: ICD-10-CM

## 2023-01-23 DIAGNOSIS — I50.30 UNSPECIFIED DIASTOLIC (CONGESTIVE) HEART FAILURE: ICD-10-CM

## 2023-01-23 DIAGNOSIS — N18.9 CHRONIC KIDNEY DISEASE, UNSPECIFIED: ICD-10-CM

## 2023-01-23 DIAGNOSIS — Z79.02 LONG TERM (CURRENT) USE OF ANTITHROMBOTICS/ANTIPLATELETS: ICD-10-CM

## 2023-01-24 ENCOUNTER — TRANSCRIPTION ENCOUNTER (OUTPATIENT)
Age: 58
End: 2023-01-24

## 2023-01-24 PROBLEM — N18.9 CHRONIC KIDNEY DISEASE, UNSPECIFIED: Chronic | Status: ACTIVE | Noted: 2023-01-21

## 2023-01-30 ENCOUNTER — NON-APPOINTMENT (OUTPATIENT)
Age: 58
End: 2023-01-30

## 2023-01-31 ENCOUNTER — TRANSCRIPTION ENCOUNTER (OUTPATIENT)
Age: 58
End: 2023-01-31

## 2023-01-31 ENCOUNTER — APPOINTMENT (OUTPATIENT)
Dept: INTERNAL MEDICINE | Facility: CLINIC | Age: 58
End: 2023-01-31
Payer: COMMERCIAL

## 2023-01-31 VITALS — DIASTOLIC BLOOD PRESSURE: 70 MMHG | SYSTOLIC BLOOD PRESSURE: 155 MMHG

## 2023-01-31 VITALS
HEART RATE: 62 BPM | SYSTOLIC BLOOD PRESSURE: 165 MMHG | DIASTOLIC BLOOD PRESSURE: 67 MMHG | TEMPERATURE: 97.6 F | OXYGEN SATURATION: 95 % | WEIGHT: 213 LBS | BODY MASS INDEX: 25.26 KG/M2

## 2023-01-31 PROCEDURE — 99214 OFFICE O/P EST MOD 30 MIN: CPT | Mod: 25

## 2023-01-31 PROCEDURE — 36415 COLL VENOUS BLD VENIPUNCTURE: CPT

## 2023-02-01 ENCOUNTER — TRANSCRIPTION ENCOUNTER (OUTPATIENT)
Age: 58
End: 2023-02-01

## 2023-02-01 LAB
ANION GAP SERPL CALC-SCNC: 11 MMOL/L
BUN SERPL-MCNC: 39 MG/DL
CALCIUM SERPL-MCNC: 9.2 MG/DL
CHLORIDE SERPL-SCNC: 99 MMOL/L
CO2 SERPL-SCNC: 23 MMOL/L
CREAT SERPL-MCNC: 1.47 MG/DL
EGFR: 56 ML/MIN/1.73M2
GLUCOSE SERPL-MCNC: 439 MG/DL
NT-PROBNP SERPL-MCNC: 436 PG/ML
POTASSIUM SERPL-SCNC: 4.8 MMOL/L
POTASSIUM SERPL-SCNC: 4.9 MMOL/L
SODIUM SERPL-SCNC: 134 MMOL/L

## 2023-02-01 NOTE — REVIEW OF SYSTEMS
[Paroxysmal Nocturnal Dyspnea] : paroxysmal nocturnal dyspnea [Negative] : Heme/Lymph [Chest Pain] : no chest pain [Palpitations] : no palpitations [Claudication] : no  leg claudication [Lower Ext Edema] : no lower extremity edema [Shortness Of Breath] : no shortness of breath [Wheezing] : no wheezing [Cough] : no cough [Dyspnea on Exertion] : not dyspnea on exertion

## 2023-02-01 NOTE — PHYSICAL EXAM
[Soft] : abdomen soft [Non Tender] : non-tender [Normal] : affect was normal and insight and judgment were intact [de-identified] : b/l pitting edema to mid/upper shin

## 2023-02-01 NOTE — ADDENDUM
[FreeTextEntry1] : Discussed labs with patient. Glucose >400 on labs. He reports FS yesterday 140. He will more closely monitor FS and continue basal/premeal insulin.

## 2023-02-03 ENCOUNTER — APPOINTMENT (OUTPATIENT)
Dept: PULMONOLOGY | Facility: CLINIC | Age: 58
End: 2023-02-03

## 2023-02-09 ENCOUNTER — NON-APPOINTMENT (OUTPATIENT)
Age: 58
End: 2023-02-09

## 2023-02-09 ENCOUNTER — OUTPATIENT (OUTPATIENT)
Dept: OUTPATIENT SERVICES | Facility: HOSPITAL | Age: 58
LOS: 1 days | End: 2023-02-09

## 2023-02-09 ENCOUNTER — APPOINTMENT (OUTPATIENT)
Dept: HEART AND VASCULAR | Facility: CLINIC | Age: 58
End: 2023-02-09
Payer: COMMERCIAL

## 2023-02-09 ENCOUNTER — APPOINTMENT (OUTPATIENT)
Dept: ULTRASOUND IMAGING | Facility: CLINIC | Age: 58
End: 2023-02-09
Payer: COMMERCIAL

## 2023-02-09 ENCOUNTER — APPOINTMENT (OUTPATIENT)
Dept: NEPHROLOGY | Facility: CLINIC | Age: 58
End: 2023-02-09
Payer: COMMERCIAL

## 2023-02-09 VITALS
SYSTOLIC BLOOD PRESSURE: 134 MMHG | BODY MASS INDEX: 25.15 KG/M2 | DIASTOLIC BLOOD PRESSURE: 61 MMHG | HEIGHT: 77 IN | WEIGHT: 213 LBS | HEART RATE: 68 BPM

## 2023-02-09 VITALS
BODY MASS INDEX: 25.15 KG/M2 | WEIGHT: 213 LBS | TEMPERATURE: 98.5 F | SYSTOLIC BLOOD PRESSURE: 154 MMHG | DIASTOLIC BLOOD PRESSURE: 70 MMHG | HEART RATE: 63 BPM | HEIGHT: 77 IN | OXYGEN SATURATION: 98 %

## 2023-02-09 DIAGNOSIS — Z98.890 OTHER SPECIFIED POSTPROCEDURAL STATES: Chronic | ICD-10-CM

## 2023-02-09 PROCEDURE — 93970 EXTREMITY STUDY: CPT | Mod: 26

## 2023-02-09 PROCEDURE — 99204 OFFICE O/P NEW MOD 45 MIN: CPT

## 2023-02-09 PROCEDURE — 99214 OFFICE O/P EST MOD 30 MIN: CPT

## 2023-02-09 NOTE — PHYSICAL EXAM
[Well Developed] : well developed [Well Nourished] : well nourished [No Acute Distress] : no acute distress [No Carotid Bruit] : no carotid bruit [No Murmur] : no murmur [Clear Lung Fields] : clear lung fields [Good Air Entry] : good air entry [No Respiratory Distress] : no respiratory distress  [Soft] : abdomen soft [Non Tender] : non-tender [Normal Gait] : normal gait [Moves all extremities] : moves all extremities [No Focal Deficits] : no focal deficits [Normal Speech] : normal speech [Alert and Oriented] : alert and oriented [Normal memory] : normal memory [de-identified] : mildly elevated JVP [de-identified] : bradycardic [de-identified] : 1-2+ LE edema

## 2023-02-09 NOTE — CARDIOLOGY SUMMARY
[de-identified] : \par 12/16/22 EKG: likely ectopic atrial rhythm at 52bpm, no ischemic ST-T deviations\par 1/5/23 EKG: sinus bradycardia at 50bpm, prominent T waves [de-identified] : \par 12/9/22 Limited TTE: normal LV and RV size and fxn, mildly dilated LA, aortic sclerosis, no pericardial effusion\par 12/9/22 Limited TTE: normal LV size/fxn, no pericardial effusion\par 12/5/22 TTE: normal LV size/fxn, normal RV, mild LAE, aortic sclerosis, PASP 47mmHg, no pericardial effusion\par 12/29/22 TTE: limited study, normal LV and RV size and function, no pericardial effusion [de-identified] : \par 12/9/22 LHC: 1vCAD, 100%  OM1, 30-50% pRamus, s/p PEPE of OM; RHC RA 10, RV 41/3/12, PA 39/15/33, PCWP 17, CI 3.3

## 2023-02-09 NOTE — ASSESSMENT
[FreeTextEntry1] : 57-year-old man with microvascular complications of longstanding type 2 diabetes including retinopathy, neuropathy, and nephropathy.  He had 1+ proteinuria in the hospital, and his latest creatinine was 1.47 with what appears to be a prerenal component.  His K is now acceptable.  I am changing his furosemide to torsemide 40 mg daily, and we can add metolazone if needed.  I was about to replace losartan with Edarbi, but he tells me that he is not on it, pending today's review and repeat labs to see how his potassium is doing.  Since his BP right now is not bad, I will wait to see his labs before making a decision on that.  I have ordered labs for today to include A1c, U ACR, CMP, CBC, PTH, phosphorus, plasma potassium.  He will return in 3 weeks but we will stay in touch in between.  He has a trip to [place] planned between now and then.  He will follow-up with Dr. Gay.

## 2023-02-09 NOTE — DISCUSSION/SUMMARY
[FreeTextEntry1] : Joey Tse is a 56yo M with CAD (recent PEPE to OM1 Dec 2022), HTN HL OM of R 4th toe on abx, anxiety, hospitalization in December initially for HFpEF and CAD s/p diuresis and PCI who presents for follow-up.\par \par Has had hyperkalemia and NOLAN. 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 Since then, K was stable but more volume overload. \par Today still with volume overload despite increasing furosemide dose. Pt seeing Dr. Castillo today. Presumably will have labs done there, will see if BNP and lipid profile can be added. \par For volume overload, would try torsemide if Dr. Castillo agreeable; if still doesn't respond, will need admission for IV diuresis. \par Also BPs elevated - expect to improve with diuresis but if remain elevated, consider re-adding losartan if ok with renal based on K and Cr. No beta blockers as didn't tolerate. \par Unclear etiology of recurrent HFpEF - renal artery Doppler neg as inpt, will consider PYP scan once volume optimized\par CAD: continue DAPT, will set up for cardiac rehab once optimized from volume perspective, recheck lipid profile\par R>L edema: check LE Doppler, to be done today at [facility]\par \par Follow-up 2-3 weeks for BP, volume, med titration\par

## 2023-02-09 NOTE — REVIEW OF SYSTEMS
[Lower Ext Edema] : lower extremity edema [SOB on Exertion] : shortness of breath during exertion [Negative] : Heme/Lymph

## 2023-02-09 NOTE — HISTORY OF PRESENT ILLNESS
[FreeTextEntry1] : 57-year-old man with a complex history that includes hypertension, 25 years of type 2 diabetes with neuropathy, retinopathy, and nephropathy, CAD, HFpEF, hyperlipidemia, who presents following hospitalization during which she had an episode of NOLAN.  He required treatment with IV vancomycin because of a foot ulcer, and Vanco level was 20-28  recently.  He had osteomyelitis of the fourth toe  Of the right foot.  He had a coronary calcium score of 361.  He has echo LVH and LAE, with an EF of 65-70.  He has experienced edema of both lower extremities, not ideally responsive to furosemide 40 mg daily.  His home BP is typically in the 150s, often highest in the morning.  His BP earlier today when he saw Dr. Gay for cardiac visit was 155/67.  It was 126/60 when he saw her on January 5, 142/63 when he saw Dr. Gaming on January 13, but only 101/57 on another visit of January 13.  He has ROBBINS with stairs.  His creatinine peaked at about 1.7 on December 6 and then again at 2.1-2.2 during the period December 30th to January 6.  One Week ago, his sodium was 134, K 4.8-4.9, glucose 439, creatinine 1.47, BUN 39, GFR 56.

## 2023-02-09 NOTE — PHYSICAL EXAM
[General Appearance - Alert] : alert [General Appearance - In No Acute Distress] : in no acute distress [Sclera] : the sclera and conjunctiva were normal [PERRL With Normal Accommodation] : pupils were equal in size, round, and reactive to light [Outer Ear] : the ears and nose were normal in appearance [Neck Appearance] : the appearance of the neck was normal [Neck Cervical Mass (___cm)] : no neck mass was observed [Jugular Venous Distention Increased] : there was no jugular-venous distention [Auscultation Breath Sounds / Voice Sounds] : lungs were clear to auscultation bilaterally [Heart Rate And Rhythm] : heart rate was normal and rhythm regular [Heart Sounds] : normal S1 and S2 [Heart Sounds Gallop] : no gallops [Murmurs] : no murmurs [Heart Sounds Pericardial Friction Rub] : no pericardial rub [Full Pulse] : the pedal pulses are present [FreeTextEntry1] : 2-3+ [Skin Color & Pigmentation] : normal skin color and pigmentation [Skin Turgor] : normal skin turgor [] : no rash [Deep Tendon Reflexes (DTR)] : deep tendon reflexes were 2+ and symmetric [No Focal Deficits] : no focal deficits [Oriented To Time, Place, And Person] : oriented to person, place, and time [Impaired Insight] : insight and judgment were intact [Affect] : the affect was normal

## 2023-02-09 NOTE — HISTORY OF PRESENT ILLNESS
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 Followed-up with PCP and noted to have improving K. However then with symptoms of volume overload so saw PCP last week and furosemide dose increased. \par \par Since then:\par -has appt with Dr. Castillo today\par -home BPs in the SBP 150s\par -for past week increased furosemide to 40mg BID -- has noted decrease in swelling; breathing is ok, was having orthopnea that has since improved; he has noted worse LE edema on rt leg with pain over leg as well (not calf)\par -no chest pain\par \par PMH/PSH:\par as above\par \par FH:\par Father CAD 70's yo\par \par SH:\par Non smoker, Occasional ETOH 3-4 drinks / week

## 2023-02-13 ENCOUNTER — NON-APPOINTMENT (OUTPATIENT)
Age: 58
End: 2023-02-13

## 2023-02-13 LAB
ALBUMIN SERPL ELPH-MCNC: 4.6 G/DL
ALP BLD-CCNC: 81 U/L
ALT SERPL-CCNC: 18 U/L
ANION GAP SERPL CALC-SCNC: 14 MMOL/L
APPEARANCE: CLEAR
AST SERPL-CCNC: 18 U/L
BACTERIA: NEGATIVE
BASOPHILS # BLD AUTO: 0.05 K/UL
BASOPHILS NFR BLD AUTO: 0.7 %
BILIRUB SERPL-MCNC: 0.3 MG/DL
BILIRUBIN URINE: NEGATIVE
BLOOD URINE: NEGATIVE
BUN SERPL-MCNC: 34 MG/DL
CALCIUM SERPL-MCNC: 10 MG/DL
CALCIUM SERPL-MCNC: 10 MG/DL
CHLORIDE SERPL-SCNC: 101 MMOL/L
CO2 SERPL-SCNC: 24 MMOL/L
COLOR: NORMAL
CREAT SERPL-MCNC: 1.41 MG/DL
CREAT SPEC-SCNC: 58 MG/DL
CYSTATIN C SERPL-MCNC: 1.68 MG/L
EGFR: 59 ML/MIN/1.73M2
EOSINOPHIL # BLD AUTO: 0.25 K/UL
EOSINOPHIL NFR BLD AUTO: 3.5 %
ESTIMATED AVERAGE GLUCOSE: 237 MG/DL
GFR/BSA.PRED SERPLBLD CYS-BASED-ARV: 40 ML/MIN/1.73M2
GLUCOSE QUALITATIVE U: NEGATIVE
GLUCOSE SERPL-MCNC: 97 MG/DL
HBA1C MFR BLD HPLC: 9.9 %
HCT VFR BLD CALC: 30.2 %
HGB BLD-MCNC: 9.7 G/DL
HYALINE CASTS: 0 /LPF
IMM GRANULOCYTES NFR BLD AUTO: 0.3 %
KETONES URINE: NEGATIVE
LEUKOCYTE ESTERASE URINE: NEGATIVE
LYMPHOCYTES # BLD AUTO: 1.09 K/UL
LYMPHOCYTES NFR BLD AUTO: 15.3 %
MAN DIFF?: NORMAL
MCHC RBC-ENTMCNC: 29 PG
MCHC RBC-ENTMCNC: 32.1 GM/DL
MCV RBC AUTO: 90.4 FL
MICROALBUMIN 24H UR DL<=1MG/L-MCNC: 27.5 MG/DL
MICROALBUMIN/CREAT 24H UR-RTO: 472 MG/G
MICROSCOPIC-UA: NORMAL
MONOCYTES # BLD AUTO: 0.56 K/UL
MONOCYTES NFR BLD AUTO: 7.8 %
NEUTROPHILS # BLD AUTO: 5.17 K/UL
NEUTROPHILS NFR BLD AUTO: 72.4 %
NITRITE URINE: NEGATIVE
PARATHYROID HORMONE INTACT: 52 PG/ML
PH URINE: 7
PHOSPHATE SERPL-MCNC: 3.5 MG/DL
PLATELET # BLD AUTO: 286 K/UL
POTASSIUM SERPL-SCNC: 4.6 MMOL/L
POTASSIUM SERPL-SCNC: 5 MMOL/L
PROT SERPL-MCNC: 7.3 G/DL
PROTEIN URINE: ABNORMAL
RBC # BLD: 3.34 M/UL
RBC # FLD: 13.5 %
RED BLOOD CELLS URINE: 3 /HPF
SODIUM SERPL-SCNC: 139 MMOL/L
SPECIFIC GRAVITY URINE: 1.01
SQUAMOUS EPITHELIAL CELLS: 1 /HPF
UROBILINOGEN URINE: NORMAL
WBC # FLD AUTO: 7.14 K/UL
WHITE BLOOD CELLS URINE: 0 /HPF

## 2023-02-20 NOTE — ED PROVIDER NOTE - NS ED MD DISPO DIVISION
Problem: Altered Thought Process (Adult/Pediatric)  Goal: *STG: Participates in treatment plan  Outcome: Progressing Towards Goal  Goal: *STG: Attends activities and groups  Outcome: Progressing Towards Goal     Problem: Patient Education: Go to Patient Education Activity  Goal: Patient/Family Education  Outcome: Progressing Towards Goal [facility]

## 2023-03-07 ENCOUNTER — RX CHANGE (OUTPATIENT)
Age: 58
End: 2023-03-07

## 2023-03-07 RX ORDER — TORSEMIDE 20 MG/1
20 TABLET ORAL
Qty: 60 | Refills: 0 | Status: DISCONTINUED | COMMUNITY
Start: 2023-02-09 | End: 2023-03-07

## 2023-03-08 ENCOUNTER — APPOINTMENT (OUTPATIENT)
Dept: SLEEP CENTER | Facility: HOME HEALTH | Age: 58
End: 2023-03-08

## 2023-03-17 ENCOUNTER — NON-APPOINTMENT (OUTPATIENT)
Age: 58
End: 2023-03-17

## 2023-03-20 ENCOUNTER — RX CHANGE (OUTPATIENT)
Age: 58
End: 2023-03-20

## 2023-03-20 RX ORDER — METOLAZONE 5 MG/1
5 TABLET ORAL DAILY
Qty: 30 | Refills: 1 | Status: DISCONTINUED | COMMUNITY
Start: 2023-02-13 | End: 2023-03-20

## 2023-03-21 ENCOUNTER — APPOINTMENT (OUTPATIENT)
Dept: NEPHROLOGY | Facility: CLINIC | Age: 58
End: 2023-03-21

## 2023-03-21 ENCOUNTER — TRANSCRIPTION ENCOUNTER (OUTPATIENT)
Age: 58
End: 2023-03-21

## 2023-03-21 ENCOUNTER — EMERGENCY (EMERGENCY)
Facility: HOSPITAL | Age: 58
LOS: 1 days | Discharge: HOME | End: 2023-03-21
Attending: EMERGENCY MEDICINE | Admitting: EMERGENCY MEDICINE
Payer: COMMERCIAL

## 2023-03-21 VITALS
DIASTOLIC BLOOD PRESSURE: 51 MMHG | HEART RATE: 93 BPM | WEIGHT: 184.97 LBS | RESPIRATION RATE: 18 BRPM | HEIGHT: 72 IN | SYSTOLIC BLOOD PRESSURE: 79 MMHG | OXYGEN SATURATION: 95 %

## 2023-03-21 VITALS
HEART RATE: 53 BPM | TEMPERATURE: 99 F | SYSTOLIC BLOOD PRESSURE: 137 MMHG | RESPIRATION RATE: 17 BRPM | DIASTOLIC BLOOD PRESSURE: 63 MMHG | OXYGEN SATURATION: 98 %

## 2023-03-21 DIAGNOSIS — Z98.890 OTHER SPECIFIED POSTPROCEDURAL STATES: Chronic | ICD-10-CM

## 2023-03-21 LAB
ALBUMIN SERPL ELPH-MCNC: 4.3 G/DL — SIGNIFICANT CHANGE UP (ref 3.3–5)
ALP SERPL-CCNC: 84 U/L — SIGNIFICANT CHANGE UP (ref 40–120)
ALT FLD-CCNC: 19 U/L — SIGNIFICANT CHANGE UP (ref 10–45)
ANION GAP SERPL CALC-SCNC: 13 MMOL/L — SIGNIFICANT CHANGE UP (ref 5–17)
ANION GAP SERPL CALC-SCNC: 20 MMOL/L — HIGH (ref 5–17)
APTT BLD: 27.3 SEC — LOW (ref 27.5–35.5)
AST SERPL-CCNC: 21 U/L — SIGNIFICANT CHANGE UP (ref 10–40)
BASE EXCESS BLDV CALC-SCNC: -6.5 MMOL/L — LOW (ref -2–3)
BASOPHILS # BLD AUTO: 0.06 K/UL — SIGNIFICANT CHANGE UP (ref 0–0.2)
BASOPHILS NFR BLD AUTO: 0.9 % — SIGNIFICANT CHANGE UP (ref 0–2)
BILIRUB SERPL-MCNC: 0.3 MG/DL — SIGNIFICANT CHANGE UP (ref 0.2–1.2)
BUN SERPL-MCNC: 63 MG/DL — HIGH (ref 7–23)
BUN SERPL-MCNC: 65 MG/DL — HIGH (ref 7–23)
CA-I SERPL-SCNC: 1.24 MMOL/L — SIGNIFICANT CHANGE UP (ref 1.15–1.33)
CALCIUM SERPL-MCNC: 10.1 MG/DL — SIGNIFICANT CHANGE UP (ref 8.4–10.5)
CALCIUM SERPL-MCNC: 9.1 MG/DL — SIGNIFICANT CHANGE UP (ref 8.4–10.5)
CHLORIDE SERPL-SCNC: 100 MMOL/L — SIGNIFICANT CHANGE UP (ref 96–108)
CHLORIDE SERPL-SCNC: 94 MMOL/L — LOW (ref 96–108)
CO2 BLDV-SCNC: 23 MMOL/L — SIGNIFICANT CHANGE UP (ref 22–26)
CO2 SERPL-SCNC: 20 MMOL/L — LOW (ref 22–31)
CO2 SERPL-SCNC: 22 MMOL/L — SIGNIFICANT CHANGE UP (ref 22–31)
CREAT SERPL-MCNC: 2.06 MG/DL — HIGH (ref 0.5–1.3)
CREAT SERPL-MCNC: 2.42 MG/DL — HIGH (ref 0.5–1.3)
EGFR: 31 ML/MIN/1.73M2 — LOW
EGFR: 37 ML/MIN/1.73M2 — LOW
EOSINOPHIL # BLD AUTO: 0.49 K/UL — SIGNIFICANT CHANGE UP (ref 0–0.5)
EOSINOPHIL NFR BLD AUTO: 7.3 % — HIGH (ref 0–6)
GAS PNL BLDV: 129 MMOL/L — LOW (ref 136–145)
GAS PNL BLDV: SIGNIFICANT CHANGE UP
GLUCOSE SERPL-MCNC: 199 MG/DL — HIGH (ref 70–99)
GLUCOSE SERPL-MCNC: 82 MG/DL — SIGNIFICANT CHANGE UP (ref 70–99)
HCO3 BLDV-SCNC: 21 MMOL/L — LOW (ref 22–29)
HCT VFR BLD CALC: 41.1 % — SIGNIFICANT CHANGE UP (ref 39–50)
HGB BLD-MCNC: 14.2 G/DL — SIGNIFICANT CHANGE UP (ref 13–17)
IMM GRANULOCYTES NFR BLD AUTO: 0.3 % — SIGNIFICANT CHANGE UP (ref 0–0.9)
INR BLD: 0.96 — SIGNIFICANT CHANGE UP (ref 0.88–1.16)
LACTATE SERPL-SCNC: 1.4 MMOL/L — SIGNIFICANT CHANGE UP (ref 0.5–2)
LACTATE SERPL-SCNC: 4.9 MMOL/L — CRITICAL HIGH (ref 0.5–2)
LIDOCAIN IGE QN: 66 U/L — HIGH (ref 7–60)
LYMPHOCYTES # BLD AUTO: 1.52 K/UL — SIGNIFICANT CHANGE UP (ref 1–3.3)
LYMPHOCYTES # BLD AUTO: 22.6 % — SIGNIFICANT CHANGE UP (ref 13–44)
MAGNESIUM SERPL-MCNC: 2.2 MG/DL — SIGNIFICANT CHANGE UP (ref 1.6–2.6)
MCHC RBC-ENTMCNC: 29.3 PG — SIGNIFICANT CHANGE UP (ref 27–34)
MCHC RBC-ENTMCNC: 34.5 GM/DL — SIGNIFICANT CHANGE UP (ref 32–36)
MCV RBC AUTO: 84.9 FL — SIGNIFICANT CHANGE UP (ref 80–100)
MONOCYTES # BLD AUTO: 0.79 K/UL — SIGNIFICANT CHANGE UP (ref 0–0.9)
MONOCYTES NFR BLD AUTO: 11.7 % — SIGNIFICANT CHANGE UP (ref 2–14)
NEUTROPHILS # BLD AUTO: 3.85 K/UL — SIGNIFICANT CHANGE UP (ref 1.8–7.4)
NEUTROPHILS NFR BLD AUTO: 57.2 % — SIGNIFICANT CHANGE UP (ref 43–77)
NRBC # BLD: 0 /100 WBCS — SIGNIFICANT CHANGE UP (ref 0–0)
NT-PROBNP SERPL-SCNC: 208 PG/ML — SIGNIFICANT CHANGE UP (ref 0–300)
PCO2 BLDV: 51 MMHG — SIGNIFICANT CHANGE UP (ref 42–55)
PH BLDV: 7.23 — LOW (ref 7.32–7.43)
PHOSPHATE SERPL-MCNC: 3.9 MG/DL — SIGNIFICANT CHANGE UP (ref 2.5–4.5)
PLATELET # BLD AUTO: 298 K/UL — SIGNIFICANT CHANGE UP (ref 150–400)
PO2 BLDV: <33 MMHG — SIGNIFICANT CHANGE UP (ref 25–45)
POTASSIUM BLDV-SCNC: 3.7 MMOL/L — SIGNIFICANT CHANGE UP (ref 3.5–5.1)
POTASSIUM SERPL-MCNC: 4 MMOL/L — SIGNIFICANT CHANGE UP (ref 3.5–5.3)
POTASSIUM SERPL-MCNC: 4.1 MMOL/L — SIGNIFICANT CHANGE UP (ref 3.5–5.3)
POTASSIUM SERPL-SCNC: 4 MMOL/L — SIGNIFICANT CHANGE UP (ref 3.5–5.3)
POTASSIUM SERPL-SCNC: 4.1 MMOL/L — SIGNIFICANT CHANGE UP (ref 3.5–5.3)
PROT SERPL-MCNC: 8.5 G/DL — HIGH (ref 6–8.3)
PROTHROM AB SERPL-ACNC: 11.4 SEC — SIGNIFICANT CHANGE UP (ref 10.5–13.4)
RBC # BLD: 4.84 M/UL — SIGNIFICANT CHANGE UP (ref 4.2–5.8)
RBC # FLD: 12.7 % — SIGNIFICANT CHANGE UP (ref 10.3–14.5)
SAO2 % BLDV: 50.2 % — LOW (ref 67–88)
SODIUM SERPL-SCNC: 134 MMOL/L — LOW (ref 135–145)
SODIUM SERPL-SCNC: 135 MMOL/L — SIGNIFICANT CHANGE UP (ref 135–145)
TROPONIN T SERPL-MCNC: 0.03 NG/ML — HIGH (ref 0–0.01)
WBC # BLD: 6.73 K/UL — SIGNIFICANT CHANGE UP (ref 3.8–10.5)
WBC # FLD AUTO: 6.73 K/UL — SIGNIFICANT CHANGE UP (ref 3.8–10.5)

## 2023-03-21 PROCEDURE — 71250 CT THORAX DX C-: CPT | Mod: MA

## 2023-03-21 PROCEDURE — 80048 BASIC METABOLIC PNL TOTAL CA: CPT

## 2023-03-21 PROCEDURE — 96360 HYDRATION IV INFUSION INIT: CPT

## 2023-03-21 PROCEDURE — 84484 ASSAY OF TROPONIN QUANT: CPT

## 2023-03-21 PROCEDURE — 85025 COMPLETE CBC W/AUTO DIFF WBC: CPT

## 2023-03-21 PROCEDURE — 74176 CT ABD & PELVIS W/O CONTRAST: CPT | Mod: MA

## 2023-03-21 PROCEDURE — 83690 ASSAY OF LIPASE: CPT

## 2023-03-21 PROCEDURE — 85610 PROTHROMBIN TIME: CPT

## 2023-03-21 PROCEDURE — 99285 EMERGENCY DEPT VISIT HI MDM: CPT

## 2023-03-21 PROCEDURE — 84295 ASSAY OF SERUM SODIUM: CPT

## 2023-03-21 PROCEDURE — 80053 COMPREHEN METABOLIC PANEL: CPT

## 2023-03-21 PROCEDURE — 83605 ASSAY OF LACTIC ACID: CPT

## 2023-03-21 PROCEDURE — 36415 COLL VENOUS BLD VENIPUNCTURE: CPT

## 2023-03-21 PROCEDURE — 83735 ASSAY OF MAGNESIUM: CPT

## 2023-03-21 PROCEDURE — 82962 GLUCOSE BLOOD TEST: CPT

## 2023-03-21 PROCEDURE — 71250 CT THORAX DX C-: CPT | Mod: 26,MA

## 2023-03-21 PROCEDURE — 85730 THROMBOPLASTIN TIME PARTIAL: CPT

## 2023-03-21 PROCEDURE — 82803 BLOOD GASES ANY COMBINATION: CPT

## 2023-03-21 PROCEDURE — 71046 X-RAY EXAM CHEST 2 VIEWS: CPT | Mod: 26

## 2023-03-21 PROCEDURE — 71046 X-RAY EXAM CHEST 2 VIEWS: CPT

## 2023-03-21 PROCEDURE — 84100 ASSAY OF PHOSPHORUS: CPT

## 2023-03-21 PROCEDURE — 82330 ASSAY OF CALCIUM: CPT

## 2023-03-21 PROCEDURE — 99285 EMERGENCY DEPT VISIT HI MDM: CPT | Mod: 25

## 2023-03-21 PROCEDURE — 84132 ASSAY OF SERUM POTASSIUM: CPT

## 2023-03-21 PROCEDURE — 83880 ASSAY OF NATRIURETIC PEPTIDE: CPT

## 2023-03-21 PROCEDURE — 74176 CT ABD & PELVIS W/O CONTRAST: CPT | Mod: 26,MA

## 2023-03-21 RX ORDER — SODIUM CHLORIDE 9 MG/ML
500 INJECTION INTRAMUSCULAR; INTRAVENOUS; SUBCUTANEOUS ONCE
Refills: 0 | Status: COMPLETED | OUTPATIENT
Start: 2023-03-21 | End: 2023-03-21

## 2023-03-21 RX ORDER — ONDANSETRON 8 MG/1
4 TABLET, FILM COATED ORAL ONCE
Refills: 0 | Status: COMPLETED | OUTPATIENT
Start: 2023-03-21 | End: 2023-03-21

## 2023-03-21 RX ADMIN — SODIUM CHLORIDE 500 MILLILITER(S): 9 INJECTION INTRAMUSCULAR; INTRAVENOUS; SUBCUTANEOUS at 16:36

## 2023-03-21 RX ADMIN — SODIUM CHLORIDE 500 MILLILITER(S): 9 INJECTION INTRAMUSCULAR; INTRAVENOUS; SUBCUTANEOUS at 13:05

## 2023-03-21 RX ADMIN — SODIUM CHLORIDE 500 MILLILITER(S): 9 INJECTION INTRAMUSCULAR; INTRAVENOUS; SUBCUTANEOUS at 14:10

## 2023-03-21 RX ADMIN — SODIUM CHLORIDE 500 MILLILITER(S): 9 INJECTION INTRAMUSCULAR; INTRAVENOUS; SUBCUTANEOUS at 13:34

## 2023-03-21 RX ADMIN — SODIUM CHLORIDE 500 MILLILITER(S): 9 INJECTION INTRAMUSCULAR; INTRAVENOUS; SUBCUTANEOUS at 14:59

## 2023-03-21 NOTE — ED ADULT TRIAGE NOTE - CHIEF COMPLAINT QUOTE
Pt with hx of cardiac stent placement in December, was en-route to nephrology appointment when per wife "he began throwing up and fell." Pt noted to be pale, diaphoretic and vomiting in triage. Endorses yuliana, SOB. UPG to MD.

## 2023-03-21 NOTE — ED PROVIDER NOTE - CLINICAL SUMMARY MEDICAL DECISION MAKING FREE TEXT BOX
6:30pm - pt clinically much improved, feels well. Discussed lab and ct results - admission offered for gentle hydration / keep an eye on fluid status given pts known CHF, but pt declines, prefers to go home and will call Dr. Castillo and/or Dr. Gay for close follow up. DC home in NAD with strict return precautions given. Call pending for Dr. Castillo

## 2023-03-21 NOTE — ED PROVIDER NOTE - NSFOLLOWUPINSTRUCTIONS_ED_ALL_ED_FT
Nausea / Vomiting    Nausea is the feeling that you have to vomit. As nausea gets worse, it can lead to vomiting. Vomiting puts you at an increased risk for dehydration. Older adults and people with other diseases or a weak immune system are at higher risk for dehydration. Drink clear fluids in small but frequent amounts as tolerated. Eat bland, easy-to-digest foods in small amounts as tolerated.    SEEK IMMEDIATE MEDICAL CARE IF YOU HAVE ANY OF THE FOLLOWING SYMPTOMS: fever, inability to keep sufficient fluids down, black or bloody vomitus, black or bloody stools, lightheadedness/dizziness, chest pain, severe headache, rash, shortness of breath, cold or clammy skin, confusion, pain with urination, or any signs of dehydration.     Viral Respiratory Infection    A viral respiratory infection is an illness that affects parts of the body used for breathing, like the lungs, nose, and throat. It is caused by a germ called a virus. Symptoms can include runny nose, coughing, sneezing, fatigue, body aches, sore throat, fever, or headache. Over the counter medicine can be used to manage the symptoms but the infection typically goes away on its own in 5 to 10 days.     SEEK IMMEDIATE MEDICAL CARE IF YOU HAVE ANY OF THE FOLLOWING SYMPTOMS: shortness of breath, chest pain, fever over 10 days, or lightheadedness/dizziness.

## 2023-03-21 NOTE — ED PROVIDER NOTE - PHYSICAL EXAMINATION
CONSTITUTIONAL: tired appearing middle aged male  HEAD: Normocephalic; atraumatic.   EYES:  conjunctiva and sclera clear  ENT: normal nose; no rhinorrhea; normal pharynx with no erythema or lesions.   NECK: Supple; non-tender;   CARDIOVASCULAR: Normal S1, S2; no murmurs, rubs, or gallops. Regular rate and rhythm.   RESPIRATORY: Breathing easily; breath sounds clear and equal bilaterally; no wheezes, rhonchi, or rales.  GI: Soft; non-distended; non-tender; no palpable organomegaly.   EXT: No cyanosis or edema; N/V intact  SKIN: Normal for age and race; warm; dry; good turgor; no apparent lesions or rash.   NEURO: A & O x 3; face symmetric; grossly unremarkable.   PSYCHOLOGICAL: The patient’s mood and manner are appropriate.

## 2023-03-21 NOTE — ED PROVIDER NOTE - DIFFERENTIAL DIAGNOSIS
here w/ hypotension in setting of recent cough, but also w/ hx of CHF and CKD so gentle fluids given in 500cc aliquots.  no bacterial source identified, likely volume related as pt on fluid restriction, diuretics, and bp meds, but unable to take much po for the past few days Differential Diagnosis

## 2023-03-21 NOTE — ED PROVIDER NOTE - PATIENT PORTAL LINK FT
You can access the FollowMyHealth Patient Portal offered by [facility] by registering at the following website: [email or web address]. By joining [employer]’s FollowMyHealth portal, you will also be able to view your health information using other applications (apps) compatible with our system.

## 2023-03-21 NOTE — ED PROVIDER NOTE - OBJECTIVE STATEMENT
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 noted to be hypotensive in triage, so upgraded. pt states he is still on lokelma for hyperK as well. Did not take his BP meds today as was feeling unwell. 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 noted to be hypotensive in triage, so upgraded. pt states he is still on lokelma for hyperK as well. Did not take his BP meds today as was feeling unwell. +intermittent cramping since symptoms started as well.

## 2023-03-22 ENCOUNTER — APPOINTMENT (OUTPATIENT)
Dept: NEPHROLOGY | Facility: CLINIC | Age: 58
End: 2023-03-22
Payer: COMMERCIAL

## 2023-03-22 ENCOUNTER — TRANSCRIPTION ENCOUNTER (OUTPATIENT)
Age: 58
End: 2023-03-22

## 2023-03-22 VITALS
WEIGHT: 185 LBS | DIASTOLIC BLOOD PRESSURE: 58 MMHG | SYSTOLIC BLOOD PRESSURE: 102 MMHG | HEART RATE: 76 BPM | BODY MASS INDEX: 21.84 KG/M2 | HEIGHT: 77 IN

## 2023-03-22 DIAGNOSIS — N17.9 ACUTE KIDNEY FAILURE, UNSPECIFIED: ICD-10-CM

## 2023-03-22 PROCEDURE — 99215 OFFICE O/P EST HI 40 MIN: CPT

## 2023-03-22 NOTE — ASSESSMENT
[FreeTextEntry1] : 57-year-old man history of hypertension, type 2 diabetes, although microvascular complications of the same  , CKD 3 with multiple episodes of NOLAN in the last 3 months.  He is still hypotensive today, so I have asked him to hold amlodipine until his BP is at least 150.  I also asked him to hold torsemide and metolazone until his weight is above 197 and then contact me with his BP is well before restarting either diuretic.  I am concerned that we try to get his creatinine, BUN, GFR back to baseline without any further dehydration episodes.  I suggested he see pulmonary in regard to his worsening cough.  I have asked him to have labs drawn in 9 days, upon return from [place], including U ACR, urinalysis, BMP, Cystatin C, plasma potassium.  He will also hold off on Lokelma, since his K was 4.0 in the ER.  I have asked him to call me if there is any change in status, even in [place].  I gave him a list of instructions regarding when to restart amlodipine, torsemide, metolazone.   Time spent 45 minutes

## 2023-03-22 NOTE — PHYSICAL EXAM
[General Appearance - Alert] : alert [General Appearance - In No Acute Distress] : in no acute distress [Sclera] : the sclera and conjunctiva were normal [PERRL With Normal Accommodation] : pupils were equal in size, round, and reactive to light [Outer Ear] : the ears and nose were normal in appearance [Neck Appearance] : the appearance of the neck was normal [Neck Cervical Mass (___cm)] : no neck mass was observed [Jugular Venous Distention Increased] : there was no jugular-venous distention [Auscultation Breath Sounds / Voice Sounds] : lungs were clear to auscultation bilaterally [Heart Rate And Rhythm] : heart rate was normal and rhythm regular [Heart Sounds] : normal S1 and S2 [Heart Sounds Gallop] : no gallops [Murmurs] : no murmurs [Heart Sounds Pericardial Friction Rub] : no pericardial rub [Full Pulse] : the pedal pulses are present [Skin Color & Pigmentation] : normal skin color and pigmentation [Skin Turgor] : normal skin turgor [] : no rash [Deep Tendon Reflexes (DTR)] : deep tendon reflexes were 2+ and symmetric [No Focal Deficits] : no focal deficits [Oriented To Time, Place, And Person] : oriented to person, place, and time [Impaired Insight] : insight and judgment were intact [Affect] : the affect was normal [FreeTextEntry1] : 2-3+

## 2023-03-22 NOTE — HISTORY OF PRESENT ILLNESS
[FreeTextEntry1] : 57-year-old man with a complex history including hypertension, type 2 diabetes for 25 years with neuropathy, retinopathy, and nephropathy, CAD, HFpEF, and multiple episodes of NOLAN, baseline CKD.  His creatinine and early February was 1.47, BUN 34-39, GFR in the 50s.  He had undergone an episode of NOLAN in December, when he was treated with IV vancomycin for a foot ulcer/osteomyelitis.  He had experienced edema of both legs, not fully responsive to furosemide.  He wound up on the loop distal combination of furosemide and metolazone -I changed furosemide to torsemide for longer half-life, and he took 40 mg daily until recently when it was reduced to 20.  He has had an upper respiratory condition for several weeks, which has made his chronic cough considerably worse.  He was scheduled for an office visit here yesterday, but developed nausea and vomiting with weakness and wisely went to the ER here at [facility].  There, he was found to be extremely hypotensive at about 70/40, and received at least 1-1/2 L of IV fluid.  His creatinine had spiked up to 2.4 and then fell to 2.0 after IV hydration.  His BUN was in the 60s compared to his usual mid 30s and that did not improve by the time of discharge from the ER.  He has held off on amlodipine, torsemide, and metolazone.  His blood pressure cuff read 140/80 earlier today but I question its accuracy, because here now it is 102/58 but without dizziness or lightheadedness.  He is scheduled to leave for [facility] in 4 days.  I suggested he not ski unless he is feeling great.  I mentioned taking precautions against high-altitude particularly in the 48 hours, including no alcohol and increased fluid intake.  In the ER, EKG was normal with no signs of ischemia, and chest x-ray was felt to be unremarkable.  It should be noted that he has lost somewhere between 20 and 30 pounds, much of which was clearly fluid.  He has no edema whatsoever

## 2023-03-23 ENCOUNTER — NON-APPOINTMENT (OUTPATIENT)
Age: 58
End: 2023-03-23

## 2023-03-23 ENCOUNTER — APPOINTMENT (OUTPATIENT)
Dept: HEART AND VASCULAR | Facility: CLINIC | Age: 58
End: 2023-03-23
Payer: COMMERCIAL

## 2023-03-23 ENCOUNTER — TRANSCRIPTION ENCOUNTER (OUTPATIENT)
Age: 58
End: 2023-03-23

## 2023-03-23 VITALS
DIASTOLIC BLOOD PRESSURE: 70 MMHG | WEIGHT: 190.99 LBS | TEMPERATURE: 97.9 F | HEIGHT: 72 IN | SYSTOLIC BLOOD PRESSURE: 120 MMHG | BODY MASS INDEX: 25.87 KG/M2 | HEART RATE: 62 BPM | OXYGEN SATURATION: 99 %

## 2023-03-23 PROCEDURE — 99214 OFFICE O/P EST MOD 30 MIN: CPT | Mod: 25

## 2023-03-23 PROCEDURE — 93000 ELECTROCARDIOGRAM COMPLETE: CPT

## 2023-03-24 ENCOUNTER — TRANSCRIPTION ENCOUNTER (OUTPATIENT)
Age: 58
End: 2023-03-24

## 2023-03-24 ENCOUNTER — APPOINTMENT (OUTPATIENT)
Dept: PULMONOLOGY | Facility: CLINIC | Age: 58
End: 2023-03-24
Payer: COMMERCIAL

## 2023-03-24 VITALS
TEMPERATURE: 97.7 F | HEART RATE: 62 BPM | DIASTOLIC BLOOD PRESSURE: 71 MMHG | HEIGHT: 72 IN | BODY MASS INDEX: 25.73 KG/M2 | SYSTOLIC BLOOD PRESSURE: 165 MMHG | WEIGHT: 190 LBS | OXYGEN SATURATION: 98 %

## 2023-03-24 DIAGNOSIS — Z82.49 FAMILY HISTORY OF ISCHEMIC HEART DISEASE AND OTHER DISEASES OF THE CIRCULATORY SYSTEM: ICD-10-CM

## 2023-03-24 DIAGNOSIS — Z79.4 LONG TERM (CURRENT) USE OF INSULIN: ICD-10-CM

## 2023-03-24 DIAGNOSIS — M86.9 OSTEOMYELITIS, UNSPECIFIED: ICD-10-CM

## 2023-03-24 DIAGNOSIS — R05.1 ACUTE COUGH: ICD-10-CM

## 2023-03-24 DIAGNOSIS — I13.0 HYPERTENSIVE HEART AND CHRONIC KIDNEY DISEASE WITH HEART FAILURE AND STAGE 1 THROUGH STAGE 4 CHRONIC KIDNEY DISEASE, OR UNSPECIFIED CHRONIC KIDNEY DISEASE: ICD-10-CM

## 2023-03-24 DIAGNOSIS — T46.5X6A UNDERDOSING OF OTHER ANTIHYPERTENSIVE DRUGS, INITIAL ENCOUNTER: ICD-10-CM

## 2023-03-24 DIAGNOSIS — Z79.82 LONG TERM (CURRENT) USE OF ASPIRIN: ICD-10-CM

## 2023-03-24 DIAGNOSIS — I25.10 ATHEROSCLEROTIC HEART DISEASE OF NATIVE CORONARY ARTERY WITHOUT ANGINA PECTORIS: ICD-10-CM

## 2023-03-24 DIAGNOSIS — I50.32 CHRONIC DIASTOLIC (CONGESTIVE) HEART FAILURE: ICD-10-CM

## 2023-03-24 DIAGNOSIS — R05.9 COUGH, UNSPECIFIED: ICD-10-CM
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
## 2023-03-24 DIAGNOSIS — Z91.14 PATIENT'S OTHER NONCOMPLIANCE WITH MEDICATION REGIMEN: ICD-10-CM

## 2023-03-24 DIAGNOSIS — Z79.02 LONG TERM (CURRENT) USE OF ANTITHROMBOTICS/ANTIPLATELETS: ICD-10-CM

## 2023-03-24 DIAGNOSIS — Z87.448 PERSONAL HISTORY OF OTHER DISEASES OF URINARY SYSTEM: ICD-10-CM

## 2023-03-24 DIAGNOSIS — E87.5 HYPERKALEMIA: ICD-10-CM

## 2023-03-24 DIAGNOSIS — N18.9 CHRONIC KIDNEY DISEASE, UNSPECIFIED: ICD-10-CM

## 2023-03-24 DIAGNOSIS — E78.5 HYPERLIPIDEMIA, UNSPECIFIED: ICD-10-CM

## 2023-03-24 DIAGNOSIS — F41.9 ANXIETY DISORDER, UNSPECIFIED: ICD-10-CM

## 2023-03-24 DIAGNOSIS — E11.69 TYPE 2 DIABETES MELLITUS WITH OTHER SPECIFIED COMPLICATION: ICD-10-CM

## 2023-03-24 DIAGNOSIS — N17.9 ACUTE KIDNEY FAILURE, UNSPECIFIED: ICD-10-CM

## 2023-03-24 DIAGNOSIS — E11.22 TYPE 2 DIABETES MELLITUS WITH DIABETIC CHRONIC KIDNEY DISEASE: ICD-10-CM

## 2023-03-24 DIAGNOSIS — R07.89 OTHER CHEST PAIN: ICD-10-CM
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
## 2023-03-24 DIAGNOSIS — E86.0 DEHYDRATION: ICD-10-CM

## 2023-03-24 PROCEDURE — 99214 OFFICE O/P EST MOD 30 MIN: CPT

## 2023-03-24 NOTE — HISTORY OF PRESENT ILLNESS
[FreeTextEntry1] : Joey Tse is a 56yo M with CAD (recent PEPE to OM1 Dec 2022), HTN HL OM of R 4th toe on abx, anxiety, hospitalization in December initially for HFpEF and CAD s/p diuresis and PCI who presents for follow-up.\par \par Intolerant of BB (concern for BRASH)\par Last seen Feb 2023. At that time, was volume overloaded. Diuretic adjustments made after seeing Dr. Castillo. LE Doppler checked to r/o DVT - negative. Consideration of PYP once volume optimized.\par \par Since last visit:\par -in ER recently for nausea/hypotension (3/21) - improved with IVF, also had elevated Cr that improved with IVF\par - his wife accompanied pt today; reports that 2 days ago they were waiting for an Uber and he vomited and they went straight to the ER. He reports just feeling tired and thinks he was dehydrated\par - he reports having the cough for years; he reports some mild chest discomfort occasionally when taking a deep breath because of the coughing \par - he thinks he was accidentally taking both furosemide and torsemide together by mistake; as per Dr. Castillo, he was changed from furosemide to torsemide for longer half-life and the dosage was lowered from 40mg qd to 20 mg qd. After found to be hypotensive in the ER, Dr. Castillo said to hold the amlodipine, torsemide, and metolazone for now. Pt reports he has continued to take the amlodipine. \par - some confusion if he was still taking clopidogrel as carvedilol has been stopped. If he did stop the clopidogrel, it was only for a few days, but pt thinks he has been taking the clopidogrel \par - he thinks normal baseline weight for him is 205-210 lbs. \par \par PMH/PSH:\par as above\par \par FH:\par Father CAD 70's yo\par \par SH:\par Non smoker, Occasional ETOH 3-4 drinks / week

## 2023-03-24 NOTE — PHYSICAL EXAM
[General Appearance - Well Developed] : well developed [Normal Appearance] : normal appearance [Well Groomed] : well groomed [General Appearance - Well Nourished] : well nourished [No Deformities] : no deformities [General Appearance - In No Acute Distress] : no acute distress [Normal Conjunctiva] : the conjunctiva exhibited no abnormalities [Eyelids - No Xanthelasma] : the eyelids demonstrated no xanthelasmas [Normal Oropharynx] : normal oropharynx [III] : III [Heart Rate And Rhythm] : heart rate was normal and rhythm regular [Heart Sounds] : normal S1 and S2 [Heart Sounds Gallop] : no gallops [Murmurs] : no murmurs [Heart Sounds Pericardial Friction Rub] : no pericardial rub [] : no respiratory distress [Auscultation Breath Sounds / Voice Sounds] : lungs were clear to auscultation bilaterally [FreeTextEntry1] : No crackles [Abnormal Walk] : normal gait [Nail Clubbing] : no clubbing  or cyanosis of the fingernails [Musculoskeletal - Swelling] : no joint swelling seen [Motor Tone] : muscle strength and tone were normal [FreeTextEntry2] : No edema

## 2023-03-24 NOTE — CARDIOLOGY SUMMARY
[de-identified] : \par 12/16/22 EKG: likely ectopic atrial rhythm at 52bpm, no ischemic ST-T deviations\par 1/5/23 EKG: sinus bradycardia at 50bpm, prominent T waves\par 3/23/23 EKG: SB at 53bpm, prominent T waves similar to prior [de-identified] : \par 12/9/22 Limited TTE: normal LV and RV size and fxn, mildly dilated LA, aortic sclerosis, no pericardial effusion\par 12/9/22 Limited TTE: normal LV size/fxn, no pericardial effusion\par 12/5/22 TTE: normal LV size/fxn, normal RV, mild LAE, aortic sclerosis, PASP 47mmHg, no pericardial effusion\par 12/29/22 TTE: limited study, normal LV and RV size and function, no pericardial effusion [de-identified] : \par 12/9/22 LHC: 1vCAD, 100%  OM1, 30-50% pRamus, s/p PEPE of OM; RHC RA 10, RV 41/3/12, PA 39/15/33, PCWP 17, CI 3.3

## 2023-03-24 NOTE — HISTORY OF PRESENT ILLNESS
[FreeTextEntry1] : 01/13/2023 :  VALENTIN CABALLERO is a 57 year old male who is being evaluated for possible sleep disordered breathing.  He has a history of heart failure with preserved ejection fraction and had coronary stenting December 9.  He was admitted again December 28 with hyperkalemia and acute renal failure.  During his recent hospitalization was noted to be snoring severely and was treated with nasal CPAP empirically for at least 1 night.  He found that relatively comfortable and thought it improved his sleep.\par \par His usual bedtime is 10 PM, sleep latency 15 minutes, awakens 4-5 times on a typical night before getting up at 8 AM.  He has been told of severe snoring apnea has been observed and he wakes up choking or gasping on occasion.  He denies significant daytime sleepiness.  There is no history of morning headache, nasal or sinus congestion, sleep paralysis, parasomnias, or cataplexy.  There is no family history of sleep apnea or other sleep problem.  No recent change in weight.\par \par 3/24/23: Since last seen he did not follow-up with a home sleep study.  However, during that time he has lost 20 pounds.  He no longer has much snoring.  He goes to bed about 10 PM, falls asleep quickly, gets up about 8 AM after brief awakenings.  Does not report daytime sleepiness, and his wife who is here confirms this.  He has had aggressive management for congestive heart failure and fluid overload, at one point evidently becoming dehydrated, but now feels well.  He is here mostly because of a chronic nonproductive cough.  He is not coughing in the office today.  He does not complain of dyspnea.  Does not wheeze.  Most recent chest x-ray available for review from December 2022 shows pulmonary vascular congestion, normal heart size.\par \par He has not been on an ACE inhibitor.  He does not have symptoms of gastroesophageal reflux, he does have some nasal congestion.

## 2023-03-24 NOTE — DISCUSSION/SUMMARY
[EKG obtained to assist in diagnosis and management of assessed problem(s)] : EKG obtained to assist in diagnosis and management of assessed problem(s) [FreeTextEntry1] : Joey Tse is a 58yo M with CAD (recent PEPE to OM1 Dec 2022), HTN HL OM of R 4th toe on abx, anxiety, hospitalization in December initially for HFpEF and CAD s/p diuresis and PCI who presents for follow-up.\par \par Has had hyperkalemia and NOLAN. 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 Since then, K was stable but more volume overload. Dr. Castillo told him to hold Lokelma for now. \par After recent ER visit 3/21 and was found to by hypotensive; Dr. Castillo instructed pt to hold amlodipine until BP >150, but pt has continued to take it. He was also told to stop metolazone and torsemide, which he has been compliant with. \par Advised as BP normal today and pt asymptomatic, ok to continue amlodipine but continue to hold diuretics per Dr. Castillo's instructions. \par Has repeat lab work pending for 3/31 from Dr. Castillo; will add on lipids for that draw (order given to pt)\par Consider w/u for amyloid including PYP scan and lab work\par Pulm referral was already suggested for worsening cough; has appt w/ Dr. Alexandre tomorrow (3/24) \par \par CAD: continue DAPT, will set up for cardiac rehab once optimized from volume perspective, recheck lipid profile as above; will recheck EKG today 2/2 possible confusion over clopidogrel/Plavix dosing according to pt  -- EKG similar to prior\par R>L edema: resolved; LE Doppler was negative.\par \par RTC in one month for BP/volume assessment\par

## 2023-03-24 NOTE — ASSESSMENT
[FreeTextEntry1] : Previous symptoms of snoring disrupted sleep and possible sleep apnea having seemingly resolved with weight loss and resolution of fluid overload\par \par Etiology of his cough is not clear.  He may have some bronchospasm and requested to try empiric albuterol metered-dose inhaler now, as he is leaving town for a week.  I would like him to have pulmonary function testing and we will see him again when he returns.

## 2023-03-26 ENCOUNTER — NON-APPOINTMENT (OUTPATIENT)
Age: 58
End: 2023-03-26

## 2023-03-27 ENCOUNTER — NON-APPOINTMENT (OUTPATIENT)
Age: 58
End: 2023-03-27

## 2023-03-30 ENCOUNTER — TRANSCRIPTION ENCOUNTER (OUTPATIENT)
Age: 58
End: 2023-03-30

## 2023-03-31 ENCOUNTER — LABORATORY RESULT (OUTPATIENT)
Age: 58
End: 2023-03-31

## 2023-03-31 ENCOUNTER — TRANSCRIPTION ENCOUNTER (OUTPATIENT)
Age: 58
End: 2023-03-31

## 2023-04-02 ENCOUNTER — NON-APPOINTMENT (OUTPATIENT)
Age: 58
End: 2023-04-02

## 2023-04-02 LAB
ANION GAP SERPL CALC-SCNC: 11 MMOL/L
APPEARANCE: CLEAR
BACTERIA: NEGATIVE
BILIRUBIN URINE: NEGATIVE
BLOOD URINE: ABNORMAL
BUN SERPL-MCNC: 24 MG/DL
CALCIUM SERPL-MCNC: 9.5 MG/DL
CHLORIDE SERPL-SCNC: 107 MMOL/L
CO2 SERPL-SCNC: 21 MMOL/L
COLOR: NORMAL
CREAT SERPL-MCNC: 1.22 MG/DL
CREAT SPEC-SCNC: 69 MG/DL
CYSTATIN C SERPL-MCNC: 1.26 MG/L
EGFR: 70 ML/MIN/1.73M2
GFR/BSA.PRED SERPLBLD CYS-BASED-ARV: 58 ML/MIN/1.73M2
GLUCOSE QUALITATIVE U: ABNORMAL
GLUCOSE SERPL-MCNC: 306 MG/DL
HYALINE CASTS: 0 /LPF
KETONES URINE: NEGATIVE
LEUKOCYTE ESTERASE URINE: NEGATIVE
MICROALBUMIN 24H UR DL<=1MG/L-MCNC: 73.5 MG/DL
MICROALBUMIN/CREAT 24H UR-RTO: 1070 MG/G
MICROSCOPIC-UA: NORMAL
NITRITE URINE: NEGATIVE
PH URINE: 6
POTASSIUM SERPL-SCNC: 5.5 MMOL/L
POTASSIUM SERPL-SCNC: 5.7 MMOL/L
PROTEIN URINE: ABNORMAL
RED BLOOD CELLS URINE: 1 /HPF
SODIUM SERPL-SCNC: 139 MMOL/L
SPECIFIC GRAVITY URINE: 1.02
SQUAMOUS EPITHELIAL CELLS: 0 /HPF
UROBILINOGEN URINE: NORMAL
WHITE BLOOD CELLS URINE: 0 /HPF

## 2023-04-06 ENCOUNTER — NON-APPOINTMENT (OUTPATIENT)
Age: 58
End: 2023-04-06

## 2023-04-06 ENCOUNTER — TRANSCRIPTION ENCOUNTER (OUTPATIENT)
Age: 58
End: 2023-04-06

## 2023-04-06 ENCOUNTER — APPOINTMENT (OUTPATIENT)
Dept: PULMONOLOGY | Facility: CLINIC | Age: 58
End: 2023-04-06
Payer: COMMERCIAL

## 2023-04-06 VITALS
TEMPERATURE: 97.7 F | OXYGEN SATURATION: 99 % | SYSTOLIC BLOOD PRESSURE: 158 MMHG | DIASTOLIC BLOOD PRESSURE: 75 MMHG | HEIGHT: 72 IN | HEART RATE: 51 BPM | BODY MASS INDEX: 29.93 KG/M2 | WEIGHT: 221 LBS

## 2023-04-06 LAB
CHOLEST SERPL-MCNC: 149 MG/DL
HDLC SERPL-MCNC: 56 MG/DL
LDLC SERPL CALC-MCNC: 82 MG/DL
NONHDLC SERPL-MCNC: 93 MG/DL
NT-PROBNP SERPL-MCNC: 633 PG/ML
TRIGL SERPL-MCNC: 57 MG/DL

## 2023-04-06 PROCEDURE — ZZZZZ: CPT

## 2023-04-06 PROCEDURE — 94729 DIFFUSING CAPACITY: CPT | Mod: 26

## 2023-04-06 PROCEDURE — 99214 OFFICE O/P EST MOD 30 MIN: CPT | Mod: 25

## 2023-04-06 PROCEDURE — 94726 PLETHYSMOGRAPHY LUNG VOLUMES: CPT | Mod: 26

## 2023-04-06 PROCEDURE — 94060 EVALUATION OF WHEEZING: CPT | Mod: 26

## 2023-04-07 ENCOUNTER — NON-APPOINTMENT (OUTPATIENT)
Age: 58
End: 2023-04-07

## 2023-04-07 DIAGNOSIS — R76.8 OTHER SPECIFIED ABNORMAL IMMUNOLOGICAL FINDINGS IN SERUM: ICD-10-CM

## 2023-04-07 NOTE — PHYSICAL EXAM
[General Appearance - Well Developed] : well developed [Normal Appearance] : normal appearance [General Appearance - Well Nourished] : well nourished [Well Groomed] : well groomed [No Deformities] : no deformities [General Appearance - In No Acute Distress] : no acute distress [Normal Oropharynx] : normal oropharynx [III] : III [Heart Rate And Rhythm] : heart rate was normal and rhythm regular [Heart Sounds] : normal S1 and S2 [Heart Sounds Gallop] : no gallops [Murmurs] : no murmurs [Heart Sounds Pericardial Friction Rub] : no pericardial rub [] : no respiratory distress [Auscultation Breath Sounds / Voice Sounds] : lungs were clear to auscultation bilaterally [Nail Clubbing] : no clubbing of the fingernails [Cyanosis, Localized] : no localized cyanosis [Petechial Hemorrhages (___cm)] : no petechial hemorrhages [2+ Pitting] : 2+  pitting [FreeTextEntry1] : No posterior exudate

## 2023-04-07 NOTE — HISTORY OF PRESENT ILLNESS
[FreeTextEntry1] : 01/13/2023 :  VALENTIN CABALLERO is a 57 year old male who is being evaluated for possible sleep disordered breathing.  He has a history of heart failure with preserved ejection fraction and had coronary stenting December 9.  He was admitted again December 28 with hyperkalemia and acute renal failure.  During his recent hospitalization was noted to be snoring severely and was treated with nasal CPAP empirically for at least 1 night.  He found that relatively comfortable and thought it improved his sleep.\par \par His usual bedtime is 10 PM, sleep latency 15 minutes, awakens 4-5 times on a typical night before getting up at 8 AM.  He has been told of severe snoring apnea has been observed and he wakes up choking or gasping on occasion.  He denies significant daytime sleepiness.  There is no history of morning headache, nasal or sinus congestion, sleep paralysis, parasomnias, or cataplexy.  There is no family history of sleep apnea or other sleep problem.  No recent change in weight.\par \par 3/24/23: Since last seen he did not follow-up with a home sleep study.  However, during that time he has lost 20 pounds.  He no longer has much snoring.  He goes to bed about 10 PM, falls asleep quickly, gets up about 8 AM after brief awakenings.  Does not report daytime sleepiness, and his wife who is here confirms this.  He has had aggressive management for congestive heart failure and fluid overload, at one point evidently becoming dehydrated, but now feels well.  He is here mostly because of a chronic nonproductive cough.  He is not coughing in the office today.  He does not complain of dyspnea.  Does not wheeze.  Most recent chest x-ray available for review from December 2022 shows pulmonary vascular congestion, normal heart size.\par \par He has not been on an ACE inhibitor.  He does not have symptoms of gastroesophageal reflux, he does have some nasal congestion.\par \par 4/6/23: Since last seen his diuretics were evidently stopped and he is now gained about 20 pounds.  His cough is worse, and he has symptoms of orthopnea and paroxysmal nocturnal dyspnea.  He also has noted bilateral leg swelling.\par \par

## 2023-04-07 NOTE — ASSESSMENT
[FreeTextEntry1] : He appears to be fluid overloaded once again.  He will be talking to his cardiologist and restarting diuretics.  I have suggested when he gets back to a reasonable weight, perhaps down by 10 to 15 pounds, we evaluate him with home sleep testing to see if he does have significant sleep disordered breathing.  I suspect that any sleep disordered breathing we see at this point might be purely central sleep apnea from his cardiomyopathy.

## 2023-04-07 NOTE — PROCEDURE
[FreeTextEntry1] : pulmonary function testing today:  mild to moderate  restrictive changes, normal DLCO:  c/w CHF

## 2023-04-09 ENCOUNTER — NON-APPOINTMENT (OUTPATIENT)
Age: 58
End: 2023-04-09

## 2023-04-11 ENCOUNTER — APPOINTMENT (OUTPATIENT)
Dept: HEART AND VASCULAR | Facility: CLINIC | Age: 58
End: 2023-04-11

## 2023-04-13 ENCOUNTER — NON-APPOINTMENT (OUTPATIENT)
Age: 58
End: 2023-04-13

## 2023-04-13 ENCOUNTER — TRANSCRIPTION ENCOUNTER (OUTPATIENT)
Age: 58
End: 2023-04-13

## 2023-04-13 LAB
ANION GAP SERPL CALC-SCNC: 11 MMOL/L
BUN SERPL-MCNC: 18 MG/DL
CALCIUM SERPL-MCNC: 9.6 MG/DL
CHLORIDE SERPL-SCNC: 103 MMOL/L
CO2 SERPL-SCNC: 28 MMOL/L
CREAT SERPL-MCNC: 1.28 MG/DL
EGFR: 66 ML/MIN/1.73M2
GLUCOSE SERPL-MCNC: 243 MG/DL
POTASSIUM SERPL-SCNC: 4.7 MMOL/L
POTASSIUM SERPL-SCNC: 4.8 MMOL/L
SODIUM SERPL-SCNC: 143 MMOL/L

## 2023-04-14 ENCOUNTER — TRANSCRIPTION ENCOUNTER (OUTPATIENT)
Age: 58
End: 2023-04-14

## 2023-04-15 ENCOUNTER — TRANSCRIPTION ENCOUNTER (OUTPATIENT)
Age: 58
End: 2023-04-15

## 2023-04-21 ENCOUNTER — TRANSCRIPTION ENCOUNTER (OUTPATIENT)
Age: 58
End: 2023-04-21

## 2023-04-25 ENCOUNTER — NON-APPOINTMENT (OUTPATIENT)
Age: 58
End: 2023-04-25

## 2023-04-26 ENCOUNTER — APPOINTMENT (OUTPATIENT)
Dept: HEART AND VASCULAR | Facility: CLINIC | Age: 58
End: 2023-04-26
Payer: COMMERCIAL

## 2023-04-26 VITALS
HEART RATE: 64 BPM | HEIGHT: 72 IN | DIASTOLIC BLOOD PRESSURE: 68 MMHG | WEIGHT: 201 LBS | OXYGEN SATURATION: 97 % | SYSTOLIC BLOOD PRESSURE: 140 MMHG | BODY MASS INDEX: 27.22 KG/M2

## 2023-04-26 PROCEDURE — 36415 COLL VENOUS BLD VENIPUNCTURE: CPT

## 2023-04-26 PROCEDURE — 99214 OFFICE O/P EST MOD 30 MIN: CPT | Mod: 25

## 2023-04-26 RX ORDER — METOLAZONE 5 MG/1
5 TABLET ORAL
Qty: 90 | Refills: 1 | Status: DISCONTINUED | COMMUNITY
Start: 2023-03-20 | End: 2023-04-26

## 2023-04-26 NOTE — CARDIOLOGY SUMMARY
[de-identified] : \par 12/16/22 EKG: likely ectopic atrial rhythm at 52bpm, no ischemic ST-T deviations\par 1/5/23 EKG: sinus bradycardia at 50bpm, prominent T waves\par 3/23/23 EKG: SB at 53bpm, prominent T waves similar to prior [de-identified] : \par 12/9/22 Limited TTE: normal LV and RV size and fxn, mildly dilated LA, aortic sclerosis, no pericardial effusion\par 12/9/22 Limited TTE: normal LV size/fxn, no pericardial effusion\par 12/5/22 TTE: normal LV size/fxn, normal RV, mild LAE, aortic sclerosis, PASP 47mmHg, no pericardial effusion\par 12/29/22 TTE: limited study, normal LV and RV size and function, no pericardial effusion [de-identified] : \par 12/9/22 LHC: 1vCAD, 100%  OM1, 30-50% pRamus, s/p PEPE of OM; RHC RA 10, RV 41/3/12, PA 39/15/33, PCWP 17, CI 3.3

## 2023-04-26 NOTE — DISCUSSION/SUMMARY
[FreeTextEntry1] : Joey Tse is a 58yo M with CAD (PEPE to OM1 Dec 2022), HFpEF HTN HL OM of R 4th toe on abx, anxiety, hospitalization in December initially for HFpEF and CAD s/p diuresis and PCI who presents for follow-up.\par \par HFpEF: prev hospitalizations for hyperkalemia/NOLAN/volume overload; most recently in ER for hypotension/dehydration\par -now has been losing weight below his baseline on torsemide 20mg every other day (no metolazone) - recommend to stop torsemide until weight is over 210 lbs; he will check daily weights and only take torsemide if weight goes up over 210 lbs and stop if weight under 205 lbs (can use torsemide 20mg every other day during that time)\par -unclear etiology - amyloid labs checked - elevated light chains so referred to heme\par -given brittle status, will refer to Dr. Jordan -- scheduled for next week\par -check BMP, Mg today\par \par Hyperkalemia: stable on Lokelma now -- has follow-up with Dr. Castillo next week\par HTN: BP above goal but given rapid weight loss and recent hypotension admission, will hold off on adding meds as home SBPs are 120s-150. Continue amlodipine 10mg. Not on ARB given hyperkalemia hx. No BB given concern for BRASH syndrome in the past. \par \par CAD: continue DAPT\par -continue statin - pt does not think was taking consistently so recheck lipid profile in ~2 months of consistent statin\par \par Pt will establish care with Dr. Jordan next week and can follow-up with him as needed over the summer

## 2023-04-26 NOTE — HISTORY OF PRESENT ILLNESS
[FreeTextEntry1] : Joey Tse is a 58yo M with CAD (PEPE to OM1 Dec 2022), HTN HL OM of R 4th toe on abx, anxiety, hospitalization in December initially for HFpEF and CAD s/p diuresis and PCI who presents for follow-up.\par \par Intolerant of BB (concern for BRASH)\par Last seen Mar 2023. At that time, had been in ER for hypotension/dehydration - may have been taking dual diuretics (furosemide, torsemide) and metolazone. Diuretics/antihypertensives were stopped. \par \par Since then:\par -meds have been slowly introduced - now on torsemide 20mg every other day, amlodipine 10mg\par -SBPs at home between 120s-150s\par -since starting torsemide at every other day dosing, has noted continued decrease in weight -- now down to 201 lbs -- was up to 220 lbs before starting\par -LDL was above goal, he thinks was missing statin doses - wanted to try consistent dosing before making changes \par -referred to CHF Dr. Jordan given brittle HFpEF; also had amyloid labs sent shown to have increased light chains so referred to heme\par -no chest pain or shortness of breath\par -tolerating and compliant with DAPT\par \par PMH/PSH:\par as above\par \par FH:\par Father CAD 70's yo\par \par SH:\par Non smoker, Occasional ETOH 3-4 drinks / week

## 2023-04-26 NOTE — PHYSICAL EXAM
[Well Developed] : well developed [Well Nourished] : well nourished [No Acute Distress] : no acute distress [Normal Venous Pressure] : normal venous pressure [No Carotid Bruit] : no carotid bruit [Normal S1, S2] : normal S1, S2 [No Murmur] : no murmur [Clear Lung Fields] : clear lung fields [Good Air Entry] : good air entry [No Respiratory Distress] : no respiratory distress  [Soft] : abdomen soft [Non Tender] : non-tender [No Edema] : no edema [Moves all extremities] : moves all extremities [Alert and Oriented] : alert and oriented

## 2023-04-27 LAB
ANION GAP SERPL CALC-SCNC: 12 MMOL/L
BUN SERPL-MCNC: 23 MG/DL
CALCIUM SERPL-MCNC: 9.5 MG/DL
CHLORIDE SERPL-SCNC: 95 MMOL/L
CO2 SERPL-SCNC: 26 MMOL/L
CREAT SERPL-MCNC: 1.31 MG/DL
EGFR: 64 ML/MIN/1.73M2
GLUCOSE SERPL-MCNC: 462 MG/DL
MAGNESIUM SERPL-MCNC: 2.1 MG/DL
POTASSIUM SERPL-SCNC: 5.7 MMOL/L
SODIUM SERPL-SCNC: 133 MMOL/L

## 2023-05-03 ENCOUNTER — APPOINTMENT (OUTPATIENT)
Dept: HEART AND VASCULAR | Facility: CLINIC | Age: 58
End: 2023-05-03
Payer: COMMERCIAL

## 2023-05-03 ENCOUNTER — APPOINTMENT (OUTPATIENT)
Dept: NEPHROLOGY | Facility: CLINIC | Age: 58
End: 2023-05-03
Payer: COMMERCIAL

## 2023-05-03 VITALS
WEIGHT: 205 LBS | DIASTOLIC BLOOD PRESSURE: 70 MMHG | HEIGHT: 72 IN | BODY MASS INDEX: 27.77 KG/M2 | SYSTOLIC BLOOD PRESSURE: 152 MMHG | OXYGEN SATURATION: 99 % | TEMPERATURE: 98.6 F | RESPIRATION RATE: 16 BRPM | HEART RATE: 70 BPM

## 2023-05-03 VITALS
SYSTOLIC BLOOD PRESSURE: 132 MMHG | HEIGHT: 72 IN | WEIGHT: 206 LBS | HEART RATE: 68 BPM | DIASTOLIC BLOOD PRESSURE: 54 MMHG | BODY MASS INDEX: 27.9 KG/M2

## 2023-05-03 PROCEDURE — 99214 OFFICE O/P EST MOD 30 MIN: CPT

## 2023-05-03 PROCEDURE — 36415 COLL VENOUS BLD VENIPUNCTURE: CPT

## 2023-05-03 PROCEDURE — 99214 OFFICE O/P EST MOD 30 MIN: CPT | Mod: 25

## 2023-05-03 NOTE — HISTORY OF PRESENT ILLNESS
[FreeTextEntry1] : 57-year-old man with a history of hypertension, longstanding type 2 diabetes with neuropathy, retinopathy, and nephropathy, CAD/HFpEF and multiple episodes of NOLAN, and CKD 3.  His creatinine is fortunately stable at about 1.3, with a GFR in the 60s.  However, his blood sugar is wildly uncontrolled, and was 462 last week.  Hyperkalemia has been an issue, but there is a problem with pseudohyperkalemia as well.  On April 26, his K was 5.7 on serum, but only 4.7 on plasma.  He takes Lokelma 10 g daily, and sodium bicarbonate 650 mg twice daily.  His CO2 level was 26.  His CHF is currently controlled on torsemide 20 mg every other day.  He skips it if his weight is below 205 and takes it if he is above.  His BP is managed with amlodipine 10 mg daily, but his BP was 158/75 on April 6 when he saw Dr. Meeta Monreal for a sleep visit.  On the other hand he was fluid overloaded at that time.  His BP at home runs around 140.  I would like to get him on an ARB if his potassium is adequately controlled and he continues to exhibit proteinuria.

## 2023-05-03 NOTE — ASSESSMENT
[FreeTextEntry1] : 57-year-old man with stable CHF, stable CKD stage III, controlled hyperkalemia despite the appearance of his serum being high -plasma K is 4.7.  He will remain on torsemide, Lokelma, sodium bicarb and.  I have ordered labs to repeat in 6 weeks, to include A1c, BMP, Cystatin C, plasma potassium, U ACR.  If his K is controlled and he has microalbuminuria, I would like to add an ARB to induce better BP control, and improve proteinuria and perhaps LV function as well.

## 2023-05-08 ENCOUNTER — RX CHANGE (OUTPATIENT)
Age: 58
End: 2023-05-08

## 2023-06-02 NOTE — PATIENT PROFILE ADULT - FUNCTIONAL ASSESSMENT - DAILY ACTIVITY SECTION LABEL
. Topical Metronidazole Pregnancy And Lactation Text: This medication is Pregnancy Category B and considered safe during pregnancy.  It is also considered safe to use while breastfeeding.

## 2023-06-07 ENCOUNTER — APPOINTMENT (OUTPATIENT)
Age: 58
End: 2023-06-07

## 2023-06-07 ENCOUNTER — RX RENEWAL (OUTPATIENT)
Age: 58
End: 2023-06-07

## 2023-06-09 ENCOUNTER — NON-APPOINTMENT (OUTPATIENT)
Age: 58
End: 2023-06-09

## 2023-06-09 ENCOUNTER — TRANSCRIPTION ENCOUNTER (OUTPATIENT)
Age: 58
End: 2023-06-09

## 2023-06-10 ENCOUNTER — RX RENEWAL (OUTPATIENT)
Age: 58
End: 2023-06-10

## 2023-06-10 ENCOUNTER — TRANSCRIPTION ENCOUNTER (OUTPATIENT)
Age: 58
End: 2023-06-10

## 2023-06-12 ENCOUNTER — TRANSCRIPTION ENCOUNTER (OUTPATIENT)
Age: 58
End: 2023-06-12

## 2023-06-12 ENCOUNTER — APPOINTMENT (OUTPATIENT)
Dept: NEPHROLOGY | Facility: CLINIC | Age: 58
End: 2023-06-12
Payer: COMMERCIAL

## 2023-06-12 VITALS — SYSTOLIC BLOOD PRESSURE: 154 MMHG | DIASTOLIC BLOOD PRESSURE: 75 MMHG | HEART RATE: 64 BPM

## 2023-06-12 DIAGNOSIS — R60.0 LOCALIZED EDEMA: ICD-10-CM

## 2023-06-12 LAB
ANION GAP SERPL CALC-SCNC: 12 MMOL/L
BUN SERPL-MCNC: 23 MG/DL
CALCIUM SERPL-MCNC: 9.8 MG/DL
CHLORIDE SERPL-SCNC: 102 MMOL/L
CO2 SERPL-SCNC: 28 MMOL/L
CREAT SERPL-MCNC: 1.36 MG/DL
CYSTATIN C SERPL-MCNC: 1.4 MG/L
EGFR: 61 ML/MIN/1.73M2
GFR/BSA.PRED SERPLBLD CYS-BASED-ARV: 51 ML/MIN/1.73M2
GLUCOSE SERPL-MCNC: 72 MG/DL
POTASSIUM SERPL-SCNC: 4.3 MMOL/L
POTASSIUM SERPL-SCNC: 4.5 MMOL/L
SODIUM SERPL-SCNC: 142 MMOL/L

## 2023-06-12 PROCEDURE — 99214 OFFICE O/P EST MOD 30 MIN: CPT

## 2023-06-12 RX ORDER — PANTOPRAZOLE 40 MG/1
40 TABLET, DELAYED RELEASE ORAL DAILY
Qty: 30 | Refills: 2 | Status: DISCONTINUED | COMMUNITY
Start: 2022-11-18 | End: 2023-06-12

## 2023-06-12 RX ORDER — SODIUM POLYSTYRENE SULFONATE 4.1 MEQ/G
POWDER, FOR SUSPENSION ORAL; RECTAL DAILY
Qty: 45 | Refills: 0 | Status: DISCONTINUED | COMMUNITY
Start: 2022-12-23 | End: 2023-06-12

## 2023-06-12 RX ORDER — ALBUTEROL SULFATE 90 UG/1
108 (90 BASE) INHALANT RESPIRATORY (INHALATION)
Qty: 1 | Refills: 0 | Status: DISCONTINUED | COMMUNITY
Start: 2023-03-24 | End: 2023-06-12

## 2023-06-13 ENCOUNTER — TRANSCRIPTION ENCOUNTER (OUTPATIENT)
Age: 58
End: 2023-06-13

## 2023-06-13 LAB
CREAT SPEC-SCNC: 105 MG/DL
ESTIMATED AVERAGE GLUCOSE: 390 MG/DL
HBA1C MFR BLD HPLC: 15.2 %
MICROALBUMIN 24H UR DL<=1MG/L-MCNC: 370 MG/DL
MICROALBUMIN/CREAT 24H UR-RTO: 3530 MG/G

## 2023-06-13 NOTE — PHYSICAL EXAM
[General Appearance - Alert] : alert [General Appearance - In No Acute Distress] : in no acute distress [Sclera] : the sclera and conjunctiva were normal [PERRL With Normal Accommodation] : pupils were equal in size, round, and reactive to light [Outer Ear] : the ears and nose were normal in appearance [Neck Appearance] : the appearance of the neck was normal [Neck Cervical Mass (___cm)] : no neck mass was observed [Jugular Venous Distention Increased] : there was no jugular-venous distention [Auscultation Breath Sounds / Voice Sounds] : lungs were clear to auscultation bilaterally [Heart Rate And Rhythm] : heart rate was normal and rhythm regular [Heart Sounds] : normal S1 and S2 [Heart Sounds Gallop] : no gallops [Murmurs] : no murmurs [Heart Sounds Pericardial Friction Rub] : no pericardial rub [Full Pulse] : the pedal pulses are present [Skin Color & Pigmentation] : normal skin color and pigmentation [Skin Turgor] : normal skin turgor [] : no rash [No Focal Deficits] : no focal deficits [Oriented To Time, Place, And Person] : oriented to person, place, and time [Impaired Insight] : insight and judgment were intact [Affect] : the affect was normal [Examination Of The Oral Cavity] : the lips and gums were normal [Abnormal Walk] : normal gait [Involuntary Movements] : no involuntary movements were seen [FreeTextEntry1] : trace to 1+ b/l LE edema

## 2023-06-13 NOTE — HISTORY OF PRESENT ILLNESS
[FreeTextEntry1] : 59 yo M with history of HTN, longstanding uncontrolled DM2 with neuropathy, retinopathy, and nephropathy, CAD/HFpEF, and multiple episodes of NOLAN and CKD2/3.\par Notes that for past couple weeks, BP has been much higher at home (>170s systolic) for which he only takes amlodipine 10mg daily.  He is without symptoms and reports feeling fine.  He had seen Dr. Alexandre for sleep disorder evaluation in April, and was fluid overloaded at the time.  \par His creatinine has been stable at about 1.3, with a GFR in the 60s.  However, his blood sugar remains uncontrolled.  Hyperkalemia has been an issue, also with pseudohyperkalemia as well.  He has been off Lokelma but took some yesterday hoping that it would help with BP.  He also takes sodium bicarbonate 650mg but only once daily.  It appears he had previously been on lisinopril, suspect it may have been discontinued due to hyperkalemia. Jardiance prescribed by cardio last month but per pharmacy, they have not even received the order.  \par His CHF is reasonably controlled, taking torsemide 20mg only if weight >205lbs.  Reports being 199lbs today at home.\par

## 2023-06-13 NOTE — ASSESSMENT
[FreeTextEntry1] : 58-year-old man with stable CHF, stable CKD, HTN and uncontrolled DM.  \par Will have him do labs today which Dr. Castillo had ordered last visit.  Would probably start him on an ARB for additional BP and proteinuric control.  He took one dose of Lokelma yesterday, not likely plasma K will be elevated.  If on the high normal end, may have him resume taking K binder when adding ARB.  He can continue torsemide prn, and sodium bicarbonate BID.  Presumably he still has some proteinuria.  In addition to ARB, Jardiance would be a great addition.  Will evaluate the effect of an ARB first.  \par Still needs better blood sugar control.  Will be following up with a new endocrinologist within a couple weeks.  \par \par addendum 6/13: Cr 1.3, stable. K also good.  UACR now >3g/g, likely attributed to uncontrolled DM (a1C 15% !). He has not been compliant with DM meds and said he will be following up with endocrinologist within the month.  Will rx olmesartan 40mg in hopes it will greatly reduce his pressure and proteinuria.  Advised he call back if BP still needs better control.  can f/u 3-4months.

## 2023-07-02 NOTE — ED PROVIDER NOTE - PHYSICAL EXAMINATION
RT LE wound
General: comfortable, resting in ED  HEENT: atraumatic, no eye erythema or discharge  Pulm: no cyanosis, no added work of breathing, small rales bilateral lower lung fields  Cardiac: extremities warm, intact peripheral pulse  GI: no abdominal distension, abdomen nontender  Neuro: alert, conversant  Psych: neutral affect, cooperative  Msk: no gross deformity or instability, 1+ pitting edema bilateral lower extremities  Skin: no erythema or rash

## 2023-07-18 NOTE — CONSULT NOTE ADULT - CONSULT REQUESTED DATE/TIME
08-Nov-2022 12:23
Impression: Hypermetropia, bilateral: H52.03. Plan: Discussed diagnosis in detail with patient. No treatment is required at this time. New glasses Rx was not given today. Recommend yearly exams.
07-Nov-2022 22:20
09-Nov-2022

## 2023-09-07 ENCOUNTER — RX RENEWAL (OUTPATIENT)
Age: 58
End: 2023-09-07

## 2023-09-27 ENCOUNTER — TRANSCRIPTION ENCOUNTER (OUTPATIENT)
Age: 58
End: 2023-09-27

## 2023-10-05 ENCOUNTER — RX RENEWAL (OUTPATIENT)
Age: 58
End: 2023-10-05

## 2023-10-10 ENCOUNTER — TRANSCRIPTION ENCOUNTER (OUTPATIENT)
Age: 58
End: 2023-10-10

## 2023-10-11 ENCOUNTER — APPOINTMENT (OUTPATIENT)
Dept: NEPHROLOGY | Facility: CLINIC | Age: 58
End: 2023-10-11
Payer: COMMERCIAL

## 2023-10-11 VITALS
HEART RATE: 68 BPM | DIASTOLIC BLOOD PRESSURE: 72 MMHG | WEIGHT: 203 LBS | BODY MASS INDEX: 27.5 KG/M2 | SYSTOLIC BLOOD PRESSURE: 146 MMHG | HEIGHT: 72 IN

## 2023-10-11 PROCEDURE — 99214 OFFICE O/P EST MOD 30 MIN: CPT

## 2023-10-12 LAB
ANION GAP SERPL CALC-SCNC: 10 MMOL/L
BUN SERPL-MCNC: 27 MG/DL
CALCIUM SERPL-MCNC: 9 MG/DL
CALCIUM SERPL-MCNC: 9 MG/DL
CHLORIDE SERPL-SCNC: 108 MMOL/L
CO2 SERPL-SCNC: 25 MMOL/L
CREAT SERPL-MCNC: 1.4 MG/DL
CREAT SPEC-SCNC: 80 MG/DL
CYSTATIN C SERPL-MCNC: 1.41 MG/L
EGFR: 59 ML/MIN/1.73M2
ESTIMATED AVERAGE GLUCOSE: 395 MG/DL
GFR/BSA.PRED SERPLBLD CYS-BASED-ARV: 50 ML/MIN/1.73M2
GLUCOSE SERPL-MCNC: 138 MG/DL
HBA1C MFR BLD HPLC: 15.4 %
MICROALBUMIN 24H UR DL<=1MG/L-MCNC: 126.7 MG/DL
MICROALBUMIN/CREAT 24H UR-RTO: 1576 MG/G
PARATHYROID HORMONE INTACT: 36 PG/ML
POTASSIUM SERPL-SCNC: 4.6 MMOL/L
POTASSIUM SERPL-SCNC: 4.8 MMOL/L
SODIUM SERPL-SCNC: 142 MMOL/L

## 2023-11-06 ENCOUNTER — RX RENEWAL (OUTPATIENT)
Age: 58
End: 2023-11-06

## 2023-11-08 NOTE — ED PROVIDER NOTE - EKG #1 DATE/TIME
"PT Name: Marisol Kerr  MR #: [id number]     DOCUMENTATION CLARIFICATION     CDS/: Quiana Larsen Pe               Contact information: [phone]   This form is a permanent document in the medical record.     Query Date: November 8, 2023    By submitting this query, we are merely seeking further clarification of documentation.  Please utilize your independent clinical judgment when addressing the question(s) below.  Provider, please provide the integumentary diagnosis related to the documentation of Various decubitus ulcers:    The Medical Record contains the following:     Decubiti to heels per Pulmonary consult note 11/7 11/7 Wound care MD, Dr Barkley-  pt presented w/ a DTI of Sacrum rapidly progressed to an unstagable necrotic pressure ulcer over three days. Patient's ulcer has the developing "butterfly" shape which is characteristic of a Stephane ulcer.  Per nursing documentation the Left heel and sacral altered skin integrity were present on admit        The clinical guidelines noted are only a system guideline. It does not replace the providers clinical judgment.    Per the National Pressure Injury Advisory Panel:   A pressure injury is localized damage to the skin and underlying soft tissue usually over a bony prominence or related to a medical or other device. The injury can present as intact skin or an open ulcer and may be painful. The injury occurs as a result of intense and/or prolonged pressure or pressure in combination with shear. The tolerance of soft tissue for pressure and shear may also be affected by microclimate, nutrition, perfusion, co-morbidities and condition of the soft tissue.       Stage 1 Pressure Injury:  Intact skin with a localized area of non-blanchable erythema, which may appear differently in darkly pigmented skin. Color changes do not include purple or maroon discoloration; these may indicate deep tissue pressure injury.    Stage 2 Pressure Injury:  Partial-thickness loss of " skin with exposed dermis. The wound bed is viable, pink or red, moist, and may also present as an intact or ruptured serum-filled blister.    Stage 3 Pressure Injury:  Full-thickness loss of skin, in which adipose (fat) is visible in the ulcer and granulation tissue and epibole (rolled wound edges) are often present. Slough and/or eschar may be visible. Undermining and tunneling may occur.    Stage 4 Pressure Injury:  Full-thickness skin and tissue loss with exposed or directly palpable fascia, muscle, tendon, ligament, cartilage or bone in the ulcer. Slough and/or eschar may be visible. Epibole (rolled edges), undermining and/or tunneling often occur.    Unstageable Pressure Injury:  Full-thickness skin and tissue loss in which the extent of tissue damage within the ulcer cannot be confirmed because it is obscured by slough or eschar. If slough or eschar is removed, a Stage 3 or Stage 4 pressure injury will be revealed.    Deep Tissue Pressure Injury:  Intact or non-intact skin with localized area of persistent non-blanchable deep red, maroon, purple discoloration or epidermal separation revealing a dark wound bed or blood filled blister. This injury results from intense and/or prolonged pressure and shear forces at the bone-muscle interface. The wound may evolve rapidly to reveal the actual extent of tissue injury, or may resolve without tissue loss. If necrotic tissue, subcutaneous tissue, granulation tissue, fascia, muscle or other underlying structures are visible, this indicates a full thickness pressure injury (Unstageable, Stage 3 or Stage 4). Do not use DTPI to describe vascular, traumatic, neuropathic, or dermatologic conditions.   Medical Device Related Pressure Injury: This describes an etiology. Medical device related pressure injuries result from the use of devices designed and applied for diagnostic or therapeutic purposes. The resultant pressure injury generally conforms to the pattern or shape of  the device. The injury should be staged using the staging system.    Mucosal Membrane Pressure Injury: Mucosal membrane pressure injury is found on mucous membranes with a history of a medical device in use at the location of the injury. Due to the anatomy of the tissue these ulcers cannot be staged.       Provider, please provide the integumentary diagnosis related to the documentation of Left Heel:     [  X ] Pressure Injury/Decubitus Ulcer, Stage 1   [   ] Pressure Injury/Decubitus Ulcer, Unstageable   [   ] Deep Tissue Pressure Injury   [   ] Other Integumentary Diagnosis (please specify):______________      Provider, please provide the integumentary diagnosis related to the documentation of Right Heel:  [   ] Pressure Injury/Decubitus Ulcer, Stage 1   [  X ] Pressure Injury/Decubitus Ulcer, Unstageable   [   ] Deep Tissue Pressure Injury   [   ] Other Integumentary Diagnosis (please specify):______________     Please, provide POA status of the Right Heel ulcer : POA ___X__ Yes  /  ____ No     Provider, please provide the integumentary diagnosis related to the documentation of the Sacrum:  [   ]  Deep Tissue Pressure Injury that evolved into an unstageable Pressure Injury/Decubitus Ulcer / Stephane ulcer       [   ] Pressure Injury/Decubitus Ulcer, Stage 1   [   ] Pressure Injury/Decubitus Ulcer, Stage 2   [   ] Pressure Injury/Decubitus Ulcer, Stage 3   [   ] Pressure Injury/Decubitus Ulcer, Stage 4   [  X] Pressure Injury/Decubitus Ulcer, Unstageable   [   ] Deep Tissue Pressure Injury   [   ] Other Integumentary Diagnosis (please specify):______________       Please document in your progress notes daily for the duration of treatment until resolved and include in your discharge summary.    Reference:    EMMA Quinones., AIMEE Holland., Goldberg, M., THANG Nunez., EMMA Sewell, & ERIKA Brandon (2016). Revised National Pressure Ulcer Advisory Panel Pressure Injury Staging System: Revised Pressure Injury Staging System.  J Wound Ostomy Continence Nurs, 43(6), 585-597. doi:10.1097/[id number]    Form No.[id number]   28-Dec-2022 16:30

## 2023-12-06 ENCOUNTER — RX RENEWAL (OUTPATIENT)
Age: 58
End: 2023-12-06

## 2023-12-07 ENCOUNTER — TRANSCRIPTION ENCOUNTER (OUTPATIENT)
Age: 58
End: 2023-12-07

## 2024-01-05 ENCOUNTER — NON-APPOINTMENT (OUTPATIENT)
Age: 59
End: 2024-01-05

## 2024-01-06 ENCOUNTER — NON-APPOINTMENT (OUTPATIENT)
Age: 59
End: 2024-01-06

## 2024-01-09 ENCOUNTER — APPOINTMENT (OUTPATIENT)
Dept: ORTHOPEDIC SURGERY | Facility: CLINIC | Age: 59
End: 2024-01-09
Payer: COMMERCIAL

## 2024-01-09 VITALS — WEIGHT: 200 LBS | HEIGHT: 72 IN | BODY MASS INDEX: 27.09 KG/M2

## 2024-01-09 DIAGNOSIS — Q65.89 OTHER SPECIFIED CONGENITAL DEFORMITIES OF HIP: ICD-10-CM

## 2024-01-09 DIAGNOSIS — M70.62 TROCHANTERIC BURSITIS, LEFT HIP: ICD-10-CM

## 2024-01-09 PROCEDURE — 99203 OFFICE O/P NEW LOW 30 MIN: CPT

## 2024-01-09 PROCEDURE — 72100 X-RAY EXAM L-S SPINE 2/3 VWS: CPT

## 2024-01-09 PROCEDURE — 73502 X-RAY EXAM HIP UNI 2-3 VIEWS: CPT

## 2024-01-09 NOTE — PHYSICAL EXAM
[de-identified] : Left hip  Constitutional:  The patient is healthy-appearing and in no apparent distress.   Gait: The patient ambulates with a normal gait and no limp.  Cardiovascular System:  There is capillary refill less than 2 seconds.   Skin:  There is no skin abnormalities.  Lumbar Spine; There is no significance malalignment and no tenderness to the paraspinal musculature.  Left hip:  Bony Palpation:  There is no tenderness of the iliac crest. There is no tenderness of the ASIS. There is no tenderness of the PSIS. There is no tenderness of the SI joint. There is tenderness of the greater trochanter.   Soft Tissue Palpation:  There is no tenderness of the hip adductors. There is no tenderness of the hip abductors. There is no tenderness of the hamstrings.  There is no tenderness of the piriformis.  There is no tenderness of the hip flexors.  Active Range of Motion:  There is decreased hip range of motion with internal rotation to 15 degrees and external rotation to 40  Special Tests:  There is a positive Isa's test. There is a negative Hector-Yessy test.   Strength:  There is 5/5 hip flexion, adduction, abduction.    Psychiatric:  The patient demonstrates a normal mood and affect and is active and alert. 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  Except for mild loss of lordosis

## 2024-01-09 NOTE — ASSESSMENT
[FreeTextEntry1] : Discussed at length with patient hip dysplasia treatment options and tight IT band he elects home exercises and therapy will follow-up if persistent symptoms

## 2024-01-09 NOTE — HISTORY OF PRESENT ILLNESS
[de-identified] : Initial Visit: Left hip-lateral Reason: no injury Duration: months Prior studies: x rays @ [facility] urgent care Symptoms: weakness/ buckling/  Aggravating Fx: walking/ up sown stairs lifting leg Alleviating Fx: heat Medical Hx: diabetes / neuropathy  Surgeries Hx: none Current MEdication: Allergies: NKA

## 2024-01-18 NOTE — ED ADULT TRIAGE NOTE - GLASGOW COMA SCALE: SCORE, MLM
15
[FreeTextEntry3] : Large joint injection was performed of the right left knees. The indication for this procedure was pain, inflammation and x-ray evidence of Osteoarthritis on this or prior visit. The site was prepped with alcohol. An injection of Betamethasone (Celestone) 1cc of 3mg, Lidocaine 7cc of 1%  was used. Patient tolerated procedure well. Patient was advised to call if redness, pain or fever occur and apply ice for 15 minutes out of every hour for the next 12-24 hours as tolerated. Patient has tried OTC's including aspirin, Ibuprofen, Aleve, etc or prescription NSAIDS, and/or exercises at home and/or physical therapy without satisfactory response, patient had decreased mobility in the joint and the risks benefits, and alternatives have been discussed, and verbal consent was obtained.

## 2024-01-23 ENCOUNTER — RX RENEWAL (OUTPATIENT)
Age: 59
End: 2024-01-23

## 2024-01-31 ENCOUNTER — EMERGENCY (EMERGENCY)
Facility: HOSPITAL | Age: 59
LOS: 1 days | Discharge: HOME | End: 2024-01-31
Attending: STUDENT IN AN ORGANIZED HEALTH CARE EDUCATION/TRAINING PROGRAM | Admitting: STUDENT IN AN ORGANIZED HEALTH CARE EDUCATION/TRAINING PROGRAM
Payer: COMMERCIAL

## 2024-01-31 VITALS
HEART RATE: 81 BPM | TEMPERATURE: 98 F | DIASTOLIC BLOOD PRESSURE: 69 MMHG | SYSTOLIC BLOOD PRESSURE: 167 MMHG | OXYGEN SATURATION: 98 % | RESPIRATION RATE: 18 BRPM | WEIGHT: 199.96 LBS

## 2024-01-31 VITALS
RESPIRATION RATE: 17 BRPM | TEMPERATURE: 98 F | DIASTOLIC BLOOD PRESSURE: 78 MMHG | SYSTOLIC BLOOD PRESSURE: 184 MMHG | OXYGEN SATURATION: 98 % | HEART RATE: 68 BPM

## 2024-01-31 DIAGNOSIS — Z79.4 LONG TERM (CURRENT) USE OF INSULIN: ICD-10-CM

## 2024-01-31 DIAGNOSIS — E11.621 TYPE 2 DIABETES MELLITUS WITH FOOT ULCER: ICD-10-CM

## 2024-01-31 DIAGNOSIS — Z98.890 OTHER SPECIFIED POSTPROCEDURAL STATES: Chronic | ICD-10-CM

## 2024-01-31 DIAGNOSIS — Z89.421 ACQUIRED ABSENCE OF OTHER RIGHT TOE(S): ICD-10-CM

## 2024-01-31 DIAGNOSIS — I10 ESSENTIAL (PRIMARY) HYPERTENSION: ICD-10-CM

## 2024-01-31 DIAGNOSIS — Z91.148 PATIENT'S OTHER NONCOMPLIANCE WITH MEDICATION REGIMEN FOR OTHER REASON: ICD-10-CM

## 2024-01-31 DIAGNOSIS — T38.3X6A UNDERDOSING OF INSULIN AND ORAL HYPOGLYCEMIC [ANTIDIABETIC] DRUGS, INITIAL ENCOUNTER: ICD-10-CM

## 2024-01-31 LAB
ANION GAP SERPL CALC-SCNC: 12 MMOL/L — SIGNIFICANT CHANGE UP (ref 5–17)
B-OH-BUTYR SERPL-SCNC: 0.2 MMOL/L — SIGNIFICANT CHANGE UP
BASE EXCESS BLDV CALC-SCNC: -1.5 MMOL/L — SIGNIFICANT CHANGE UP (ref -2–3)
BASOPHILS # BLD AUTO: 0.02 K/UL — SIGNIFICANT CHANGE UP (ref 0–0.2)
BASOPHILS NFR BLD AUTO: 0.2 % — SIGNIFICANT CHANGE UP (ref 0–2)
BUN SERPL-MCNC: 35 MG/DL — HIGH (ref 7–23)
CALCIUM SERPL-MCNC: 9.1 MG/DL — SIGNIFICANT CHANGE UP (ref 8.4–10.5)
CHLORIDE SERPL-SCNC: 94 MMOL/L — LOW (ref 96–108)
CO2 BLDV-SCNC: 25.7 MMOL/L — SIGNIFICANT CHANGE UP (ref 22–26)
CO2 SERPL-SCNC: 23 MMOL/L — SIGNIFICANT CHANGE UP (ref 22–31)
CREAT SERPL-MCNC: 1.41 MG/DL — HIGH (ref 0.5–1.3)
CRP SERPL-MCNC: 18.8 MG/L — HIGH (ref 0–4)
EGFR: 58 ML/MIN/1.73M2 — LOW
EOSINOPHIL # BLD AUTO: 0.09 K/UL — SIGNIFICANT CHANGE UP (ref 0–0.5)
EOSINOPHIL NFR BLD AUTO: 0.8 % — SIGNIFICANT CHANGE UP (ref 0–6)
ERYTHROCYTE [SEDIMENTATION RATE] IN BLOOD: 28 MM/HR — HIGH
GLUCOSE SERPL-MCNC: 507 MG/DL — CRITICAL HIGH (ref 70–99)
HCO3 BLDV-SCNC: 24 MMOL/L — SIGNIFICANT CHANGE UP (ref 22–29)
HCT VFR BLD CALC: 36.9 % — LOW (ref 39–50)
HGB BLD-MCNC: 13 G/DL — SIGNIFICANT CHANGE UP (ref 13–17)
IMM GRANULOCYTES NFR BLD AUTO: 0.4 % — SIGNIFICANT CHANGE UP (ref 0–0.9)
LYMPHOCYTES # BLD AUTO: 0.79 K/UL — LOW (ref 1–3.3)
LYMPHOCYTES # BLD AUTO: 7.5 % — LOW (ref 13–44)
MAGNESIUM SERPL-MCNC: 2 MG/DL — SIGNIFICANT CHANGE UP (ref 1.6–2.6)
MCHC RBC-ENTMCNC: 30.1 PG — SIGNIFICANT CHANGE UP (ref 27–34)
MCHC RBC-ENTMCNC: 35.2 GM/DL — SIGNIFICANT CHANGE UP (ref 32–36)
MCV RBC AUTO: 85.4 FL — SIGNIFICANT CHANGE UP (ref 80–100)
MONOCYTES # BLD AUTO: 0.7 K/UL — SIGNIFICANT CHANGE UP (ref 0–0.9)
MONOCYTES NFR BLD AUTO: 6.6 % — SIGNIFICANT CHANGE UP (ref 2–14)
NEUTROPHILS # BLD AUTO: 8.96 K/UL — HIGH (ref 1.8–7.4)
NEUTROPHILS NFR BLD AUTO: 84.5 % — HIGH (ref 43–77)
NRBC # BLD: 0 /100 WBCS — SIGNIFICANT CHANGE UP (ref 0–0)
PCO2 BLDV: 44 MMHG — SIGNIFICANT CHANGE UP (ref 42–55)
PH BLDV: 7.35 — SIGNIFICANT CHANGE UP (ref 7.32–7.43)
PLATELET # BLD AUTO: 231 K/UL — SIGNIFICANT CHANGE UP (ref 150–400)
PO2 BLDV: 41 MMHG — SIGNIFICANT CHANGE UP (ref 25–45)
POTASSIUM SERPL-MCNC: 4.8 MMOL/L — SIGNIFICANT CHANGE UP (ref 3.5–5.3)
POTASSIUM SERPL-SCNC: 4.8 MMOL/L — SIGNIFICANT CHANGE UP (ref 3.5–5.3)
RBC # BLD: 4.32 M/UL — SIGNIFICANT CHANGE UP (ref 4.2–5.8)
RBC # FLD: 12.3 % — SIGNIFICANT CHANGE UP (ref 10.3–14.5)
SAO2 % BLDV: 65 % — LOW (ref 67–88)
SODIUM SERPL-SCNC: 129 MMOL/L — LOW (ref 135–145)
WBC # BLD: 10.6 K/UL — HIGH (ref 3.8–10.5)
WBC # FLD AUTO: 10.6 K/UL — HIGH (ref 3.8–10.5)

## 2024-01-31 PROCEDURE — 73600 X-RAY EXAM OF ANKLE: CPT

## 2024-01-31 PROCEDURE — 82962 GLUCOSE BLOOD TEST: CPT

## 2024-01-31 PROCEDURE — 73620 X-RAY EXAM OF FOOT: CPT

## 2024-01-31 PROCEDURE — 83735 ASSAY OF MAGNESIUM: CPT

## 2024-01-31 PROCEDURE — 99284 EMERGENCY DEPT VISIT MOD MDM: CPT | Mod: 25

## 2024-01-31 PROCEDURE — 82803 BLOOD GASES ANY COMBINATION: CPT

## 2024-01-31 PROCEDURE — 96375 TX/PRO/DX INJ NEW DRUG ADDON: CPT

## 2024-01-31 PROCEDURE — 82010 KETONE BODYS QUAN: CPT

## 2024-01-31 PROCEDURE — 85025 COMPLETE CBC W/AUTO DIFF WBC: CPT

## 2024-01-31 PROCEDURE — 99285 EMERGENCY DEPT VISIT HI MDM: CPT

## 2024-01-31 PROCEDURE — 80048 BASIC METABOLIC PNL TOTAL CA: CPT

## 2024-01-31 PROCEDURE — 73620 X-RAY EXAM OF FOOT: CPT | Mod: 26,RT

## 2024-01-31 PROCEDURE — 86140 C-REACTIVE PROTEIN: CPT

## 2024-01-31 PROCEDURE — 96361 HYDRATE IV INFUSION ADD-ON: CPT

## 2024-01-31 PROCEDURE — 36415 COLL VENOUS BLD VENIPUNCTURE: CPT

## 2024-01-31 PROCEDURE — 87040 BLOOD CULTURE FOR BACTERIA: CPT

## 2024-01-31 PROCEDURE — 96365 THER/PROPH/DIAG IV INF INIT: CPT

## 2024-01-31 PROCEDURE — 73600 X-RAY EXAM OF ANKLE: CPT | Mod: 26,RT

## 2024-01-31 PROCEDURE — 85652 RBC SED RATE AUTOMATED: CPT

## 2024-01-31 RX ORDER — VANCOMYCIN HCL 1 G
1500 VIAL (EA) INTRAVENOUS ONCE
Refills: 0 | Status: COMPLETED | OUTPATIENT
Start: 2024-01-31 | End: 2024-01-31

## 2024-01-31 RX ORDER — INSULIN LISPRO 100/ML
6 VIAL (ML) SUBCUTANEOUS ONCE
Refills: 0 | Status: COMPLETED | OUTPATIENT
Start: 2024-01-31 | End: 2024-01-31

## 2024-01-31 RX ORDER — VANCOMYCIN HCL 1 G
1000 VIAL (EA) INTRAVENOUS ONCE
Refills: 0 | Status: DISCONTINUED | OUTPATIENT
Start: 2024-01-31 | End: 2024-01-31

## 2024-01-31 RX ORDER — SODIUM CHLORIDE 9 MG/ML
1000 INJECTION INTRAMUSCULAR; INTRAVENOUS; SUBCUTANEOUS ONCE
Refills: 0 | Status: COMPLETED | OUTPATIENT
Start: 2024-01-31 | End: 2024-01-31

## 2024-01-31 RX ORDER — PIPERACILLIN AND TAZOBACTAM 4; .5 G/20ML; G/20ML
3.38 INJECTION, POWDER, LYOPHILIZED, FOR SOLUTION INTRAVENOUS ONCE
Refills: 0 | Status: COMPLETED | OUTPATIENT
Start: 2024-01-31 | End: 2024-01-31

## 2024-01-31 RX ORDER — INSULIN GLARGINE 100 [IU]/ML
40 INJECTION, SOLUTION SUBCUTANEOUS
Qty: 1 | Refills: 0
Start: 2024-01-31 | End: 2024-02-29

## 2024-01-31 RX ADMIN — PIPERACILLIN AND TAZOBACTAM 3.38 GRAM(S): 4; .5 INJECTION, POWDER, LYOPHILIZED, FOR SOLUTION INTRAVENOUS at 14:30

## 2024-01-31 RX ADMIN — Medication 300 MILLIGRAM(S): at 14:43

## 2024-01-31 RX ADMIN — SODIUM CHLORIDE 1000 MILLILITER(S): 9 INJECTION INTRAMUSCULAR; INTRAVENOUS; SUBCUTANEOUS at 11:55

## 2024-01-31 RX ADMIN — PIPERACILLIN AND TAZOBACTAM 200 GRAM(S): 4; .5 INJECTION, POWDER, LYOPHILIZED, FOR SOLUTION INTRAVENOUS at 13:54

## 2024-01-31 RX ADMIN — SODIUM CHLORIDE 1000 MILLILITER(S): 9 INJECTION INTRAMUSCULAR; INTRAVENOUS; SUBCUTANEOUS at 13:01

## 2024-01-31 RX ADMIN — Medication 6 UNIT(S): at 13:47

## 2024-01-31 NOTE — ED PROVIDER NOTE - NSFOLLOWUPINSTRUCTIONS_ED_ALL_ED_FT
You were seen in the Emergency Department for: foot ulcer    You were prescribed to the pharmacy: lantus and augmentin  Please follow the instructions on the container/label and ask your pharmacist for any questions/concerns.    For pain, you may take Tylenol (acetaminophen) 975 mg every 6 hours.    Please follow up with podiatrist Dr. MISHRA                  . If you do not have a primary physician or specialist of your needs, please call [phone] to find one convenient for you. At this number you will be able to locate a provider who accepts your insurance, as well as locate the right specialist for your needs.    You should return to the Emergency Department if you feel any new/worsening/persistent symptoms including but not limited to: fever, chills, vomiting, chest pain, difficulty breathing, loss of consciousness, bleeding, uncontrolled pain, numbness/weakness of a body part You were seen in the Emergency Department for: foot ulcer    You were prescribed to the pharmacy: lantus and augmentin  Please follow the instructions on the container/label and ask your pharmacist for any questions/concerns.    For pain, you may take Tylenol (acetaminophen) 975 mg every 6 hours.    Please follow up with podiatrist Dr. Chakraborty (PLEASE CALL [phone]). If you do not have a primary physician or specialist of your needs, please call [phone] to find one convenient for you. At this number you will be able to locate a provider who accepts your insurance, as well as locate the right specialist for your needs.    You should return to the Emergency Department if you feel any new/worsening/persistent symptoms including but not limited to: fever, chills, vomiting, chest pain, difficulty breathing, loss of consciousness, bleeding, uncontrolled pain, numbness/weakness of a body part

## 2024-01-31 NOTE — ED PROVIDER NOTE - PHYSICAL EXAMINATION
Gen - NAD; well-appearing; A+Ox3   HEENT - NCAT, EOMI, moist mucous membranes  Neck - supple  Resp - CTAB, no increased WOB  CV -  RRR, no m/r/g  Abd - soft, NT, ND; no guarding or rebound  Back - no midline, paraspinous, or CVA tenderness  MSK - FROM of b/l UE and LE, no gross deformities, soft compartments, s/p R 3rd toe amputation  Extrem - +R foot erythema and mild swelling; +palpable PT pulse  Neuro - no focal motor or sensation deficits  Skin - warm, well perfused; +purulent macerated diabetic ulcer to R webbed space between 4th/5th toes

## 2024-01-31 NOTE — ED ADULT NURSE REASSESSMENT NOTE - NS ED NURSE REASSESS COMMENT FT1
Micro lab called to inform RN that they were unable to use wound culture swab due to it not being labeled when obtained by podiatry provider. MD Crowley aware.

## 2024-01-31 NOTE — ED PROVIDER NOTE - PATIENT PORTAL LINK FT
You can access the FollowMyHealth Patient Portal offered by [facility] by registering at the following website: [email or web address]. By joining [employer]’s FollowMyHealth portal, you will also be able to view your health information using other applications (apps) compatible with our system.

## 2024-01-31 NOTE — CONSULT NOTE ADULT - ASSESSMENT
Podiatry was consulted on a pt with complaints of R 4th interspace pain and erythema that began weeks ago. At the time of consult, WBC 10.60, ESR 28, CRP 18.8 glucose 507. On physical exam was remarkable for 4th interspace wound that probes 1cm deep and edema surrounding the R 5th digit. Mild malodor was present when performing the PTB test. Imaging showed possible cortical erosion at the medial aspect of 5th IP bone. Based on the clinical presentation there is suspicion for diabetic soft tissue infection at this time. Pt may not be amendable to being admitted to the hospital for IV abx.      Plan:   ED wound cultures taken   F/U ED cx  May receive a one time dose of broad spectrum IV abx in the ED   May be d/c on Augmentin 875mg for 10 days   Follow up with Dr. Chakraborty - have pt call [phone] to f/u outpatient   Wound care: Area wrapped with betadine soaked gauze, kerlix, and tape

## 2024-01-31 NOTE — CONSULT NOTE ADULT - SUBJECTIVE AND OBJECTIVE BOX
Attending: Dr. Chakraborty     Patient is a 58y old  Male who presents with a chief complaint of     HPI: 58 year old male with history of HTN, IDDM, s/p R 3rd toe amputation, presenting with foot wound. States having progressive wound between his R 4th/5th toes over the past 2 weeks, then over past day has had significant pain and associated swelling/redness to foot. Feels it is draining malodorous pus. No fevers or chills.    Podiatry Addendum: Pt was seen in the ED by on-call resident. Pt states that the pain radiates from his R 4th interspace up the leg. He describes the pain as sharp and has gotten progressively worse in the last few weeks. He is currently being followed by his podiatrist, Dr. Chakraborty outpt. Pt seems very hesitant when the conversation of staying overnight was discussed. Denies any fever/chills/CP/SOB at this time.       Review of systems negative except per HPI and as stated below  General:	 no weakness; no fevers, no chills  Skin/Breast: no rash  Respiratory and Thorax: no SOB, no cough  Cardiovascular:	No chest pain  Gastrointestinal:	 no nausea, vomiting , diarrhea  Genitourinary:	no dysuria, no difficulty urinating, no hematuria  Musculoskeletal:	no weakness, no joint swelling/pain  Neurological:	no focal weakness/numbness  Endocrine:	no polyuria, no polydipsia    PAST MEDICAL & SURGICAL HISTORY:  Osteomyelitis      Hypertension      Hyperlipidemia      Diabetes mellitus      CAD (coronary artery disease)      Chronic diastolic congestive heart failure      CKD (chronic kidney disease)      S/P release of urethral stricture        Home Medications:  hydrALAZINE 50 mg oral tablet: 1 tab(s) orally 2 times a day (21 Jan 2023 12:41)  Insulin Lispro KwikPen 100 units/mL injectable solution: 15 unit(s) injectable 3 times a day (before meals) (21 Jan 2023 12:41)  Lasix 40 mg oral tablet: 1 tab(s) orally once a day (21 Jan 2023 12:41)  losartan 50 mg oral tablet: 1 tab(s) orally once a day (21 Jan 2023 12:41)  sodium zirconium cyclosilicate: 1 tab(s) orally once a day (21 Jan 2023 12:41)  tamsulosin 0.4 mg oral capsule: 1 cap(s) orally once a day (21 Jan 2023 12:41)    Allergies    No Known Allergies    Intolerances    carvedilol (Hypotension)    FAMILY HISTORY:  FH: myocardial infarction (Father, Grandparent)      Social History:       LABS                        13.0   10.60 )-----------( 231      ( 31 Jan 2024 11:44 )             36.9     01-31    129<L>  |  94<L>  |  35<H>  ----------------------------<  507<HH>  4.8   |  23  |  1.41<H>    Ca    9.1      31 Jan 2024 11:44  Mg     2.0     01-31        ESR: 28  CRP: --  01-31 @ 11:44    Vital Signs Last 24 Hrs  T(C): 36.6 (31 Jan 2024 11:01), Max: 36.6 (31 Jan 2024 11:01)  T(F): 97.9 (31 Jan 2024 11:01), Max: 97.9 (31 Jan 2024 11:01)  HR: 81 (31 Jan 2024 11:01) (81 - 81)  BP: 167/69 (31 Jan 2024 11:01) (167/69 - 167/69)  BP(mean): --  RR: 18 (31 Jan 2024 11:01) (18 - 18)  SpO2: 98% (31 Jan 2024 11:01) (98% - 98%)    Parameters below as of 31 Jan 2024 11:01  Patient On (Oxygen Delivery Method): room air        PHYSICAL EXAM  General: NAD, AA0x3    Lower Extremity Focused(Right):  Vasc: DP 1/4, PT 2/4, (+) localized R 5th digit edema, (+) erythema noted on the dorsal aspect of R 5th digit extending proximally to ankle, CFT <3 x 5, TG warm to warm   Derm:  open lesion noted in the R 4th interspace which probes deep (1cm) however does not probe to bone, maceration seen in the 4th interspace, (-)pus, (+)mild malodor, (-)crepitus, (-)fluctuance no other open wounds   Neuro: Protective sensation intact  MSK: Partial 3rd digit amputation      RADIOLOGY  Wet read: No signs of GAS, acute fx. Possible cortical erosion seen at the medial aspect of 5th IP bone

## 2024-01-31 NOTE — ED PROVIDER NOTE - PROGRESS NOTE DETAILS
Adriel Crowley MD: Patient seen by podiatry--recommending 1x dose of vanc/zosyn here then dc on Augmentin x 10d with outpatient f/u with podiatry. Discussed with patient. Patient also stating that he ran out of his lantus rx--asking if I can refill it. Will refill 30d supply of his 40U QHS lantus dose. Adriel Crowley MD: Patient seen by podiatry--recommending 1x dose of vanc/zosyn here then dc on Augmentin x 10d with outpatient f/u with podiatry. Discussed with patient. Patient also stating that he ran out of his lantus rx--asking if I can refill it. Will refill 30d supply of his 40U QHS lantus dose.    Labs not consistent with DKA. Sugars downtrending after IVF/SQ insulin.

## 2024-01-31 NOTE — ED ADULT NURSE NOTE - NSFALLUNIVINTERV_ED_ALL_ED
Bed/Stretcher in lowest position, wheels locked, appropriate side rails in place/Call bell, personal items and telephone in reach/Instruct patient to call for assistance before getting out of bed/chair/stretcher/Non-slip footwear applied when patient is off stretcher/[place] to call system/Physically safe environment - no spills, clutter or unnecessary equipment/Purposeful proactive rounding/Room/bathroom lighting operational, light cord in reach

## 2024-01-31 NOTE — ED PROVIDER NOTE - CLINICAL SUMMARY MEDICAL DECISION MAKING FREE TEXT BOX
58 year old male with history of HTN, IDDM, s/p R 3rd toe amputation, presenting with foot wound x 2 wk. Well appearing overall, vitals wnl, no evidence of systemic infection/sepsis or necrotizing fascitis. Will send labs/cultures/inflammatory markers, podiatry to eval, XR to r/o radiographic signs of OM. Dispo pending work up and podiatry recs.

## 2024-01-31 NOTE — ED PROVIDER NOTE - OBJECTIVE STATEMENT
58 year old male with history of HTN, IDDM, s/p R 3rd toe amputation, presenting with foot wound. States having progressive wound between his R 4th/5th toes over the past 2 weeks, then over past day has had significant pain and associated swelling/redness to foot. Feels it is draining malodorous pus. No fevers or chills.

## 2024-01-31 NOTE — ED ADULT NURSE NOTE - OBJECTIVE STATEMENT
Pt A&Ox4 presents to ED with complaints of foot wound. Pt has diabetic ulcer between 4th and 5th toes on right foot. Pt reports the wound started 2 weeks ago and has increased in pain with swelling and redness. Pt has pus and drainage from site. Pt has amputation of the top of his 3rd toe on his right foot due to a previous ulcer. HX DM with BG= 441mg/dL in triage. Denies fevers/chills, nausea/vomiting.

## 2024-02-05 ENCOUNTER — INPATIENT (INPATIENT)
Facility: HOSPITAL | Age: 59
LOS: 4 days | Discharge: HOME | DRG: 623 | End: 2024-02-10
Attending: STUDENT IN AN ORGANIZED HEALTH CARE EDUCATION/TRAINING PROGRAM | Admitting: INTERNAL MEDICINE
Payer: COMMERCIAL

## 2024-02-05 VITALS
OXYGEN SATURATION: 99 % | SYSTOLIC BLOOD PRESSURE: 180 MMHG | RESPIRATION RATE: 17 BRPM | HEIGHT: 72 IN | HEART RATE: 60 BPM | DIASTOLIC BLOOD PRESSURE: 72 MMHG | WEIGHT: 199.96 LBS | TEMPERATURE: 98 F

## 2024-02-05 DIAGNOSIS — Z98.890 OTHER SPECIFIED POSTPROCEDURAL STATES: Chronic | ICD-10-CM

## 2024-02-05 DIAGNOSIS — F41.9 ANXIETY DISORDER, UNSPECIFIED: ICD-10-CM

## 2024-02-05 DIAGNOSIS — N18.9 CHRONIC KIDNEY DISEASE, UNSPECIFIED: ICD-10-CM

## 2024-02-05 DIAGNOSIS — N40.1 BENIGN PROSTATIC HYPERPLASIA WITH LOWER URINARY TRACT SYMPTOMS: ICD-10-CM

## 2024-02-05 DIAGNOSIS — E78.5 HYPERLIPIDEMIA, UNSPECIFIED: ICD-10-CM

## 2024-02-05 DIAGNOSIS — D63.8 ANEMIA IN OTHER CHRONIC DISEASES CLASSIFIED ELSEWHERE: ICD-10-CM

## 2024-02-05 DIAGNOSIS — I25.10 ATHEROSCLEROTIC HEART DISEASE OF NATIVE CORONARY ARTERY WITHOUT ANGINA PECTORIS: ICD-10-CM

## 2024-02-05 DIAGNOSIS — E11.628 TYPE 2 DIABETES MELLITUS WITH OTHER SKIN COMPLICATIONS: ICD-10-CM

## 2024-02-05 DIAGNOSIS — D64.9 ANEMIA, UNSPECIFIED: ICD-10-CM

## 2024-02-05 DIAGNOSIS — E11.9 TYPE 2 DIABETES MELLITUS WITHOUT COMPLICATIONS: ICD-10-CM

## 2024-02-05 DIAGNOSIS — I50.32 CHRONIC DIASTOLIC (CONGESTIVE) HEART FAILURE: ICD-10-CM

## 2024-02-05 DIAGNOSIS — Z87.438 PERSONAL HISTORY OF OTHER DISEASES OF MALE GENITAL ORGANS: ICD-10-CM

## 2024-02-05 DIAGNOSIS — Z29.9 ENCOUNTER FOR PROPHYLACTIC MEASURES, UNSPECIFIED: ICD-10-CM

## 2024-02-05 DIAGNOSIS — I10 ESSENTIAL (PRIMARY) HYPERTENSION: ICD-10-CM

## 2024-02-05 DIAGNOSIS — I13.0 HYPERTENSIVE HEART AND CHRONIC KIDNEY DISEASE WITH HEART FAILURE AND STAGE 1 THROUGH STAGE 4 CHRONIC KIDNEY DISEASE, OR UNSPECIFIED CHRONIC KIDNEY DISEASE: ICD-10-CM

## 2024-02-05 LAB
BLD GP AB SCN SERPL QL: NEGATIVE — SIGNIFICANT CHANGE UP
CULTURE RESULTS: SIGNIFICANT CHANGE UP
CULTURE RESULTS: SIGNIFICANT CHANGE UP
GRAM STN FLD: SIGNIFICANT CHANGE UP
RH IG SCN BLD-IMP: POSITIVE — SIGNIFICANT CHANGE UP
SPECIMEN SOURCE: SIGNIFICANT CHANGE UP

## 2024-02-05 PROCEDURE — 71045 X-RAY EXAM CHEST 1 VIEW: CPT | Mod: 26

## 2024-02-05 PROCEDURE — 99285 EMERGENCY DEPT VISIT HI MDM: CPT

## 2024-02-05 PROCEDURE — 99223 1ST HOSP IP/OBS HIGH 75: CPT

## 2024-02-05 PROCEDURE — 93010 ELECTROCARDIOGRAM REPORT: CPT

## 2024-02-05 PROCEDURE — 73700 CT LOWER EXTREMITY W/O DYE: CPT | Mod: 26,RT

## 2024-02-05 PROCEDURE — 73630 X-RAY EXAM OF FOOT: CPT | Mod: 26,RT

## 2024-02-05 RX ORDER — CITALOPRAM 10 MG/1
40 TABLET, FILM COATED ORAL DAILY
Refills: 0 | Status: DISCONTINUED | OUTPATIENT
Start: 2024-02-05 | End: 2024-02-07

## 2024-02-05 RX ORDER — LOSARTAN POTASSIUM 100 MG/1
1 TABLET, FILM COATED ORAL
Qty: 0 | Refills: 0 | DISCHARGE

## 2024-02-05 RX ORDER — VANCOMYCIN HCL 1 G
1250 VIAL (EA) INTRAVENOUS ONCE
Refills: 0 | Status: COMPLETED | OUTPATIENT
Start: 2024-02-05 | End: 2024-02-05

## 2024-02-05 RX ORDER — SODIUM CHLORIDE 9 MG/ML
1000 INJECTION, SOLUTION INTRAVENOUS ONCE
Refills: 0 | Status: COMPLETED | OUTPATIENT
Start: 2024-02-05 | End: 2024-02-05

## 2024-02-05 RX ORDER — PIPERACILLIN AND TAZOBACTAM 4; .5 G/20ML; G/20ML
3.38 INJECTION, POWDER, LYOPHILIZED, FOR SOLUTION INTRAVENOUS ONCE
Refills: 0 | Status: COMPLETED | OUTPATIENT
Start: 2024-02-05 | End: 2024-02-05

## 2024-02-05 RX ORDER — PIPERACILLIN AND TAZOBACTAM 4; .5 G/20ML; G/20ML
3.38 INJECTION, POWDER, LYOPHILIZED, FOR SOLUTION INTRAVENOUS EVERY 8 HOURS
Refills: 0 | Status: DISCONTINUED | OUTPATIENT
Start: 2024-02-06 | End: 2024-02-07

## 2024-02-05 RX ORDER — AMLODIPINE BESYLATE 2.5 MG/1
10 TABLET ORAL DAILY
Refills: 0 | Status: DISCONTINUED | OUTPATIENT
Start: 2024-02-05 | End: 2024-02-07

## 2024-02-05 RX ORDER — LOSARTAN POTASSIUM 100 MG/1
100 TABLET, FILM COATED ORAL DAILY
Refills: 0 | Status: DISCONTINUED | OUTPATIENT
Start: 2024-02-05 | End: 2024-02-05

## 2024-02-05 RX ORDER — ASPIRIN/CALCIUM CARB/MAGNESIUM 324 MG
81 TABLET ORAL DAILY
Refills: 0 | Status: DISCONTINUED | OUTPATIENT
Start: 2024-02-05 | End: 2024-02-05

## 2024-02-05 RX ORDER — ATORVASTATIN CALCIUM 80 MG/1
40 TABLET, FILM COATED ORAL AT BEDTIME
Refills: 0 | Status: DISCONTINUED | OUTPATIENT
Start: 2024-02-05 | End: 2024-02-07

## 2024-02-05 RX ORDER — TAMSULOSIN HYDROCHLORIDE 0.4 MG/1
1 CAPSULE ORAL
Qty: 0 | Refills: 0 | DISCHARGE

## 2024-02-05 RX ORDER — FUROSEMIDE 40 MG
1 TABLET ORAL
Qty: 0 | Refills: 0 | DISCHARGE

## 2024-02-05 RX ADMIN — SODIUM CHLORIDE 1000 MILLILITER(S): 9 INJECTION, SOLUTION INTRAVENOUS at 20:31

## 2024-02-05 RX ADMIN — PIPERACILLIN AND TAZOBACTAM 200 GRAM(S): 4; .5 INJECTION, POWDER, LYOPHILIZED, FOR SOLUTION INTRAVENOUS at 19:52

## 2024-02-05 RX ADMIN — Medication 166.67 MILLIGRAM(S): at 20:11

## 2024-02-05 NOTE — CONSULT NOTE ADULT - SUBJECTIVE AND OBJECTIVE BOX
Attending: Dr. Chakraborty    Patient is a 58y old  Male who presents with a chief complaint of     HPI:     58 year old male with history of HTN, IDDM, s/p R 3rd toe amputation, presenting with R 5th digit wound that began weeks ago. He was last here at [facility] ED and was d/c on PO abx and close follow up with his podiatrist, Dr. Chakraborty. He was seen by Dr. Sweeney today who then urged him to come to the ED for admission.  Pt states that he has been compliant with his current PO abx regimen. Endorses significant pain and associated swelling/redness to foot. Complains of a sharp pain that radiates up the leg. States that the pain rogressively worse in the last few weeks. Pt still seems very hesitant about staying overnight due to possible "issues with his roommate". Denies any fever/chills/CP/SOB at this time.           Review of systems negative except per HPI and as stated below  General:	 no weakness; no fevers, no chills  Skin/Breast: no rash  Respiratory and Thorax: no SOB, no cough  Cardiovascular:	No chest pain  Gastrointestinal:	 no nausea, vomiting , diarrhea  Genitourinary:	no dysuria, no difficulty urinating, no hematuria  Musculoskeletal:	no weakness, no joint swelling/pain  Neurological:	no focal weakness/numbness  Endocrine:	no polyuria, no polydipsia    PAST MEDICAL & SURGICAL HISTORY:  Osteomyelitis      Hypertension      Hyperlipidemia      Diabetes mellitus      CAD (coronary artery disease)      Chronic diastolic congestive heart failure      CKD (chronic kidney disease)      S/P release of urethral stricture        Home Medications:  hydrALAZINE 50 mg oral tablet: 1 tab(s) orally 2 times a day (21 Jan 2023 12:41)  Insulin Lispro KwikPen 100 units/mL injectable solution: 15 unit(s) injectable 3 times a day (before meals) (21 Jan 2023 12:41)  Lasix 40 mg oral tablet: 1 tab(s) orally once a day (21 Jan 2023 12:41)  losartan 50 mg oral tablet: 1 tab(s) orally once a day (21 Jan 2023 12:41)  sodium zirconium cyclosilicate: 1 tab(s) orally once a day (21 Jan 2023 12:41)  tamsulosin 0.4 mg oral capsule: 1 cap(s) orally once a day (21 Jan 2023 12:41)    Allergies    No Known Allergies    Intolerances    carvedilol (Hypotension)    FAMILY HISTORY:  FH: myocardial infarction (Father, Grandparent)      Social History:       LABS                        12.1   10.51 )-----------( 281      ( 05 Feb 2024 18:22 )             35.7     02-05    137  |  101  |  30<H>  ----------------------------<  289<H>  5.1   |  24  |  1.31<H>    Ca    9.4      05 Feb 2024 18:22    TPro  6.8  /  Alb  3.6  /  TBili  0.2  /  DBili  x   /  AST  14  /  ALT  12  /  AlkPhos  63  02-05    PT/INR - ( 05 Feb 2024 18:22 )   PT: 10.7 sec;   INR: 0.94          PTT - ( 05 Feb 2024 18:22 )  PTT:31.4 sec    Vital Signs Last 24 Hrs  T(C): 36.9 (05 Feb 2024 18:00), Max: 36.9 (05 Feb 2024 18:00)  T(F): 98.4 (05 Feb 2024 18:00), Max: 98.4 (05 Feb 2024 18:00)  HR: 60 (05 Feb 2024 18:00) (60 - 60)  BP: 180/72 (05 Feb 2024 18:00) (180/72 - 180/72)  BP(mean): --  RR: 17 (05 Feb 2024 18:00) (17 - 17)  SpO2: 99% (05 Feb 2024 18:00) (99% - 99%)    Parameters below as of 05 Feb 2024 18:00  Patient On (Oxygen Delivery Method): room air        PHYSICAL EXAM  General: NAD, AA0x3    Lower Extremity Focused(Right):  Vasc: nonpalpable PT/DP pulses, Monophasic PT/DP on doppler, TG warm to warm, (+)  R foot edema and erythema starting at the 5th digit and extending proximally up the leg, CFT <3 x 4, 5th digit appears to be dusky with delayed CFT  Derm:  open lesion noted in the R 4th interspace which probes deep (1cm) with possible hard stop (possible PTB), maceration seen in the 4th interspace, (-)pus, (-) malodor, (-)crepitus, (+)fluctuance no other open wounds   Neuro: Protective sensation intact  MSK: Partial 3rd digit amputation      RADIOLOGY  Wet read: No GAS, OM or acute fx seen on imaging                      Attending: Dr. Chakraborty    Patient is a 58y old  Male who presents with a chief complaint of     HPI:     58 year old male with history of HTN, IDDM, s/p R 3rd toe amputation, presenting with R 5th digit wound that began weeks ago. He was last here at [facility] ED and was d/c on PO abx and close follow up with his podiatrist, Dr. Chakraborty. He was seen by Dr. Sweeney today who then urged him to come to the ED for admission.  Pt states that he has been compliant with his current PO abx regimen. Endorses significant pain and associated swelling/redness to foot. Complains of a sharp pain that radiates up the leg. States that the pain progressively worse in the last few weeks. Pt still seems very hesitant about staying overnight due to possible "issues with his roommate". Denies any fever/chills/CP/SOB at this time.           Review of systems negative except per HPI and as stated below  General:	 no weakness; no fevers, no chills  Skin/Breast: no rash  Respiratory and Thorax: no SOB, no cough  Cardiovascular:	No chest pain  Gastrointestinal:	 no nausea, vomiting , diarrhea  Genitourinary:	no dysuria, no difficulty urinating, no hematuria  Musculoskeletal:	no weakness, no joint swelling/pain  Neurological:	no focal weakness/numbness  Endocrine:	no polyuria, no polydipsia    PAST MEDICAL & SURGICAL HISTORY:  Osteomyelitis      Hypertension      Hyperlipidemia      Diabetes mellitus      CAD (coronary artery disease)      Chronic diastolic congestive heart failure      CKD (chronic kidney disease)      S/P release of urethral stricture        Home Medications:  hydrALAZINE 50 mg oral tablet: 1 tab(s) orally 2 times a day (21 Jan 2023 12:41)  Insulin Lispro KwikPen 100 units/mL injectable solution: 15 unit(s) injectable 3 times a day (before meals) (21 Jan 2023 12:41)  Lasix 40 mg oral tablet: 1 tab(s) orally once a day (21 Jan 2023 12:41)  losartan 50 mg oral tablet: 1 tab(s) orally once a day (21 Jan 2023 12:41)  sodium zirconium cyclosilicate: 1 tab(s) orally once a day (21 Jan 2023 12:41)  tamsulosin 0.4 mg oral capsule: 1 cap(s) orally once a day (21 Jan 2023 12:41)    Allergies    No Known Allergies    Intolerances    carvedilol (Hypotension)    FAMILY HISTORY:  FH: myocardial infarction (Father, Grandparent)      Social History:       LABS                        12.1   10.51 )-----------( 281      ( 05 Feb 2024 18:22 )             35.7     02-05    137  |  101  |  30<H>  ----------------------------<  289<H>  5.1   |  24  |  1.31<H>    Ca    9.4      05 Feb 2024 18:22    TPro  6.8  /  Alb  3.6  /  TBili  0.2  /  DBili  x   /  AST  14  /  ALT  12  /  AlkPhos  63  02-05    PT/INR - ( 05 Feb 2024 18:22 )   PT: 10.7 sec;   INR: 0.94          PTT - ( 05 Feb 2024 18:22 )  PTT:31.4 sec    Vital Signs Last 24 Hrs  T(C): 36.9 (05 Feb 2024 18:00), Max: 36.9 (05 Feb 2024 18:00)  T(F): 98.4 (05 Feb 2024 18:00), Max: 98.4 (05 Feb 2024 18:00)  HR: 60 (05 Feb 2024 18:00) (60 - 60)  BP: 180/72 (05 Feb 2024 18:00) (180/72 - 180/72)  BP(mean): --  RR: 17 (05 Feb 2024 18:00) (17 - 17)  SpO2: 99% (05 Feb 2024 18:00) (99% - 99%)    Parameters below as of 05 Feb 2024 18:00  Patient On (Oxygen Delivery Method): room air        PHYSICAL EXAM  General: NAD, AA0x3    Lower Extremity Focused(Right):  Vasc: nonpalpable PT/DP pulses, Monophasic PT/DP on doppler, TG warm to warm, (+)  R foot edema and erythema starting at the 5th digit and extending proximally up the leg, CFT <3 x 4, 5th digit appears to be dusky with delayed CFT  Derm:  open lesion noted in the R 4th interspace which probes deep (1cm) with possible hard stop (possible PTB), maceration seen in the 4th interspace, (-)pus, (-) malodor, (-)crepitus, (+)fluctuance no other open wounds   Neuro: Protective sensation intact  MSK: Partial 3rd digit amputation      RADIOLOGY  Wet read: No GAS, OM or acute fx seen on imaging                      Attending: Dr. Chakraborty    Patient is a 58y old  Male who presents with a chief complaint of     HPI:     58 year old male with history of HTN, IDDM, s/p R 3rd toe amputation, presenting with R 5th digit wound that began weeks ago. He was last here at [facility] ED and was d/c on PO abx and close follow up with his podiatrist, Dr. Chakraborty. He was seen by Dr. Sweeney today who then urged him to come to the ED for admission.  Pt states that he has been compliant with his current PO abx regimen. Endorses significant pain and associated swelling/redness to foot. Complains of a sharp pain that radiates up the leg. States that the pain progressively worse in the last few weeks. Pt still seems very hesitant about staying overnight due to possible "issues with his roommate". Denies any fever/chills/CP/SOB at this time.           Review of systems negative except per HPI and as stated below  General:	 no weakness; no fevers, no chills  Skin/Breast: no rash  Respiratory and Thorax: no SOB, no cough  Cardiovascular:	No chest pain  Gastrointestinal:	 no nausea, vomiting , diarrhea  Genitourinary:	no dysuria, no difficulty urinating, no hematuria  Musculoskeletal:	no weakness, no joint swelling/pain  Neurological:	no focal weakness/numbness  Endocrine:	no polyuria, no polydipsia    PAST MEDICAL & SURGICAL HISTORY:  Osteomyelitis      Hypertension      Hyperlipidemia      Diabetes mellitus      CAD (coronary artery disease)      Chronic diastolic congestive heart failure      CKD (chronic kidney disease)      S/P release of urethral stricture        Home Medications:  hydrALAZINE 50 mg oral tablet: 1 tab(s) orally 2 times a day (21 Jan 2023 12:41)  Insulin Lispro KwikPen 100 units/mL injectable solution: 15 unit(s) injectable 3 times a day (before meals) (21 Jan 2023 12:41)  Lasix 40 mg oral tablet: 1 tab(s) orally once a day (21 Jan 2023 12:41)  losartan 50 mg oral tablet: 1 tab(s) orally once a day (21 Jan 2023 12:41)  sodium zirconium cyclosilicate: 1 tab(s) orally once a day (21 Jan 2023 12:41)  tamsulosin 0.4 mg oral capsule: 1 cap(s) orally once a day (21 Jan 2023 12:41)    Allergies    No Known Allergies    Intolerances    carvedilol (Hypotension)    FAMILY HISTORY:  FH: myocardial infarction (Father, Grandparent)      Social History:       LABS                        12.1   10.51 )-----------( 281      ( 05 Feb 2024 18:22 )             35.7     02-05    137  |  101  |  30<H>  ----------------------------<  289<H>  5.1   |  24  |  1.31<H>    Ca    9.4      05 Feb 2024 18:22    TPro  6.8  /  Alb  3.6  /  TBili  0.2  /  DBili  x   /  AST  14  /  ALT  12  /  AlkPhos  63  02-05    PT/INR - ( 05 Feb 2024 18:22 )   PT: 10.7 sec;   INR: 0.94          PTT - ( 05 Feb 2024 18:22 )  PTT:31.4 sec    Vital Signs Last 24 Hrs  T(C): 36.9 (05 Feb 2024 18:00), Max: 36.9 (05 Feb 2024 18:00)  T(F): 98.4 (05 Feb 2024 18:00), Max: 98.4 (05 Feb 2024 18:00)  HR: 60 (05 Feb 2024 18:00) (60 - 60)  BP: 180/72 (05 Feb 2024 18:00) (180/72 - 180/72)  BP(mean): --  RR: 17 (05 Feb 2024 18:00) (17 - 17)  SpO2: 99% (05 Feb 2024 18:00) (99% - 99%)    Parameters below as of 05 Feb 2024 18:00  Patient On (Oxygen Delivery Method): room air        PHYSICAL EXAM  General: NAD, AA0x3    Lower Extremity Focused(Right):  Vasc: nonpalpable PT/DP pulses, Monophasic PT/DP on doppler, TG warm to warm, (+)  R foot edema and erythema starting at the 5th digit and extending proximally up the leg, CFT <3 x 4, 5th digit appears to be dusky with delayed CFT  Derm:  open lesion noted in the R 4th interspace which probes deep (1cm) with possible hard stop (possible PTB), maceration seen in the 4th interspace, (-)pus, (-) malodor, (-)crepitus, (+)fluctuance no other open wounds   Neuro: Protective sensation intact  MSK: Partial 3rd digit amputation      RADIOLOGY  < from: Xray Foot AP + Lateral + Oblique, Right (02.05.24 @ 19:37) >  NTERPRETATION:  Clinical indications: Infection.    3 views the right foot are compared to previous radiographs from   1/31/2024.    IMPRESSION: Again noted is osteolysis consistent with loosening around a   screw extending across the second proximal interphalangeal joint. This   could be mechanical or infectious in etiology. There is subcutaneous   edema dorsally at thefoot. There is an amputation at the head of the   third proximal phalanx. There is mild spurring at the Achilles insertion.    --- End of Report ---      < end of copied text >    Wet Read CT: Soft tissue emphysema noted to be in the TIbFib joint space.

## 2024-02-05 NOTE — ED ADULT NURSE NOTE - OBJECTIVE STATEMENT
58 year old M patient with c/o of  R foot wound.  Patient states he has been having a wound for "days", went to his podiatrist who sent him in.  He states he was walking on it a lot, but it appeared it was healing before today.  No distress noted.  Hx of DM.

## 2024-02-05 NOTE — CONSULT NOTE ADULT - ASSESSMENT
Podiatry was consulted on a pt with complaints of R 4th interspace pain and erythema that began weeks ago. At the time of consult, WBC 10.51, ESR & CRP pending, 18.8 glucose 289. On physical exam was remarkable for 4th interspace wound that probes 1cm deep and edema surrounding the R 5th digit extending proximally. Imaging showed no OM, GAS or acute fx. Based on the clinical presentation there is a strong suspicion for diabetic soft tissue infection at this time. We recommend pt be admitted. At the time of consult, pt was not amendable to admission. When asked why, he states that he "does not want a troublesome roommate". Patient is AAO x 3. I explained at length to the patient the risks of signing out AMA including but not limited to harm, injury or death. I explained the risks of refusing treatment, benefits and alternatives to treatment at this time. I offered to answer any questions and fully answered any such questions. We believe that the patient fully understands what has been explained and answered. I informed his attending podiatrist Dr. Chakraborty of this, and is aware of pt possibly leaving AMA. Patient accepts responsibility for any and all results of this possible decision.    Plan:   Recommend admission to medicine   Plan for OR debridement of R 5th digit on Tuesday/Wednesday pending OR scheduling   Please have patient medically optimized for the OR (CBC,CMP, 2 T&S, Coags, EKG, CXR)  Will inform Medicine as to when NPO order should be placed   May begin broad spectrum IV abx   XR imaging reviewed  F/U ESR, CRP, and XR read   ED wound cultures taken   F/U ED cx  Recommend ID consult for abx regimen  Wound care: Area irrigated with NS and wrapped with betadine soaked gauze, kerlix, and tape   Podiatry was consulted on a pt with complaints of R 4th interspace pain and erythema that began weeks ago. At the time of consult, WBC 10.51, ESR & CRP pending, glucose 289. On physical exam was remarkable for 4th interspace wound that probes 1cm deep and edema surrounding the R 5th digit extending proximally. Imaging showed no OM, GAS or acute fx. Based on the clinical presentation there is a strong suspicion for diabetic soft tissue infection at this time. We recommend admission to medicine. At the time of consult, pt was not amendable to admission. When asked why, he states that he "does not want a troublesome roommate". Patient is AAO x 3. I explained at length to the patient the risks of signing out AMA including but not limited to harm, injury or death. I explained the risks of refusing treatment, benefits and alternatives to treatment at this time. I offered to answer any questions and fully answered any such questions. We believe that the patient fully understands what has been explained and answered. I informed his attending podiatrist Dr. Chakraborty of this, and is aware of pt possibly leaving AMA. Patient accepts responsibility for any and all results of this possible decision.    Plan:   Recommend admission to medicine   Plan for OR debridement of R 5th digit on Tuesday/Wednesday pending OR scheduling   Please have patient medically optimized for the OR (CBC,CMP, 2 T&S, Coags, EKG, CXR)  Will inform Medicine as to when NPO order should be placed   May begin broad spectrum IV abx   XR imaging reviewed  F/U ESR, CRP, and XR read   ED wound cultures taken   F/U ED cx  Recommend ID consult for abx regimen  Wound care: Area irrigated with NS and wrapped with betadine soaked gauze, kerlix, and tape   Podiatry was consulted on a pt with complaints of R 4th interspace pain and erythema that began weeks ago. At the time of consult, WBC 10.51, ESR & CRP pending, glucose 289. On physical exam was remarkable for 4th interspace wound that probes 1cm deep and edema surrounding the R 5th digit extending proximally. Imaging showed no OM, GAS or acute fx. Based on the clinical presentation there is a strong suspicion for diabetic soft tissue infection at this time. We recommend admission to medicine. At the time of consult, pt was not amendable to admission. When asked why, he states that he "does not want a troublesome roommate". Patient is AAO x 3. I explained at length to the patient the risks of signing out AMA including but not limited to harm, injury or death. I explained the risks of refusing treatment, benefits and alternatives to treatment at this time. I offered to answer any questions and fully answered any such questions. We believe that the patient fully understands what has been explained and answered. I informed his attending podiatrist Dr. Chakraborty of this, and is aware of pt possibly leaving AMA. Patient accepts responsibility for any and all results of this possible decision.    Plan:   Recommend admission to medicine   Plan for OR debridement of R 5th digit on Tuesday/Wednesday pending OR scheduling   Please have patient medically optimized for the OR (CBC,CMP, 2 T&S, Coags, EKG, CXR)  Recommend urgent CT to r/o GAS  F/u CT imaging and read   Will inform Medicine as to when NPO order should be placed   May begin broad spectrum IV abx   XR imaging reviewed  F/U ESR, CRP, and XR read   ED wound cultures taken   F/U ED cx  Recommend ID consult for abx regimen  Wound care: Area irrigated with NS and wrapped with betadine soaked gauze, kerlix, and tape   Podiatry was consulted on a pt with complaints of R 4th interspace pain and erythema that began weeks ago. At the time of consult, WBC 10.51, ESR & CRP pending, glucose 289. On physical exam was remarkable for 4th interspace wound that probes 1cm deep and edema surrounding the R 5th digit extending proximally. Imaging showed no OM, GAS or acute fx. Based on the clinical presentation there is a strong suspicion for diabetic soft tissue infection at this time. We recommend admission to medicine. At the time of consult, pt was not amendable to admission. When asked why, he states that he "does not want a troublesome roommate". Patient is AAO x 3. I explained at length to the patient the risks of signing out AMA including but not limited to harm, injury or death. I explained the risks of refusing treatment, benefits and alternatives to treatment at this time. I offered to answer any questions and fully answered any such questions. We believe that the patient fully understands what has been explained and answered. I informed his attending podiatrist Dr. Chakraborty of this, and is aware of pt possibly leaving AMA. Patient accepts responsibility for any and all results of this possible decision.    Plan:   Recommend admission to medicine   Plan for OR debridement of R 5th digit on Tuesday/Wednesday pending OR scheduling   Please have patient medically optimized for the OR (CBC,CMP, 2 T&S, Coags, EKG, CXR)  XR imaging reviewed  CT Imaging reviewed   F/u CT and XR read   Pt should be kept NPO tonight for possible emergent intervention   May begin broad spectrum IV abx     F/U ESR, CRP, and XR read   ED wound cultures taken   F/U ED cx  Recommend ID consult for abx regimen  Wound care: Area irrigated with NS and wrapped with betadine soaked gauze, kerlix, and tape   Podiatry was consulted on a pt with complaints of R 4th interspace pain and erythema that began weeks ago. At the time of consult, WBC 10.51, ESR & CRP pending, glucose 289. On physical exam was remarkable for 4th interspace wound that probes 1cm deep and edema surrounding the R 5th digit extending proximally. Imaging showed no OM, GAS or acute fx. Based on the clinical presentation there is a strong suspicion for diabetic soft tissue infection at this time. We recommend admission to medicine. At the time of consult, pt was not amendable to admission. When asked why, he states that he "does not want a troublesome roommate". Patient is AAO x 3. I explained at length to the patient the risks of signing out AMA including but not limited to harm, injury or death. I explained the risks of refusing treatment, benefits and alternatives to treatment at this time. I offered to answer any questions and fully answered any such questions. We believe that the patient fully understands what has been explained and answered. I informed his attending podiatrist Dr. Chakraborty of this, and is aware of pt possibly leaving AMA. Patient accepts responsibility for any and all results of this possible decision.    Plan:   Recommend admission to medicine   Plan for OR debridement of R 5th digit on Tuesday/Wednesday pending OR scheduling   Please have patient medically optimized for the OR (CBC,CMP, 2 T&S, Coags, EKG, CXR)  XR imaging reviewed  CT Imaging reviewed   F/u CT and XR (foot and ankle) read   Pt should be kept NPO tonight for possible emergent intervention   May begin broad spectrum IV abx   F/U ESR & CRP  ED wound cultures taken   F/U ED cx  Recommend ID consult for abx regimen  Wound care: Area irrigated with NS and wrapped with betadine soaked gauze, kerlix, and tape

## 2024-02-05 NOTE — H&P ADULT - ATTENDING COMMENTS
57 yo M with PMHx HTN, HLD, DM, HFpEF, CAD, CKD, hx OM (s/p R 3rd toe amputation), recent ED visit for R foot wound (1/31) px from outpatient podiatrist (Dr. Chakraborty) with R foot erythema admitted for further management of skin-soft tissue infection with plan for podiatry debridement.      #R foot wound – Not meeting SIRS criteria. Afebrile. No significant leukocytosis/bandemia. CT R foot showing few punctate foci of air within the ankle without destructive/erosive changes likely 2/2 degenerative change vs less likely gas-forming infection and diffuse subcutaneous edema at the foot/ankle. Podiatry consulted in ED. Concern for diabetic soft tissue skin infection without evidence of OM, gas gangrene, etc. Tentative plan for OR debridement 2/6-2/7. F/U ESR/CRP. F/U wound cx sent from ED. Continue broad-spectrum abx. F/U XR final read.     Agree with remainder of resident plan as above.

## 2024-02-05 NOTE — H&P ADULT - NSHPPHYSICALEXAM_GEN_ALL_CORE
VITAL SIGNS:  T(F): 98.5 (02-05-24 @ 20:17)  HR: 64 (02-05-24 @ 20:17)  BP: 168/67 (02-05-24 @ 20:17)  RR: 18 (02-05-24 @ 20:17)  SpO2: 98% (02-05-24 @ 20:17)  Wt(kg): --    PHYSICAL EXAM:  Constitutional: NAD  HEENT: PERRL, EOMI, sclera non-icteric, neck supple, trachea midline, no masses, no JVD, MMM  Respiratory: CTA b/l, good air entry b/l, no wheezing, no rhonchi, no rales, without accessory muscle use and no intercostal retractions  Cardiovascular: RRR, normal S1S2, no M/R/G  Gastrointestinal: soft, NTND, no masses palpable, BS normal  Extremities: Warm, well perfused, pulses equal bilateral upper and lower extremities, no edema, no clubbing  Neurological: AAOx3, CN Grossly intact  Skin: Normal temperature, warm, dry VITAL SIGNS:  T(F): 98.5 (02-05-24 @ 20:17)  HR: 64 (02-05-24 @ 20:17)  BP: 168/67 (02-05-24 @ 20:17)  RR: 18 (02-05-24 @ 20:17)  SpO2: 98% (02-05-24 @ 20:17)  Wt(kg): --    PHYSICAL EXAM:  Constitutional: NAD  HEENT: PERRL, EOMI, sclera non-icteric, neck supple, trachea midline, no masses, no JVD, MMM  Respiratory: CTA b/l, good air entry b/l, no wheezing, no rhonchi, no rales, without accessory muscle use and no intercostal retractions  Cardiovascular:  RRR, normal S1S2, no M/R/G  Gastrointestinal: soft, NTND, no masses palpable, BS normal  Extremities: nonpalpable DP pulses, R foot edema and erythema starting at the 5th digit and extending proximally up the shin, 5th digit appears to be dusky w/ an open lesion in the 4-5th toe interspace w/ loss of sensation distally in 5th toe. R partial 3rd digit amputation.   Neurological: AAOx3, CN Grossly intact  Skin: RLE findings as above otherwise warm and dry

## 2024-02-05 NOTE — H&P ADULT - NSHPLABSRESULTS_GEN_ALL_CORE
LABS:                        12.1   10.51 )-----------( 281      ( 05 Feb 2024 18:22 )             35.7     02-05    137  |  101  |  30<H>  ----------------------------<  289<H>  5.1   |  24  |  1.31<H>    Ca    9.4      05 Feb 2024 18:22    TPro  6.8  /  Alb  3.6  /  TBili  0.2  /  DBili  x   /  AST  14  /  ALT  12  /  AlkPhos  63  02-05    PT/INR - ( 05 Feb 2024 18:22 )   PT: 10.7 sec;   INR: 0.94          PTT - ( 05 Feb 2024 18:22 )  PTT:31.4 sec  Urinalysis Basic - ( 05 Feb 2024 18:22 )    Color: x / Appearance: x / SG: x / pH: x  Gluc: 289 mg/dL / Ketone: x  / Bili: x / Urobili: x   Blood: x / Protein: x / Nitrite: x   Leuk Esterase: x / RBC: x / WBC x   Sq Epi: x / Non Sq Epi: x / Bacteria: x          RADIOLOGY & ADDITIONAL TESTS:  Reviewed

## 2024-02-05 NOTE — H&P ADULT - PROBLEM SELECTOR PLAN 3
Cr of 1.31 w/ eGFR 64 today which appears to be new baseline for the pt. Patient is spontaneously urinating at this time.   Likely multifactorial iso DM2, HTN and HFpEF. Multiple recent admissions for NOLAN on CKD c/b hyperK+ on home lokelma. Pt on lasix 40mg QD, losartan 50mg QD, amlodipine 10mg QD and hydralazine 50mg BID at home. echo from 12/22 w/ LVEF 71%.   - cont home medication as above.  - Ensure adequate PO hydration.   - Limit nephrotoxics agents  - cont to trend Cr Cr of 1.31 w/ eGFR 64 today which appears to be new baseline for the pt. Patient is spontaneously urinating at this time.   Likely multifactorial iso DM2, HTN and HFpEF. Multiple recent admissions for NOLAN on CKD c/b hyperK+. Pt on olmesartan 40mg QD, farxiga 10mg QD and amlodipine 10mg QDat home. echo from 12/22 w/ LVEF 71%.   - cont home amlodipine as above  - TI home olmesartan to losartan 100mg QD -> hold after AM dose in anticipation of OR  - hold home farxiga IP  - Ensure adequate PO hydration.   - Limit nephrotoxics agents  - cont to trend Cr Cr of 1.31 w/ eGFR 64 today which appears to be new baseline for the pt. Patient is spontaneously urinating at this time.   Likely multifactorial iso DM2, HTN and HFpEF. Multiple recent admissions for NOLAN on CKD c/b hyperK+. Pt on olmesartan 40mg QD, farxiga 10mg QD and amlodipine 10mg QDat home. echo from 12/22 w/ LVEF 71%.   - cont home amlodipine as above  - TI home olmesartan to losartan 100mg QD -> hold until after OR  - hold home farxiga IP  - Ensure adequate PO hydration.   - Limit nephrotoxics agents  - cont to trend Cr

## 2024-02-05 NOTE — ED PROVIDER NOTE - PHYSICAL EXAMINATION
CONST: nontoxic NAD speaking in full sentences  HEAD: atraumatic  EYES: conjunctivae clear  NECK: supple  CARD: regular rate  CHEST: breathing comfortably, no stridor/retractions/tripoding  EXT: R foot w/ erythema to dorsum of foot, +wound in between 4th and 5th toes, 5th toe w/ purple and white discoloration  SKIN: warm, dry  NEURO: awake alert answering questions following commands moving all extremities

## 2024-02-05 NOTE — H&P ADULT - PROBLEM SELECTOR PLAN 1
Patient with long term IDDM w/o recent IP A1c. Lantus and Lispro 15 TID at home. hx of OM R 4th toe s/p amputation (cultures w/o growth). Presenting from OP podiatry office w/ diabetic foot infection requiring debridement. Plan for OR debridement of R 5th digit on Tuesday/Wednesday under podiatry pending OR scheduling. Given zosyn and vanc in ED.   - cont zosyn w/ pseudomonal coverage and vancomycin   - OR labs in AM - CBC,CMP, 2 T&S, Coag  - f/u CT R foot  - NPO @ [facility]  - trend CRP and ESR  - f/u wound culture  - restart Lantus at 20 units QHS and Lispro at 7 units TID  - A1c in AM  - Sliding scale and Accuchecks  - Consistent carb diet with DASH Patient with long term IDDM w/ reported a1c 6 mo ago 15. Lantus 40 QHS and Lispro 15 TID at home however pt reports noncompliance. hx of OM R 3th toe s/p amputation (cultures w/o growth). Presenting from OP podiatry office w/ diabetic foot infection requiring debridement. Plan for OR debridement of R 5th digit on Tuesday/Wednesday under podiatry pending OR scheduling. Given zosyn and vanc in ED.   - cont zosyn w/ pseudomonal coverage and vancomycin   - f/u CT R foot read  - trend CRP and ESR  - f/u wound culture  - restart Lantus at 30 units QHS and Lispro at 10 units TID  - A1c in AM  - Sliding scale and Accuchecks  - Consistent carb diet with DASH Patient with long term IDDM w/ reported a1c 6 mo ago 15. Lantus 40 QHS and Lispro 15 TID at home however pt reports noncompliance. hx of OM R 3th toe s/p amputation (cultures w/o growth). Presenting from OP podiatry office w/ diabetic foot infection requiring debridement. Plan for OR debridement of R 5th digit on Tuesday/Wednesday under podiatry pending OR scheduling. Given zosyn and vanc in ED. CT R foot w/ few punctate foci of air are present within the ankle joint at the superolateral mortise and posterior tibial plafond. Diffuse subcutaneous edema about the foot and ankle. Lucency surrounding screw extending across 2nd proximal IPJ. 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  - cont zosyn w/ pseudomonal coverage and vancomycin   - f/u R ankle XR for assessment of gas demonstrated on CT  - trend CRP and ESR  - f/u wound culture  - restart Lantus at 20 units QHS and hold home lispro  - A1c in AM  - Sliding scale and Accuchecks  - Consistent carb diet with DASH

## 2024-02-05 NOTE — ED ADULT TRIAGE NOTE - CHIEF COMPLAINT QUOTE
pt sent by MD Chakraborty c/o right foot redness and possible infection. as per noted " pt for pre-op admission" pt denies any pain at this time.

## 2024-02-05 NOTE — H&P ADULT - PROBLEM SELECTOR PLAN 4
chest pain free, EKG revealed sinus bradycardia @ 59BPM, no acute ST/T wave changes. [facility] 12/09/22: PEPE x 1 OM1. on home asa 81mg QD, clopidogrel 75mgg QD and lipitor 40mg QD.   -- Continue ASA/Plavix/Statin. chest pain free, EKG revealed sinus bradycardia @ 59BPM, no acute ST/T wave changes. [facility] 12/09/22: PEPE x 1 OM1. on home asa 81mg QD, clopidogrel 75mgg QD and lipitor 40mg QD.   - Continue Statin  - hold ASA/clopidogrel in anticipation of OR chest pain free, EKG revealed sinus bradycardia @ 59BPM, no acute ST/T wave changes. [facility] 12/09/22: PEPE x 1 OM1. on home asa 81mg QD, clopidogrel 75mg QD and lipitor 40mg QD.   - Continue Statin  - hold ASA/clopidogrel in anticipation of OR

## 2024-02-05 NOTE — ED PROVIDER NOTE - CLINICAL SUMMARY MEDICAL DECISION MAKING FREE TEXT BOX
VS in the ED- elevated BP, no fever, otherwise unremarkable. Labs w/ WBC 10.51, cultures sent.   Wound has worsened since prior visit on 1/31  pt failed outpt antibiotics  not septic  Seen by podiatry, plan for broad spectrum abx and admission for OR for debridement. Will send preop labs, EKG/cxr/foot XRay to r/o gas VS in the ED- elevated BP, no fever, otherwise unremarkable. Labs w/ WBC 10.51, cultures sent.   Wound has worsened since prior visit on 1/31  pt failed outpt antibiotics  not septic  Seen by podiatry, plan for broad spectrum abx and admission for OR for debridement. Will send preop labs, EKG/cxr/foot XRay to r/o gas    -->podiatry requesting CT, CT ordered, medicine to follow up

## 2024-02-05 NOTE — H&P ADULT - PROBLEM SELECTOR PLAN 7
Well controlled.   - c/w home citalopram 40mg daily. F: s/p 1L LR in ED   E: Replete carefully.   N: consistent carb, DASH,  DVT prophylaxis: ICDs in anticipation of OR  Dispo: [facility]. F: s/p 1L LR in ED   E: Replete carefully.   N: consistent carb, DASH - NPO @[facility]  DVT prophylaxis: ICDs in anticipation of OR  Dispo: [facility].

## 2024-02-05 NOTE — H&P ADULT - NSHPSOCIALHISTORY_GEN_ALL_CORE
Marital Status:  (   )    (  ) Single    (   )    (  )   Lives with: (  ) alone  (  ) children   (  ) spouse   (  ) parents  (  ) other  Recent Travel: No recent travel  Occupation:   Mobility:   Functional status:   Substance Use (street drugs): (  ) never used  (  ) other:  Tobacco Usage:  (   ) never smoked   (   ) former smoker   (   ) current smoker  (     ) pack year  Alcohol Usage: - Marital Status:   Lives with: spouse   Occupation: owns a business  Mobility: no mobility aids - harder to walk recently on R foot  Functional status: independent  Substance Use (street drugs): never used  Tobacco Usage: never smoked  Alcohol Usage: no

## 2024-02-05 NOTE — H&P ADULT - PROBLEM SELECTOR PLAN 2
Hgb of 12.1 w/ baseline 9-10. Likely iso CKD and chronic disease.  - trend Hb   - maintain active T&S and transfuse > 8.

## 2024-02-05 NOTE — H&P ADULT - PROBLEM SELECTOR PROBLEM 3
Hypertensive heart and chronic kidney disease with heart failure and stage 1 through stage 4 chronic kidney disease, or unspecified chronic kidney disease

## 2024-02-05 NOTE — ED PROVIDER NOTE - OBJECTIVE STATEMENT
58yM w/ HTN, IDDM, s/p R 3rd toe amputation, recent visit 1/31 for progressive wound between R 4th and 5th toes, seen by podiatry for diabetic soft tissue infection and dc on Augmentin, now sent in for admission for debridement due to worsening wound. Pt says no fevers/chills, but wound has progressed w/ malodorous discharge, discoloration of toe, and redness over forsum of foot.

## 2024-02-05 NOTE — H&P ADULT - ASSESSMENT
58M w/ a PMH of HFpEF (recent admission for exacerbation Dec 23 found to have CAD s/p stenting 12/9), HTN, HLD, IDDM-II (c/b OM of the 4 metatarsal), and hx urethral replacement for urethral stricture and multiple recent admissions for NOLAN and hyperkalemia (most recent early Jan 24) and recent ED visit for worsening diabetic foot infection (d/cd on Augmentin) presenting from Dr Chakraborty's (podiatry) office for worsening diabetic ulcer infection and need for debridement and admitted for IV abx and podiatry intervention.

## 2024-02-05 NOTE — H&P ADULT - HISTORY OF PRESENT ILLNESS
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     ED Course:  Vitals: 98.4F, HR 60, /72, RR 17 (99%) on RA   Labs: WCC 10.51 w/ 78.5% neutrophils. Hb 12.1, BUN 30, Cr 1.31, glucose 289  Imaging: CXR - w/o acute infiltrates. XR R foot - Osteolysis consistent with loosening around a screw extending across the 2nd proximal IPJ. Subcutaneous edema dorsally. Amputation at the head of the 3rd proximal phalanx. Mild spurring at the Achilles insertion.  EKG: sinus fran  Consults: podiatry  Interventions: zosyn 3.375g, vanc 1.25g, LR 1L   58M w/ a PMH of HFpEF (recent admission for exacerbation Dec 23 found to have CAD s/p stenting 12/9), HTN, HLD, IDDM-II (c/b OM of the 4 metatarsal), and hx urethral replacement for urethral stricture and multiple recent admissions for NOLAN and hyperkalemia (most recent early Jan 24) and recent ED visit for worsening diabetic foot infection (d/cd on Augmentin) presenting from Dr Chakraborty's (podiatry) office for worsening diabetic ulcer and need for debridement. Pt initially described worsening erythema on the dorsal side of R foot w/ warmth and tenderness tracking to the level of the shin w/ associated edema. Pt additionally describing induration at the space between 4th and 5th digit of the RLE w/ purulent discharge. ROS positive for a mild HA.    ED Course:  Vitals: 98.4F, HR 60, /72, RR 17 (99%) on RA   Labs: WCC 10.51 w/ 78.5% neutrophils. Hb 12.1, BUN 30, Cr 1.31, glucose 289  Imaging: CXR - w/o acute infiltrates. XR R foot - Osteolysis consistent with loosening around a screw extending across the 2nd proximal IPJ. Subcutaneous edema dorsally. Amputation at the head of the 3rd proximal phalanx. Mild spurring at the Achilles insertion.  EKG: sinus fran  Consults: podiatry  Interventions: zosyn 3.375g, vanc 1.25g, LR 1L

## 2024-02-05 NOTE — PATIENT PROFILE ADULT - FALL HARM RISK - HARM RISK INTERVENTIONS

## 2024-02-05 NOTE — H&P ADULT - PROBLEM SELECTOR PLAN 8
Attending Only
F: s/p 1L LR in ED   E: Replete carefully.   N: consistent carb, DASH,  DVT prophylaxis: hold for planned OR in AM  Dispo: [facility].

## 2024-02-06 ENCOUNTER — TRANSCRIPTION ENCOUNTER (OUTPATIENT)
Age: 59
End: 2024-02-06

## 2024-02-06 DIAGNOSIS — E11.9 TYPE 2 DIABETES MELLITUS WITHOUT COMPLICATIONS: ICD-10-CM

## 2024-02-06 DIAGNOSIS — L08.9 LOCAL INFECTION OF THE SKIN AND SUBCUTANEOUS TISSUE, UNSPECIFIED: ICD-10-CM

## 2024-02-06 DIAGNOSIS — D63.8 ANEMIA IN OTHER CHRONIC DISEASES CLASSIFIED ELSEWHERE: ICD-10-CM

## 2024-02-06 DIAGNOSIS — Z01.818 ENCOUNTER FOR OTHER PREPROCEDURAL EXAMINATION: ICD-10-CM

## 2024-02-06 LAB
A1C WITH ESTIMATED AVERAGE GLUCOSE RESULT: 14.2 % — HIGH (ref 4–5.6)
ALBUMIN SERPL ELPH-MCNC: 2.9 G/DL — LOW (ref 3.3–5)
ALP SERPL-CCNC: 57 U/L — SIGNIFICANT CHANGE UP (ref 40–120)
ALT FLD-CCNC: 9 U/L — LOW (ref 10–45)
ANION GAP SERPL CALC-SCNC: 11 MMOL/L — SIGNIFICANT CHANGE UP (ref 5–17)
APTT BLD: 30.5 SEC — SIGNIFICANT CHANGE UP (ref 24.5–35.6)
AST SERPL-CCNC: 10 U/L — SIGNIFICANT CHANGE UP (ref 10–40)
BASOPHILS # BLD AUTO: 0.03 K/UL — SIGNIFICANT CHANGE UP (ref 0–0.2)
BASOPHILS NFR BLD AUTO: 0.4 % — SIGNIFICANT CHANGE UP (ref 0–2)
BILIRUB SERPL-MCNC: 0.3 MG/DL — SIGNIFICANT CHANGE UP (ref 0.2–1.2)
BLD GP AB SCN SERPL QL: NEGATIVE — SIGNIFICANT CHANGE UP
BUN SERPL-MCNC: 24 MG/DL — HIGH (ref 7–23)
CALCIUM SERPL-MCNC: 8.9 MG/DL — SIGNIFICANT CHANGE UP (ref 8.4–10.5)
CHLORIDE SERPL-SCNC: 104 MMOL/L — SIGNIFICANT CHANGE UP (ref 96–108)
CO2 SERPL-SCNC: 23 MMOL/L — SIGNIFICANT CHANGE UP (ref 22–31)
CREAT SERPL-MCNC: 1.24 MG/DL — SIGNIFICANT CHANGE UP (ref 0.5–1.3)
CRP SERPL-MCNC: 49.8 MG/L — HIGH (ref 0–4)
EGFR: 68 ML/MIN/1.73M2 — SIGNIFICANT CHANGE UP
EOSINOPHIL # BLD AUTO: 0.18 K/UL — SIGNIFICANT CHANGE UP (ref 0–0.5)
EOSINOPHIL NFR BLD AUTO: 2.3 % — SIGNIFICANT CHANGE UP (ref 0–6)
ERYTHROCYTE [SEDIMENTATION RATE] IN BLOOD: 107 MM/HR — HIGH
ESTIMATED AVERAGE GLUCOSE: 361 MG/DL — HIGH (ref 68–114)
GLUCOSE BLDC GLUCOMTR-MCNC: 157 MG/DL — HIGH (ref 70–99)
GLUCOSE BLDC GLUCOMTR-MCNC: 175 MG/DL — HIGH (ref 70–99)
GLUCOSE BLDC GLUCOMTR-MCNC: 184 MG/DL — HIGH (ref 70–99)
GLUCOSE BLDC GLUCOMTR-MCNC: 241 MG/DL — HIGH (ref 70–99)
GLUCOSE BLDC GLUCOMTR-MCNC: 259 MG/DL — HIGH (ref 70–99)
GLUCOSE BLDC GLUCOMTR-MCNC: 287 MG/DL — HIGH (ref 70–99)
GLUCOSE SERPL-MCNC: 198 MG/DL — HIGH (ref 70–99)
GRAM STN FLD: ABNORMAL
HCT VFR BLD CALC: 33.4 % — LOW (ref 39–50)
HGB BLD-MCNC: 11.3 G/DL — LOW (ref 13–17)
IMM GRANULOCYTES NFR BLD AUTO: 0.3 % — SIGNIFICANT CHANGE UP (ref 0–0.9)
INR BLD: 0.97 — SIGNIFICANT CHANGE UP (ref 0.85–1.18)
LYMPHOCYTES # BLD AUTO: 1.02 K/UL — SIGNIFICANT CHANGE UP (ref 1–3.3)
LYMPHOCYTES # BLD AUTO: 13.2 % — SIGNIFICANT CHANGE UP (ref 13–44)
MCHC RBC-ENTMCNC: 30 PG — SIGNIFICANT CHANGE UP (ref 27–34)
MCHC RBC-ENTMCNC: 33.8 GM/DL — SIGNIFICANT CHANGE UP (ref 32–36)
MCV RBC AUTO: 88.6 FL — SIGNIFICANT CHANGE UP (ref 80–100)
MONOCYTES # BLD AUTO: 0.63 K/UL — SIGNIFICANT CHANGE UP (ref 0–0.9)
MONOCYTES NFR BLD AUTO: 8.2 % — SIGNIFICANT CHANGE UP (ref 2–14)
NEUTROPHILS # BLD AUTO: 5.83 K/UL — SIGNIFICANT CHANGE UP (ref 1.8–7.4)
NEUTROPHILS NFR BLD AUTO: 75.6 % — SIGNIFICANT CHANGE UP (ref 43–77)
NRBC # BLD: 0 /100 WBCS — SIGNIFICANT CHANGE UP (ref 0–0)
PLATELET # BLD AUTO: 256 K/UL — SIGNIFICANT CHANGE UP (ref 150–400)
POTASSIUM SERPL-MCNC: 4.2 MMOL/L — SIGNIFICANT CHANGE UP (ref 3.5–5.3)
POTASSIUM SERPL-SCNC: 4.2 MMOL/L — SIGNIFICANT CHANGE UP (ref 3.5–5.3)
PROT SERPL-MCNC: 6.1 G/DL — SIGNIFICANT CHANGE UP (ref 6–8.3)
PROTHROM AB SERPL-ACNC: 11.1 SEC — SIGNIFICANT CHANGE UP (ref 9.5–13)
RBC # BLD: 3.77 M/UL — LOW (ref 4.2–5.8)
RBC # FLD: 12.6 % — SIGNIFICANT CHANGE UP (ref 10.3–14.5)
RH IG SCN BLD-IMP: POSITIVE — SIGNIFICANT CHANGE UP
SODIUM SERPL-SCNC: 138 MMOL/L — SIGNIFICANT CHANGE UP (ref 135–145)
WBC # BLD: 7.71 K/UL — SIGNIFICANT CHANGE UP (ref 3.8–10.5)
WBC # FLD AUTO: 7.71 K/UL — SIGNIFICANT CHANGE UP (ref 3.8–10.5)

## 2024-02-06 PROCEDURE — 73610 X-RAY EXAM OF ANKLE: CPT | Mod: 26,RT

## 2024-02-06 PROCEDURE — 99233 SBSQ HOSP IP/OBS HIGH 50: CPT | Mod: GC

## 2024-02-06 RX ORDER — INSULIN LISPRO 100/ML
VIAL (ML) SUBCUTANEOUS
Refills: 0 | Status: DISCONTINUED | OUTPATIENT
Start: 2024-02-06 | End: 2024-02-07

## 2024-02-06 RX ORDER — INSULIN GLARGINE 100 [IU]/ML
20 INJECTION, SOLUTION SUBCUTANEOUS AT BEDTIME
Refills: 0 | Status: DISCONTINUED | OUTPATIENT
Start: 2024-02-06 | End: 2024-02-06

## 2024-02-06 RX ORDER — SODIUM CHLORIDE 9 MG/ML
1000 INJECTION, SOLUTION INTRAVENOUS
Refills: 0 | Status: DISCONTINUED | OUTPATIENT
Start: 2024-02-06 | End: 2024-02-07

## 2024-02-06 RX ORDER — INSULIN GLARGINE 100 [IU]/ML
10 INJECTION, SOLUTION SUBCUTANEOUS AT BEDTIME
Refills: 0 | Status: DISCONTINUED | OUTPATIENT
Start: 2024-02-06 | End: 2024-02-07

## 2024-02-06 RX ORDER — VANCOMYCIN HCL 1 G
1750 VIAL (EA) INTRAVENOUS ONCE
Refills: 0 | Status: COMPLETED | OUTPATIENT
Start: 2024-02-06 | End: 2024-02-06

## 2024-02-06 RX ORDER — GLUCAGON INJECTION, SOLUTION 0.5 MG/.1ML
1 INJECTION, SOLUTION SUBCUTANEOUS ONCE
Refills: 0 | Status: DISCONTINUED | OUTPATIENT
Start: 2024-02-06 | End: 2024-02-07

## 2024-02-06 RX ORDER — LOSARTAN POTASSIUM 100 MG/1
100 TABLET, FILM COATED ORAL ONCE
Refills: 0 | Status: COMPLETED | OUTPATIENT
Start: 2024-02-06 | End: 2024-02-06

## 2024-02-06 RX ORDER — DEXTROSE 50 % IN WATER 50 %
12.5 SYRINGE (ML) INTRAVENOUS ONCE
Refills: 0 | Status: DISCONTINUED | OUTPATIENT
Start: 2024-02-06 | End: 2024-02-07

## 2024-02-06 RX ORDER — ENOXAPARIN SODIUM 100 MG/ML
40 INJECTION SUBCUTANEOUS ONCE
Refills: 0 | Status: COMPLETED | OUTPATIENT
Start: 2024-02-06 | End: 2024-02-06

## 2024-02-06 RX ORDER — SODIUM CHLORIDE 9 MG/ML
1000 INJECTION, SOLUTION INTRAVENOUS
Refills: 0 | Status: DISCONTINUED | OUTPATIENT
Start: 2024-02-07 | End: 2024-02-07

## 2024-02-06 RX ORDER — DEXTROSE 50 % IN WATER 50 %
25 SYRINGE (ML) INTRAVENOUS ONCE
Refills: 0 | Status: DISCONTINUED | OUTPATIENT
Start: 2024-02-06 | End: 2024-02-07

## 2024-02-06 RX ORDER — CLOPIDOGREL BISULFATE 75 MG/1
75 TABLET, FILM COATED ORAL ONCE
Refills: 0 | Status: COMPLETED | OUTPATIENT
Start: 2024-02-06 | End: 2024-02-06

## 2024-02-06 RX ORDER — INSULIN LISPRO 100/ML
VIAL (ML) SUBCUTANEOUS AT BEDTIME
Refills: 0 | Status: DISCONTINUED | OUTPATIENT
Start: 2024-02-06 | End: 2024-02-07

## 2024-02-06 RX ORDER — VANCOMYCIN HCL 1 G
1750 VIAL (EA) INTRAVENOUS EVERY 12 HOURS
Refills: 0 | Status: DISCONTINUED | OUTPATIENT
Start: 2024-02-06 | End: 2024-02-06

## 2024-02-06 RX ORDER — ASPIRIN/CALCIUM CARB/MAGNESIUM 324 MG
81 TABLET ORAL EVERY 24 HOURS
Refills: 0 | Status: DISCONTINUED | OUTPATIENT
Start: 2024-02-06 | End: 2024-02-07

## 2024-02-06 RX ORDER — DEXTROSE 50 % IN WATER 50 %
15 SYRINGE (ML) INTRAVENOUS ONCE
Refills: 0 | Status: DISCONTINUED | OUTPATIENT
Start: 2024-02-06 | End: 2024-02-07

## 2024-02-06 RX ADMIN — INSULIN GLARGINE 20 UNIT(S): 100 INJECTION, SOLUTION SUBCUTANEOUS at 02:18

## 2024-02-06 RX ADMIN — ENOXAPARIN SODIUM 40 MILLIGRAM(S): 100 INJECTION SUBCUTANEOUS at 17:16

## 2024-02-06 RX ADMIN — PIPERACILLIN AND TAZOBACTAM 25 GRAM(S): 4; .5 INJECTION, POWDER, LYOPHILIZED, FOR SOLUTION INTRAVENOUS at 11:10

## 2024-02-06 RX ADMIN — Medication 250 MILLIGRAM(S): at 23:24

## 2024-02-06 RX ADMIN — CITALOPRAM 40 MILLIGRAM(S): 10 TABLET, FILM COATED ORAL at 11:09

## 2024-02-06 RX ADMIN — AMLODIPINE BESYLATE 10 MILLIGRAM(S): 2.5 TABLET ORAL at 06:11

## 2024-02-06 RX ADMIN — Medication 2: at 09:22

## 2024-02-06 RX ADMIN — PIPERACILLIN AND TAZOBACTAM 25 GRAM(S): 4; .5 INJECTION, POWDER, LYOPHILIZED, FOR SOLUTION INTRAVENOUS at 19:18

## 2024-02-06 RX ADMIN — INSULIN GLARGINE 10 UNIT(S): 100 INJECTION, SOLUTION SUBCUTANEOUS at 22:19

## 2024-02-06 RX ADMIN — PIPERACILLIN AND TAZOBACTAM 25 GRAM(S): 4; .5 INJECTION, POWDER, LYOPHILIZED, FOR SOLUTION INTRAVENOUS at 04:29

## 2024-02-06 RX ADMIN — Medication 250 MILLIGRAM(S): at 08:50

## 2024-02-06 RX ADMIN — LOSARTAN POTASSIUM 100 MILLIGRAM(S): 100 TABLET, FILM COATED ORAL at 11:09

## 2024-02-06 RX ADMIN — Medication 2: at 12:12

## 2024-02-06 RX ADMIN — CLOPIDOGREL BISULFATE 75 MILLIGRAM(S): 75 TABLET, FILM COATED ORAL at 11:09

## 2024-02-06 RX ADMIN — ATORVASTATIN CALCIUM 40 MILLIGRAM(S): 80 TABLET, FILM COATED ORAL at 22:19

## 2024-02-06 RX ADMIN — Medication 81 MILLIGRAM(S): at 11:09

## 2024-02-06 RX ADMIN — Medication 2: at 18:14

## 2024-02-06 NOTE — PROGRESS NOTE ADULT - SUBJECTIVE AND OBJECTIVE BOX
Patient is a 58y old  Male who presents with a chief complaint of diabetic ulcer debridement (06 Feb 2024 08:50)      INTERVAL HPI/ OVERNIGHT EVENTS. Pt seen and examined with Attending. Pt states erythema improving. Discussed imaging results w/ pt. Patient is amenable to surgical intervention, incision & drainage.      LABS                        11.3   7.71  )-----------( 256      ( 06 Feb 2024 05:30 )             33.4     02-06    138  |  104  |  24<H>  ----------------------------<  198<H>  4.2   |  23  |  1.24    Ca    8.9      06 Feb 2024 05:30    TPro  6.1  /  Alb  2.9<L>  /  TBili  0.3  /  DBili  x   /  AST  10  /  ALT  9<L>  /  AlkPhos  57  02-06    PT/INR - ( 06 Feb 2024 05:30 )   PT: 11.1 sec;   INR: 0.97          PTT - ( 06 Feb 2024 05:30 )  PTT:30.5 sec  ESR: 107  CRP: --  02-06 @ 05:30    ICU Vital Signs Last 24 Hrs  T(C): 37 (06 Feb 2024 05:11), Max: 37 (06 Feb 2024 05:11)  T(F): 98.6 (06 Feb 2024 05:11), Max: 98.6 (06 Feb 2024 05:11)  HR: 60 (06 Feb 2024 05:11) (60 - 64)  BP: 163/73 (06 Feb 2024 05:11) (163/73 - 181/70)  BP(mean): --  ABP: --  ABP(mean): --  RR: 17 (06 Feb 2024 05:11) (17 - 18)  SpO2: 98% (06 Feb 2024 05:11) (98% - 99%)    O2 Parameters below as of 06 Feb 2024 05:11  Patient On (Oxygen Delivery Method): room air        RADIOLOGY  < from: CT Foot No Cont, Right (02.05.24 @ 22:28) >  FINDINGS:  Few punctate foci of air are present within the ankle joint at the   superolateral mortise and posterior tibial plafond. There is no   destructive or erosive change of the adjacent bone. Findings are most   likely degenerative in etiology and representing vacuum change versus  less likely gas-forming infection. There is diffuse subcutaneous edema   about the foot and ankle, this is most pronounced about the plantar   aspect of the fifth MTP joint and around the fifth toe. Redemonstrated   lucency surrounding screw extending across second proximal   interphalangeal joint, reflective of loosening. Status post amputation of   the head of the third proximal phalanx. Vascular calcification is present.    IMPRESSION:  No subcutaneous tracking gas identified.    < end of copied text >    MICROBIOLOGY    Culture - Blood (collected 05 Feb 2024 21:13)  Source: .Blood Blood  Preliminary Report (06 Feb 2024 10:00):    No growth at 12 hours    Culture - Blood (collected 05 Feb 2024 21:13)  Source: .Blood Blood  Preliminary Report (06 Feb 2024 10:00):    No growth at 12 hours    Culture - Surgical Swab (collected 05 Feb 2024 19:12)  Source: .Surgical Swab R 5th digit wound - posible OM/GAS  Gram Stain (05 Feb 2024 21:28):    No organisms seen    Rare WBC's      PHYSICAL EXAM  Right Lower Extremity Focused:  Vasc: nonpalpable PT/DP pulses, Monophasic PT/DP on doppler, TG warm to warm, (+)  R foot edema and erythema starting at the 5th digit and extending proximally up the leg, CFT <3 x 4, 5th digit appears to be dusky with delayed CFT  Derm:  open lesion noted in the R 4th interspace which probes deep (1cm) with possible hard stop (possible PTB), maceration seen in the 4th interspace, (-)pus, (-) malodor, (-)crepitus, (+)fluctuance no other open wounds   Neuro: Protective sensation intact  MSK: Partial 3rd digit amputation Patient is a 58y old  Male who presents with a chief complaint of diabetic ulcer debridement (06 Feb 2024 08:50)      INTERVAL HPI/ OVERNIGHT EVENTS. Pt seen and examined with Attending. Pt states erythema improving. Discussed imaging results w/ pt. Patient is amenable to surgical intervention, incision & drainage.      LABS                        11.3   7.71  )-----------( 256      ( 06 Feb 2024 05:30 )             33.4     02-06    138  |  104  |  24<H>  ----------------------------<  198<H>  4.2   |  23  |  1.24    Ca    8.9      06 Feb 2024 05:30    TPro  6.1  /  Alb  2.9<L>  /  TBili  0.3  /  DBili  x   /  AST  10  /  ALT  9<L>  /  AlkPhos  57  02-06    PT/INR - ( 06 Feb 2024 05:30 )   PT: 11.1 sec;   INR: 0.97          PTT - ( 06 Feb 2024 05:30 )  PTT:30.5 sec  ESR: 107  CRP: --  02-06 @ 05:30    ICU Vital Signs Last 24 Hrs  T(C): 37 (06 Feb 2024 05:11), Max: 37 (06 Feb 2024 05:11)  T(F): 98.6 (06 Feb 2024 05:11), Max: 98.6 (06 Feb 2024 05:11)  HR: 60 (06 Feb 2024 05:11) (60 - 64)  BP: 163/73 (06 Feb 2024 05:11) (163/73 - 181/70)  BP(mean): --  ABP: --  ABP(mean): --  RR: 17 (06 Feb 2024 05:11) (17 - 18)  SpO2: 98% (06 Feb 2024 05:11) (98% - 99%)    O2 Parameters below as of 06 Feb 2024 05:11  Patient On (Oxygen Delivery Method): room air        RADIOLOGY  < from: CT Foot No Cont, Right (02.05.24 @ 22:28) >  FINDINGS:  Few punctate foci of air are present within the ankle joint at the   superolateral mortise and posterior tibial plafond. There is no   destructive or erosive change of the adjacent bone. Findings are most   likely degenerative in etiology and representing vacuum change versus  less likely gas-forming infection. There is diffuse subcutaneous edema   about the foot and ankle, this is most pronounced about the plantar   aspect of the fifth MTP joint and around the fifth toe. Redemonstrated   lucency surrounding screw extending across second proximal   interphalangeal joint, reflective of loosening. Status post amputation of   the head of the third proximal phalanx. Vascular calcification is present.    IMPRESSION:  No subcutaneous tracking gas identified.    < end of copied text >    MICROBIOLOGY    Culture - Blood (collected 05 Feb 2024 21:13)  Source: .Blood Blood  Preliminary Report (06 Feb 2024 10:00):    No growth at 12 hours    Culture - Blood (collected 05 Feb 2024 21:13)  Source: .Blood Blood  Preliminary Report (06 Feb 2024 10:00):    No growth at 12 hours    Culture - Surgical Swab (collected 05 Feb 2024 19:12)  Source: .Surgical Swab R 5th digit wound - posible OM/GAS  Gram Stain (05 Feb 2024 21:28):    No organisms seen    Rare WBC's      PHYSICAL EXAM  Right Lower Extremity Focused:  Vasc: nonpalpable PT/DP pulses, Monophasic PT/DP on doppler, TG warm to warm, (+)  R foot edema and resolving erythema starting at the 5th digit and extending proximally up the leg, CFT <3 x 4, 5th digit appears to be dusky with delayed CFT  Derm:  open lesion noted in the R 4th interspace which probes deep (1cm) with possible hard stop (possible PTB), maceration seen in the 4th interspace, (-)pus, (-) malodor, (-)crepitus, (+)fluctuance no other open wounds   Neuro: Protective sensation intact  MSK: Partial 3rd digit amputation

## 2024-02-06 NOTE — PROGRESS NOTE ADULT - PROBLEM SELECTOR PLAN 2
Hgb of 12.1 w/ baseline 9-10. Likely iso CKD and chronic disease.  - trend Hb   - maintain active T&S and transfuse > 8. SHANNON:  RCRI 2 (Hx CAD w stents, Hx CHF)              Tejeda Score: 0.5% (systemic disease)              METS 9.89  EKG: sinus bradycardia @ 59BPM, no acute ST/T wave changes SHANNON:  RCRI 3 (Hx CAD w stents, Hx CHF, DM with insulin)              Tejeda Score: 0.5% (systemic disease)              METS 9.89  EKG: sinus bradycardia @ 59BPM, no acute ST/T wave changes

## 2024-02-06 NOTE — PROGRESS NOTE ADULT - PROBLEM SELECTOR PLAN 6
Well controlled.   - c/w home citalopram 40mg daily. SHANNON:  RCRI 2 (Hx CAD w stents, Hx CHF)              Tejeda Score: 0.5% (systemic disease)              METS 9.89  EKG: sinus bradycardia @ 59BPM, no acute ST/T wave changes

## 2024-02-06 NOTE — PROGRESS NOTE ADULT - PROBLEM SELECTOR PLAN 5
Home medication of Lipitor 40 PO daily.   - Continue home medication. [facility] 12/09/22: PEPE x 1 OM1  home medication(s): asa 81mg qd, clopidogrel 75mg qd, lipitor 40mg qd     - continue statin  - hold asa/clopidogrel for OR, resume as appropriate

## 2024-02-06 NOTE — PROGRESS NOTE ADULT - PROBLEM SELECTOR PLAN 1
Patient with long term IDDM w/ reported a1c 6 mo ago 15. Lantus 40 QHS and Lispro 15 TID at home however pt reports noncompliance. hx of OM R 3th toe s/p amputation (cultures w/o growth). Presenting from OP podiatry office w/ diabetic foot infection requiring debridement. Plan for OR debridement of R 5th digit on Tuesday/Wednesday under podiatry pending OR scheduling. Given zosyn and vanc in ED. CT R foot w/ few punctate foci of air are present within the ankle joint at the superolateral mortise and posterior tibial plafond. Diffuse subcutaneous edema about the foot and ankle. Lucency surrounding screw extending across 2nd proximal IPJ. 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  - cont zosyn w/ pseudomonal coverage and vancomycin   - f/u R ankle XR for assessment of gas demonstrated on CT  - trend CRP and ESR  - f/u wound culture  - restart Lantus at 20 units QHS and hold home lispro  - A1c in AM  - Sliding scale and Accuchecks  - Consistent carb diet with DASH current diabetic foot infection requiring debridement   hx of OM R 3th toe s/p amputation (then cx w/o growth)  podiatry following, planned for OR debridement 2/7/24    - continue vancomycin 1750 q12 & zosyn at 3.375 q6h (pseudomonal dosing adjusted for pt's CrCl 39)  - follow up OR cultures, narrow abx as-appropriate

## 2024-02-06 NOTE — PROGRESS NOTE ADULT - PROBLEM SELECTOR PLAN 3
Cr of 1.31 w/ eGFR 64 today which appears to be new baseline for the pt. Patient is spontaneously urinating at this time.   Likely multifactorial iso DM2, HTN and HFpEF. Multiple recent admissions for NOLAN on CKD c/b hyperK+. Pt on olmesartan 40mg QD, farxiga 10mg QD and amlodipine 10mg QDat home. echo from 12/22 w/ LVEF 71%.   - cont home amlodipine as above  - TI home olmesartan to losartan 100mg QD -> hold until after OR  - hold home farxiga IP  - Ensure adequate PO hydration.   - Limit nephrotoxics agents  - cont to trend Cr long-term IDDM w reported a1c of 15 6mo ago, 14.2 on 2/6/24  home regimen: lantus 40u qhs, lispro 15u tid, to which pt admits non-adherence    - inpt insulin regimen: hold home lispro, continue lantus 20 qhs (lantus 10 while npo prior to procedure), & iss  - consistent carb diet with DASH

## 2024-02-06 NOTE — PROGRESS NOTE ADULT - ASSESSMENT
58M w/ a PMH of HFpEF (recent admission for exacerbation Dec 23 found to have CAD s/p stenting 12/9), HTN, HLD, IDDM-II (c/b OM of the 4 metatarsal), and hx urethral replacement for urethral stricture and multiple recent admissions for NOLAN and hyperkalemia (most recent early Jan 24) and recent ED visit for worsening diabetic foot infection (d/cd on Augmentin) presenting from Dr Chakraborty's (podiatry) office for worsening diabetic ulcer infection and need for debridement and admitted for IV abx and podiatry intervention. 58M w PMHx , IDDM-II (c/b OM of 4th metatarsal), HFpEF, CAD (s/p stenting 12/9), HTN, HLD, and multiple recent admissions for NOLAN and hyperkalemia (most recent early Jan 2024), admitted for iv abx & surgical debridement of diabetic foot infection.

## 2024-02-06 NOTE — PROGRESS NOTE ADULT - PROBLEM SELECTOR PLAN 4
chest pain free, EKG revealed sinus bradycardia @ 59BPM, no acute ST/T wave changes. [facility] 12/09/22: PEPE x 1 OM1. on home asa 81mg QD, clopidogrel 75mg QD and lipitor 40mg QD.   - Continue Statin  - hold ASA/clopidogrel in anticipation of OR Cr of 1.31 & eGFR 64 on admission, continues to produce urine  multiple recent admissions for NOLAN on CKD complicated by hyperK+  likely multifactorial iso DM2, HTN and HFpEF  echo from 12/22 w/ LVEF 71%  home medication(s): olmesartan 40mg qd, farxiga 10mg qd, amlodipine 10mg QDat home    - cont home amlodipine as above  - continue home olmesartan (as therapeutic interchange losartan 100mg qd, HOLD day of OR)  - hold home farxiga  - encourage PO hydration, ivf as-needed  - limit nephrotoxins  - monitor BMP/Cr

## 2024-02-06 NOTE — PROGRESS NOTE ADULT - ASSESSMENT
Podiatry was consulted on a pt with complaints of R 4th interspace pain and erythema that began weeks ago. At the time of consult, WBC 10.51, ESR & CRP pending, glucose 289. On physical exam was remarkable for 4th interspace wound that probes 1cm deep and edema surrounding the R 5th digit extending proximally. Imaging showed no OM, GAS or acute fx. Based on the clinical presentation there is a strong suspicion for diabetic soft tissue infection at this time. CT final read negative for ST gas. Will plan for surgical intervention, incision & drainage tomorrow, 2/7.     Plan:   - Plan for OR incison & drainage with bone biopsy of R foot tomorrow  - Please medically optimize pt for surgery  - Please obtain pre-op labs/img  - Please make pt NPO after midnight  - C/w broad spectrum abx   - F/u wound cx   - Local wound care, with betadine DSD  -Rest of care per primary team    Plan d/w Attending. Podiatry following.

## 2024-02-06 NOTE — PROGRESS NOTE ADULT - PROBLEM SELECTOR PROBLEM 4
CAD (coronary artery disease) Hypertensive heart and chronic kidney disease with heart failure and stage 1 through stage 4 chronic kidney disease, or unspecified chronic kidney disease

## 2024-02-06 NOTE — PROGRESS NOTE ADULT - SUBJECTIVE AND OBJECTIVE BOX
OVERNIGHT EVENTS: None    SUBJECTIVE:  Patient seen and examined at bedside, comfortable, NAD. Denied fever, chest pain, dyspnea, abdominal pain.     Vital Signs Last 12 Hrs  T(F): 98.6 (02-06-24 @ 05:11), Max: 98.6 (02-06-24 @ 05:11)  HR: 60 (02-06-24 @ 05:11) (60 - 60)  BP: 163/73 (02-06-24 @ 05:11) (163/73 - 181/70)  BP(mean): --  RR: 17 (02-06-24 @ 05:11) (17 - 18)  SpO2: 98% (02-06-24 @ 05:11) (98% - 99%)  I&O's Summary    Physical Exam: VITAL SIGNS:  T(F): 98.5 (02-05-24 @ 20:17)  HR: 64 (02-05-24 @ 20:17)  BP: 168/67 (02-05-24 @ 20:17)  RR: 18 (02-05-24 @ 20:17)  SpO2: 98% (02-05-24 @ 20:17)  Wt(kg): --    PHYSICAL EXAM:  Constitutional: NAD  HEENT: PERRL, EOMI, sclera non-icteric, neck supple, trachea midline, no masses, no JVD, MMM  Respiratory: CTA b/l, good air entry b/l, no wheezing, no rhonchi, no rales, without accessory muscle use and no intercostal retractions  Cardiovascular:  RRR, normal S1S2, no M/R/G  Gastrointestinal: soft, NTND, no masses palpable, BS normal  Extremities: nonpalpable DP pulses, R foot edema and erythema starting at the 5th digit and extending proximally up the shin, 5th digit appears to be dusky w/ an open lesion in the 4-5th toe interspace w/ loss of sensation distally in 5th toe. R partial 3rd digit amputation.   Neurological: AAOx3, CN Grossly intact  Skin: RLE findings as above otherwise warm and dry          LABS:                        12.1   10.51 )-----------( 281      ( 05 Feb 2024 18:22 )             35.7     02-05    137  |  101  |  30<H>  ----------------------------<  289<H>  5.1   |  24  |  1.31<H>    Ca    9.4      05 Feb 2024 18:22    TPro  6.8  /  Alb  3.6  /  TBili  0.2  /  DBili  x   /  AST  14  /  ALT  12  /  AlkPhos  63  02-05    PT/INR - ( 05 Feb 2024 18:22 )   PT: 10.7 sec;   INR: 0.94          PTT - ( 05 Feb 2024 18:22 )  PTT:31.4 sec  Urinalysis Basic - ( 05 Feb 2024 18:22 )    Color: x / Appearance: x / SG: x / pH: x  Gluc: 289 mg/dL / Ketone: x  / Bili: x / Urobili: x   Blood: x / Protein: x / Nitrite: x   Leuk Esterase: x / RBC: x / WBC x   Sq Epi: x / Non Sq Epi: x / Bacteria: x          RADIOLOGY & ADDITIONAL TESTS:    MEDICATIONS  (STANDING):  amLODIPine   Tablet 10 milliGRAM(s) Oral daily  atorvastatin 40 milliGRAM(s) Oral at bedtime  citalopram 40 milliGRAM(s) Oral daily  dextrose 5%. 1000 milliLiter(s) (50 mL/Hr) IV Continuous <Continuous>  dextrose 5%. 1000 milliLiter(s) (100 mL/Hr) IV Continuous <Continuous>  dextrose 50% Injectable 25 Gram(s) IV Push once  dextrose 50% Injectable 12.5 Gram(s) IV Push once  dextrose 50% Injectable 25 Gram(s) IV Push once  glucagon  Injectable 1 milliGRAM(s) IntraMuscular once  insulin glargine Injectable (LANTUS) 20 Unit(s) SubCutaneous at bedtime  insulin lispro (ADMELOG) corrective regimen sliding scale   SubCutaneous three times a day before meals  insulin lispro (ADMELOG) corrective regimen sliding scale   SubCutaneous at bedtime  piperacillin/tazobactam IVPB.. 3.375 Gram(s) IV Intermittent every 8 hours  vancomycin  IVPB 1750 milliGRAM(s) IV Intermittent every 12 hours    MEDICATIONS  (PRN):  dextrose Oral Gel 15 Gram(s) Oral once PRN Blood Glucose LESS THAN 70 milliGRAM(s)/deciliter

## 2024-02-06 NOTE — PROGRESS NOTE ADULT - PROBLEM SELECTOR PLAN 7
F: s/p 1L LR in ED   E: Replete carefully.   N: consistent carb, DASH - NPO @MN  DVT prophylaxis: ICDs in anticipation of OR  Dispo: RMF. Home medication of Lipitor 40 PO daily.   - Continue home medication.

## 2024-02-06 NOTE — PROGRESS NOTE ADULT - PROBLEM SELECTOR PROBLEM 3
Hypertensive heart and chronic kidney disease with heart failure and stage 1 through stage 4 chronic kidney disease, or unspecified chronic kidney disease Diabetes mellitus

## 2024-02-06 NOTE — PROGRESS NOTE ADULT - ATTENDING COMMENTS
57yo M w/ hx of IDDM-II (c/b OM of 4th metatarsal), HFpEF, CAD (s/p stenting 12/9/22), HTN, HLD, who presented from podiatry office for surgical debridement of diabetic foot infection    #DM foot ulcer  - podiatry following, plan for I&D tomorrow.  No OM on imaging  - f/u OR cultures  - c/w broad spectrum abx    #Preop eval  - RCRI 3, class IV risk (DM with insulin, CHF, CAD with stent)  - Tejeda score 0.1-0.5% 30 day MELISSA risk  - METs > 4  - No active chest pain, ACS or fluid overload  - Echo with normal LV function in 12/22  - Cardiac cath with stent of 100% occlusion of OM s/p stent in 12/22  - intermediate risk for intermediate risk procedure, no further cardiac workup indicated    Remainder as above 59yo M w/ hx of IDDM-II (c/b OM of 4th metatarsal), HFpEF, CAD (s/p stenting 12/9/22), HTN, HLD, who presented from podiatry office for surgical debridement of diabetic foot infection    #DM foot ulcer  - podiatry following, plan for I&D tomorrow.  No OM on imaging  - f/u OR cultures  - c/w broad spectrum abx    #Preop eval  - RCRI 3, class IV risk (DM with insulin, CHF, CAD with stent)  - Tejeda score 0.1-0.5% 30 day MELISSA risk  - METs > 4  - EKG   - No active chest pain, ACS or fluid overload  - Echo with normal LV function in 12/22  - Cardiac cath with stent of 100% occlusion of OM s/p stent in 12/22  - intermediate risk for intermediate risk procedure, no further cardiac workup indicated    #CHFpEF, chronic, not in acute exacerbation  - previously on BB but stopped due to bradycardia, junctional rhythm and concern for BRASH  - hold Losartan on AM of OR  - hold Torsemide    #HTN  - c/w amlodipine    #CAD  - c/w ASA, plavix and atorvastatin    Remainder as above 57yo M w/ hx of IDDM-II (c/b OM of 4th metatarsal), HFpEF, CAD (s/p stenting 12/9/22), HTN, HLD, who presented from podiatry office for surgical debridement of diabetic foot infection    #DM foot ulcer  - podiatry following, plan for I&D tomorrow.  No OM on imaging  - f/u OR cultures  - c/w broad spectrum abx    #Preop eval  - RCRI 3, class IV risk (DM with insulin, CHF, CAD with stent)  - Tejeda score 0.1-0.5% 30 day MELISSA risk  - METs > 4  - EKG sinus fran without ischemic changes   - No active chest pain, ACS or fluid overload  - Echo with normal LV function in 12/22  - Cardiac cath with stent of 100% occlusion of OM s/p stent in 12/22  - intermediate risk for intermediate risk procedure, no further cardiac workup indicated    #CHFpEF, chronic, not in acute exacerbation  - follows with Dr Nelson  - previously on BB but stopped due to bradycardia, junctional rhythm and concern for BRASH  - hold Losartan on AM of OR  - hold Torsemide    #HTN  - c/w amlodipine    #CAD  - c/w ASA, plavix and atorvastatin    Remainder as above

## 2024-02-07 ENCOUNTER — APPOINTMENT (OUTPATIENT)
Dept: NEPHROLOGY | Facility: CLINIC | Age: 59
End: 2024-02-07

## 2024-02-07 LAB
ALBUMIN SERPL ELPH-MCNC: 3 G/DL — LOW (ref 3.3–5)
ALP SERPL-CCNC: 60 U/L — SIGNIFICANT CHANGE UP (ref 40–120)
ALT FLD-CCNC: 8 U/L — LOW (ref 10–45)
ANION GAP SERPL CALC-SCNC: 11 MMOL/L — SIGNIFICANT CHANGE UP (ref 5–17)
AST SERPL-CCNC: 11 U/L — SIGNIFICANT CHANGE UP (ref 10–40)
BASOPHILS # BLD AUTO: 0.04 K/UL — SIGNIFICANT CHANGE UP (ref 0–0.2)
BASOPHILS NFR BLD AUTO: 0.4 % — SIGNIFICANT CHANGE UP (ref 0–2)
BILIRUB SERPL-MCNC: 0.3 MG/DL — SIGNIFICANT CHANGE UP (ref 0.2–1.2)
BUN SERPL-MCNC: 26 MG/DL — HIGH (ref 7–23)
CALCIUM SERPL-MCNC: 9 MG/DL — SIGNIFICANT CHANGE UP (ref 8.4–10.5)
CHLORIDE SERPL-SCNC: 99 MMOL/L — SIGNIFICANT CHANGE UP (ref 96–108)
CO2 SERPL-SCNC: 24 MMOL/L — SIGNIFICANT CHANGE UP (ref 22–31)
CREAT SERPL-MCNC: 1.18 MG/DL — SIGNIFICANT CHANGE UP (ref 0.5–1.3)
EGFR: 72 ML/MIN/1.73M2 — SIGNIFICANT CHANGE UP
EOSINOPHIL # BLD AUTO: 0.2 K/UL — SIGNIFICANT CHANGE UP (ref 0–0.5)
EOSINOPHIL NFR BLD AUTO: 2.2 % — SIGNIFICANT CHANGE UP (ref 0–6)
GLUCOSE BLDC GLUCOMTR-MCNC: 124 MG/DL — HIGH (ref 70–99)
GLUCOSE BLDC GLUCOMTR-MCNC: 175 MG/DL — HIGH (ref 70–99)
GLUCOSE BLDC GLUCOMTR-MCNC: 176 MG/DL — HIGH (ref 70–99)
GLUCOSE BLDC GLUCOMTR-MCNC: 230 MG/DL — HIGH (ref 70–99)
GLUCOSE BLDC GLUCOMTR-MCNC: 282 MG/DL — HIGH (ref 70–99)
GLUCOSE SERPL-MCNC: 241 MG/DL — HIGH (ref 70–99)
GRAM STN FLD: SIGNIFICANT CHANGE UP
GRAM STN FLD: SIGNIFICANT CHANGE UP
HCT VFR BLD CALC: 35 % — LOW (ref 39–50)
HGB BLD-MCNC: 11.5 G/DL — LOW (ref 13–17)
IMM GRANULOCYTES NFR BLD AUTO: 0.3 % — SIGNIFICANT CHANGE UP (ref 0–0.9)
LYMPHOCYTES # BLD AUTO: 1.09 K/UL — SIGNIFICANT CHANGE UP (ref 1–3.3)
LYMPHOCYTES # BLD AUTO: 12.2 % — LOW (ref 13–44)
MAGNESIUM SERPL-MCNC: 2.1 MG/DL — SIGNIFICANT CHANGE UP (ref 1.6–2.6)
MCHC RBC-ENTMCNC: 29.4 PG — SIGNIFICANT CHANGE UP (ref 27–34)
MCHC RBC-ENTMCNC: 32.9 GM/DL — SIGNIFICANT CHANGE UP (ref 32–36)
MCV RBC AUTO: 89.5 FL — SIGNIFICANT CHANGE UP (ref 80–100)
MONOCYTES # BLD AUTO: 0.69 K/UL — SIGNIFICANT CHANGE UP (ref 0–0.9)
MONOCYTES NFR BLD AUTO: 7.7 % — SIGNIFICANT CHANGE UP (ref 2–14)
NEUTROPHILS # BLD AUTO: 6.86 K/UL — SIGNIFICANT CHANGE UP (ref 1.8–7.4)
NEUTROPHILS NFR BLD AUTO: 77.2 % — HIGH (ref 43–77)
NRBC # BLD: 0 /100 WBCS — SIGNIFICANT CHANGE UP (ref 0–0)
PHOSPHATE SERPL-MCNC: 3.6 MG/DL — SIGNIFICANT CHANGE UP (ref 2.5–4.5)
PLATELET # BLD AUTO: 312 K/UL — SIGNIFICANT CHANGE UP (ref 150–400)
POTASSIUM SERPL-MCNC: 4.4 MMOL/L — SIGNIFICANT CHANGE UP (ref 3.5–5.3)
POTASSIUM SERPL-SCNC: 4.4 MMOL/L — SIGNIFICANT CHANGE UP (ref 3.5–5.3)
PROT SERPL-MCNC: 6.5 G/DL — SIGNIFICANT CHANGE UP (ref 6–8.3)
RBC # BLD: 3.91 M/UL — LOW (ref 4.2–5.8)
RBC # FLD: 12.8 % — SIGNIFICANT CHANGE UP (ref 10.3–14.5)
SODIUM SERPL-SCNC: 134 MMOL/L — LOW (ref 135–145)
SPECIMEN SOURCE: SIGNIFICANT CHANGE UP
SPECIMEN SOURCE: SIGNIFICANT CHANGE UP
WBC # BLD: 8.91 K/UL — SIGNIFICANT CHANGE UP (ref 3.8–10.5)
WBC # FLD AUTO: 8.91 K/UL — SIGNIFICANT CHANGE UP (ref 3.8–10.5)

## 2024-02-07 PROCEDURE — 88307 TISSUE EXAM BY PATHOLOGIST: CPT | Mod: 26

## 2024-02-07 PROCEDURE — 88304 TISSUE EXAM BY PATHOLOGIST: CPT | Mod: 26

## 2024-02-07 PROCEDURE — 73630 X-RAY EXAM OF FOOT: CPT | Mod: 26,RT

## 2024-02-07 PROCEDURE — 88311 DECALCIFY TISSUE: CPT | Mod: 26

## 2024-02-07 PROCEDURE — 99233 SBSQ HOSP IP/OBS HIGH 50: CPT | Mod: GC

## 2024-02-07 RX ORDER — INSULIN GLARGINE 100 [IU]/ML
20 INJECTION, SOLUTION SUBCUTANEOUS AT BEDTIME
Refills: 0 | Status: DISCONTINUED | OUTPATIENT
Start: 2024-02-07 | End: 2024-02-10

## 2024-02-07 RX ORDER — LOSARTAN POTASSIUM 100 MG/1
100 TABLET, FILM COATED ORAL DAILY
Refills: 0 | Status: DISCONTINUED | OUTPATIENT
Start: 2024-02-07 | End: 2024-02-07

## 2024-02-07 RX ORDER — AMLODIPINE BESYLATE 2.5 MG/1
10 TABLET ORAL ONCE
Refills: 0 | Status: DISCONTINUED | OUTPATIENT
Start: 2024-02-07 | End: 2024-02-07

## 2024-02-07 RX ORDER — AMLODIPINE BESYLATE 2.5 MG/1
10 TABLET ORAL ONCE
Refills: 0 | Status: COMPLETED | OUTPATIENT
Start: 2024-02-07 | End: 2024-02-07

## 2024-02-07 RX ORDER — ATORVASTATIN CALCIUM 80 MG/1
40 TABLET, FILM COATED ORAL AT BEDTIME
Refills: 0 | Status: DISCONTINUED | OUTPATIENT
Start: 2024-02-07 | End: 2024-02-10

## 2024-02-07 RX ORDER — CLOPIDOGREL BISULFATE 75 MG/1
75 TABLET, FILM COATED ORAL DAILY
Refills: 0 | Status: DISCONTINUED | OUTPATIENT
Start: 2024-02-07 | End: 2024-02-10

## 2024-02-07 RX ORDER — PIPERACILLIN AND TAZOBACTAM 4; .5 G/20ML; G/20ML
3.38 INJECTION, POWDER, LYOPHILIZED, FOR SOLUTION INTRAVENOUS EVERY 8 HOURS
Refills: 0 | Status: DISCONTINUED | OUTPATIENT
Start: 2024-02-07 | End: 2024-02-08

## 2024-02-07 RX ORDER — INSULIN LISPRO 100/ML
VIAL (ML) SUBCUTANEOUS
Refills: 0 | Status: DISCONTINUED | OUTPATIENT
Start: 2024-02-07 | End: 2024-02-10

## 2024-02-07 RX ORDER — VANCOMYCIN HCL 1 G
1750 VIAL (EA) INTRAVENOUS EVERY 12 HOURS
Refills: 0 | Status: DISCONTINUED | OUTPATIENT
Start: 2024-02-07 | End: 2024-02-08

## 2024-02-07 RX ORDER — AMLODIPINE BESYLATE 2.5 MG/1
10 TABLET ORAL DAILY
Refills: 0 | Status: DISCONTINUED | OUTPATIENT
Start: 2024-02-07 | End: 2024-02-10

## 2024-02-07 RX ORDER — LOSARTAN POTASSIUM 100 MG/1
100 TABLET, FILM COATED ORAL DAILY
Refills: 0 | Status: DISCONTINUED | OUTPATIENT
Start: 2024-02-07 | End: 2024-02-10

## 2024-02-07 RX ORDER — ASPIRIN/CALCIUM CARB/MAGNESIUM 324 MG
81 TABLET ORAL DAILY
Refills: 0 | Status: DISCONTINUED | OUTPATIENT
Start: 2024-02-07 | End: 2024-02-10

## 2024-02-07 RX ORDER — CITALOPRAM 10 MG/1
40 TABLET, FILM COATED ORAL DAILY
Refills: 0 | Status: DISCONTINUED | OUTPATIENT
Start: 2024-02-07 | End: 2024-02-10

## 2024-02-07 RX ADMIN — PIPERACILLIN AND TAZOBACTAM 25 GRAM(S): 4; .5 INJECTION, POWDER, LYOPHILIZED, FOR SOLUTION INTRAVENOUS at 03:32

## 2024-02-07 RX ADMIN — PIPERACILLIN AND TAZOBACTAM 25 GRAM(S): 4; .5 INJECTION, POWDER, LYOPHILIZED, FOR SOLUTION INTRAVENOUS at 19:00

## 2024-02-07 RX ADMIN — SODIUM CHLORIDE 110 MILLILITER(S): 9 INJECTION, SOLUTION INTRAVENOUS at 03:36

## 2024-02-07 RX ADMIN — Medication 81 MILLIGRAM(S): at 11:53

## 2024-02-07 RX ADMIN — AMLODIPINE BESYLATE 10 MILLIGRAM(S): 2.5 TABLET ORAL at 11:05

## 2024-02-07 RX ADMIN — LOSARTAN POTASSIUM 100 MILLIGRAM(S): 100 TABLET, FILM COATED ORAL at 11:04

## 2024-02-07 RX ADMIN — CITALOPRAM 40 MILLIGRAM(S): 10 TABLET, FILM COATED ORAL at 16:25

## 2024-02-07 RX ADMIN — INSULIN GLARGINE 20 UNIT(S): 100 INJECTION, SOLUTION SUBCUTANEOUS at 21:55

## 2024-02-07 RX ADMIN — Medication 4: at 18:21

## 2024-02-07 RX ADMIN — ATORVASTATIN CALCIUM 40 MILLIGRAM(S): 80 TABLET, FILM COATED ORAL at 21:55

## 2024-02-07 RX ADMIN — Medication 250 MILLIGRAM(S): at 16:25

## 2024-02-07 RX ADMIN — CLOPIDOGREL BISULFATE 75 MILLIGRAM(S): 75 TABLET, FILM COATED ORAL at 18:21

## 2024-02-07 NOTE — PROGRESS NOTE ADULT - ATTENDING COMMENTS
59yo M w/ hx of IDDM-II (c/b OM of 4th metatarsal), HFpEF, CAD (s/p stenting 12/9/22), HTN, HLD, who presented from podiatry office for surgical debridement of diabetic foot infection    #DM foot ulcer  - podiatry following, s/p R 5th digit I&D with bone biopsy  - f/u OR cultures  - c/w broad spectrum abx pending Cx data    #CHFpEF, chronic, not in acute exacerbation  - follows with Dr Nelson  - previously on BB but stopped due to bradycardia, junctional rhythm and concern for BRASH  - resume Losartan post op  - hold Torsemide given euvolemia, takes PRN at home    #HTN  - c/w amlodipine    #CAD  - c/w ASA, plavix and atorvastatin    Remainder as above

## 2024-02-07 NOTE — PROGRESS NOTE ADULT - PROBLEM SELECTOR PLAN 1
current diabetic foot infection requiring debridement   hx of OM R 3th toe s/p amputation (then cx w/o growth)  podiatry following, planned for OR debridement 2/7/24    - continue vancomycin 1750 q12 & zosyn at 3.375 q6h (pseudomonal dosing adjusted for pt's CrCl 39)  - follow up OR cultures, narrow abx as-appropriate  - pt now s/p R5T excision and drainage- f/u podiatry recs

## 2024-02-07 NOTE — PROGRESS NOTE ADULT - ASSESSMENT
58M w PMHx , IDDM-II (c/b OM of 4th metatarsal), HFpEF, CAD (s/p stenting 12/9), HTN, HLD, and multiple recent admissions for NOLAN and hyperkalemia (most recent early Jan 2024), admitted for iv abx & surgical debridement of diabetic foot infection.

## 2024-02-07 NOTE — PROGRESS NOTE ADULT - SUBJECTIVE AND OBJECTIVE BOX
POST OP NOTE    VALENTIN CABALLERO  MRN-[id number]    Procedure: R foot 5th digit I&D with bone biopsy  Surgeon: Jay Chakraborty  Assistants: Jen Mehta    Patient tolerated procedure well without incident.  Patient transferred to PACU in stable condition.       PE / Post Op Check:       GEN: NAD, AAOx3, resting comfortably with pain controlled      LE Focused:  No strikethrough is appreciated on surgical dressings.  Dressings were dry, clean, and intact.  No neuromuscular deficits appreciated.

## 2024-02-07 NOTE — PROGRESS NOTE ADULT - ASSESSMENT
Podiatry was consulted on a pt with complaints of R 4th interspace pain and erythema that began weeks ago. At the time of consult, WBC 10.51, ESR & CRP pending, glucose 289. On physical exam was remarkable for 4th interspace wound that probes 1cm deep and edema surrounding the R 5th digit extending proximally. Imaging showed no OM, GAS or acute fx. Based on the clinical presentation there is a strong suspicion for diabetic soft tissue infection at this time. CT final read negative for ST gas.    Plan:   - Plan for OR incison & drainage with bone biopsy of R foot this 2.7/24 AM at 7:30  - pt is medically optimized and has been NPO since midnight  - ABX per primary team  - Local wound care: betadine DSD, Kerlix, secured with tape  - Rest of care per primary team    Plan d/w Attending. Podiatry following.

## 2024-02-07 NOTE — PROGRESS NOTE ADULT - PROBLEM SELECTOR PLAN 2
SHANNON:  RCRI 3 (Hx CAD w stents, Hx CHF, DM with insulin)              Tejeda Score: 0.5% (systemic disease)              METS 9.89  EKG: sinus bradycardia @ 59BPM, no acute ST/T wave changes

## 2024-02-07 NOTE — PROGRESS NOTE ADULT - SUBJECTIVE AND OBJECTIVE BOX
PRE-OP NOTE    Pre-Op Diagnosis: Right 5th digit abscess  Planned Procedure: R 5th ray I&D with bone biopsy  Scheduled Date / Time: 2/7/24 at 7:30 AM  Indication: infection control    Labs:                       11.3   7.71  )-----------( 256      ( 06 Feb 2024 05:30 )             33.4   02-06    138  |  104  |  24<H>  ----------------------------<  198<H>  4.2   |  23  |  1.24    Ca    8.9      06 Feb 2024 05:30    TPro  6.1  /  Alb  2.9<L>  /  TBili  0.3  /  DBili  x   /  AST  10  /  ALT  9<L>  /  AlkPhos  57  02-06  LIVER FUNCTIONS - ( 06 Feb 2024 05:30 )  Alb: 2.9 g/dL / Pro: 6.1 g/dL / ALK PHOS: 57 U/L / ALT: 9 U/L / AST: 10 U/L / GGT: x           Vitals: Vital Signs Last 24 Hrs  T(C): 37 (07 Feb 2024 06:55), Max: 37.2 (06 Feb 2024 20:58)  T(F): 98.6 (07 Feb 2024 06:12), Max: 98.9 (06 Feb 2024 20:58)  HR: 56 (07 Feb 2024 06:55) (55 - 60)  BP: 170/67 (07 Feb 2024 06:55) (127/63 - 170/67)  BP(mean): --  RR: 18 (07 Feb 2024 06:55) (18 - 18)  SpO2: 97% (07 Feb 2024 06:55) (96% - 99%)    Parameters below as of 07 Feb 2024 06:12  Patient On (Oxygen Delivery Method): room air      PE:   Official CXR reading: (On Chart)  Official EKG reading: (On Chart)  Type and Cross/Screen: in sunrise  NPO after MN  Antibiotics: per primary team  Anesthesia evaluation (on chart)  Operative Consent (on chart)

## 2024-02-07 NOTE — PRE-ANESTHESIA EVALUATION ADULT - NSANTHPMHFT_GEN_ALL_CORE
58M w/ PMH of HFpEF (recent admission for exacerbation Dec 23 found to have CAD s/p stenting 12/9), HTN, HLD, IDDM-II (c/b OM of the 4 metatarsal), and hx urethral replacement for urethral stricture and multiple recent admissions for NOLAN and hyperkalemia (most recent early Jan 24).     Patient with recent ED visit for worsening diabetic foot infection (d/cd on Augmentin) presenting from Dr Chakraborty's (podiatry) office for worsening diabetic ulcer and need for debridement. Admitted 2/5.      XR R foot - Osteolysis consistent with loosening around a screw extending across the 2nd proximal IPJ. Subcutaneous edema dorsally. Amputation at the head of the 3rd proximal phalanx. Mild spurring at the Achilles insertion.    CT R foot w/ few punctate foci of air are present within the ankle joint at the superolateral mortise and posterior tibial plafond. Diffuse subcutaneous edema about the foot and ankle. Lucency surrounding screw extending across 2nd proximal IPJ. 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      EKG: sinus fran        No Known Allergies:   Intolerances:carvedilol: Drug, Hypotension, intolerance to beta blockers, do not restart    Home Medications:   · 	insulin glargine 100 units/mL subcutaneous solution: Last Dose Taken:  , 40 unit(s) subcutaneous once a day (at bedtime)  · 	amoxicillin-clavulanate 875 mg-125 mg oral tablet: Last Dose Taken:  , 1 tab(s) orally 2 times a day  · 	clopidogrel 75 mg oral tablet: Last Dose Taken:  , 1 tab(s) orally once a day   · 	Aspirin Enteric Coated 81 mg oral delayed release tablet: Last Dose Taken:  , 1 tab(s) orally once a day   · 	amLODIPine 10 mg oral tablet: Last Dose Taken:  , 1 tab(s) orally once a day  · 	Insulin Lispro KwikPen 100 units/mL injectable solution: Last Dose Taken:  , 15 unit(s) injectable 3 times a day (before meals)  · 	Lipitor 40 mg oral tablet: Last Dose Taken:  , 1 tab(s) orally once a day  · 	citalopram 40 mg oral tablet: Last Dose Taken:  , 1 tab(s) orally once a day  · 	olmesartan 40 mg oral tablet: 1 tab(s) orally once a day  · 	Farxiga 10 mg oral tablet: 1 tab(s) orally once a day        PAST MEDICAL HISTORY:  CAD (coronary artery disease)   Chronic diastolic congestive heart failure   CKD (chronic kidney disease)   Diabetes mellitus  Hyperlipidemia   Hypertension   Osteomyelitis.     PAST SURGICAL HISTORY:  S/P release of urethral stricture.

## 2024-02-07 NOTE — PROGRESS NOTE ADULT - PROBLEM SELECTOR PLAN 4
Cr of 1.31 & eGFR 64 on admission, continues to produce urine  multiple recent admissions for NOLAN on CKD complicated by hyperK+  likely multifactorial iso DM2, HTN and HFpEF  echo from 12/22 w/ LVEF 71%  home medication(s): olmesartan 40mg qd, farxiga 10mg qd, amlodipine 10mg QDat home    - cont home amlodipine as above  - continue home olmesartan (as therapeutic interchange losartan 100mg qd, HOLD day of OR)  - hold home farxiga  - encourage PO hydration, ivf as-needed  - limit nephrotoxins  - monitor BMP/Cr

## 2024-02-07 NOTE — PRE-OP CHECKLIST - AS TEMP SITE
Call Center TCM Note      Responses   [facility] patient discharged from?  [facility]   Does the patient have one of the following disease processes/diagnoses(primary or secondary)?  Other   TCM attempt successful?  No   Unsuccessful attempts  Attempt 2          Celi Garcia LPN    7/5/2021, 15:49 EDT       oral

## 2024-02-07 NOTE — PROGRESS NOTE ADULT - SUBJECTIVE AND OBJECTIVE BOX
INTERVAL HPI/OVERNIGHT EVENTS:  Pt in OR for planned debridement.     VITAL SIGNS:  T(F): 97.6 (02-07-24 @ 10:21)  HR: 52 (02-07-24 @ 10:21)  BP: 174/72 (02-07-24 @ 10:21)  RR: 17 (02-07-24 @ 10:21)  SpO2: 97% (02-07-24 @ 10:21)  Wt(kg): --        PHYSICAL EXAM:    Constitutional: resting comfortably in bed; NAD  HEENT: NC/AT, PER, anicteric sclera, no nasal discharge; MMM  Neck: supple; no JVD or thyromegaly  Respiratory: CTA B/L; no W/R/R, no retractions  Cardiac: +S1/S2; RRR; no M/R/G  Gastrointestinal: soft, NT/ND; no rebound or guarding  Back: spine midline, no bony tenderness or step-offs; no CVAT B/L  Extremities: WWP, no clubbing or cyanosis; no peripheral edema  Musculoskeletal: NROM x4; no joint swelling, tenderness or erythema  Vascular: 2+ radial, DP/PT pulses B/L  Dermatologic: skin warm, dry and intact; no rashes, wounds, or scars  Neurologic: AAOx3; CNII-XII grossly intact; no focal deficits  Psychiatric: affect and characteristics of appearance, verbalizations, behaviors are appropriate    MEDICATIONS  (STANDING):    MEDICATIONS  (PRN):      Allergies    No Known Allergies    Intolerances    carvedilol (Hypotension)      LABS:                        11.3   7.71  )-----------( 256      ( 06 Feb 2024 05:30 )             33.4     02-06    138  |  104  |  24<H>  ----------------------------<  198<H>  4.2   |  23  |  1.24    Ca    8.9      06 Feb 2024 05:30    TPro  6.1  /  Alb  2.9<L>  /  TBili  0.3  /  DBili  x   /  AST  10  /  ALT  9<L>  /  AlkPhos  57  02-06    PT/INR - ( 06 Feb 2024 05:30 )   PT: 11.1 sec;   INR: 0.97          PTT - ( 06 Feb 2024 05:30 )  PTT:30.5 sec  Urinalysis Basic - ( 06 Feb 2024 05:30 )    Color: x / Appearance: x / SG: x / pH: x  Gluc: 198 mg/dL / Ketone: x  / Bili: x / Urobili: x   Blood: x / Protein: x / Nitrite: x   Leuk Esterase: x / RBC: x / WBC x   Sq Epi: x / Non Sq Epi: x / Bacteria: x        RADIOLOGY & ADDITIONAL TESTS:  Reviewed

## 2024-02-07 NOTE — PROGRESS NOTE ADULT - ASSESSMENT
Podiatry was consulted on a pt with complaints of R 4th interspace pain and erythema that began weeks ago. At the time of consult, WBC 10.51, ESR & CRP pending, glucose 289. On physical exam was remarkable for 4th interspace wound that probes 1cm deep and edema surrounding the R 5th digit extending proximally. Imaging showed no OM, GAS or acute fx. Based on the clinical presentation there is a strong suspicion for diabetic soft tissue infection at this time. CT final read negative for ST gas. Pt is now s/p R 5th digit I&D with bone biopsy     Plan:   - F/u OR bone culture/path  - Pt may heel WBAT to R foot today  - Recommend ID consult for abx recommendations  -  F/u ED culture   - Rest of care per primary team    Plan d/w Attending. Podiatry following.

## 2024-02-07 NOTE — PROGRESS NOTE ADULT - PROBLEM SELECTOR PLAN 5
[facility] 12/09/22: PEPE x 1 OM1  home medication(s): asa 81mg qd, clopidogrel 75mg qd, lipitor 40mg qd     - continue statin  - hold asa/clopidogrel for OR, resume as appropriate

## 2024-02-07 NOTE — PROGRESS NOTE ADULT - PROBLEM SELECTOR PLAN 3
long-term IDDM w reported a1c of 15 6mo ago, 14.2 on 2/6/24  home regimen: lantus 40u qhs, lispro 15u tid, to which pt admits non-adherence    - inpt insulin regimen: hold home lispro, continue lantus 20 qhs (lantus 10 while npo prior to procedure), & iss  - consistent carb diet with DASH

## 2024-02-07 NOTE — BRIEF OPERATIVE NOTE - OPERATION/FINDINGS
Pt given pre-op local anesthetic block consisting of 10 ccs of 1% lidocaine plain in a reverse gray block fashion. Attention directed to right 4th interspace. Debrided macerated and de-vitalized tissue with #15 blade. Sent devitalized tissue for specimen, Spread soft tissue planes with hemostat. No tracking noted dorsally. Sinus tract noted plantarly communicating with lateral side of 5th digit. Made incision over dorsal lateral 5th digit. No purulence was expressed from any planes. Using jamshidi needle took proximal phalanx bone, sent for culture and pathology. Using jamshidi needle, took piece of 5th metatarsal head and sent for culture and pathology. Copiously irrigated using pulse lavage. Packed with iodoform packing. Dressed with 4x8 gauze, abd, kerlix, and ACE.

## 2024-02-08 LAB
ALBUMIN SERPL ELPH-MCNC: 3 G/DL — LOW (ref 3.3–5)
ALP SERPL-CCNC: 61 U/L — SIGNIFICANT CHANGE UP (ref 40–120)
ALT FLD-CCNC: 7 U/L — LOW (ref 10–45)
ANION GAP SERPL CALC-SCNC: 7 MMOL/L — SIGNIFICANT CHANGE UP (ref 5–17)
AST SERPL-CCNC: 11 U/L — SIGNIFICANT CHANGE UP (ref 10–40)
BASOPHILS # BLD AUTO: 0.04 K/UL — SIGNIFICANT CHANGE UP (ref 0–0.2)
BASOPHILS NFR BLD AUTO: 0.5 % — SIGNIFICANT CHANGE UP (ref 0–2)
BILIRUB SERPL-MCNC: 0.3 MG/DL — SIGNIFICANT CHANGE UP (ref 0.2–1.2)
BUN SERPL-MCNC: 23 MG/DL — SIGNIFICANT CHANGE UP (ref 7–23)
CALCIUM SERPL-MCNC: 9.2 MG/DL — SIGNIFICANT CHANGE UP (ref 8.4–10.5)
CHLORIDE SERPL-SCNC: 100 MMOL/L — SIGNIFICANT CHANGE UP (ref 96–108)
CO2 SERPL-SCNC: 27 MMOL/L — SIGNIFICANT CHANGE UP (ref 22–31)
CREAT SERPL-MCNC: 1.21 MG/DL — SIGNIFICANT CHANGE UP (ref 0.5–1.3)
EGFR: 70 ML/MIN/1.73M2 — SIGNIFICANT CHANGE UP
EOSINOPHIL # BLD AUTO: 0.25 K/UL — SIGNIFICANT CHANGE UP (ref 0–0.5)
EOSINOPHIL NFR BLD AUTO: 3.2 % — SIGNIFICANT CHANGE UP (ref 0–6)
GLUCOSE BLDC GLUCOMTR-MCNC: 159 MG/DL — HIGH (ref 70–99)
GLUCOSE BLDC GLUCOMTR-MCNC: 160 MG/DL — HIGH (ref 70–99)
GLUCOSE BLDC GLUCOMTR-MCNC: 212 MG/DL — HIGH (ref 70–99)
GLUCOSE BLDC GLUCOMTR-MCNC: 272 MG/DL — HIGH (ref 70–99)
GLUCOSE SERPL-MCNC: 245 MG/DL — HIGH (ref 70–99)
HCT VFR BLD CALC: 36.1 % — LOW (ref 39–50)
HGB BLD-MCNC: 11.8 G/DL — LOW (ref 13–17)
IMM GRANULOCYTES NFR BLD AUTO: 0.5 % — SIGNIFICANT CHANGE UP (ref 0–0.9)
LYMPHOCYTES # BLD AUTO: 1.08 K/UL — SIGNIFICANT CHANGE UP (ref 1–3.3)
LYMPHOCYTES # BLD AUTO: 13.7 % — SIGNIFICANT CHANGE UP (ref 13–44)
MAGNESIUM SERPL-MCNC: 2.1 MG/DL — SIGNIFICANT CHANGE UP (ref 1.6–2.6)
MCHC RBC-ENTMCNC: 29.4 PG — SIGNIFICANT CHANGE UP (ref 27–34)
MCHC RBC-ENTMCNC: 32.7 GM/DL — SIGNIFICANT CHANGE UP (ref 32–36)
MCV RBC AUTO: 90 FL — SIGNIFICANT CHANGE UP (ref 80–100)
MONOCYTES # BLD AUTO: 0.66 K/UL — SIGNIFICANT CHANGE UP (ref 0–0.9)
MONOCYTES NFR BLD AUTO: 8.4 % — SIGNIFICANT CHANGE UP (ref 2–14)
NEUTROPHILS # BLD AUTO: 5.81 K/UL — SIGNIFICANT CHANGE UP (ref 1.8–7.4)
NEUTROPHILS NFR BLD AUTO: 73.7 % — SIGNIFICANT CHANGE UP (ref 43–77)
NRBC # BLD: 0 /100 WBCS — SIGNIFICANT CHANGE UP (ref 0–0)
PHOSPHATE SERPL-MCNC: 3.6 MG/DL — SIGNIFICANT CHANGE UP (ref 2.5–4.5)
PLATELET # BLD AUTO: 348 K/UL — SIGNIFICANT CHANGE UP (ref 150–400)
POTASSIUM SERPL-MCNC: 4.6 MMOL/L — SIGNIFICANT CHANGE UP (ref 3.5–5.3)
POTASSIUM SERPL-SCNC: 4.6 MMOL/L — SIGNIFICANT CHANGE UP (ref 3.5–5.3)
PROT SERPL-MCNC: 6.6 G/DL — SIGNIFICANT CHANGE UP (ref 6–8.3)
RBC # BLD: 4.01 M/UL — LOW (ref 4.2–5.8)
RBC # FLD: 12.5 % — SIGNIFICANT CHANGE UP (ref 10.3–14.5)
SODIUM SERPL-SCNC: 134 MMOL/L — LOW (ref 135–145)
SURGICAL PATHOLOGY STUDY: SIGNIFICANT CHANGE UP
VANCOMYCIN TROUGH SERPL-MCNC: 24.9 UG/ML — HIGH (ref 10–20)
WBC # BLD: 7.88 K/UL — SIGNIFICANT CHANGE UP (ref 3.8–10.5)
WBC # FLD AUTO: 7.88 K/UL — SIGNIFICANT CHANGE UP (ref 3.8–10.5)

## 2024-02-08 PROCEDURE — 99233 SBSQ HOSP IP/OBS HIGH 50: CPT | Mod: GC

## 2024-02-08 RX ORDER — INSULIN LISPRO 100/ML
3 VIAL (ML) SUBCUTANEOUS
Refills: 0 | Status: DISCONTINUED | OUTPATIENT
Start: 2024-02-08 | End: 2024-02-10

## 2024-02-08 RX ORDER — VANCOMYCIN HCL 1 G
1500 VIAL (EA) INTRAVENOUS EVERY 12 HOURS
Refills: 0 | Status: DISCONTINUED | OUTPATIENT
Start: 2024-02-09 | End: 2024-02-09

## 2024-02-08 RX ORDER — VANCOMYCIN HCL 1 G
1750 VIAL (EA) INTRAVENOUS EVERY 12 HOURS
Refills: 0 | Status: DISCONTINUED | OUTPATIENT
Start: 2024-02-08 | End: 2024-02-08

## 2024-02-08 RX ORDER — PIPERACILLIN AND TAZOBACTAM 4; .5 G/20ML; G/20ML
3.38 INJECTION, POWDER, LYOPHILIZED, FOR SOLUTION INTRAVENOUS EVERY 8 HOURS
Refills: 0 | Status: DISCONTINUED | OUTPATIENT
Start: 2024-02-08 | End: 2024-02-10

## 2024-02-08 RX ADMIN — Medication 250 MILLIGRAM(S): at 06:16

## 2024-02-08 RX ADMIN — Medication 2: at 13:44

## 2024-02-08 RX ADMIN — PIPERACILLIN AND TAZOBACTAM 25 GRAM(S): 4; .5 INJECTION, POWDER, LYOPHILIZED, FOR SOLUTION INTRAVENOUS at 21:19

## 2024-02-08 RX ADMIN — Medication 3 UNIT(S): at 13:44

## 2024-02-08 RX ADMIN — LOSARTAN POTASSIUM 100 MILLIGRAM(S): 100 TABLET, FILM COATED ORAL at 06:13

## 2024-02-08 RX ADMIN — AMLODIPINE BESYLATE 10 MILLIGRAM(S): 2.5 TABLET ORAL at 06:14

## 2024-02-08 RX ADMIN — CLOPIDOGREL BISULFATE 75 MILLIGRAM(S): 75 TABLET, FILM COATED ORAL at 12:07

## 2024-02-08 RX ADMIN — Medication 3 UNIT(S): at 18:31

## 2024-02-08 RX ADMIN — PIPERACILLIN AND TAZOBACTAM 25 GRAM(S): 4; .5 INJECTION, POWDER, LYOPHILIZED, FOR SOLUTION INTRAVENOUS at 02:01

## 2024-02-08 RX ADMIN — Medication 81 MILLIGRAM(S): at 12:07

## 2024-02-08 RX ADMIN — CITALOPRAM 40 MILLIGRAM(S): 10 TABLET, FILM COATED ORAL at 12:07

## 2024-02-08 RX ADMIN — PIPERACILLIN AND TAZOBACTAM 25 GRAM(S): 4; .5 INJECTION, POWDER, LYOPHILIZED, FOR SOLUTION INTRAVENOUS at 12:07

## 2024-02-08 RX ADMIN — ATORVASTATIN CALCIUM 40 MILLIGRAM(S): 80 TABLET, FILM COATED ORAL at 21:19

## 2024-02-08 RX ADMIN — INSULIN GLARGINE 20 UNIT(S): 100 INJECTION, SOLUTION SUBCUTANEOUS at 22:46

## 2024-02-08 RX ADMIN — Medication 6: at 18:30

## 2024-02-08 RX ADMIN — Medication 4: at 09:43

## 2024-02-08 NOTE — PROGRESS NOTE ADULT - PROBLEM SELECTOR PLAN 4
Cr of 1.31 & eGFR 64 on admission, continues to produce urine  multiple recent admissions for NOLAN on CKD complicated by hyperK+  likely multifactorial iso DM2, HTN and HFpEF  echo from 12/22 w/ LVEF 71%  home medication(s): olmesartan 40mg qd, farxiga 10mg qd, amlodipine 10mg QDat home    - cont home amlodipine as above  - continue home olmesartan  - hold home farxiga  - encourage PO hydration, ivf as-needed  - limit nephrotoxins  - monitor BMP/Cr

## 2024-02-08 NOTE — DIETITIAN INITIAL EVALUATION ADULT - NSPROEDALEARNPREF_GEN_A_NUR
consistent carbohydrates, importance of adequate protein and fiber, sugar-free sweets/verbal instruction

## 2024-02-08 NOTE — PROGRESS NOTE ADULT - SUBJECTIVE AND OBJECTIVE BOX
INTERVAL HPI/OVERNIGHT EVENTS:  Patient was seen and examined at bedside. As per nurse and patient, no o/n events, patient resting comfortably. No complaints at this time. Patient denies: fever, chills, lightheadedness, weakness, CP, palpitations, SOB, cough, N/V. ROS otherwise negative.    VITAL SIGNS:  T(F): 98.7 (02-08-24 @ 08:32)  HR: 54 (02-08-24 @ 08:32)  BP: 149/65 (02-08-24 @ 08:32)  RR: 17 (02-08-24 @ 08:32)  SpO2: 98% (02-08-24 @ 08:32)  Wt(kg): --        PHYSICAL EXAM:    Constitutional: resting comfortably in bed; NAD  HEENT: NC/AT, PER, anicteric sclera, no nasal discharge; MMM  Neck: supple; no JVD or thyromegaly  Respiratory: CTA B/L; no W/R/R, no retractions  Cardiac: +S1/S2; RRR; no M/R/G  Gastrointestinal: soft, NT/ND; no rebound or guarding  Back: spine midline, no bony tenderness or step-offs; no CVAT B/L  Extremities: WWP, no clubbing or cyanosis; no peripheral edema  Musculoskeletal: RLE wound bandage clean dry and intact   Vascular: 2+ radial, DP/PT pulses B/L  Dermatologic: skin warm, dry and intact; no rashes, wounds, or scars  Neurologic: AAOx3; CNII-XII grossly intact; no focal deficits  Psychiatric: affect and characteristics of appearance, verbalizations, behaviors are appropriate    MEDICATIONS  (STANDING):  amLODIPine   Tablet 10 milliGRAM(s) Oral daily  aspirin enteric coated 81 milliGRAM(s) Oral daily  atorvastatin 40 milliGRAM(s) Oral at bedtime  citalopram 40 milliGRAM(s) Oral daily  clopidogrel Tablet 75 milliGRAM(s) Oral daily  insulin glargine Injectable (LANTUS) 20 Unit(s) SubCutaneous at bedtime  insulin lispro (ADMELOG) corrective regimen sliding scale   SubCutaneous three times a day before meals  insulin lispro Injectable (ADMELOG) 3 Unit(s) SubCutaneous before breakfast  insulin lispro Injectable (ADMELOG) 3 Unit(s) SubCutaneous before dinner  insulin lispro Injectable (ADMELOG) 3 Unit(s) SubCutaneous before lunch  losartan 100 milliGRAM(s) Oral daily  piperacillin/tazobactam IVPB.. 3.375 Gram(s) IV Intermittent every 8 hours  vancomycin  IVPB 1750 milliGRAM(s) IV Intermittent every 12 hours    MEDICATIONS  (PRN):      Allergies    No Known Allergies    Intolerances    carvedilol (Hypotension)      LABS:                        11.8   7.88  )-----------( 348      ( 08 Feb 2024 05:30 )             36.1     02-08    134<L>  |  100  |  23  ----------------------------<  245<H>  4.6   |  27  |  1.21    Ca    9.2      08 Feb 2024 05:30  Phos  3.6     02-08  Mg     2.1     02-08    TPro  6.6  /  Alb  3.0<L>  /  TBili  0.3  /  DBili  x   /  AST  11  /  ALT  7<L>  /  AlkPhos  61  02-08      Urinalysis Basic - ( 08 Feb 2024 05:30 )    Color: x / Appearance: x / SG: x / pH: x  Gluc: 245 mg/dL / Ketone: x  / Bili: x / Urobili: x   Blood: x / Protein: x / Nitrite: x   Leuk Esterase: x / RBC: x / WBC x   Sq Epi: x / Non Sq Epi: x / Bacteria: x        RADIOLOGY & ADDITIONAL TESTS:  Reviewed

## 2024-02-08 NOTE — PROGRESS NOTE ADULT - ASSESSMENT
Podiatry was consulted on a pt with complaints of R 4th interspace pain and erythema that began weeks ago. At the time of consult, WBC 10.51, ESR & CRP pending, glucose 289. On physical exam was remarkable for 4th interspace wound that probes 1cm deep and edema surrounding the R 5th digit extending proximally. Imaging showed no OM, GAS or acute fx. Based on the clinical presentation there is a strong suspicion for diabetic soft tissue infection at this time. CT final read negative for ST gas. Pt is now s/p R 5th digit I&D with bone biopsy     Plan:   - F/u OR bone culture/path  - Pt may heel WBAT to R foot today  - Recommend ID consult for abx recommendations once clean margins finalize    - Please have VNS set up for pt when he is medically stable for d/c.   - F/u ED culture   - Rest of care per primary team    Plan d/w Attending. Podiatry following.    Podiatry was consulted on a pt with complaints of R 4th interspace pain and erythema that began weeks ago. At the time of consult, WBC 10.51, ESR & CRP pending, glucose 289. On physical exam was remarkable for 4th interspace wound that probes 1cm deep and edema surrounding the R 5th digit extending proximally. Imaging showed no OM, GAS or acute fx. Based on the clinical presentation there is a strong suspicion for diabetic soft tissue infection at this time. CT final read negative for ST gas. Pt is now s/p R 5th digit I&D with bone biopsy     Plan:   - F/u OR bone culture/path  - Pt may heel WBAT to R foot today  - Recommend ID consult for abx recommendations once clean margins finalize    - Please have VNS set up for pt when he is medically stable for d/c.   - F/u ED culture   - Rest of care per primary team    Plan d/w Attending. Podiatry following.     Discharge dressing instructions: Cleanse wound with normal saline daily, pat dry with sterile guaze, pack interspace wound with 1/2 inch iodoform packing, cover with dry sterile gauze, ABD pad, wrap with Kerlix and light ACE bandage.     Patient should follow up with Dr. Jay Chakraborty within 1 week of discharge.    Office information:          [place] Address- [address] ([address])[place] [address]          "[employer]

## 2024-02-08 NOTE — PROGRESS NOTE ADULT - PROBLEM SELECTOR PLAN 3
long-term IDDM w reported a1c of 15 6mo ago, 14.2 on 2/6/24  home regimen: lantus 40u qhs, lispro 15u tid, to which pt admits non-adherence    - inpt insulin regimen: hold home lispro, continue lantus 20 qhs (lantus 10 while npo prior to procedure), & iss  - started 3 lispro pre meal   - consistent carb diet with DASH

## 2024-02-08 NOTE — DIETITIAN INITIAL EVALUATION ADULT - PROBLEM SELECTOR PLAN 3
Cr of 1.31 w/ eGFR 64 today which appears to be new baseline for the pt. Patient is spontaneously urinating at this time.   Likely multifactorial iso DM2, HTN and HFpEF. Multiple recent admissions for NOLAN on CKD c/b hyperK+. Pt on olmesartan 40mg QD, farxiga 10mg QD and amlodipine 10mg QDat home. echo from 12/22 w/ LVEF 71%.   - cont home amlodipine as above  - TI home olmesartan to losartan 100mg QD -> hold until after OR  - hold home farxiga IP  - Ensure adequate PO hydration.   - Limit nephrotoxics agents  - cont to trend Cr

## 2024-02-08 NOTE — PROGRESS NOTE ADULT - SUBJECTIVE AND OBJECTIVE BOX
53 year-old male with a PMH of intellectual disability, hypothyroidism, seizure disorder (on Depakote), bipolar disorder, schizophrenia, presenting s/p mechanical fall yesterday and an episode of chest pain this morning, admitted for further w/u.    #Chest pain, ACS ruled out  Reproducible, consistent with costochondritis  Night of 7/20 patient did have elevated temperature    CXR (07/19): Mild bibasal opacities/atelectasis  Troponins negative x4  - f/u S/S eval  - will ask for ID consult due to urinary retention and fever    #Urinary retention  - found to have 800cc in bladder 7/21  - long placed   - urology consulted     #Left colon distension  Seen on CXR  - supp. ordered    #Hypothyroidism  - Cont levothyroxine 100mcg qD    #Seizures disorder  - Cont depakote  TID    #Bipolar  #Schizophrenia  - Cont haldol 10mg ID  - Cont bupropion XL 150mg qD  - COnt mirtazapine 15mg qHs  - Cont benztropine 1mg BID    DVT PPX, Lovenox    #Progress Note Handoff  Pending (specify): S/S VENESSA mota  Family discussion: d/w caretaker at bedside. Pt is [facility] of [facility], discussed case with charge nurse at [facility] ([phone])  Disposition:  Patient is a 58y old  Male who presents with a chief complaint of diabetic ulcer debridement (08 Feb 2024 07:03)      INTERVAL HPI/ OVERNIGHT EVENTS Pt was seen bedside during AM rounds. Pt was resting comfortably in bed. Dressings were found to be clean, dry, and intact. Pt has no other pedal complaints at this time.       LABS                        11.8   7.88  )-----------( 348      ( 08 Feb 2024 05:30 )             36.1     02-07    134<L>  |  99  |  26<H>  ----------------------------<  241<H>  4.4   |  24  |  1.18    Ca    9.0      07 Feb 2024 17:13  Phos  3.6     02-07  Mg     2.1     02-07    TPro  6.5  /  Alb  3.0<L>  /  TBili  0.3  /  DBili  x   /  AST  11  /  ALT  8<L>  /  AlkPhos  60  02-07        ICU Vital Signs Last 24 Hrs  T(C): 37.1 (08 Feb 2024 08:32), Max: 37.1 (08 Feb 2024 08:32)  T(F): 98.7 (08 Feb 2024 08:32), Max: 98.7 (08 Feb 2024 08:32)  HR: 54 (08 Feb 2024 08:32) (47 - 62)  BP: 149/65 (08 Feb 2024 08:32) (146/69 - 174/72)  BP(mean): 102 (07 Feb 2024 09:41) (102 - 103)  ABP: --  ABP(mean): --  RR: 17 (08 Feb 2024 08:32) (12 - 18)  SpO2: 98% (08 Feb 2024 08:32) (94% - 100%)    O2 Parameters below as of 08 Feb 2024 08:32  Patient On (Oxygen Delivery Method): room air    RADIOLOGY  < from: Xray Foot AP + Lateral + Oblique, Right (02.07.24 @ 11:34) >    INTERPRETATION:  INDICATION: Right foot biopsy    TECHNIQUE: 3 views of the right foot    COMPARISON: None available    FINDINGS:    There is no fracture or dislocation. There are postsurgical changes of   the fifth ray.  There is no focal soft tissue abnormality.      IMPRESSION:    Postsurgical changes of the foot.    --- End of Report ---        < end of copied text >    MICROBIOLOGY  Culture - Surgical Swab (02.07.24 @ 08:16)    Gram Stain:   No organisms seen  Rare WBC's   Specimen Source: .Surgical Swab Proximal Phalanx Bone - Bone Biopsy #2   Culture Results:   No growth to date    Culture - Surgical Swab (02.07.24 @ 08:16)    Gram Stain:   No organisms seen  Rare WBC's   Specimen Source: .Surgical Swab Metatarsal Head #1 - Bone Biopsy   Culture Results:   No growth to date    Culture - Surgical Swab (02.05.24 @ 19:12)    Gram Stain:   No organisms seen  Rare WBC's   Specimen Source: .Surgical Swab R 5th digit wound - posible OM/GAS   Culture Results:   Rare Staphylococcus epidermidis  Few Corynebacterium amycolatum  Rare Alloscardovia omnicolens  Rare Finegoldia magna      PHYSICAL EXAM  Lower Extremity Focused  Vasc: nonpalpable PT/DP pulses, Monophasic PT/DP on doppler, TG warm to warm, (+)  R foot edema and resolving erythema starting at the 5th digit and extending proximally up the leg, CFT <3 x 4, 5th digit appears to be dusky with delayed CFT  Derm: Open lesion noted in the R 4th interspace which probes deep (1cm) with possible hard stop (possible PTB), maceration seen in the 4th interspace, (+)Serosang drainage (-)pus, (-) malodor, (-)crepitus, (-)fluctuance   Neuro: Protective sensation intact  MSK: Partial 3rd digit amputation   Patient is a 58y old  Male who presents with a chief complaint of diabetic ulcer debridement (08 Feb 2024 07:03)      INTERVAL HPI/ OVERNIGHT EVENTS Pt was seen bedside during AM rounds with attending present. Pt was resting comfortably in bed. Dressings were found to be clean, dry, and intact. Pt has no other pedal complaints at this time.       LABS                        11.8   7.88  )-----------( 348      ( 08 Feb 2024 05:30 )             36.1     02-07    134<L>  |  99  |  26<H>  ----------------------------<  241<H>  4.4   |  24  |  1.18    Ca    9.0      07 Feb 2024 17:13  Phos  3.6     02-07  Mg     2.1     02-07    TPro  6.5  /  Alb  3.0<L>  /  TBili  0.3  /  DBili  x   /  AST  11  /  ALT  8<L>  /  AlkPhos  60  02-07        ICU Vital Signs Last 24 Hrs  T(C): 37.1 (08 Feb 2024 08:32), Max: 37.1 (08 Feb 2024 08:32)  T(F): 98.7 (08 Feb 2024 08:32), Max: 98.7 (08 Feb 2024 08:32)  HR: 54 (08 Feb 2024 08:32) (47 - 62)  BP: 149/65 (08 Feb 2024 08:32) (146/69 - 174/72)  BP(mean): 102 (07 Feb 2024 09:41) (102 - 103)  ABP: --  ABP(mean): --  RR: 17 (08 Feb 2024 08:32) (12 - 18)  SpO2: 98% (08 Feb 2024 08:32) (94% - 100%)    O2 Parameters below as of 08 Feb 2024 08:32  Patient On (Oxygen Delivery Method): room air    RADIOLOGY  < from: Xray Foot AP + Lateral + Oblique, Right (02.07.24 @ 11:34) >    INTERPRETATION:  INDICATION: Right foot biopsy    TECHNIQUE: 3 views of the right foot    COMPARISON: None available    FINDINGS:    There is no fracture or dislocation. There are postsurgical changes of   the fifth ray.  There is no focal soft tissue abnormality.      IMPRESSION:    Postsurgical changes of the foot.    --- End of Report ---        < end of copied text >    MICROBIOLOGY  Culture - Surgical Swab (02.07.24 @ 08:16)    Gram Stain:   No organisms seen  Rare WBC's   Specimen Source: .Surgical Swab Proximal Phalanx Bone - Bone Biopsy #2   Culture Results:   No growth to date    Culture - Surgical Swab (02.07.24 @ 08:16)    Gram Stain:   No organisms seen  Rare WBC's   Specimen Source: .Surgical Swab Metatarsal Head #1 - Bone Biopsy   Culture Results:   No growth to date    Culture - Surgical Swab (02.05.24 @ 19:12)    Gram Stain:   No organisms seen  Rare WBC's   Specimen Source: .Surgical Swab R 5th digit wound - posible OM/GAS   Culture Results:   Rare Staphylococcus epidermidis  Few Corynebacterium amycolatum  Rare Alloscardovia omnicolens  Rare Finegoldia magna      PHYSICAL EXAM  Lower Extremity Focused  Vasc: nonpalpable PT/DP pulses, Monophasic PT/DP on doppler, TG warm to warm, (+)  R foot edema and resolving erythema starting at the 5th digit and extending proximally up the leg, CFT <3 x 4, 5th digit appears to be dusky with delayed CFT  Derm: Open lesion noted in the R 4th interspace which probes deep (1cm) with possible hard stop (possible PTB), maceration seen in the 4th interspace, (+)Serosang drainage (-)pus, (-) malodor, (-)crepitus, (-)fluctuance   Neuro: Protective sensation intact  MSK: Partial 3rd digit amputation

## 2024-02-08 NOTE — PROGRESS NOTE ADULT - PROBLEM SELECTOR PLAN 5
[facility] 12/09/22: PEPE x 1 OM1  home medication(s): asa 81mg qd, clopidogrel 75mg qd, lipitor 40mg qd     - continue statin  - c/w asa/clopidogrel

## 2024-02-08 NOTE — DIETITIAN INITIAL EVALUATION ADULT - ADD RECOMMEND
1. Recommend to remove DASH/TLC diet and continue consistent carbohydrates with low sodium.   2. Encourage pt to meet nutritional needs as able   3. Monitor labs: electrolytes, cmp, fingersticks  4. Monitor weights   5. Pain and bowel regimen per team   6. Will continue to assess/honor food preferences as able  7. Monitor adherence to diet education

## 2024-02-08 NOTE — DIETITIAN INITIAL EVALUATION ADULT - PERTINENT LABORATORY DATA
02-08    134<L>  |  100  |  23  ----------------------------<  245<H>  4.6   |  27  |  1.21    Ca    9.2      08 Feb 2024 05:30  Phos  3.6     02-08  Mg     2.1     02-08    TPro  6.6  /  Alb  3.0<L>  /  TBili  0.3  /  DBili  x   /  AST  11  /  ALT  7<L>  /  AlkPhos  61  02-08  POCT Blood Glucose.: 159 mg/dL (02-08-24 @ 13:24)  A1C with Estimated Average Glucose Result: 14.2 % (02-06-24 @ 05:30)

## 2024-02-08 NOTE — DIETITIAN INITIAL EVALUATION ADULT - OTHER INFO
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 Pt initially described worsening erythema on the dorsal side of R foot w/ warmth and tenderness tracking to the level of the shin w/ associated edema.    Patient seen at bedside for nutrition assessment. Current diet order: consistent carbohydrates, DASH/TLC, low sodium. No known food allergies. No difficulty chewing/swallowing reported. Reports blank appetite. Patient consumed blank % of breakfast which included blank. Food recall PTA. Nutrition education. Dosing weight: blank pounds, BMI, reports UBW of blank pounds. No pressure injuries documented. No edema documented. Denies N/V/D/C. Labs. Meds: blank. Observed with no overt signs and symptoms of muscle or fat wasting. Based on ASPEN guidelines, pt does not meet criteria for malnutrition. No cultural, ethnic, Evangelical food preferences reported. See nutrition recommendations below.  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 Pt initially described worsening erythema on the dorsal side of R foot w/ warmth and tenderness tracking to the level of the shin w/ associated edema.    Patient seen at bedside for nutrition assessment. Current diet order: consistent carbohydrates, DASH/TLC, low sodium. No known food allergies. No difficulty chewing/swallowing reported. Reports good appetite, consumed toast and fruit at breakfast. Reports he was eating well PTA. Patient reports he was not very compliant with his DM medications or with monitoring his blood sugar. Reports he eats a generally healthy diet including eggs with English muffin at breakfast, half a sandwich at lunch on whole wheat bread, and protein with salad at dinner. Provided nutrition education discussing consistent carbohydrates, importance of adequate protein and fiber, sugar-free sweets. Provided handout "Carbohydrate counting for people with DM." Dosing weight: 198 pounds, BMI 26.9, reports UBW of 195-205 pounds. No pressure injuries documented. +2 edema to R foot. Denies N/V/D/C. Blood sugar 159-282 hours, HgbA1c 14.2%. Meds: antibiotics, insulin. Observed with no overt signs and symptoms of muscle or fat wasting. Based on ASPEN guidelines, pt does not meet criteria for malnutrition. No cultural, ethnic, Amish food preferences reported. See nutrition recommendations below.

## 2024-02-08 NOTE — DIETITIAN INITIAL EVALUATION ADULT - PROBLEM SELECTOR PLAN 1
Patient with long term IDDM w/ reported a1c 6 mo ago 15. Lantus 40 QHS and Lispro 15 TID at home however pt reports noncompliance. hx of OM R 3th toe s/p amputation (cultures w/o growth). Presenting from OP podiatry office w/ diabetic foot infection requiring debridement. Plan for OR debridement of R 5th digit on Tuesday/Wednesday under podiatry pending OR scheduling. Given zosyn and vanc in ED. CT R foot w/ few punctate foci of air are present within the ankle joint at the superolateral mortise and posterior tibial plafond. Diffuse subcutaneous edema about the foot and ankle. Lucency surrounding screw extending across 2nd proximal IPJ. 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  - cont zosyn w/ pseudomonal coverage and vancomycin   - f/u R ankle XR for assessment of gas demonstrated on CT  - trend CRP and ESR  - f/u wound culture  - restart Lantus at 20 units QHS and hold home lispro  - A1c in AM  - Sliding scale and Accuchecks  - Consistent carb diet with DASH

## 2024-02-08 NOTE — DIETITIAN INITIAL EVALUATION ADULT - PERTINENT MEDS FT
MEDICATIONS  (STANDING):  amLODIPine   Tablet 10 milliGRAM(s) Oral daily  aspirin enteric coated 81 milliGRAM(s) Oral daily  atorvastatin 40 milliGRAM(s) Oral at bedtime  citalopram 40 milliGRAM(s) Oral daily  clopidogrel Tablet 75 milliGRAM(s) Oral daily  insulin glargine Injectable (LANTUS) 20 Unit(s) SubCutaneous at bedtime  insulin lispro (ADMELOG) corrective regimen sliding scale   SubCutaneous three times a day before meals  insulin lispro Injectable (ADMELOG) 3 Unit(s) SubCutaneous before dinner  insulin lispro Injectable (ADMELOG) 3 Unit(s) SubCutaneous before lunch  insulin lispro Injectable (ADMELOG) 3 Unit(s) SubCutaneous before breakfast  losartan 100 milliGRAM(s) Oral daily  piperacillin/tazobactam IVPB.. 3.375 Gram(s) IV Intermittent every 8 hours  vancomycin  IVPB 1750 milliGRAM(s) IV Intermittent every 12 hours    MEDICATIONS  (PRN):

## 2024-02-08 NOTE — PROGRESS NOTE ADULT - ATTENDING COMMENTS
57yo M w/ hx of IDDM-II (c/b OM of 4th metatarsal), HFpEF, CAD (s/p stenting 12/9/22), HTN, HLD, who presented from podiatry office for surgical debridement of diabetic foot infection    #DM foot ulcer  - podiatry following, s/p R 5th digit I&D with bone biopsy  - f/u OR cultures preop superficial culture with staph epi, corynebacterium, Rare Alloscardovia omnicolens, Rare Finegoldia magna   - c/w Vanc for corynebacterium, zosyn non pseudomonal dose for gram negatives, f/u OR bone cx    #DM, poorly controlled  - intermittent compliance with insulin, A1c 14.4  - adjust basal bolus as needed. will need insulin on DC given A1c    #CHFpEF, chronic, not in acute exacerbation  - follows with Dr Nelson  - previously on BB but stopped due to bradycardia, junctional rhythm and concern for BRASH  - c/w Losartan post op  - hold Torsemide given euvolemia, takes PRN at home    #HTN  - c/w amlodipine    #CAD  - c/w ASA, plavix and atorvastatin    Remainder as above

## 2024-02-08 NOTE — DIETITIAN INITIAL EVALUATION ADULT - PROBLEM SELECTOR PLAN 4
chest pain free, EKG revealed sinus bradycardia @ 59BPM, no acute ST/T wave changes. [facility] 12/09/22: PEEP x 1 OM1. on home asa 81mg QD, clopidogrel 75mg QD and lipitor 40mg QD.   - Continue Statin  - hold ASA/clopidogrel in anticipation of OR

## 2024-02-08 NOTE — DIETITIAN INITIAL EVALUATION ADULT - OTHER CALCULATIONS
Based on Standards of Care pt within % IBW (111%) thus actual body weight used for all calculations. Needs adjusted for CHF. Fluid recs per team.

## 2024-02-08 NOTE — DIETITIAN INITIAL EVALUATION ADULT - PROBLEM SELECTOR PLAN 7
F: s/p 1L LR in ED   E: Replete carefully.   N: consistent carb, DASH - NPO @MN  DVT prophylaxis: ICDs in anticipation of OR  Dispo: RMF.

## 2024-02-09 ENCOUNTER — TRANSCRIPTION ENCOUNTER (OUTPATIENT)
Age: 59
End: 2024-02-09

## 2024-02-09 DIAGNOSIS — F41.9 ANXIETY DISORDER, UNSPECIFIED: ICD-10-CM

## 2024-02-09 LAB
ALBUMIN SERPL ELPH-MCNC: 2.8 G/DL — LOW (ref 3.3–5)
ALP SERPL-CCNC: 58 U/L — SIGNIFICANT CHANGE UP (ref 40–120)
ALT FLD-CCNC: 7 U/L — LOW (ref 10–45)
ANION GAP SERPL CALC-SCNC: 9 MMOL/L — SIGNIFICANT CHANGE UP (ref 5–17)
AST SERPL-CCNC: 10 U/L — SIGNIFICANT CHANGE UP (ref 10–40)
BASOPHILS # BLD AUTO: 0.03 K/UL — SIGNIFICANT CHANGE UP (ref 0–0.2)
BASOPHILS NFR BLD AUTO: 0.4 % — SIGNIFICANT CHANGE UP (ref 0–2)
BILIRUB SERPL-MCNC: 0.3 MG/DL — SIGNIFICANT CHANGE UP (ref 0.2–1.2)
BUN SERPL-MCNC: 23 MG/DL — SIGNIFICANT CHANGE UP (ref 7–23)
CALCIUM SERPL-MCNC: 8.9 MG/DL — SIGNIFICANT CHANGE UP (ref 8.4–10.5)
CHLORIDE SERPL-SCNC: 102 MMOL/L — SIGNIFICANT CHANGE UP (ref 96–108)
CO2 SERPL-SCNC: 24 MMOL/L — SIGNIFICANT CHANGE UP (ref 22–31)
CREAT SERPL-MCNC: 1.3 MG/DL — SIGNIFICANT CHANGE UP (ref 0.5–1.3)
CULTURE RESULTS: ABNORMAL
EGFR: 64 ML/MIN/1.73M2 — SIGNIFICANT CHANGE UP
EOSINOPHIL # BLD AUTO: 0.26 K/UL — SIGNIFICANT CHANGE UP (ref 0–0.5)
EOSINOPHIL NFR BLD AUTO: 3.2 % — SIGNIFICANT CHANGE UP (ref 0–6)
GLUCOSE BLDC GLUCOMTR-MCNC: 124 MG/DL — HIGH (ref 70–99)
GLUCOSE BLDC GLUCOMTR-MCNC: 187 MG/DL — HIGH (ref 70–99)
GLUCOSE BLDC GLUCOMTR-MCNC: 192 MG/DL — HIGH (ref 70–99)
GLUCOSE BLDC GLUCOMTR-MCNC: 231 MG/DL — HIGH (ref 70–99)
GLUCOSE SERPL-MCNC: 146 MG/DL — HIGH (ref 70–99)
HCT VFR BLD CALC: 33.4 % — LOW (ref 39–50)
HGB BLD-MCNC: 11.3 G/DL — LOW (ref 13–17)
IMM GRANULOCYTES NFR BLD AUTO: 0.4 % — SIGNIFICANT CHANGE UP (ref 0–0.9)
LYMPHOCYTES # BLD AUTO: 1.35 K/UL — SIGNIFICANT CHANGE UP (ref 1–3.3)
LYMPHOCYTES # BLD AUTO: 16.6 % — SIGNIFICANT CHANGE UP (ref 13–44)
MAGNESIUM SERPL-MCNC: 2 MG/DL — SIGNIFICANT CHANGE UP (ref 1.6–2.6)
MCHC RBC-ENTMCNC: 29.8 PG — SIGNIFICANT CHANGE UP (ref 27–34)
MCHC RBC-ENTMCNC: 33.8 GM/DL — SIGNIFICANT CHANGE UP (ref 32–36)
MCV RBC AUTO: 88.1 FL — SIGNIFICANT CHANGE UP (ref 80–100)
MONOCYTES # BLD AUTO: 0.83 K/UL — SIGNIFICANT CHANGE UP (ref 0–0.9)
MONOCYTES NFR BLD AUTO: 10.2 % — SIGNIFICANT CHANGE UP (ref 2–14)
NEUTROPHILS # BLD AUTO: 5.64 K/UL — SIGNIFICANT CHANGE UP (ref 1.8–7.4)
NEUTROPHILS NFR BLD AUTO: 69.2 % — SIGNIFICANT CHANGE UP (ref 43–77)
NRBC # BLD: 0 /100 WBCS — SIGNIFICANT CHANGE UP (ref 0–0)
PHOSPHATE SERPL-MCNC: 3.9 MG/DL — SIGNIFICANT CHANGE UP (ref 2.5–4.5)
PLATELET # BLD AUTO: 328 K/UL — SIGNIFICANT CHANGE UP (ref 150–400)
POTASSIUM SERPL-MCNC: 4 MMOL/L — SIGNIFICANT CHANGE UP (ref 3.5–5.3)
POTASSIUM SERPL-SCNC: 4 MMOL/L — SIGNIFICANT CHANGE UP (ref 3.5–5.3)
PROT SERPL-MCNC: 6.1 G/DL — SIGNIFICANT CHANGE UP (ref 6–8.3)
RBC # BLD: 3.79 M/UL — LOW (ref 4.2–5.8)
RBC # FLD: 12.6 % — SIGNIFICANT CHANGE UP (ref 10.3–14.5)
SODIUM SERPL-SCNC: 135 MMOL/L — SIGNIFICANT CHANGE UP (ref 135–145)
SPECIMEN SOURCE: SIGNIFICANT CHANGE UP
WBC # BLD: 8.14 K/UL — SIGNIFICANT CHANGE UP (ref 3.8–10.5)
WBC # FLD AUTO: 8.14 K/UL — SIGNIFICANT CHANGE UP (ref 3.8–10.5)

## 2024-02-09 PROCEDURE — 99222 1ST HOSP IP/OBS MODERATE 55: CPT

## 2024-02-09 PROCEDURE — 99233 SBSQ HOSP IP/OBS HIGH 50: CPT | Mod: GC

## 2024-02-09 RX ORDER — LOSARTAN POTASSIUM 100 MG/1
1 TABLET, FILM COATED ORAL
Qty: 0 | Refills: 0 | DISCHARGE
Start: 2024-02-09

## 2024-02-09 RX ORDER — DAPAGLIFLOZIN 10 MG/1
1 TABLET, FILM COATED ORAL
Refills: 0 | DISCHARGE

## 2024-02-09 RX ADMIN — Medication 300 MILLIGRAM(S): at 05:25

## 2024-02-09 RX ADMIN — Medication 3 UNIT(S): at 18:25

## 2024-02-09 RX ADMIN — INSULIN GLARGINE 20 UNIT(S): 100 INJECTION, SOLUTION SUBCUTANEOUS at 22:11

## 2024-02-09 RX ADMIN — Medication 300 MILLIGRAM(S): at 18:23

## 2024-02-09 RX ADMIN — PIPERACILLIN AND TAZOBACTAM 25 GRAM(S): 4; .5 INJECTION, POWDER, LYOPHILIZED, FOR SOLUTION INTRAVENOUS at 21:20

## 2024-02-09 RX ADMIN — Medication 3 UNIT(S): at 09:27

## 2024-02-09 RX ADMIN — CITALOPRAM 40 MILLIGRAM(S): 10 TABLET, FILM COATED ORAL at 13:06

## 2024-02-09 RX ADMIN — AMLODIPINE BESYLATE 10 MILLIGRAM(S): 2.5 TABLET ORAL at 05:41

## 2024-02-09 RX ADMIN — PIPERACILLIN AND TAZOBACTAM 25 GRAM(S): 4; .5 INJECTION, POWDER, LYOPHILIZED, FOR SOLUTION INTRAVENOUS at 07:18

## 2024-02-09 RX ADMIN — Medication 81 MILLIGRAM(S): at 13:06

## 2024-02-09 RX ADMIN — Medication 2: at 18:24

## 2024-02-09 RX ADMIN — CLOPIDOGREL BISULFATE 75 MILLIGRAM(S): 75 TABLET, FILM COATED ORAL at 13:06

## 2024-02-09 RX ADMIN — Medication 3 UNIT(S): at 13:06

## 2024-02-09 RX ADMIN — ATORVASTATIN CALCIUM 40 MILLIGRAM(S): 80 TABLET, FILM COATED ORAL at 21:20

## 2024-02-09 RX ADMIN — PIPERACILLIN AND TAZOBACTAM 25 GRAM(S): 4; .5 INJECTION, POWDER, LYOPHILIZED, FOR SOLUTION INTRAVENOUS at 13:05

## 2024-02-09 RX ADMIN — LOSARTAN POTASSIUM 100 MILLIGRAM(S): 100 TABLET, FILM COATED ORAL at 05:41

## 2024-02-09 RX ADMIN — Medication 2: at 13:05

## 2024-02-09 NOTE — DISCHARGE NOTE PROVIDER - NSDCMRMEDTOKEN_GEN_ALL_CORE_FT
amLODIPine 10 mg oral tablet: 1 tab(s) orally once a day  amoxicillin-clavulanate 875 mg-125 mg oral tablet: 1 tab(s) orally 2 times a day  Aspirin Enteric Coated 81 mg oral delayed release tablet: 1 tab(s) orally once a day   citalopram 40 mg oral tablet: 1 tab(s) orally once a day  clopidogrel 75 mg oral tablet: 1 tab(s) orally once a day   Farxiga 10 mg oral tablet: 1 tab(s) orally once a day  insulin glargine 100 units/mL subcutaneous solution: 40 unit(s) subcutaneous once a day (at bedtime)  Insulin Lispro KwikPen 100 units/mL injectable solution: 15 unit(s) injectable 3 times a day (before meals)  Lipitor 40 mg oral tablet: 1 tab(s) orally once a day  olmesartan 40 mg oral tablet: 1 tab(s) orally once a day   amLODIPine 10 mg oral tablet: 1 tab(s) orally once a day  amoxicillin-clavulanate 875 mg-125 mg oral tablet: 1 tab(s) orally 2 times a day  Aspirin Enteric Coated 81 mg oral delayed release tablet: 1 tab(s) orally once a day   citalopram 40 mg oral tablet: 1 tab(s) orally once a day  clopidogrel 75 mg oral tablet: 1 tab(s) orally once a day   Farxiga 10 mg oral tablet: 1 tab(s) orally once a day  insulin glargine 100 units/mL subcutaneous solution: 40 unit(s) subcutaneous once a day (at bedtime)  Insulin Lispro KwikPen 100 units/mL injectable solution: 15 unit(s) injectable 3 times a day (before meals)  Lipitor 40 mg oral tablet: 1 tab(s) orally once a day  losartan 100 mg oral tablet: 1 tab(s) orally once a day  olmesartan 40 mg oral tablet: 1 tab(s) orally once a day   amLODIPine 10 mg oral tablet: 1 tab(s) orally once a day  amoxicillin-clavulanate 875 mg-125 mg oral tablet: 1 tab(s) orally every 12 hours  Aspirin Enteric Coated 81 mg oral delayed release tablet: 1 tab(s) orally once a day   citalopram 40 mg oral tablet: 1 tab(s) orally once a day  clopidogrel 75 mg oral tablet: 1 tab(s) orally once a day   insulin glargine 100 units/mL subcutaneous solution: 40 unit(s) subcutaneous once a day (at bedtime)  Insulin Lispro KwikPen 100 units/mL injectable solution: 15 unit(s) injectable 3 times a day (before meals)  Lipitor 40 mg oral tablet: 1 tab(s) orally once a day  olmesartan 40 mg oral tablet: 1 tab(s) orally once a day   amLODIPine 10 mg oral tablet: 1 tab(s) orally once a day  Aspirin Enteric Coated 81 mg oral delayed release tablet: 1 tab(s) orally once a day   cephalexin 500 mg oral capsule: 1 cap(s) orally every 6 hours  citalopram 40 mg oral tablet: 1 tab(s) orally once a day  clopidogrel 75 mg oral tablet: 1 tab(s) orally once a day   insulin glargine 100 units/mL subcutaneous solution: 30 unit(s) subcutaneous once a day (at bedtime)  Insulin Lispro KwikPen 100 units/mL injectable solution: 5 unit(s) injectable 3 times a day (before meals)  Lipitor 40 mg oral tablet: 1 tab(s) orally once a day  metroNIDAZOLE 500 mg oral tablet: 1 tab(s) orally 2 times a day  olmesartan 40 mg oral tablet: 1 tab(s) orally once a day

## 2024-02-09 NOTE — PROGRESS NOTE ADULT - PROBLEM SELECTOR PLAN 9
F: s/p 1L LR in ED   E: Replete carefully  N: consistent carb, DASH - NPO @MN  DVT prophylaxis: ICDs in anticipation of OR  Dispo: RMF.

## 2024-02-09 NOTE — DISCHARGE NOTE PROVIDER - CARE PROVIDER_API CALL
Jay Chakraborty  Podiatric Medicine and Surgery  [address], [address] [address]  Phone: [phone]  Fax: [phone]  Established Patient  Scheduled Appointment: 02/12/2024 04:00 PM

## 2024-02-09 NOTE — DISCHARGE NOTE PROVIDER - ATTENDING DISCHARGE PHYSICAL EXAMINATION:
NAD laying in bed   CTA b/l no wheezing or crackles  Nl S1,S2 no mumur  soft NT/ND + BS no rebound or guarding   LE : dressing C/D/I

## 2024-02-09 NOTE — CONSULT NOTE ADULT - SUBJECTIVE AND OBJECTIVE BOX
INFECTIOUS DISEASES INITIAL CONSULT NOTE    HPI:  58M w/ HFpEF (recent admission for exacerbation Dec 23 found to have CAD s/p stenting 12/9), HTN, HLD, IDDM-II (c/b OM of the 4 metatarsal s/p IV abx), and hx urethral replacement for urethral stricture and multiple recent admissions for NOLAN and hyperkalemia (most recent early Jan 24) presented on 2/5 for R 5th digit infection. ID consulted for diabetic foot infection.  Patient states he initially presented to ED on 1/31 for R 5th toe infection. He has neuropathy and only noticed the wound 1 week prior. He denies having had systemic symptoms. He was seen by podiatry in ED that day, was reluctant to be admitted so was discharged with 10 day course of Augmentin and close follow up with Dr. Chakraborty. He was taking Augmentin as prescribed and saw Dr. Chakraborty on 2/5 who then referred him to ED for worsening toe infection. Patient does note that cellulitis had extending up to lower shin which had improved on the Augmentin. Upon arrival, he was afebrile with WBC 10.51, , CRP 49.8, Cr 1.31. Xray and CT showed no signs of OM or subcutaneous gas. He was started on vanc and zosyn. Deep wound culture in ED (exam +/-PTB) taken grew staph epi, coryne amycolatum, finegoldia, alloscardovia. He is s/p OR I&D with bone bx of proximal phalanx and 5th met head 2/7. Bone bx cultures ngtd thus far (not finalized). He has been continued on vanc and zosyn. Denies complaints today. Eager to leave hospital.       PAST MEDICAL & SURGICAL HISTORY:  Osteomyelitis      Hypertension      Hyperlipidemia      Diabetes mellitus      CAD (coronary artery disease)      Chronic diastolic congestive heart failure      CKD (chronic kidney disease)      S/P release of urethral stricture            Review of Systems:   Constitutional, eyes, ENT, cardiovascular, respiratory, gastrointestinal, genitourinary, integumentary, neurological, psychiatric and heme/lymph are otherwise negative other than noted above       ANTIBIOTICS:  MEDICATIONS  (STANDING):  amLODIPine   Tablet 10 milliGRAM(s) Oral daily  aspirin enteric coated 81 milliGRAM(s) Oral daily  atorvastatin 40 milliGRAM(s) Oral at bedtime  citalopram 40 milliGRAM(s) Oral daily  clopidogrel Tablet 75 milliGRAM(s) Oral daily  insulin glargine Injectable (LANTUS) 20 Unit(s) SubCutaneous at bedtime  insulin lispro (ADMELOG) corrective regimen sliding scale   SubCutaneous three times a day before meals  insulin lispro Injectable (ADMELOG) 3 Unit(s) SubCutaneous before breakfast  insulin lispro Injectable (ADMELOG) 3 Unit(s) SubCutaneous before dinner  insulin lispro Injectable (ADMELOG) 3 Unit(s) SubCutaneous before lunch  losartan 100 milliGRAM(s) Oral daily  piperacillin/tazobactam IVPB.. 3.375 Gram(s) IV Intermittent every 8 hours  vancomycin  IVPB 1500 milliGRAM(s) IV Intermittent every 12 hours    MEDICATIONS  (PRN):      Allergies    No Known Allergies    Intolerances    carvedilol (Hypotension)      SOCIAL HISTORY:  -lives in [facility] with wife and has 2 kids   -works in sales   -denies tobacco, ETOH, recreational drugs   -denies pets or recent travel     FAMILY HISTORY:  FH: myocardial infarction (Father, Grandparent)     no FH leading to current infection    Vital Signs Last 24 Hrs  T(C): 36.9 (09 Feb 2024 15:36), Max: 37.2 (09 Feb 2024 05:15)  T(F): 98.5 (09 Feb 2024 15:36), Max: 98.9 (09 Feb 2024 05:15)  HR: 56 (09 Feb 2024 15:36) (51 - 56)  BP: 124/67 (09 Feb 2024 15:36) (124/67 - 171/69)  BP(mean): --  RR: 18 (09 Feb 2024 15:36) (18 - 18)  SpO2: 96% (09 Feb 2024 15:36) (96% - 97%)    Parameters below as of 09 Feb 2024 15:36  Patient On (Oxygen Delivery Method): room air          PHYSICAL EXAM:  Constitutional: alert, NAD  Eyes: the sclera and conjunctiva were normal.   ENT: the ears and nose were normal in appearance.   Neck: the appearance of the neck was normal and the neck was supple.   Pulmonary: no respiratory distress and lungs were clear to auscultation bilaterally.   Heart: heart rate was normal and rhythm regular, normal S1 and S2  Vascular:. there was no peripheral edema  Abdomen: normal bowel sounds, soft, non-tender  Ext: R foot wrapped by podiatry   Neurological: no focal deficits.   Psychiatric: the affect was normal      LABS:                        11.3   8.14  )-----------( 328      ( 09 Feb 2024 05:30 )             33.4     02-09    135  |  102  |  23  ----------------------------<  146<H>  4.0   |  24  |  1.30    Ca    8.9      09 Feb 2024 05:30  Phos  3.9     02-09  Mg     2.0     02-09    TPro  6.1  /  Alb  2.8<L>  /  TBili  0.3  /  DBili  x   /  AST  10  /  ALT  7<L>  /  AlkPhos  58  02-09      Urinalysis Basic - ( 09 Feb 2024 05:30 )    Color: x / Appearance: x / SG: x / pH: x  Gluc: 146 mg/dL / Ketone: x  / Bili: x / Urobili: x   Blood: x / Protein: x / Nitrite: x   Leuk Esterase: x / RBC: x / WBC x   Sq Epi: x / Non Sq Epi: x / Bacteria: x        MICROBIOLOGY:    Culture - Surgical Swab (collected 02-07-24 @ 08:16)  Source: .Surgical Swab Metatarsal Head #1 - Bone Biopsy  Gram Stain (02-07-24 @ 17:22):    No organisms seen    Rare WBC's  Preliminary Report (02-08-24 @ 08:14):    No growth to date    Culture - Surgical Swab (collected 02-07-24 @ 08:16)  Source: .Surgical Swab Proximal Phalanx Bone - Bone Biopsy #2  Gram Stain (02-07-24 @ 17:34):    No organisms seen    Rare WBC's  Preliminary Report (02-08-24 @ 08:13):    No growth to date    Culture - Blood (collected 02-05-24 @ 21:13)  Source: .Blood Blood  Preliminary Report (02-08-24 @ 22:00):    No growth at 3 days.    Culture - Blood (collected 02-05-24 @ 21:13)  Source: .Blood Blood  Preliminary Report (02-08-24 @ 22:00):    No growth at 3 days.    Culture - Surgical Swab (collected 02-05-24 @ 19:12)  Source: .Surgical Swab R 5th digit wound - posible OM/GAS  Gram Stain (02-06-24 @ 12:40):    No organisms seen    Rare WBC's  Final Report (02-09-24 @ 08:39):    Rare Staphylococcus epidermidis    Few Corynebacterium amycolatum    Rare Alloscardovia omnicolens    Rare Finegoldia magna      RADIOLOGY & ADDITIONAL STUDIES:  reviewed  INFECTIOUS DISEASES INITIAL CONSULT NOTE    HPI:  58M w/ HFpEF (recent admission for exacerbation Dec 23 found to have CAD s/p stenting 12/9), HTN, HLD, IDDM-II (c/b OM of the 4 metatarsal s/p IV abx), and hx urethral replacement for urethral stricture and multiple recent admissions for NOLAN and hyperkalemia (most recent early Jan 24) presented on 2/5 for R 5th digit infection. ID consulted for diabetic foot infection.  Patient states he initially presented to ED on 1/31 for R 5th toe infection. He has neuropathy and only noticed the wound 1 week prior. He denies having had systemic symptoms. He was seen by podiatry in ED that day, was reluctant to be admitted so was discharged with 10 day course of Augmentin and close follow up with Dr. Chakraborty. He was taking Augmentin as prescribed and saw Dr. Chakraborty on 2/5 who then referred him to ED for worsening toe infection. Patient does note that cellulitis had extending up to lower shin which had improved on the Augmentin. Upon arrival, he was afebrile with WBC 10.51, , CRP 49.8, Cr 1.31. Xray and CT showed no signs of OM or subcutaneous gas. He was started on vanc and zosyn. Deep wound culture in ED (exam +/-PTB) taken grew staph epi, coryne amycolatum, finegoldia, alloscardovia. He is s/p OR I&D with bone bx of proximal phalanx and 5th met head 2/7. Bone bx cultures ngtd thus far (not finalized). He has been continued on vanc and zosyn. Denies complaints today.       PAST MEDICAL & SURGICAL HISTORY:  Osteomyelitis      Hypertension      Hyperlipidemia      Diabetes mellitus      CAD (coronary artery disease)      Chronic diastolic congestive heart failure      CKD (chronic kidney disease)      S/P release of urethral stricture            Review of Systems:   Constitutional, eyes, ENT, cardiovascular, respiratory, gastrointestinal, genitourinary, integumentary, neurological, psychiatric and heme/lymph are otherwise negative other than noted above       ANTIBIOTICS:  MEDICATIONS  (STANDING):  amLODIPine   Tablet 10 milliGRAM(s) Oral daily  aspirin enteric coated 81 milliGRAM(s) Oral daily  atorvastatin 40 milliGRAM(s) Oral at bedtime  citalopram 40 milliGRAM(s) Oral daily  clopidogrel Tablet 75 milliGRAM(s) Oral daily  insulin glargine Injectable (LANTUS) 20 Unit(s) SubCutaneous at bedtime  insulin lispro (ADMELOG) corrective regimen sliding scale   SubCutaneous three times a day before meals  insulin lispro Injectable (ADMELOG) 3 Unit(s) SubCutaneous before breakfast  insulin lispro Injectable (ADMELOG) 3 Unit(s) SubCutaneous before dinner  insulin lispro Injectable (ADMELOG) 3 Unit(s) SubCutaneous before lunch  losartan 100 milliGRAM(s) Oral daily  piperacillin/tazobactam IVPB.. 3.375 Gram(s) IV Intermittent every 8 hours  vancomycin  IVPB 1500 milliGRAM(s) IV Intermittent every 12 hours    MEDICATIONS  (PRN):      Allergies    No Known Allergies    Intolerances    carvedilol (Hypotension)      SOCIAL HISTORY:  -lives in [facility] with wife and has 2 kids   -works in sales   -denies tobacco, ETOH, recreational drugs   -denies pets or recent travel     FAMILY HISTORY:  FH: myocardial infarction (Father, Grandparent)     no FH leading to current infection    Vital Signs Last 24 Hrs  T(C): 36.9 (09 Feb 2024 15:36), Max: 37.2 (09 Feb 2024 05:15)  T(F): 98.5 (09 Feb 2024 15:36), Max: 98.9 (09 Feb 2024 05:15)  HR: 56 (09 Feb 2024 15:36) (51 - 56)  BP: 124/67 (09 Feb 2024 15:36) (124/67 - 171/69)  BP(mean): --  RR: 18 (09 Feb 2024 15:36) (18 - 18)  SpO2: 96% (09 Feb 2024 15:36) (96% - 97%)    Parameters below as of 09 Feb 2024 15:36  Patient On (Oxygen Delivery Method): room air          PHYSICAL EXAM:  Constitutional: alert, NAD  Eyes: the sclera and conjunctiva were normal.   ENT: the ears and nose were normal in appearance.   Neck: the appearance of the neck was normal and the neck was supple.   Pulmonary: no respiratory distress and lungs were clear to auscultation bilaterally.   Heart: heart rate was normal and rhythm regular, normal S1 and S2  Vascular:. there was no peripheral edema  Abdomen: normal bowel sounds, soft, non-tender  Ext: R foot wrapped by podiatry   Neurological: no focal deficits.   Psychiatric: the affect was normal      LABS:                        11.3   8.14  )-----------( 328      ( 09 Feb 2024 05:30 )             33.4     02-09    135  |  102  |  23  ----------------------------<  146<H>  4.0   |  24  |  1.30    Ca    8.9      09 Feb 2024 05:30  Phos  3.9     02-09  Mg     2.0     02-09    TPro  6.1  /  Alb  2.8<L>  /  TBili  0.3  /  DBili  x   /  AST  10  /  ALT  7<L>  /  AlkPhos  58  02-09      Urinalysis Basic - ( 09 Feb 2024 05:30 )    Color: x / Appearance: x / SG: x / pH: x  Gluc: 146 mg/dL / Ketone: x  / Bili: x / Urobili: x   Blood: x / Protein: x / Nitrite: x   Leuk Esterase: x / RBC: x / WBC x   Sq Epi: x / Non Sq Epi: x / Bacteria: x        MICROBIOLOGY:    Culture - Surgical Swab (collected 02-07-24 @ 08:16)  Source: .Surgical Swab Metatarsal Head #1 - Bone Biopsy  Gram Stain (02-07-24 @ 17:22):    No organisms seen    Rare WBC's  Preliminary Report (02-08-24 @ 08:14):    No growth to date    Culture - Surgical Swab (collected 02-07-24 @ 08:16)  Source: .Surgical Swab Proximal Phalanx Bone - Bone Biopsy #2  Gram Stain (02-07-24 @ 17:34):    No organisms seen    Rare WBC's  Preliminary Report (02-08-24 @ 08:13):    No growth to date    Culture - Blood (collected 02-05-24 @ 21:13)  Source: .Blood Blood  Preliminary Report (02-08-24 @ 22:00):    No growth at 3 days.    Culture - Blood (collected 02-05-24 @ 21:13)  Source: .Blood Blood  Preliminary Report (02-08-24 @ 22:00):    No growth at 3 days.    Culture - Surgical Swab (collected 02-05-24 @ 19:12)  Source: .Surgical Swab R 5th digit wound - posible OM/GAS  Gram Stain (02-06-24 @ 12:40):    No organisms seen    Rare WBC's  Final Report (02-09-24 @ 08:39):    Rare Staphylococcus epidermidis    Few Corynebacterium amycolatum    Rare Alloscardovia omnicolens    Rare Finegoldia magna      RADIOLOGY & ADDITIONAL STUDIES:  reviewed

## 2024-02-09 NOTE — PROGRESS NOTE ADULT - PROBLEM SELECTOR PLAN 5
[facility] 12/09/22: PEPE x 1 OM1  home medication(s): asa 81mg qd, clopidogrel 75mg qd, lipitor 40mg qd     - continue statin  - c/w asa/clopidogrel

## 2024-02-09 NOTE — CONSULT NOTE ADULT - NS ATTEND AMEND GEN_ALL_CORE FT
59 yo M with HTN, DM2, HLD, CAD s/p stent, HFpEF, s/p urethral replacement for stricture, multiple admissions for NOLAN, prior OM 4th digit admitted 2/5 with DFI R foot. He had been on amox-clav since 1/31.  He was afebrile, WBC 7.71, BUN 24, Cr 1.24, , CRP 49.8. CT (w/o IVC) did not identify SQ tracking gas. He was started on vanc & pip-tazo.  He underwent I&D of R 4th interspace, sinus tract lateral 5th digit with bone sent from prox phalanx for culture and path.  On exam, he is well appearing, L foot wrapped/packed by Podiatry.  OR cultures from prox margin NGTD, deep wound culture from ED grew Staph epi, Corynebacterium amycolatum, Alloscardovia omnicolens and Finegoldia magna – significance of each of which is unclear, except for documentation of an anaerobe – would otherwise not target these organisms.  He had been on vanc and pip-tazo for 2 d prior to OR.  Per Podiatry, concern is for SSTI (though possible positive PTB and ESR >100).  If OR cultures remain neg, can treat with po for SSTI – would use cephalexin and metronidazole.  However, marked elevation of ESR is c/f OM – he has close f/u with Podiatry. Unable to determine duration of antibiotics without seeing site but likely 2 weeks from OR.  Will follow with you, team 2.

## 2024-02-09 NOTE — PROGRESS NOTE ADULT - PROBLEM SELECTOR PLAN 1
current diabetic foot infection requiring debridement   hx of OM R 3th toe s/p amputation (then cx w/o growth)  podiatry following, planned for OR debridement 2/7/24    - continue vancomycin 1500 q12 & zosyn at 3.375 q6h (pseudomonal dosing adjusted for pt's CrCl 39)  - plan to transition to PO abx   - follow up OR cultures, narrow abx as-appropriate  - pt now s/p R5T excision and drainage- f/u podiatry recs

## 2024-02-09 NOTE — PROGRESS NOTE ADULT - TIME BILLING
Review of hospital course, labs, vitals, medical records.   Bedside exam and interview    Discussed plan of care with housestaff  Documenting the encounter

## 2024-02-09 NOTE — PROGRESS NOTE ADULT - SUBJECTIVE AND OBJECTIVE BOX
Patient is a 58y old  Male who presents with a chief complaint of FOOT PAIN INFECTION     (08 Feb 2024 14:58)      INTERVAL HPI/ OVERNIGHT EVENTS Pt was seen bedside with attending present. Pt was resting comfortably in bed. Dressings were found to be clean, dry, and intact. Pt notes that he is considering leaving AMA and would like to complete the paperwork for it. Otherwise no other pedal complaints at this time.       LABS                        11.3   8.14  )-----------( 328      ( 09 Feb 2024 05:30 )             33.4     02-09    135  |  102  |  23  ----------------------------<  146<H>  4.0   |  24  |  1.30    Ca    8.9      09 Feb 2024 05:30  Phos  3.9     02-09  Mg     2.0     02-09    TPro  6.1  /  Alb  2.8<L>  /  TBili  0.3  /  DBili  x   /  AST  10  /  ALT  7<L>  /  AlkPhos  58  02-09        ICU Vital Signs Last 24 Hrs  T(C): 37.2 (09 Feb 2024 05:15), Max: 37.2 (09 Feb 2024 05:15)  T(F): 98.9 (09 Feb 2024 05:15), Max: 98.9 (09 Feb 2024 05:15)  HR: 54 (09 Feb 2024 06:59) (51 - 58)  BP: 134/60 (09 Feb 2024 06:59) (128/69 - 171/69)  BP(mean): --  ABP: --  ABP(mean): --  RR: 18 (09 Feb 2024 05:15) (18 - 18)  SpO2: 97% (09 Feb 2024 05:15) (95% - 97%)    O2 Parameters below as of 09 Feb 2024 05:15  Patient On (Oxygen Delivery Method): room air            RADIOLOGY  < from: Xray Foot AP + Lateral + Oblique, Right (02.07.24 @ 11:34) >  INTERPRETATION:  INDICATION: Right foot biopsy    TECHNIQUE: 3 views of the right foot    COMPARISON: None available    FINDINGS:    There is no fracture or dislocation. There are postsurgical changes of   the fifth ray.  There is no focal soft tissue abnormality.      IMPRESSION:    Postsurgical changes of the foot.    --- End of Report ---      < end of copied text >  < from: Xray Ankle Complete 3 Views, Right (02.06.24 @ 00:02) >    INTERPRETATION:  CLINICAL INDICATION: distal right calf and ankle soft   tissue swelling    EXAM:  Frontal oblique lateral right ankle from 2/6/2024 at 0002. Compared to   prior study from 1/31/2024.    IMPRESSION:  Soft tissue swelling. No tracking soft tissue gas collections, radiopaque   foreign bodies, or gross radiographic evidence for osteomyelitis.    No fractures ordislocations.    Congruent ankle mortise with smooth and intact talar dome. Preserved   remaining visualized joint spaces and no joint margin erosions.    Os trigonum behind talus on lateral view. Small calcaneal enthesophytes.    No lytic or blasticlesions.    Vertical thin irregular linear dystrophic calcification in distal medial   calf soft tissues above medial malleolus, possibly calcified venous   thrombus in narrow caliber vessel.    --- End of Report ---        < end of copied text >      Lower Extremity Focused  Vasc:  < from: CT Foot No Cont, Right (02.05.24 @ 22:28) >    INTERPRETATION:  CLINICAL INFORMATION: Diabetic foot infection. Evaluate   for gas.    COMPARISON: Right foot radiographs 2/5/2024.    CONTRAST/COMPLICATIONS:  IV Contrast: None  Oral Contrast: None  Complications: None    PROCEDURE:  CT of the right foot was performed without intravenous contrast. Sagittal   and coronal reformats were performed.    FINDINGS:  Few punctate foci of air are present within the ankle joint at the   superolateral mortise and posterior tibial plafond. There is no   destructive or erosive change of the adjacent bone. Findings are most   likely degenerative in etiology and representing vacuum change versus  less likely gas-forming infection. There is diffuse subcutaneous edema   about the foot and ankle, this is most pronounced about the plantar   aspect of the fifth MTP joint and around the fifth toe. Redemonstrated   lucency surrounding screw extending across second proximal   interphalangeal joint, reflective of loosening. Status post amputation of   the head of the third proximal phalanx. Vascular calcification is present.      IMPRESSION:  No subcutaneous tracking gas identified.    --- End of Report ---    < end of copied text >    < from: Xray Foot AP + Lateral + Oblique, Right (02.05.24 @ 19:37) >      INTERPRETATION:  Clinical indications: Infection.    3 views the right foot are compared to previous radiographs from   1/31/2024.    IMPRESSION: Again noted is osteolysis consistent with loosening around a   screw extending across the second proximal interphalangeal joint. This   could be mechanical or infectious in etiology. There is subcutaneous   edema dorsally at thefoot. There is an amputation at the head of the   third proximal phalanx. There is mild spurring at the Achilles insertion.    --- End of Report ---        < end of copied text >    MICROBIOLOGY  Culture - Surgical Swab (02.07.24 @ 08:16)    Gram Stain:   No organisms seen  Rare WBC's   Specimen Source: .Surgical Swab Proximal Phalanx Bone - Bone Biopsy #2   Culture Results:   No growth to date    Culture - Surgical Swab (02.07.24 @ 08:16)    Gram Stain:   No organisms seen  Rare WBC's   Specimen Source: .Surgical Swab Metatarsal Head #1 - Bone Biopsy   Culture Results:   No growth to date    Culture - Surgical Swab (02.05.24 @ 19:12)    Gram Stain:   No organisms seen  Rare WBC's   Specimen Source: .Surgical Swab R 5th digit wound - posible OM/GAS   Culture Results:   Rare Staphylococcus epidermidis  Few Corynebacterium amycolatum  Rare Alloscardovia omnicolens  Rare Finegoldia magna          PHYSICAL EXAM  Vasc: nonpalpable PT/DP pulses, Monophasic PT/DP on doppler, TG warm to warm, (+)  R foot edema and  erythema localized to the 5th digit, CFT <3 x 4, 5th digit appears to have delayed CFT  Derm: Open lesion noted in the R 4th interspace which probes deep (1cm) with possible hard stop (possible PTB), maceration seen in the 4th interspace, (+)Serosang drainage (-)pus, (-) malodor, (-)crepitus, (-)fluctuance   Neuro: Protective sensation intact  MSK: Partial 3rd digit amputation

## 2024-02-09 NOTE — PROGRESS NOTE ADULT - ASSESSMENT
Podiatry was consulted on a pt with complaints of R 4th interspace pain and erythema that began weeks ago. At the time of consult, WBC 10.51, ESR & CRP pending, glucose 289. On physical exam was remarkable for 4th interspace wound that probes 1cm deep and edema surrounding the R 5th digit extending proximally. Imaging showed no OM, GAS or acute fx. Based on the clinical presentation there is a strong suspicion for diabetic soft tissue infection at this time. CT final read negative for ST gas. Pt is now s/p R 5th digit I&D with bone biopsy     Plan:   - F/u OR bone culture/path  - Pt may heel WBAT to R foot today  - Recommend ID consult for abx recommendations once clean margins finalize    - Please have VNS set up for pt when he is medically stable for d/c.   - F/u ED culture   - Wound care: Area packed with iodoform packing, betadine soaked gauze, kerlix, and ace to secure wrapping  - Rest of care per primary team    Plan d/w Attending. Podiatry following.     Discharge dressing instructions: Cleanse wound with normal saline daily, pat dry with sterile guaze, pack interspace wound with 1/2 inch iodoform packing, cover with dry sterile gauze, ABD pad, wrap with Kerlix and light ACE bandage.     Patient should follow up with Dr. Jay Chakraborty within 1 week of discharge.    Office information:          [facility] Address- [address] [place] [address] ([address])[place] [address]          [facility]

## 2024-02-09 NOTE — DISCHARGE NOTE PROVIDER - NSDCFUADDAPPT_GEN_ALL_CORE_FT
Please follow up with Dr. Castillo at 2:40 PM on 2/15\  Patient should follow up with Dr. Jay Chakraborty within 1 week of discharge.

## 2024-02-09 NOTE — DISCHARGE NOTE PROVIDER - PROVIDER TOKENS
PROVIDER:[TOKEN:[[id number]],SCHEDULEDAPPT:[02/12/2024],SCHEDULEDAPPTTIME:[04:00 PM],ESTABLISHEDPATIENT:[T]]

## 2024-02-09 NOTE — PROGRESS NOTE ADULT - PROBLEM SELECTOR PLAN 8
home medication(s): citalopram 40mg qd    - continue home medication(s)

## 2024-02-09 NOTE — DISCHARGE NOTE PROVIDER - HOSPITAL COURSE
Report received from outgoing RN. Patient is A/Ox4. Patient reports that she no longer feels nauseous and is feeling better but still has pain in her RLQ. Patient awaiting ultrasound. Fluids infusing. No acute distress noted at this time. Rounding performed. Plan of care and wait time explained. Call bell in reach. Will continue to monitor. Safety maintained. Bella Bernabe RN Pt awake and alert, ambulated around ED with no difficulty. Denies abdominal pain at this time, tolerating PO water, no dizziness noted. Will continue to monitor pending discharge. pt not yet feeling full for US, discussed with MD, NS bolus hung as per MD orders Patient resting in comfortably in bed. Vital signs stable. No acute distress noted at this time. Rounding performed. Plan of care and wait time explained. Call bell in reach. Will continue to monitor. Safety maintained. Bella Bernabe RN Patient resting comfortably in bed. Ultrasound at bedside. Vital signs stable. BP a little low, Dr. Galloway notified. No additional orders at this time. No acute distress noted at this time. Rounding performed. Plan of care and wait time explained. Call bell in reach. Will continue to monitor. Safety maintained. 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 Based on the clinical presentation there is a strong suspicion for diabetic soft tissue infection at this time. CT final read negative for ST gas. Pt is now s/p R 5th digit I&D with bone biopsy.    Problem List/Main Diagnoses (system-based):         Patient was discharged to: (home/[facility]/acute rehab/hospice, etc, and with what services – home health PT/RN? 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 Based on the clinical presentation there is a strong suspicion for diabetic soft tissue infection at this time. CT final read negative for ST gas. Pt is now s/p R 5th digit I&D with bone biopsy    #Infection of toe.   ·  Plan: current diabetic foot infection requiring debridement   hx of OM R 3th toe s/p amputation (then cx w/o growth)  podiatry following, planned for OR debridement 2/7/24  - ABX:   -      #Diabetes mellitus.   ·  Plan: long-term IDDM w reported a1c of 15 6mo ago, 14.2 on 2/6/24  home regimen: lantus 40u qhs, lispro 15u tid, to which pt admits non-adherence  - c/w home regimen     #Hypertensive heart and chronic kidney disease with heart failure and stage 1 through stage 4 chronic kidney disease, or unspecified chronic kidney disease.   ·  Plan: Cr of 1.31 & eGFR 64 on admission, continues to produce urine  multiple recent admissions for NOLAN on CKD complicated by hyperK+  likely multifactorial iso DM2, HTN and HFpEF  echo from 12/22 w/ LVEF 71%  home medication(s): olmesartan 40mg qd, farxiga 10mg qd, amlodipine 10mg QDat home  ,- cont home amlodipine as above  - continue home olmesartan  - hold home farxiga  - encourage PO hydration    #CAD (coronary artery disease).   ·  Plan: [facility] 12/09/22: PEPE x 1 OM1  home medication(s): asa 81mg qd, clopidogrel 75mg qd, lipitor 40mg qd  - continue statin  - c/w asa/clopidogrel.    #Hyperlipidemia.   ·  Plan: Home medication of Lipitor 40 PO daily.   - Continue home medication.    #Anxiety disorder, unspecified.   ·  Plan: home medication(s): citalopram 40mg qd  - continue home medication(s).    Patient was discharged to: (home/[facility]/acute rehab/hospice, etc, and with what services – home health PT/RN? 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 Based on the clinical presentation there is a strong suspicion for diabetic soft tissue infection at this time. CT final read negative for ST gas. Pt is now s/p R 5th digit I&D with bone biopsy    #Infection of toe.   ·  Plan: current diabetic foot infection requiring debridement   hx of OM R 3th toe s/p amputation (then cx w/o growth)  podiatry following, planned for OR debridement 2/7/24  - ABX:  Augmentin 875 mg every 12 hours and linezolid 600 mg every 12 hours for 12 more days  -      #Diabetes mellitus.   ·  Plan: long-term IDDM w reported a1c of 15 6mo ago, 14.2 on 2/6/24  home regimen: lantus 40u qhs, lispro 15u tid, to which pt admits non-adherence  - c/w home regimen     #Hypertensive heart and chronic kidney disease with heart failure and stage 1 through stage 4 chronic kidney disease, or unspecified chronic kidney disease.   ·  Plan: Cr of 1.31 & eGFR 64 on admission, continues to produce urine  multiple recent admissions for NOLAN on CKD complicated by hyperK+  likely multifactorial iso DM2, HTN and HFpEF  echo from 12/22 w/ LVEF 71%  home medication(s): olmesartan 40mg qd, farxiga 10mg qd, amlodipine 10mg QDat home  ,- cont home amlodipine as above  - continue home olmesartan  - hold home farxiga  - encourage PO hydration    #CAD (coronary artery disease).   ·  Plan: [facility] 12/09/22: PEPE x 1 OM1  home medication(s): asa 81mg qd, clopidogrel 75mg qd, lipitor 40mg qd  - continue statin  - c/w asa/clopidogrel.    #Hyperlipidemia.   ·  Plan: Home medication of Lipitor 40 PO daily.   - Continue home medication.    #Anxiety disorder, unspecified.   ·  Plan: home medication(s): citalopram 40mg qd  - continue home medication(s).    Patient was discharged to: (home/[facility]/acute rehab/hospice, etc, and with what services – home health PT/RN? 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 Based on the clinical presentation there is a strong suspicion for diabetic soft tissue infection at this time. CT final read negative for ST gas. Pt is now s/p R 5th digit I&D with bone biopsy    #Infection of toe.   ·  Plan: current diabetic foot infection requiring debridement   hx of OM R 3th toe s/p amputation (then cx w/o growth)  podiatry following, planned for OR debridement 2/7/24  - ABX:   Keflex 500mg PO Q6 plus Flagyl 500mg PO Q12 - 11/20    #Diabetes mellitus.   ·  Plan: long-term IDDM w reported a1c of 15 6mo ago, 14.2 on 2/6/24  home regimen: lantus 40u qhs, lispro 15u tid, to which pt admits non-adherence  - c/w home regimen     #Hypertensive heart and chronic kidney disease with heart failure and stage 1 through stage 4 chronic kidney disease, or unspecified chronic kidney disease.   ·  Plan: Cr of 1.31 & eGFR 64 on admission, continues to produce urine  multiple recent admissions for NOLAN on CKD complicated by hyperK+  likely multifactorial iso DM2, HTN and HFpEF  echo from 12/22 w/ LVEF 71%  home medication(s): olmesartan 40mg qd, farxiga 10mg qd, amlodipine 10mg QDat home  ,- cont home amlodipine as above  - continue home olmesartan  - hold home farxiga  - encourage PO hydration    #CAD (coronary artery disease).   ·  Plan: [facility] 12/09/22: PEPE x 1 OM1  home medication(s): asa 81mg qd, clopidogrel 75mg qd, lipitor 40mg qd  - continue statin  - c/w asa/clopidogrel.    #Hyperlipidemia.   ·  Plan: Home medication of Lipitor 40 PO daily.   - Continue home medication.    #Anxiety disorder, unspecified.   ·  Plan: home medication(s): citalopram 40mg qd  - continue home medication(s).    Patient was discharged to: (home/[facility]/acute rehab/hospice, etc, and with what services – home health PT/RN? Home O2?)  New medications: Augmentin 875 mg every 12 hours and linezolid 600 mg every 12 hours for 12 more days  Changes to old medications:  Medications that were stopped:   Items to follow up as outpatient:  Physical exam at the time of discharge:

## 2024-02-09 NOTE — PROGRESS NOTE ADULT - SUBJECTIVE AND OBJECTIVE BOX
INTERVAL HPI/OVERNIGHT EVENTS:  Patient was seen and examined at bedside. As per nurse and patient, no o/n events, patient resting comfortably. No complaints at this time. Patient denies: fever, chills, lightheadedness, weakness, CP, palpitations, SOB, cough, N/V. ROS otherwise negative.    VITAL SIGNS:  T(F): 98.5 (02-09-24 @ 15:36)  HR: 56 (02-09-24 @ 15:36)  BP: 124/67 (02-09-24 @ 15:36)  RR: 18 (02-09-24 @ 15:36)  SpO2: 96% (02-09-24 @ 15:36)  Wt(kg): --        PHYSICAL EXAM:    Constitutional: resting comfortably in bed; NAD  HEENT: NC/AT, PER, anicteric sclera, no nasal discharge; MMM  Neck: supple; no JVD or thyromegaly  Respiratory: CTA B/L; no W/R/R, no retractions  Cardiac: +S1/S2; RRR; no M/R/G  Gastrointestinal: soft, NT/ND; no rebound or guarding  Back: spine midline, no bony tenderness or step-offs; no CVAT B/L  Extremities: WWP, no clubbing or cyanosis; no peripheral edema  Musculoskeletal: R foot wound dressing, clean dry and intact   Vascular: 2+ radial, DP/PT pulses B/L  Dermatologic: skin warm, dry and intact; no rashes, wounds, or scars  Neurologic: AAOx3; CNII-XII grossly intact; no focal deficits  Psychiatric: affect and characteristics of appearance, verbalizations, behaviors are appropriate    MEDICATIONS  (STANDING):  amLODIPine   Tablet 10 milliGRAM(s) Oral daily  aspirin enteric coated 81 milliGRAM(s) Oral daily  atorvastatin 40 milliGRAM(s) Oral at bedtime  citalopram 40 milliGRAM(s) Oral daily  clopidogrel Tablet 75 milliGRAM(s) Oral daily  insulin glargine Injectable (LANTUS) 20 Unit(s) SubCutaneous at bedtime  insulin lispro (ADMELOG) corrective regimen sliding scale   SubCutaneous three times a day before meals  insulin lispro Injectable (ADMELOG) 3 Unit(s) SubCutaneous before breakfast  insulin lispro Injectable (ADMELOG) 3 Unit(s) SubCutaneous before lunch  insulin lispro Injectable (ADMELOG) 3 Unit(s) SubCutaneous before dinner  losartan 100 milliGRAM(s) Oral daily  piperacillin/tazobactam IVPB.. 3.375 Gram(s) IV Intermittent every 8 hours  vancomycin  IVPB 1500 milliGRAM(s) IV Intermittent every 12 hours    MEDICATIONS  (PRN):      Allergies    No Known Allergies    Intolerances    carvedilol (Hypotension)      LABS:                        11.3   8.14  )-----------( 328      ( 09 Feb 2024 05:30 )             33.4     02-09    135  |  102  |  23  ----------------------------<  146<H>  4.0   |  24  |  1.30    Ca    8.9      09 Feb 2024 05:30  Phos  3.9     02-09  Mg     2.0     02-09    TPro  6.1  /  Alb  2.8<L>  /  TBili  0.3  /  DBili  x   /  AST  10  /  ALT  7<L>  /  AlkPhos  58  02-09      Urinalysis Basic - ( 09 Feb 2024 05:30 )    Color: x / Appearance: x / SG: x / pH: x  Gluc: 146 mg/dL / Ketone: x  / Bili: x / Urobili: x   Blood: x / Protein: x / Nitrite: x   Leuk Esterase: x / RBC: x / WBC x   Sq Epi: x / Non Sq Epi: x / Bacteria: x        RADIOLOGY & ADDITIONAL TESTS:  Reviewed

## 2024-02-09 NOTE — PROGRESS NOTE ADULT - ATTENDING COMMENTS
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  ID recs no need to cover corynebacterium, stop Vanc.  F/u OR Cultures one more day and can DC on Keflex/Flagyl per ID recs in AM    #DM, poorly controlled  - intermittent compliance with insulin, A1c 14.4  - adjust basal bolus as needed. will need insulin on DC given A1c    #CHFpEF, chronic, not in acute exacerbation  - follows with Dr Nelson  - previously on BB but stopped due to bradycardia, junctional rhythm and concern for BRASH  - c/w Losartan post op  - hold Torsemide given euvolemia, takes PRN at home  - Stop Farxiga on DC given active LE wound    #HTN  - c/w amlodipine    #CAD  - c/w ASA, plavix and atorvastatin    Remainder as above  Dispo: DC home tomorrow AM

## 2024-02-09 NOTE — DISCHARGE NOTE PROVIDER - NSDCFUSCHEDAPPT_GEN_ALL_CORE_FT
Efrain Castillo  [facility]  NEPHRO [address]  Scheduled Appointment: 02/15/2024

## 2024-02-09 NOTE — PROGRESS NOTE ADULT - PROBLEM SELECTOR PROBLEM 8
Anxiety disorder, unspecified

## 2024-02-09 NOTE — DISCHARGE NOTE PROVIDER - NSDCCPCAREPLAN_GEN_ALL_CORE_FT
PRINCIPAL DISCHARGE DIAGNOSIS  Diagnosis: Right foot infection  Assessment and Plan of Treatment: Based on your clinical presentation there is a strong suspicion for diabetic soft tissue infection at this time. CT final read negative for gas.  You were seen by susanne who performed a R 5th digit I&D with bone biopsy. You were treated with IV antibiotics and were transitoned to oral antibiotics.   Treatment:   1. Discharge dressing instructions: Cleanse wound with normal saline daily, pat dry with sterile guaze, pack interspace wound with 1/2 inch iodoform packing, cover with dry sterile gauze, ABD pad, wrap with Kerlix and light ACE bandage.   2.            SECONDARY DISCHARGE DIAGNOSES  Diagnosis: Chronic systolic congestive heart failure  Assessment and Plan of Treatment: Please continue to take home amlodipine, and losartin  Please DISCONTINUE taking farxiga until you follow up with your PCP. This medication should be temporarily stopped in the setting of you lower extremeity infection.    Diagnosis: CAD (coronary artery disease)  Assessment and Plan of Treatment: [facility] 12/09/22: PEPE x 1 OM1  Home medication(s): asa 81mg qd, clopidogrel 75mg qd, lipitor 40mg qd     - Please continue to take all of your home medication as prescribed       Diagnosis: Diabetes mellitus  Assessment and Plan of Treatment: Please contiue home regimen: lantus 40u qhs, lispro 15u tid    Diagnosis: Hyperlipidemia  Assessment and Plan of Treatment: Please continue to take lipitor 40mg daily    Diagnosis: Anxiety  Assessment and Plan of Treatment: Please continue to take citalopram 40mg qd     PRINCIPAL DISCHARGE DIAGNOSIS  Diagnosis: Right foot infection  Assessment and Plan of Treatment: Based on your clinical presentation there is a strong suspicion for diabetic soft tissue infection at this time. CT final read negative for gas.  You were seen by susanne who performed a R 5th digit I&D with bone biopsy. You were treated with IV antibiotics and were transitoned to oral antibiotics.   Treatment:   1. Discharge dressing instructions: Cleanse wound with normal saline daily, pat dry with sterile guaze, pack interspace wound with 1/2 inch iodoform packing, cover with dry sterile gauze, ABD pad, wrap with Kerlix and light ACE bandage.   2.     Keflex 500mg PO Q6 plus Flagyl 500mg PO Q12  from 2/10-2/20        SECONDARY DISCHARGE DIAGNOSES  Diagnosis: Chronic systolic congestive heart failure  Assessment and Plan of Treatment: Please continue to take home amlodipine, and losartin  Please DISCONTINUE taking farxiga until you follow up with your PCP. This medication should be temporarily stopped in the setting of you lower extremeity infection.    Diagnosis: CAD (coronary artery disease)  Assessment and Plan of Treatment: [facility] 12/09/22: PEPE x 1 OM1  Home medication(s): asa 81mg qd, clopidogrel 75mg qd, lipitor 40mg qd     - Please continue to take all of your home medication as prescribed       Diagnosis: Diabetes mellitus  Assessment and Plan of Treatment: Please take : lantus 30u qhs, lispro 5u tid    Diagnosis: Hyperlipidemia  Assessment and Plan of Treatment: Please continue to take lipitor 40mg daily    Diagnosis: Anxiety  Assessment and Plan of Treatment: Please continue to take citalopram 40mg qd

## 2024-02-09 NOTE — CONSULT NOTE ADULT - ASSESSMENT
58M w/ HFpEF (recent admission for exacerbation Dec 23 found to have CAD s/p stenting 12/9), HTN, HLD, IDDM-II (c/b OM of the 4 metatarsal s/p IV abx), and hx urethral replacement for urethral stricture and multiple recent admissions for NOLAN and hyperkalemia (most recent early Jan 24) presented on 2/5 for R 5th digit infection found to have diabetic SSTI (low concern for OM per podiatry). Patient is afebrile with now normal WBC. Nontoxic appearing. Deep wound culture 2/5 from ED grew staph epi, coryne amycolatum, finegoldia, alloscardovia. Would opt not to treat staph epi or corynebacterium amycolatum.  58M w/ HFpEF (recent admission for exacerbation Dec 23 found to have CAD s/p stenting 12/9), HTN, HLD, IDDM-II (c/b OM of the 4 metatarsal s/p IV abx), and hx urethral replacement for urethral stricture and multiple recent admissions for NOLAN and hyperkalemia (most recent early Jan 24) presented on 2/5 for R 5th digit infection found to have diabetic SSTI (low concern for OM per podiatry, no evidence on imaging). Patient is afebrile with now normal WBC. Nontoxic appearing. Deep wound culture 2/5 from ED grew staph epi, coryne amycolatum, finegoldia, alloscardovia. Would opt not to treat staph epi or corynebacterium amycolatum in wound culture. Would treat finegoldia and cover for strep that may be suppressed by recent Augmentin use. Bone bx cultures ngtd thus far- would ideally wait another day before discharging on PO abx. Will plan to treat for SSTI. Patient has follow up appt with Dr Chakraborty this coming Monday 2/12. Not comfortable with redressing patient post-op so was unable to examine wound- will ask podiatry to redress and send images tomorrow morning to help determine duration of therapy. Pt agreeable to staying until tomorrow AM    Suggest:   -f/u OR bone bx cultures 2/7 --ngtd   -stop vancomycin   -continue zosyn 3.375g IV Q8 EI   -if OR bone bx cultures remain ngtd tomorrow, can discharge on Keflex 500mg PO Q6 plus Flagyl 500mg PO Q12. Duration TBD     Team 2 will follow you.    Case d/w primary team.  Final recommendation pending attending note.    Jessica Andres, Infectious Diseases PA  Please reach out for any questions 9 am-5pm. For evenings and weekends, please call the ID physician on call.  Work cell: [phone]

## 2024-02-10 ENCOUNTER — TRANSCRIPTION ENCOUNTER (OUTPATIENT)
Age: 59
End: 2024-02-10

## 2024-02-10 VITALS
SYSTOLIC BLOOD PRESSURE: 143 MMHG | OXYGEN SATURATION: 97 % | RESPIRATION RATE: 18 BRPM | TEMPERATURE: 98 F | DIASTOLIC BLOOD PRESSURE: 68 MMHG | HEART RATE: 53 BPM

## 2024-02-10 LAB
ALBUMIN SERPL ELPH-MCNC: 2.7 G/DL — LOW (ref 3.3–5)
ALP SERPL-CCNC: 55 U/L — SIGNIFICANT CHANGE UP (ref 40–120)
ALT FLD-CCNC: 8 U/L — LOW (ref 10–45)
ANION GAP SERPL CALC-SCNC: 8 MMOL/L — SIGNIFICANT CHANGE UP (ref 5–17)
AST SERPL-CCNC: 10 U/L — SIGNIFICANT CHANGE UP (ref 10–40)
BASOPHILS # BLD AUTO: 0.03 K/UL — SIGNIFICANT CHANGE UP (ref 0–0.2)
BASOPHILS NFR BLD AUTO: 0.4 % — SIGNIFICANT CHANGE UP (ref 0–2)
BILIRUB SERPL-MCNC: 0.2 MG/DL — SIGNIFICANT CHANGE UP (ref 0.2–1.2)
BUN SERPL-MCNC: 24 MG/DL — HIGH (ref 7–23)
CALCIUM SERPL-MCNC: 8.6 MG/DL — SIGNIFICANT CHANGE UP (ref 8.4–10.5)
CHLORIDE SERPL-SCNC: 103 MMOL/L — SIGNIFICANT CHANGE UP (ref 96–108)
CO2 SERPL-SCNC: 26 MMOL/L — SIGNIFICANT CHANGE UP (ref 22–31)
CREAT SERPL-MCNC: 1.48 MG/DL — HIGH (ref 0.5–1.3)
CULTURE RESULTS: SIGNIFICANT CHANGE UP
CULTURE RESULTS: SIGNIFICANT CHANGE UP
EGFR: 55 ML/MIN/1.73M2 — LOW
EOSINOPHIL # BLD AUTO: 0.26 K/UL — SIGNIFICANT CHANGE UP (ref 0–0.5)
EOSINOPHIL NFR BLD AUTO: 3.6 % — SIGNIFICANT CHANGE UP (ref 0–6)
GLUCOSE BLDC GLUCOMTR-MCNC: 162 MG/DL — HIGH (ref 70–99)
GLUCOSE SERPL-MCNC: 166 MG/DL — HIGH (ref 70–99)
HCT VFR BLD CALC: 33.7 % — LOW (ref 39–50)
HGB BLD-MCNC: 11.3 G/DL — LOW (ref 13–17)
IMM GRANULOCYTES NFR BLD AUTO: 0.5 % — SIGNIFICANT CHANGE UP (ref 0–0.9)
LYMPHOCYTES # BLD AUTO: 1.4 K/UL — SIGNIFICANT CHANGE UP (ref 1–3.3)
LYMPHOCYTES # BLD AUTO: 19.2 % — SIGNIFICANT CHANGE UP (ref 13–44)
MAGNESIUM SERPL-MCNC: 2 MG/DL — SIGNIFICANT CHANGE UP (ref 1.6–2.6)
MCHC RBC-ENTMCNC: 29.6 PG — SIGNIFICANT CHANGE UP (ref 27–34)
MCHC RBC-ENTMCNC: 33.5 GM/DL — SIGNIFICANT CHANGE UP (ref 32–36)
MCV RBC AUTO: 88.2 FL — SIGNIFICANT CHANGE UP (ref 80–100)
MONOCYTES # BLD AUTO: 0.75 K/UL — SIGNIFICANT CHANGE UP (ref 0–0.9)
MONOCYTES NFR BLD AUTO: 10.3 % — SIGNIFICANT CHANGE UP (ref 2–14)
NEUTROPHILS # BLD AUTO: 4.8 K/UL — SIGNIFICANT CHANGE UP (ref 1.8–7.4)
NEUTROPHILS NFR BLD AUTO: 66 % — SIGNIFICANT CHANGE UP (ref 43–77)
NRBC # BLD: 0 /100 WBCS — SIGNIFICANT CHANGE UP (ref 0–0)
PHOSPHATE SERPL-MCNC: 3.6 MG/DL — SIGNIFICANT CHANGE UP (ref 2.5–4.5)
PLATELET # BLD AUTO: 357 K/UL — SIGNIFICANT CHANGE UP (ref 150–400)
POTASSIUM SERPL-MCNC: 4.7 MMOL/L — SIGNIFICANT CHANGE UP (ref 3.5–5.3)
POTASSIUM SERPL-SCNC: 4.7 MMOL/L — SIGNIFICANT CHANGE UP (ref 3.5–5.3)
PROT SERPL-MCNC: 6.4 G/DL — SIGNIFICANT CHANGE UP (ref 6–8.3)
RBC # BLD: 3.82 M/UL — LOW (ref 4.2–5.8)
RBC # FLD: 12.7 % — SIGNIFICANT CHANGE UP (ref 10.3–14.5)
SODIUM SERPL-SCNC: 137 MMOL/L — SIGNIFICANT CHANGE UP (ref 135–145)
SPECIMEN SOURCE: SIGNIFICANT CHANGE UP
SPECIMEN SOURCE: SIGNIFICANT CHANGE UP
WBC # BLD: 7.28 K/UL — SIGNIFICANT CHANGE UP (ref 3.8–10.5)
WBC # FLD AUTO: 7.28 K/UL — SIGNIFICANT CHANGE UP (ref 3.8–10.5)

## 2024-02-10 PROCEDURE — 73630 X-RAY EXAM OF FOOT: CPT

## 2024-02-10 PROCEDURE — 99238 HOSP IP/OBS DSCHRG MGMT 30/<: CPT

## 2024-02-10 PROCEDURE — 84100 ASSAY OF PHOSPHORUS: CPT

## 2024-02-10 PROCEDURE — 85610 PROTHROMBIN TIME: CPT

## 2024-02-10 PROCEDURE — 76000 FLUOROSCOPY <1 HR PHYS/QHP: CPT

## 2024-02-10 PROCEDURE — 83036 HEMOGLOBIN GLYCOSYLATED A1C: CPT

## 2024-02-10 PROCEDURE — 80053 COMPREHEN METABOLIC PANEL: CPT

## 2024-02-10 PROCEDURE — 83735 ASSAY OF MAGNESIUM: CPT

## 2024-02-10 PROCEDURE — 87075 CULTR BACTERIA EXCEPT BLOOD: CPT

## 2024-02-10 PROCEDURE — 36415 COLL VENOUS BLD VENIPUNCTURE: CPT

## 2024-02-10 PROCEDURE — 93005 ELECTROCARDIOGRAM TRACING: CPT

## 2024-02-10 PROCEDURE — 99285 EMERGENCY DEPT VISIT HI MDM: CPT | Mod: 25

## 2024-02-10 PROCEDURE — 88311 DECALCIFY TISSUE: CPT

## 2024-02-10 PROCEDURE — 85025 COMPLETE CBC W/AUTO DIFF WBC: CPT

## 2024-02-10 PROCEDURE — 87070 CULTURE OTHR SPECIMN AEROBIC: CPT

## 2024-02-10 PROCEDURE — 86140 C-REACTIVE PROTEIN: CPT

## 2024-02-10 PROCEDURE — 80202 ASSAY OF VANCOMYCIN: CPT

## 2024-02-10 PROCEDURE — 85730 THROMBOPLASTIN TIME PARTIAL: CPT

## 2024-02-10 PROCEDURE — 88304 TISSUE EXAM BY PATHOLOGIST: CPT

## 2024-02-10 PROCEDURE — 71045 X-RAY EXAM CHEST 1 VIEW: CPT

## 2024-02-10 PROCEDURE — 88307 TISSUE EXAM BY PATHOLOGIST: CPT

## 2024-02-10 PROCEDURE — 73610 X-RAY EXAM OF ANKLE: CPT

## 2024-02-10 PROCEDURE — 85652 RBC SED RATE AUTOMATED: CPT

## 2024-02-10 PROCEDURE — 82962 GLUCOSE BLOOD TEST: CPT

## 2024-02-10 PROCEDURE — 86901 BLOOD TYPING SEROLOGIC RH(D): CPT

## 2024-02-10 PROCEDURE — 73700 CT LOWER EXTREMITY W/O DYE: CPT

## 2024-02-10 PROCEDURE — 87040 BLOOD CULTURE FOR BACTERIA: CPT

## 2024-02-10 PROCEDURE — 86850 RBC ANTIBODY SCREEN: CPT

## 2024-02-10 PROCEDURE — 86900 BLOOD TYPING SEROLOGIC ABO: CPT

## 2024-02-10 RX ORDER — CEPHALEXIN 500 MG
1 CAPSULE ORAL
Qty: 44 | Refills: 0
Start: 2024-02-10 | End: 2024-02-20

## 2024-02-10 RX ORDER — METRONIDAZOLE 500 MG
1 TABLET ORAL
Qty: 22 | Refills: 0
Start: 2024-02-10 | End: 2024-02-20

## 2024-02-10 RX ORDER — INSULIN GLARGINE 100 [IU]/ML
30 INJECTION, SOLUTION SUBCUTANEOUS
Qty: 1 | Refills: 0
Start: 2024-02-10 | End: 2024-03-10

## 2024-02-10 RX ADMIN — AMLODIPINE BESYLATE 10 MILLIGRAM(S): 2.5 TABLET ORAL at 06:11

## 2024-02-10 RX ADMIN — PIPERACILLIN AND TAZOBACTAM 25 GRAM(S): 4; .5 INJECTION, POWDER, LYOPHILIZED, FOR SOLUTION INTRAVENOUS at 06:11

## 2024-02-10 RX ADMIN — Medication 2: at 09:42

## 2024-02-10 RX ADMIN — LOSARTAN POTASSIUM 100 MILLIGRAM(S): 100 TABLET, FILM COATED ORAL at 06:11

## 2024-02-10 RX ADMIN — Medication 3 UNIT(S): at 09:42

## 2024-02-10 NOTE — PROGRESS NOTE ADULT - REASON FOR ADMISSION
diabetic ulcer debridement

## 2024-02-10 NOTE — DISCHARGE NOTE NURSING/CASE MANAGEMENT/SOCIAL WORK - PATIENT PORTAL LINK FT
You can access the FollowMyHealth Patient Portal offered by [facility] by registering at the following website: [email or web address]. By joining [employer]’s FollowMyHealth portal, you will also be able to view your health information using other applications (apps) compatible with our system.

## 2024-02-10 NOTE — PROGRESS NOTE ADULT - ASSESSMENT
58M w/ a PMH of HFpEF, CAD, HTN, HLD, IDDM-II with recent admissions for NOLAN and hyperkalemia admitted for R 4th interspace wound with associated cellulitis. At time of consult, WBC 10.51, ESR, 107, CRP 49.8. Physical exam was remarkable for 4th interspace wound that probes 1cm deep and edema surrounding the R 5th digit extending proximally. Imaging showed no OM, GAS or acute fx. Based on the clinical presentation there is a strong suspicion for diabetic soft tissue infection at this time. CT final read negative for ST gas. Pt is now s/p R 5th digit I&D with bone biopsy on 2/7. Pathology for bone biopsy of prox phalanx and 5th met head show viable bone. Cultures with NGTD. Patient wishes to leave AMA at this time, plan to be discharged on oral antibiotic regimen per ID.     Plan:   - Pt may heel WBAT to R foot in a surgical shoe  - Antibiotics per ID   - F/u final OR bone cx/path  - Wound care: Area packed with 1/2" plain packing, betadine soaked gauze, kerlix, and ace to secure wrapping  - Rest of care per primary team    Plan d/w Attending. Podiatry following.     Discharge recommendations:   - Patient should leave dressing in tact until follow up with Dr. Chakraborty in 2 days  - Patient should take abx as per ID  - Patient should follow up with Dr. Jay Chakraborty on 2/12    Office information:          [place] Address- [address] [place] [address] (10th Floor [address])[place] [address]          [facility]

## 2024-02-10 NOTE — PROGRESS NOTE ADULT - SUBJECTIVE AND OBJECTIVE BOX
Patient is a 58y old  Male who presents with a chief complaint of diabetic ulcer debridement (09 Feb 2024 16:48)      INTERVAL HPI/ OVERNIGHT EVENTS. Patient seen and examined bedside. Denies any pedal complaints at this time. Wishes to leave the hospital at this time.       LABS                        11.3   7.28  )-----------( 357      ( 10 Feb 2024 05:30 )             33.7     02-10    137  |  103  |  24<H>  ----------------------------<  166<H>  4.7   |  26  |  1.48<H>    Ca    8.6      10 Feb 2024 05:30  Phos  3.6     02-10  Mg     2.0     02-10    TPro  6.4  /  Alb  2.7<L>  /  TBili  0.2  /  DBili  x   /  AST  10  /  ALT  8<L>  /  AlkPhos  55  02-10        ICU Vital Signs Last 24 Hrs  T(C): 37 (10 Feb 2024 05:32), Max: 37 (10 Feb 2024 05:32)  T(F): 98.6 (10 Feb 2024 05:32), Max: 98.6 (10 Feb 2024 05:32)  HR: 57 (10 Feb 2024 05:32) (56 - 57)  BP: 149/72 (10 Feb 2024 05:32) (124/67 - 153/68)  BP(mean): --  ABP: --  ABP(mean): --  RR: 18 (10 Feb 2024 05:32) (18 - 18)  SpO2: 97% (10 Feb 2024 05:32) (96% - 97%)    O2 Parameters below as of 10 Feb 2024 05:32  Patient On (Oxygen Delivery Method): room air        RADIOLOGY  < from: Xray Foot AP + Lateral + Oblique, Right (02.07.24 @ 11:34) >  IMPRESSION:    Postsurgical changes of the foot.  < end of copied text >    MICROBIOLOGY  Culture - Surgical Swab (02.07.24 @ 08:16)    Gram Stain:   No organisms seen  Rare WBC's   Specimen Source: .Surgical Swab Proximal Phalanx Bone - Bone Biopsy #2   Culture Results:   No growth to date  Culture - Surgical Swab (02.07.24 @ 08:16)    Gram Stain:   No organisms seen  Rare WBC's   Specimen Source: .Surgical Swab Metatarsal Head #1 - Bone Biopsy   Culture Results:   No growth to date        PATHOLOGY   Surgical Pathology Report (02.07.24 @ 08:15)    Surgical Pathology Report:   ACCESSION No:  [id number]  Patient:     VALENTIN CABALLERO      Accession:                             [id number]    Collected Date/Time:                   2/7/2024 08:15 EST  Received Date/Time:                    2/7/2024 09:20 EST    Surgical Pathology Report - Auth (Verified)    Specimen(s) Submitted  1  Tissue debridement of right fifth toe  2  Metatarsal head bone biopsy  3  Proximal phalanx bone biopsy    Final Diagnosis  1.  Tissue debridement right fifth toe:  -   Keratotic squamous epithelium with gangrenous necrosis      2.  Metatarsal head bone biopsy:  -   Viable bone  -   No inflammation identified    3.  Proximal phalanx bone biopsy:  -   Viable bone with a small focus of acute inflammation  Verified by: Gaviota Jones MD  (Electronic Signature)  Reported on: 02/08/24 13:39 [facility], [facility], [address] [place] [address]  Phone: [phone]   Fax: [phone]  _________________________________________________________________      Clinical Information  type 2 diabetes mellitus with foot ulcer    Gross Description  1.  Received: Fresh labeled  "tissue debridement of right 5th toe"  Size:  Multiple fragments measuring 4.0 x 1.8 x 0.6 cm in aggregate  Description:  tan and focally yellow-tan epidermis fragments  Submitted:  Representative sections in one cassette: 1A    2.  Received: Fresh labeled  "metatarsal head bone biopsy"  Size:  One fragment measuring 0.7 x 0.2 x 0.2 cm  Description:  tan brown bone core  Submitted:  Entirely following acid decalcification in one cassette: 2A    3.  Received: Fresh labeled  "proximal phalanx bone biopsy"  Size:  One fragment measuring 0.5 x 0.2 x 0.2 cm  Description:  tan brown bone core  Submitted:  Entirely following acid decalcification in onecassette: 3A    MELONIE Miles (ASCP) 02/07/2024 05:07 PM      Disclaimer  In addition to other data that may appear on the specimen containers, all  labels have been inspected to confirm the presence of the patient's name  and date of birth.      PHYSICAL EXAM   Vasc: nonpalpable PT/DP pulses, Monophasic PT/DP on doppler, TG warm to warm, (+)  R foot edema and  erythema localized to the 5th digit, CFT <3 x 4, 5th digit appears to have delayed CFT  Derm: Open lesion noted in the R 4th interspace which tracks laterally with incision made over dorsal lateral 5th digit, maceration resolved,  serosanginous drainage only, (-)pus, (-) malodor, (-)crepitus, (-)fluctuance   Neuro: Protective sensation intact  MSK: Partial 3rd digit amputation

## 2024-02-10 NOTE — PROGRESS NOTE ADULT - PROVIDER SPECIALTY LIST ADULT
Podiatry
Internal Medicine
Podiatry
Internal Medicine
Podiatry
Podiatry
Internal Medicine
Internal Medicine

## 2024-02-10 NOTE — DISCHARGE NOTE NURSING/CASE MANAGEMENT/SOCIAL WORK - NSPROEXTENSIONSOFSELF_GEN_A_NUR
Patient called for results from her procedure that was done on 4/25/23 with Dr Berger. Patient informed results can take up to 10-14 days and when results available nurse will call her.  
none

## 2024-02-15 ENCOUNTER — APPOINTMENT (OUTPATIENT)
Dept: NEPHROLOGY | Facility: CLINIC | Age: 59
End: 2024-02-15
Payer: COMMERCIAL

## 2024-02-15 VITALS
WEIGHT: 201 LBS | BODY MASS INDEX: 27.22 KG/M2 | HEIGHT: 72 IN | DIASTOLIC BLOOD PRESSURE: 61 MMHG | SYSTOLIC BLOOD PRESSURE: 134 MMHG

## 2024-02-15 PROCEDURE — 99214 OFFICE O/P EST MOD 30 MIN: CPT

## 2024-02-15 PROCEDURE — G2211 COMPLEX E/M VISIT ADD ON: CPT | Mod: NC,1L

## 2024-02-15 NOTE — CONSULT LETTER
[Dear  ___] : Dear  [unfilled], [Courtesy Letter:] : I had the pleasure of seeing your patient, [unfilled], in my office today. [FreeTextEntry2] : Dr Lucy Elliott [FreeTextEntry1] : Jorge A Elliott -thanks for seeing Rich.  Diabetes control has been a tough challenge.  I applaud the idea of Mounjaro and told him so.  I always get a kick out of seeing a [facility] address .  [place] [place] fifth [place] and went to  [address].  I am hoping to at least get his urine protein under 1 g.   Best regards, Louis Castillo

## 2024-02-15 NOTE — HISTORY OF PRESENT ILLNESS
[FreeTextEntry1] : 58-year-old man with a history of hypertension, longstanding type 2 diabetes which has been very difficult to control and has caused microvascular complications including retinopathy, neuropathy, and nephropathy.  He also has CAD/HFpEF with multiple episodes of NOLAN and nephrotic range proteinuria.  His creatinine has been relatively stable at 1.3-1.4, with a GFR in the 50-60 range depending on whether it is gauged by serum creatinine or Cystatin C.  His weight has generally been under 205, so he does not take torsemide unless it goes above that.  Cardiology order Jardiance number of months ago but it was never approved by insurance.  The insurance did ultimately approve Farxiga, and he is currently not taking it because of a foot infection but I have encouraged him to restart as soon as he has clearance.  His current UACR is 1657, , sugar 257, A1c 14, creatinine 1.38, BUN 30, GFR 50-60, hemoglobin 12.1, K4.9, CO2 24.  In the past, he has exhibited real hyperkalemia but also there has been a tendency towards pseudohyperkalemia with the plasma K is considerably lower than serum.  He has a new endocrinologist at [place], Dr. Lucy Elliott.  She recommended he consider Mounjaro and I totally agree.

## 2024-02-15 NOTE — ASSESSMENT
[FreeTextEntry1] : 58-year-old man with reasonable BP control, but continuing problems with diabetes control.  Renal function is stable, proteinuria remains subnephrotic but considerable.  I would love to see him back on Farxiga 10 mg daily as soon as possible.  I agree with Dr. Elliott that Mounjaro could be helpful.  I have ordered labs to be repeated in 4 months to include BMP, Cystatin C, plasma K, PTH.  If his K is controlled and he continues to exhibit significant proteinuria on optimal dose ARB plus SGLT2 inhibitor, I would consider adding Kerendia as long as his K is controlled.  We might have to restart Lokelma in order to accomplish that.

## 2024-02-16 NOTE — ED PROVIDER NOTE - NS ED MD DISPO DIVISION
[facility] Call Center    Phone Message    May a detailed message be left on voicemail: yes     Reason for Call: Order(s): Other:   Reason for requested: Labs  Date needed: 7/17/24  Provider name: Dr. Aquino    Action Taken: Message routed to:  [facility]    Travel Screening: Not Applicable                                                                     [facility]

## 2024-02-17 DIAGNOSIS — I25.10 ATHEROSCLEROTIC HEART DISEASE OF NATIVE CORONARY ARTERY WITHOUT ANGINA PECTORIS: ICD-10-CM

## 2024-02-17 DIAGNOSIS — E87.5 HYPERKALEMIA: ICD-10-CM

## 2024-02-17 DIAGNOSIS — Z95.5 PRESENCE OF CORONARY ANGIOPLASTY IMPLANT AND GRAFT: ICD-10-CM

## 2024-02-17 DIAGNOSIS — L97.512 NON-PRESSURE CHRONIC ULCER OF OTHER PART OF RIGHT FOOT WITH FAT LAYER EXPOSED: ICD-10-CM

## 2024-02-17 DIAGNOSIS — N18.9 CHRONIC KIDNEY DISEASE, UNSPECIFIED: ICD-10-CM

## 2024-02-17 DIAGNOSIS — L08.9 LOCAL INFECTION OF THE SKIN AND SUBCUTANEOUS TISSUE, UNSPECIFIED: ICD-10-CM

## 2024-02-17 DIAGNOSIS — F41.9 ANXIETY DISORDER, UNSPECIFIED: ICD-10-CM

## 2024-02-17 DIAGNOSIS — E11.621 TYPE 2 DIABETES MELLITUS WITH FOOT ULCER: ICD-10-CM

## 2024-02-17 DIAGNOSIS — Z79.82 LONG TERM (CURRENT) USE OF ASPIRIN: ICD-10-CM

## 2024-02-17 DIAGNOSIS — E78.5 HYPERLIPIDEMIA, UNSPECIFIED: ICD-10-CM

## 2024-02-17 DIAGNOSIS — I50.32 CHRONIC DIASTOLIC (CONGESTIVE) HEART FAILURE: ICD-10-CM

## 2024-02-17 DIAGNOSIS — Z89.421 ACQUIRED ABSENCE OF OTHER RIGHT TOE(S): ICD-10-CM

## 2024-02-17 DIAGNOSIS — Z79.4 LONG TERM (CURRENT) USE OF INSULIN: ICD-10-CM

## 2024-02-17 DIAGNOSIS — L02.611 CUTANEOUS ABSCESS OF RIGHT FOOT: ICD-10-CM

## 2024-02-17 DIAGNOSIS — I13.2 HYPERTENSIVE HEART AND CHRONIC KIDNEY DISEASE WITH HEART FAILURE AND WITH STAGE 5 CHRONIC KIDNEY DISEASE, OR END STAGE RENAL DISEASE: ICD-10-CM

## 2024-02-19 ENCOUNTER — EMERGENCY (EMERGENCY)
Facility: HOSPITAL | Age: 59
LOS: 1 days | Discharge: HOME | End: 2024-02-19
Attending: EMERGENCY MEDICINE | Admitting: EMERGENCY MEDICINE
Payer: COMMERCIAL

## 2024-02-19 VITALS
HEIGHT: 72 IN | HEART RATE: 51 BPM | WEIGHT: 205.03 LBS | SYSTOLIC BLOOD PRESSURE: 124 MMHG | DIASTOLIC BLOOD PRESSURE: 64 MMHG | TEMPERATURE: 98 F | RESPIRATION RATE: 16 BRPM | OXYGEN SATURATION: 100 %

## 2024-02-19 VITALS
SYSTOLIC BLOOD PRESSURE: 170 MMHG | OXYGEN SATURATION: 99 % | HEART RATE: 52 BPM | RESPIRATION RATE: 18 BRPM | DIASTOLIC BLOOD PRESSURE: 75 MMHG

## 2024-02-19 DIAGNOSIS — Z98.890 OTHER SPECIFIED POSTPROCEDURAL STATES: Chronic | ICD-10-CM

## 2024-02-19 LAB
ALBUMIN SERPL ELPH-MCNC: 3.7 G/DL — SIGNIFICANT CHANGE UP (ref 3.3–5)
ALP SERPL-CCNC: 51 U/L — SIGNIFICANT CHANGE UP (ref 40–120)
ALT FLD-CCNC: 16 U/L — SIGNIFICANT CHANGE UP (ref 10–45)
ANION GAP SERPL CALC-SCNC: 7 MMOL/L — SIGNIFICANT CHANGE UP (ref 5–17)
ANION GAP SERPL CALC-SCNC: 8 MMOL/L — SIGNIFICANT CHANGE UP (ref 5–17)
AST SERPL-CCNC: 14 U/L — SIGNIFICANT CHANGE UP (ref 10–40)
BASOPHILS # BLD AUTO: 0.05 K/UL — SIGNIFICANT CHANGE UP (ref 0–0.2)
BASOPHILS NFR BLD AUTO: 0.5 % — SIGNIFICANT CHANGE UP (ref 0–2)
BILIRUB SERPL-MCNC: <0.2 MG/DL — SIGNIFICANT CHANGE UP (ref 0.2–1.2)
BUN SERPL-MCNC: 38 MG/DL — HIGH (ref 7–23)
BUN SERPL-MCNC: 39 MG/DL — HIGH (ref 7–23)
CALCIUM SERPL-MCNC: 8.5 MG/DL — SIGNIFICANT CHANGE UP (ref 8.4–10.5)
CALCIUM SERPL-MCNC: 8.8 MG/DL — SIGNIFICANT CHANGE UP (ref 8.4–10.5)
CHLORIDE SERPL-SCNC: 100 MMOL/L — SIGNIFICANT CHANGE UP (ref 96–108)
CHLORIDE SERPL-SCNC: 103 MMOL/L — SIGNIFICANT CHANGE UP (ref 96–108)
CO2 SERPL-SCNC: 22 MMOL/L — SIGNIFICANT CHANGE UP (ref 22–31)
CO2 SERPL-SCNC: 23 MMOL/L — SIGNIFICANT CHANGE UP (ref 22–31)
CREAT SERPL-MCNC: 1.5 MG/DL — HIGH (ref 0.5–1.3)
CREAT SERPL-MCNC: 1.67 MG/DL — HIGH (ref 0.5–1.3)
CRP SERPL-MCNC: <3 MG/L — SIGNIFICANT CHANGE UP (ref 0–4)
EGFR: 48 ML/MIN/1.73M2 — LOW
EGFR: 54 ML/MIN/1.73M2 — LOW
EOSINOPHIL # BLD AUTO: 0.27 K/UL — SIGNIFICANT CHANGE UP (ref 0–0.5)
EOSINOPHIL NFR BLD AUTO: 2.5 % — SIGNIFICANT CHANGE UP (ref 0–6)
ERYTHROCYTE [SEDIMENTATION RATE] IN BLOOD: 51 MM/HR — HIGH
GLUCOSE SERPL-MCNC: 139 MG/DL — HIGH (ref 70–99)
GLUCOSE SERPL-MCNC: 205 MG/DL — HIGH (ref 70–99)
HCT VFR BLD CALC: 34.4 % — LOW (ref 39–50)
HGB BLD-MCNC: 11.2 G/DL — LOW (ref 13–17)
IMM GRANULOCYTES NFR BLD AUTO: 0.4 % — SIGNIFICANT CHANGE UP (ref 0–0.9)
LYMPHOCYTES # BLD AUTO: 2.29 K/UL — SIGNIFICANT CHANGE UP (ref 1–3.3)
LYMPHOCYTES # BLD AUTO: 21.3 % — SIGNIFICANT CHANGE UP (ref 13–44)
MCHC RBC-ENTMCNC: 29.5 PG — SIGNIFICANT CHANGE UP (ref 27–34)
MCHC RBC-ENTMCNC: 32.6 GM/DL — SIGNIFICANT CHANGE UP (ref 32–36)
MCV RBC AUTO: 90.5 FL — SIGNIFICANT CHANGE UP (ref 80–100)
MONOCYTES # BLD AUTO: 0.67 K/UL — SIGNIFICANT CHANGE UP (ref 0–0.9)
MONOCYTES NFR BLD AUTO: 6.2 % — SIGNIFICANT CHANGE UP (ref 2–14)
NEUTROPHILS # BLD AUTO: 7.42 K/UL — HIGH (ref 1.8–7.4)
NEUTROPHILS NFR BLD AUTO: 69.1 % — SIGNIFICANT CHANGE UP (ref 43–77)
NRBC # BLD: 0 /100 WBCS — SIGNIFICANT CHANGE UP (ref 0–0)
PLATELET # BLD AUTO: 390 K/UL — SIGNIFICANT CHANGE UP (ref 150–400)
POTASSIUM SERPL-MCNC: 4.4 MMOL/L — SIGNIFICANT CHANGE UP (ref 3.5–5.3)
POTASSIUM SERPL-MCNC: 5.9 MMOL/L — HIGH (ref 3.5–5.3)
POTASSIUM SERPL-SCNC: 4.4 MMOL/L — SIGNIFICANT CHANGE UP (ref 3.5–5.3)
POTASSIUM SERPL-SCNC: 5.9 MMOL/L — HIGH (ref 3.5–5.3)
PROT SERPL-MCNC: 7 G/DL — SIGNIFICANT CHANGE UP (ref 6–8.3)
RBC # BLD: 3.8 M/UL — LOW (ref 4.2–5.8)
RBC # FLD: 13.1 % — SIGNIFICANT CHANGE UP (ref 10.3–14.5)
SODIUM SERPL-SCNC: 130 MMOL/L — LOW (ref 135–145)
SODIUM SERPL-SCNC: 133 MMOL/L — LOW (ref 135–145)
WBC # BLD: 10.74 K/UL — HIGH (ref 3.8–10.5)
WBC # FLD AUTO: 10.74 K/UL — HIGH (ref 3.8–10.5)

## 2024-02-19 PROCEDURE — 80053 COMPREHEN METABOLIC PANEL: CPT

## 2024-02-19 PROCEDURE — 73630 X-RAY EXAM OF FOOT: CPT

## 2024-02-19 PROCEDURE — 82962 GLUCOSE BLOOD TEST: CPT

## 2024-02-19 PROCEDURE — 93010 ELECTROCARDIOGRAM REPORT: CPT

## 2024-02-19 PROCEDURE — 99285 EMERGENCY DEPT VISIT HI MDM: CPT | Mod: 25

## 2024-02-19 PROCEDURE — 80048 BASIC METABOLIC PNL TOTAL CA: CPT

## 2024-02-19 PROCEDURE — 86140 C-REACTIVE PROTEIN: CPT

## 2024-02-19 PROCEDURE — 36415 COLL VENOUS BLD VENIPUNCTURE: CPT

## 2024-02-19 PROCEDURE — 73630 X-RAY EXAM OF FOOT: CPT | Mod: 26,RT

## 2024-02-19 PROCEDURE — 85652 RBC SED RATE AUTOMATED: CPT

## 2024-02-19 PROCEDURE — 99285 EMERGENCY DEPT VISIT HI MDM: CPT

## 2024-02-19 PROCEDURE — 96375 TX/PRO/DX INJ NEW DRUG ADDON: CPT

## 2024-02-19 PROCEDURE — 85025 COMPLETE CBC W/AUTO DIFF WBC: CPT

## 2024-02-19 PROCEDURE — 96374 THER/PROPH/DIAG INJ IV PUSH: CPT

## 2024-02-19 PROCEDURE — 93005 ELECTROCARDIOGRAM TRACING: CPT

## 2024-02-19 PROCEDURE — 87040 BLOOD CULTURE FOR BACTERIA: CPT

## 2024-02-19 RX ORDER — METRONIDAZOLE 500 MG
500 TABLET ORAL ONCE
Refills: 0 | Status: COMPLETED | OUTPATIENT
Start: 2024-02-19 | End: 2024-02-19

## 2024-02-19 RX ORDER — CEFTRIAXONE 500 MG/1
1000 INJECTION, POWDER, FOR SOLUTION INTRAMUSCULAR; INTRAVENOUS ONCE
Refills: 0 | Status: COMPLETED | OUTPATIENT
Start: 2024-02-19 | End: 2024-02-19

## 2024-02-19 RX ORDER — SODIUM ZIRCONIUM CYCLOSILICATE 10 G/10G
10 POWDER, FOR SUSPENSION ORAL ONCE
Refills: 0 | Status: COMPLETED | OUTPATIENT
Start: 2024-02-19 | End: 2024-02-19

## 2024-02-19 RX ORDER — DEXTROSE 50 % IN WATER 50 %
50 SYRINGE (ML) INTRAVENOUS ONCE
Refills: 0 | Status: COMPLETED | OUTPATIENT
Start: 2024-02-19 | End: 2024-02-19

## 2024-02-19 RX ORDER — SODIUM CHLORIDE 9 MG/ML
500 INJECTION INTRAMUSCULAR; INTRAVENOUS; SUBCUTANEOUS ONCE
Refills: 0 | Status: DISCONTINUED | OUTPATIENT
Start: 2024-02-19 | End: 2024-02-23

## 2024-02-19 RX ORDER — INSULIN HUMAN 100 [IU]/ML
10 INJECTION, SOLUTION SUBCUTANEOUS ONCE
Refills: 0 | Status: COMPLETED | OUTPATIENT
Start: 2024-02-19 | End: 2024-02-19

## 2024-02-19 RX ADMIN — Medication 50 MILLILITER(S): at 21:25

## 2024-02-19 RX ADMIN — CEFTRIAXONE 100 MILLIGRAM(S): 500 INJECTION, POWDER, FOR SOLUTION INTRAMUSCULAR; INTRAVENOUS at 18:54

## 2024-02-19 RX ADMIN — SODIUM ZIRCONIUM CYCLOSILICATE 10 GRAM(S): 10 POWDER, FOR SUSPENSION ORAL at 20:24

## 2024-02-19 RX ADMIN — Medication 100 MILLIGRAM(S): at 19:51

## 2024-02-19 RX ADMIN — INSULIN HUMAN 10 UNIT(S): 100 INJECTION, SOLUTION SUBCUTANEOUS at 21:26

## 2024-02-19 NOTE — ED PROVIDER NOTE - PATIENT PORTAL LINK FT
You can access the FollowMyHealth Patient Portal offered by [facility] by registering at the following website: [email or web address]. By joining [employer]’s FollowMyHealth portal, you will also be able to view your health information using other applications (apps) compatible with our system.

## 2024-02-19 NOTE — ED PROVIDER NOTE - PHYSICAL EXAMINATION
.  VITAL SIGNS:  T(C): 36.8 (02-19-24 @ 16:33), Max: 36.8 (02-19-24 @ 16:33)  T(F): 98.3 (02-19-24 @ 16:33), Max: 98.3 (02-19-24 @ 16:33)  HR: 51 (02-19-24 @ 16:33) (51 - 51)  BP: 124/64 (02-19-24 @ 16:33) (124/64 - 124/64)  BP(mean): --  RR: 16 (02-19-24 @ 16:33) (16 - 16)  SpO2: 100% (02-19-24 @ 16:33) (100% - 100%)  Wt(kg): --    PHYSICAL EXAM:    Constitutional: WDWN resting comfortably in bed; NAD  Head: NC/AT  Eyes: PERRL, EOMI, clear conjunctiva  ENT: no nasal discharge; uvula midline, no oropharyngeal erythema or exudates; MMM  Neck: supple; no JVD or thyromegaly  Respiratory: CTA B/L; no W/R/R, no retractions  Cardiac: +S1/S2; RRR; no M/R/G;  Gastrointestinal: soft, NT/ND; no rebound or guarding; +BSx4  Extremities: WWP, no clubbing or cyanosis; no peripheral edema  Musculoskeletal: NROM x4; no joint swelling, tenderness or erythema  Vascular: 2+ radial, femoral, DP/PT pulses B/L  Dermatologic: skin warm, dry and intact; R 5th digit no malodor, cellulitis. Serosang drainage present.  Neurologic: AAOx3; CNII-XII grossly intact; no focal deficits  Psychiatric: affect and characteristics of appearance, verbalizations, behaviors are appropriate Constitutional: WDWN resting comfortably in bed; NAD  Head: NC/AT  Eyes: PERRL, EOMI, clear conjunctiva  ENT: no nasal discharge; uvula midline, no oropharyngeal erythema or exudates; MMM  Neck: supple; no JVD or thyromegaly  Respiratory: CTA B/L; no W/R/R, no retractions  Cardiac: +S1/S2; RRR; no M/R/G;  Gastrointestinal: soft, NT/ND; no rebound or guarding; +BSx4  Extremities: WWP, no clubbing or cyanosis; no peripheral edema  Musculoskeletal: NROM x4; no joint swelling, tenderness or erythema  Vascular: 2+ radial, femoral, DP/PT pulses B/L  Dermatologic: skin warm, dry and intact; R 5th digit no malodor, cellulitis. Serosang drainage present.  Neurologic: AAOx3; CNII-XII grossly intact; no focal deficits  Psychiatric: affect and characteristics of appearance, verbalizations, behaviors are appropriate

## 2024-02-19 NOTE — CONSULT NOTE ADULT - SUBJECTIVE AND OBJECTIVE BOX
Attending: Dr. Chakraborty     Patient is a 58y old  Male who presents with a chief complaint of     HPI:  58y Male PMH of HFpEF (recent admission for exacerbation Dec 23 found to have CAD s/p stenting 12/9), HTN, HLD, IDDM-II (c/b OM of the 4 metatarsal), and hx urethral replacement for urethral stricture and multiple recent admissions for NOLAN and hyperkalemia (most recent early Jan 24)  complaining of foot pain/injury. Patient was recently admitted to [facility] and underwent R 5th digit I&D with bone biopsy on 2/7. Pathology for bone biopsy of prox phalanx and 5th met head show viable bone with acute inflammation of the phalanx. Cultures with NGTD. Patient ended up leaving AMA. Patient presents today at his Podiatrists instructions for IV abx. Pt was Rx   Keflex 500mg PO Q6 plus Flagyl 500mg PO Q12 on DC. He has been compliant with regimen. He has not noticed any purulent drainage. Denies N/V/F/C/SOB    Podiatry Addendum: Podiatry was consulted for R 5th digit wound(s/p I&D with bone biopsy 2/7). Pt states that he felt increased warmth and swelling in the digit and saw his podiatrist who recommended he come to the ED for IV abx. Pt denies any N/V/CP/SOB at this time.       Review of systems negative except per HPI and as stated below  General:	 no weakness; no fevers, no chills  Skin/Breast: no rash  Respiratory and Thorax: no SOB, no cough  Cardiovascular:	No chest pain  Gastrointestinal:	 no nausea, vomiting , diarrhea  Genitourinary:	no dysuria, no difficulty urinating, no hematuria  Musculoskeletal:	no weakness, no joint swelling/pain  Neurological:	no focal weakness/numbness  Endocrine:	no polyuria, no polydipsia    PAST MEDICAL & SURGICAL HISTORY:  Osteomyelitis      Hypertension      Hyperlipidemia      Diabetes mellitus      CAD (coronary artery disease)      Chronic diastolic congestive heart failure      CKD (chronic kidney disease)      S/P release of urethral stricture        Home Medications:  Insulin Lispro KwikPen 100 units/mL injectable solution: 5 unit(s) injectable 3 times a day (before meals) (10 Feb 2024 10:23)  olmesartan 40 mg oral tablet: 1 tab(s) orally once a day (05 Feb 2024 23:02)    Allergies    No Known Allergies    Intolerances    carvedilol (Hypotension)    FAMILY HISTORY:  FH: myocardial infarction (Father, Grandparent)      Social History:       LABS              Vital Signs Last 24 Hrs  T(C): 36.8 (19 Feb 2024 16:33), Max: 36.8 (19 Feb 2024 16:33)  T(F): 98.3 (19 Feb 2024 16:33), Max: 98.3 (19 Feb 2024 16:33)  HR: 51 (19 Feb 2024 16:33) (51 - 51)  BP: 124/64 (19 Feb 2024 16:33) (124/64 - 124/64)  BP(mean): --  RR: 16 (19 Feb 2024 16:33) (16 - 16)  SpO2: 100% (19 Feb 2024 16:33) (100% - 100%)    Parameters below as of 19 Feb 2024 16:33  Patient On (Oxygen Delivery Method): room air        PHYSICAL EXAM  General: NAD, AA0x3    Lower Extremity Focused:  Vasc: Monophasic PT/DP on doppler, TG warm to warm, (+)  R foot edema and  erythema localized to the distal aspect of 5th digit, CFT <3 x 4, 5th digit appears to have delayed CFT  Derm: Open lesion noted in the R 4th interspace which tracks laterally with incision made over dorsal lateral 5th digit, (-)maceration, (+)sanginous drainage, (-)pus, (-) malodor, (-)crepitus, (-)fluctuance   Neuro: Protective sensation intact  MSK: Partial 3rd digit amputation    RADIOLOGY  XR: Pending

## 2024-02-19 NOTE — ED ADULT TRIAGE NOTE - CHIEF COMPLAINT QUOTE
R foot infection: hx of diabetic foot ulcers with partial toe amputation. Pt was recently hospitalized for IV abx of foot in question. Discharged on PO abx but told by wound care team to present for failure of outpatient antibiotics. Pt alert, oriented, and ambulatory at time of assessment. Denies fevers or chills. States redness is localized to his foot. Denies chest pain, dizziness, weakness, or syncope.

## 2024-02-19 NOTE — ED PROVIDER NOTE - OBJECTIVE STATEMENT
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 Patient was recently admitted to [facility] and underwent R 5th digit I&D with bone biopsy on 2/7. Pathology for bone biopsy of prox phalanx and 5th met head show viable bone. Cultures with NGTD. Patient ended up leaving AMA against medical advice. 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 Patient was recently admitted to [facility] and underwent R 5th digit I&D with bone biopsy on 2/7. Pathology for bone biopsy of prox phalanx and 5th met head show viable bone with acute inflammation of the phalnx. Cultures with NGTD. Patient ended up leaving AMA against medical advice. 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 Patient was recently admitted to [facility] and underwent R 5th digit I&D with bone biopsy on 2/7. Pathology for bone biopsy of prox phalanx and 5th met head show viable bone with acute inflammation of the phalnx. Cultures with NGTD. Patient ended up leaving A. 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 Patient was recently admitted to [facility] and underwent R 5th digit I&D with bone biopsy on 2/7. Pathology for bone biopsy of prox phalanx and 5th met head show viable bone with acute inflammation of the phalnx. Cultures with NGTD. Patient ended up leaving A. Patient presents today at his Podiatrists instructions for IV abx. 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 Patient was recently admitted to [facility] and underwent R 5th digit I&D with bone biopsy on 2/7. Pathology for bone biopsy of prox phalanx and 5th met head show viable bone with acute inflammation of the phalnx. Cultures with NGTD. Patient ended up leaving AMA. Patient presents today at his Podiatrists instructions for IV abx. Pt was Rx   Keflex 500mg PO Q6 plus Flagyl 500mg PO Q12 on DC. He has been compliant with regimine. He has not noticed any purulent drainage. 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 Patient was recently admitted to [facility] and underwent R 5th digit I&D with bone biopsy on 2/7. Pathology for bone biopsy of prox phalanx and 5th met head show viable bone with acute inflammation of the phalanx. Cultures with NGTD. Patient ended up leaving AMA. Patient presents today at his Podiatrists instructions for IV abx. Pt was Rx   Keflex 500mg PO Q6 plus Flagyl 500mg PO Q12 on DC. He has been compliant with regimen. He has not noticed any purulent drainage. Denies N/V/F/C/SOB

## 2024-02-19 NOTE — ED PROVIDER NOTE - CLINICAL SUMMARY MEDICAL DECISION MAKING FREE TEXT BOX
Will give 1 round Ceftriaxone/Flagyl in ED. Ordered ESR, CRP, WBC, R foot XR. Will give 1 round Ceftriaxone/Flagyl in ED. Ordered ESR, CRP, WBC, Bcx, R foot XR. Will give 1 round Ceftriaxone/Flagyl in ED. Ordered ESR, CRP, WBC, Bcx, R foot XR. Podiatry consulted.

## 2024-02-19 NOTE — ED PROVIDER NOTE - NSFOLLOWUPINSTRUCTIONS_ED_ALL_ED_FT
Follow up with your podiatrist and take the antibiotics as prescribed by your podiatrist. Get repeat labs to check your potassium to make sure it stays normal.

## 2024-02-19 NOTE — ED ADULT NURSE NOTE - OBJECTIVE STATEMENT
58 y.o. Male c/o R fifth toe diabetic wound infection redness localized to foot. Recently hospitalized a few weeks ago for IV ABX and d/c to continue PO ABX but failed course. Sent to ED for IV ABX. A/Ox4, ambulatory wit soft surgical shoe on affected foot. alert, oriented, and ambulatory at time of assessment. Denies fevers or chills. Denies CP, SOB, fevers/chills. Baseline mild neuropathy not worse than usual. (Type II Diabetic) 58 y.o. Male c/o R fifth toe diabetic wound infection redness localized to foot. Recently hospitalized a few weeks ago for IV ABX and d/c to continue PO ABX but failed course (pt does not remember but outpatient med rec says PO flagyl & keflex?). Sent to ED for IV ABX. A/Ox4, ambulatory wit soft surgical shoe on affected foot. alert, oriented, and ambulatory at time of assessment. Denies fevers or chills. Denies CP, SOB, fevers/chills. Baseline mild neuropathy not worse than usual. (Type II Diabetic)

## 2024-02-19 NOTE — ED PROVIDER NOTE - ATTENDING APP SHARED VISIT CONTRIBUTION OF CARE
pt w foot wound treated by podiatry with keflex flagyl after I&D, pt sent in by his podiatrist , advised to give additional antibiotic dose IV , finishing Keflex flagyl dosing, as no elevated WBC we are not starting a new antibiotic course at this time, pt will follow with his podiatrist, pt's wound w no drainage, toe is erthematous and tender at distal metatarsal over 4/5 , unclear if inflammation vs persistent infection.   will f/u w his podiatrist and if worsens likely broaden antibiotics at that time. pt w foot wound treated by podiatry with keflex flagyl after I&D, pt sent in by his podiatrist , advised to give additional antibiotic dose IV , finishing Keflex flagyl dosing, as no elevated WBC we are not starting a new antibiotic course at this time, pt will follow with his podiatrist, pt's wound w no drainage, toe is erthematous and tender at distal metatarsal over 4/5 , unclear if inflammation vs persistent infection.   will f/u w his podiatrist and if worsens likely broaden antibiotics at that time.    follow up : pt w hyperK, mild T wave peaking, lokelma , insulin / glucose given, pt w ho of CHF although no need for diuretics for many months, pt w slightly dry mucous membranes and elevated BUN / creat , suspect mild dehydration contributing, pt reports trying to restrict fluids, will give 500 ml NS, I advised pt to be admitted however pt unwilling, will keep and repeat K in a few hrs, I advised pt return again tommorrow for repeat K as he plans to fly/ strongly advised against flying prior to repeat K tommorrow due to risk of arrythmia / death , pt understands / agrees .

## 2024-02-19 NOTE — ED ADULT NURSE REASSESSMENT NOTE - NS ED NURSE REASSESS COMMENT FT1
Assumed care of pt. Denies CP, SOB, dizziness, pain, palpitations or any other complaints at this time. AAOx4, NAD.
Home

## 2024-02-19 NOTE — CONSULT NOTE ADULT - ASSESSMENT
58M w/ a PMH of HFpEF, CAD, HTN, HLD, IDDM-II with recent admissions for NOLAN and hyperkalemia admitted for R 4th interspace wound with associated cellulitis. At time of consult, WBC, ESR, CRP were pending. Physical exam was remarkable for 4th interspace wound that probes 1cm deep and edema surrounding the R 5th digit extending proximally. Imaging was pending at the time of consult.  Based on the clinical presentation diabetic soft tissue infection cannot be r/o at this time.     Plan:   - Pt may heel WBAT to R foot in a surgical shoe  - May receive IV abx in ED   - OR bone cx/path reviewed   - Wound care: Area wrapped with betadine soaked gauze, kerlix,       Plan d/w Attending. Podiatry following.     Discharge recommendations:   - Patient should follow up with Dr. Jay Chakraborty within 1 week of d/c     Office information:          [place] Address- [address] [place] [address] (10[address] Floor [address]), [place] [address]          [facility]

## 2024-02-20 ENCOUNTER — TRANSCRIPTION ENCOUNTER (OUTPATIENT)
Age: 59
End: 2024-02-20

## 2024-02-20 RX ORDER — ELECTROLYTES/DEXTROSE
32G X 4 MM SOLUTION, ORAL ORAL
Qty: 90 | Refills: 5 | Status: ACTIVE | COMMUNITY
Start: 2023-01-13 | End: 1900-01-01

## 2024-02-21 DIAGNOSIS — L08.9 LOCAL INFECTION OF THE SKIN AND SUBCUTANEOUS TISSUE, UNSPECIFIED: ICD-10-CM

## 2024-02-21 DIAGNOSIS — R00.1 BRADYCARDIA, UNSPECIFIED: ICD-10-CM

## 2024-02-21 DIAGNOSIS — I10 ESSENTIAL (PRIMARY) HYPERTENSION: ICD-10-CM

## 2024-02-21 DIAGNOSIS — I25.10 ATHEROSCLEROTIC HEART DISEASE OF NATIVE CORONARY ARTERY WITHOUT ANGINA PECTORIS: ICD-10-CM

## 2024-02-21 DIAGNOSIS — E87.5 HYPERKALEMIA: ICD-10-CM

## 2024-02-21 DIAGNOSIS — E78.5 HYPERLIPIDEMIA, UNSPECIFIED: ICD-10-CM

## 2024-02-21 DIAGNOSIS — E11.9 TYPE 2 DIABETES MELLITUS WITHOUT COMPLICATIONS: ICD-10-CM

## 2024-02-26 ENCOUNTER — RX RENEWAL (OUTPATIENT)
Age: 59
End: 2024-02-26

## 2024-02-26 ENCOUNTER — TRANSCRIPTION ENCOUNTER (OUTPATIENT)
Age: 59
End: 2024-02-26

## 2024-02-28 ENCOUNTER — TRANSCRIPTION ENCOUNTER (OUTPATIENT)
Age: 59
End: 2024-02-28

## 2024-02-28 LAB
CULTURE RESULTS: NO GROWTH — SIGNIFICANT CHANGE UP
CULTURE RESULTS: NO GROWTH — SIGNIFICANT CHANGE UP
SPECIMEN SOURCE: SIGNIFICANT CHANGE UP
SPECIMEN SOURCE: SIGNIFICANT CHANGE UP

## 2024-03-08 ENCOUNTER — RX RENEWAL (OUTPATIENT)
Age: 59
End: 2024-03-08

## 2024-03-20 ENCOUNTER — TRANSCRIPTION ENCOUNTER (OUTPATIENT)
Age: 59
End: 2024-03-20

## 2024-03-21 ENCOUNTER — TRANSCRIPTION ENCOUNTER (OUTPATIENT)
Age: 59
End: 2024-03-21

## 2024-03-25 ENCOUNTER — RX RENEWAL (OUTPATIENT)
Age: 59
End: 2024-03-25

## 2024-03-26 ENCOUNTER — TRANSCRIPTION ENCOUNTER (OUTPATIENT)
Age: 59
End: 2024-03-26

## 2024-03-27 ENCOUNTER — INPATIENT (INPATIENT)
Facility: HOSPITAL | Age: 59
LOS: 5 days | Discharge: HOME | DRG: 617 | End: 2024-04-02
Attending: STUDENT IN AN ORGANIZED HEALTH CARE EDUCATION/TRAINING PROGRAM | Admitting: STUDENT IN AN ORGANIZED HEALTH CARE EDUCATION/TRAINING PROGRAM
Payer: COMMERCIAL

## 2024-03-27 VITALS
TEMPERATURE: 100 F | HEART RATE: 53 BPM | RESPIRATION RATE: 18 BRPM | WEIGHT: 210.98 LBS | DIASTOLIC BLOOD PRESSURE: 67 MMHG | SYSTOLIC BLOOD PRESSURE: 151 MMHG | HEIGHT: 72 IN

## 2024-03-27 DIAGNOSIS — I25.10 ATHEROSCLEROTIC HEART DISEASE OF NATIVE CORONARY ARTERY WITHOUT ANGINA PECTORIS: ICD-10-CM

## 2024-03-27 DIAGNOSIS — I50.32 CHRONIC DIASTOLIC (CONGESTIVE) HEART FAILURE: ICD-10-CM

## 2024-03-27 DIAGNOSIS — I10 ESSENTIAL (PRIMARY) HYPERTENSION: ICD-10-CM

## 2024-03-27 DIAGNOSIS — E78.5 HYPERLIPIDEMIA, UNSPECIFIED: ICD-10-CM

## 2024-03-27 DIAGNOSIS — E11.9 TYPE 2 DIABETES MELLITUS WITHOUT COMPLICATIONS: ICD-10-CM

## 2024-03-27 DIAGNOSIS — Z98.890 OTHER SPECIFIED POSTPROCEDURAL STATES: Chronic | ICD-10-CM

## 2024-03-27 DIAGNOSIS — F41.9 ANXIETY DISORDER, UNSPECIFIED: ICD-10-CM

## 2024-03-27 DIAGNOSIS — Z29.9 ENCOUNTER FOR PROPHYLACTIC MEASURES, UNSPECIFIED: ICD-10-CM

## 2024-03-27 DIAGNOSIS — N18.9 CHRONIC KIDNEY DISEASE, UNSPECIFIED: ICD-10-CM

## 2024-03-27 DIAGNOSIS — M86.9 OSTEOMYELITIS, UNSPECIFIED: ICD-10-CM

## 2024-03-27 LAB
ANION GAP SERPL CALC-SCNC: 8 MMOL/L — SIGNIFICANT CHANGE UP (ref 5–17)
APTT BLD: 35.2 SEC — SIGNIFICANT CHANGE UP (ref 24.5–35.6)
BASE EXCESS BLDV CALC-SCNC: -5 MMOL/L — LOW (ref -2–3)
BASOPHILS # BLD AUTO: 0.05 K/UL — SIGNIFICANT CHANGE UP (ref 0–0.2)
BASOPHILS NFR BLD AUTO: 0.6 % — SIGNIFICANT CHANGE UP (ref 0–2)
BLD GP AB SCN SERPL QL: NEGATIVE — SIGNIFICANT CHANGE UP
BUN SERPL-MCNC: 31 MG/DL — HIGH (ref 7–23)
CA-I SERPL-SCNC: 1.21 MMOL/L — SIGNIFICANT CHANGE UP (ref 1.15–1.33)
CALCIUM SERPL-MCNC: 9.4 MG/DL — SIGNIFICANT CHANGE UP (ref 8.4–10.5)
CHLORIDE SERPL-SCNC: 106 MMOL/L — SIGNIFICANT CHANGE UP (ref 96–108)
CK MB CFR SERPL CALC: 5.1 NG/ML — SIGNIFICANT CHANGE UP (ref 0–6.7)
CK SERPL-CCNC: SIGNIFICANT CHANGE UP (ref 30–200)
CO2 BLDV-SCNC: 22.4 MMOL/L — SIGNIFICANT CHANGE UP (ref 22–26)
CO2 SERPL-SCNC: 24 MMOL/L — SIGNIFICANT CHANGE UP (ref 22–31)
CREAT SERPL-MCNC: 1.31 MG/DL — HIGH (ref 0.5–1.3)
CRP SERPL-MCNC: 26.7 MG/L — HIGH (ref 0–4)
EGFR: 64 ML/MIN/1.73M2 — SIGNIFICANT CHANGE UP
EOSINOPHIL # BLD AUTO: 0.32 K/UL — SIGNIFICANT CHANGE UP (ref 0–0.5)
EOSINOPHIL NFR BLD AUTO: 4 % — SIGNIFICANT CHANGE UP (ref 0–6)
ERYTHROCYTE [SEDIMENTATION RATE] IN BLOOD: 67 MM/HR — HIGH
GAS PNL BLDV: 137 MMOL/L — SIGNIFICANT CHANGE UP (ref 136–145)
GAS PNL BLDV: SIGNIFICANT CHANGE UP
GLUCOSE BLDC GLUCOMTR-MCNC: 106 MG/DL — HIGH (ref 70–99)
GLUCOSE BLDC GLUCOMTR-MCNC: 109 MG/DL — HIGH (ref 70–99)
GLUCOSE BLDC GLUCOMTR-MCNC: 63 MG/DL — LOW (ref 70–99)
GLUCOSE BLDC GLUCOMTR-MCNC: 64 MG/DL — LOW (ref 70–99)
GLUCOSE SERPL-MCNC: 120 MG/DL — HIGH (ref 70–99)
GRAM STN FLD: SIGNIFICANT CHANGE UP
HCO3 BLDV-SCNC: 21 MMOL/L — LOW (ref 22–29)
HCT VFR BLD CALC: 31.5 % — LOW (ref 39–50)
HGB BLD-MCNC: 10.5 G/DL — LOW (ref 13–17)
IMM GRANULOCYTES NFR BLD AUTO: 0.3 % — SIGNIFICANT CHANGE UP (ref 0–0.9)
INR BLD: 1.09 — SIGNIFICANT CHANGE UP (ref 0.85–1.18)
LACTATE SERPL-SCNC: 0.7 MMOL/L — SIGNIFICANT CHANGE UP (ref 0.5–2)
LYMPHOCYTES # BLD AUTO: 0.77 K/UL — LOW (ref 1–3.3)
LYMPHOCYTES # BLD AUTO: 9.6 % — LOW (ref 13–44)
MCHC RBC-ENTMCNC: 30.6 PG — SIGNIFICANT CHANGE UP (ref 27–34)
MCHC RBC-ENTMCNC: 33.3 GM/DL — SIGNIFICANT CHANGE UP (ref 32–36)
MCV RBC AUTO: 91.8 FL — SIGNIFICANT CHANGE UP (ref 80–100)
MONOCYTES # BLD AUTO: 0.67 K/UL — SIGNIFICANT CHANGE UP (ref 0–0.9)
MONOCYTES NFR BLD AUTO: 8.4 % — SIGNIFICANT CHANGE UP (ref 2–14)
NEUTROPHILS # BLD AUTO: 6.15 K/UL — SIGNIFICANT CHANGE UP (ref 1.8–7.4)
NEUTROPHILS NFR BLD AUTO: 77.1 % — HIGH (ref 43–77)
NRBC # BLD: 0 /100 WBCS — SIGNIFICANT CHANGE UP (ref 0–0)
PCO2 BLDV: 42 MMHG — SIGNIFICANT CHANGE UP (ref 42–55)
PH BLDV: 7.31 — LOW (ref 7.32–7.43)
PLATELET # BLD AUTO: 285 K/UL — SIGNIFICANT CHANGE UP (ref 150–400)
PO2 BLDV: 62 MMHG — HIGH (ref 25–45)
POTASSIUM BLDV-SCNC: 4.8 MMOL/L — SIGNIFICANT CHANGE UP (ref 3.5–5.1)
POTASSIUM SERPL-MCNC: SIGNIFICANT CHANGE UP (ref 3.5–5.3)
POTASSIUM SERPL-SCNC: SIGNIFICANT CHANGE UP (ref 3.5–5.3)
PROTHROM AB SERPL-ACNC: 12.4 SEC — SIGNIFICANT CHANGE UP (ref 9.5–13)
RBC # BLD: 3.43 M/UL — LOW (ref 4.2–5.8)
RBC # FLD: 13.8 % — SIGNIFICANT CHANGE UP (ref 10.3–14.5)
RH IG SCN BLD-IMP: POSITIVE — SIGNIFICANT CHANGE UP
SAO2 % BLDV: 92 % — HIGH (ref 67–88)
SODIUM SERPL-SCNC: 138 MMOL/L — SIGNIFICANT CHANGE UP (ref 135–145)
SPECIMEN SOURCE: SIGNIFICANT CHANGE UP
WBC # BLD: 7.98 K/UL — SIGNIFICANT CHANGE UP (ref 3.8–10.5)
WBC # FLD AUTO: 7.98 K/UL — SIGNIFICANT CHANGE UP (ref 3.8–10.5)

## 2024-03-27 PROCEDURE — 99285 EMERGENCY DEPT VISIT HI MDM: CPT

## 2024-03-27 PROCEDURE — 99223 1ST HOSP IP/OBS HIGH 75: CPT | Mod: GC

## 2024-03-27 PROCEDURE — 73620 X-RAY EXAM OF FOOT: CPT | Mod: 26,59,RT

## 2024-03-27 PROCEDURE — 88311 DECALCIFY TISSUE: CPT | Mod: 26

## 2024-03-27 PROCEDURE — 88304 TISSUE EXAM BY PATHOLOGIST: CPT | Mod: 26

## 2024-03-27 PROCEDURE — 71045 X-RAY EXAM CHEST 1 VIEW: CPT | Mod: 26

## 2024-03-27 PROCEDURE — 73630 X-RAY EXAM OF FOOT: CPT | Mod: 26,RT

## 2024-03-27 PROCEDURE — 88305 TISSUE EXAM BY PATHOLOGIST: CPT | Mod: 26

## 2024-03-27 DEVICE — K-WIRE MICROAIRE (SMOOTH) DOUBLE TROCAR 1.6MM X 4": Type: IMPLANTABLE DEVICE | Status: FUNCTIONAL

## 2024-03-27 DEVICE — K-WIRE MICROAIRE (SMOOTH) DOUBLE TROCAR 1.1MM X 4": Type: IMPLANTABLE DEVICE | Status: FUNCTIONAL

## 2024-03-27 DEVICE — K-WIRE MICROAIRE (SMOOTH) DOUBLE TROCAR 0.9MM X 4": Type: IMPLANTABLE DEVICE | Status: FUNCTIONAL

## 2024-03-27 RX ORDER — PIPERACILLIN AND TAZOBACTAM 4; .5 G/20ML; G/20ML
3.38 INJECTION, POWDER, LYOPHILIZED, FOR SOLUTION INTRAVENOUS EVERY 8 HOURS
Refills: 0 | Status: DISCONTINUED | OUTPATIENT
Start: 2024-03-27 | End: 2024-03-29

## 2024-03-27 RX ORDER — HEPARIN SODIUM 5000 [USP'U]/ML
5000 INJECTION INTRAVENOUS; SUBCUTANEOUS EVERY 8 HOURS
Refills: 0 | Status: DISCONTINUED | OUTPATIENT
Start: 2024-03-27 | End: 2024-03-28

## 2024-03-27 RX ORDER — DEXTROSE 50 % IN WATER 50 %
15 SYRINGE (ML) INTRAVENOUS ONCE
Refills: 0 | Status: DISCONTINUED | OUTPATIENT
Start: 2024-03-27 | End: 2024-04-02

## 2024-03-27 RX ORDER — AMLODIPINE BESYLATE 2.5 MG/1
10 TABLET ORAL DAILY
Refills: 0 | Status: DISCONTINUED | OUTPATIENT
Start: 2024-03-27 | End: 2024-04-02

## 2024-03-27 RX ORDER — INSULIN GLARGINE 100 [IU]/ML
20 INJECTION, SOLUTION SUBCUTANEOUS AT BEDTIME
Refills: 0 | Status: DISCONTINUED | OUTPATIENT
Start: 2024-03-27 | End: 2024-03-28

## 2024-03-27 RX ORDER — DEXTROSE 50 % IN WATER 50 %
12.5 SYRINGE (ML) INTRAVENOUS ONCE
Refills: 0 | Status: DISCONTINUED | OUTPATIENT
Start: 2024-03-27 | End: 2024-04-02

## 2024-03-27 RX ORDER — SODIUM CHLORIDE 9 MG/ML
1000 INJECTION, SOLUTION INTRAVENOUS
Refills: 0 | Status: DISCONTINUED | OUTPATIENT
Start: 2024-03-27 | End: 2024-04-02

## 2024-03-27 RX ORDER — LOSARTAN POTASSIUM 100 MG/1
100 TABLET, FILM COATED ORAL DAILY
Refills: 0 | Status: DISCONTINUED | OUTPATIENT
Start: 2024-03-27 | End: 2024-03-27

## 2024-03-27 RX ORDER — AMLODIPINE BESYLATE 2.5 MG/1
10 TABLET ORAL DAILY
Refills: 0 | Status: DISCONTINUED | OUTPATIENT
Start: 2024-03-27 | End: 2024-03-27

## 2024-03-27 RX ORDER — ACETAMINOPHEN 500 MG
650 TABLET ORAL EVERY 6 HOURS
Refills: 0 | Status: DISCONTINUED | OUTPATIENT
Start: 2024-03-27 | End: 2024-03-31

## 2024-03-27 RX ORDER — DEXTROSE 50 % IN WATER 50 %
12.5 SYRINGE (ML) INTRAVENOUS ONCE
Refills: 0 | Status: COMPLETED | OUTPATIENT
Start: 2024-03-27 | End: 2024-03-27

## 2024-03-27 RX ORDER — DEXTROSE 50 % IN WATER 50 %
25 SYRINGE (ML) INTRAVENOUS ONCE
Refills: 0 | Status: DISCONTINUED | OUTPATIENT
Start: 2024-03-27 | End: 2024-04-02

## 2024-03-27 RX ORDER — PIPERACILLIN AND TAZOBACTAM 4; .5 G/20ML; G/20ML
3.38 INJECTION, POWDER, LYOPHILIZED, FOR SOLUTION INTRAVENOUS ONCE
Refills: 0 | Status: COMPLETED | OUTPATIENT
Start: 2024-03-27 | End: 2024-03-27

## 2024-03-27 RX ORDER — VANCOMYCIN HCL 1 G
1500 VIAL (EA) INTRAVENOUS EVERY 12 HOURS
Refills: 0 | Status: COMPLETED | OUTPATIENT
Start: 2024-03-28 | End: 2024-03-28

## 2024-03-27 RX ORDER — HEPARIN SODIUM 5000 [USP'U]/ML
5000 INJECTION INTRAVENOUS; SUBCUTANEOUS EVERY 12 HOURS
Refills: 0 | Status: DISCONTINUED | OUTPATIENT
Start: 2024-03-27 | End: 2024-03-27

## 2024-03-27 RX ORDER — HYDROMORPHONE HYDROCHLORIDE 2 MG/ML
0.5 INJECTION INTRAMUSCULAR; INTRAVENOUS; SUBCUTANEOUS ONCE
Refills: 0 | Status: DISCONTINUED | OUTPATIENT
Start: 2024-03-27 | End: 2024-03-28

## 2024-03-27 RX ORDER — INSULIN LISPRO 100/ML
VIAL (ML) SUBCUTANEOUS
Refills: 0 | Status: DISCONTINUED | OUTPATIENT
Start: 2024-03-27 | End: 2024-04-02

## 2024-03-27 RX ORDER — GLUCAGON INJECTION, SOLUTION 0.5 MG/.1ML
1 INJECTION, SOLUTION SUBCUTANEOUS ONCE
Refills: 0 | Status: DISCONTINUED | OUTPATIENT
Start: 2024-03-27 | End: 2024-04-02

## 2024-03-27 RX ORDER — ATORVASTATIN CALCIUM 80 MG/1
40 TABLET, FILM COATED ORAL AT BEDTIME
Refills: 0 | Status: DISCONTINUED | OUTPATIENT
Start: 2024-03-27 | End: 2024-04-02

## 2024-03-27 RX ORDER — ONDANSETRON 8 MG/1
4 TABLET, FILM COATED ORAL EVERY 8 HOURS
Refills: 0 | Status: DISCONTINUED | OUTPATIENT
Start: 2024-03-27 | End: 2024-04-02

## 2024-03-27 RX ORDER — LANOLIN ALCOHOL/MO/W.PET/CERES
3 CREAM (GRAM) TOPICAL AT BEDTIME
Refills: 0 | Status: DISCONTINUED | OUTPATIENT
Start: 2024-03-27 | End: 2024-04-02

## 2024-03-27 RX ORDER — ACETAMINOPHEN 500 MG
650 TABLET ORAL ONCE
Refills: 0 | Status: COMPLETED | OUTPATIENT
Start: 2024-03-27 | End: 2024-03-27

## 2024-03-27 RX ORDER — CITALOPRAM 10 MG/1
40 TABLET, FILM COATED ORAL DAILY
Refills: 0 | Status: DISCONTINUED | OUTPATIENT
Start: 2024-03-27 | End: 2024-04-02

## 2024-03-27 RX ORDER — VANCOMYCIN HCL 1 G
1000 VIAL (EA) INTRAVENOUS ONCE
Refills: 0 | Status: COMPLETED | OUTPATIENT
Start: 2024-03-27 | End: 2024-03-27

## 2024-03-27 RX ADMIN — PIPERACILLIN AND TAZOBACTAM 25 GRAM(S): 4; .5 INJECTION, POWDER, LYOPHILIZED, FOR SOLUTION INTRAVENOUS at 22:41

## 2024-03-27 RX ADMIN — Medication 12.5 GRAM(S): at 23:03

## 2024-03-27 RX ADMIN — Medication 250 MILLIGRAM(S): at 17:19

## 2024-03-27 RX ADMIN — Medication 650 MILLIGRAM(S): at 16:53

## 2024-03-27 RX ADMIN — PIPERACILLIN AND TAZOBACTAM 200 GRAM(S): 4; .5 INJECTION, POWDER, LYOPHILIZED, FOR SOLUTION INTRAVENOUS at 16:53

## 2024-03-27 RX ADMIN — ATORVASTATIN CALCIUM 40 MILLIGRAM(S): 80 TABLET, FILM COATED ORAL at 22:41

## 2024-03-27 RX ADMIN — INSULIN GLARGINE 20 UNIT(S): 100 INJECTION, SOLUTION SUBCUTANEOUS at 22:49

## 2024-03-27 RX ADMIN — HEPARIN SODIUM 5000 UNIT(S): 5000 INJECTION INTRAVENOUS; SUBCUTANEOUS at 22:41

## 2024-03-27 NOTE — ED PROVIDER NOTE - PROGRESS NOTE DETAILS
[place]: ESR 67, hgb 10.5, CRP 26.7, K hemolyzed (4.8 on VBG), CK hemolyzed, BUN/cr 31/1.31 grossly at baseline, VBG pH 7.31, other labs grossly wnl. XR w/ no gas, possible osteo. Evaluated by podiatry, plan on operative management today, will admit medicine for further care. Updated pt.

## 2024-03-27 NOTE — ED ADULT NURSE NOTE - CAS EDN DISCHARGE INTERVENTIONS
Detail Level: Zone
Recommendation Preamble: The following recommendations were made during the visit: Cosmetic Consult with Yanna
Arm band on/IV intact

## 2024-03-27 NOTE — PROGRESS NOTE ADULT - SUBJECTIVE AND OBJECTIVE BOX
POST OP NOTE    VALENTIN CABALLERO  MRN-[id number]    Procedure: R foot partial fifth ray amputation  Surgeon: Dr. Jay Chakraborty  Assistants: Jen Huggins     Patient tolerated procedure well without incident.  Patient transferred to PACU in stable condition. Denies n/v/f/c/sob. Pain is controlled.       PE / Post Op Check:       GEN: NAD, AAOx3, resting comfortably with pain controlled      LE Focused: CFT shows adequate perfusion b/l with no signs of ischemic compromise.  No strikethrough is appreciated on surgical dressings.  Dressings were dry, clean, and intact.  No neuromuscular deficits appreciated.

## 2024-03-27 NOTE — H&P ADULT - NSHPPHYSICALEXAM_GEN_ALL_CORE
.  VITAL SIGNS:  T(C): 37.6 (03-27-24 @ 15:52), Max: 37.6 (03-27-24 @ 15:52)  T(F): 99.6 (03-27-24 @ 15:52), Max: 99.6 (03-27-24 @ 15:52)  HR: 53 (03-27-24 @ 15:52) (53 - 53)  BP: 151/67 (03-27-24 @ 15:52) (151/67 - 151/67)  BP(mean): --  RR: 18 (03-27-24 @ 15:52) (18 - 18)  SpO2: --  Wt(kg): --    PHYSICAL EXAM:    Constitutional: WDWN resting comfortably in bed; NAD  Head: NC/AT  Eyes: PERRL, EOMI, anicteric sclera  ENT: no nasal discharge; uvula midline, no oropharyngeal erythema or exudates; MMM  Neck: supple; no JVD or thyromegaly  Respiratory: CTA B/L; no W/R/R, no retractions  Cardiac: +S1/S2; RRR; no M/R/G; PMI non-displaced  Gastrointestinal: abdomen soft, NT/ND; no rebound or guarding; +BSx4  Back: spine midline, no bony tenderness or step-offs; no CVAT B/L  Extremities: WWP, no clubbing or cyanosis; no peripheral edema  Musculoskeletal: NROM x4; no joint swelling, tenderness or erythema  Vascular: 2+ radial, femoral, DP/PT pulses B/L  Dermatologic: skin warm, dry and intact; no rashes, wounds, or scars  Lymphatic: no submandibular or cervical LAD  Neurologic: AAOx3; CNII-XII grossly intact; no focal deficits  Psychiatric: affect and characteristics of appearance, verbalizations, behaviors are appropriate .  VITAL SIGNS:  T(C): 37.6 (03-27-24 @ 15:52), Max: 37.6 (03-27-24 @ 15:52)  T(F): 99.6 (03-27-24 @ 15:52), Max: 99.6 (03-27-24 @ 15:52)  HR: 53 (03-27-24 @ 15:52) (53 - 53)  BP: 151/67 (03-27-24 @ 15:52) (151/67 - 151/67)  BP(mean): --  RR: 18 (03-27-24 @ 15:52) (18 - 18)  SpO2: --  Wt(kg): --    PHYSICAL EXAM:    Constitutional: resting comfortably in chair. L wrist in cast, R foot in bandage  Head: NC/AT  Eyes: PERRL, EOMI, anicteric sclera  ENT: no nasal discharge; uvula midline, no oropharyngeal erythema or exudates; MMM  Respiratory: CTA B/L; no W/R/R, no retractions  Cardiac: +S1/S2; bradycardic, no M/R/G;  Gastrointestinal: abdomen soft, NT/ND; no rebound or guarding  Extremities: WWP, no clubbing or cyanosis; b/l LE 2+ pitting edema  Musculoskeletal: L wrist in cast, pulses intact  Dermatologic: skin warm, dry and intact; no rashes, wounds, or scars  Neurologic: AAOx3; CNII-XII grossly intact; no focal deficits  Psychiatric: affect and characteristics of appearance, verbalizations, behaviors are appropriate

## 2024-03-27 NOTE — H&P ADULT - PROBLEM SELECTOR PLAN 5
#H/o hyperkalemia   Hx of CKD stage 3. Follows with Dr. Efrain Castillo. Previously prescribed lokelma but no longer taking. Labs drawn in ED were hemolyzed. Crt 1.31 (baseline 1.3-1.4).  - Renally dose meds  - Avoid nephrotoxic agents  - Obtain BMP in am  - Monitor K/Crt

## 2024-03-27 NOTE — H&P ADULT - PROBLEM SELECTOR PLAN 1
Hx of OM with prior amputation, px for foot infection. Follows with Dr. Brunner weekly. Was admitted here for management of OM 2/5-2/8 and dc'd on augmentin and linezolid, was started on unknown abx last week. Referred to ED by Dr. Brunner for worsening infection.  Podiatry consulted, c/f deep SSTI vs OM, pending partial 5th ray amp of R foot  S/p Zosyn, Vanc in ED.  - C/w Zosyn 3.375 q6h  - C/w Vanc 1250mg q12h, check trough before 4th dose  - F/u wound margins   - Appreciate podiatry Hx of OM with prior amputation, px for foot infection. Follows with Dr. Brunner weekly. Was admitted here for management of OM 2/5-2/8 and dc'd on augmentin and linezolid, was started on unknown abx last week. Referred to ED by Dr. Brunner for worsening infection.  Podiatry consulted, c/f deep SSTI vs OM, pending partial 5th ray amp of R foot  S/p Zosyn, Vanc in ED.  - C/w Zosyn 3.375 q8h  - C/w Vanc 1500mg q12h, check trough before 4th dose  - F/u wound margins   - Appreciate podiatry recs Hx of OM with prior amputation, px for foot infection. Follows with Dr. Brunner weekly. Was admitted here for management of OM 2/5-2/8 and dc'd on keflex and flagyl was started on unknown abx last week. Referred to ED by Dr. Brunner for worsening infection.  Podiatry consulted, c/f deep SSTI vs OM, pending partial 5th ray amp of R foot  S/p Zosyn, Vanc in ED.  - C/w Zosyn 3.375 q8h  - C/w Vanc 1500mg q12h, check trough before 4th dose  - F/u wound margins   - Appreciate podiatry recs

## 2024-03-27 NOTE — ED PROVIDER NOTE - PHYSICAL EXAMINATION
R toe: whitish/macerated skin, swelling. 2+ pitting edema to foot extending proximally to knee region. +Blanching erythema/warmth extending proximally to mid shin region.   No crepitus, firmness, induration, fluctuance.  no palpable DP pulse.

## 2024-03-27 NOTE — BRIEF OPERATIVE NOTE - NSICDXBRIEFPROCEDURE_GEN_ALL_CORE_FT
PROCEDURES:  Detachment at left foot, partial 5th ray, open approach 27-Mar-2024 21:46:51  Jen Huggins

## 2024-03-27 NOTE — H&P ADULT - PROBLEM SELECTOR PLAN 9
F: Encourage PO when diet started  E: Replete as necessary K>4 Mg>2  N: NPO for procedure, start CC diet    DVT Prophylaxis: Start Hep SubQ when possible  GI prophylaxis: None   CODE STATUS: FULL F: Encourage PO when diet started  E: Replete as necessary K>4 Mg>2  N: NPO for procedure, start CC diet    DVT Prophylaxis: Start Hep SubQ when cleared  GI prophylaxis: None   CODE STATUS: FULL

## 2024-03-27 NOTE — H&P ADULT - ASSESSMENT
58M with PMH HfpEF, CAD, HTN, HLD, DM, OM presenting for foot infection, admitted for OM, pending partial 5th ray amputation of R foot.

## 2024-03-27 NOTE — H&P ADULT - PROBLEM SELECTOR PLAN 3
Pt had stent placed 12/9 iso SOB and CP. Symptoms have resolved since.   Home meds: ASA/Plavix, Atorvastatin 40  - C/w statin  - C/w DAPT when cleared after procedure Pt had stent placed 12/9 iso SOB and CP. Symptoms have resolved since.   Home meds: ASA/Plavix, Atorvastatin 40  - C/w statin  - Holding DAPT, resume when cleared after procedure Pt had stent placed 12/9/2022 iso SOB and CP. Symptoms have resolved since. Unclear why he is still on plavix.     Home meds: ASA/Plavix, Atorvastatin 40  - C/w statin  - restart asa/plavix after procedure   - collateral in am

## 2024-03-27 NOTE — CONSULT NOTE ADULT - SUBJECTIVE AND OBJECTIVE BOX
Attending: Dr. Chakraborty    Patient is a 58y old  Male who presents with a chief complaint of R 5th digit wound/Pre-Op    HPI: 58M PMH HFpEF, CAD w/ stent, HTN, HLD, DM, prior metatarsal OM, prior visits for NOLAN/hyerpakalemia, p/w concern for foot infection. Had bone bx of R 5th toe 2/7/24, has been on several courses of abx since then, most recently was started on unknown abx by ?UC ~1w ago. Has slowly worsening pain to toe, worsening redness/swelling for several days. States he followed w/ podiatry Dr. Brunner today who referred to ED. No other systemic symptoms. Denies f/c, lightheaded, SOB/CP, NVD, abd pain, urinary complaints, other joint pain, other wounds.    Podiatry addendum: Pt saw Dr. Chakraborty in the office today and he sent the pt to the ED for worsening infection with a plan for a 5th digit amputation tonight. Pt states the wound has gotten worse over the last couple of weeks. Last Ed visit wound culture of the R 5th digit grew Staph Epi, [place], [place]ard[place]ia, Finegoldia. Bone biopsy was done and showed no growth. Pt has noticed drainage and malodor.         Review of systems negative except per HPI and as stated below  General:	 no weakness; no fevers, no chills  Skin/Breast: no rash  Respiratory and Thorax: no SOB, no cough  Cardiovascular:	No chest pain  Gastrointestinal:	 no nausea, vomiting , diarrhea  Genitourinary:	no dysuria, no difficulty urinating, no hematuria  Musculoskeletal:	no weakness, no joint swelling/pain  Neurological:	no focal weakness/numbness  Endocrine:	no polyuria, no polydipsia    PAST MEDICAL & SURGICAL HISTORY:  Osteomyelitis      Hypertension      Hyperlipidemia      Diabetes mellitus      CAD (coronary artery disease)      Chronic diastolic congestive heart failure      CKD (chronic kidney disease)      S/P release of urethral stricture        Home Medications:  Insulin Lispro KwikPen 100 units/mL injectable solution: 5 unit(s) injectable 3 times a day (before meals) (10 Feb 2024 10:23)  olmesartan 40 mg oral tablet: 1 tab(s) orally once a day (05 Feb 2024 23:02)    Allergies    No Known Allergies    Intolerances    carvedilol (Hypotension)    FAMILY HISTORY:  FH: myocardial infarction (Father, Grandparent)      Social History:       LABS              Vital Signs Last 24 Hrs  T(C): 37.6 (27 Mar 2024 15:52), Max: 37.6 (27 Mar 2024 15:52)  T(F): 99.6 (27 Mar 2024 15:52), Max: 99.6 (27 Mar 2024 15:52)  HR: 53 (27 Mar 2024 15:52) (53 - 53)  BP: 151/67 (27 Mar 2024 15:52) (151/67 - 151/67)  BP(mean): --  RR: 18 (27 Mar 2024 15:52) (18 - 18)  SpO2: --        PHYSICAL EXAM  General: NAD, AA0x3    R Lower Extremity Focused:  Vasc: DP 1/4 PT 2/4. Tg: Warm to Warm. Edema present up to distal half of leg  Derm: 5th digit Fibrotic tissue overlaying digit. Macerated +PTB interspace Erythema dorsally. No fluctuance, Sloughy drainage noted, No crepitus, +Malodor  Neuro: Protective sensation intact  MSK: Partial 3rd digit amputation    Gait Examination:    RADIOLOGY  XR: No obvious signs of ST gas

## 2024-03-27 NOTE — PRE-ANESTHESIA EVALUATION ADULT - NSANTHPMHFT_GEN_ALL_CORE
58M PMH HFpEF, CAD w/ stent, HTN, HLD, DM, prior metatarsal OM, prior visits for NOLAN/hyerpakalemia, p/w concern for foot infection. Had bone bx of R 5th toe 2/7/24, has been on several courses of abx since then, most recently was started on unknown abx by ?UC ~1w ago. Has slowly worsening pain to toe, worsening redness/swelling for several days. States he followed w/ podiatry Dr. Brunner today who referred to ED. No other systemic symptoms. Denies f/c, lightheaded, SOB/CP, NVD, abd pain, urinary complaints, other joint pain, other wounds.    Podiatry consulted in ED with plan to perform partial 5th ray amputation of R foot tonight.    ED course:  Vitals: T 99.6, HR 53, /67  Labs: WBC 7.98, Hgb 10.5 (baseline ~11), plt 285, ESR 67, CRP 26.7, Na 138, K hemolyzed, Cl 106, CO2 24, AG 8, BUN 31, Crt 1.31, Glucose 120  VBG pH 7.31  EKG: Sinus bradycardia 52bpm, normal axis, non-ischemic  Imaging: CXR wnl (author read), foot xr w R 5th toe and dorsal foot soft tissue swelling, findings c/w septic arthritis vs OM, no gas  Interventions: Tylenol 650 x1, Zosyn 3.375 x1, Vanc 1000mg x1 58M PMH HFpEF, CAD w/ stent, HTN, HLD, DM, prior metatarsal OM, prior visits for NOLAN/hyerpakalemia, p/w  foot infection. Had bone bx of R 5th toe 2/7/24, has been on several courses of abx since then, most recently was started on unknown abx by ?UC ~1w ago. Has slowly worsening pain to toe, worsening redness/swelling for several days. States he followed w/ podiatry Dr. Brunner today who referred to ED. No other systemic symptoms. Denies f/c, lightheaded, SOB/CP, NVD, abd pain, urinary complaints, other joint pain, other wounds.    PSH:   urethral replacement for urethral stricture      ED course:  Vitals: T 99.6, HR 53, /67  Labs: WBC 7.98, Hgb 10.5 (baseline ~11), plt 285, ESR 67, CRP 26.7, Na 138, K hemolyzed, Cl 106, CO2 24, AG 8, BUN 31, Crt 1.31, Glucose 120  VBG pH 7.31  EKG: Sinus bradycardia 52bpm, normal axis, non-ischemic    Imaging: CXR wnl; foot xr w R 5th toe and dorsal foot soft tissue swelling, findings c/w septic arthritis vs OM, no gas  Interventions: Tylenol 650 x1, Zosyn 3.375 x1, Vanc 1000mg x1

## 2024-03-27 NOTE — H&P ADULT - PROBLEM SELECTOR PLAN 2
Hx of poorly controlled IDDM with A1C 14.2 (2/6/24). Pt believes he sees an endocrinologist but isn't sure. Says he recently became more compliant with insulin regimen but has made his own changes iso seeing low glucose readings in the morning (mid-50s)  Home meds: Lantus 40, Humalog 15 TID - says he takes closer to 25 units of Lantus  - mISS  - C/w insulin at lower dose  - Start consistent carb diet when appropriate   - Endocrinology consult in am

## 2024-03-27 NOTE — PROGRESS NOTE ADULT - ASSESSMENT
58M PMH HFpEF, CAD w/ stent, HTN, HLD, DM s/p Right partial 5th rya amputation    Plan:  - Please keep dressing C/D/I.   - F/u OR clean margin culture/path  - C/w IV abx  - F/u post-op x-rays  - Pt may resume diet  - Please hold DVT prophylaxis until tomorrow if appropriate  - Non Weight-bearing to RLE  -Rest of care up to primary team    Podiatry following, Plan d/w with Attending

## 2024-03-27 NOTE — BRIEF OPERATIVE NOTE - OPERATION/FINDINGS
Pt given pre-op local block consisting of 15 ccs of 1% lidocaine plain to 0.5% marcaine plain. Pre-operative imaging taken with c-arm. Racquet incision made around right 5th metatarsal. 5th digit disarticulated at MTPJ and sent for pathology.  Pt given pre-op local block consisting of 15 ccs of 1% lidocaine plain to 0.5% marcaine plain. Pre-operative imaging taken with c-arm. Racquet incision made around right 5th metatarsal. 5th digit disarticulated at MTPJ and sent for pathology. Dissected soft tissue off 5th met head. Cut 5th metatarsal head with sable saw keeping dorsal shelf. Sent for pathology. Removed non-viable tissue with #15 blade. Copiously irrigated with 3 L normal saline using pulse lavage. Took 2 pieces of bone using rongeur and sent as clean margin for culture and pathology. Closed soft tissue and skin. Dressed with betadine compressive dressing.

## 2024-03-27 NOTE — H&P ADULT - HISTORY OF PRESENT ILLNESS
58M PMH HFpEF, CAD w/ stent, HTN, HLD, DM, prior metatarsal OM, prior visits for NOLAN/hyerpakalemia, p/w concern for foot infection. Had bone bx of R 5th toe 2/7/24, has been on several courses of abx since then, most recently was started on unknown abx by ?UC ~1w ago. Has slowly worsening pain to toe, worsening redness/swelling for several days. States he followed w/ podiatry Dr. Brunner today who referred to ED. No other systemic symptoms. Denies f/c, lightheaded, SOB/CP, NVD, abd pain, urinary complaints, other joint pain, other wounds.    Podiatry consulted in ED with plan to perform partial 5th ray amputation of R foot tonight.    ED course:  Vitals: T 99.6, HR 53, /67  Labs: WBC 7.98, Hgb 10.5 (baseline ~11), plt 285, ESR 67, CRP 26.7, Na 138, K hemolyzed, Cl 106, CO2 24, AG 8, BUN 31, Crt 1.31, Glucose 120  VBG pH 7.31  EKG: Sinus bradycardia 52bpm, normal axis, non-ischemic  Imaging: CXR wnl (author read), foot xr w/o obvious evidence of ST gas (podiatry read)  Interventions: Tylenol 650 x1, Zosyn 3.375 x1, Vanc 1000mg x1,    58M PMH HFpEF, CAD w/ stent, HTN, HLD, DM, prior metatarsal OM, prior visits for NOLAN/hyerpakalemia, p/w concern for foot infection. Had bone bx of R 5th toe 2/7/24, has been on several courses of abx since then, most recently was started on unknown abx by ?UC ~1w ago. Has slowly worsening pain to toe, worsening redness/swelling for several days. States he followed w/ podiatry Dr. Brunner today who referred to ED. No other systemic symptoms. Denies f/c, lightheaded, SOB/CP, NVD, abd pain, urinary complaints, other joint pain, other wounds.    Podiatry consulted in ED with plan to perform partial 5th ray amputation of R foot tonight.    ED course:  Vitals: T 99.6, HR 53, /67  Labs: WBC 7.98, Hgb 10.5 (baseline ~11), plt 285, ESR 67, CRP 26.7, Na 138, K hemolyzed, Cl 106, CO2 24, AG 8, BUN 31, Crt 1.31, Glucose 120  VBG pH 7.31  EKG: Sinus bradycardia 52bpm, normal axis, non-ischemic  Imaging: CXR wnl (author read), foot xr w R 5th toe and dorsal foot soft tissue swelling, findings c/w septic arthritis vs OM, no gas  Interventions: Tylenol 650 x1, Zosyn 3.375 x1, Vanc 1000mg x1 58M with PMH of HFpEF (EF 71% 12/22), CAD w/ stent (12/9), HTN, HLD, IDDM, prior metatarsal OM, prior visits for NOLAN/hyerpakalemia, p/w concern for foot infection. Had bone bx of R 5th toe 2/7/24, has been on several courses of abx since then, with hospitalization 2/5-2/7 for OM, dc'd on Augmentin and linezolid, most recently was started on unknown abx ~1w ago. Has slowly worsening pain to toe, worsening redness/swelling for several days with yellow-green drainage. States he followed w/ podiatry Dr. Brunner today who referred to ED. Admits to new dry cough over past month. Denies f/c, lightheaded, SOB/CP, NVD, abd pain, urinary complaints, other joint pain, other wounds.    Podiatry consulted in ED with plan to perform partial 5th ray amputation of R foot tonight.    ED course:  Vitals: T 99.6, HR 53, /67  Labs: WBC 7.98, Hgb 10.5 (baseline ~11), plt 285, ESR 67, CRP 26.7, Na 138, K hemolyzed, Cl 106, CO2 24, AG 8, BUN 31, Crt 1.31, Glucose 120  VBG pH 7.31  EKG: Sinus bradycardia 52bpm, normal axis, non-ischemic  Imaging:   CXR grossly normal with possible R pleural effusion  Foot xr w R 5th toe and dorsal foot soft tissue swelling, findings c/w septic arthritis vs OM, no gas  Interventions: Tylenol 650 x1, Zosyn 3.375 x1, Vanc 1000mg x1 58M with PMH of HFpEF (EF 71% 12/22), CAD w/ stent (12/9), HTN, HLD, IDDM, prior metatarsal OM, prior visits for NOLAN/hyerpakalemia, p/w concern for foot infection. Had bone bx of R 5th toe 2/7/24, has been on several courses of abx since then, with hospitalization 2/5-2/7 for OM, dc'd on Augmentin and linezolid, most recently was started on unknown abx ~1w ago. Has slowly worsening pain to toe, worsening redness/swelling for several days with yellow-green drainage. States he followed w/ podiatry Dr. Brunner today who referred to ED. Admits to new dry cough over past month. Denies f/c, lightheaded, SOB/CP, NVD, abd pain, urinary complaints, other joint pain, other wounds.    Podiatry consulted in ED with plan to perform partial 5th ray amputation of R foot tonight.    ED course:  Vitals: T 99.6, HR 53, /67  Labs: WBC 7.98, Hgb 10.5 (baseline ~11), plt 285, ESR 67, CRP 26.7, Na 138, K hemolyzed, Cl 106, CO2 24, AG 8, BUN 31, Crt 1.31 (naseline 1.3-1.4), Glucose 120  VBG pH 7.31  EKG: Sinus bradycardia 52bpm, normal axis, non-ischemic  Imaging:   CXR grossly normal with possible R pleural effusion  Foot xr w R 5th toe and dorsal foot soft tissue swelling, findings c/w septic arthritis vs OM, no gas  Interventions: Tylenol 650 x1, Zosyn 3.375 x1, Vanc 1000mg x1

## 2024-03-27 NOTE — CONSULT NOTE ADULT - ASSESSMENT
58M PMH HFpEF, CAD w/ stent, HTN, HLD, DM, prior metatarsal OM, prior visits for NOLAN/hyerpakalemia, p/w concern for foot infection. Had bone bx of R 5th toe 2/7/24, has been on several courses of abx since then, most recently was started on unknown abx by ?UC ~1w ago. XR: No obvious evidence of ST gas. Concern for deep SSTI/OM. VSS, WBC 7.98    Plan:    -Patient evaluated and Chart reviewed  -Discussed diagnosis and treatment plan with patient  -Recommend pt to be admitted to medicine  -Recommend broad spectrum IV abx  -X-rays reviewed, f/u final read   -f/u ESR and CRP  -Sx plan: Plan for partial 5th ray amp R foot tonight 3/27  -Please make sure pt is medically optimized for sx and have pre-op requirements completed (2 type and screens, CBC, CMP, Coagulation studies, CXR, EKG, NPO now and hold DVT ppx and other medications as appropriate)  -Dressing changes, local wound care, and monitoring of cardinal signs of infection  -Offloading/WB status: WBAT R foot  -Dressed with betadine and DSD  -Rest of care up to primary team    Podiatry following, Plan d/w with attending

## 2024-03-27 NOTE — PROGRESS NOTE ADULT - ASSESSMENT
58M PMH HFpEF, CAD w/ stent, HTN, HLD, DM, prior metatarsal OM, prior visits for NOLAN/hyerpakalemia, p/w concern for foot infection. Had bone bx of R 5th toe 2/7/24, has been on several courses of abx since then, most recently was started on unknown abx by ?UC ~1w ago. XR: No obvious evidence of ST gas. Concern for deep SSTI/OM. VSS, WBC 7.98    Plan:  - Plan for Right 5th partial ray amputation  - Please keep pt NPO until after procedure   -Please medically optimize pt for surgery  -Offloading/WB status: WBAT R foot  -Rest of care up to primary team    Podiatry following, Plan d/w with Attending

## 2024-03-27 NOTE — ED PROVIDER NOTE - OBJECTIVE STATEMENT
58M PMH HFpEF, CAD w/ stent, HTN, HLD, DM, prior metatarsal OM, prior visits for NOLAN/hyerpakalemia, p/w concern for foot infection. Had bone bx of R 5th toe 2/7/24, has been on several courses of abx since then, most recently was started on unknown abx by ?UC ~1w ago. Has slowly worsening pain to toe, worsening redness/swelling for several days. States he followed w/ podiatry Dr. Brunner today who referred to ED. No other systemic symptoms.  Denies f/c, lightheaded, SOB/CP, NVD, abd pain, urinary complaints, other joint pain, other wounds.

## 2024-03-27 NOTE — ED ADULT NURSE NOTE - OBJECTIVE STATEMENT
Pt is a 59y/o M presenting to the ED for admission for infection of L-5th toe, worsening pain/redness/brown&bloody drainage/swelling to site x1mo. Pt w/ PMHx HF, CAD (+stent), HLD, DM, neuropathy, pt poor historian regarding PMHx. Pt taken multiple courses PO ABX. Pt denies fever/chills, N/V/D, abd pain, CP, SOB, dizziness/lightheadedness, blurry vision, HA, recent falls @ home. Pt A/Ox3, speaking in clear/complete sentences. Respirations easy/even and unlabored on RA. Pt ambulates independently w/ steady gait. Pt is a 59y/o M presenting to the ED for admission for infection of R-5th toe, worsening pain/redness/brown&bloody drainage/swelling to site x1mo. Pt w/ PMHx HF, CAD (+stent), HLD, DM, neuropathy, pt poor historian regarding PMHx. Pt taken multiple courses PO ABX. Pt denies fever/chills, N/V/D, abd pain, CP, SOB, dizziness/lightheadedness, blurry vision, HA, recent falls @ home. Pt A/Ox3, speaking in clear/complete sentences. Respirations easy/even and unlabored on RA. Pt ambulates independently w/ steady gait. Hard cast on for L-thumb fracture following skiing accident.

## 2024-03-27 NOTE — PROGRESS NOTE ADULT - SUBJECTIVE AND OBJECTIVE BOX
PRE-OP NOTE    Pre-Op Diagnosis: Right 5th digit osteomyelitis  Planned Procedure: Right partial 5th ray amputation   Scheduled Date / Time: 3/27/24  Indication: Right 5th digit osteomyelitis     Labs:                       10.5   7.98  )-----------( 285      ( 27 Mar 2024 16:57 )             31.5   03-27    138  |  106  |  31<H>  ----------------------------<  120<H>  See Note   |  24  |  1.31<H>    Ca    9.4      27 Mar 2024 16:57      Vitals: Vital Signs Last 24 Hrs  T(C): 36.6 (27 Mar 2024 18:53), Max: 37.6 (27 Mar 2024 15:52)  T(F): 97.8 (27 Mar 2024 18:53), Max: 99.6 (27 Mar 2024 15:52)  HR: 55 (27 Mar 2024 18:53) (53 - 55)  BP: 171/75 (27 Mar 2024 18:53) (151/67 - 171/75)  BP(mean): --  RR: 18 (27 Mar 2024 18:53) (18 - 18)  SpO2: 98% (27 Mar 2024 18:53) (98% - 98%)    Parameters below as of 27 Mar 2024 18:53  Patient On (Oxygen Delivery Method): room air      PE:   Official CXR reading: (On Chart)  Official EKG reading: (On Chart)  Type and Cross/Screen: (On chart)  NPO after MN  Antibiotics: vanc/zosyn  Operative Consent (on chart)

## 2024-03-27 NOTE — H&P ADULT - NSHPLABSRESULTS_GEN_ALL_CORE
.  LABS:                         10.5   7.98  )-----------( 285      ( 27 Mar 2024 16:57 )             31.5     03-27    138  |  106  |  31<H>  ----------------------------<  120<H>  See Note   |  24  |  1.31<H>    Ca    9.4      27 Mar 2024 16:57      PT/INR - ( 27 Mar 2024 16:57 )   PT: 12.4 sec;   INR: 1.09          PTT - ( 27 Mar 2024 16:57 )  PTT:35.2 sec  Urinalysis Basic - ( 27 Mar 2024 16:57 )    Color: x / Appearance: x / SG: x / pH: x  Gluc: 120 mg/dL / Ketone: x  / Bili: x / Urobili: x   Blood: x / Protein: x / Nitrite: x   Leuk Esterase: x / RBC: x / WBC x   Sq Epi: x / Non Sq Epi: x / Bacteria: x      CARDIAC MARKERS ( 27 Mar 2024 16:57 )  x     / x     / See Note / x     / 5.1 ng/mL        Lactate, Blood: 0.7 mmol/L (03-27 @ 16:57)      RADIOLOGY, EKG & ADDITIONAL TESTS: Reviewed.

## 2024-03-27 NOTE — ED PROVIDER NOTE - CLINICAL SUMMARY MEDICAL DECISION MAKING FREE TEXT BOX
58M PMH HFpEF, CAD w/ stent, HTN, HLD, DM, prior metatarsal OM, prior visits for NOLAN/hyerpakalemia, p/w concern for foot infection. Had bone bx of R 5th toe 2/7/24, has been on several courses of abx since then, most recently was started on unknown abx by ?UC ~1w ago. Has slowly worsening pain to toe, worsening redness/swelling for several days. States he followed w/ podiatry Dr. Brunner today who referred to ED. No other systemic symptoms.  Mild bradycardia, 99.6 oral temp, other vitals wnl. Exam as above.   ddx: Cellulitis, possible gangrenous toe, possible osteo. Low suspicion nec fasc.   Labs, empiric abx, XR, ortho consulted.   Holding IVF 2/2 hx CHF.

## 2024-03-27 NOTE — H&P ADULT - ATTENDING COMMENTS
#OM: s/p Right partial 5th rya amputation tonight. 0/4 SIRS. c/w vanc/zosyn, f/up wound and bone cx. Serial exams. Podiatry recs    #T2DM: poorly controlled. A1c 14%. Previosly on lantus 40,akes lower dose at home/ c/w home lantus 20U, monitor FSG.

## 2024-03-27 NOTE — H&P ADULT - PROBLEM SELECTOR PLAN 4
Hx of HFpEF with last EF 71% 12/22. On exam has b/l 2+ pitting edema and complains of cough but denies SOB, orthopnea, ROBBINS. Says he started taking Torsemide today (was prescribed previously)  - C/w home meds

## 2024-03-28 LAB
A1C WITH ESTIMATED AVERAGE GLUCOSE RESULT: 9.7 % — HIGH (ref 4–5.6)
ANION GAP SERPL CALC-SCNC: 9 MMOL/L — SIGNIFICANT CHANGE UP (ref 5–17)
BASOPHILS # BLD AUTO: 0.04 K/UL — SIGNIFICANT CHANGE UP (ref 0–0.2)
BASOPHILS NFR BLD AUTO: 0.5 % — SIGNIFICANT CHANGE UP (ref 0–2)
BUN SERPL-MCNC: 28 MG/DL — HIGH (ref 7–23)
CALCIUM SERPL-MCNC: 8.8 MG/DL — SIGNIFICANT CHANGE UP (ref 8.4–10.5)
CHLORIDE SERPL-SCNC: 109 MMOL/L — HIGH (ref 96–108)
CO2 SERPL-SCNC: 21 MMOL/L — LOW (ref 22–31)
CREAT SERPL-MCNC: 1.52 MG/DL — HIGH (ref 0.5–1.3)
EGFR: 53 ML/MIN/1.73M2 — LOW
EOSINOPHIL # BLD AUTO: 0.28 K/UL — SIGNIFICANT CHANGE UP (ref 0–0.5)
EOSINOPHIL NFR BLD AUTO: 3.5 % — SIGNIFICANT CHANGE UP (ref 0–6)
ESTIMATED AVERAGE GLUCOSE: 232 MG/DL — HIGH (ref 68–114)
GLUCOSE BLDC GLUCOMTR-MCNC: 103 MG/DL — HIGH (ref 70–99)
GLUCOSE BLDC GLUCOMTR-MCNC: 152 MG/DL — HIGH (ref 70–99)
GLUCOSE BLDC GLUCOMTR-MCNC: 81 MG/DL — SIGNIFICANT CHANGE UP (ref 70–99)
GLUCOSE BLDC GLUCOMTR-MCNC: 90 MG/DL — SIGNIFICANT CHANGE UP (ref 70–99)
GLUCOSE SERPL-MCNC: 58 MG/DL — LOW (ref 70–99)
HCT VFR BLD CALC: 27.4 % — LOW (ref 39–50)
HGB BLD-MCNC: 9 G/DL — LOW (ref 13–17)
IMM GRANULOCYTES NFR BLD AUTO: 0.1 % — SIGNIFICANT CHANGE UP (ref 0–0.9)
LYMPHOCYTES # BLD AUTO: 0.96 K/UL — LOW (ref 1–3.3)
LYMPHOCYTES # BLD AUTO: 11.8 % — LOW (ref 13–44)
MAGNESIUM SERPL-MCNC: 2.2 MG/DL — SIGNIFICANT CHANGE UP (ref 1.6–2.6)
MCHC RBC-ENTMCNC: 29.7 PG — SIGNIFICANT CHANGE UP (ref 27–34)
MCHC RBC-ENTMCNC: 32.8 GM/DL — SIGNIFICANT CHANGE UP (ref 32–36)
MCV RBC AUTO: 90.4 FL — SIGNIFICANT CHANGE UP (ref 80–100)
MONOCYTES # BLD AUTO: 0.86 K/UL — SIGNIFICANT CHANGE UP (ref 0–0.9)
MONOCYTES NFR BLD AUTO: 10.6 % — SIGNIFICANT CHANGE UP (ref 2–14)
NEUTROPHILS # BLD AUTO: 5.96 K/UL — SIGNIFICANT CHANGE UP (ref 1.8–7.4)
NEUTROPHILS NFR BLD AUTO: 73.5 % — SIGNIFICANT CHANGE UP (ref 43–77)
NRBC # BLD: 0 /100 WBCS — SIGNIFICANT CHANGE UP (ref 0–0)
PHOSPHATE SERPL-MCNC: 4.7 MG/DL — HIGH (ref 2.5–4.5)
PLATELET # BLD AUTO: 242 K/UL — SIGNIFICANT CHANGE UP (ref 150–400)
POTASSIUM SERPL-MCNC: 3.7 MMOL/L — SIGNIFICANT CHANGE UP (ref 3.5–5.3)
POTASSIUM SERPL-SCNC: 3.7 MMOL/L — SIGNIFICANT CHANGE UP (ref 3.5–5.3)
RBC # BLD: 3.03 M/UL — LOW (ref 4.2–5.8)
RBC # FLD: 13.8 % — SIGNIFICANT CHANGE UP (ref 10.3–14.5)
SODIUM SERPL-SCNC: 139 MMOL/L — SIGNIFICANT CHANGE UP (ref 135–145)
WBC # BLD: 8.11 K/UL — SIGNIFICANT CHANGE UP (ref 3.8–10.5)
WBC # FLD AUTO: 8.11 K/UL — SIGNIFICANT CHANGE UP (ref 3.8–10.5)

## 2024-03-28 PROCEDURE — 99233 SBSQ HOSP IP/OBS HIGH 50: CPT

## 2024-03-28 PROCEDURE — 99222 1ST HOSP IP/OBS MODERATE 55: CPT | Mod: GC

## 2024-03-28 RX ORDER — HEPARIN SODIUM 5000 [USP'U]/ML
5000 INJECTION INTRAVENOUS; SUBCUTANEOUS EVERY 8 HOURS
Refills: 0 | Status: DISCONTINUED | OUTPATIENT
Start: 2024-03-28 | End: 2024-04-02

## 2024-03-28 RX ORDER — OXYCODONE HYDROCHLORIDE 5 MG/1
5 TABLET ORAL EVERY 4 HOURS
Refills: 0 | Status: DISCONTINUED | OUTPATIENT
Start: 2024-03-28 | End: 2024-04-02

## 2024-03-28 RX ORDER — OXYCODONE HYDROCHLORIDE 5 MG/1
2.5 TABLET ORAL EVERY 4 HOURS
Refills: 0 | Status: DISCONTINUED | OUTPATIENT
Start: 2024-03-28 | End: 2024-04-02

## 2024-03-28 RX ORDER — ACETAMINOPHEN 500 MG
650 TABLET ORAL EVERY 6 HOURS
Refills: 0 | Status: DISCONTINUED | OUTPATIENT
Start: 2024-03-28 | End: 2024-04-02

## 2024-03-28 RX ORDER — HYDROMORPHONE HYDROCHLORIDE 2 MG/ML
0.5 INJECTION INTRAMUSCULAR; INTRAVENOUS; SUBCUTANEOUS ONCE
Refills: 0 | Status: DISCONTINUED | OUTPATIENT
Start: 2024-03-28 | End: 2024-03-28

## 2024-03-28 RX ORDER — ASPIRIN/CALCIUM CARB/MAGNESIUM 324 MG
81 TABLET ORAL DAILY
Refills: 0 | Status: DISCONTINUED | OUTPATIENT
Start: 2024-03-28 | End: 2024-04-02

## 2024-03-28 RX ORDER — POLYETHYLENE GLYCOL 3350 17 G/17G
17 POWDER, FOR SOLUTION ORAL AT BEDTIME
Refills: 0 | Status: DISCONTINUED | OUTPATIENT
Start: 2024-03-28 | End: 2024-04-02

## 2024-03-28 RX ORDER — INSULIN GLARGINE 100 [IU]/ML
16 INJECTION, SOLUTION SUBCUTANEOUS AT BEDTIME
Refills: 0 | Status: DISCONTINUED | OUTPATIENT
Start: 2024-03-28 | End: 2024-03-29

## 2024-03-28 RX ORDER — SODIUM CHLORIDE 9 MG/ML
1000 INJECTION, SOLUTION INTRAVENOUS
Refills: 0 | Status: DISCONTINUED | OUTPATIENT
Start: 2024-03-28 | End: 2024-03-30

## 2024-03-28 RX ORDER — INSULIN LISPRO 100/ML
12 VIAL (ML) SUBCUTANEOUS
Refills: 0 | Status: DISCONTINUED | OUTPATIENT
Start: 2024-03-28 | End: 2024-03-29

## 2024-03-28 RX ORDER — SENNA PLUS 8.6 MG/1
2 TABLET ORAL AT BEDTIME
Refills: 0 | Status: DISCONTINUED | OUTPATIENT
Start: 2024-03-28 | End: 2024-04-02

## 2024-03-28 RX ORDER — INSULIN LISPRO 100/ML
10 VIAL (ML) SUBCUTANEOUS
Refills: 0 | Status: DISCONTINUED | OUTPATIENT
Start: 2024-03-28 | End: 2024-03-28

## 2024-03-28 RX ADMIN — HYDROMORPHONE HYDROCHLORIDE 0.5 MILLIGRAM(S): 2 INJECTION INTRAMUSCULAR; INTRAVENOUS; SUBCUTANEOUS at 09:16

## 2024-03-28 RX ADMIN — AMLODIPINE BESYLATE 10 MILLIGRAM(S): 2.5 TABLET ORAL at 06:12

## 2024-03-28 RX ADMIN — PIPERACILLIN AND TAZOBACTAM 25 GRAM(S): 4; .5 INJECTION, POWDER, LYOPHILIZED, FOR SOLUTION INTRAVENOUS at 14:16

## 2024-03-28 RX ADMIN — Medication 12 UNIT(S): at 18:33

## 2024-03-28 RX ADMIN — Medication 300 MILLIGRAM(S): at 17:25

## 2024-03-28 RX ADMIN — ATORVASTATIN CALCIUM 40 MILLIGRAM(S): 80 TABLET, FILM COATED ORAL at 22:47

## 2024-03-28 RX ADMIN — PIPERACILLIN AND TAZOBACTAM 25 GRAM(S): 4; .5 INJECTION, POWDER, LYOPHILIZED, FOR SOLUTION INTRAVENOUS at 06:07

## 2024-03-28 RX ADMIN — Medication 650 MILLIGRAM(S): at 12:37

## 2024-03-28 RX ADMIN — HEPARIN SODIUM 5000 UNIT(S): 5000 INJECTION INTRAVENOUS; SUBCUTANEOUS at 14:16

## 2024-03-28 RX ADMIN — HEPARIN SODIUM 5000 UNIT(S): 5000 INJECTION INTRAVENOUS; SUBCUTANEOUS at 22:47

## 2024-03-28 RX ADMIN — Medication 650 MILLIGRAM(S): at 17:55

## 2024-03-28 RX ADMIN — Medication 81 MILLIGRAM(S): at 12:37

## 2024-03-28 RX ADMIN — Medication 300 MILLIGRAM(S): at 04:50

## 2024-03-28 RX ADMIN — Medication 2: at 18:33

## 2024-03-28 RX ADMIN — SODIUM CHLORIDE 50 MILLILITER(S): 9 INJECTION, SOLUTION INTRAVENOUS at 01:21

## 2024-03-28 RX ADMIN — PIPERACILLIN AND TAZOBACTAM 25 GRAM(S): 4; .5 INJECTION, POWDER, LYOPHILIZED, FOR SOLUTION INTRAVENOUS at 22:58

## 2024-03-28 RX ADMIN — CITALOPRAM 40 MILLIGRAM(S): 10 TABLET, FILM COATED ORAL at 12:36

## 2024-03-28 NOTE — PROGRESS NOTE ADULT - PROBLEM SELECTOR PLAN 3
Pt had stent placed 12/9/2022 iso SOB and CP. Symptoms have resolved since. Unclear why he is still on plavix.     Home meds: ASA/Plavix, Atorvastatin 40  - C/w statin  - restart asa/plavix after procedure   - collateral in am Pt had stent placed 12/9/2022 iso SOB and CP. Symptoms have resolved since. Unclear why he is still on plavix.   Home meds: ASA/Plavix, Atorvastatin 40  - C/w statin  - restart asa/plavix after procedure   - collateral in am

## 2024-03-28 NOTE — PROGRESS NOTE ADULT - SUBJECTIVE AND OBJECTIVE BOX
OVERNIGHT EVENTS:    SUBJECTIVE / INTERVAL HPI: Patient seen and examined at bedside.     VITAL SIGNS:  Vital Signs Last 24 Hrs  T(C): 36.4 (28 Mar 2024 06:15), Max: 37.6 (27 Mar 2024 15:52)  T(F): 97.6 (28 Mar 2024 06:15), Max: 99.6 (27 Mar 2024 15:52)  HR: 50 (28 Mar 2024 06:15) (40 - 55)  BP: 143/67 (28 Mar 2024 06:15) (94/52 - 171/75)  BP(mean): 97 (28 Mar 2024 06:15) (67 - 97)  RR: 14 (28 Mar 2024 06:15) (9 - 18)  SpO2: 100% (28 Mar 2024 06:15) (96% - 100%)    Parameters below as of 28 Mar 2024 06:15  Patient On (Oxygen Delivery Method): nasal cannula  O2 Flow (L/min): 3      PHYSICAL EXAM:    General: NAD  HEENT: NC/AT; PERRL, anicteric sclera; MMM  Neck: supple w/o palpable nodularity  Cardiovascular: +S1/S2; RRR  Respiratory: CTA B/L; no W/R/R  Gastrointestinal: soft, NT/ND; +BSx4  Extremities: WWP; no edema, clubbing or cyanosis  Vascular: 2+ radial, DP/PT pulses B/L  Neurological: AAOx3; no focal deficits    MEDICATIONS:  MEDICATIONS  (STANDING):  amLODIPine   Tablet 10 milliGRAM(s) Oral daily  atorvastatin 40 milliGRAM(s) Oral at bedtime  citalopram 40 milliGRAM(s) Oral daily  dextrose 5%. 1000 milliLiter(s) (100 mL/Hr) IV Continuous <Continuous>  dextrose 5%. 1000 milliLiter(s) (50 mL/Hr) IV Continuous <Continuous>  dextrose 5%. 1000 milliLiter(s) (50 mL/Hr) IV Continuous <Continuous>  dextrose 50% Injectable 25 Gram(s) IV Push once  dextrose 50% Injectable 12.5 Gram(s) IV Push once  dextrose 50% Injectable 25 Gram(s) IV Push once  glucagon  Injectable 1 milliGRAM(s) IntraMuscular once  insulin glargine Injectable (LANTUS) 20 Unit(s) SubCutaneous at bedtime  insulin lispro (ADMELOG) corrective regimen sliding scale   SubCutaneous Before meals and at bedtime  piperacillin/tazobactam IVPB.. 3.375 Gram(s) IV Intermittent every 8 hours  vancomycin  IVPB 1500 milliGRAM(s) IV Intermittent every 12 hours    MEDICATIONS  (PRN):  acetaminophen     Tablet .. 650 milliGRAM(s) Oral every 6 hours PRN Temp greater or equal to 38C (100.4F), Mild Pain (1 - 3)  aluminum hydroxide/magnesium hydroxide/simethicone Suspension 30 milliLiter(s) Oral every 4 hours PRN Dyspepsia  dextrose Oral Gel 15 Gram(s) Oral once PRN Blood Glucose LESS THAN 70 milliGRAM(s)/deciliter  HYDROmorphone  Injectable 0.5 milliGRAM(s) IV Push once PRN Severe Pain (7 - 10)  melatonin 3 milliGRAM(s) Oral at bedtime PRN Insomnia  ondansetron Injectable 4 milliGRAM(s) IV Push every 8 hours PRN Nausea and/or Vomiting      ALLERGIES:  Allergies    No Known Allergies    Intolerances    carvedilol (Hypotension)      LABS:                        9.0    8.11  )-----------( 242      ( 28 Mar 2024 05:28 )             27.4     03-28    139  |  109<H>  |  28<H>  ----------------------------<  58<L>  3.7   |  21<L>  |  1.52<H>    Ca    8.8      28 Mar 2024 05:28  Phos  4.7     03-28  Mg     2.2     03-28      PT/INR - ( 27 Mar 2024 16:57 )   PT: 12.4 sec;   INR: 1.09          PTT - ( 27 Mar 2024 16:57 )  PTT:35.2 sec  Urinalysis Basic - ( 28 Mar 2024 05:28 )    Color: x / Appearance: x / SG: x / pH: x  Gluc: 58 mg/dL / Ketone: x  / Bili: x / Urobili: x   Blood: x / Protein: x / Nitrite: x   Leuk Esterase: x / RBC: x / WBC x   Sq Epi: x / Non Sq Epi: x / Bacteria: x      CAPILLARY BLOOD GLUCOSE      POCT Blood Glucose.: 90 mg/dL (28 Mar 2024 06:17)      RADIOLOGY & ADDITIONAL TESTS: Reviewed.   OVERNIGHT EVENTS: S/p amputation    SUBJECTIVE / INTERVAL HPI: Patient seen and examined at bedside. Patient found laying in bed, awake and alert and in NAD. Says his pain is mild and generally feels well. ROS negative.     VITAL SIGNS:  Vital Signs Last 24 Hrs  T(C): 36.4 (28 Mar 2024 06:15), Max: 37.6 (27 Mar 2024 15:52)  T(F): 97.6 (28 Mar 2024 06:15), Max: 99.6 (27 Mar 2024 15:52)  HR: 50 (28 Mar 2024 06:15) (40 - 55)  BP: 143/67 (28 Mar 2024 06:15) (94/52 - 171/75)  BP(mean): 97 (28 Mar 2024 06:15) (67 - 97)  RR: 14 (28 Mar 2024 06:15) (9 - 18)  SpO2: 100% (28 Mar 2024 06:15) (96% - 100%)    Parameters below as of 28 Mar 2024 06:15  Patient On (Oxygen Delivery Method): nasal cannula  O2 Flow (L/min): 3      PHYSICAL EXAM:    General: NAD, laying in bed comfortably  HEENT: NC/AT; PERRL, anicteric sclera  Cardiovascular: +S1/S2; RRR  Respiratory: CTA B/L; no W/R/R  Gastrointestinal: soft, NT/ND;   Extremities: WWP; no edema, clubbing or cyanosis. R foot wrapped.  Vascular: 2+ radial, DP/PT pulses B/L  Neurological: AAOx3; no focal deficits    MEDICATIONS:  MEDICATIONS  (STANDING):  amLODIPine   Tablet 10 milliGRAM(s) Oral daily  atorvastatin 40 milliGRAM(s) Oral at bedtime  citalopram 40 milliGRAM(s) Oral daily  dextrose 5%. 1000 milliLiter(s) (100 mL/Hr) IV Continuous <Continuous>  dextrose 5%. 1000 milliLiter(s) (50 mL/Hr) IV Continuous <Continuous>  dextrose 5%. 1000 milliLiter(s) (50 mL/Hr) IV Continuous <Continuous>  dextrose 50% Injectable 25 Gram(s) IV Push once  dextrose 50% Injectable 12.5 Gram(s) IV Push once  dextrose 50% Injectable 25 Gram(s) IV Push once  glucagon  Injectable 1 milliGRAM(s) IntraMuscular once  insulin glargine Injectable (LANTUS) 20 Unit(s) SubCutaneous at bedtime  insulin lispro (ADMELOG) corrective regimen sliding scale   SubCutaneous Before meals and at bedtime  piperacillin/tazobactam IVPB.. 3.375 Gram(s) IV Intermittent every 8 hours  vancomycin  IVPB 1500 milliGRAM(s) IV Intermittent every 12 hours    MEDICATIONS  (PRN):  acetaminophen     Tablet .. 650 milliGRAM(s) Oral every 6 hours PRN Temp greater or equal to 38C (100.4F), Mild Pain (1 - 3)  aluminum hydroxide/magnesium hydroxide/simethicone Suspension 30 milliLiter(s) Oral every 4 hours PRN Dyspepsia  dextrose Oral Gel 15 Gram(s) Oral once PRN Blood Glucose LESS THAN 70 milliGRAM(s)/deciliter  HYDROmorphone  Injectable 0.5 milliGRAM(s) IV Push once PRN Severe Pain (7 - 10)  melatonin 3 milliGRAM(s) Oral at bedtime PRN Insomnia  ondansetron Injectable 4 milliGRAM(s) IV Push every 8 hours PRN Nausea and/or Vomiting      ALLERGIES:  Allergies    No Known Allergies    Intolerances    carvedilol (Hypotension)      LABS:                        9.0    8.11  )-----------( 242      ( 28 Mar 2024 05:28 )             27.4     03-28    139  |  109<H>  |  28<H>  ----------------------------<  58<L>  3.7   |  21<L>  |  1.52<H>    Ca    8.8      28 Mar 2024 05:28  Phos  4.7     03-28  Mg     2.2     03-28      PT/INR - ( 27 Mar 2024 16:57 )   PT: 12.4 sec;   INR: 1.09          PTT - ( 27 Mar 2024 16:57 )  PTT:35.2 sec  Urinalysis Basic - ( 28 Mar 2024 05:28 )    Color: x / Appearance: x / SG: x / pH: x  Gluc: 58 mg/dL / Ketone: x  / Bili: x / Urobili: x   Blood: x / Protein: x / Nitrite: x   Leuk Esterase: x / RBC: x / WBC x   Sq Epi: x / Non Sq Epi: x / Bacteria: x      CAPILLARY BLOOD GLUCOSE      POCT Blood Glucose.: 90 mg/dL (28 Mar 2024 06:17)      RADIOLOGY & ADDITIONAL TESTS: Reviewed.

## 2024-03-28 NOTE — PROGRESS NOTE ADULT - SUBJECTIVE AND OBJECTIVE BOX
Patient is a 58y old  Male who presents with a chief complaint of SSTI/OM (28 Mar 2024 06:58)      INTERVAL HPI/ OVERNIGHT EVENTS Pt is POD1 from R partial 5th ray amputation. Pt seen and examined bedside. Complaining of some soreness to his left foot.       LABS                        9.0    8.11  )-----------( 242      ( 28 Mar 2024 05:28 )             27.4     03-28    139  |  109<H>  |  28<H>  ----------------------------<  58<L>  3.7   |  21<L>  |  1.52<H>    Ca    8.8      28 Mar 2024 05:28  Phos  4.7     03-28  Mg     2.2     03-28      PT/INR - ( 27 Mar 2024 16:57 )   PT: 12.4 sec;   INR: 1.09          PTT - ( 27 Mar 2024 16:57 )  PTT:35.2 sec  ESR: 67  CRP: --  03-27 @ 16:57    ICU Vital Signs Last 24 Hrs  T(C): 36.4 (28 Mar 2024 06:15), Max: 37.6 (27 Mar 2024 15:52)  T(F): 97.6 (28 Mar 2024 06:15), Max: 99.6 (27 Mar 2024 15:52)  HR: 51 (28 Mar 2024 07:15) (40 - 55)  BP: 152/70 (28 Mar 2024 07:15) (94/52 - 171/75)  BP(mean): 100 (28 Mar 2024 07:15) (67 - 100)  ABP: --  ABP(mean): --  RR: 15 (28 Mar 2024 07:15) (9 - 18)  SpO2: 100% (28 Mar 2024 07:15) (96% - 100%)    O2 Parameters below as of 28 Mar 2024 07:15  Patient On (Oxygen Delivery Method): nasal cannula  O2 Flow (L/min): 3      RADIOLOGY  < from: Xray Foot AP + Lateral, Right (03.27.24 @ 17:26) >  IMPRESSION:  Right 5th toe and dorsal foot soft tissue swelling with osteopenic and   eroded appearing underlying 5th distal phalanx cortices, arthritic   slightly subluxed 5th PIP joint, and patchy osteopenic 5th proximal   phalanx head with focal indistinct medial cortical margins compatible   with stigmata of septic arthritis/osteoarthritis. No tracking gas   collections beyond this region and no additional suspected areas of   osteomyelitis.    Redemonstrated 2nd toe PIP arthrodesis screw with surrounding well   marginated lucent halo.    Redemonstrated partial 3rd toe amputation defect through proximal phalanx   neck region with smooth corticated stump margin.    Intact aligned remaining imaged osseous articular structures with   preserved joint spaces and no joint margin erosions.    Small posterior calcaneal enthesophyte.>  < end of copied text >    MICROBIOLOGY  Culture - Tissue with Gram Stain (03.27.24 @ 21:53)    Gram Stain:   No organisms seen  No WBC's seen.   Specimen Source: .Tissue right foot 5th metatarsal clean margin or spec      PHYSICAL EXAM  Lower Extremity Focused  RLE: Dressing C/D/I

## 2024-03-28 NOTE — PROGRESS NOTE ADULT - PROBLEM SELECTOR PLAN 6
Home meds: Amlodipine 10mg, olmesartan 40mg   - C/w amlodipine  - Holding losartan iso moderate BPs, resume when appropriate

## 2024-03-28 NOTE — PROGRESS NOTE ADULT - ASSESSMENT
58M PMH HFpEF, CAD w/ stent, HTN, HLD, DM s/p Right partial 5th ray amputation. Low suspicion for residual OM.    Plan:  - Please keep dressing C/D/I until tomorrow   - F/u OR clean margin culture/path, low suspicion for residual OM  - C/w IV abx. Likely okay to discharge tomorrow on antibiotics pending clinical improvement  - Non Weight-bearing to RLE  -Rest of care up to primary team    Podiatry following, Plan d/w with Attending

## 2024-03-28 NOTE — PROGRESS NOTE ADULT - ATTENDING COMMENTS
58M with PMH of DM2, HFpEF, HTN, HLD, DM, admitted for OM and s/p amputation of partial 5th ray amp of R foot.      #Osteomyelitis  - appreciate input from podiatry  - pending results from post-op margins  - continue on vancomycin and zosyn at this time  - check vanc trough  - oxycodone PRN for pain, bowel regimen    #DM2  - patient was able to reduce A1C from 14.2 to 9.7  - reports that on regimen hes on at home, gets hypoglycemic at times - endocrine consult  - has outpatient follow up with new endocrinologist at [place]    #HTN  #HLD  - on amlodipine and olmesartan  - normotensive at this time, ARB being held.      High level of medical decision making required for this case, including vancomycin therapy requiring monitoring for toxicity, review of labs, trending of labs, review of outpatient chart.
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
## 2024-03-28 NOTE — PROGRESS NOTE ADULT - PROBLEM SELECTOR PLAN 2
Hx of poorly controlled IDDM with A1C 14.2 (2/6/24). Pt believes he sees an endocrinologist but isn't sure. Says he recently became more compliant with insulin regimen but has made his own changes iso seeing low glucose readings in the morning (mid-50s)  Home meds: Lantus 40, Humalog 15 TID - says he takes closer to 25 units of Lantus  - mISS  - C/w insulin at lower dose  - Start consistent carb diet when appropriate   - Endocrinology consult in am Hx of poorly controlled IDDM with A1C 14.2 (2/6/24). Pt believes he sees an endocrinologist but isn't sure. Says he recently became more compliant with insulin regimen but has made his own changes iso seeing low glucose readings in the morning (mid-50s)  Home meds: Lantus 40, Humalog 15 TID - says he takes closer to 25 units of Lantus  - mISS  - C/w lantus 20, lispro 10  - started CC diet  - Endo consulted, appreciate recs

## 2024-03-28 NOTE — PROGRESS NOTE ADULT - PROBLEM SELECTOR PLAN 9
F: Encourage PO when diet started  E: Replete as necessary K>4 Mg>2  N: NPO for procedure, start CC diet    DVT Prophylaxis: Start Hep SubQ when cleared  GI prophylaxis: None   CODE STATUS: FULL F: Encourage PO when diet started  E: Replete as necessary K>4 Mg>2  N: CC diet    DVT Prophylaxis: Start Hep SubQ when cleared  GI prophylaxis: None   CODE STATUS: FULL

## 2024-03-28 NOTE — CONSULT NOTE ADULT - SUBJECTIVE AND OBJECTIVE BOX
HISTORY OF PRESENT ILLNESS    VALENTIN CABALLERO is a 58y Male with a past medical history of Hypertension , Hyperlipidemia, Diabetes mellitus type 2, HFpEF , CAD s/p stents, CKD stage 3 presented to the hospital for right foot infection with osteomyelitis non responsive to outpatient antibiotic therapy. Patient states that he was recently admitted from 2/5-2/8 for osteomyelitis and underwent bone biopsy on 2/7. He was discharged on oral Augmentin and linezolid but his infection kept getting worse and he was referred to the ED by Dr Brunner. Patient underwent Right partial 5th ray amputation on 3/27 and post operative course was uneventful. He also reports that he has diabetic neuropathy and retinopathy and has a CGM which was placed a year ago. No other systemic symptoms. Denies f/c, lightheaded, SOB/CP, NVD, abd pain, urinary complaints,  joint pain, or other wounds.  Endocrinology has been consulted for post operative Diabetes Management.    DIABETES HISTORY  - Age at diagnosis: 33, 25 years ago  - Diabetes managed outpatient by: Endocrinologist at [place] , cannot recall his name  - Current Therapy: Lantus 40 units at bedtime, Humalog 15 units 3 times a day  - History of other regimens: previously on metformin , glimepiride , switched to insulin due to poor control.   - History of hypoglycemia: yes , morning blood glucose levels have been 50s with generalized weakness  - History of DKA/HHS: No  - Diabetic Complications: Diabetic neuropathy  , retinopathy and nephropathy.  - Home glucose readings: 50-60 in AM , 300s in afternoon , 150s in the evenings as per CGM readings  - Diet:          > Breakfast: eggs , muffins        > Lunch: sandwich        > Dinner: chicken , fish , rice, salad        > Snacks: cheese crackers    FAMILY HISTORY  - Diabetes: Father and grandparents    SOCIAL HISTORY  - Alcohol: No   - Smoking: No    ALLERGIES  No Known Allergies  carvedilol (Hypotension)      CURRENT MEDICATIONS  acetaminophen     Tablet .. 650 milliGRAM(s) Oral every 6 hours PRN  aluminum hydroxide/magnesium hydroxide/simethicone Suspension 30 milliLiter(s) Oral every 4 hours PRN  amLODIPine   Tablet 10 milliGRAM(s) Oral daily  aspirin  chewable 81 milliGRAM(s) Oral daily  atorvastatin 40 milliGRAM(s) Oral at bedtime  citalopram 40 milliGRAM(s) Oral daily  dextrose 5%. 1000 milliLiter(s) IV Continuous <Continuous>  dextrose 5%. 1000 milliLiter(s) IV Continuous <Continuous>  dextrose 5%. 1000 milliLiter(s) IV Continuous <Continuous>  dextrose 50% Injectable 25 Gram(s) IV Push once  dextrose 50% Injectable 12.5 Gram(s) IV Push once  dextrose 50% Injectable 25 Gram(s) IV Push once  dextrose Oral Gel 15 Gram(s) Oral once PRN  glucagon  Injectable 1 milliGRAM(s) IntraMuscular once  insulin glargine Injectable (LANTUS) 20 Unit(s) SubCutaneous at bedtime  insulin lispro (ADMELOG) corrective regimen sliding scale   SubCutaneous Before meals and at bedtime  insulin lispro Injectable (ADMELOG) 10 Unit(s) SubCutaneous three times a day before meals  melatonin 3 milliGRAM(s) Oral at bedtime PRN  ondansetron Injectable 4 milliGRAM(s) IV Push every 8 hours PRN  piperacillin/tazobactam IVPB.. 3.375 Gram(s) IV Intermittent every 8 hours  vancomycin  IVPB 1500 milliGRAM(s) IV Intermittent every 12 hours      REVIEW OF SYSTEMS  Constitutional:  Negative fever, chills , Positive for foot pain.  Cardiovascular:  Negative for chest pain or palpitations.  Respiratory:  Negative for cough, wheezing, or shortness of breath.   Gastrointestinal:  Negative for nausea, vomiting, diarrhea.  Genitourinary: Negative frequency, urgency or dysuria.  Neurologic:  No headache, confusion, dizziness    PHYSICAL EXAM  Vital Signs Last 24 Hrs  T(C): 36.8 (28 Mar 2024 09:00), Max: 37.6 (27 Mar 2024 15:52)  T(F): 98.2 (28 Mar 2024 09:00), Max: 99.6 (27 Mar 2024 15:52)  HR: 52 (28 Mar 2024 09:00) (40 - 63)  BP: 139/63 (28 Mar 2024 09:00) (94/52 - 171/75)  BP(mean): 90 (28 Mar 2024 09:00) (67 - 108)  RR: 17 (28 Mar 2024 09:00) (9 - 18)  SpO2: 95% (28 Mar 2024 09:00) (95% - 100%)    Parameters below as of 28 Mar 2024 09:00  Patient On (Oxygen Delivery Method): room air      Constitutional: Awake, alert  HEENT: Normocephalic, atraumatic, HALLEY  Neck: supple, no acanthosis, no thyromegaly or palpable thyroid nodules.  Respiratory: Lungs clear to ausculation bilaterally.   Cardiovascular: regular rhythm, normal S1 and S2, no audible murmurs.   GI: soft, non-tender, non-distended, bowel sounds present  Extremities: right leg in dressing , mild loss of sensation in b/l lower legs    LABS  CBC - WBC/HGB/HTC/PLT: 8.11/9.0/27.4/242 (03-28-24)  BMP: Na/K/Cl/Bicarb/BUN/Cr/Gluc: 139/3.7/109/21/28/1.52/58 (03-28-24)  Anion Gap: 9 (03-28-24)  eGFR: 53 (03-28-24)  Calcium: 8.8 (03-28-24)  Phosphorus: 4.7 (03-28-24)  Magnesium: 2.2 (03-28-24)    PT/aPTT/INR: 12.4/35.2/1.09 (03-27-24)    CBC - WBC/HGB/HTC/PLT: 8.11/9.0/27.4/242 (03-28-24)BMP: Na/K/Cl/Bicarb/BUN/Cr/Gluc: 139/3.7/109/21/28/1.52/58 (03-28-24)  Anion Gap: 9 (03-28-24)  eGFR: 53 (03-28-24)  Calcium: 8.8 (03-28-24)  Phosphorus: 4.7 (03-28-24)  Magnesium: 2.2 (03-28-24)    CAPILLARY BLOOD GLUCOSE & INSULIN RECEIVED  109 mg/dL (03-27 @ 18:48)  64 mg/dL (03-27 @ 22:36)   63 mg/dL (03-27 @ 23:11)  106 mg/dL (03-27 @ 23:40) - Lantus 20  90 mg/dL (03-28 @ 06:17)        03-27-24 @ 07:01  -  03-28-24 @ 07:00  --------------------------------------------------------  IN: 150 mL / OUT: 1350 mL / NET: -1200 mL        ASSESSMENT / RECOMMENDATIONS    A1C: 9.7 %  BUN: 28  Creatinine: 1.52  GFR: 53  Weight: 95.7  BMI: 28.6  EF: 77 %    # Type 2 diabetes mellitus with hyperglycemia  - Please keep lantus   units at bedtime.   - Keep lispro   units before each meal.  - Continue lispro moderate dose sliding scale before meals and at bedtime.  - Patient's fingerstick glucose goal is 100-180 mg/dL.    - Discharge recommendations to be discussed.   - Patient can follow up at discharge with [facility] by calling [phone] to make an appointment.      Case discussed with Dr. Bustillo. Primary team updated.      HISTORY OF PRESENT ILLNESS    VALENTIN CABALLERO is a 58y Male with a past medical history of Hypertension , Hyperlipidemia, Diabetes mellitus type 2, HFpEF , CAD s/p stents, CKD stage 3 presented to the hospital for right foot infection with osteomyelitis non responsive to outpatient antibiotic therapy. Patient states that he was recently admitted from 2/5-2/8 for osteomyelitis and underwent bone biopsy on 2/7. He was discharged on oral Augmentin and linezolid but his infection kept getting worse and he was referred to the ED by Dr Brunner. Patient underwent Right partial 5th ray amputation on 3/27 and post operative course was uneventful. He also reports that he has diabetic neuropathy and retinopathy and has a CGM which was placed a year ago. No other systemic symptoms. Denies f/c, lightheaded, SOB/CP, NVD, abd pain, urinary complaints,  joint pain, or other wounds.  Endocrinology has been consulted for post operative Diabetes Management.    DIABETES HISTORY  - Age at diagnosis: 33, 25 years ago  - Diabetes managed outpatient by: Endocrinologist at [place] , cannot recall his name  - Current Therapy: Lantus 40 units at bedtime, Humalog 15 units 3 times a day  - History of other regimens: previously on metformin , glimepiride , switched to insulin due to poor control.   - History of hypoglycemia: yes , morning blood glucose levels have been 50s with generalized weakness  - History of DKA/HHS: No  - Diabetic Complications: Diabetic neuropathy  , retinopathy and nephropathy.  - Home glucose readings: 50-60 in AM , 300s in afternoon , 150s in the evenings as per CGM readings  - Diet:          > Breakfast: eggs , muffins        > Lunch: sandwich        > Dinner: chicken , fish , rice, salad        > Snacks: cheese crackers    FAMILY HISTORY  - Diabetes: Father and grandparents    SOCIAL HISTORY  - Alcohol: No   - Smoking: No    ALLERGIES  No Known Allergies  carvedilol (Hypotension)      CURRENT MEDICATIONS  acetaminophen     Tablet .. 650 milliGRAM(s) Oral every 6 hours PRN  aluminum hydroxide/magnesium hydroxide/simethicone Suspension 30 milliLiter(s) Oral every 4 hours PRN  amLODIPine   Tablet 10 milliGRAM(s) Oral daily  aspirin  chewable 81 milliGRAM(s) Oral daily  atorvastatin 40 milliGRAM(s) Oral at bedtime  citalopram 40 milliGRAM(s) Oral daily  dextrose 5%. 1000 milliLiter(s) IV Continuous <Continuous>  dextrose 5%. 1000 milliLiter(s) IV Continuous <Continuous>  dextrose 5%. 1000 milliLiter(s) IV Continuous <Continuous>  dextrose 50% Injectable 25 Gram(s) IV Push once  dextrose 50% Injectable 12.5 Gram(s) IV Push once  dextrose 50% Injectable 25 Gram(s) IV Push once  dextrose Oral Gel 15 Gram(s) Oral once PRN  glucagon  Injectable 1 milliGRAM(s) IntraMuscular once  insulin glargine Injectable (LANTUS) 20 Unit(s) SubCutaneous at bedtime  insulin lispro (ADMELOG) corrective regimen sliding scale   SubCutaneous Before meals and at bedtime  insulin lispro Injectable (ADMELOG) 10 Unit(s) SubCutaneous three times a day before meals  melatonin 3 milliGRAM(s) Oral at bedtime PRN  ondansetron Injectable 4 milliGRAM(s) IV Push every 8 hours PRN  piperacillin/tazobactam IVPB.. 3.375 Gram(s) IV Intermittent every 8 hours  vancomycin  IVPB 1500 milliGRAM(s) IV Intermittent every 12 hours      REVIEW OF SYSTEMS  Constitutional:  Negative fever, chills , Positive for foot pain.  Cardiovascular:  Negative for chest pain or palpitations.  Respiratory:  Negative for cough, wheezing, or shortness of breath.   Gastrointestinal:  Negative for nausea, vomiting, diarrhea.  Genitourinary: Negative frequency, urgency or dysuria.  Neurologic:  No headache, confusion, dizziness    PHYSICAL EXAM  Vital Signs Last 24 Hrs  T(C): 36.8 (28 Mar 2024 09:00), Max: 37.6 (27 Mar 2024 15:52)  T(F): 98.2 (28 Mar 2024 09:00), Max: 99.6 (27 Mar 2024 15:52)  HR: 52 (28 Mar 2024 09:00) (40 - 63)  BP: 139/63 (28 Mar 2024 09:00) (94/52 - 171/75)  BP(mean): 90 (28 Mar 2024 09:00) (67 - 108)  RR: 17 (28 Mar 2024 09:00) (9 - 18)  SpO2: 95% (28 Mar 2024 09:00) (95% - 100%)    Parameters below as of 28 Mar 2024 09:00  Patient On (Oxygen Delivery Method): room air      Constitutional: Awake, alert  HEENT: Normocephalic, atraumatic, HALLEY  Neck: supple, no acanthosis, no thyromegaly or palpable thyroid nodules.  Respiratory: Lungs clear to ausculation bilaterally.   Cardiovascular: regular rhythm, normal S1 and S2, no audible murmurs.   GI: soft, non-tender, non-distended, bowel sounds present  Extremities: right leg in dressing , mild loss of sensation in b/l lower legs    LABS  CBC - WBC/HGB/HTC/PLT: 8.11/9.0/27.4/242 (03-28-24)  BMP: Na/K/Cl/Bicarb/BUN/Cr/Gluc: 139/3.7/109/21/28/1.52/58 (03-28-24)  Anion Gap: 9 (03-28-24)  eGFR: 53 (03-28-24)  Calcium: 8.8 (03-28-24)  Phosphorus: 4.7 (03-28-24)  Magnesium: 2.2 (03-28-24)    PT/aPTT/INR: 12.4/35.2/1.09 (03-27-24)    CBC - WBC/HGB/HTC/PLT: 8.11/9.0/27.4/242 (03-28-24)BMP: Na/K/Cl/Bicarb/BUN/Cr/Gluc: 139/3.7/109/21/28/1.52/58 (03-28-24)  Anion Gap: 9 (03-28-24)  eGFR: 53 (03-28-24)  Calcium: 8.8 (03-28-24)  Phosphorus: 4.7 (03-28-24)  Magnesium: 2.2 (03-28-24)    CAPILLARY BLOOD GLUCOSE & INSULIN RECEIVED  109 mg/dL (03-27 @ 18:48)  64 mg/dL (03-27 @ 22:36)   63 mg/dL (03-27 @ 23:11)  106 mg/dL (03-27 @ 23:40) - Lantus 20  90 mg/dL (03-28 @ 06:17)      03-27-24 @ 07:01  -  03-28-24 @ 07:00  --------------------------------------------------------  IN: 150 mL / OUT: 1350 mL / NET: -1200 mL        ASSESSMENT / RECOMMENDATIONS    A1C: 9.7 %                       BUN: 28  Creatinine: 1.52  GFR: 53  Weight: 95.7  BMI: 28.6  EF: 77 %    58y Male with a past medical history of Hypertension , Hyperlipidemia, Diabetes mellitus type 2, HFpEF , CAD s/p stents, CKD stage 3 s/p Right partial 5th ray amputation.    # Type 2 diabetes mellitus with hyperglycemia  -CGM readings noted over the last 14 days with GMI 77%  , sensor active 68%.  -Glucose levels time in range 36% , low 12% , less than 54 5%.  -CGM readings -12-6 am 130-150 , 6-9 am 122, 9-12 am 163 , 12-3 am 227 , 3-6 am 256 , 6-9 am pm 247 , 9-12 188  -Patient's home dose of Lantus is very high and needs to be decreased due to fasting hypoglycemia in the morning , afternoon hyperglycemia episodes will be monitored and Humalog dose will be increased if needed on discharge.  - Please keep Lantus 16 units at bedtime.   - Keep lispro 12 units before each meal.  - Continue lispro moderate dose sliding scale before meals and at bedtime.  - Patient's fingerstick glucose goal is 100-180 mg/dL.    - Discharge recommendations to be discussed.   - Patient can follow up at discharge with [facility] by calling [phone] to make an appointment.      Case discussed with Dr. Bustillo. Primary team updated.

## 2024-03-28 NOTE — CONSULT NOTE ADULT - ATTENDING COMMENTS
Pt seen on rounds this afternoon.  58-yo man with HTN, HLP, CAD (with previous PCI of OM1), type 2 DM and previous OM of the R 5th toe which did not respond to outpatient oral antibiotic therapy is now s/p partial 5th metatarsal amputation.  Is known to our service (and to myself) from consult in 2022.  Has a 25-yr history of type 2 DM, most recent outpatient regimen of 40 Lantus, 15 units premeal lispro.  Monitors with a Kellie at home with averages as noted in the HPI.  Essentially, he tends to have readings in the low 100s in the AM, > 200 in the PM.  Also with occasional AM hypoglycemia.  Review of diet does not indicate any obvious non-compliance, though snacking on cheese and crackers, plus what might be excessive portions of starch at his main meal are likely problems.    Despite his nominal Lantus dose of 40 units, states that he will take only 20 units if his bedtime glucose is in the low 100 range, and he was given 20 units last night--but AM glucose was still down to 90 mg%.  He was also hypoglycemic yesterday evening, likely the result of his outpatient Lantus dose the night before  He has not eaten a sufficient number of meals in the hospital to evaluate his premeal dose.  His R foot is in an Ace wrap and cannot be examined.  He has a mild increase in warmth above the ankle.  Unable to examine pedal pulses.  --Will decrease the Lantus dose further to 16 units for tonight  --As a precaution, decrease his premeal lispro from his home dose of 15 to 12 units beginning tonight

## 2024-03-28 NOTE — PATIENT PROFILE ADULT - FALL HARM RISK - UNIVERSAL INTERVENTIONS
Bed in lowest position, wheels locked, appropriate side rails in place/Call bell, personal items and telephone in reach/Instruct patient to call for assistance before getting out of bed or chair/Non-slip footwear when patient is out of bed/[place] to call system/Physically safe environment - no spills, clutter or unnecessary equipment/Purposeful Proactive Rounding/Room/bathroom lighting operational, light cord in reach

## 2024-03-28 NOTE — PROGRESS NOTE ADULT - PROBLEM SELECTOR PLAN 1
Hx of OM with prior amputation, px for foot infection. Follows with Dr. Brunner weekly. Was admitted here for management of OM 2/5-2/8 and dc'd on augmentin and linezolid, was started on unknown abx last week. Referred to ED by Dr. Brunner for worsening infection.  Podiatry consulted, c/f deep SSTI vs OM, pending partial 5th ray amp of R foot  S/p Zosyn, Vanc in ED.  - C/w Zosyn 3.375 q8h  - C/w Vanc 1500mg q12h, check trough before 4th dose  - F/u wound margins   - Appreciate podiatry recs Hx of OM with prior amputation, px for foot infection. Follows with Dr. Brunner weekly. Was admitted here for management of OM 2/5-2/8 and dc'd on augmentin and linezolid, was started on unknown abx last week. Referred to ED by Dr. Brunner for worsening infection.  Podiatry consulted, c/f deep SSTI vs OM, s/p partial 5th ray amp of R foot, uncomplicated procedure  - C/w Zosyn 3.375 q8h  - C/w Vanc 1500mg q12h, check trough before 4th dose  - F/u wound margins   - Appreciate podiatry recs

## 2024-03-29 ENCOUNTER — TRANSCRIPTION ENCOUNTER (OUTPATIENT)
Age: 59
End: 2024-03-29

## 2024-03-29 LAB
ANION GAP SERPL CALC-SCNC: 9 MMOL/L — SIGNIFICANT CHANGE UP (ref 5–17)
BUN SERPL-MCNC: 24 MG/DL — HIGH (ref 7–23)
C PEPTIDE SERPL-MCNC: 0.5 NG/ML — LOW (ref 1.1–4.4)
CALCIUM SERPL-MCNC: 9 MG/DL — SIGNIFICANT CHANGE UP (ref 8.4–10.5)
CHLORIDE SERPL-SCNC: 107 MMOL/L — SIGNIFICANT CHANGE UP (ref 96–108)
CO2 SERPL-SCNC: 21 MMOL/L — LOW (ref 22–31)
CREAT SERPL-MCNC: 1.55 MG/DL — HIGH (ref 0.5–1.3)
EGFR: 52 ML/MIN/1.73M2 — LOW
GLUCOSE BLDC GLUCOMTR-MCNC: 181 MG/DL — HIGH (ref 70–99)
GLUCOSE BLDC GLUCOMTR-MCNC: 195 MG/DL — HIGH (ref 70–99)
GLUCOSE BLDC GLUCOMTR-MCNC: 76 MG/DL — SIGNIFICANT CHANGE UP (ref 70–99)
GLUCOSE BLDC GLUCOMTR-MCNC: 79 MG/DL — SIGNIFICANT CHANGE UP (ref 70–99)
GLUCOSE SERPL-MCNC: 74 MG/DL — SIGNIFICANT CHANGE UP (ref 70–99)
GRAM STN FLD: ABNORMAL
HCT VFR BLD CALC: 27.9 % — LOW (ref 39–50)
HGB BLD-MCNC: 9.2 G/DL — LOW (ref 13–17)
MAGNESIUM SERPL-MCNC: 2.2 MG/DL — SIGNIFICANT CHANGE UP (ref 1.6–2.6)
MCHC RBC-ENTMCNC: 30.4 PG — SIGNIFICANT CHANGE UP (ref 27–34)
MCHC RBC-ENTMCNC: 33 GM/DL — SIGNIFICANT CHANGE UP (ref 32–36)
MCV RBC AUTO: 92.1 FL — SIGNIFICANT CHANGE UP (ref 80–100)
NRBC # BLD: 0 /100 WBCS — SIGNIFICANT CHANGE UP (ref 0–0)
PHOSPHATE SERPL-MCNC: 4.5 MG/DL — SIGNIFICANT CHANGE UP (ref 2.5–4.5)
PLATELET # BLD AUTO: 241 K/UL — SIGNIFICANT CHANGE UP (ref 150–400)
POTASSIUM SERPL-MCNC: 4.6 MMOL/L — SIGNIFICANT CHANGE UP (ref 3.5–5.3)
POTASSIUM SERPL-SCNC: 4.6 MMOL/L — SIGNIFICANT CHANGE UP (ref 3.5–5.3)
RBC # BLD: 3.03 M/UL — LOW (ref 4.2–5.8)
RBC # FLD: 13.4 % — SIGNIFICANT CHANGE UP (ref 10.3–14.5)
SODIUM SERPL-SCNC: 137 MMOL/L — SIGNIFICANT CHANGE UP (ref 135–145)
VANCOMYCIN TROUGH SERPL-MCNC: 19.6 UG/ML — SIGNIFICANT CHANGE UP (ref 10–20)
WBC # BLD: 6.54 K/UL — SIGNIFICANT CHANGE UP (ref 3.8–10.5)
WBC # FLD AUTO: 6.54 K/UL — SIGNIFICANT CHANGE UP (ref 3.8–10.5)

## 2024-03-29 PROCEDURE — 99232 SBSQ HOSP IP/OBS MODERATE 35: CPT | Mod: GC

## 2024-03-29 PROCEDURE — 99233 SBSQ HOSP IP/OBS HIGH 50: CPT | Mod: GC

## 2024-03-29 PROCEDURE — 99222 1ST HOSP IP/OBS MODERATE 55: CPT

## 2024-03-29 RX ORDER — CEFAZOLIN SODIUM 1 G
2000 VIAL (EA) INJECTION EVERY 8 HOURS
Refills: 0 | Status: DISCONTINUED | OUTPATIENT
Start: 2024-03-29 | End: 2024-04-02

## 2024-03-29 RX ORDER — INSULIN GLARGINE 100 [IU]/ML
15 INJECTION, SOLUTION SUBCUTANEOUS AT BEDTIME
Refills: 0 | Status: DISCONTINUED | OUTPATIENT
Start: 2024-03-29 | End: 2024-04-02

## 2024-03-29 RX ORDER — VANCOMYCIN HCL 1 G
1250 VIAL (EA) INTRAVENOUS EVERY 12 HOURS
Refills: 0 | Status: COMPLETED | OUTPATIENT
Start: 2024-03-29 | End: 2024-03-30

## 2024-03-29 RX ORDER — VANCOMYCIN HCL 1 G
1500 VIAL (EA) INTRAVENOUS EVERY 12 HOURS
Refills: 0 | Status: DISCONTINUED | OUTPATIENT
Start: 2024-03-29 | End: 2024-03-29

## 2024-03-29 RX ORDER — METRONIDAZOLE 500 MG
1 TABLET ORAL
Qty: 36 | Refills: 0
Start: 2024-03-29 | End: 2024-04-09

## 2024-03-29 RX ORDER — INSULIN LISPRO 100/ML
7 VIAL (ML) SUBCUTANEOUS
Refills: 0 | Status: DISCONTINUED | OUTPATIENT
Start: 2024-03-29 | End: 2024-04-02

## 2024-03-29 RX ORDER — CEPHALEXIN 500 MG
1 CAPSULE ORAL
Qty: 48 | Refills: 0
Start: 2024-03-29 | End: 2024-04-09

## 2024-03-29 RX ORDER — CLOPIDOGREL BISULFATE 75 MG/1
75 TABLET, FILM COATED ORAL DAILY
Refills: 0 | Status: DISCONTINUED | OUTPATIENT
Start: 2024-03-29 | End: 2024-04-02

## 2024-03-29 RX ADMIN — Medication 100 MILLIGRAM(S): at 22:29

## 2024-03-29 RX ADMIN — CITALOPRAM 40 MILLIGRAM(S): 10 TABLET, FILM COATED ORAL at 12:58

## 2024-03-29 RX ADMIN — AMLODIPINE BESYLATE 10 MILLIGRAM(S): 2.5 TABLET ORAL at 06:02

## 2024-03-29 RX ADMIN — Medication 2: at 22:27

## 2024-03-29 RX ADMIN — Medication 12 UNIT(S): at 12:57

## 2024-03-29 RX ADMIN — CLOPIDOGREL BISULFATE 75 MILLIGRAM(S): 75 TABLET, FILM COATED ORAL at 17:20

## 2024-03-29 RX ADMIN — Medication 650 MILLIGRAM(S): at 00:08

## 2024-03-29 RX ADMIN — Medication 2: at 12:57

## 2024-03-29 RX ADMIN — Medication 650 MILLIGRAM(S): at 01:00

## 2024-03-29 RX ADMIN — SENNA PLUS 2 TABLET(S): 8.6 TABLET ORAL at 22:28

## 2024-03-29 RX ADMIN — Medication 650 MILLIGRAM(S): at 12:57

## 2024-03-29 RX ADMIN — Medication 166.67 MILLIGRAM(S): at 19:39

## 2024-03-29 RX ADMIN — ATORVASTATIN CALCIUM 40 MILLIGRAM(S): 80 TABLET, FILM COATED ORAL at 22:28

## 2024-03-29 RX ADMIN — HEPARIN SODIUM 5000 UNIT(S): 5000 INJECTION INTRAVENOUS; SUBCUTANEOUS at 14:58

## 2024-03-29 RX ADMIN — INSULIN GLARGINE 15 UNIT(S): 100 INJECTION, SOLUTION SUBCUTANEOUS at 22:28

## 2024-03-29 RX ADMIN — Medication 650 MILLIGRAM(S): at 19:07

## 2024-03-29 RX ADMIN — PIPERACILLIN AND TAZOBACTAM 25 GRAM(S): 4; .5 INJECTION, POWDER, LYOPHILIZED, FOR SOLUTION INTRAVENOUS at 06:01

## 2024-03-29 RX ADMIN — PIPERACILLIN AND TAZOBACTAM 25 GRAM(S): 4; .5 INJECTION, POWDER, LYOPHILIZED, FOR SOLUTION INTRAVENOUS at 14:58

## 2024-03-29 RX ADMIN — Medication 650 MILLIGRAM(S): at 06:02

## 2024-03-29 RX ADMIN — HEPARIN SODIUM 5000 UNIT(S): 5000 INJECTION INTRAVENOUS; SUBCUTANEOUS at 22:28

## 2024-03-29 RX ADMIN — HEPARIN SODIUM 5000 UNIT(S): 5000 INJECTION INTRAVENOUS; SUBCUTANEOUS at 06:02

## 2024-03-29 RX ADMIN — Medication 81 MILLIGRAM(S): at 12:57

## 2024-03-29 RX ADMIN — Medication 650 MILLIGRAM(S): at 07:00

## 2024-03-29 NOTE — PROGRESS NOTE ADULT - PROBLEM SELECTOR PLAN 3
Pt had stent placed 12/9/2022 iso SOB and CP. Symptoms have resolved since. Unclear why he is still on plavix.   Home meds: ASA/Plavix, Atorvastatin 40  - C/w statin  - restart asa/plavix after procedure   - collateral in am Pt had stent placed 12/9/2022 iso SOB and CP. Symptoms have resolved since. Unclear why he is still on plavix.   Home meds: ASA/Plavix, Atorvastatin 40  - C/w statin  - restarted plavix, per cardiologist

## 2024-03-29 NOTE — PROGRESS NOTE ADULT - ASSESSMENT
58M PMH HFpEF, CAD w/ stent, HTN, HLD, DM s/p Right partial 5th ray amputation. Low suspicion for residual OM.    Plan:  - F/u OR clean margin culture/path, low suspicion for residual OM  - C/w IV abx  - Okay to discharge to home today after 5 PM post attending rounds.   - Dressed with betadine DSD  - Non Weight-bearing to RLE  -Rest of care up to primary team    Podiatry following, Plan d/w with Attending 58M PMH HFpEF, CAD w/ stent, HTN, HLD, DM s/p Right partial 5th ray amputation. Low suspicion for residual OM.    Plan:  - F/u OR clean margin culture/path, low suspicion for residual OM  - C/w IV abx  - Okay to discharge to home today after 5 PM post attending rounds.   - Dressed with betadine DSD  - Non Weight-bearing to RLE  -Rest of care up to primary team    Podiatry following, Plan d/w with Attending    Patient should f/u w/ Dr. Chakraborty w/i 1 week of discharge:  [address], [place] [address]  [phone]

## 2024-03-29 NOTE — PROGRESS NOTE ADULT - ATTENDING COMMENTS
57yo M with PMH of DM2, HFpEF, HTN, HLD, DM, admitted for OM and s/p amputation of partial 5th ray amp of R foot.      #Osteomyelitis  - initial concern for SSTI but now bone margins with MSSA and Corynebacterium  - podiatry following, ID consulted now that OM.  Cefazolin and Vanc IV will need PICC  - dose vanc by trough  - oxycodone PRN for pain, bowel regimen    #DM2  - patient was able to reduce A1C from 14.2 to 9.7  - reports that on regimen hes on at home he has been getting hypoglycemic at times and skips lantus - endocrine consulted appreciate recs  - lantus reduced to 15, premeal 7 lispro    #HTN  #HLD  - on amlodipine and olmesartan  - normotensive at this time, ARB being held.      #HFpEF  - follows with Dr Nelson  - previously on BB but stopped due to bradycardia, junctional rhythm and concern for BRASH  - consider restarting Losartan if BP tolerates  - hold Torsemide given euvolemia, takes PRN at home  - Stop Farxiga on DC given active LE wound    #CAD  - c/w ASA/Plavix/Atorvastatin

## 2024-03-29 NOTE — PROGRESS NOTE ADULT - SUBJECTIVE AND OBJECTIVE BOX
SUBJECTIVE / INTERVAL HPI: Patient was seen and examined this morning.     Overnight events: No acute events overnight , denies any new complains. He did not received his Lantus insulin yesterday and had all his meals. He also states that he was taking Farxiga in the past but stopped taking it because of the foot infection, follows up with Dr Oviedo (endocrinologist).    CAPILLARY BLOOD GLUCOSE & INSULIN RECEIVED  90 mg/dL (03-28 @ 06:17)  103 mg/dL (03-28 @ 13:41)  152 mg/dL (03-28 @ 17:49) 12 units lispro  81 mg/dL (03-28 @ 22:12)  79 mg/dL (03-29 @ 09:03)  195 mg/dL (03-29 @ 12:44)      REVIEW OF SYSTEMS  Constitutional:  Negative fever, chills or loss of appetite.  Eyes:  Negative blurry vision or double vision.  Cardiovascular:  Negative for chest pain or palpitations.  Respiratory:  Negative for cough, wheezing, or shortness of breath.    Gastrointestinal:  Negative for nausea, vomiting, diarrhea, constipation, or abdominal pain.  Genitourinary:  Negative frequency, urgency or dysuria.  Neurologic:  No headache, confusion, dizziness, lightheadedness.    PHYSICAL EXAM  Vital Signs Last 24 Hrs  T(C): 36.7 (29 Mar 2024 15:50), Max: 36.7 (28 Mar 2024 21:24)  T(F): 98 (29 Mar 2024 15:50), Max: 98.1 (28 Mar 2024 21:24)  HR: 48 (29 Mar 2024 15:50) (48 - 58)  BP: 163/70 (29 Mar 2024 15:50) (142/61 - 163/70)  BP(mean): --  RR: 18 (29 Mar 2024 15:50) (17 - 18)  SpO2: 98% (29 Mar 2024 15:50) (95% - 99%)    Parameters below as of 29 Mar 2024 15:50  Patient On (Oxygen Delivery Method): room air        Constitutional: Awake, alert, in no acute distress.   HEENT: Normocephalic, atraumatic, HALLEY.  Respiratory: Lungs clear to ausculation bilaterally.   Cardiovascular: regular rhythm, normal S1 and S2, no audible murmurs.   GI: soft, non-tender, non-distended, bowel sounds present.  Extremities: right lower extremity in ACE wrap  Psychiatric: AAO x 3. Normal affect/mood.     LABS  CBC - WBC/HGB/HTC/PLT: 6.54/9.2/27.9/241 (03-29-24)  BMP - Na/K/Cl/Bicarb/BUN/Cr/Gluc/AG/eGFR: 137/4.6/107/21/24/1.55/74/9/52 (03-29-24)  Ca - 9.0 (03-29-24)  Phos - 4.5 (03-29-24)  Mg - 2.2 (03-29-24)    PT/aPTT/INR: 12.4/35.2/1.09 (03-27-24)         03-28-24 @ 07:01  -  03-29-24 @ 07:00  --------------------------------------------------------  IN: 0 mL / OUT: 300 mL / NET: -300 mL    03-29-24 @ 07:01  -  03-29-24 @ 17:48  --------------------------------------------------------  IN: 0 mL / OUT: 1000 mL / NET: -1000 mL        MEDICATIONS  MEDICATIONS  (STANDING):  acetaminophen     Tablet .. 650 milliGRAM(s) Oral every 6 hours  amLODIPine   Tablet 10 milliGRAM(s) Oral daily  aspirin  chewable 81 milliGRAM(s) Oral daily  atorvastatin 40 milliGRAM(s) Oral at bedtime  ceFAZolin   IVPB 2000 milliGRAM(s) IV Intermittent every 8 hours  citalopram 40 milliGRAM(s) Oral daily  clopidogrel Tablet 75 milliGRAM(s) Oral daily  dextrose 5%. 1000 milliLiter(s) (100 mL/Hr) IV Continuous <Continuous>  dextrose 5%. 1000 milliLiter(s) (50 mL/Hr) IV Continuous <Continuous>  dextrose 5%. 1000 milliLiter(s) (50 mL/Hr) IV Continuous <Continuous>  dextrose 50% Injectable 25 Gram(s) IV Push once  dextrose 50% Injectable 12.5 Gram(s) IV Push once  dextrose 50% Injectable 25 Gram(s) IV Push once  glucagon  Injectable 1 milliGRAM(s) IntraMuscular once  heparin   Injectable 5000 Unit(s) SubCutaneous every 8 hours  insulin glargine Injectable (LANTUS) 15 Unit(s) SubCutaneous at bedtime  insulin lispro (ADMELOG) corrective regimen sliding scale   SubCutaneous Before meals and at bedtime  insulin lispro Injectable (ADMELOG) 7 Unit(s) SubCutaneous three times a day before meals  polyethylene glycol 3350 17 Gram(s) Oral at bedtime  senna 2 Tablet(s) Oral at bedtime  vancomycin  IVPB 1250 milliGRAM(s) IV Intermittent every 12 hours    MEDICATIONS  (PRN):  acetaminophen     Tablet .. 650 milliGRAM(s) Oral every 6 hours PRN Temp greater or equal to 38C (100.4F), Mild Pain (1 - 3)  aluminum hydroxide/magnesium hydroxide/simethicone Suspension 30 milliLiter(s) Oral every 4 hours PRN Dyspepsia  dextrose Oral Gel 15 Gram(s) Oral once PRN Blood Glucose LESS THAN 70 milliGRAM(s)/deciliter  melatonin 3 milliGRAM(s) Oral at bedtime PRN Insomnia  ondansetron Injectable 4 milliGRAM(s) IV Push every 8 hours PRN Nausea and/or Vomiting  oxyCODONE    IR 2.5 milliGRAM(s) Oral every 4 hours PRN Moderate Pain (4 - 6)  oxyCODONE    IR 5 milliGRAM(s) Oral every 4 hours PRN Severe Pain (7 - 10)    ASSESSMENT / RECOMMENDATIONS    58y Male with a past medical history of Hypertension , Hyperlipidemia, Diabetes mellitus type 2, HFpEF , CAD s/p stents, CKD stage 3 s/p Right partial 5th ray amputation, #POD 2.    A1C: 9.7 %  BUN: 24  Creatinine: 1.55  GFR: 52  Weight: 95.7  BMI: 28.6    # Type 2 diabetes mellitus with hyperglycemia    -CGM readings noted over the last 14 days with GMI 77%  , sensor active 68%.  -Glucose levels time in range 36% , low 12% , less than 54 5%.  -CGM readings -12-6 am 130-150 , 6-9 am 122, 9-12 am 163 , 12-3 am 227 , 3-6 am 256 , 6-9 am pm 247 , 9-12 188  -Patient's home dose of Lantus is very high and needs to be decreased due to fasting hypoglycemia in the morning , afternoon hyperglycemia episodes will be monitored.  - Please keep Lantus 15 units at bedtime.   - Keep lispro 7 units before each meal.  - Continue lispro moderate dose sliding scale before meals and at bedtime.  - Patient's fingerstick glucose goal is 100-180 mg/dL.    - Discharge recommendations to be discussed.   - Patient can follow up at discharge with [facility] by calling [phone] to make an appointment.        Case discussed with Dr. Bustillo. Primary team updated.

## 2024-03-29 NOTE — CONSULT NOTE ADULT - SUBJECTIVE AND OBJECTIVE BOX
HPI:  58M with PMH of HFpEF (EF 71% 12/22), CAD w/ stent (12/9), HTN, HLD, IDDM, prior metatarsal OM, prior visits for NOLAN/hyerpakalemia, p/w concern for foot infection. Had bone bx of R 5th toe 2/7/24, has been on several courses of abx since then, with hospitalization 2/5-2/7 for OM, dc'd on Augmentin and linezolid, most recently was started on unknown abx ~1w ago. Has slowly worsening pain to toe, worsening redness/swelling for several days with yellow-green drainage. States he followed w/ podiatry Dr. Brunner today who referred to ED. Admits to new dry cough over past month. Denies f/c, lightheaded, SOB/CP, NVD, abd pain, urinary complaints, other joint pain, other wounds.    Podiatry consulted in ED with plan to perform partial 5th ray amputation of R foot tonight.    Patient went for partial 5th ray amputation on 3/27/24      PAST MEDICAL & SURGICAL HISTORY:  Osteomyelitis      Hypertension      Hyperlipidemia      Diabetes mellitus      CAD (coronary artery disease)      Chronic diastolic congestive heart failure      CKD (chronic kidney disease)      S/P release of urethral stricture            REVIEW OF SYSTEMS:    General:	 no weakness; no fevers, no chills  Skin/Breast: no rash  Respiratory and Thorax: no SOB, no cough  Cardiovascular:	No chest pain  Gastrointestinal:	 no nausea, vomiting , diarrhea  Genitourinary:	no dysuria, no difficulty urinating, no hematuria  Musculoskeletal:	no weakness, no joint swelling/pain  Neurological:	no focal weakness/numbness  Endocrine:	no polyuria, no polydipsia      ANTIBIOTICS:  MEDICATIONS  (STANDING):  acetaminophen     Tablet .. 650 milliGRAM(s) Oral every 6 hours  amLODIPine   Tablet 10 milliGRAM(s) Oral daily  aspirin  chewable 81 milliGRAM(s) Oral daily  atorvastatin 40 milliGRAM(s) Oral at bedtime  citalopram 40 milliGRAM(s) Oral daily  clopidogrel Tablet 75 milliGRAM(s) Oral daily  dextrose 5%. 1000 milliLiter(s) (100 mL/Hr) IV Continuous <Continuous>  dextrose 5%. 1000 milliLiter(s) (50 mL/Hr) IV Continuous <Continuous>  dextrose 5%. 1000 milliLiter(s) (50 mL/Hr) IV Continuous <Continuous>  dextrose 50% Injectable 25 Gram(s) IV Push once  dextrose 50% Injectable 12.5 Gram(s) IV Push once  dextrose 50% Injectable 25 Gram(s) IV Push once  glucagon  Injectable 1 milliGRAM(s) IntraMuscular once  heparin   Injectable 5000 Unit(s) SubCutaneous every 8 hours  insulin glargine Injectable (LANTUS) 15 Unit(s) SubCutaneous at bedtime  insulin lispro (ADMELOG) corrective regimen sliding scale   SubCutaneous Before meals and at bedtime  insulin lispro Injectable (ADMELOG) 7 Unit(s) SubCutaneous three times a day before meals  piperacillin/tazobactam IVPB.. 3.375 Gram(s) IV Intermittent every 8 hours  polyethylene glycol 3350 17 Gram(s) Oral at bedtime  senna 2 Tablet(s) Oral at bedtime  vancomycin  IVPB 1500 milliGRAM(s) IV Intermittent every 12 hours    MEDICATIONS  (PRN):  acetaminophen     Tablet .. 650 milliGRAM(s) Oral every 6 hours PRN Temp greater or equal to 38C (100.4F), Mild Pain (1 - 3)  aluminum hydroxide/magnesium hydroxide/simethicone Suspension 30 milliLiter(s) Oral every 4 hours PRN Dyspepsia  dextrose Oral Gel 15 Gram(s) Oral once PRN Blood Glucose LESS THAN 70 milliGRAM(s)/deciliter  melatonin 3 milliGRAM(s) Oral at bedtime PRN Insomnia  ondansetron Injectable 4 milliGRAM(s) IV Push every 8 hours PRN Nausea and/or Vomiting  oxyCODONE    IR 2.5 milliGRAM(s) Oral every 4 hours PRN Moderate Pain (4 - 6)  oxyCODONE    IR 5 milliGRAM(s) Oral every 4 hours PRN Severe Pain (7 - 10)      Allergies    No Known Allergies    Intolerances    carvedilol (Hypotension)      SOCIAL HISTORY:    FAMILY HISTORY:  FH: myocardial infarction (Father, Grandparent)        Vital Signs Last 24 Hrs  T(C): 36.4 (29 Mar 2024 08:43), Max: 37.1 (28 Mar 2024 16:31)  T(F): 97.5 (29 Mar 2024 08:43), Max: 98.7 (28 Mar 2024 16:31)  HR: 58 (29 Mar 2024 08:43) (50 - 58)  BP: 161/80 (29 Mar 2024 08:43) (142/61 - 161/80)  BP(mean): --  RR: 18 (29 Mar 2024 08:43) (17 - 18)  SpO2: 99% (29 Mar 2024 08:43) (95% - 99%)    Parameters below as of 29 Mar 2024 08:43  Patient On (Oxygen Delivery Method): room air        PHYSICAL EXAM:  Constitutional:Well-developed, well nourished  Eyes:HALLEY, EOMI  Ear/Nose/Throat: no oral lesion, no sinus tenderness on percussion	  Neck:  , supple  Respiratory: CTA sina  Cardiovascular: S1S2 RRR, no murmurs  Gastrointestinal:soft, (+) BS, no HSM  Extremities: right foot s/p partial amputation, dressing is clean, dry and intact   Vascular: DP Pulse:	right normal; left normal            LABS:                        9.2    6.54  )-----------( 241      ( 29 Mar 2024 05:30 )             27.9     03-29    137  |  107  |  24<H>  ----------------------------<  74  4.6   |  21<L>  |  1.55<H>    Ca    9.0      29 Mar 2024 05:30  Phos  4.5     03-29  Mg     2.2     03-29      PT/INR - ( 27 Mar 2024 16:57 )   PT: 12.4 sec;   INR: 1.09          PTT - ( 27 Mar 2024 16:57 )  PTT:35.2 sec  Urinalysis Basic - ( 29 Mar 2024 05:30 )    Color: x / Appearance: x / SG: x / pH: x  Gluc: 74 mg/dL / Ketone: x  / Bili: x / Urobili: x   Blood: x / Protein: x / Nitrite: x   Leuk Esterase: x / RBC: x / WBC x   Sq Epi: x / Non Sq Epi: x / Bacteria: x    Vancomycin Level, Trough (03.29.24 @ 05:30)    Vancomycin Level, Trough: 19.6: Vancomycin trough levels should be rapidly reached and maintained at  15-20 ug/ml for life threatening MRSA  infections such as sepsis, endocarditis, osteomyelitis and pneumonia. A  first trough level should be drawn  before the 3rd or 4th dose.  Risk of renal toxicity is increased for levels >15 ug/ml, in patients on  other nephrotoxic drugs, who are  hemodynamically unstable, have unstable renal function, or are on  Vancomycin therapy for >14 days. Renal function with  creatinine levels should be monitored for those patients. ug/mL        MICROBIOLOGY:    Culture - Tissue with Gram Stain (03.27.24 @ 21:53)    Gram Stain:   No organisms seen  No WBC's seen.   Specimen Source: .Tissue right foot 5th metatarsal clean margin or spec   Culture Results:   Rare Staphylococcus aureus Presumptive Methicillin susceptible  Confirmation to follow within 24 hrs.  Rare Corynebacterium amycolatum  Result called to and read back byKimberlee Olivarez RN  03/29/2024 14:28:11  Culture in progress      RADIOLOGY & ADDITIONAL STUDIES:

## 2024-03-29 NOTE — PROGRESS NOTE ADULT - SUBJECTIVE AND OBJECTIVE BOX
OVERNIGHT EVENTS:    SUBJECTIVE / INTERVAL HPI: Patient seen and examined at bedside.     VITAL SIGNS:  Vital Signs Last 24 Hrs  T(C): 36.6 (29 Mar 2024 05:52), Max: 37.1 (28 Mar 2024 16:31)  T(F): 97.8 (29 Mar 2024 05:52), Max: 98.7 (28 Mar 2024 16:31)  HR: 52 (29 Mar 2024 05:52) (50 - 63)  BP: 142/61 (29 Mar 2024 05:52) (124/65 - 166/75)  BP(mean): 90 (28 Mar 2024 09:00) (90 - 108)  RR: 17 (29 Mar 2024 05:52) (15 - 18)  SpO2: 96% (29 Mar 2024 05:52) (94% - 100%)    Parameters below as of 29 Mar 2024 05:52  Patient On (Oxygen Delivery Method): room air        PHYSICAL EXAM:    General: NAD  HEENT: NC/AT; PERRL, anicteric sclera; MMM  Neck: supple w/o palpable nodularity  Cardiovascular: +S1/S2; RRR  Respiratory: CTA B/L; no W/R/R  Gastrointestinal: soft, NT/ND; +BSx4  Extremities: WWP; no edema, clubbing or cyanosis  Vascular: 2+ radial, DP/PT pulses B/L  Neurological: AAOx3; no focal deficits    MEDICATIONS:  MEDICATIONS  (STANDING):  acetaminophen     Tablet .. 650 milliGRAM(s) Oral every 6 hours  amLODIPine   Tablet 10 milliGRAM(s) Oral daily  aspirin  chewable 81 milliGRAM(s) Oral daily  atorvastatin 40 milliGRAM(s) Oral at bedtime  citalopram 40 milliGRAM(s) Oral daily  dextrose 5%. 1000 milliLiter(s) (100 mL/Hr) IV Continuous <Continuous>  dextrose 5%. 1000 milliLiter(s) (50 mL/Hr) IV Continuous <Continuous>  dextrose 5%. 1000 milliLiter(s) (50 mL/Hr) IV Continuous <Continuous>  dextrose 50% Injectable 25 Gram(s) IV Push once  dextrose 50% Injectable 12.5 Gram(s) IV Push once  dextrose 50% Injectable 25 Gram(s) IV Push once  glucagon  Injectable 1 milliGRAM(s) IntraMuscular once  heparin   Injectable 5000 Unit(s) SubCutaneous every 8 hours  insulin glargine Injectable (LANTUS) 16 Unit(s) SubCutaneous at bedtime  insulin lispro (ADMELOG) corrective regimen sliding scale   SubCutaneous Before meals and at bedtime  insulin lispro Injectable (ADMELOG) 12 Unit(s) SubCutaneous three times a day before meals  piperacillin/tazobactam IVPB.. 3.375 Gram(s) IV Intermittent every 8 hours  polyethylene glycol 3350 17 Gram(s) Oral at bedtime  senna 2 Tablet(s) Oral at bedtime    MEDICATIONS  (PRN):  acetaminophen     Tablet .. 650 milliGRAM(s) Oral every 6 hours PRN Temp greater or equal to 38C (100.4F), Mild Pain (1 - 3)  aluminum hydroxide/magnesium hydroxide/simethicone Suspension 30 milliLiter(s) Oral every 4 hours PRN Dyspepsia  dextrose Oral Gel 15 Gram(s) Oral once PRN Blood Glucose LESS THAN 70 milliGRAM(s)/deciliter  melatonin 3 milliGRAM(s) Oral at bedtime PRN Insomnia  ondansetron Injectable 4 milliGRAM(s) IV Push every 8 hours PRN Nausea and/or Vomiting  oxyCODONE    IR 2.5 milliGRAM(s) Oral every 4 hours PRN Moderate Pain (4 - 6)  oxyCODONE    IR 5 milliGRAM(s) Oral every 4 hours PRN Severe Pain (7 - 10)      ALLERGIES:  Allergies    No Known Allergies    Intolerances    carvedilol (Hypotension)      LABS:                        9.0    8.11  )-----------( 242      ( 28 Mar 2024 05:28 )             27.4     03-28    139  |  109<H>  |  28<H>  ----------------------------<  58<L>  3.7   |  21<L>  |  1.52<H>    Ca    8.8      28 Mar 2024 05:28  Phos  4.7     03-28  Mg     2.2     03-28      PT/INR - ( 27 Mar 2024 16:57 )   PT: 12.4 sec;   INR: 1.09          PTT - ( 27 Mar 2024 16:57 )  PTT:35.2 sec  Urinalysis Basic - ( 28 Mar 2024 05:28 )    Color: x / Appearance: x / SG: x / pH: x  Gluc: 58 mg/dL / Ketone: x  / Bili: x / Urobili: x   Blood: x / Protein: x / Nitrite: x   Leuk Esterase: x / RBC: x / WBC x   Sq Epi: x / Non Sq Epi: x / Bacteria: x      CAPILLARY BLOOD GLUCOSE      POCT Blood Glucose.: 81 mg/dL (28 Mar 2024 22:12)      RADIOLOGY & ADDITIONAL TESTS: Reviewed.   OVERNIGHT EVENTS: PARKER    SUBJECTIVE / INTERVAL HPI: Patient seen and examined at bedside, found laying in bed sleeping, woke easily to voice. No complaints. ROS negative.    VITAL SIGNS:  Vital Signs Last 24 Hrs  T(C): 36.6 (29 Mar 2024 05:52), Max: 37.1 (28 Mar 2024 16:31)  T(F): 97.8 (29 Mar 2024 05:52), Max: 98.7 (28 Mar 2024 16:31)  HR: 52 (29 Mar 2024 05:52) (50 - 63)  BP: 142/61 (29 Mar 2024 05:52) (124/65 - 166/75)  BP(mean): 90 (28 Mar 2024 09:00) (90 - 108)  RR: 17 (29 Mar 2024 05:52) (15 - 18)  SpO2: 96% (29 Mar 2024 05:52) (94% - 100%)    Parameters below as of 29 Mar 2024 05:52  Patient On (Oxygen Delivery Method): room air        PHYSICAL EXAM:    General: NAD, laying in bed comfortably  HEENT: NC/AT; PERRL, anicteric sclera  Cardiovascular: +S1/S2; RRR  Respiratory: CTA B/L; no W/R/R  Gastrointestinal: soft, NT/ND;   Extremities: WWP; no edema, clubbing or cyanosis. R foot wrapped.  Vascular: 2+ radial, DP/PT pulses B/L  Neurological: AAOx3; no focal deficits    MEDICATIONS:  MEDICATIONS  (STANDING):  acetaminophen     Tablet .. 650 milliGRAM(s) Oral every 6 hours  amLODIPine   Tablet 10 milliGRAM(s) Oral daily  aspirin  chewable 81 milliGRAM(s) Oral daily  atorvastatin 40 milliGRAM(s) Oral at bedtime  citalopram 40 milliGRAM(s) Oral daily  dextrose 5%. 1000 milliLiter(s) (100 mL/Hr) IV Continuous <Continuous>  dextrose 5%. 1000 milliLiter(s) (50 mL/Hr) IV Continuous <Continuous>  dextrose 5%. 1000 milliLiter(s) (50 mL/Hr) IV Continuous <Continuous>  dextrose 50% Injectable 25 Gram(s) IV Push once  dextrose 50% Injectable 12.5 Gram(s) IV Push once  dextrose 50% Injectable 25 Gram(s) IV Push once  glucagon  Injectable 1 milliGRAM(s) IntraMuscular once  heparin   Injectable 5000 Unit(s) SubCutaneous every 8 hours  insulin glargine Injectable (LANTUS) 16 Unit(s) SubCutaneous at bedtime  insulin lispro (ADMELOG) corrective regimen sliding scale   SubCutaneous Before meals and at bedtime  insulin lispro Injectable (ADMELOG) 12 Unit(s) SubCutaneous three times a day before meals  piperacillin/tazobactam IVPB.. 3.375 Gram(s) IV Intermittent every 8 hours  polyethylene glycol 3350 17 Gram(s) Oral at bedtime  senna 2 Tablet(s) Oral at bedtime    MEDICATIONS  (PRN):  acetaminophen     Tablet .. 650 milliGRAM(s) Oral every 6 hours PRN Temp greater or equal to 38C (100.4F), Mild Pain (1 - 3)  aluminum hydroxide/magnesium hydroxide/simethicone Suspension 30 milliLiter(s) Oral every 4 hours PRN Dyspepsia  dextrose Oral Gel 15 Gram(s) Oral once PRN Blood Glucose LESS THAN 70 milliGRAM(s)/deciliter  melatonin 3 milliGRAM(s) Oral at bedtime PRN Insomnia  ondansetron Injectable 4 milliGRAM(s) IV Push every 8 hours PRN Nausea and/or Vomiting  oxyCODONE    IR 2.5 milliGRAM(s) Oral every 4 hours PRN Moderate Pain (4 - 6)  oxyCODONE    IR 5 milliGRAM(s) Oral every 4 hours PRN Severe Pain (7 - 10)      ALLERGIES:  Allergies    No Known Allergies    Intolerances    carvedilol (Hypotension)      LABS:                        9.0    8.11  )-----------( 242      ( 28 Mar 2024 05:28 )             27.4     03-28    139  |  109<H>  |  28<H>  ----------------------------<  58<L>  3.7   |  21<L>  |  1.52<H>    Ca    8.8      28 Mar 2024 05:28  Phos  4.7     03-28  Mg     2.2     03-28      PT/INR - ( 27 Mar 2024 16:57 )   PT: 12.4 sec;   INR: 1.09          PTT - ( 27 Mar 2024 16:57 )  PTT:35.2 sec  Urinalysis Basic - ( 28 Mar 2024 05:28 )    Color: x / Appearance: x / SG: x / pH: x  Gluc: 58 mg/dL / Ketone: x  / Bili: x / Urobili: x   Blood: x / Protein: x / Nitrite: x   Leuk Esterase: x / RBC: x / WBC x   Sq Epi: x / Non Sq Epi: x / Bacteria: x      CAPILLARY BLOOD GLUCOSE      POCT Blood Glucose.: 81 mg/dL (28 Mar 2024 22:12)      RADIOLOGY & ADDITIONAL TESTS: Reviewed.

## 2024-03-29 NOTE — DISCHARGE NOTE PROVIDER - CARE PROVIDER_API CALL
Jay Chakraborty  Podiatric Medicine and Surgery  [address], [facility] [address]  [place] [address]  Phone: [phone]  Fax: [phone]  Established Patient  Follow Up Time: 1 week   Jay Chakraborty  Podiatric Medicine and Surgery  [address], [facility] [address]  [place] [address]  Phone: [phone]  Fax: [phone]  Established Patient  Follow Up Time: 1 week    Jarret Gallardo  Infectious Disease  [address]  [place] [address]  Phone: [phone]  Fax: [phone]  Follow Up Time: 2 weeks

## 2024-03-29 NOTE — DISCHARGE NOTE PROVIDER - CARE PROVIDERS DIRECT ADDRESSES
,DirectAddress_Unknown ,DirectAddress_Unknown,[email or web address]

## 2024-03-29 NOTE — DISCHARGE NOTE PROVIDER - NSDCCPTREATMENT_GEN_ALL_CORE_FT
PRINCIPAL PROCEDURE  Procedure: X-ray of right foot, AP and lateral views  Findings and Treatment: IMPRESSION:  Right 5th toe and dorsal foot soft tissue swelling with osteopenic and   eroded appearing underlying 5th distal phalanx cortices, arthritic   slightly subluxed 5th PIP joint, and patchy osteopenic 5th proximal   phalanx head with focal indistinct medial cortical margins compatible   with stigmata of septic arthritis/osteoarthritis. No tracking gas   collections beyond this region and no additional suspected areas of   osteomyelitis.  Redemonstrated 2nd toe PIP arthrodesis screw with surrounding well   marginated lucent halo.  Redemonstrated partial 3rd toe amputation defect through proximal phalanx   neck region with smooth corticated stump margin.  Intact aligned remaining imaged osseous articular structures with   preserved joint spaces and no joint margin erosions.  Small posterior calcaneal enthesophyte.        SECONDARY PROCEDURE  Procedure: CXR, single AP view  Findings and Treatment:   FINDINGS/  IMPRESSION: Heart size, mediastinal and hilar contours are unchanged   within normal limits. The lung zones are clear. No pneumothorax. There   may be new trace right-sided pleural effusion.    Procedure: X-ray of foot, AP, lateral, and oblique views  Findings and Treatment: IMPRESSION:  Status post partial 5th ray resection through distal metatarsal shaft   with postsurgical changes in the overlying soft tissues. No proximally   tracking gas collections beyond the margins and no focal areas of   osteomyelitis.  Remainder of foot unchanged. Also correlate with intraoperative findings.

## 2024-03-29 NOTE — DISCHARGE NOTE PROVIDER - NSDCFUADDAPPT_GEN_ALL_CORE_FT
Please call Dr. Chakraborty's office to schedule a follow up appointment within 1-2 weeks of discharge - [phone]    Please follow up with your endocrinologist within 1-2 weeks of discharge. If you do not have one, please call [phone] Please call Dr. Chakraborty's office to schedule a follow up appointment within 1-2 weeks of discharge - [phone]    Please follow up with your endocrinologist within 1-2 weeks of discharge. If you do not have one, please call [phone]    Please call Dr. Gallardo's office to schedule an infectious disease follow up appointment within 2 weeks.

## 2024-03-29 NOTE — CONSULT NOTE ADULT - ASSESSMENT
IMPRESSION:  Diabetic foot infection s/p amputation. Clean margin culture shows MSSA, corynebacterium and are in still progress suggesting additional bacteria. Presence of bacteria in clean margin culture suggests there is residual osteomyelitis    Recommend:  1.  Continue Vancomycin but given high trough change dose to 1250 mg IV q12. This will cover corynebacterium  2.  Can stop Zosyn  3.  Start Cefazolin 2 grams IV q8hrs to target MSSA  4.  Will need 6 weeks of IV antibiotics.  Final antibiotics depends on culture results    ID team 2 will follow.  Dr. Barber will cover tonight and this weekend. I will return on 4/1/24

## 2024-03-29 NOTE — PROGRESS NOTE ADULT - PROBLEM SELECTOR PLAN 1
Hx of OM with prior amputation, px for foot infection. Follows with Dr. Brunner weekly. Was admitted here for management of OM 2/5-2/8 and dc'd on augmentin and linezolid, was started on unknown abx last week. Referred to ED by Dr. Brunner for worsening infection.  Podiatry consulted, c/f deep SSTI vs OM, s/p partial 5th ray amp of R foot, uncomplicated procedure  - C/w Zosyn 3.375 q8h  - C/w Vanc 1500mg q12h, check trough before 4th dose  - F/u wound margins   - Appreciate podiatry recs Hx of OM with prior amputation, px for foot infection. Follows with Dr. Brunner weekly. Was admitted here for management of OM 2/5-2/8 and dc'd on augmentin and linezolid, was started on unknown abx last week. Referred to ED by Dr. Brunner for worsening infection.  Podiatry consulted, c/f deep SSTI vs OM, s/p partial 5th ray amp of R foot, uncomplicated procedure  Margins Margins with presumed methicillin sensitive staph and corynebacterium  - ID consulted, appreciate recs  - Appreciate podiatry recs  - C/w Zosyn 3.375 q8h  - C/w Vanc 1500mg q12h, check trough before 4th dose Hx of OM with prior amputation, px for foot infection. Follows with Dr. Brunner weekly. Was admitted here for management of OM 2/5-2/8 and dc'd on augmentin and linezolid, was started on unknown abx last week. Referred to ED by Dr. Brunner for worsening infection.  Podiatry consulted, c/f deep SSTI vs OM, s/p partial 5th ray amp of R foot, uncomplicated procedure  Margins Margins with presumed methicillin sensitive staph and corynebacterium  - ID consulted, appreciate recs  - Appreciate podiatry recs  - C/w Vanc 1250mg q12h, check trough before 4th dose  - Started cefazolin 2g 18h

## 2024-03-29 NOTE — DISCHARGE NOTE PROVIDER - HOSPITAL COURSE
#Discharge: do not delete    VALENTIN CABALLERO is a 58y Male with a past medical history of _____    Presented with _____, found to have _____    Problem List/Main Diagnoses (system-based):   #     #     #    Inpatient treatment course:     Patient was discharged to: (home/[facility]/acute rehab/hospice, etc. and w/ home health/home PT/RN/home O2)    New medications:   Changes to old medications:  Medications that were stopped:    Items to follow up as outpatient:    Physical exam at the time of discharge:       LABS & STUDIES:   #Discharge: do not delete    VALENTIN CABALLERO is a 58M with PMH HfpEF, CAD, HTN, HLD, DM, OM presenting for foot infection, admitted for OM, s/p partial 5th ray amputation of R foot, pending endo recs and clean margins for discharge.       Problem List/Main Diagnoses (system-based):   #     #     #    Inpatient treatment course:     Patient was discharged to: (home/[facility]/acute rehab/hospice, etc. and w/ home health/home PT/RN/home O2)    New medications:   Changes to old medications:  Medications that were stopped:    Items to follow up as outpatient:    Physical exam at the time of discharge:       LABS & STUDIES:   #Discharge: do not delete    VALETNIN CABALLERO is a 58M with PMH HfpEF, CAD, HTN, HLD, DM, OM presenting for foot infection, admitted for OM, s/p partial 5th ray amputation of R foot, podiatry team not concerned for residual OM. Endo consulted for DM. Patient medically optimized for DC home with PO abx.     Problem List/Main Diagnoses (system-based):   # SSTI 2/2 OM  Px with R 5th toe swelling and redness with PMH of OM. S/p partial 5th ray amputation of R foot. Received IV abx with Vanc and Zosyn.  - DC with Keflex 500mg q6h and Flagyl 500mg q8h for 14 day total course (last dose 4/10)  - f/u Dr. Brunner     # IDDM  Hx of poorly controlled DM, prescribed lantus and Humalog at home with inconsistent adherence. A1C in 2/24 was 14%, rechecked and found to be 9.7%. Endo consulted for insulin management.   - Endo recs:  - F/u PCP    # CAD  Pt had stent placed in 12/2022 and has been taking DAPT since. Discussed continuing DAPT with Dr. Jordan.   - DC with _____    Patient was discharged to: Home    New medications: Keflex 500 q6h, Flagyl 500mg q8h  Changes to old medications:  Medications that were stopped:    Items to follow up as outpatient: Podiatry, IDDM    Physical exam at the time of discharge:   General: NAD, laying in bed comfortably  HEENT: NC/AT; PERRL, anicteric sclera  Cardiovascular: +S1/S2; RRR  Respiratory: CTA B/L; no W/R/R  Gastrointestinal: soft, NT/ND;   Extremities: WWP; no edema, clubbing or cyanosis. R foot wrapped.  Vascular: 2+ radial, DP/PT pulses B/L  Neurological: AAOx3; no focal deficits    LABS & STUDIES:  .  LABS:                         9.2    6.54  )-----------( 241      ( 29 Mar 2024 05:30 )             27.9     03-29    137  |  107  |  24<H>  ----------------------------<  74  4.6   |  21<L>  |  1.55<H>    Ca    9.0      29 Mar 2024 05:30  Phos  4.5     03-29  Mg     2.2     03-29      PT/INR - ( 27 Mar 2024 16:57 )   PT: 12.4 sec;   INR: 1.09          PTT - ( 27 Mar 2024 16:57 )  PTT:35.2 sec  Urinalysis Basic - ( 29 Mar 2024 05:30 )    Color: x / Appearance: x / SG: x / pH: x  Gluc: 74 mg/dL / Ketone: x  / Bili: x / Urobili: x   Blood: x / Protein: x / Nitrite: x   Leuk Esterase: x / RBC: x / WBC x   Sq Epi: x / Non Sq Epi: x / Bacteria: x      CARDIAC MARKERS ( 27 Mar 2024 16:57 )  x     / x     / See Note / x     / 5.1 ng/mL            RADIOLOGY, EKG & ADDITIONAL TESTS: Reviewed. #Discharge: do not delete    VALENTIN CABALLERO is a 58M with PMH HfpEF, CAD, HTN, HLD, DM, OM presenting for foot infection, admitted for OM, s/p partial 5th ray amputation of R foot, podiatry team not concerned for residual OM. Endo consulted for DM. Patient medically optimized for DC home with PO abx.     Problem List/Main Diagnoses (system-based):   # SSTI 2/2 OM  Px with R 5th toe swelling and redness with PMH of OM. S/p partial 5th ray amputation of R foot. Received IV abx with Vanc and Zosyn.  - DC with Keflex 500mg q6h and Flagyl 500mg q8h for 14 day total course (last dose 4/10)  - f/u Dr. Chakraborty     # IDDM  Hx of poorly controlled DM, prescribed lantus and Humalog at home with inconsistent adherence. A1C in 2/24 was 14%, rechecked and found to be 9.7%. Endo consulted for insulin management.   - Endo recs:  - F/u PCP    # CAD  Pt had stent placed in 12/2022 and has been taking DAPT since. Discussed continuing DAPT with Dr. Jordan.   - Continue home meds  - F/u with PCP/Julian    Patient was discharged to: Home    New medications: Keflex 500 q6h, Flagyl 500mg q8h  Changes to old medications: None  Medications that were stopped: None    Items to follow up as outpatient: Podiatry, IDDM    Physical exam at the time of discharge:   General: NAD, laying in bed comfortably  HEENT: NC/AT; PERRL, anicteric sclera  Cardiovascular: +S1/S2; RRR  Respiratory: CTA B/L; no W/R/R  Gastrointestinal: soft, NT/ND;   Extremities: WWP; no edema, clubbing or cyanosis. R foot wrapped.  Vascular: 2+ radial, DP/PT pulses B/L  Neurological: AAOx3; no focal deficits    LABS & STUDIES:  .  LABS:                         9.2    6.54  )-----------( 241      ( 29 Mar 2024 05:30 )             27.9     03-29    137  |  107  |  24<H>  ----------------------------<  74  4.6   |  21<L>  |  1.55<H>    Ca    9.0      29 Mar 2024 05:30  Phos  4.5     03-29  Mg     2.2     03-29      PT/INR - ( 27 Mar 2024 16:57 )   PT: 12.4 sec;   INR: 1.09          PTT - ( 27 Mar 2024 16:57 )  PTT:35.2 sec  Urinalysis Basic - ( 29 Mar 2024 05:30 )    Color: x / Appearance: x / SG: x / pH: x  Gluc: 74 mg/dL / Ketone: x  / Bili: x / Urobili: x   Blood: x / Protein: x / Nitrite: x   Leuk Esterase: x / RBC: x / WBC x   Sq Epi: x / Non Sq Epi: x / Bacteria: x      CARDIAC MARKERS ( 27 Mar 2024 16:57 )  x     / x     / See Note / x     / 5.1 ng/mL            RADIOLOGY, EKG & ADDITIONAL TESTS: Reviewed. #Discharge: do not delete    VALENTIN CABALLERO is a 58M with PMH HfpEF, CAD, HTN, HLD, DM, OM presenting for foot infection, admitted for OM, s/p partial 5th ray amputation of R foot, podiatry team not concerned for residual OM. Endo consulted for DM. Patient medically optimized for DC home with PO abx.     Problem List/Main Diagnoses (system-based):   # SSTI 2/2 OM  Px with R 5th toe swelling and redness with PMH of OM. S/p partial 5th ray amputation of R foot. Received IV abx with Vanc and Zosyn. C/c/b margins growing Corynebacterium and Staph species. PICC placed via IR.  - C/w Vancomycin 1g QD with trough in 3 days  - C/w Cefazolin 2g q8hrs  - C/w IV abx for 6 weeks total until 5/9/2024  - F/u with Dr. Gallardo    # IDDM  Hx of poorly controlled DM, prescribed lantus and Humalog at home with inconsistent adherence. A1C in 2/24 was 14%, rechecked and found to be 9.7%. Endo consulted for insulin management.   - Endo recs: Continue Lantus 15 qhs, Lispro 7 TID with meals  - F/u PCP    # CAD  Pt had stent placed in 12/2022 and has been taking DAPT since. Discussed continuing DAPT with Dr. Jordan.   - Continue home meds  - F/u with PCP/Julian    Patient was discharged to: Home    New medications: Vancomycin 1g qd, Cefazolin 2g q8hr  Changes to old medications: None  Medications that were stopped: None    Items to follow up as outpatient: Podiatry, IDDM    Physical exam at the time of discharge:   General: NAD, laying in bed comfortably  HEENT: NC/AT; PERRL, anicteric sclera  Cardiovascular: +S1/S2; RRR  Respiratory: CTA B/L; no W/R/R  Gastrointestinal: soft, NT/ND;   Extremities: WWP; no edema, clubbing or cyanosis. R foot wrapped.  Vascular: 2+ radial, DP/PT pulses B/L  Neurological: AAOx3; no focal deficits    LABS & STUDIES:  .  LABS:                         9.2    6.54  )-----------( 241      ( 29 Mar 2024 05:30 )             27.9     03-29    137  |  107  |  24<H>  ----------------------------<  74  4.6   |  21<L>  |  1.55<H>    Ca    9.0      29 Mar 2024 05:30  Phos  4.5     03-29  Mg     2.2     03-29      PT/INR - ( 27 Mar 2024 16:57 )   PT: 12.4 sec;   INR: 1.09          PTT - ( 27 Mar 2024 16:57 )  PTT:35.2 sec  Urinalysis Basic - ( 29 Mar 2024 05:30 )    Color: x / Appearance: x / SG: x / pH: x  Gluc: 74 mg/dL / Ketone: x  / Bili: x / Urobili: x   Blood: x / Protein: x / Nitrite: x   Leuk Esterase: x / RBC: x / WBC x   Sq Epi: x / Non Sq Epi: x / Bacteria: x      CARDIAC MARKERS ( 27 Mar 2024 16:57 )  x     / x     / See Note / x     / 5.1 ng/mL            RADIOLOGY, EKG & ADDITIONAL TESTS: Reviewed.

## 2024-03-29 NOTE — DISCHARGE NOTE PROVIDER - PROVIDER TOKENS
PROVIDER:[TOKEN:[[id number]],FOLLOWUP:[1 week],ESTABLISHEDPATIENT:[T]] PROVIDER:[TOKEN:[[id number]],FOLLOWUP:[1 week],ESTABLISHEDPATIENT:[T]],PROVIDER:[TOKEN:[[id number]],FOLLOWUP:[2 weeks]]

## 2024-03-29 NOTE — PROGRESS NOTE ADULT - PROBLEM SELECTOR PLAN 9
F: Encourage PO when diet started  E: Replete as necessary K>4 Mg>2  N: CC diet    DVT Prophylaxis: Start Hep SubQ when cleared  GI prophylaxis: None   CODE STATUS: FULL F: Encourage PO  E: Replete as necessary K>4 Mg>2  N: CC diet    DVT Prophylaxis: Hep SubQ  GI prophylaxis: None   CODE STATUS: FULL

## 2024-03-29 NOTE — DISCHARGE NOTE PROVIDER - NSDCCPCAREPLAN_GEN_ALL_CORE_FT
PRINCIPAL DISCHARGE DIAGNOSIS  Diagnosis: Osteomyelitis  Assessment and Plan of Treatment: You were admitted to the hospital for the management of osteomyelitis. Osteomyelitis is an infection of the bone. It can cause pain and other symptoms. A bone can get infected if there are germs in the blood or nearby tissues. A bone can also get infected following a serious injury that exposes the bone. Treatment usually starts with surgery to remove dead and damaged bone and tissue.   You are now medically optimized for discharge with oral antibiotics. You will be taking KEFLEX 500mg four times a day and FLAGYL 500mg three times a day with your last dose on April 10th.  Please be sure to follow up with your podiatrist within 1 week of discharge.  Please be sure to take all of your medications as prescribed.  Please seek immediate medical attention if you notice worsening swelling, pain, fevers or chills.        PRINCIPAL DISCHARGE DIAGNOSIS  Diagnosis: Osteomyelitis  Assessment and Plan of Treatment: You were admitted to the hospital for the management of osteomyelitis. Osteomyelitis is an infection of the bone. It can cause pain and other symptoms. A bone can get infected if there are germs in the blood or nearby tissues. A bone can also get infected following a serious injury that exposes the bone. Treatment usually starts with surgery to remove dead and damaged bone and tissue.   You are now medically optimized for discharge with oral antibiotics. You will be taking KEFLEX 500mg four times a day and FLAGYL 500mg three times a day with your last dose on April 10th.  Please be sure to follow up with your podiatrist within 1 week of discharge.  Please be sure to take all of your medications as prescribed.  Please seek immediate medical attention if you notice worsening swelling, pain, fevers or chills.         SECONDARY DISCHARGE DIAGNOSES  Diagnosis: Diabetes mellitus type 2, insulin dependent  Assessment and Plan of Treatment: You have a history of insulin-dependent diabetes, which has previously been poorly controlled putting you at risk for infections. We checked your A1C while you were here, which was 9.7%, a huge improvement from your previous A1C of >14%. We discussed your insulin regimen, which was previously Lantus 40 units at night and humalog 15 units at night. You mentioned that your adherence to your insulin has been inconsistent and you have found yourself making your own adjustments to the dose due to seeing low blood sugars on your home monitor. We had the endocrinology team see you and they lowered your insulin doses. Now that you are medically optimized for discharge, it is incredibly important that you continue on you path to lowering your A1C to prevent further infections.   Please be sure to take _____ at night and _____ with meals.  Please be sure to follow up with your endocrinologist.  Please seek immediate medical attention if you experience lightheadedness, weakness, increased thurst or urination, or confusion.     PRINCIPAL DISCHARGE DIAGNOSIS  Diagnosis: Osteomyelitis  Assessment and Plan of Treatment: You were admitted to the hospital for the management of osteomyelitis. Osteomyelitis is an infection of the bone. It can cause pain and other symptoms. A bone can get infected if there are germs in the blood or nearby tissues. A bone can also get infected following a serious injury that exposes the bone. Treatment usually starts with surgery to remove dead and damaged bone and tissue. We took a margin biopsy to ensure that all of the infection was contained within the amputation, however your margins showed some residual bugs.   You were given a PICC line and are now medically optimized for discharge with 6 weeks of IV antibiotics. You will be taking Vancomycin 1g per day and Cefazolin 2g three times a day. For your Vancomycin, it is important that you get your trough checked 2-3 days after you leave the hospital to ensure appropriate levels are in your blood.  Please be sure to follow up with your podiatrist and infectious disease physician within 1 week of discharge.  Please be sure to take all of your medications as prescribed.  Please seek immediate medical attention if you notice worsening swelling, pain, fevers or chills.         SECONDARY DISCHARGE DIAGNOSES  Diagnosis: Diabetes mellitus type 2, insulin dependent  Assessment and Plan of Treatment: You have a history of insulin-dependent diabetes, which has previously been poorly controlled putting you at risk for infections. We checked your A1C while you were here, which was 9.7%, a huge improvement from your previous A1C of >14%. We discussed your insulin regimen, which was previously Lantus 40 units at night and humalog 15 units at night. You mentioned that your adherence to your insulin has been inconsistent and you have found yourself making your own adjustments to the dose due to seeing low blood sugars on your home monitor. We had the endocrinology team see you and they lowered your insulin doses. Now that you are medically optimized for discharge, it is incredibly important that you continue on you path to lowering your A1C to prevent further infections.   Please be sure to take Lantus 15 units at night and Humalog 7 units with meals.  Please be sure to follow up with your endocrinologist.  Please seek immediate medical attention if you experience lightheadedness, weakness, increased thurst or urination, or confusion.

## 2024-03-29 NOTE — DISCHARGE NOTE PROVIDER - ATTENDING DISCHARGE PHYSICAL EXAMINATION:
58 YOM with PMH of T2DM, HFpEF, HTN, HLD, T2DM, CAD on DAPT, CKD3a admitted for right toe OM s/p amputation of partial 5th ray receiving IV antibiotics for positive margin culture.     Acute OM - MSSA and corynebacterium on clean bone margin culture, will need 6w course of cefazolin and vancomycin. PICC placed 4/1/24, CM to assist with setup of home infusion services. Podiatry follow up 4/5 with weekly labs per ID (including trough THIS WEEK).    T2DM - HbA1C 9.7 (previously 14.2), endocrine following. Plan for discharge home with glargine 15u + lispro 7u TID premeal.     HTN - cont amlodipine 10mg, losartan 100mg (home olmesartan 40mg). Resume home torsemide on discharge.    CKD3a - renally dose, avoid nephrotoxins. Lokelma 10g TID today for K 5.4, repeat BMP prior to discharge with improved K of 5.2.     Dispo - home

## 2024-03-29 NOTE — PROGRESS NOTE ADULT - PROBLEM SELECTOR PLAN 2
Hx of poorly controlled IDDM with A1C 14.2 (2/6/24). Pt believes he sees an endocrinologist but isn't sure. Says he recently became more compliant with insulin regimen but has made his own changes iso seeing low glucose readings in the morning (mid-50s)  Home meds: Lantus 40, Humalog 15 TID - says he takes closer to 25 units of Lantus  - mISS  - C/w lantus 20, lispro 10  - started CC diet  - Endo consulted, appreciate recs Hx of poorly controlled IDDM with A1C 14.2 (2/6/24). Pt believes he sees an endocrinologist but isn't sure. Says he recently became more compliant with insulin regimen but has made his own changes iso seeing low glucose readings in the morning (mid-50s)  Home meds: Lantus 40, Humalog 15 TID - says he takes closer to 25 units of Lantus  - Endo consulted, appreciate recs  - mISS  - C/w lantus 15, lispro 7  - started CC diet

## 2024-03-29 NOTE — DISCHARGE NOTE PROVIDER - NSDCMRMEDTOKEN_GEN_ALL_CORE_FT
amLODIPine 10 mg oral tablet: 1 tab(s) orally once a day  Aspirin Enteric Coated 81 mg oral delayed release tablet: 1 tab(s) orally once a day   citalopram 40 mg oral tablet: 1 tab(s) orally once a day  clopidogrel 75 mg oral tablet: 1 tab(s) orally once a day   HumaLOG 100 units/mL injectable solution: 15 unit(s) injectable 3 times a day  Lantus 100 units/mL subcutaneous solution: 40 unit(s) subcutaneous once a day (at bedtime)  Lipitor 40 mg oral tablet: 1 tab(s) orally once a day  olmesartan 40 mg oral tablet: 1 tab(s) orally once a day  torsemide 40 mg oral tablet: 1 tab(s) orally once a day   amLODIPine 10 mg oral tablet: 1 tab(s) orally once a day  Aspirin Enteric Coated 81 mg oral delayed release tablet: 1 tab(s) orally once a day   cephalexin 500 mg oral capsule: 1 cap(s) orally 4 times a day  citalopram 40 mg oral tablet: 1 tab(s) orally once a day  clopidogrel 75 mg oral tablet: 1 tab(s) orally once a day   HumaLOG 100 units/mL injectable solution: 15 unit(s) injectable 3 times a day  Lantus 100 units/mL subcutaneous solution: 40 unit(s) subcutaneous once a day (at bedtime)  Lipitor 40 mg oral tablet: 1 tab(s) orally once a day  metroNIDAZOLE 500 mg oral tablet: 1 tab(s) orally 3 times a day  olmesartan 40 mg oral tablet: 1 tab(s) orally once a day  torsemide 40 mg oral tablet: 1 tab(s) orally once a day   amLODIPine 10 mg oral tablet: 1 tab(s) orally once a day  Aspirin Enteric Coated 81 mg oral delayed release tablet: 1 tab(s) orally once a day   ceFAZolin 2 g intravenous injection: 2 gram(s) intravenous every 8 hours  citalopram 40 mg oral tablet: 1 tab(s) orally once a day  clopidogrel 75 mg oral tablet: 1 tab(s) orally once a day   HumaLOG 100 units/mL injectable solution: 7 unit(s) injectable 3 times a day (with meals)  insulin glargine 100 units/mL subcutaneous solution: 15 unit(s) subcutaneous once a day (at bedtime)  Lipitor 40 mg oral tablet: 1 tab(s) orally once a day  olmesartan 40 mg oral tablet: 1 tab(s) orally once a day  torsemide 40 mg oral tablet: 1 tab(s) orally once a day  vancomycin 1 g intravenous injection: 1 gram(s) intravenous every 24 hours

## 2024-03-29 NOTE — PROGRESS NOTE ADULT - SUBJECTIVE AND OBJECTIVE BOX
Patient is a 58y old  Male who presents with a chief complaint of SSTI/OM (29 Mar 2024 06:53)      INTERVAL HPI/ OVERNIGHT EVENTS  Pt is POD2 from R partial 5th ray amputation. Pt seen and examined bedside. Complaining of some soreness to his left foot      LABS                        9.2    6.54  )-----------( 241      ( 29 Mar 2024 05:30 )             27.9     03-29    137  |  107  |  24<H>  ----------------------------<  74  4.6   |  21<L>  |  1.55<H>    Ca    9.0      29 Mar 2024 05:30  Phos  4.5     03-29  Mg     2.2     03-29      PT/INR - ( 27 Mar 2024 16:57 )   PT: 12.4 sec;   INR: 1.09          PTT - ( 27 Mar 2024 16:57 )  PTT:35.2 sec    ICU Vital Signs Last 24 Hrs  T(C): 36.6 (29 Mar 2024 05:52), Max: 37.1 (28 Mar 2024 16:31)  T(F): 97.8 (29 Mar 2024 05:52), Max: 98.7 (28 Mar 2024 16:31)  HR: 52 (29 Mar 2024 05:52) (50 - 56)  BP: 142/61 (29 Mar 2024 05:52) (124/65 - 150/71)  BP(mean): --  ABP: --  ABP(mean): --  RR: 17 (29 Mar 2024 05:52) (17 - 18)  SpO2: 96% (29 Mar 2024 05:52) (94% - 96%)    O2 Parameters below as of 29 Mar 2024 05:52  Patient On (Oxygen Delivery Method): room air            RADIOLOGY    MICROBIOLOGY    Culture - Tissue with Gram Stain (collected 27 Mar 2024 21:53)  Source: .Tissue right foot 5th metatarsal clean margin or spec  Gram Stain (27 Mar 2024 22:31):    No organisms seen    No WBC's seen.  Preliminary Report (28 Mar 2024 09:01):    No growth to date    Culture - Blood (collected 27 Mar 2024 16:40)  Source: .Blood Blood-Peripheral  Preliminary Report (28 Mar 2024 18:00):    No growth at 1 day.    Culture - Blood (collected 27 Mar 2024 16:30)  Source: .Blood Blood-Peripheral  Preliminary Report (28 Mar 2024 18:00):    No growth at 1 day.        PHYSICAL EXAM  R Lower Extremity Focused:  Vasc: DP 1/4 PT 2/4. Tg: Warm to Warm. Edema present up to distal half of leg  Derm: Surgical site intact with no signs of dehiscence. No drainage noted or signs of residual infection.   Neuro: Protective sensation intact  MSK: Partial 3rd digit amputation Patient is a 58y old  Male who presents with a chief complaint of SSTI/OM (29 Mar 2024 06:53)      INTERVAL HPI/ OVERNIGHT EVENTS  Pt is POD2 from R partial 5th ray amputation. Pt seen and examined bedside by attending. Complaining of some soreness to his left foot      LABS                        9.2    6.54  )-----------( 241      ( 29 Mar 2024 05:30 )             27.9     03-29    137  |  107  |  24<H>  ----------------------------<  74  4.6   |  21<L>  |  1.55<H>    Ca    9.0      29 Mar 2024 05:30  Phos  4.5     03-29  Mg     2.2     03-29      PT/INR - ( 27 Mar 2024 16:57 )   PT: 12.4 sec;   INR: 1.09          PTT - ( 27 Mar 2024 16:57 )  PTT:35.2 sec    ICU Vital Signs Last 24 Hrs  T(C): 36.6 (29 Mar 2024 05:52), Max: 37.1 (28 Mar 2024 16:31)  T(F): 97.8 (29 Mar 2024 05:52), Max: 98.7 (28 Mar 2024 16:31)  HR: 52 (29 Mar 2024 05:52) (50 - 56)  BP: 142/61 (29 Mar 2024 05:52) (124/65 - 150/71)  BP(mean): --  ABP: --  ABP(mean): --  RR: 17 (29 Mar 2024 05:52) (17 - 18)  SpO2: 96% (29 Mar 2024 05:52) (94% - 96%)    O2 Parameters below as of 29 Mar 2024 05:52  Patient On (Oxygen Delivery Method): room air            RADIOLOGY    MICROBIOLOGY    Culture - Tissue with Gram Stain (collected 27 Mar 2024 21:53)  Source: .Tissue right foot 5th metatarsal clean margin or spec  Gram Stain (27 Mar 2024 22:31):    No organisms seen    No WBC's seen.  Preliminary Report (28 Mar 2024 09:01):    No growth to date    Culture - Blood (collected 27 Mar 2024 16:40)  Source: .Blood Blood-Peripheral  Preliminary Report (28 Mar 2024 18:00):    No growth at 1 day.    Culture - Blood (collected 27 Mar 2024 16:30)  Source: .Blood Blood-Peripheral  Preliminary Report (28 Mar 2024 18:00):    No growth at 1 day.        PHYSICAL EXAM  R Lower Extremity Focused:  Vasc: DP 1/4 PT 2/4. Tg: Warm to Warm. Edema present up to distal half of leg  Derm: Surgical site intact with no signs of dehiscence. No drainage noted or signs of residual infection.   Neuro: Protective sensation intact  MSK: Partial 3rd digit amputation

## 2024-03-30 LAB
-  CLINDAMYCIN: SIGNIFICANT CHANGE UP
-  ERYTHROMYCIN: SIGNIFICANT CHANGE UP
-  LINEZOLID: SIGNIFICANT CHANGE UP
-  OXACILLIN: SIGNIFICANT CHANGE UP
-  RIFAMPIN: SIGNIFICANT CHANGE UP
-  TETRACYCLINE: SIGNIFICANT CHANGE UP
-  TRIMETHOPRIM/SULFAMETHOXAZOLE: SIGNIFICANT CHANGE UP
-  VANCOMYCIN: SIGNIFICANT CHANGE UP
ANION GAP SERPL CALC-SCNC: 2 MMOL/L — LOW (ref 5–17)
BASOPHILS # BLD AUTO: 0.05 K/UL — SIGNIFICANT CHANGE UP (ref 0–0.2)
BASOPHILS NFR BLD AUTO: 0.8 % — SIGNIFICANT CHANGE UP (ref 0–2)
BUN SERPL-MCNC: 23 MG/DL — SIGNIFICANT CHANGE UP (ref 7–23)
CALCIUM SERPL-MCNC: 9.4 MG/DL — SIGNIFICANT CHANGE UP (ref 8.4–10.5)
CHLORIDE SERPL-SCNC: 105 MMOL/L — SIGNIFICANT CHANGE UP (ref 96–108)
CO2 SERPL-SCNC: 27 MMOL/L — SIGNIFICANT CHANGE UP (ref 22–31)
CREAT SERPL-MCNC: 1.36 MG/DL — HIGH (ref 0.5–1.3)
CULTURE RESULTS: ABNORMAL
EGFR: 61 ML/MIN/1.73M2 — SIGNIFICANT CHANGE UP
EOSINOPHIL # BLD AUTO: 0.39 K/UL — SIGNIFICANT CHANGE UP (ref 0–0.5)
EOSINOPHIL NFR BLD AUTO: 6.1 % — HIGH (ref 0–6)
GLUCOSE BLDC GLUCOMTR-MCNC: 106 MG/DL — HIGH (ref 70–99)
GLUCOSE BLDC GLUCOMTR-MCNC: 140 MG/DL — HIGH (ref 70–99)
GLUCOSE BLDC GLUCOMTR-MCNC: 222 MG/DL — HIGH (ref 70–99)
GLUCOSE BLDC GLUCOMTR-MCNC: 72 MG/DL — SIGNIFICANT CHANGE UP (ref 70–99)
GLUCOSE SERPL-MCNC: 96 MG/DL — SIGNIFICANT CHANGE UP (ref 70–99)
HCT VFR BLD CALC: 30 % — LOW (ref 39–50)
HGB BLD-MCNC: 9.6 G/DL — LOW (ref 13–17)
IMM GRANULOCYTES NFR BLD AUTO: 0.2 % — SIGNIFICANT CHANGE UP (ref 0–0.9)
LYMPHOCYTES # BLD AUTO: 1.37 K/UL — SIGNIFICANT CHANGE UP (ref 1–3.3)
LYMPHOCYTES # BLD AUTO: 21.4 % — SIGNIFICANT CHANGE UP (ref 13–44)
MAGNESIUM SERPL-MCNC: 1.9 MG/DL — SIGNIFICANT CHANGE UP (ref 1.6–2.6)
MCHC RBC-ENTMCNC: 29.7 PG — SIGNIFICANT CHANGE UP (ref 27–34)
MCHC RBC-ENTMCNC: 32 GM/DL — SIGNIFICANT CHANGE UP (ref 32–36)
MCV RBC AUTO: 92.9 FL — SIGNIFICANT CHANGE UP (ref 80–100)
METHOD TYPE: SIGNIFICANT CHANGE UP
MONOCYTES # BLD AUTO: 0.65 K/UL — SIGNIFICANT CHANGE UP (ref 0–0.9)
MONOCYTES NFR BLD AUTO: 10.2 % — SIGNIFICANT CHANGE UP (ref 2–14)
NEUTROPHILS # BLD AUTO: 3.93 K/UL — SIGNIFICANT CHANGE UP (ref 1.8–7.4)
NEUTROPHILS NFR BLD AUTO: 61.3 % — SIGNIFICANT CHANGE UP (ref 43–77)
NRBC # BLD: 0 /100 WBCS — SIGNIFICANT CHANGE UP (ref 0–0)
ORGANISM # SPEC MICROSCOPIC CNT: ABNORMAL
ORGANISM # SPEC MICROSCOPIC CNT: SIGNIFICANT CHANGE UP
PHOSPHATE SERPL-MCNC: 3.7 MG/DL — SIGNIFICANT CHANGE UP (ref 2.5–4.5)
PLATELET # BLD AUTO: 274 K/UL — SIGNIFICANT CHANGE UP (ref 150–400)
POTASSIUM SERPL-MCNC: 4.9 MMOL/L — SIGNIFICANT CHANGE UP (ref 3.5–5.3)
POTASSIUM SERPL-SCNC: 4.9 MMOL/L — SIGNIFICANT CHANGE UP (ref 3.5–5.3)
RBC # BLD: 3.23 M/UL — LOW (ref 4.2–5.8)
RBC # FLD: 13.3 % — SIGNIFICANT CHANGE UP (ref 10.3–14.5)
SODIUM SERPL-SCNC: 134 MMOL/L — LOW (ref 135–145)
SPECIMEN SOURCE: SIGNIFICANT CHANGE UP
WBC # BLD: 6.4 K/UL — SIGNIFICANT CHANGE UP (ref 3.8–10.5)
WBC # FLD AUTO: 6.4 K/UL — SIGNIFICANT CHANGE UP (ref 3.8–10.5)

## 2024-03-30 PROCEDURE — 99233 SBSQ HOSP IP/OBS HIGH 50: CPT

## 2024-03-30 RX ORDER — INFLUENZA VIRUS VACCINE 15; 15; 15; 15 UG/.5ML; UG/.5ML; UG/.5ML; UG/.5ML
0.5 SUSPENSION INTRAMUSCULAR ONCE
Refills: 0 | Status: DISCONTINUED | OUTPATIENT
Start: 2024-03-30 | End: 2024-04-02

## 2024-03-30 RX ORDER — LOSARTAN POTASSIUM 100 MG/1
50 TABLET, FILM COATED ORAL DAILY
Refills: 0 | Status: DISCONTINUED | OUTPATIENT
Start: 2024-03-30 | End: 2024-03-31

## 2024-03-30 RX ADMIN — Medication 650 MILLIGRAM(S): at 19:00

## 2024-03-30 RX ADMIN — Medication 650 MILLIGRAM(S): at 01:36

## 2024-03-30 RX ADMIN — CLOPIDOGREL BISULFATE 75 MILLIGRAM(S): 75 TABLET, FILM COATED ORAL at 11:36

## 2024-03-30 RX ADMIN — Medication 81 MILLIGRAM(S): at 11:36

## 2024-03-30 RX ADMIN — Medication 650 MILLIGRAM(S): at 06:43

## 2024-03-30 RX ADMIN — Medication 166.67 MILLIGRAM(S): at 06:43

## 2024-03-30 RX ADMIN — Medication 650 MILLIGRAM(S): at 18:00

## 2024-03-30 RX ADMIN — Medication 650 MILLIGRAM(S): at 00:36

## 2024-03-30 RX ADMIN — Medication 7 UNIT(S): at 13:57

## 2024-03-30 RX ADMIN — Medication 7 UNIT(S): at 09:50

## 2024-03-30 RX ADMIN — AMLODIPINE BESYLATE 10 MILLIGRAM(S): 2.5 TABLET ORAL at 06:43

## 2024-03-30 RX ADMIN — Medication 100 MILLIGRAM(S): at 08:50

## 2024-03-30 RX ADMIN — Medication 650 MILLIGRAM(S): at 11:36

## 2024-03-30 RX ADMIN — HEPARIN SODIUM 5000 UNIT(S): 5000 INJECTION INTRAVENOUS; SUBCUTANEOUS at 13:57

## 2024-03-30 RX ADMIN — HEPARIN SODIUM 5000 UNIT(S): 5000 INJECTION INTRAVENOUS; SUBCUTANEOUS at 21:51

## 2024-03-30 RX ADMIN — Medication 7 UNIT(S): at 18:36

## 2024-03-30 RX ADMIN — Medication 650 MILLIGRAM(S): at 23:30

## 2024-03-30 RX ADMIN — CITALOPRAM 40 MILLIGRAM(S): 10 TABLET, FILM COATED ORAL at 11:36

## 2024-03-30 RX ADMIN — Medication 166.67 MILLIGRAM(S): at 18:00

## 2024-03-30 RX ADMIN — Medication 100 MILLIGRAM(S): at 16:57

## 2024-03-30 RX ADMIN — LOSARTAN POTASSIUM 50 MILLIGRAM(S): 100 TABLET, FILM COATED ORAL at 09:55

## 2024-03-30 RX ADMIN — Medication 650 MILLIGRAM(S): at 12:36

## 2024-03-30 RX ADMIN — Medication 100 MILLIGRAM(S): at 21:50

## 2024-03-30 RX ADMIN — HEPARIN SODIUM 5000 UNIT(S): 5000 INJECTION INTRAVENOUS; SUBCUTANEOUS at 06:43

## 2024-03-30 RX ADMIN — ATORVASTATIN CALCIUM 40 MILLIGRAM(S): 80 TABLET, FILM COATED ORAL at 21:51

## 2024-03-30 RX ADMIN — POLYETHYLENE GLYCOL 3350 17 GRAM(S): 17 POWDER, FOR SOLUTION ORAL at 21:50

## 2024-03-30 RX ADMIN — INSULIN GLARGINE 15 UNIT(S): 100 INJECTION, SOLUTION SUBCUTANEOUS at 21:50

## 2024-03-30 RX ADMIN — SENNA PLUS 2 TABLET(S): 8.6 TABLET ORAL at 21:51

## 2024-03-30 NOTE — PROGRESS NOTE ADULT - PROBLEM SELECTOR PLAN 5
#H/o hyperkalemia   Hx of CKD stage 3. Follows with Dr. Efrain Castillo. Previously prescribed lokelma but no longer taking. Labs drawn in ED were hemolyzed. Crt 1.31 (baseline 1.3-1.4).  - Renally dose meds  - Avoid nephrotoxic agents  - Obtain BMP in am  - Monitor K/Crt #H/o hyperkalemia   Hx of CKD stage 3. Follows with Dr. Efrain Castillo. Previously prescribed lokelma but no longer taking. Labs drawn in ED were hemolyzed. Crt 1.31 (baseline 1.3-1.4).  - Renally dose meds  - Avoid nephrotoxic agents  - Monitor K/Crt

## 2024-03-30 NOTE — PROGRESS NOTE ADULT - PROBLEM SELECTOR PLAN 6
Home meds: Amlodipine 10mg, olmesartan 40mg   - C/w amlodipine  - Holding losartan iso moderate BPs, resume when appropriate Home meds: Amlodipine 10mg, olmesartan 40mg   - C/w amlodipine  - Started losartan 50mg iso elevated pressures (therapeutic exchange would be losartan 100mg)

## 2024-03-30 NOTE — PROGRESS NOTE ADULT - SUBJECTIVE AND OBJECTIVE BOX
Patient is a 58y old  Male who presents with a chief complaint of SSTI/OM (30 Mar 2024 06:48)      INTERVAL HPI/ OVERNIGHT EVENTS  Pt is POD3 from R partial 5th ray amputation. Pt seen and examined bedside by attending. Complaining of some soreness to his left foot        LABS                        9.6    6.40  )-----------( 274      ( 30 Mar 2024 06:32 )             30.0     03-30    134<L>  |  105  |  23  ----------------------------<  96  4.9   |  27  |  1.36<H>    Ca    9.4      30 Mar 2024 06:32  Phos  3.7     03-30  Mg     1.9     03-30          ICU Vital Signs Last 24 Hrs  T(C): 36.4 (30 Mar 2024 05:44), Max: 36.7 (29 Mar 2024 15:50)  T(F): 97.6 (30 Mar 2024 05:44), Max: 98 (29 Mar 2024 15:50)  HR: 55 (30 Mar 2024 11:41) (48 - 63)  BP: 149/59 (30 Mar 2024 11:41) (149/59 - 174/72)  BP(mean): --  ABP: --  ABP(mean): --  RR: 17 (30 Mar 2024 09:50) (17 - 18)  SpO2: 96% (30 Mar 2024 09:50) (96% - 99%)    O2 Parameters below as of 30 Mar 2024 09:50  Patient On (Oxygen Delivery Method): room air            RADIOLOGY      MICROBIOLOGY    Culture - Tissue with Gram Stain (collected 27 Mar 2024 21:53)  Source: .Tissue right foot 5th metatarsal clean margin or spec  Gram Stain (29 Mar 2024 14:29):    No organisms seen    No WBC's seen.  Final Report (30 Mar 2024 12:05):    Rare Staphylococcus aureus    Rare Corynebacterium amycolatum    Result called to and read back byKimberlee Olivarez RN  03/29/2024 14:28:11  Organism: Staphylococcus aureus (30 Mar 2024 12:05)  Organism: Staphylococcus aureus (30 Mar 2024 12:05)    Culture - Blood (collected 27 Mar 2024 16:40)  Source: .Blood Blood-Peripheral  Preliminary Report (29 Mar 2024 18:00):    No growth at 2 days.    Culture - Blood (collected 27 Mar 2024 16:30)  Source: .Blood Blood-Peripheral  Preliminary Report (29 Mar 2024 18:00):    No growth at 2 days.        PHYSICAL EXAM  R Lower Extremity Focused:  Vasc: DP 1/4 PT 2/4. Tg: Warm to Warm. Edema present up to distal half of leg  Derm: Surgical site intact with no signs of dehiscence. No drainage noted or signs of residual infection.   Neuro: Protective sensation intact  MSK: Partial 3rd digit amputation and 5th ray amputation

## 2024-03-30 NOTE — PROGRESS NOTE ADULT - PROBLEM SELECTOR PLAN 3
Pt had stent placed 12/9/2022 iso SOB and CP. Symptoms have resolved since. Unclear why he is still on plavix.   Home meds: ASA/Plavix, Atorvastatin 40  - C/w statin  - restarted plavix, per cardiologist Pt had stent placed 12/9/2022 iso SOB and CP. Symptoms have resolved since. Unclear why he is still on plavix.   Home meds: ASA/Plavix, Atorvastatin 40  - C/w statin  - C/w DAPT

## 2024-03-30 NOTE — PROGRESS NOTE ADULT - PROBLEM SELECTOR PLAN 2
Hx of poorly controlled IDDM with A1C 14.2 (2/6/24). Pt believes he sees an endocrinologist but isn't sure. Says he recently became more compliant with insulin regimen but has made his own changes iso seeing low glucose readings in the morning (mid-50s)  Home meds: Lantus 40, Humalog 15 TID - says he takes closer to 25 units of Lantus  - Endo consulted, appreciate recs  - mISS  - C/w lantus 15, lispro 7  - started CC diet

## 2024-03-30 NOTE — PROGRESS NOTE ADULT - PROBLEM SELECTOR PLAN 9
F: Encourage PO  E: Replete as necessary K>4 Mg>2  N: CC diet    DVT Prophylaxis: Hep SubQ  GI prophylaxis: None   CODE STATUS: FULL

## 2024-03-30 NOTE — PROGRESS NOTE ADULT - PROBLEM SELECTOR PLAN 1
Hx of OM with prior amputation, px for foot infection. Follows with Dr. Brunner weekly. Was admitted here for management of OM 2/5-2/8 and dc'd on augmentin and linezolid, was started on unknown abx last week. Referred to ED by Dr. Brunner for worsening infection.  Podiatry consulted, c/f deep SSTI vs OM, s/p partial 5th ray amp of R foot, uncomplicated procedure  Margins Margins with presumed methicillin sensitive staph and corynebacterium  - ID consulted, appreciate recs  - Appreciate podiatry recs  - C/w Vanc 1250mg q12h, check trough before 4th dose  - Started cefazolin 2g 18h Hx of OM with prior amputation, px for foot infection. Follows with Dr. Brunner weekly. Was admitted here for management of OM 2/5-2/8 and dc'd on augmentin and linezolid, was started on unknown abx last week. Referred to ED by Dr. Brunner for worsening infection.  Podiatry consulted, c/f deep SSTI vs OM, s/p partial 5th ray amp of R foot, uncomplicated procedure  Margins Margins with presumed methicillin sensitive staph and corynebacterium  - ID consulted, appreciate recs  - Appreciate podiatry recs  - C/w Vanc 1250mg q12h, check trough before 4th dose  - Started cefazolin 2g q8h

## 2024-03-30 NOTE — PROGRESS NOTE ADULT - SUBJECTIVE AND OBJECTIVE BOX
OVERNIGHT EVENTS:    SUBJECTIVE / INTERVAL HPI: Patient seen and examined at bedside.     VITAL SIGNS:  Vital Signs Last 24 Hrs  T(C): 36.4 (30 Mar 2024 05:44), Max: 36.7 (29 Mar 2024 15:50)  T(F): 97.6 (30 Mar 2024 05:44), Max: 98 (29 Mar 2024 15:50)  HR: 63 (30 Mar 2024 05:44) (48 - 63)  BP: 168/73 (30 Mar 2024 05:44) (161/80 - 168/73)  BP(mean): --  RR: 17 (30 Mar 2024 05:44) (17 - 18)  SpO2: 99% (30 Mar 2024 05:44) (96% - 99%)    Parameters below as of 30 Mar 2024 05:44  Patient On (Oxygen Delivery Method): room air        PHYSICAL EXAM:    General: NAD  HEENT: NC/AT; PERRL, anicteric sclera; MMM  Neck: supple w/o palpable nodularity  Cardiovascular: +S1/S2; RRR  Respiratory: CTA B/L; no W/R/R  Gastrointestinal: soft, NT/ND; +BSx4  Extremities: WWP; no edema, clubbing or cyanosis  Vascular: 2+ radial, DP/PT pulses B/L  Neurological: AAOx3; no focal deficits    MEDICATIONS:  MEDICATIONS  (STANDING):  acetaminophen     Tablet .. 650 milliGRAM(s) Oral every 6 hours  amLODIPine   Tablet 10 milliGRAM(s) Oral daily  aspirin  chewable 81 milliGRAM(s) Oral daily  atorvastatin 40 milliGRAM(s) Oral at bedtime  ceFAZolin   IVPB 2000 milliGRAM(s) IV Intermittent every 8 hours  citalopram 40 milliGRAM(s) Oral daily  clopidogrel Tablet 75 milliGRAM(s) Oral daily  dextrose 5%. 1000 milliLiter(s) (50 mL/Hr) IV Continuous <Continuous>  dextrose 5%. 1000 milliLiter(s) (100 mL/Hr) IV Continuous <Continuous>  dextrose 50% Injectable 12.5 Gram(s) IV Push once  dextrose 50% Injectable 25 Gram(s) IV Push once  dextrose 50% Injectable 25 Gram(s) IV Push once  glucagon  Injectable 1 milliGRAM(s) IntraMuscular once  heparin   Injectable 5000 Unit(s) SubCutaneous every 8 hours  influenza   Vaccine 0.5 milliLiter(s) IntraMuscular once  insulin glargine Injectable (LANTUS) 15 Unit(s) SubCutaneous at bedtime  insulin lispro (ADMELOG) corrective regimen sliding scale   SubCutaneous Before meals and at bedtime  insulin lispro Injectable (ADMELOG) 7 Unit(s) SubCutaneous three times a day before meals  polyethylene glycol 3350 17 Gram(s) Oral at bedtime  senna 2 Tablet(s) Oral at bedtime  vancomycin  IVPB 1250 milliGRAM(s) IV Intermittent every 12 hours    MEDICATIONS  (PRN):  acetaminophen     Tablet .. 650 milliGRAM(s) Oral every 6 hours PRN Temp greater or equal to 38C (100.4F), Mild Pain (1 - 3)  aluminum hydroxide/magnesium hydroxide/simethicone Suspension 30 milliLiter(s) Oral every 4 hours PRN Dyspepsia  dextrose Oral Gel 15 Gram(s) Oral once PRN Blood Glucose LESS THAN 70 milliGRAM(s)/deciliter  melatonin 3 milliGRAM(s) Oral at bedtime PRN Insomnia  ondansetron Injectable 4 milliGRAM(s) IV Push every 8 hours PRN Nausea and/or Vomiting  oxyCODONE    IR 2.5 milliGRAM(s) Oral every 4 hours PRN Moderate Pain (4 - 6)  oxyCODONE    IR 5 milliGRAM(s) Oral every 4 hours PRN Severe Pain (7 - 10)      ALLERGIES:  Allergies    No Known Allergies    Intolerances    carvedilol (Hypotension)      LABS:                        9.6    6.40  )-----------( 274      ( 30 Mar 2024 06:32 )             30.0     03-29    137  |  107  |  24<H>  ----------------------------<  74  4.6   |  21<L>  |  1.55<H>    Ca    9.0      29 Mar 2024 05:30  Phos  4.5     03-29  Mg     2.2     03-29        Urinalysis Basic - ( 29 Mar 2024 05:30 )    Color: x / Appearance: x / SG: x / pH: x  Gluc: 74 mg/dL / Ketone: x  / Bili: x / Urobili: x   Blood: x / Protein: x / Nitrite: x   Leuk Esterase: x / RBC: x / WBC x   Sq Epi: x / Non Sq Epi: x / Bacteria: x      CAPILLARY BLOOD GLUCOSE      POCT Blood Glucose.: 181 mg/dL (29 Mar 2024 22:00)      RADIOLOGY & ADDITIONAL TESTS: Reviewed.   OVERNIGHT EVENTS: PARKER    SUBJECTIVE / INTERVAL HPI: Patient seen and examined at bedside. Patient found laying in bed, NAD. Has no complaints. ROS negative.     VITAL SIGNS:  Vital Signs Last 24 Hrs  T(C): 36.4 (30 Mar 2024 05:44), Max: 36.7 (29 Mar 2024 15:50)  T(F): 97.6 (30 Mar 2024 05:44), Max: 98 (29 Mar 2024 15:50)  HR: 63 (30 Mar 2024 05:44) (48 - 63)  BP: 168/73 (30 Mar 2024 05:44) (161/80 - 168/73)  BP(mean): --  RR: 17 (30 Mar 2024 05:44) (17 - 18)  SpO2: 99% (30 Mar 2024 05:44) (96% - 99%)    Parameters below as of 30 Mar 2024 05:44  Patient On (Oxygen Delivery Method): room air        PHYSICAL EXAM:    General: NAD, laying in bed comfortably  HEENT: NC/AT; PERRL, anicteric sclera  Cardiovascular: +S1/S2; RRR  Respiratory: CTA B/L; no W/R/R  Gastrointestinal: soft, NT/ND;   Extremities: WWP; no edema, clubbing or cyanosis. R foot wrapped.  Vascular: 2+ radial, DP/PT pulses B/L  Neurological: AAOx3; no focal deficits    MEDICATIONS:  MEDICATIONS  (STANDING):  acetaminophen     Tablet .. 650 milliGRAM(s) Oral every 6 hours  amLODIPine   Tablet 10 milliGRAM(s) Oral daily  aspirin  chewable 81 milliGRAM(s) Oral daily  atorvastatin 40 milliGRAM(s) Oral at bedtime  ceFAZolin   IVPB 2000 milliGRAM(s) IV Intermittent every 8 hours  citalopram 40 milliGRAM(s) Oral daily  clopidogrel Tablet 75 milliGRAM(s) Oral daily  dextrose 5%. 1000 milliLiter(s) (50 mL/Hr) IV Continuous <Continuous>  dextrose 5%. 1000 milliLiter(s) (100 mL/Hr) IV Continuous <Continuous>  dextrose 50% Injectable 12.5 Gram(s) IV Push once  dextrose 50% Injectable 25 Gram(s) IV Push once  dextrose 50% Injectable 25 Gram(s) IV Push once  glucagon  Injectable 1 milliGRAM(s) IntraMuscular once  heparin   Injectable 5000 Unit(s) SubCutaneous every 8 hours  influenza   Vaccine 0.5 milliLiter(s) IntraMuscular once  insulin glargine Injectable (LANTUS) 15 Unit(s) SubCutaneous at bedtime  insulin lispro (ADMELOG) corrective regimen sliding scale   SubCutaneous Before meals and at bedtime  insulin lispro Injectable (ADMELOG) 7 Unit(s) SubCutaneous three times a day before meals  polyethylene glycol 3350 17 Gram(s) Oral at bedtime  senna 2 Tablet(s) Oral at bedtime  vancomycin  IVPB 1250 milliGRAM(s) IV Intermittent every 12 hours    MEDICATIONS  (PRN):  acetaminophen     Tablet .. 650 milliGRAM(s) Oral every 6 hours PRN Temp greater or equal to 38C (100.4F), Mild Pain (1 - 3)  aluminum hydroxide/magnesium hydroxide/simethicone Suspension 30 milliLiter(s) Oral every 4 hours PRN Dyspepsia  dextrose Oral Gel 15 Gram(s) Oral once PRN Blood Glucose LESS THAN 70 milliGRAM(s)/deciliter  melatonin 3 milliGRAM(s) Oral at bedtime PRN Insomnia  ondansetron Injectable 4 milliGRAM(s) IV Push every 8 hours PRN Nausea and/or Vomiting  oxyCODONE    IR 2.5 milliGRAM(s) Oral every 4 hours PRN Moderate Pain (4 - 6)  oxyCODONE    IR 5 milliGRAM(s) Oral every 4 hours PRN Severe Pain (7 - 10)      ALLERGIES:  Allergies    No Known Allergies    Intolerances    carvedilol (Hypotension)      LABS:                        9.6    6.40  )-----------( 274      ( 30 Mar 2024 06:32 )             30.0     03-29    137  |  107  |  24<H>  ----------------------------<  74  4.6   |  21<L>  |  1.55<H>    Ca    9.0      29 Mar 2024 05:30  Phos  4.5     03-29  Mg     2.2     03-29        Urinalysis Basic - ( 29 Mar 2024 05:30 )    Color: x / Appearance: x / SG: x / pH: x  Gluc: 74 mg/dL / Ketone: x  / Bili: x / Urobili: x   Blood: x / Protein: x / Nitrite: x   Leuk Esterase: x / RBC: x / WBC x   Sq Epi: x / Non Sq Epi: x / Bacteria: x      CAPILLARY BLOOD GLUCOSE      POCT Blood Glucose.: 181 mg/dL (29 Mar 2024 22:00)      RADIOLOGY & ADDITIONAL TESTS: Reviewed.

## 2024-03-30 NOTE — PROGRESS NOTE ADULT - ASSESSMENT
58M PMH HFpEF, CAD w/ stent, HTN, HLD, DM s/p Right partial 5th ray amputation. Low suspicion for residual OM.    Plan:  - F/u OR clean margin culture/path, low suspicion for residual OM  - C/w IV abx  - appreciate ID reccs  - Dressed with betadine DSD  - Non Weight-bearing to RLE  -Rest of care up to primary team    Podiatry following, Plan d/w with Attending    Patient should f/u w/ Dr. Chakraborty w/i 1 week of discharge:  [address], [place] [address]  [phone]

## 2024-03-31 LAB
ALBUMIN SERPL ELPH-MCNC: 3.6 G/DL — SIGNIFICANT CHANGE UP (ref 3.3–5)
ALP SERPL-CCNC: 105 U/L — SIGNIFICANT CHANGE UP (ref 40–120)
ALT FLD-CCNC: 12 U/L — SIGNIFICANT CHANGE UP (ref 10–45)
ANION GAP SERPL CALC-SCNC: 8 MMOL/L — SIGNIFICANT CHANGE UP (ref 5–17)
AST SERPL-CCNC: 18 U/L — SIGNIFICANT CHANGE UP (ref 10–40)
BASOPHILS # BLD AUTO: 0.04 K/UL — SIGNIFICANT CHANGE UP (ref 0–0.2)
BASOPHILS NFR BLD AUTO: 0.6 % — SIGNIFICANT CHANGE UP (ref 0–2)
BILIRUB SERPL-MCNC: 0.2 MG/DL — SIGNIFICANT CHANGE UP (ref 0.2–1.2)
BUN SERPL-MCNC: 21 MG/DL — SIGNIFICANT CHANGE UP (ref 7–23)
CALCIUM SERPL-MCNC: 9.5 MG/DL — SIGNIFICANT CHANGE UP (ref 8.4–10.5)
CHLORIDE SERPL-SCNC: 105 MMOL/L — SIGNIFICANT CHANGE UP (ref 96–108)
CO2 SERPL-SCNC: 26 MMOL/L — SIGNIFICANT CHANGE UP (ref 22–31)
CREAT SERPL-MCNC: 1.3 MG/DL — SIGNIFICANT CHANGE UP (ref 0.5–1.3)
EGFR: 64 ML/MIN/1.73M2 — SIGNIFICANT CHANGE UP
EOSINOPHIL # BLD AUTO: 0.38 K/UL — SIGNIFICANT CHANGE UP (ref 0–0.5)
EOSINOPHIL NFR BLD AUTO: 5.7 % — SIGNIFICANT CHANGE UP (ref 0–6)
GLUCOSE BLDC GLUCOMTR-MCNC: 128 MG/DL — HIGH (ref 70–99)
GLUCOSE BLDC GLUCOMTR-MCNC: 131 MG/DL — HIGH (ref 70–99)
GLUCOSE BLDC GLUCOMTR-MCNC: 153 MG/DL — HIGH (ref 70–99)
GLUCOSE BLDC GLUCOMTR-MCNC: 91 MG/DL — SIGNIFICANT CHANGE UP (ref 70–99)
GLUCOSE SERPL-MCNC: 82 MG/DL — SIGNIFICANT CHANGE UP (ref 70–99)
HCT VFR BLD CALC: 31.4 % — LOW (ref 39–50)
HGB BLD-MCNC: 10.2 G/DL — LOW (ref 13–17)
IMM GRANULOCYTES NFR BLD AUTO: 0.3 % — SIGNIFICANT CHANGE UP (ref 0–0.9)
LYMPHOCYTES # BLD AUTO: 1.53 K/UL — SIGNIFICANT CHANGE UP (ref 1–3.3)
LYMPHOCYTES # BLD AUTO: 22.9 % — SIGNIFICANT CHANGE UP (ref 13–44)
MAGNESIUM SERPL-MCNC: 2.1 MG/DL — SIGNIFICANT CHANGE UP (ref 1.6–2.6)
MCHC RBC-ENTMCNC: 29.6 PG — SIGNIFICANT CHANGE UP (ref 27–34)
MCHC RBC-ENTMCNC: 32.5 GM/DL — SIGNIFICANT CHANGE UP (ref 32–36)
MCV RBC AUTO: 91 FL — SIGNIFICANT CHANGE UP (ref 80–100)
MONOCYTES # BLD AUTO: 0.61 K/UL — SIGNIFICANT CHANGE UP (ref 0–0.9)
MONOCYTES NFR BLD AUTO: 9.1 % — SIGNIFICANT CHANGE UP (ref 2–14)
NEUTROPHILS # BLD AUTO: 4.11 K/UL — SIGNIFICANT CHANGE UP (ref 1.8–7.4)
NEUTROPHILS NFR BLD AUTO: 61.4 % — SIGNIFICANT CHANGE UP (ref 43–77)
NRBC # BLD: 0 /100 WBCS — SIGNIFICANT CHANGE UP (ref 0–0)
PHOSPHATE SERPL-MCNC: 4.1 MG/DL — SIGNIFICANT CHANGE UP (ref 2.5–4.5)
PLATELET # BLD AUTO: 297 K/UL — SIGNIFICANT CHANGE UP (ref 150–400)
POTASSIUM SERPL-MCNC: 5.3 MMOL/L — SIGNIFICANT CHANGE UP (ref 3.5–5.3)
POTASSIUM SERPL-SCNC: 5.3 MMOL/L — SIGNIFICANT CHANGE UP (ref 3.5–5.3)
PROT SERPL-MCNC: 6.9 G/DL — SIGNIFICANT CHANGE UP (ref 6–8.3)
RBC # BLD: 3.45 M/UL — LOW (ref 4.2–5.8)
RBC # FLD: 13.2 % — SIGNIFICANT CHANGE UP (ref 10.3–14.5)
SODIUM SERPL-SCNC: 139 MMOL/L — SIGNIFICANT CHANGE UP (ref 135–145)
VANCOMYCIN TROUGH SERPL-MCNC: 26 UG/ML — CRITICAL HIGH (ref 10–20)
WBC # BLD: 6.69 K/UL — SIGNIFICANT CHANGE UP (ref 3.8–10.5)
WBC # FLD AUTO: 6.69 K/UL — SIGNIFICANT CHANGE UP (ref 3.8–10.5)

## 2024-03-31 PROCEDURE — 99233 SBSQ HOSP IP/OBS HIGH 50: CPT

## 2024-03-31 PROCEDURE — 99232 SBSQ HOSP IP/OBS MODERATE 35: CPT

## 2024-03-31 RX ORDER — LOSARTAN POTASSIUM 100 MG/1
100 TABLET, FILM COATED ORAL DAILY
Refills: 0 | Status: DISCONTINUED | OUTPATIENT
Start: 2024-04-01 | End: 2024-04-02

## 2024-03-31 RX ORDER — LOSARTAN POTASSIUM 100 MG/1
50 TABLET, FILM COATED ORAL ONCE
Refills: 0 | Status: COMPLETED | OUTPATIENT
Start: 2024-03-31 | End: 2024-03-31

## 2024-03-31 RX ORDER — LOSARTAN POTASSIUM 100 MG/1
50 TABLET, FILM COATED ORAL ONCE
Refills: 0 | Status: DISCONTINUED | OUTPATIENT
Start: 2024-03-31 | End: 2024-03-31

## 2024-03-31 RX ORDER — VANCOMYCIN HCL 1 G
1000 VIAL (EA) INTRAVENOUS EVERY 12 HOURS
Refills: 0 | Status: COMPLETED | OUTPATIENT
Start: 2024-03-31 | End: 2024-04-01

## 2024-03-31 RX ADMIN — Medication 100 MILLIGRAM(S): at 13:59

## 2024-03-31 RX ADMIN — Medication 7 UNIT(S): at 18:43

## 2024-03-31 RX ADMIN — SENNA PLUS 2 TABLET(S): 8.6 TABLET ORAL at 22:26

## 2024-03-31 RX ADMIN — INSULIN GLARGINE 15 UNIT(S): 100 INJECTION, SOLUTION SUBCUTANEOUS at 22:45

## 2024-03-31 RX ADMIN — Medication 650 MILLIGRAM(S): at 05:45

## 2024-03-31 RX ADMIN — HEPARIN SODIUM 5000 UNIT(S): 5000 INJECTION INTRAVENOUS; SUBCUTANEOUS at 14:00

## 2024-03-31 RX ADMIN — CITALOPRAM 40 MILLIGRAM(S): 10 TABLET, FILM COATED ORAL at 11:32

## 2024-03-31 RX ADMIN — CLOPIDOGREL BISULFATE 75 MILLIGRAM(S): 75 TABLET, FILM COATED ORAL at 11:31

## 2024-03-31 RX ADMIN — AMLODIPINE BESYLATE 10 MILLIGRAM(S): 2.5 TABLET ORAL at 06:34

## 2024-03-31 RX ADMIN — Medication 650 MILLIGRAM(S): at 00:30

## 2024-03-31 RX ADMIN — HEPARIN SODIUM 5000 UNIT(S): 5000 INJECTION INTRAVENOUS; SUBCUTANEOUS at 05:45

## 2024-03-31 RX ADMIN — Medication 81 MILLIGRAM(S): at 11:31

## 2024-03-31 RX ADMIN — Medication 650 MILLIGRAM(S): at 18:44

## 2024-03-31 RX ADMIN — Medication 250 MILLIGRAM(S): at 05:45

## 2024-03-31 RX ADMIN — Medication 100 MILLIGRAM(S): at 06:35

## 2024-03-31 RX ADMIN — Medication 650 MILLIGRAM(S): at 11:31

## 2024-03-31 RX ADMIN — LOSARTAN POTASSIUM 50 MILLIGRAM(S): 100 TABLET, FILM COATED ORAL at 06:35

## 2024-03-31 RX ADMIN — Medication 7 UNIT(S): at 09:48

## 2024-03-31 RX ADMIN — Medication 7 UNIT(S): at 13:46

## 2024-03-31 RX ADMIN — Medication 100 MILLIGRAM(S): at 22:26

## 2024-03-31 RX ADMIN — Medication 650 MILLIGRAM(S): at 12:31

## 2024-03-31 RX ADMIN — POLYETHYLENE GLYCOL 3350 17 GRAM(S): 17 POWDER, FOR SOLUTION ORAL at 22:26

## 2024-03-31 RX ADMIN — HEPARIN SODIUM 5000 UNIT(S): 5000 INJECTION INTRAVENOUS; SUBCUTANEOUS at 22:26

## 2024-03-31 RX ADMIN — LOSARTAN POTASSIUM 50 MILLIGRAM(S): 100 TABLET, FILM COATED ORAL at 19:31

## 2024-03-31 RX ADMIN — Medication 250 MILLIGRAM(S): at 18:44

## 2024-03-31 RX ADMIN — ATORVASTATIN CALCIUM 40 MILLIGRAM(S): 80 TABLET, FILM COATED ORAL at 22:26

## 2024-03-31 RX ADMIN — Medication 2: at 18:43

## 2024-03-31 NOTE — PROGRESS NOTE ADULT - SUBJECTIVE AND OBJECTIVE BOX
Patient is awake. Daughter and wife at bedside with permission.  He has tightly controlled blood glucose levels, near hypoglycemia    MEDICATIONS  (STANDING):  acetaminophen     Tablet .. 650 milliGRAM(s) Oral every 6 hours  amLODIPine   Tablet 10 milliGRAM(s) Oral daily  aspirin  chewable 81 milliGRAM(s) Oral daily  atorvastatin 40 milliGRAM(s) Oral at bedtime  ceFAZolin   IVPB 2000 milliGRAM(s) IV Intermittent every 8 hours  citalopram 40 milliGRAM(s) Oral daily  clopidogrel Tablet 75 milliGRAM(s) Oral daily  dextrose 5%. 1000 milliLiter(s) (100 mL/Hr) IV Continuous <Continuous>  dextrose 5%. 1000 milliLiter(s) (50 mL/Hr) IV Continuous <Continuous>  dextrose 50% Injectable 25 Gram(s) IV Push once  dextrose 50% Injectable 12.5 Gram(s) IV Push once  dextrose 50% Injectable 25 Gram(s) IV Push once  glucagon  Injectable 1 milliGRAM(s) IntraMuscular once  heparin   Injectable 5000 Unit(s) SubCutaneous every 8 hours  influenza   Vaccine 0.5 milliLiter(s) IntraMuscular once  insulin glargine Injectable (LANTUS) 15 Unit(s) SubCutaneous at bedtime  insulin lispro (ADMELOG) corrective regimen sliding scale   SubCutaneous Before meals and at bedtime  insulin lispro Injectable (ADMELOG) 7 Unit(s) SubCutaneous three times a day before meals  losartan 50 milliGRAM(s) Oral once  polyethylene glycol 3350 17 Gram(s) Oral at bedtime  senna 2 Tablet(s) Oral at bedtime  vancomycin  IVPB 1000 milliGRAM(s) IV Intermittent every 12 hours    MEDICATIONS  (PRN):  acetaminophen     Tablet .. 650 milliGRAM(s) Oral every 6 hours PRN Temp greater or equal to 38C (100.4F), Mild Pain (1 - 3)  aluminum hydroxide/magnesium hydroxide/simethicone Suspension 30 milliLiter(s) Oral every 4 hours PRN Dyspepsia  dextrose Oral Gel 15 Gram(s) Oral once PRN Blood Glucose LESS THAN 70 milliGRAM(s)/deciliter  melatonin 3 milliGRAM(s) Oral at bedtime PRN Insomnia  ondansetron Injectable 4 milliGRAM(s) IV Push every 8 hours PRN Nausea and/or Vomiting  oxyCODONE    IR 2.5 milliGRAM(s) Oral every 4 hours PRN Moderate Pain (4 - 6)  oxyCODONE    IR 5 milliGRAM(s) Oral every 4 hours PRN Severe Pain (7 - 10)      Allergies  No Known Allergies  Intolerance  carvedilol (Hypotension)    Review of Systems:  Constitutional: No fever  Eyes: No blurry vision  Neuro: No tremors  HEENT: No pain  Cardiovascular: No chest pain, palpitations  Respiratory: No SOB, no cough  GI: No nausea, vomiting, abdominal pain  ALL OTHER SYSTEMS REVIEWED AND NEGATIVE    PHYSICAL EXAM:  VITALS: T(C): 36.6 (03-31-24 @ 05:42)  T(F): 97.8 (03-31-24 @ 05:42), Max: 98 (03-30-24 @ 15:20)  HR: 50 (03-31-24 @ 09:13) (48 - 50)  BP: 179/77 (03-31-24 @ 09:13) (157/74 - 179/77)  RR:  (16 - 17)  SpO2:  (96% - 97%)  Wt(kg): --  GENERAL: NAD, well-groomed, well-developed  EYES: No proptosis, no lid lag, anicteric  HEENT:  Atraumatic, Normocephalic, moist mucous membranes  RESPIRATORY: Respirations are even and unlabored  CARDIOVASCULAR: Regular rate   SKIN: Dry, intact, No rashes or lesions  PSYCH: Alert and oriented x 3, normal affect, normal mood      POCT Blood Glucose.: 131 mg/dL (03-31-24 @ 12:55)  POCT Blood Glucose.: 91 mg/dL (03-31-24 @ 09:16)  POCT Blood Glucose.: 222 mg/dL (03-30-24 @ 21:36)  POCT Blood Glucose.: 72 mg/dL (03-30-24 @ 17:44)  POCT Blood Glucose.: 140 mg/dL (03-30-24 @ 13:07)  POCT Blood Glucose.: 106 mg/dL (03-30-24 @ 09:23)  POCT Blood Glucose.: 181 mg/dL (03-29-24 @ 22:00)  POCT Blood Glucose.: 76 mg/dL (03-29-24 @ 17:57)  POCT Blood Glucose.: 195 mg/dL (03-29-24 @ 12:44)  POCT Blood Glucose.: 79 mg/dL (03-29-24 @ 09:03)  POCT Blood Glucose.: 81 mg/dL (03-28-24 @ 22:12)  POCT Blood Glucose.: 152 mg/dL (03-28-24 @ 17:49)  POCT Blood Glucose.: 103 mg/dL (03-28-24 @ 13:41)      03-31    139  |  105  |  21  ----------------------------<  82  5.3   |  26  |  1.30    eGFR: 64    Ca    9.5      03-31  Mg     2.1     03-31  Phos  4.1     03-31    TPro  6.9  /  Alb  3.6  /  TBili  0.2  /  DBili  x   /  AST  18  /  ALT  12  /  AlkPhos  105  03-31

## 2024-03-31 NOTE — PROGRESS NOTE ADULT - PROBLEM SELECTOR PLAN 1
-patient with near hypoglycemia.  -his elizabeth data was reviewed and average is about 148. He does have postprandial highs from 12-6PM  -if there is another value of 70 before dinner, decrease lispro to 5 units TID with meals  -consistent carb diet  -educated about hypoglycemia-confirm low CGM values with glucometer; we discussed potential compression hypoglycemia  -continue lantus at current doses, sliding scale  -hypoglycemia protocol

## 2024-03-31 NOTE — PROGRESS NOTE ADULT - ASSESSMENT
57yo M with PMH of DM2, HFpEF, HTN, HLD, DM, admitted for OM and s/p amputation of partial 5th ray amp of R foot.      #Osteomyelitis  - initial concern for SSTI but now bone margins with MSSA and Corynebacterium  - podiatry and ID following. Cefazolin and Vanc IV will need PICC  - dose vanc by trough  - oxycodone PRN for pain, bowel regimen    #DM2  - patient was able to reduce A1C from 14.2 to 9.7  - reports that on regimen hes on at home he has been getting hypoglycemic at times and skips lantus - endocrine following  - lantus 15, premeal 7 lispro    #HTN  #HLD  - on amlodipine and olmesartan  - continues to be hypertensive. Increase losartan ( TI) to 100mg qd today     #HFpEF  - follows with Dr Nelson  - previously on BB but stopped due to bradycardia, junctional rhythm   - hold Torsemide given euvolemia, takes PRN at home  - Stop Farxiga on DC given active LE wound    #CAD  - c/w ASA/Plavix/Atorvastatin     Dispo: Pending PICC placement

## 2024-03-31 NOTE — PROGRESS NOTE ADULT - ASSESSMENT
57 yo man with uncontrolled T2DM A1c > 14%, Hypertension , Hyperlipidemia, HFpEF , CAD s/p stents, CKD stage 3 s/p Right partial 5th ray amputation

## 2024-03-31 NOTE — PROGRESS NOTE ADULT - ASSESSMENT
58M PMH HFpEF, CAD w/ stent, HTN, HLD, DM s/p Right partial 5th ray amputation. Low suspicion for residual OM.    Plan:  - F/u OR clean margin culture/path, low suspicion for residual OM  - C/w IV abx  - appreciate ID reccs  - Dressed with betadine DSD  - Non Weight-bearing to RLE  -Rest of care up to primary team    Podiatry following, Plan d/w with Attending    Patient should f/u w/ Dr. Chakraborty w/i 1 week of discharge:  [address], [place] [address]  [phone]

## 2024-03-31 NOTE — PROGRESS NOTE ADULT - SUBJECTIVE AND OBJECTIVE BOX
INFECTIOUS DISEASES CONSULT FOLLOW-UP NOTE    INTERVAL HPI/OVERNIGHT EVENTS:  no event overnight  reports pain     ROS:   Constitutional, eyes, ENT, cardiovascular, respiratory, gastrointestinal, genitourinary, integumentary, neurological, psychiatric and heme/lymph are otherwise negative other than noted above       ANTIBIOTICS/RELEVANT:    MEDICATIONS  (STANDING):  acetaminophen     Tablet .. 650 milliGRAM(s) Oral every 6 hours  amLODIPine   Tablet 10 milliGRAM(s) Oral daily  aspirin  chewable 81 milliGRAM(s) Oral daily  atorvastatin 40 milliGRAM(s) Oral at bedtime  ceFAZolin   IVPB 2000 milliGRAM(s) IV Intermittent every 8 hours  citalopram 40 milliGRAM(s) Oral daily  clopidogrel Tablet 75 milliGRAM(s) Oral daily  dextrose 5%. 1000 milliLiter(s) (100 mL/Hr) IV Continuous <Continuous>  dextrose 5%. 1000 milliLiter(s) (50 mL/Hr) IV Continuous <Continuous>  dextrose 50% Injectable 25 Gram(s) IV Push once  dextrose 50% Injectable 12.5 Gram(s) IV Push once  dextrose 50% Injectable 25 Gram(s) IV Push once  glucagon  Injectable 1 milliGRAM(s) IntraMuscular once  heparin   Injectable 5000 Unit(s) SubCutaneous every 8 hours  influenza   Vaccine 0.5 milliLiter(s) IntraMuscular once  insulin glargine Injectable (LANTUS) 15 Unit(s) SubCutaneous at bedtime  insulin lispro (ADMELOG) corrective regimen sliding scale   SubCutaneous Before meals and at bedtime  insulin lispro Injectable (ADMELOG) 7 Unit(s) SubCutaneous three times a day before meals  losartan 50 milliGRAM(s) Oral once  polyethylene glycol 3350 17 Gram(s) Oral at bedtime  senna 2 Tablet(s) Oral at bedtime  vancomycin  IVPB 1000 milliGRAM(s) IV Intermittent every 12 hours    MEDICATIONS  (PRN):  acetaminophen     Tablet .. 650 milliGRAM(s) Oral every 6 hours PRN Temp greater or equal to 38C (100.4F), Mild Pain (1 - 3)  aluminum hydroxide/magnesium hydroxide/simethicone Suspension 30 milliLiter(s) Oral every 4 hours PRN Dyspepsia  dextrose Oral Gel 15 Gram(s) Oral once PRN Blood Glucose LESS THAN 70 milliGRAM(s)/deciliter  melatonin 3 milliGRAM(s) Oral at bedtime PRN Insomnia  ondansetron Injectable 4 milliGRAM(s) IV Push every 8 hours PRN Nausea and/or Vomiting  oxyCODONE    IR 2.5 milliGRAM(s) Oral every 4 hours PRN Moderate Pain (4 - 6)  oxyCODONE    IR 5 milliGRAM(s) Oral every 4 hours PRN Severe Pain (7 - 10)        Vital Signs Last 24 Hrs  T(C): 36.6 (31 Mar 2024 05:42), Max: 36.7 (30 Mar 2024 15:20)  T(F): 97.8 (31 Mar 2024 05:42), Max: 98 (30 Mar 2024 15:20)  HR: 50 (31 Mar 2024 09:13) (48 - 50)  BP: 179/77 (31 Mar 2024 09:13) (157/74 - 179/77)  BP(mean): --  RR: 17 (31 Mar 2024 09:13) (16 - 17)  SpO2: 97% (31 Mar 2024 09:13) (96% - 97%)    Parameters below as of 31 Mar 2024 09:13  Patient On (Oxygen Delivery Method): room air        PHYSICAL EXAM:  Constitutional: alert, NAD  Eyes: the sclera and conjunctiva were normal.   ENT: the ears and nose were normal in appearance.   Neck: the appearance of the neck was normal and the neck was supple.   Pulmonary: no respiratory distress and lungs were clear to auscultation bilaterally.   Heart: heart rate was normal and rhythm regular, normal S1 and S2  Vascular:. there was no peripheral edema  Abdomen: normal bowel sounds, soft, non-tender        LABS:                        10.2   6.69  )-----------( 297      ( 31 Mar 2024 03:59 )             31.4     03-31    139  |  105  |  21  ----------------------------<  82  5.3   |  26  |  1.30    Ca    9.5      31 Mar 2024 03:59  Phos  4.1     03-31  Mg     2.1     03-31    TPro  6.9  /  Alb  3.6  /  TBili  0.2  /  DBili  x   /  AST  18  /  ALT  12  /  AlkPhos  105  03-31      Urinalysis Basic - ( 31 Mar 2024 03:59 )    Color: x / Appearance: x / SG: x / pH: x  Gluc: 82 mg/dL / Ketone: x  / Bili: x / Urobili: x   Blood: x / Protein: x / Nitrite: x   Leuk Esterase: x / RBC: x / WBC x   Sq Epi: x / Non Sq Epi: x / Bacteria: x        MICROBIOLOGY:      RADIOLOGY & ADDITIONAL STUDIES:  Reviewed

## 2024-03-31 NOTE — PROGRESS NOTE ADULT - SUBJECTIVE AND OBJECTIVE BOX
INTERVAL EVENTS: PARKER    PAST MEDICAL & SURGICAL HISTORY:  Osteomyelitis    Hypertension    Hyperlipidemia    Diabetes mellitus    CAD (coronary artery disease)    Chronic diastolic congestive heart failure    CKD (chronic kidney disease)    No significant past surgical history    S/P release of urethral stricture        MEDICATIONS  (STANDING):  acetaminophen     Tablet .. 650 milliGRAM(s) Oral every 6 hours  amLODIPine   Tablet 10 milliGRAM(s) Oral daily  aspirin  chewable 81 milliGRAM(s) Oral daily  atorvastatin 40 milliGRAM(s) Oral at bedtime  ceFAZolin   IVPB 2000 milliGRAM(s) IV Intermittent every 8 hours  citalopram 40 milliGRAM(s) Oral daily  clopidogrel Tablet 75 milliGRAM(s) Oral daily  dextrose 5%. 1000 milliLiter(s) (50 mL/Hr) IV Continuous <Continuous>  dextrose 5%. 1000 milliLiter(s) (100 mL/Hr) IV Continuous <Continuous>  dextrose 50% Injectable 12.5 Gram(s) IV Push once  dextrose 50% Injectable 25 Gram(s) IV Push once  dextrose 50% Injectable 25 Gram(s) IV Push once  glucagon  Injectable 1 milliGRAM(s) IntraMuscular once  heparin   Injectable 5000 Unit(s) SubCutaneous every 8 hours  influenza   Vaccine 0.5 milliLiter(s) IntraMuscular once  insulin glargine Injectable (LANTUS) 15 Unit(s) SubCutaneous at bedtime  insulin lispro (ADMELOG) corrective regimen sliding scale   SubCutaneous Before meals and at bedtime  insulin lispro Injectable (ADMELOG) 7 Unit(s) SubCutaneous three times a day before meals  losartan 50 milliGRAM(s) Oral daily  polyethylene glycol 3350 17 Gram(s) Oral at bedtime  senna 2 Tablet(s) Oral at bedtime  vancomycin  IVPB 1000 milliGRAM(s) IV Intermittent every 12 hours    MEDICATIONS  (PRN):  acetaminophen     Tablet .. 650 milliGRAM(s) Oral every 6 hours PRN Temp greater or equal to 38C (100.4F), Mild Pain (1 - 3)  aluminum hydroxide/magnesium hydroxide/simethicone Suspension 30 milliLiter(s) Oral every 4 hours PRN Dyspepsia  dextrose Oral Gel 15 Gram(s) Oral once PRN Blood Glucose LESS THAN 70 milliGRAM(s)/deciliter  melatonin 3 milliGRAM(s) Oral at bedtime PRN Insomnia  ondansetron Injectable 4 milliGRAM(s) IV Push every 8 hours PRN Nausea and/or Vomiting  oxyCODONE    IR 2.5 milliGRAM(s) Oral every 4 hours PRN Moderate Pain (4 - 6)  oxyCODONE    IR 5 milliGRAM(s) Oral every 4 hours PRN Severe Pain (7 - 10)    Vital Signs Last 24 Hrs  T(C): 36.6 (31 Mar 2024 05:42), Max: 36.7 (30 Mar 2024 15:20)  T(F): 97.8 (31 Mar 2024 05:42), Max: 98 (30 Mar 2024 15:20)  HR: 50 (31 Mar 2024 09:13) (48 - 55)  BP: 179/77 (31 Mar 2024 09:13) (149/59 - 179/77)  BP(mean): --  RR: 17 (31 Mar 2024 09:13) (16 - 17)  SpO2: 97% (31 Mar 2024 09:13) (96% - 97%)    Parameters below as of 31 Mar 2024 09:13  Patient On (Oxygen Delivery Method): room air        PHYSICAL EXAM:  GEN: Awake, alert. NAD.   HEENT: NCAT, PERRL, EOMI. Mucosa moist. No JVD.  RESP: CTA b/l  CV: RRR. Normal S1/S2. No m/r/g.  ABD: Soft. NT/ND. BS+  EXT: Warm. No edema, clubbing, or cyanosis.       LABS:                        10.2   6.69  )-----------( 297      ( 31 Mar 2024 03:59 )             31.4     03-31    139  |  105  |  21  ----------------------------<  82  5.3   |  26  |  1.30    Ca    9.5      31 Mar 2024 03:59  Phos  4.1     03-31  Mg     2.1     03-31    TPro  6.9  /  Alb  3.6  /  TBili  0.2  /  DBili  x   /  AST  18  /  ALT  12  /  AlkPhos  105  03-31          Urinalysis Basic - ( 31 Mar 2024 03:59 )    Color: x / Appearance: x / SG: x / pH: x  Gluc: 82 mg/dL / Ketone: x  / Bili: x / Urobili: x   Blood: x / Protein: x / Nitrite: x   Leuk Esterase: x / RBC: x / WBC x   Sq Epi: x / Non Sq Epi: x / Bacteria: x      I&O's Summary    RADIOLOGY & ADDITIONAL STUDIES:  TELEMETRY:  EKG:

## 2024-03-31 NOTE — PROGRESS NOTE ADULT - SUBJECTIVE AND OBJECTIVE BOX
Patient is a 58y old  Male who presents with a chief complaint of SSTI/OM (30 Mar 2024 12:12)      INTERVAL HPI/ OVERNIGHT EVENTS  Pt is POD4 from R partial 5th ray amputation. Pt seen and examined bedside by attending. Complaining of some soreness to his left foot      LABS                        10.2   6.69  )-----------( 297      ( 31 Mar 2024 03:59 )             31.4     03-31    139  |  105  |  21  ----------------------------<  82  5.3   |  26  |  1.30    Ca    9.5      31 Mar 2024 03:59  Phos  4.1     03-31  Mg     2.1     03-31    TPro  6.9  /  Alb  3.6  /  TBili  0.2  /  DBili  x   /  AST  18  /  ALT  12  /  AlkPhos  105  03-31        ICU Vital Signs Last 24 Hrs  T(C): 36.6 (31 Mar 2024 05:42), Max: 36.7 (30 Mar 2024 15:20)  T(F): 97.8 (31 Mar 2024 05:42), Max: 98 (30 Mar 2024 15:20)  HR: 48 (31 Mar 2024 05:42) (48 - 57)  BP: 175/68 (31 Mar 2024 05:42) (149/59 - 175/68)  BP(mean): --  ABP: --  ABP(mean): --  RR: 16 (31 Mar 2024 05:42) (16 - 17)  SpO2: 97% (31 Mar 2024 05:42) (96% - 97%)    O2 Parameters below as of 30 Mar 2024 21:10  Patient On (Oxygen Delivery Method): room air            RADIOLOGY    MICROBIOLOGY    Culture - Tissue with Gram Stain (03.27.24 @ 21:53)    Gram Stain:   No organisms seen  No WBC's seen.   -  Clindamycin: R 0.5 This isolate is presumed to be clindamycin resistant based on detection of inducible resistance. Clindamycin may still be effective in some patients.   -  Erythromycin: R >4   -  Linezolid: S 2   -  Oxacillin: S <=0.25 Oxacillin predicts susceptibility for dicloxacillin, methicillin, and nafcillin   -  Rifampin: S <=1 Should not be used as monotherapy   -  Tetracycline: S <=1   -  Trimethoprim/Sulfamethoxazole: S <=0.5/9.5   -  Vancomycin: S 2   Specimen Source: .Tissue right foot 5th metatarsal clean margin or spec   Culture Results:   Rare Staphylococcus aureus  Rare Corynebacterium amycolatum  Result called to and read back byKimberlee Olivarez RN  03/29/2024 14:28:11   Organism Identification: Staphylococcus aureus   Organism: Staphylococcus aureus   Method Type: PALMA      PHYSICAL EXAM    R Lower Extremity Focused:  Vasc: DP 1/4 PT 2/4. Tg: Warm to Warm. Edema present up to distal half of leg  Derm: Surgical site intact with no signs of dehiscence. No drainage noted or signs of residual infection.   Neuro: Protective sensation intact  MSK: Partial 3rd digit amputation and 5th ray amputation

## 2024-03-31 NOTE — PROGRESS NOTE ADULT - ASSESSMENT
58M h/o DM p/w foot OM.  s/p partial 5th ray amputation.  Clean margin culture grew MSSA and Corynebacterium spp.      - reduce vanco 1g IV q12h, check trough before 4th dose (for Coryne)  - cont cefazolin 2g IV q8h (for MSSA)  - will need 6 weeks of IV abx    Team2  will follow you.  Dr Gallardo will resume care on Monday.  Case d/w primary team.    Nicole Barber MD, MS  Infectious Disease attending  office phone [phone]  For any questions during evening/weekend/holiday, please page ID on call

## 2024-04-01 ENCOUNTER — TRANSCRIPTION ENCOUNTER (OUTPATIENT)
Age: 59
End: 2024-04-01

## 2024-04-01 LAB
ALBUMIN SERPL ELPH-MCNC: 3.5 G/DL — SIGNIFICANT CHANGE UP (ref 3.3–5)
ALP SERPL-CCNC: 97 U/L — SIGNIFICANT CHANGE UP (ref 40–120)
ALT FLD-CCNC: 11 U/L — SIGNIFICANT CHANGE UP (ref 10–45)
ANION GAP SERPL CALC-SCNC: 10 MMOL/L — SIGNIFICANT CHANGE UP (ref 5–17)
AST SERPL-CCNC: 24 U/L — SIGNIFICANT CHANGE UP (ref 10–40)
BASOPHILS # BLD AUTO: 0.04 K/UL — SIGNIFICANT CHANGE UP (ref 0–0.2)
BASOPHILS NFR BLD AUTO: 0.6 % — SIGNIFICANT CHANGE UP (ref 0–2)
BILIRUB SERPL-MCNC: <0.2 MG/DL — SIGNIFICANT CHANGE UP (ref 0.2–1.2)
BUN SERPL-MCNC: 22 MG/DL — SIGNIFICANT CHANGE UP (ref 7–23)
CALCIUM SERPL-MCNC: 8.6 MG/DL — SIGNIFICANT CHANGE UP (ref 8.4–10.5)
CHLORIDE SERPL-SCNC: 107 MMOL/L — SIGNIFICANT CHANGE UP (ref 96–108)
CO2 SERPL-SCNC: 23 MMOL/L — SIGNIFICANT CHANGE UP (ref 22–31)
CREAT SERPL-MCNC: 1.4 MG/DL — HIGH (ref 0.5–1.3)
CULTURE RESULTS: SIGNIFICANT CHANGE UP
CULTURE RESULTS: SIGNIFICANT CHANGE UP
EGFR: 59 ML/MIN/1.73M2 — LOW
EOSINOPHIL # BLD AUTO: 0.29 K/UL — SIGNIFICANT CHANGE UP (ref 0–0.5)
EOSINOPHIL NFR BLD AUTO: 4.7 % — SIGNIFICANT CHANGE UP (ref 0–6)
GLUCOSE BLDC GLUCOMTR-MCNC: 136 MG/DL — HIGH (ref 70–99)
GLUCOSE BLDC GLUCOMTR-MCNC: 136 MG/DL — HIGH (ref 70–99)
GLUCOSE BLDC GLUCOMTR-MCNC: 160 MG/DL — HIGH (ref 70–99)
GLUCOSE BLDC GLUCOMTR-MCNC: 190 MG/DL — HIGH (ref 70–99)
GLUCOSE SERPL-MCNC: 110 MG/DL — HIGH (ref 70–99)
HCT VFR BLD CALC: 30.3 % — LOW (ref 39–50)
HGB BLD-MCNC: 9.9 G/DL — LOW (ref 13–17)
IMM GRANULOCYTES NFR BLD AUTO: 0.3 % — SIGNIFICANT CHANGE UP (ref 0–0.9)
LYMPHOCYTES # BLD AUTO: 1.43 K/UL — SIGNIFICANT CHANGE UP (ref 1–3.3)
LYMPHOCYTES # BLD AUTO: 23 % — SIGNIFICANT CHANGE UP (ref 13–44)
MAGNESIUM SERPL-MCNC: 1.9 MG/DL — SIGNIFICANT CHANGE UP (ref 1.6–2.6)
MCHC RBC-ENTMCNC: 29.9 PG — SIGNIFICANT CHANGE UP (ref 27–34)
MCHC RBC-ENTMCNC: 32.7 GM/DL — SIGNIFICANT CHANGE UP (ref 32–36)
MCV RBC AUTO: 91.5 FL — SIGNIFICANT CHANGE UP (ref 80–100)
MONOCYTES # BLD AUTO: 0.49 K/UL — SIGNIFICANT CHANGE UP (ref 0–0.9)
MONOCYTES NFR BLD AUTO: 7.9 % — SIGNIFICANT CHANGE UP (ref 2–14)
NEUTROPHILS # BLD AUTO: 3.94 K/UL — SIGNIFICANT CHANGE UP (ref 1.8–7.4)
NEUTROPHILS NFR BLD AUTO: 63.5 % — SIGNIFICANT CHANGE UP (ref 43–77)
NRBC # BLD: 0 /100 WBCS — SIGNIFICANT CHANGE UP (ref 0–0)
PHOSPHATE SERPL-MCNC: 3.8 MG/DL — SIGNIFICANT CHANGE UP (ref 2.5–4.5)
PLATELET # BLD AUTO: 298 K/UL — SIGNIFICANT CHANGE UP (ref 150–400)
POTASSIUM SERPL-MCNC: 5.1 MMOL/L — SIGNIFICANT CHANGE UP (ref 3.5–5.3)
POTASSIUM SERPL-SCNC: 5.1 MMOL/L — SIGNIFICANT CHANGE UP (ref 3.5–5.3)
PROT SERPL-MCNC: 6.7 G/DL — SIGNIFICANT CHANGE UP (ref 6–8.3)
RBC # BLD: 3.31 M/UL — LOW (ref 4.2–5.8)
RBC # FLD: 13.3 % — SIGNIFICANT CHANGE UP (ref 10.3–14.5)
SODIUM SERPL-SCNC: 140 MMOL/L — SIGNIFICANT CHANGE UP (ref 135–145)
SPECIMEN SOURCE: SIGNIFICANT CHANGE UP
SPECIMEN SOURCE: SIGNIFICANT CHANGE UP
VANCOMYCIN TROUGH SERPL-MCNC: 21.4 UG/ML — HIGH (ref 10–20)
WBC # BLD: 6.21 K/UL — SIGNIFICANT CHANGE UP (ref 3.8–10.5)
WBC # FLD AUTO: 6.21 K/UL — SIGNIFICANT CHANGE UP (ref 3.8–10.5)

## 2024-04-01 PROCEDURE — 99232 SBSQ HOSP IP/OBS MODERATE 35: CPT

## 2024-04-01 PROCEDURE — 36569 INSJ PICC 5 YR+ W/O IMAGING: CPT

## 2024-04-01 PROCEDURE — 99233 SBSQ HOSP IP/OBS HIGH 50: CPT | Mod: GC

## 2024-04-01 RX ORDER — VANCOMYCIN HCL 1 G
750 VIAL (EA) INTRAVENOUS EVERY 12 HOURS
Refills: 0 | Status: DISCONTINUED | OUTPATIENT
Start: 2024-04-01 | End: 2024-04-02

## 2024-04-01 RX ORDER — MAGNESIUM SULFATE 500 MG/ML
1 VIAL (ML) INJECTION ONCE
Refills: 0 | Status: COMPLETED | OUTPATIENT
Start: 2024-04-01 | End: 2024-04-01

## 2024-04-01 RX ORDER — CHLORHEXIDINE GLUCONATE 213 G/1000ML
1 SOLUTION TOPICAL
Refills: 0 | Status: DISCONTINUED | OUTPATIENT
Start: 2024-04-01 | End: 2024-04-02

## 2024-04-01 RX ORDER — SODIUM CHLORIDE 9 MG/ML
10 INJECTION INTRAMUSCULAR; INTRAVENOUS; SUBCUTANEOUS
Refills: 0 | Status: DISCONTINUED | OUTPATIENT
Start: 2024-04-01 | End: 2024-04-02

## 2024-04-01 RX ADMIN — HEPARIN SODIUM 5000 UNIT(S): 5000 INJECTION INTRAVENOUS; SUBCUTANEOUS at 22:09

## 2024-04-01 RX ADMIN — Medication 650 MILLIGRAM(S): at 18:43

## 2024-04-01 RX ADMIN — Medication 250 MILLIGRAM(S): at 20:54

## 2024-04-01 RX ADMIN — CITALOPRAM 40 MILLIGRAM(S): 10 TABLET, FILM COATED ORAL at 14:01

## 2024-04-01 RX ADMIN — CLOPIDOGREL BISULFATE 75 MILLIGRAM(S): 75 TABLET, FILM COATED ORAL at 14:00

## 2024-04-01 RX ADMIN — Medication 250 MILLIGRAM(S): at 07:12

## 2024-04-01 RX ADMIN — LOSARTAN POTASSIUM 100 MILLIGRAM(S): 100 TABLET, FILM COATED ORAL at 06:04

## 2024-04-01 RX ADMIN — Medication 650 MILLIGRAM(S): at 14:59

## 2024-04-01 RX ADMIN — Medication 650 MILLIGRAM(S): at 05:47

## 2024-04-01 RX ADMIN — Medication 7 UNIT(S): at 09:39

## 2024-04-01 RX ADMIN — Medication 100 GRAM(S): at 12:31

## 2024-04-01 RX ADMIN — Medication 100 MILLIGRAM(S): at 14:01

## 2024-04-01 RX ADMIN — HEPARIN SODIUM 5000 UNIT(S): 5000 INJECTION INTRAVENOUS; SUBCUTANEOUS at 14:01

## 2024-04-01 RX ADMIN — HEPARIN SODIUM 5000 UNIT(S): 5000 INJECTION INTRAVENOUS; SUBCUTANEOUS at 05:47

## 2024-04-01 RX ADMIN — INSULIN GLARGINE 15 UNIT(S): 100 INJECTION, SOLUTION SUBCUTANEOUS at 22:08

## 2024-04-01 RX ADMIN — Medication 81 MILLIGRAM(S): at 14:00

## 2024-04-01 RX ADMIN — Medication 2: at 13:35

## 2024-04-01 RX ADMIN — Medication 650 MILLIGRAM(S): at 19:55

## 2024-04-01 RX ADMIN — Medication 100 MILLIGRAM(S): at 05:47

## 2024-04-01 RX ADMIN — SENNA PLUS 2 TABLET(S): 8.6 TABLET ORAL at 22:09

## 2024-04-01 RX ADMIN — Medication 100 MILLIGRAM(S): at 22:07

## 2024-04-01 RX ADMIN — AMLODIPINE BESYLATE 10 MILLIGRAM(S): 2.5 TABLET ORAL at 05:48

## 2024-04-01 RX ADMIN — Medication 650 MILLIGRAM(S): at 07:16

## 2024-04-01 RX ADMIN — Medication 7 UNIT(S): at 18:04

## 2024-04-01 RX ADMIN — POLYETHYLENE GLYCOL 3350 17 GRAM(S): 17 POWDER, FOR SOLUTION ORAL at 22:10

## 2024-04-01 RX ADMIN — Medication 650 MILLIGRAM(S): at 13:59

## 2024-04-01 RX ADMIN — Medication 2: at 22:08

## 2024-04-01 RX ADMIN — ATORVASTATIN CALCIUM 40 MILLIGRAM(S): 80 TABLET, FILM COATED ORAL at 22:09

## 2024-04-01 RX ADMIN — Medication 7 UNIT(S): at 13:35

## 2024-04-01 NOTE — DIETITIAN INITIAL EVALUATION ADULT - PERTINENT LABORATORY DATA
04-01    140  |  107  |  22  ----------------------------<  110<H>  5.1   |  23  |  1.40<H>    Ca    8.6      01 Apr 2024 05:30  Phos  3.8     04-01  Mg     1.9     04-01    TPro  6.7  /  Alb  3.5  /  TBili  <0.2  /  DBili  x   /  AST  24  /  ALT  11  /  AlkPhos  97  04-01  POCT Blood Glucose.: 160 mg/dL (04-01-24 @ 13:21)  A1C with Estimated Average Glucose Result: 9.7 % (03-28-24 @ 05:28)  A1C with Estimated Average Glucose Result: 14.2 % (02-06-24 @ 05:30)

## 2024-04-01 NOTE — PROGRESS NOTE ADULT - ATTENDING COMMENTS
The patient was admitted with toe infection   and had right partial toe resection. Diabetes mellitus  wasoit of control with Hgb A1C 14.0.On 15 units Lantus Insulin plus 7 units pre-meal Lispro Insulin, glucose levels were [id number] yesterday and tiday 136,I agree with discharge on these Insulin doses.

## 2024-04-01 NOTE — PROGRESS NOTE ADULT - PROBLEM SELECTOR PLAN 5
#H/o hyperkalemia   Hx of CKD stage 3. Follows with Dr. Efrain Castillo. Previously prescribed lokelma but no longer taking. Labs drawn in ED were hemolyzed. Crt 1.31 (baseline 1.3-1.4).  - Renally dose meds  - Avoid nephrotoxic agents  - Monitor K/Crt

## 2024-04-01 NOTE — PROGRESS NOTE ADULT - PROBLEM SELECTOR PLAN 1
Hx of OM with prior amputation, px for foot infection. Follows with Dr. Brunner weekly. Was admitted here for management of OM 2/5-2/8 and dc'd on augmentin and linezolid, was started on unknown abx last week. Referred to ED by Dr. Brunner for worsening infection.  Podiatry consulted, c/f deep SSTI vs OM, s/p partial 5th ray amp of R foot, uncomplicated procedure  Margins Margins with presumed methicillin sensitive staph and corynebacterium  - ID consulted, appreciate recs  - Appreciate podiatry recs  - C/w Vanc 1250mg q12h, check trough before 4th dose  - Started cefazolin 2g q8h Hx of OM with prior amputation, px for foot infection. Follows with Dr. Brunner weekly. Was admitted here for management of OM 2/5-2/8 and dc'd on augmentin and linezolid, was started on unknown abx last week. Referred to ED by Dr. Brunner for worsening infection.  Podiatry consulted, c/f deep SSTI vs OM, s/p partial 5th ray amp of R foot, uncomplicated procedure  Margins Margins with presumed methicillin sensitive staph and corynebacterium  - ID consulted, appreciate recs --> c/w vanc and cefazolin; final vanc dose pending trough  - Appreciate podiatry recs  - C/w Vanc 1250mg q12h, check trough before 4th dose (4/1 @ 6pm)  - c/w cefazolin 2g q8h  - PICC team to place PICC line today, 4/1

## 2024-04-01 NOTE — PROGRESS NOTE ADULT - NS ATTEST RISK PROBLEM GEN_ALL_CORE FT
Still with inadequate glycemic control with intermittent hypoglycemia
PVD,Uncontrolled Diabetes mellitus

## 2024-04-01 NOTE — DIETITIAN INITIAL EVALUATION ADULT - PROBLEM SELECTOR PLAN 3
Pt had stent placed 12/9/2022 iso SOB and CP. Symptoms have resolved since. Unclear why he is still on plavix.     Home meds: ASA/Plavix, Atorvastatin 40  - C/w statin  - restart asa/plavix after procedure   - collateral in am

## 2024-04-01 NOTE — PROGRESS NOTE ADULT - ATTENDING COMMENTS
58 YOM with PMH of T2DM, HFpEF, HTN, HLD, T2DM, CAD on DAPT, CKD3a admitted for right toe OM s/p amputation of partial 5th ray receiving IV antibiotics for positive margins.     Acute OM - MSSA and corynebacterium of bone margins, will need 6w course of cefazolin and vancomycin (final dose TBD), plan for PICC, CM to assist with setup of home infusion services    T2DM - HbA1C 9.7 (previously 14.2), endocrine following, currently glargine 15u + lispro 7u TID premeal + ISS    HTN - cont amlodipine 10mg, losartan 100mg (home olmesartan 40mg), if BP remains elevated resume torsemide at 20mg (home 40mg)    CKD3a - renally dose, avoid nephrotoxins, monitor potassium (has req Lokelma in past)    Dispo - home pending final antibiotic regimen, PICC, and arrangement of home infusion services

## 2024-04-01 NOTE — DIETITIAN INITIAL EVALUATION ADULT - PERTINENT MEDS FT
MEDICATIONS  (STANDING):  acetaminophen     Tablet .. 650 milliGRAM(s) Oral every 6 hours  amLODIPine   Tablet 10 milliGRAM(s) Oral daily  aspirin  chewable 81 milliGRAM(s) Oral daily  atorvastatin 40 milliGRAM(s) Oral at bedtime  ceFAZolin   IVPB 2000 milliGRAM(s) IV Intermittent every 8 hours  chlorhexidine 2% Cloths 1 Application(s) Topical <User Schedule>  citalopram 40 milliGRAM(s) Oral daily  clopidogrel Tablet 75 milliGRAM(s) Oral daily  dextrose 5%. 1000 milliLiter(s) (100 mL/Hr) IV Continuous <Continuous>  dextrose 5%. 1000 milliLiter(s) (50 mL/Hr) IV Continuous <Continuous>  dextrose 50% Injectable 25 Gram(s) IV Push once  dextrose 50% Injectable 12.5 Gram(s) IV Push once  dextrose 50% Injectable 25 Gram(s) IV Push once  glucagon  Injectable 1 milliGRAM(s) IntraMuscular once  heparin   Injectable 5000 Unit(s) SubCutaneous every 8 hours  influenza   Vaccine 0.5 milliLiter(s) IntraMuscular once  insulin glargine Injectable (LANTUS) 15 Unit(s) SubCutaneous at bedtime  insulin lispro (ADMELOG) corrective regimen sliding scale   SubCutaneous Before meals and at bedtime  insulin lispro Injectable (ADMELOG) 7 Unit(s) SubCutaneous three times a day before meals  losartan 100 milliGRAM(s) Oral daily  polyethylene glycol 3350 17 Gram(s) Oral at bedtime  senna 2 Tablet(s) Oral at bedtime    MEDICATIONS  (PRN):  aluminum hydroxide/magnesium hydroxide/simethicone Suspension 30 milliLiter(s) Oral every 4 hours PRN Dyspepsia  dextrose Oral Gel 15 Gram(s) Oral once PRN Blood Glucose LESS THAN 70 milliGRAM(s)/deciliter  melatonin 3 milliGRAM(s) Oral at bedtime PRN Insomnia  ondansetron Injectable 4 milliGRAM(s) IV Push every 8 hours PRN Nausea and/or Vomiting  oxyCODONE    IR 2.5 milliGRAM(s) Oral every 4 hours PRN Moderate Pain (4 - 6)  oxyCODONE    IR 5 milliGRAM(s) Oral every 4 hours PRN Severe Pain (7 - 10)  sodium chloride 0.9% lock flush 10 milliLiter(s) IV Push every 1 hour PRN Pre/post blood products, medications, blood draw, and to maintain line patency

## 2024-04-01 NOTE — DIETITIAN INITIAL EVALUATION ADULT - PROBLEM SELECTOR PLAN 9
F: Encourage PO when diet started  E: Replete as necessary K>4 Mg>2  N: NPO for procedure, start CC diet    DVT Prophylaxis: Start Hep SubQ when cleared  GI prophylaxis: None   CODE STATUS: FULL

## 2024-04-01 NOTE — DIETITIAN INITIAL EVALUATION ADULT - OTHER CALCULATIONS
Based on Standards of Care pt within % ideal body weight, thus actual body weight used for all calculations. Needs adjusted for age, clinical condition/status.

## 2024-04-01 NOTE — PROGRESS NOTE ADULT - ASSESSMENT
58M PMH HFpEF, CAD w/ stent, HTN, HLD, DM s/p Right partial 5th ray amputation on 3/27. Clean margin growing MSSA    Plan:  - F/u OR clean margin culture/path,   - C/w abx per ID, planned for 6 weeks IV abx for residual OM  - Dressed with betadine DSD  - Non Weight-bearing to RLE  -Rest of care up to primary team    Podiatry following, Plan d/w with Attending    Patient should f/u w/ Dr. Chakraborty w/i 1 week of discharge:  [address], [place] [address]  [phone]

## 2024-04-01 NOTE — PROGRESS NOTE ADULT - SUBJECTIVE AND OBJECTIVE BOX
OVERNIGHT EVENTS:    SUBJECTIVE / INTERVAL HPI: Patient seen and examined at bedside.     ROS: negative unless otherwise stated above.    VITAL SIGNS:  Vital Signs Last 24 Hrs  T(C): 36.5 (01 Apr 2024 05:45), Max: 36.6 (31 Mar 2024 15:53)  T(F): 97.7 (01 Apr 2024 05:45), Max: 97.9 (31 Mar 2024 15:53)  HR: 48 (01 Apr 2024 05:45) (47 - 55)  BP: 174/76 (01 Apr 2024 05:45) (151/83 - 179/77)  BP(mean): --  RR: 18 (01 Apr 2024 05:45) (17 - 19)  SpO2: 94% (01 Apr 2024 05:45) (94% - 99%)    Parameters below as of 01 Apr 2024 05:45  Patient On (Oxygen Delivery Method): room air        PHYSICAL EXAM:    General: In no apparent distress  HEENT: NC/AT; PERRL, anicteric sclera; MMM  Neck: supple  Cardiovascular: +S1/S2; RRR  Respiratory: CTA B/L; no W/R/R  Gastrointestinal: soft, NT/ND; +BSx4  Extremities: WWP; no edema, clubbing or cyanosis  Vascular: 2+ radial, DP/PT pulses B/L  Neurological: AAOx3; no focal deficits    MEDICATIONS:  MEDICATIONS  (STANDING):  acetaminophen     Tablet .. 650 milliGRAM(s) Oral every 6 hours  amLODIPine   Tablet 10 milliGRAM(s) Oral daily  aspirin  chewable 81 milliGRAM(s) Oral daily  atorvastatin 40 milliGRAM(s) Oral at bedtime  ceFAZolin   IVPB 2000 milliGRAM(s) IV Intermittent every 8 hours  citalopram 40 milliGRAM(s) Oral daily  clopidogrel Tablet 75 milliGRAM(s) Oral daily  dextrose 5%. 1000 milliLiter(s) (50 mL/Hr) IV Continuous <Continuous>  dextrose 5%. 1000 milliLiter(s) (100 mL/Hr) IV Continuous <Continuous>  dextrose 50% Injectable 12.5 Gram(s) IV Push once  dextrose 50% Injectable 25 Gram(s) IV Push once  dextrose 50% Injectable 25 Gram(s) IV Push once  glucagon  Injectable 1 milliGRAM(s) IntraMuscular once  heparin   Injectable 5000 Unit(s) SubCutaneous every 8 hours  influenza   Vaccine 0.5 milliLiter(s) IntraMuscular once  insulin glargine Injectable (LANTUS) 15 Unit(s) SubCutaneous at bedtime  insulin lispro (ADMELOG) corrective regimen sliding scale   SubCutaneous Before meals and at bedtime  insulin lispro Injectable (ADMELOG) 7 Unit(s) SubCutaneous three times a day before meals  losartan 100 milliGRAM(s) Oral daily  magnesium sulfate  IVPB 1 Gram(s) IV Intermittent once  polyethylene glycol 3350 17 Gram(s) Oral at bedtime  senna 2 Tablet(s) Oral at bedtime    MEDICATIONS  (PRN):  aluminum hydroxide/magnesium hydroxide/simethicone Suspension 30 milliLiter(s) Oral every 4 hours PRN Dyspepsia  dextrose Oral Gel 15 Gram(s) Oral once PRN Blood Glucose LESS THAN 70 milliGRAM(s)/deciliter  melatonin 3 milliGRAM(s) Oral at bedtime PRN Insomnia  ondansetron Injectable 4 milliGRAM(s) IV Push every 8 hours PRN Nausea and/or Vomiting  oxyCODONE    IR 2.5 milliGRAM(s) Oral every 4 hours PRN Moderate Pain (4 - 6)  oxyCODONE    IR 5 milliGRAM(s) Oral every 4 hours PRN Severe Pain (7 - 10)      ALLERGIES:  Allergies    No Known Allergies    Intolerances    carvedilol (Hypotension)      LABS:                        9.9    6.21  )-----------( 298      ( 01 Apr 2024 05:30 )             30.3     04-01    140  |  107  |  22  ----------------------------<  110<H>  5.1   |  23  |  1.40<H>    Ca    8.6      01 Apr 2024 05:30  Phos  3.8     04-01  Mg     1.9     04-01    TPro  6.7  /  Alb  3.5  /  TBili  <0.2  /  DBili  x   /  AST  24  /  ALT  11  /  AlkPhos  97  04-01      Urinalysis Basic - ( 01 Apr 2024 05:30 )    Color: x / Appearance: x / SG: x / pH: x  Gluc: 110 mg/dL / Ketone: x  / Bili: x / Urobili: x   Blood: x / Protein: x / Nitrite: x   Leuk Esterase: x / RBC: x / WBC x   Sq Epi: x / Non Sq Epi: x / Bacteria: x      CAPILLARY BLOOD GLUCOSE      POCT Blood Glucose.: 128 mg/dL (31 Mar 2024 22:03)      RADIOLOGY & ADDITIONAL TESTS: Reviewed. OVERNIGHT EVENTS:    SUBJECTIVE / INTERVAL HPI: Patient seen and examined at bedside.     ROS: negative unless otherwise stated above.    VITAL SIGNS:  Vital Signs Last 24 Hrs  T(C): 36.5 (01 Apr 2024 05:45), Max: 36.6 (31 Mar 2024 15:53)  T(F): 97.7 (01 Apr 2024 05:45), Max: 97.9 (31 Mar 2024 15:53)  HR: 48 (01 Apr 2024 05:45) (47 - 55)  BP: 174/76 (01 Apr 2024 05:45) (151/83 - 179/77)  BP(mean): --  RR: 18 (01 Apr 2024 05:45) (17 - 19)  SpO2: 94% (01 Apr 2024 05:45) (94% - 99%)    Parameters below as of 01 Apr 2024 05:45  Patient On (Oxygen Delivery Method): room air        PHYSICAL EXAM:    General: In no apparent distress  HEENT: NC/AT; PERRL, anicteric sclera; MMM  Neck: supple  Cardiovascular: +S1/S2; RRR  Respiratory: CTA B/L; no W/R/R  Gastrointestinal: soft, NT/ND; +BSx4  Extremities: WWP; no edema, clubbing or cyanosis, L wrist in splint, R foot dressed  Vascular: 2+ radial, DP/PT pulses B/L  Neurological: AAOx3; no focal deficits    MEDICATIONS:  MEDICATIONS  (STANDING):  acetaminophen     Tablet .. 650 milliGRAM(s) Oral every 6 hours  amLODIPine   Tablet 10 milliGRAM(s) Oral daily  aspirin  chewable 81 milliGRAM(s) Oral daily  atorvastatin 40 milliGRAM(s) Oral at bedtime  ceFAZolin   IVPB 2000 milliGRAM(s) IV Intermittent every 8 hours  citalopram 40 milliGRAM(s) Oral daily  clopidogrel Tablet 75 milliGRAM(s) Oral daily  dextrose 5%. 1000 milliLiter(s) (50 mL/Hr) IV Continuous <Continuous>  dextrose 5%. 1000 milliLiter(s) (100 mL/Hr) IV Continuous <Continuous>  dextrose 50% Injectable 12.5 Gram(s) IV Push once  dextrose 50% Injectable 25 Gram(s) IV Push once  dextrose 50% Injectable 25 Gram(s) IV Push once  glucagon  Injectable 1 milliGRAM(s) IntraMuscular once  heparin   Injectable 5000 Unit(s) SubCutaneous every 8 hours  influenza   Vaccine 0.5 milliLiter(s) IntraMuscular once  insulin glargine Injectable (LANTUS) 15 Unit(s) SubCutaneous at bedtime  insulin lispro (ADMELOG) corrective regimen sliding scale   SubCutaneous Before meals and at bedtime  insulin lispro Injectable (ADMELOG) 7 Unit(s) SubCutaneous three times a day before meals  losartan 100 milliGRAM(s) Oral daily  magnesium sulfate  IVPB 1 Gram(s) IV Intermittent once  polyethylene glycol 3350 17 Gram(s) Oral at bedtime  senna 2 Tablet(s) Oral at bedtime    MEDICATIONS  (PRN):  aluminum hydroxide/magnesium hydroxide/simethicone Suspension 30 milliLiter(s) Oral every 4 hours PRN Dyspepsia  dextrose Oral Gel 15 Gram(s) Oral once PRN Blood Glucose LESS THAN 70 milliGRAM(s)/deciliter  melatonin 3 milliGRAM(s) Oral at bedtime PRN Insomnia  ondansetron Injectable 4 milliGRAM(s) IV Push every 8 hours PRN Nausea and/or Vomiting  oxyCODONE    IR 2.5 milliGRAM(s) Oral every 4 hours PRN Moderate Pain (4 - 6)  oxyCODONE    IR 5 milliGRAM(s) Oral every 4 hours PRN Severe Pain (7 - 10)      ALLERGIES:  Allergies    No Known Allergies    Intolerances    carvedilol (Hypotension)      LABS:                        9.9    6.21  )-----------( 298      ( 01 Apr 2024 05:30 )             30.3     04-01    140  |  107  |  22  ----------------------------<  110<H>  5.1   |  23  |  1.40<H>    Ca    8.6      01 Apr 2024 05:30  Phos  3.8     04-01  Mg     1.9     04-01    TPro  6.7  /  Alb  3.5  /  TBili  <0.2  /  DBili  x   /  AST  24  /  ALT  11  /  AlkPhos  97  04-01      Urinalysis Basic - ( 01 Apr 2024 05:30 )    Color: x / Appearance: x / SG: x / pH: x  Gluc: 110 mg/dL / Ketone: x  / Bili: x / Urobili: x   Blood: x / Protein: x / Nitrite: x   Leuk Esterase: x / RBC: x / WBC x   Sq Epi: x / Non Sq Epi: x / Bacteria: x      CAPILLARY BLOOD GLUCOSE      POCT Blood Glucose.: 128 mg/dL (31 Mar 2024 22:03)      RADIOLOGY & ADDITIONAL TESTS: Reviewed. OVERNIGHT EVENTS: PARKER    SUBJECTIVE / INTERVAL HPI: The patient was encountered lying in bed, in no acute distress, AAOx3. He noted that his pain was well controlled and that he had no immediate complaints. He reported no BM since admission, and his bowel regimen was updated accordingly. The patient denied chest pain, SOB, palpitations, n/v/d. He endorsed mild constipation, but was not in discomfort or distended. The patient requested an updated plan RE: PICC and d/c.     ROS: negative unless otherwise stated above.    VITAL SIGNS:  Vital Signs Last 24 Hrs  T(C): 36.5 (01 Apr 2024 05:45), Max: 36.6 (31 Mar 2024 15:53)  T(F): 97.7 (01 Apr 2024 05:45), Max: 97.9 (31 Mar 2024 15:53)  HR: 48 (01 Apr 2024 05:45) (47 - 55)  BP: 174/76 (01 Apr 2024 05:45) (151/83 - 179/77)  BP(mean): --  RR: 18 (01 Apr 2024 05:45) (17 - 19)  SpO2: 94% (01 Apr 2024 05:45) (94% - 99%)    Parameters below as of 01 Apr 2024 05:45  Patient On (Oxygen Delivery Method): room air        PHYSICAL EXAM:    General: In no apparent distress  HEENT: NC/AT; PERRL, anicteric sclera; MMM  Neck: supple  Cardiovascular: +S1/S2; RRR  Respiratory: CTA B/L; no W/R/R  Gastrointestinal: soft, NT/ND; +BSx4  Extremities: WWP; no edema, clubbing or cyanosis, L wrist in splint, R foot dressed  Vascular: 2+ radial, DP/PT pulses B/L  Neurological: AAOx3; no focal deficits    MEDICATIONS:  MEDICATIONS  (STANDING):  acetaminophen     Tablet .. 650 milliGRAM(s) Oral every 6 hours  amLODIPine   Tablet 10 milliGRAM(s) Oral daily  aspirin  chewable 81 milliGRAM(s) Oral daily  atorvastatin 40 milliGRAM(s) Oral at bedtime  ceFAZolin   IVPB 2000 milliGRAM(s) IV Intermittent every 8 hours  citalopram 40 milliGRAM(s) Oral daily  clopidogrel Tablet 75 milliGRAM(s) Oral daily  dextrose 5%. 1000 milliLiter(s) (50 mL/Hr) IV Continuous <Continuous>  dextrose 5%. 1000 milliLiter(s) (100 mL/Hr) IV Continuous <Continuous>  dextrose 50% Injectable 12.5 Gram(s) IV Push once  dextrose 50% Injectable 25 Gram(s) IV Push once  dextrose 50% Injectable 25 Gram(s) IV Push once  glucagon  Injectable 1 milliGRAM(s) IntraMuscular once  heparin   Injectable 5000 Unit(s) SubCutaneous every 8 hours  influenza   Vaccine 0.5 milliLiter(s) IntraMuscular once  insulin glargine Injectable (LANTUS) 15 Unit(s) SubCutaneous at bedtime  insulin lispro (ADMELOG) corrective regimen sliding scale   SubCutaneous Before meals and at bedtime  insulin lispro Injectable (ADMELOG) 7 Unit(s) SubCutaneous three times a day before meals  losartan 100 milliGRAM(s) Oral daily  magnesium sulfate  IVPB 1 Gram(s) IV Intermittent once  polyethylene glycol 3350 17 Gram(s) Oral at bedtime  senna 2 Tablet(s) Oral at bedtime    MEDICATIONS  (PRN):  aluminum hydroxide/magnesium hydroxide/simethicone Suspension 30 milliLiter(s) Oral every 4 hours PRN Dyspepsia  dextrose Oral Gel 15 Gram(s) Oral once PRN Blood Glucose LESS THAN 70 milliGRAM(s)/deciliter  melatonin 3 milliGRAM(s) Oral at bedtime PRN Insomnia  ondansetron Injectable 4 milliGRAM(s) IV Push every 8 hours PRN Nausea and/or Vomiting  oxyCODONE    IR 2.5 milliGRAM(s) Oral every 4 hours PRN Moderate Pain (4 - 6)  oxyCODONE    IR 5 milliGRAM(s) Oral every 4 hours PRN Severe Pain (7 - 10)      ALLERGIES:  Allergies    No Known Allergies    Intolerances    carvedilol (Hypotension)      LABS:                        9.9    6.21  )-----------( 298      ( 01 Apr 2024 05:30 )             30.3     04-01    140  |  107  |  22  ----------------------------<  110<H>  5.1   |  23  |  1.40<H>    Ca    8.6      01 Apr 2024 05:30  Phos  3.8     04-01  Mg     1.9     04-01    TPro  6.7  /  Alb  3.5  /  TBili  <0.2  /  DBili  x   /  AST  24  /  ALT  11  /  AlkPhos  97  04-01      Urinalysis Basic - ( 01 Apr 2024 05:30 )    Color: x / Appearance: x / SG: x / pH: x  Gluc: 110 mg/dL / Ketone: x  / Bili: x / Urobili: x   Blood: x / Protein: x / Nitrite: x   Leuk Esterase: x / RBC: x / WBC x   Sq Epi: x / Non Sq Epi: x / Bacteria: x      CAPILLARY BLOOD GLUCOSE      POCT Blood Glucose.: 128 mg/dL (31 Mar 2024 22:03)      RADIOLOGY & ADDITIONAL TESTS: Reviewed.

## 2024-04-01 NOTE — PROGRESS NOTE ADULT - PROBLEM SELECTOR PLAN 6
Home meds: Amlodipine 10mg, olmesartan 40mg   - C/w amlodipine  - Started losartan 50mg iso elevated pressures (therapeutic exchange would be losartan 100mg) Home meds: Amlodipine 10mg, olmesartan 40mg   - C/w amlodipine, losartan 100mg

## 2024-04-01 NOTE — PROGRESS NOTE ADULT - SUBJECTIVE AND OBJECTIVE BOX
SUBJECTIVE / INTERVAL HPI: Patient was seen and examined this morning.     Overnight events: No acute overnight events, denies any new complains , blood glucose levels well controlled, vitals stable.    CAPILLARY BLOOD GLUCOSE & INSULIN RECEIVED  91 mg/dL (03-31 @ 09:16)  131 mg/dL (03-31 @ 12:55)  153 mg/dL (03-31 @ 17:49)- 2 units sliding scale   128 mg/dL (03-31 @ 22:03)  136 mg/dL (04-01 @ 09:04)      REVIEW OF SYSTEMS  Constitutional:  Negative fever, chills or loss of appetite.  Eyes:  Negative blurry vision or double vision.  Cardiovascular:  Negative for chest pain or palpitations.  Respiratory:  Negative for cough, wheezing, or shortness of breath.    Gastrointestinal:  Negative for nausea, vomiting, diarrhea, constipation, or abdominal pain.  Genitourinary:  Negative frequency, urgency or dysuria.  Neurologic:  No headache, confusion, dizziness, lightheadedness.    PHYSICAL EXAM  Vital Signs Last 24 Hrs  T(C): 36.1 (01 Apr 2024 09:13), Max: 36.6 (31 Mar 2024 15:53)  T(F): 97 (01 Apr 2024 09:13), Max: 97.9 (31 Mar 2024 15:53)  HR: 50 (01 Apr 2024 09:13) (47 - 55)  BP: 164/70 (01 Apr 2024 09:13) (151/83 - 174/76)  BP(mean): --  RR: 18 (01 Apr 2024 09:13) (17 - 19)  SpO2: 99% (01 Apr 2024 09:13) (94% - 99%)    Parameters below as of 01 Apr 2024 09:13  Patient On (Oxygen Delivery Method): room air        Constitutional: Awake, alert, in no acute distress.   HEENT: Normocephalic, atraumatic, HALLEY.  Respiratory: Lungs clear to ausculation bilaterally.   Cardiovascular: regular rhythm, normal S1 and S2, no audible murmurs.   GI: soft, non-tender, non-distended, bowel sounds present.  Extremities: No lower extremity edema.  Psychiatric: AAO x 3. Normal affect/mood.     LABS  CBC - WBC/HGB/HTC/PLT: 6.21/9.9/30.3/298 (04-01-24)  BMP - Na/K/Cl/Bicarb/BUN/Cr/Gluc/AG/eGFR: 140/5.1/107/23/22/1.40/110/10/59 (04-01-24)  Ca - 8.6 (04-01-24)  Phos - 3.8 (04-01-24)  Mg - 1.9 (04-01-24)  LFT - Alb/Tprot/Tbili/Dbili/AlkPhos/ALT/AST: 3.5/--/<0.2/--/97/11/24 (04-01-24)  PT/aPTT/INR: 12.4/35.2/1.09 (03-27-24)           MEDICATIONS  MEDICATIONS  (STANDING):  acetaminophen     Tablet .. 650 milliGRAM(s) Oral every 6 hours  amLODIPine   Tablet 10 milliGRAM(s) Oral daily  aspirin  chewable 81 milliGRAM(s) Oral daily  atorvastatin 40 milliGRAM(s) Oral at bedtime  ceFAZolin   IVPB 2000 milliGRAM(s) IV Intermittent every 8 hours  citalopram 40 milliGRAM(s) Oral daily  clopidogrel Tablet 75 milliGRAM(s) Oral daily  dextrose 5%. 1000 milliLiter(s) (100 mL/Hr) IV Continuous <Continuous>  dextrose 5%. 1000 milliLiter(s) (50 mL/Hr) IV Continuous <Continuous>  dextrose 50% Injectable 25 Gram(s) IV Push once  dextrose 50% Injectable 12.5 Gram(s) IV Push once  dextrose 50% Injectable 25 Gram(s) IV Push once  glucagon  Injectable 1 milliGRAM(s) IntraMuscular once  heparin   Injectable 5000 Unit(s) SubCutaneous every 8 hours  influenza   Vaccine 0.5 milliLiter(s) IntraMuscular once  insulin glargine Injectable (LANTUS) 15 Unit(s) SubCutaneous at bedtime  insulin lispro (ADMELOG) corrective regimen sliding scale   SubCutaneous Before meals and at bedtime  insulin lispro Injectable (ADMELOG) 7 Unit(s) SubCutaneous three times a day before meals  losartan 100 milliGRAM(s) Oral daily  polyethylene glycol 3350 17 Gram(s) Oral at bedtime  senna 2 Tablet(s) Oral at bedtime    MEDICATIONS  (PRN):  aluminum hydroxide/magnesium hydroxide/simethicone Suspension 30 milliLiter(s) Oral every 4 hours PRN Dyspepsia  dextrose Oral Gel 15 Gram(s) Oral once PRN Blood Glucose LESS THAN 70 milliGRAM(s)/deciliter  melatonin 3 milliGRAM(s) Oral at bedtime PRN Insomnia  ondansetron Injectable 4 milliGRAM(s) IV Push every 8 hours PRN Nausea and/or Vomiting  oxyCODONE    IR 2.5 milliGRAM(s) Oral every 4 hours PRN Moderate Pain (4 - 6)  oxyCODONE    IR 5 milliGRAM(s) Oral every 4 hours PRN Severe Pain (7 - 10)    ASSESSMENT / RECOMMENDATIONS      58y Male with a past medical history of Hypertension , Hyperlipidemia, Diabetes mellitus type 2, HFpEF , CAD s/p stents, CKD stage 3 s/p Right partial 5th ray amputation.    A1C: 9.7 %  BUN: 22  Creatinine: 1.40  GFR: 59  Weight: 95.7  BMI: 28.6    # Type 2 diabetes mellitus with hyperglycemia  -CGM readings noted over the last 14 days with GMI 77%  , sensor active 68%.  -Glucose levels time in range 36% , low 12% , less than 54 5%.  -CGM readings -12-6 am 130-150 , 6-9 am 122, 9-12 am 163 , 12-3 am 227 , 3-6 am 256 , 6-9 am pm 247 , 9-12 pm 188 .  -Home readings consistent with fasting hypoglycemia episodes and afternoon post prandial hyperglycemia, blood glucose well controlled with the current regimen.  -Please continue Lantus 15 units at bedtime.    -Please continue lispro 7 units before each meal.  - Continue lispro moderate dose sliding scale before meals and at bedtime.  - Patient's fingerstick glucose goal is 100-180 mg/dL  -Can be discharged on the current regimen-7 units lispro 3 times a day before meals ,Lantus 15 units at bedtime.

## 2024-04-01 NOTE — DIETITIAN INITIAL EVALUATION ADULT - PROBLEM SELECTOR PLAN 1
Hx of OM with prior amputation, px for foot infection. Follows with Dr. Brunner weekly. Was admitted here for management of OM 2/5-2/8 and dc'd on keflex and flagyl was started on unknown abx last week. Referred to ED by Dr. Brunner for worsening infection.  Podiatry consulted, c/f deep SSTI vs OM, pending partial 5th ray amp of R foot  S/p Zosyn, Vanc in ED.  - C/w Zosyn 3.375 q8h  - C/w Vanc 1500mg q12h, check trough before 4th dose  - F/u wound margins   - Appreciate podiatry recs

## 2024-04-01 NOTE — PROGRESS NOTE ADULT - SUBJECTIVE AND OBJECTIVE BOX
INTERVAL HPI/OVERNIGHT EVENTS:    Patient was seen and examined at bedside.  No complaints.  Wants to leave the hospital     CONSTITUTIONAL:  Negative fever or chills, feels well, good appetite  EYES:  Negative  blurry vision or double vision  CARDIOVASCULAR:  Negative for chest pain or palpitations  RESPIRATORY:  Negative for cough, wheezing, or SOB   GASTROINTESTINAL:  Negative for nausea, vomiting, diarrhea, constipation, or abdominal pain  GENITOURINARY:  Negative frequency, urgency or dysuria  NEUROLOGIC:  No headache, confusion, dizziness, lightheadedness      ANTIBIOTICS/RELEVANT:    MEDICATIONS  (STANDING):  acetaminophen     Tablet .. 650 milliGRAM(s) Oral every 6 hours  amLODIPine   Tablet 10 milliGRAM(s) Oral daily  aspirin  chewable 81 milliGRAM(s) Oral daily  atorvastatin 40 milliGRAM(s) Oral at bedtime  ceFAZolin   IVPB 2000 milliGRAM(s) IV Intermittent every 8 hours  citalopram 40 milliGRAM(s) Oral daily  clopidogrel Tablet 75 milliGRAM(s) Oral daily  dextrose 5%. 1000 milliLiter(s) (100 mL/Hr) IV Continuous <Continuous>  dextrose 5%. 1000 milliLiter(s) (50 mL/Hr) IV Continuous <Continuous>  dextrose 50% Injectable 25 Gram(s) IV Push once  dextrose 50% Injectable 12.5 Gram(s) IV Push once  dextrose 50% Injectable 25 Gram(s) IV Push once  glucagon  Injectable 1 milliGRAM(s) IntraMuscular once  heparin   Injectable 5000 Unit(s) SubCutaneous every 8 hours  influenza   Vaccine 0.5 milliLiter(s) IntraMuscular once  insulin glargine Injectable (LANTUS) 15 Unit(s) SubCutaneous at bedtime  insulin lispro (ADMELOG) corrective regimen sliding scale   SubCutaneous Before meals and at bedtime  insulin lispro Injectable (ADMELOG) 7 Unit(s) SubCutaneous three times a day before meals  losartan 100 milliGRAM(s) Oral daily  magnesium sulfate  IVPB 1 Gram(s) IV Intermittent once  polyethylene glycol 3350 17 Gram(s) Oral at bedtime  senna 2 Tablet(s) Oral at bedtime    MEDICATIONS  (PRN):  aluminum hydroxide/magnesium hydroxide/simethicone Suspension 30 milliLiter(s) Oral every 4 hours PRN Dyspepsia  dextrose Oral Gel 15 Gram(s) Oral once PRN Blood Glucose LESS THAN 70 milliGRAM(s)/deciliter  melatonin 3 milliGRAM(s) Oral at bedtime PRN Insomnia  ondansetron Injectable 4 milliGRAM(s) IV Push every 8 hours PRN Nausea and/or Vomiting  oxyCODONE    IR 2.5 milliGRAM(s) Oral every 4 hours PRN Moderate Pain (4 - 6)  oxyCODONE    IR 5 milliGRAM(s) Oral every 4 hours PRN Severe Pain (7 - 10)        Vital Signs Last 24 Hrs  T(C): 36.1 (01 Apr 2024 09:13), Max: 36.6 (31 Mar 2024 15:53)  T(F): 97 (01 Apr 2024 09:13), Max: 97.9 (31 Mar 2024 15:53)  HR: 50 (01 Apr 2024 09:13) (47 - 55)  BP: 164/70 (01 Apr 2024 09:13) (151/83 - 174/76)  BP(mean): --  RR: 18 (01 Apr 2024 09:13) (17 - 19)  SpO2: 99% (01 Apr 2024 09:13) (94% - 99%)    Parameters below as of 01 Apr 2024 09:13  Patient On (Oxygen Delivery Method): room air        PHYSICAL EXAM:  Constitutional: non-toxic, no distress  Eyes:HALLEY, EOMI  Ear/Nose/Throat: no oral lesion, no sinus tenderness on percussion	  Neck:  supple  Respiratory: CTA sina  Cardiovascular: S1S2 RRR, no murmurs  Gastrointestinal:soft, (+) BS, no HSM  Extremities:no e/e/c  Vascular: DP Pulse:	right normal; left normal      LABS:                        9.9    6.21  )-----------( 298      ( 01 Apr 2024 05:30 )             30.3     04-01    140  |  107  |  22  ----------------------------<  110<H>  5.1   |  23  |  1.40<H>    Ca    8.6      01 Apr 2024 05:30  Phos  3.8     04-01  Mg     1.9     04-01    TPro  6.7  /  Alb  3.5  /  TBili  <0.2  /  DBili  x   /  AST  24  /  ALT  11  /  AlkPhos  97  04-01      Urinalysis Basic - ( 01 Apr 2024 05:30 )    Color: x / Appearance: x / SG: x / pH: x  Gluc: 110 mg/dL / Ketone: x  / Bili: x / Urobili: x   Blood: x / Protein: x / Nitrite: x   Leuk Esterase: x / RBC: x / WBC x   Sq Epi: x / Non Sq Epi: x / Bacteria: x        MICROBIOLOGY:    RADIOLOGY & ADDITIONAL STUDIES:

## 2024-04-01 NOTE — PROGRESS NOTE ADULT - ASSESSMENT
IMPRESSION:  Diabetic foot infection s/p amputation. Clean margin culture shows MSSA, corynebacterium. Presence of bacteria in clean margin culture suggests there is residual osteomyelitis    Recommend:  1.  Continue Vancomycin 1 gram IV q12.  This will cover corynebacterium.  Check trough around 6 pm tonight  2.  Continue Cefazolin 2 grams IV q8hrs to target MSSA  3. Ok to place PICC today.  Final regimen will be Vancomycin + Cefazolin.  Vancomycin dose depends on trough. Ok to place single lumen PICC  4.  Needs weekly CBC, CMP, ESR, CRP, vanco trough     ID team 2 will follow

## 2024-04-01 NOTE — DIETITIAN INITIAL EVALUATION ADULT - PERSON TAUGHT/METHOD
Pt amenable to education; RD provided education in regards to the importance of adequate macro and micronutrients, as well as hydration to support ADLs, maintain energy levels and overall functional/nutritional status. General healthful education provided. Nutrient-dense foods promoted. Pt's diet and nutrition discussed. Pt was receptive and verbalized understanding. No additional nutrition-related concerns./verbal instruction/patient instructed

## 2024-04-01 NOTE — DIETITIAN INITIAL EVALUATION ADULT - COLLABORATION WITH OTHER PROVIDERS
RD has provided medical team with diet/enteral/parenteral nutrition recommendations, oral nutrition supplement/nourishment recommendations, pt's overall nutritional status.

## 2024-04-01 NOTE — DIETITIAN INITIAL EVALUATION ADULT - ADD RECOMMEND
1. Continue with current diet order (consistent carbohydrate diet with evening snack)  2. Encourage pt to meet nutritional needs as able  3. Monitor PO intakes, trend weights (weekly), monitor skin integrity, monitor labs (electrolytes, CMP), monitor GI function  4. Encourage adherence to diet education (reinforce as able)  5. Continue insulin regimen to promote euglycemia  6. Pain and bowel regimen per team  7. Will continue to assess/honor preferences as able   8. Align nutrition interventions with goals of care at all times

## 2024-04-01 NOTE — PROGRESS NOTE ADULT - PROBLEM SELECTOR PLAN 3
Pt had stent placed 12/9/2022 iso SOB and CP. Symptoms have resolved since. Unclear why he is still on plavix.   Home meds: ASA/Plavix, Atorvastatin 40  - C/w statin  - C/w DAPT

## 2024-04-01 NOTE — PROGRESS NOTE ADULT - SUBJECTIVE AND OBJECTIVE BOX
Patient is a 58y old  Male who presents with a chief complaint of SSTI/OM (01 Apr 2024 07:43)      INTERVAL HPI/ OVERNIGHT EVENTS. Pt seen and examined bedside. Pending PICC for long-term antibiotics. No pedal complaints.       LABS                        9.9    6.21  )-----------( 298      ( 01 Apr 2024 05:30 )             30.3     04-01    140  |  107  |  22  ----------------------------<  110<H>  5.1   |  23  |  1.40<H>    Ca    8.6      01 Apr 2024 05:30  Phos  3.8     04-01  Mg     1.9     04-01    TPro  6.7  /  Alb  3.5  /  TBili  <0.2  /  DBili  x   /  AST  24  /  ALT  11  /  AlkPhos  97  04-01        ICU Vital Signs Last 24 Hrs  T(C): 36.5 (01 Apr 2024 05:45), Max: 36.6 (31 Mar 2024 15:53)  T(F): 97.7 (01 Apr 2024 05:45), Max: 97.9 (31 Mar 2024 15:53)  HR: 48 (01 Apr 2024 05:45) (47 - 55)  BP: 174/76 (01 Apr 2024 05:45) (151/83 - 179/77)  BP(mean): --  ABP: --  ABP(mean): --  RR: 18 (01 Apr 2024 05:45) (17 - 19)  SpO2: 94% (01 Apr 2024 05:45) (94% - 99%)    O2 Parameters below as of 01 Apr 2024 05:45  Patient On (Oxygen Delivery Method): room air        RADIOLOGY  < from: Xray Foot AP + Lateral + Oblique, Right (03.27.24 @ 23:12) >  IMPRESSION:  Status post partial 5th ray resection through distal metatarsal shaft   with postsurgical changes in the overlying soft tissues. No proximally  tracking gas collections beyond the margins and no focal areas of   osteomyelitis.    Remainder of foot unchanged. Also correlate with intraoperative findings.  < end of copied text >      PHYSICAL EXAM  Lower Extremity Focused  Vasc: DP 1/4 PT 2/4. Tg: Warm to Warm.  mild edema to dorsla foot  Derm: Surgical site intact with no signs of dehiscence. Sutures in tact No drainage noted or signs of residual infection.   Neuro: Protective sensation intact  MSK: Partial 3rd digit amputation and 5th ray amputation

## 2024-04-01 NOTE — DIETITIAN INITIAL EVALUATION ADULT - OTHER INFO
58 male with past medical history of HFpEF (EF 71% 12/22), CAD with stent (12/9), HTN, HLD, IDDM, prior metatarsal osteomyelitis, prior visits for NOLAN/hyperkalemia, presented with concern for foot infection. Admitted for osteomyelitis, status post partial 5th ray amputation of R foot, pending endo recommendations and clean margins for discharge.    Pt seen in room for nutrition assessment. Pt reports fair to good appetite PTA and during hospital stay. As per diet recall PTA: pt endorsed he eats a varriety of foods, fruits, eggs, vegetables, meat, fish, dairy, starches. Currently on consistent carbohydrate diet with evening snack, tolerating well, noted with ~75% PO intakes overall. No cultural, Judaism, or ethnic food preferences noted. No known food allergies. Denies wt changes, reports wt stability at current wt. Dosing wt: 211 pounds, Ideal body weight: 178 pounds, pt is 119% of ideal body weight. Denies nausea, vomiting, diarrhea, constipation, last BM yesterday per pt, small amount of stool, formed. Noted with right foot edema (pitting, 2+). Skin: right foot osteomyelitis, no pressure ulcers. Satya: 20. No issues chewing or swallowing noted. Denies pain. Labs reviewed: elevated POCT glucose (128-153 range), Cr (1.40), serum Glucose (110), low eGFR (59); RD to continue to monitor trends. Nutritionally pertinent medications/supplements: IV antibiotics, insulin, senna, MIRALAX, zofran. Observed pt with no overt signs of muscle or fat wasting. Based on ASPEN guidelines, pt does not meet criteria for malnutrition at this time. Pt amenable to education; RD provided education in regards to the importance of adequate macro and micronutrients, as well as hydration to support ADLs, maintain energy levels and overall functional/nutritional status. General healthful education provided. Nutrient-dense foods promoted. Pt's diet and nutrition discussed. Pt was receptive and verbalized understanding. No additional nutrition-related concerns. Will continue to follow. Additional nutrition recommendations below to follow.

## 2024-04-02 ENCOUNTER — TRANSCRIPTION ENCOUNTER (OUTPATIENT)
Age: 59
End: 2024-04-02

## 2024-04-02 ENCOUNTER — NON-APPOINTMENT (OUTPATIENT)
Age: 59
End: 2024-04-02

## 2024-04-02 ENCOUNTER — APPOINTMENT (OUTPATIENT)
Dept: INTERNAL MEDICINE | Facility: CLINIC | Age: 59
End: 2024-04-02

## 2024-04-02 VITALS
HEART RATE: 65 BPM | RESPIRATION RATE: 18 BRPM | DIASTOLIC BLOOD PRESSURE: 66 MMHG | SYSTOLIC BLOOD PRESSURE: 155 MMHG | OXYGEN SATURATION: 96 % | TEMPERATURE: 98 F

## 2024-04-02 LAB
ANION GAP SERPL CALC-SCNC: 9 MMOL/L — SIGNIFICANT CHANGE UP (ref 5–17)
ANION GAP SERPL CALC-SCNC: 9 MMOL/L — SIGNIFICANT CHANGE UP (ref 5–17)
BASOPHILS # BLD AUTO: 0.04 K/UL — SIGNIFICANT CHANGE UP (ref 0–0.2)
BASOPHILS NFR BLD AUTO: 0.6 % — SIGNIFICANT CHANGE UP (ref 0–2)
BUN SERPL-MCNC: 28 MG/DL — HIGH (ref 7–23)
BUN SERPL-MCNC: 31 MG/DL — HIGH (ref 7–23)
CALCIUM SERPL-MCNC: 8.8 MG/DL — SIGNIFICANT CHANGE UP (ref 8.4–10.5)
CALCIUM SERPL-MCNC: 9.4 MG/DL — SIGNIFICANT CHANGE UP (ref 8.4–10.5)
CHLORIDE SERPL-SCNC: 102 MMOL/L — SIGNIFICANT CHANGE UP (ref 96–108)
CHLORIDE SERPL-SCNC: 106 MMOL/L — SIGNIFICANT CHANGE UP (ref 96–108)
CO2 SERPL-SCNC: 23 MMOL/L — SIGNIFICANT CHANGE UP (ref 22–31)
CO2 SERPL-SCNC: 23 MMOL/L — SIGNIFICANT CHANGE UP (ref 22–31)
CREAT SERPL-MCNC: 1.31 MG/DL — HIGH (ref 0.5–1.3)
CREAT SERPL-MCNC: 1.48 MG/DL — HIGH (ref 0.5–1.3)
EGFR: 55 ML/MIN/1.73M2 — LOW
EGFR: 64 ML/MIN/1.73M2 — SIGNIFICANT CHANGE UP
EOSINOPHIL # BLD AUTO: 0.34 K/UL — SIGNIFICANT CHANGE UP (ref 0–0.5)
EOSINOPHIL NFR BLD AUTO: 4.7 % — SIGNIFICANT CHANGE UP (ref 0–6)
GLUCOSE BLDC GLUCOMTR-MCNC: 148 MG/DL — HIGH (ref 70–99)
GLUCOSE BLDC GLUCOMTR-MCNC: 150 MG/DL — HIGH (ref 70–99)
GLUCOSE SERPL-MCNC: 145 MG/DL — HIGH (ref 70–99)
GLUCOSE SERPL-MCNC: 198 MG/DL — HIGH (ref 70–99)
HCT VFR BLD CALC: 31 % — LOW (ref 39–50)
HGB BLD-MCNC: 10 G/DL — LOW (ref 13–17)
IMM GRANULOCYTES NFR BLD AUTO: 0.1 % — SIGNIFICANT CHANGE UP (ref 0–0.9)
LYMPHOCYTES # BLD AUTO: 1.51 K/UL — SIGNIFICANT CHANGE UP (ref 1–3.3)
LYMPHOCYTES # BLD AUTO: 20.8 % — SIGNIFICANT CHANGE UP (ref 13–44)
MAGNESIUM SERPL-MCNC: 2.3 MG/DL — SIGNIFICANT CHANGE UP (ref 1.6–2.6)
MCHC RBC-ENTMCNC: 29.8 PG — SIGNIFICANT CHANGE UP (ref 27–34)
MCHC RBC-ENTMCNC: 32.3 GM/DL — SIGNIFICANT CHANGE UP (ref 32–36)
MCV RBC AUTO: 92.3 FL — SIGNIFICANT CHANGE UP (ref 80–100)
MONOCYTES # BLD AUTO: 0.59 K/UL — SIGNIFICANT CHANGE UP (ref 0–0.9)
MONOCYTES NFR BLD AUTO: 8.1 % — SIGNIFICANT CHANGE UP (ref 2–14)
NEUTROPHILS # BLD AUTO: 4.77 K/UL — SIGNIFICANT CHANGE UP (ref 1.8–7.4)
NEUTROPHILS NFR BLD AUTO: 65.7 % — SIGNIFICANT CHANGE UP (ref 43–77)
NRBC # BLD: 0 /100 WBCS — SIGNIFICANT CHANGE UP (ref 0–0)
PHOSPHATE SERPL-MCNC: 4.4 MG/DL — SIGNIFICANT CHANGE UP (ref 2.5–4.5)
PLATELET # BLD AUTO: 312 K/UL — SIGNIFICANT CHANGE UP (ref 150–400)
POTASSIUM SERPL-MCNC: 5.2 MMOL/L — SIGNIFICANT CHANGE UP (ref 3.5–5.3)
POTASSIUM SERPL-MCNC: 5.4 MMOL/L — HIGH (ref 3.5–5.3)
POTASSIUM SERPL-SCNC: 5.2 MMOL/L — SIGNIFICANT CHANGE UP (ref 3.5–5.3)
POTASSIUM SERPL-SCNC: 5.4 MMOL/L — HIGH (ref 3.5–5.3)
RBC # BLD: 3.36 M/UL — LOW (ref 4.2–5.8)
RBC # FLD: 13.4 % — SIGNIFICANT CHANGE UP (ref 10.3–14.5)
SODIUM SERPL-SCNC: 134 MMOL/L — LOW (ref 135–145)
SODIUM SERPL-SCNC: 138 MMOL/L — SIGNIFICANT CHANGE UP (ref 135–145)
SURGICAL PATHOLOGY STUDY: SIGNIFICANT CHANGE UP
WBC # BLD: 7.26 K/UL — SIGNIFICANT CHANGE UP (ref 3.8–10.5)
WBC # FLD AUTO: 7.26 K/UL — SIGNIFICANT CHANGE UP (ref 3.8–10.5)

## 2024-04-02 PROCEDURE — 83735 ASSAY OF MAGNESIUM: CPT

## 2024-04-02 PROCEDURE — 82803 BLOOD GASES ANY COMBINATION: CPT

## 2024-04-02 PROCEDURE — 93005 ELECTROCARDIOGRAM TRACING: CPT

## 2024-04-02 PROCEDURE — 88305 TISSUE EXAM BY PATHOLOGIST: CPT

## 2024-04-02 PROCEDURE — 99285 EMERGENCY DEPT VISIT HI MDM: CPT | Mod: 25

## 2024-04-02 PROCEDURE — 87186 SC STD MICRODIL/AGAR DIL: CPT

## 2024-04-02 PROCEDURE — 96374 THER/PROPH/DIAG INJ IV PUSH: CPT

## 2024-04-02 PROCEDURE — 86900 BLOOD TYPING SEROLOGIC ABO: CPT

## 2024-04-02 PROCEDURE — 36573 INSJ PICC RS&I 5 YR+: CPT

## 2024-04-02 PROCEDURE — 96375 TX/PRO/DX INJ NEW DRUG ADDON: CPT

## 2024-04-02 PROCEDURE — 86901 BLOOD TYPING SEROLOGIC RH(D): CPT

## 2024-04-02 PROCEDURE — 85730 THROMBOPLASTIN TIME PARTIAL: CPT

## 2024-04-02 PROCEDURE — 84295 ASSAY OF SERUM SODIUM: CPT

## 2024-04-02 PROCEDURE — 87075 CULTR BACTERIA EXCEPT BLOOD: CPT

## 2024-04-02 PROCEDURE — 82550 ASSAY OF CK (CPK): CPT

## 2024-04-02 PROCEDURE — 87040 BLOOD CULTURE FOR BACTERIA: CPT

## 2024-04-02 PROCEDURE — 85652 RBC SED RATE AUTOMATED: CPT

## 2024-04-02 PROCEDURE — 36415 COLL VENOUS BLD VENIPUNCTURE: CPT

## 2024-04-02 PROCEDURE — 84681 ASSAY OF C-PEPTIDE: CPT

## 2024-04-02 PROCEDURE — 82330 ASSAY OF CALCIUM: CPT

## 2024-04-02 PROCEDURE — 71045 X-RAY EXAM CHEST 1 VIEW: CPT

## 2024-04-02 PROCEDURE — 86140 C-REACTIVE PROTEIN: CPT

## 2024-04-02 PROCEDURE — 83605 ASSAY OF LACTIC ACID: CPT

## 2024-04-02 PROCEDURE — 80048 BASIC METABOLIC PNL TOTAL CA: CPT

## 2024-04-02 PROCEDURE — 82553 CREATINE MB FRACTION: CPT

## 2024-04-02 PROCEDURE — 99232 SBSQ HOSP IP/OBS MODERATE 35: CPT

## 2024-04-02 PROCEDURE — 88304 TISSUE EXAM BY PATHOLOGIST: CPT

## 2024-04-02 PROCEDURE — 80202 ASSAY OF VANCOMYCIN: CPT

## 2024-04-02 PROCEDURE — 82962 GLUCOSE BLOOD TEST: CPT

## 2024-04-02 PROCEDURE — 73630 X-RAY EXAM OF FOOT: CPT

## 2024-04-02 PROCEDURE — 88311 DECALCIFY TISSUE: CPT

## 2024-04-02 PROCEDURE — 86850 RBC ANTIBODY SCREEN: CPT

## 2024-04-02 PROCEDURE — 83036 HEMOGLOBIN GLYCOSYLATED A1C: CPT

## 2024-04-02 PROCEDURE — 93010 ELECTROCARDIOGRAM REPORT: CPT

## 2024-04-02 PROCEDURE — 84132 ASSAY OF SERUM POTASSIUM: CPT

## 2024-04-02 PROCEDURE — 85027 COMPLETE CBC AUTOMATED: CPT

## 2024-04-02 PROCEDURE — 85610 PROTHROMBIN TIME: CPT

## 2024-04-02 PROCEDURE — 85025 COMPLETE CBC W/AUTO DIFF WBC: CPT

## 2024-04-02 PROCEDURE — 99239 HOSP IP/OBS DSCHRG MGMT >30: CPT | Mod: GC

## 2024-04-02 PROCEDURE — 87070 CULTURE OTHR SPECIMN AEROBIC: CPT

## 2024-04-02 PROCEDURE — 80053 COMPREHEN METABOLIC PANEL: CPT

## 2024-04-02 PROCEDURE — 73620 X-RAY EXAM OF FOOT: CPT

## 2024-04-02 PROCEDURE — 84100 ASSAY OF PHOSPHORUS: CPT

## 2024-04-02 PROCEDURE — 76000 FLUOROSCOPY <1 HR PHYS/QHP: CPT

## 2024-04-02 RX ORDER — VANCOMYCIN HCL 1 G
1000 VIAL (EA) INTRAVENOUS EVERY 24 HOURS
Refills: 0 | Status: DISCONTINUED | OUTPATIENT
Start: 2024-04-03 | End: 2024-04-02

## 2024-04-02 RX ORDER — SODIUM ZIRCONIUM CYCLOSILICATE 10 G/10G
10 POWDER, FOR SUSPENSION ORAL EVERY 8 HOURS
Refills: 0 | Status: DISCONTINUED | OUTPATIENT
Start: 2024-04-02 | End: 2024-04-02

## 2024-04-02 RX ORDER — VANCOMYCIN HCL 1 G
1 VIAL (EA) INTRAVENOUS
Qty: 37 | Refills: 0
Start: 2024-04-02 | End: 2024-05-08

## 2024-04-02 RX ORDER — INSULIN LISPRO 100/ML
7 VIAL (ML) SUBCUTANEOUS
Qty: 1 | Refills: 2
Start: 2024-04-02 | End: 2024-07-30

## 2024-04-02 RX ORDER — INSULIN GLARGINE 100 [IU]/ML
15 INJECTION, SOLUTION SUBCUTANEOUS
Qty: 1 | Refills: 2
Start: 2024-04-02 | End: 2024-07-30

## 2024-04-02 RX ORDER — CEFAZOLIN SODIUM 1 G
2 VIAL (EA) INJECTION
Qty: 222 | Refills: 0
Start: 2024-04-02 | End: 2024-05-08

## 2024-04-02 RX ORDER — SODIUM ZIRCONIUM CYCLOSILICATE 10 G/10G
10 POWDER, FOR SUSPENSION ORAL ONCE
Refills: 0 | Status: COMPLETED | OUTPATIENT
Start: 2024-04-02 | End: 2024-04-02

## 2024-04-02 RX ORDER — VANCOMYCIN HCL 1 G
1000 VIAL (EA) INTRAVENOUS EVERY 24 HOURS
Refills: 0 | Status: DISCONTINUED | OUTPATIENT
Start: 2024-04-02 | End: 2024-04-02

## 2024-04-02 RX ORDER — INSULIN GLARGINE 100 [IU]/ML
40 INJECTION, SOLUTION SUBCUTANEOUS
Refills: 0 | DISCHARGE

## 2024-04-02 RX ORDER — INSULIN LISPRO 100/ML
15 VIAL (ML) SUBCUTANEOUS
Refills: 0 | DISCHARGE

## 2024-04-02 RX ADMIN — Medication 7 UNIT(S): at 13:18

## 2024-04-02 RX ADMIN — Medication 100 MILLIGRAM(S): at 05:32

## 2024-04-02 RX ADMIN — HEPARIN SODIUM 5000 UNIT(S): 5000 INJECTION INTRAVENOUS; SUBCUTANEOUS at 13:18

## 2024-04-02 RX ADMIN — Medication 650 MILLIGRAM(S): at 13:17

## 2024-04-02 RX ADMIN — Medication 650 MILLIGRAM(S): at 06:16

## 2024-04-02 RX ADMIN — SODIUM ZIRCONIUM CYCLOSILICATE 10 GRAM(S): 10 POWDER, FOR SUSPENSION ORAL at 13:38

## 2024-04-02 RX ADMIN — Medication 250 MILLIGRAM(S): at 06:15

## 2024-04-02 RX ADMIN — HEPARIN SODIUM 5000 UNIT(S): 5000 INJECTION INTRAVENOUS; SUBCUTANEOUS at 06:16

## 2024-04-02 RX ADMIN — Medication 650 MILLIGRAM(S): at 06:45

## 2024-04-02 RX ADMIN — Medication 81 MILLIGRAM(S): at 13:17

## 2024-04-02 RX ADMIN — Medication 7 UNIT(S): at 09:27

## 2024-04-02 RX ADMIN — AMLODIPINE BESYLATE 10 MILLIGRAM(S): 2.5 TABLET ORAL at 06:16

## 2024-04-02 RX ADMIN — CHLORHEXIDINE GLUCONATE 1 APPLICATION(S): 213 SOLUTION TOPICAL at 06:15

## 2024-04-02 RX ADMIN — SODIUM ZIRCONIUM CYCLOSILICATE 10 GRAM(S): 10 POWDER, FOR SUSPENSION ORAL at 07:17

## 2024-04-02 RX ADMIN — LOSARTAN POTASSIUM 100 MILLIGRAM(S): 100 TABLET, FILM COATED ORAL at 06:16

## 2024-04-02 RX ADMIN — CITALOPRAM 40 MILLIGRAM(S): 10 TABLET, FILM COATED ORAL at 13:35

## 2024-04-02 RX ADMIN — Medication 100 MILLIGRAM(S): at 13:18

## 2024-04-02 RX ADMIN — CLOPIDOGREL BISULFATE 75 MILLIGRAM(S): 75 TABLET, FILM COATED ORAL at 13:17

## 2024-04-02 NOTE — PROGRESS NOTE ADULT - PROBLEM SELECTOR PLAN 6
Home meds: Amlodipine 10mg, olmesartan 40mg   - C/w amlodipine, losartan 100mg Home meds: Amlodipine 10mg, olmesartan 40mg   - C/w amlodipine, losartan 100mg  - resume home torsemide on discharge

## 2024-04-02 NOTE — PROGRESS NOTE ADULT - ATTENDING COMMENTS
58 YOM with PMH of T2DM, HFpEF, HTN, HLD, T2DM, CAD on DAPT, CKD3a admitted for right toe OM s/p amputation of partial 5th ray receiving IV antibiotics for positive margin culture.     Acute OM - MSSA and corynebacterium on clean bone margin culture, will need 6w course of cefazolin and vancomycin. PICC placed 4/1/24, CM to assist with setup of home infusion services. Podiatry follow up 4/5 with weekly labs per ID.    T2DM - HbA1C 9.7 (previously 14.2), endocrine following. Plan for discharge home with glargine 15u + lispro 7u TID premeal.     HTN - cont amlodipine 10mg, losartan 100mg (home olmesartan 40mg). Resume home torsemide on discharge.    CKD3a - renally dose, avoid nephrotoxins. Lokelma 10g TID today for K 5.4, repeat BMP prior to discharge, may need standing lokelma (was previously on it).     Dispo - anticipate home 58 YOM with PMH of T2DM, HFpEF, HTN, HLD, T2DM, CAD on DAPT, CKD3a admitted for right toe OM s/p amputation of partial 5th ray receiving IV antibiotics for positive margin culture.     Acute OM - MSSA and corynebacterium on clean bone margin culture, will need 6w course of cefazolin and vancomycin. PICC placed 4/1/24, CM to assist with setup of home infusion services. Podiatry follow up 4/5 with weekly labs per ID (including trough THIS WEEK).    T2DM - HbA1C 9.7 (previously 14.2), endocrine following. Plan for discharge home with glargine 15u + lispro 7u TID premeal.     HTN - cont amlodipine 10mg, losartan 100mg (home olmesartan 40mg). Resume home torsemide on discharge.    CKD3a - renally dose, avoid nephrotoxins. Lokelma 10g TID today for K 5.4, repeat BMP prior to discharge with improved K of 5.2.     Dispo - home

## 2024-04-02 NOTE — DISCHARGE NOTE NURSING/CASE MANAGEMENT/SOCIAL WORK - PATIENT PORTAL LINK FT
You can access the FollowMyHealth Patient Portal offered by [facility] by registering at the following website: [email or web address]. By joining [facility]’s FollowMyHealth portal, you will also be able to view your health information using other applications (apps) compatible with our system.

## 2024-04-02 NOTE — PROGRESS NOTE ADULT - REASON FOR ADMISSION
SSTI/OM

## 2024-04-02 NOTE — PROGRESS NOTE ADULT - ASSESSMENT
IMPRESSION:   Diabetic foot infection s/p amputation. Clean margin culture shows MSSA, corynebacterium. Presence of bacteria in clean margin culture suggests there is residual osteomyelitis    Recommend:  1.  Continue Vancomycin but change dose to 1 gram IV daily.  Given that he already received 750 mg today, can start this dose on 4/3/24  2.  Continue Cefazolin 2 grams IV q8hrs to target MSSA  3.  Needs weekly CBC, CMP, ESR, CRP, vanco trough (first draw ideally to be done later this week).  Can fax to Dr. Gallardo:  [phone]  4.  He can follow up with me as an outpatient:  [address] [place] [address], 4th floor, [place], [place]     ID team 2 will follow

## 2024-04-02 NOTE — PROGRESS NOTE ADULT - ASSESSMENT
58M with PMH HfpEF, CAD, HTN, HLD, DM, OM presenting for foot infection, admitted for OM, pending partial 5th ray amputation of R foot. 58M with PMH HfpEF, CAD, HTN, HLD, DM, OM, CKD3 presenting for foot infection, admitted for OM s/p partial 5th ray amputation of R foot found to have residual OM positive margins)

## 2024-04-02 NOTE — PROGRESS NOTE ADULT - PROBLEM SELECTOR PLAN 8
Home med: Citalopram 40  - C/w home meds

## 2024-04-02 NOTE — PROGRESS NOTE ADULT - PROBLEM SELECTOR PLAN 7
Home meds: Atorvastatin 40  - C/w home meds

## 2024-04-02 NOTE — PROGRESS NOTE ADULT - SUBJECTIVE AND OBJECTIVE BOX
Patient is a 58y old  Male who presents with a chief complaint of SSTI/OM (02 Apr 2024 06:51)      INTERVAL HPI/ OVERNIGHT EVENTS. PICC placed. Pt seen and examined bedside. No pedal complaints this morning.       LABS                        10.0   7.26  )-----------( 312      ( 02 Apr 2024 05:30 )             31.0     04-02    138  |  106  |  31<H>  ----------------------------<  145<H>  5.4<H>   |  23  |  1.48<H>    Ca    8.8      02 Apr 2024 05:30  Phos  4.4     04-02  Mg     2.3     04-02    TPro  6.7  /  Alb  3.5  /  TBili  <0.2  /  DBili  x   /  AST  24  /  ALT  11  /  AlkPhos  97  04-01        ICU Vital Signs Last 24 Hrs  T(C): 36.6 (02 Apr 2024 05:57), Max: 36.6 (01 Apr 2024 15:22)  T(F): 97.8 (02 Apr 2024 05:57), Max: 97.8 (01 Apr 2024 15:22)  HR: 49 (02 Apr 2024 05:57) (48 - 50)  BP: 166/70 (02 Apr 2024 05:57) (148/55 - 166/70)  BP(mean): --  ABP: --  ABP(mean): --  RR: 18 (02 Apr 2024 05:57) (18 - 18)  SpO2: 99% (02 Apr 2024 05:57) (96% - 99%)    O2 Parameters below as of 02 Apr 2024 05:57  Patient On (Oxygen Delivery Method): room air        RADIOLOGY  < from: Xray Foot AP + Lateral + Oblique, Right (03.27.24 @ 23:12) >  IMPRESSION:  Status post partial 5th ray resection through distal metatarsal shaft   with postsurgical changes in the overlying soft tissues. No proximally  tracking gas collections beyond the margins and no focal areas of   osteomyelitis.    Remainder of foot unchanged. Also correlate with intraoperative findings.  < end of copied text >      PHYSICAL EXAM  Lower Extremity Focused  Lower Extremity Focused  Vasc: DP 1/4 PT 2/4. Tg: Warm to Warm.  mild edema to dorsla foot  Derm: Surgical site intact with with some dehiscence centrally with granular wound base.. Sutures in tact No drainage noted or signs of residual infection.   Neuro: Protective sensation intact  MSK: Partial 3rd digit amputation and 5th ray amputation Patient is a 58y old  Male who presents with a chief complaint of SSTI/OM (02 Apr 2024 06:51)      INTERVAL HPI/ OVERNIGHT EVENTS. PICC placed. Pt seen and examined bedside with podiatry attending Dr. Chakraborty. No pedal complaints this morning.       LABS                        10.0   7.26  )-----------( 312      ( 02 Apr 2024 05:30 )             31.0     04-02    138  |  106  |  31<H>  ----------------------------<  145<H>  5.4<H>   |  23  |  1.48<H>    Ca    8.8      02 Apr 2024 05:30  Phos  4.4     04-02  Mg     2.3     04-02    TPro  6.7  /  Alb  3.5  /  TBili  <0.2  /  DBili  x   /  AST  24  /  ALT  11  /  AlkPhos  97  04-01        ICU Vital Signs Last 24 Hrs  T(C): 36.6 (02 Apr 2024 05:57), Max: 36.6 (01 Apr 2024 15:22)  T(F): 97.8 (02 Apr 2024 05:57), Max: 97.8 (01 Apr 2024 15:22)  HR: 49 (02 Apr 2024 05:57) (48 - 50)  BP: 166/70 (02 Apr 2024 05:57) (148/55 - 166/70)  BP(mean): --  ABP: --  ABP(mean): --  RR: 18 (02 Apr 2024 05:57) (18 - 18)  SpO2: 99% (02 Apr 2024 05:57) (96% - 99%)    O2 Parameters below as of 02 Apr 2024 05:57  Patient On (Oxygen Delivery Method): room air        RADIOLOGY  < from: Xray Foot AP + Lateral + Oblique, Right (03.27.24 @ 23:12) >  IMPRESSION:  Status post partial 5th ray resection through distal metatarsal shaft   with postsurgical changes in the overlying soft tissues. No proximally  tracking gas collections beyond the margins and no focal areas of   osteomyelitis.    Remainder of foot unchanged. Also correlate with intraoperative findings.  < end of copied text >      PHYSICAL EXAM  Lower Extremity Focused  Lower Extremity Focused  Vasc: DP 1/4 PT 2/4. Tg: Warm to Warm.  mild edema to dorsla foot  Derm: Surgical site intact with with some dehiscence centrally with granular wound base.. Sutures in tact No drainage noted or signs of residual infection.   Neuro: Protective sensation intact  MSK: Partial 3rd digit amputation and 5th ray amputation

## 2024-04-02 NOTE — DISCHARGE NOTE NURSING/CASE MANAGEMENT/SOCIAL WORK - NSDCFUADDAPPT_GEN_ALL_CORE_FT
Please call Dr. Chakraborty's office to schedule a follow up appointment within 1-2 weeks of discharge - [phone]    Please follow up with your endocrinologist within 1-2 weeks of discharge. If you do not have one, please call [phone]    Please call Dr. Gallardo's office to schedule an infectious disease follow up appointment within 2 weeks.

## 2024-04-02 NOTE — PROGRESS NOTE ADULT - PROBLEM SELECTOR PLAN 1
Hx of OM with prior amputation, px for foot infection. Follows with Dr. Brunner weekly. Was admitted here for management of OM 2/5-2/8 and dc'd on augmentin and linezolid, was started on unknown abx last week. Referred to ED by Dr. Brunner for worsening infection.  Podiatry consulted, c/f deep SSTI vs OM, s/p partial 5th ray amp of R foot, uncomplicated procedure  Margins Margins with presumed methicillin sensitive staph and corynebacterium  - ID consulted, appreciate recs --> c/w vanc and cefazolin; final vanc dose pending trough  - Appreciate podiatry recs  - C/w Vanc 1250mg q12h, check trough before 4th dose (4/1 @ 6pm)  - c/w cefazolin 2g q8h  - PICC team to place PICC line today, 4/1 Hx of OM with prior amputation, px for foot infection. Follows with Dr. Brunner weekly. Was admitted here for management of OM 2/5-2/8 and dc'd on augmentin and linezolid, was started on unknown abx last week. Referred to ED by Dr. Brunner for worsening infection.  Podiatry consulted, c/f deep SSTI vs OM, s/p partial 5th ray amp of R foot, uncomplicated procedure  Margins Margins with presumed methicillin sensitive staph and corynebacterium  - ID consulted, appreciate recs --> c/w vanc and cefazolin; final vanc dose pending trough  - Appreciate podiatry recs  - C/w Vanc 1000mg q24h, check trough before 4th dose (4/1 @ 6pm)  - c/w cefazolin 2g q8h  - PICC team to place PICC line 4/1

## 2024-04-02 NOTE — CHART NOTE - NSCHARTNOTEFT_GEN_A_CORE
Patient's vancomycin dose was changed to 1 gram IV daily today (prior dose was 750 mg IV q12). He received 750 mg IV x 1 this AM.  He should then start Vancomycin 1 gram IV daily tomorrow and check a trough before 3rd dose (4/5/24).  Given that patient wants to leave and we are lowering the dose, I would be ok if he left today and this is checked outpatient (either 4/5 or 4/6)

## 2024-04-02 NOTE — PROGRESS NOTE ADULT - SUBJECTIVE AND OBJECTIVE BOX
INTERVAL HPI/OVERNIGHT EVENTS:    Patient was seen and examined at bedside.  S/P PICC placement.  Would like to leave today    CONSTITUTIONAL:  Negative fever or chills, feels well, good appetite  EYES:  Negative  blurry vision or double vision  CARDIOVASCULAR:  Negative for chest pain or palpitations  RESPIRATORY:  Negative for cough, wheezing, or SOB   GASTROINTESTINAL:  Negative for nausea, vomiting, diarrhea, constipation, or abdominal pain  GENITOURINARY:  Negative frequency, urgency or dysuria  NEUROLOGIC:  No headache, confusion, dizziness, lightheadedness      ANTIBIOTICS/RELEVANT:    MEDICATIONS  (STANDING):  acetaminophen     Tablet .. 650 milliGRAM(s) Oral every 6 hours  amLODIPine   Tablet 10 milliGRAM(s) Oral daily  aspirin  chewable 81 milliGRAM(s) Oral daily  atorvastatin 40 milliGRAM(s) Oral at bedtime  ceFAZolin   IVPB 2000 milliGRAM(s) IV Intermittent every 8 hours  chlorhexidine 2% Cloths 1 Application(s) Topical <User Schedule>  citalopram 40 milliGRAM(s) Oral daily  clopidogrel Tablet 75 milliGRAM(s) Oral daily  dextrose 5%. 1000 milliLiter(s) (100 mL/Hr) IV Continuous <Continuous>  dextrose 5%. 1000 milliLiter(s) (50 mL/Hr) IV Continuous <Continuous>  dextrose 50% Injectable 25 Gram(s) IV Push once  dextrose 50% Injectable 12.5 Gram(s) IV Push once  dextrose 50% Injectable 25 Gram(s) IV Push once  glucagon  Injectable 1 milliGRAM(s) IntraMuscular once  heparin   Injectable 5000 Unit(s) SubCutaneous every 8 hours  influenza   Vaccine 0.5 milliLiter(s) IntraMuscular once  insulin glargine Injectable (LANTUS) 15 Unit(s) SubCutaneous at bedtime  insulin lispro (ADMELOG) corrective regimen sliding scale   SubCutaneous Before meals and at bedtime  insulin lispro Injectable (ADMELOG) 7 Unit(s) SubCutaneous three times a day before meals  losartan 100 milliGRAM(s) Oral daily  polyethylene glycol 3350 17 Gram(s) Oral at bedtime  senna 2 Tablet(s) Oral at bedtime  sodium zirconium cyclosilicate 10 Gram(s) Oral every 8 hours    MEDICATIONS  (PRN):  aluminum hydroxide/magnesium hydroxide/simethicone Suspension 30 milliLiter(s) Oral every 4 hours PRN Dyspepsia  dextrose Oral Gel 15 Gram(s) Oral once PRN Blood Glucose LESS THAN 70 milliGRAM(s)/deciliter  melatonin 3 milliGRAM(s) Oral at bedtime PRN Insomnia  ondansetron Injectable 4 milliGRAM(s) IV Push every 8 hours PRN Nausea and/or Vomiting  oxyCODONE    IR 2.5 milliGRAM(s) Oral every 4 hours PRN Moderate Pain (4 - 6)  oxyCODONE    IR 5 milliGRAM(s) Oral every 4 hours PRN Severe Pain (7 - 10)  sodium chloride 0.9% lock flush 10 milliLiter(s) IV Push every 1 hour PRN Pre/post blood products, medications, blood draw, and to maintain line patency        Vital Signs Last 24 Hrs  T(C): 36.6 (02 Apr 2024 08:40), Max: 36.6 (01 Apr 2024 15:22)  T(F): 97.8 (02 Apr 2024 08:40), Max: 97.8 (01 Apr 2024 15:22)  HR: 65 (02 Apr 2024 08:40) (48 - 65)  BP: 155/66 (02 Apr 2024 08:40) (148/55 - 166/70)  BP(mean): --  RR: 18 (02 Apr 2024 08:40) (18 - 18)  SpO2: 96% (02 Apr 2024 08:40) (96% - 99%)    Parameters below as of 02 Apr 2024 08:40  Patient On (Oxygen Delivery Method): room air        PHYSICAL EXAM:  Constitutional: non-toxic, no distress  Eyes:HALLEY, EOMI  Ear/Nose/Throat: no oral lesion, no sinus tenderness on percussion	  Neck:  supple  Respiratory: CTA sina  Cardiovascular: S1S2 RRR, no murmurs  Gastrointestinal:soft, (+) BS, no HSM  Extremities:no e/e/c  Vascular: DP Pulse:	right normal; left normal      LABS:                        10.0   7.26  )-----------( 312      ( 02 Apr 2024 05:30 )             31.0     04-02    138  |  106  |  31<H>  ----------------------------<  145<H>  5.4<H>   |  23  |  1.48<H>    Ca    8.8      02 Apr 2024 05:30  Phos  4.4     04-02  Mg     2.3     04-02    TPro  6.7  /  Alb  3.5  /  TBili  <0.2  /  DBili  x   /  AST  24  /  ALT  11  /  AlkPhos  97  04-01      Urinalysis Basic - ( 02 Apr 2024 05:30 )    Color: x / Appearance: x / SG: x / pH: x  Gluc: 145 mg/dL / Ketone: x  / Bili: x / Urobili: x   Blood: x / Protein: x / Nitrite: x   Leuk Esterase: x / RBC: x / WBC x   Sq Epi: x / Non Sq Epi: x / Bacteria: x        MICROBIOLOGY:    Culture - Tissue with Gram Stain (03.27.24 @ 21:53)    Gram Stain:   No organisms seen  No WBC's seen.   -  Clindamycin: R 0.5 This isolate is presumed to be clindamycin resistant based on detection of inducible resistance. Clindamycin may still be effective in some patients.   -  Erythromycin: R >4   -  Linezolid: S 2   -  Oxacillin: S <=0.25 Oxacillin predicts susceptibility for dicloxacillin, methicillin, and nafcillin   -  Rifampin: S <=1 Should not be used as monotherapy   -  Tetracycline: S <=1   -  Trimethoprim/Sulfamethoxazole: S <=0.5/9.5   -  Vancomycin: S 2   Specimen Source: .Tissue right foot 5th metatarsal clean margin or spec   Culture Results:   Rare Staphylococcus aureus  Rare Corynebacterium amycolatum  Result called to and read back byKimberlee Olivarez RN  03/29/2024 14:28:11   Organism Identification: Staphylococcus aureus   Organism: Staphylococcus aureus   Method Type: PALMA        RADIOLOGY & ADDITIONAL STUDIES:

## 2024-04-02 NOTE — PROGRESS NOTE ADULT - PROBLEM SELECTOR PLAN 2
Hx of poorly controlled IDDM with A1C 14.2 (2/6/24). Pt believes he sees an endocrinologist but isn't sure. Says he recently became more compliant with insulin regimen but has made his own changes iso seeing low glucose readings in the morning (mid-50s)  Home meds: Lantus 40, Humalog 15 TID - says he takes closer to 25 units of Lantus  - Endo consulted, appreciate recs  - mISS  - C/w lantus 15, lispro 7  - started CC diet Hx of poorly controlled IDDM with A1C 14.2 (2/6/24). Pt believes he sees an endocrinologist but isn't sure. Says he recently became more compliant with insulin regimen but has made his own changes iso seeing low glucose readings in the morning (mid-50s)  Home meds: Lantus 40, Humalog 15 TID - says he takes closer to 25 units of Lantus  - Endo consulted, appreciate recs  - mISS  - C/w lantus 15, lispro 7  - cont CC diet

## 2024-04-02 NOTE — PROGRESS NOTE ADULT - PROVIDER SPECIALTY LIST ADULT
Internal Medicine
Endocrinology
Endocrinology
Infectious Disease
Podiatry
Endocrinology
Endocrinology
Infectious Disease
Podiatry
Infectious Disease
Internal Medicine

## 2024-04-02 NOTE — PROGRESS NOTE ADULT - ASSESSMENT
58M PMH HFpEF, CAD w/ stent, HTN, HLD, DM s/p Right partial 5th ray amputation on 3/27. Clean margin growing MSSA    Plan:  - F/u OR clean margin culture/path,   - C/w abx per ID, planned for 6 weeks IV abx for residual OM  - Wound cleansed with normal saline. Dressed with DSD.   - Non Weight-bearing to RLE  -Rest of care up to primary team    Podiatry following, Plan d/w with Attending    - Pt should leave dressing in tact until his follow up appointment  Patient should f/u w/ Dr. Chakraborty w/i 1 week of discharge:  [address], [place] [address]  [phone]   58M PMH HFpEF, CAD w/ stent, HTN, HLD, DM s/p Right partial 5th ray amputation on 3/27. Clean margin growing MSSA    Plan:  - C/w abx per ID, planned for 6 weeks IV abx for residual OM  - Wound cleansed with normal saline. Dressed with DSD.   - Heel Weight-bearing to RLE  -Rest of care up to primary team    - Pt should leave dressing clean dry and intact until his follow up appointment  Patient should f/u w/ Dr. Chakraborty on Friday 4/5:  [address], [place] [address]  [phone]    Podiatry following, Plan d/w with Attending

## 2024-04-02 NOTE — PROGRESS NOTE ADULT - PROBLEM SELECTOR PLAN 4
Hx of HFpEF with last EF 71% 12/22. On exam has b/l 2+ pitting edema and complains of cough but denies SOB, orthopnea, ROBBINS. Says he started taking Torsemide today (was prescribed previously)  - C/w home meds Hx of HFpEF with last EF 71% 12/22. On exam has b/l 2+ pitting edema and complains of cough but denies SOB, orthopnea, ROBBINS. Says he started taking Torsemide today (was prescribed previously)  - C/w home meds  -

## 2024-04-02 NOTE — PROGRESS NOTE ADULT - SUBJECTIVE AND OBJECTIVE BOX
OVERNIGHT EVENTS:    SUBJECTIVE / INTERVAL HPI: Patient seen and examined at bedside.     VITAL SIGNS:  Vital Signs Last 24 Hrs  T(C): 36.6 (02 Apr 2024 05:57), Max: 36.6 (01 Apr 2024 15:22)  T(F): 97.8 (02 Apr 2024 05:57), Max: 97.8 (01 Apr 2024 15:22)  HR: 49 (02 Apr 2024 05:57) (48 - 50)  BP: 166/70 (02 Apr 2024 05:57) (148/55 - 166/70)  BP(mean): --  RR: 18 (02 Apr 2024 05:57) (18 - 18)  SpO2: 99% (02 Apr 2024 05:57) (96% - 99%)    Parameters below as of 02 Apr 2024 05:57  Patient On (Oxygen Delivery Method): room air        PHYSICAL EXAM:    General: NAD  HEENT: NC/AT; PERRL, anicteric sclera; MMM  Neck: supple w/o palpable nodularity  Cardiovascular: +S1/S2; RRR  Respiratory: CTA B/L; no W/R/R  Gastrointestinal: soft, NT/ND; +BSx4  Extremities: WWP; no edema, clubbing or cyanosis  Vascular: 2+ radial, DP/PT pulses B/L  Neurological: AAOx3; no focal deficits    MEDICATIONS:  MEDICATIONS  (STANDING):  acetaminophen     Tablet .. 650 milliGRAM(s) Oral every 6 hours  amLODIPine   Tablet 10 milliGRAM(s) Oral daily  aspirin  chewable 81 milliGRAM(s) Oral daily  atorvastatin 40 milliGRAM(s) Oral at bedtime  ceFAZolin   IVPB 2000 milliGRAM(s) IV Intermittent every 8 hours  chlorhexidine 2% Cloths 1 Application(s) Topical <User Schedule>  citalopram 40 milliGRAM(s) Oral daily  clopidogrel Tablet 75 milliGRAM(s) Oral daily  dextrose 5%. 1000 milliLiter(s) (100 mL/Hr) IV Continuous <Continuous>  dextrose 5%. 1000 milliLiter(s) (50 mL/Hr) IV Continuous <Continuous>  dextrose 50% Injectable 25 Gram(s) IV Push once  dextrose 50% Injectable 12.5 Gram(s) IV Push once  dextrose 50% Injectable 25 Gram(s) IV Push once  glucagon  Injectable 1 milliGRAM(s) IntraMuscular once  heparin   Injectable 5000 Unit(s) SubCutaneous every 8 hours  influenza   Vaccine 0.5 milliLiter(s) IntraMuscular once  insulin glargine Injectable (LANTUS) 15 Unit(s) SubCutaneous at bedtime  insulin lispro (ADMELOG) corrective regimen sliding scale   SubCutaneous Before meals and at bedtime  insulin lispro Injectable (ADMELOG) 7 Unit(s) SubCutaneous three times a day before meals  losartan 100 milliGRAM(s) Oral daily  polyethylene glycol 3350 17 Gram(s) Oral at bedtime  senna 2 Tablet(s) Oral at bedtime  vancomycin  IVPB 750 milliGRAM(s) IV Intermittent every 12 hours    MEDICATIONS  (PRN):  aluminum hydroxide/magnesium hydroxide/simethicone Suspension 30 milliLiter(s) Oral every 4 hours PRN Dyspepsia  dextrose Oral Gel 15 Gram(s) Oral once PRN Blood Glucose LESS THAN 70 milliGRAM(s)/deciliter  melatonin 3 milliGRAM(s) Oral at bedtime PRN Insomnia  ondansetron Injectable 4 milliGRAM(s) IV Push every 8 hours PRN Nausea and/or Vomiting  oxyCODONE    IR 2.5 milliGRAM(s) Oral every 4 hours PRN Moderate Pain (4 - 6)  oxyCODONE    IR 5 milliGRAM(s) Oral every 4 hours PRN Severe Pain (7 - 10)  sodium chloride 0.9% lock flush 10 milliLiter(s) IV Push every 1 hour PRN Pre/post blood products, medications, blood draw, and to maintain line patency      ALLERGIES:  Allergies    No Known Allergies    Intolerances    carvedilol (Hypotension)      LABS:                        10.0   7.26  )-----------( 312      ( 02 Apr 2024 05:30 )             31.0     04-02    138  |  106  |  31<H>  ----------------------------<  145<H>  5.4<H>   |  23  |  1.48<H>    Ca    8.8      02 Apr 2024 05:30  Phos  4.4     04-02  Mg     2.3     04-02    TPro  6.7  /  Alb  3.5  /  TBili  <0.2  /  DBili  x   /  AST  24  /  ALT  11  /  AlkPhos  97  04-01      Urinalysis Basic - ( 02 Apr 2024 05:30 )    Color: x / Appearance: x / SG: x / pH: x  Gluc: 145 mg/dL / Ketone: x  / Bili: x / Urobili: x   Blood: x / Protein: x / Nitrite: x   Leuk Esterase: x / RBC: x / WBC x   Sq Epi: x / Non Sq Epi: x / Bacteria: x      CAPILLARY BLOOD GLUCOSE      POCT Blood Glucose.: 190 mg/dL (01 Apr 2024 21:42)      RADIOLOGY & ADDITIONAL TESTS: Reviewed.

## 2024-04-02 NOTE — PROGRESS NOTE ADULT - TIME BILLING
Om of foot
CGM education, hypoglycemia management, review of [id number] and insulin adjustments
case complexity
Insulin adjustment
treatment of osteomyelitis
treatment of osteomyelitis
chart review including outpatient records, patient interview/exam, review of labs/imaging, management of medical conditions, counseling/educating patient, discussion with consultants, documentation
chart review including outpatient records, patient interview/exam, review of labs/imaging, management of medical conditions, counseling/educating patient, discussion with consultants, documentation
Review of hospital course, labs, vitals, medical records.   Bedside exam and interview    Discussed plan of care with housestaff  Documenting the encounter

## 2024-04-02 NOTE — PROGRESS NOTE ADULT - SUBJECTIVE AND OBJECTIVE BOX
SUBJECTIVE / INTERVAL HPI: Patient was seen and examined this morning.     Overnight events: No acute overnight events , patient denies any new complains , blood glucose levels well controlled, vitals stable.    CAPILLARY BLOOD GLUCOSE & INSULIN RECEIVED  136 mg/dL (04-01 @ 09:04) 7 units pre meal insulin   160 mg/dL (04-01 @ 13:21) 7 units pre meal insulin + 2 units sliding scale  136 mg/dL (04-01 @ 17:43) 7 units pre meal insulin   190 mg/dL (04-01 @ 21:42) 15 units Lantus + 2 units sliding scale  150 mg/dL (04-02 @ 09:08) 7 units pre meal insulin       REVIEW OF SYSTEMS    Constitutional:  Negative fever, chills or loss of appetite.  Eyes:  Negative blurry vision or double vision.  Cardiovascular:  Negative for chest pain or palpitations.  Respiratory:  Negative for cough, wheezing, or shortness of breath.    Gastrointestinal:  Negative for nausea, vomiting, diarrhea, constipation.  Neurologic:  No headache, confusion, dizziness, lightheadedness.    PHYSICAL EXAM    Vital Signs Last 24 Hrs  T(C): 36.6 (02 Apr 2024 08:40), Max: 36.6 (01 Apr 2024 15:22)  T(F): 97.8 (02 Apr 2024 08:40), Max: 97.8 (01 Apr 2024 15:22)  HR: 65 (02 Apr 2024 08:40) (48 - 65)  BP: 155/66 (02 Apr 2024 08:40) (148/55 - 166/70)  BP(mean): --  RR: 18 (02 Apr 2024 08:40) (18 - 18)  SpO2: 96% (02 Apr 2024 08:40) (96% - 99%)    Parameters below as of 02 Apr 2024 08:40  Patient On (Oxygen Delivery Method): room air        Constitutional: Awake, alert, in no acute distress.   HEENT: Normocephalic, atraumatic, HALLEY.  Respiratory: Lungs clear to ausculation bilaterally.   Cardiovascular: regular rhythm, normal S1 and S2, no audible murmurs.   GI: soft, non-tender, non-distended, bowel sounds present.  Extremities: No lower extremity edema.  Psychiatric: AAO x 3. Normal affect/mood.     LABS  CBC - WBC/HGB/HTC/PLT: 7.26/10.0/31.0/312 (04-02-24)  BMP - Na/K/Cl/Bicarb/BUN/Cr/Gluc/AG/eGFR: 134/5.2/102/23/28/1.31/198/9/64 (04-02-24)  Ca - 9.4 (04-02-24)  Phos - -- (04-02-24)  Mg - -- (04-02-24)  LFT - Alb/Tprot/Tbili/Dbili/AlkPhos/ALT/AST: 3.5/--/<0.2/--/97/11/24 (04-01-24)  PT/aPTT/INR: 12.4/35.2/1.09 (03-27-24)           MEDICATIONS  MEDICATIONS  (STANDING):  acetaminophen     Tablet .. 650 milliGRAM(s) Oral every 6 hours  amLODIPine   Tablet 10 milliGRAM(s) Oral daily  aspirin  chewable 81 milliGRAM(s) Oral daily  atorvastatin 40 milliGRAM(s) Oral at bedtime  ceFAZolin   IVPB 2000 milliGRAM(s) IV Intermittent every 8 hours  chlorhexidine 2% Cloths 1 Application(s) Topical <User Schedule>  citalopram 40 milliGRAM(s) Oral daily  clopidogrel Tablet 75 milliGRAM(s) Oral daily  dextrose 5%. 1000 milliLiter(s) (100 mL/Hr) IV Continuous <Continuous>  dextrose 5%. 1000 milliLiter(s) (50 mL/Hr) IV Continuous <Continuous>  dextrose 50% Injectable 25 Gram(s) IV Push once  dextrose 50% Injectable 12.5 Gram(s) IV Push once  dextrose 50% Injectable 25 Gram(s) IV Push once  glucagon  Injectable 1 milliGRAM(s) IntraMuscular once  heparin   Injectable 5000 Unit(s) SubCutaneous every 8 hours  influenza   Vaccine 0.5 milliLiter(s) IntraMuscular once  insulin glargine Injectable (LANTUS) 15 Unit(s) SubCutaneous at bedtime  insulin lispro (ADMELOG) corrective regimen sliding scale   SubCutaneous Before meals and at bedtime  insulin lispro Injectable (ADMELOG) 7 Unit(s) SubCutaneous three times a day before meals  losartan 100 milliGRAM(s) Oral daily  polyethylene glycol 3350 17 Gram(s) Oral at bedtime  senna 2 Tablet(s) Oral at bedtime  sodium zirconium cyclosilicate 10 Gram(s) Oral every 8 hours    MEDICATIONS  (PRN):  aluminum hydroxide/magnesium hydroxide/simethicone Suspension 30 milliLiter(s) Oral every 4 hours PRN Dyspepsia  dextrose Oral Gel 15 Gram(s) Oral once PRN Blood Glucose LESS THAN 70 milliGRAM(s)/deciliter  melatonin 3 milliGRAM(s) Oral at bedtime PRN Insomnia  ondansetron Injectable 4 milliGRAM(s) IV Push every 8 hours PRN Nausea and/or Vomiting  oxyCODONE    IR 2.5 milliGRAM(s) Oral every 4 hours PRN Moderate Pain (4 - 6)  oxyCODONE    IR 5 milliGRAM(s) Oral every 4 hours PRN Severe Pain (7 - 10)  sodium chloride 0.9% lock flush 10 milliLiter(s) IV Push every 1 hour PRN Pre/post blood products, medications, blood draw, and to maintain line patency    ASSESSMENT / RECOMMENDATIONS    58y Male with a past medical history of Hypertension , Hyperlipidemia, Diabetes mellitus type 2, HFpEF , CAD s/p stents, CKD stage 3 s/p Right partial 5th ray amputation.    A1C: 9.7 %  BUN: 28  Creatinine: 1.31  GFR: 64  Weight: 95.7  BMI: 28.6    # Type 2 diabetes mellitus with hyperglycemia  -CGM readings noted over the last 14 days with GMI 77%  , sensor active 68%.  -Glucose levels time in range 36% , low 12% , less than 54 5%.  -CGM readings -12-6 am 130-150 , 6-9 am 122, 9-12 am 163 , 12-3 am 227 , 3-6 am 256 , 6-9 am pm 247 , 9-12 pm 188 .  -Home readings consistent with fasting hypoglycemia episodes and afternoon post prandial hyperglycemia, blood glucose well controlled with the current regimen.  -Please continue Lantus 15 units at bedtime.    -Please continue lispro 7 units before each meal.  - Continue lispro moderate dose sliding scale before meals and at bedtime.  - Patient's fingerstick glucose goal is 100-180 mg/dL  -Can be discharged on the current regimen-7 units lispro 3 times a day before meals ,Lantus 15 units at bedtime.       Case discussed with Dr. Pérez, Primary team updated.

## 2024-04-02 NOTE — PROGRESS NOTE ADULT - PROBLEM SELECTOR PLAN 5
#H/o hyperkalemia   Hx of CKD stage 3. Follows with Dr. Efrain Castillo. Previously prescribed lokelma but no longer taking. Labs drawn in ED were hemolyzed. Crt 1.31 (baseline 1.3-1.4).  - Renally dose meds  - Avoid nephrotoxic agents  - Monitor K/Crt Hx of CKD stage 3. Follows with Dr. Efrain Castillo. Previously prescribed lokelma but no longer taking. LCrt 1.31 (baseline 1.3-1.4).  - Renally dose meds  - Avoid nephrotoxic agents  - Monitor K/Crt    #Hyperkalemia Previously prescribed lokelma but no longer taking.  - lokelma 10g TID x1 day, repeat BMP prior to discharge

## 2024-04-03 DIAGNOSIS — M86.8X7 OTHER OSTEOMYELITIS, ANKLE AND FOOT: ICD-10-CM

## 2024-04-09 ENCOUNTER — APPOINTMENT (OUTPATIENT)
Dept: INTERNAL MEDICINE | Facility: CLINIC | Age: 59
End: 2024-04-09
Payer: COMMERCIAL

## 2024-04-09 ENCOUNTER — NON-APPOINTMENT (OUTPATIENT)
Age: 59
End: 2024-04-09

## 2024-04-09 VITALS
BODY MASS INDEX: 28.87 KG/M2 | SYSTOLIC BLOOD PRESSURE: 161 MMHG | HEART RATE: 47 BPM | OXYGEN SATURATION: 99 % | TEMPERATURE: 97.9 F | WEIGHT: 213.13 LBS | HEIGHT: 72 IN | DIASTOLIC BLOOD PRESSURE: 68 MMHG

## 2024-04-09 PROCEDURE — 93000 ELECTROCARDIOGRAM COMPLETE: CPT

## 2024-04-09 PROCEDURE — G2211 COMPLEX E/M VISIT ADD ON: CPT | Mod: NC,1L

## 2024-04-09 PROCEDURE — 99214 OFFICE O/P EST MOD 30 MIN: CPT

## 2024-04-09 RX ORDER — CITALOPRAM HYDROBROMIDE 40 MG/1
40 TABLET, FILM COATED ORAL
Qty: 90 | Refills: 1 | Status: ACTIVE | COMMUNITY
Start: 2022-02-14 | End: 1900-01-01

## 2024-04-09 RX ORDER — SODIUM ZIRCONIUM CYCLOSILICATE 10 G/10G
10 POWDER, FOR SUSPENSION ORAL
Qty: 30 | Refills: 1 | Status: DISCONTINUED | COMMUNITY
Start: 2023-01-13 | End: 2024-04-09

## 2024-04-09 RX ORDER — CLOPIDOGREL BISULFATE 75 MG/1
75 TABLET, FILM COATED ORAL DAILY
Qty: 90 | Refills: 1 | Status: ACTIVE | COMMUNITY
Start: 2023-03-23 | End: 1900-01-01

## 2024-04-09 RX ORDER — INSULIN LISPRO 100 [IU]/ML
100 INJECTION, SOLUTION INTRAVENOUS; SUBCUTANEOUS
Qty: 1 | Refills: 3 | Status: DISCONTINUED | COMMUNITY
Start: 2022-12-22 | End: 2024-04-09

## 2024-04-09 RX ORDER — ASPIRIN 81 MG
81 TABLET, DELAYED RELEASE (ENTERIC COATED) ORAL
Refills: 0 | Status: DISCONTINUED | COMMUNITY
End: 2024-04-09

## 2024-04-09 RX ORDER — INSULIN LISPRO 100 [IU]/ML
100 INJECTION, SOLUTION INTRAVENOUS; SUBCUTANEOUS
Qty: 1 | Refills: 1 | Status: ACTIVE | COMMUNITY
Start: 2022-11-12

## 2024-04-09 RX ORDER — AMLODIPINE BESYLATE 10 MG/1
10 TABLET ORAL DAILY
Qty: 90 | Refills: 1 | Status: ACTIVE | COMMUNITY
Start: 2023-01-13 | End: 1900-01-01

## 2024-04-09 RX ORDER — INSULIN GLARGINE 100 [IU]/ML
100 INJECTION, SOLUTION SUBCUTANEOUS
Qty: 1 | Refills: 0 | Status: ACTIVE | COMMUNITY
Start: 2022-08-22

## 2024-04-09 RX ORDER — BLOOD-GLUCOSE SENSOR
EACH MISCELLANEOUS
Qty: 6 | Refills: 1 | Status: ACTIVE | COMMUNITY
Start: 2024-04-09 | End: 1900-01-01

## 2024-04-09 RX ORDER — TORSEMIDE 20 MG/1
20 TABLET ORAL EVERY OTHER DAY
Qty: 45 | Refills: 1 | Status: ACTIVE | COMMUNITY
Start: 2023-09-07

## 2024-04-09 RX ORDER — OLMESARTAN MEDOXOMIL 40 MG/1
40 TABLET, FILM COATED ORAL DAILY
Qty: 90 | Refills: 1 | Status: ACTIVE | COMMUNITY
Start: 2023-06-13 | End: 1900-01-01

## 2024-04-09 RX ORDER — FLASH GLUCOSE SENSOR
KIT MISCELLANEOUS
Qty: 6 | Refills: 5 | Status: DISCONTINUED | COMMUNITY
Start: 2022-09-09 | End: 2024-04-09

## 2024-04-09 NOTE — HISTORY OF PRESENT ILLNESS
[FreeTextEntry2] : Mr. VALENTIN CABALLERO is a 58-year-old male with history of HFpEF, CAD s/p PEPE OM1 '22, T2DM, HTN, HLD, OM and anxiety disorder presenting for hospital follow up visit. Last visit over a year ago. He was admitted for foot infection/OM and had partial 5th ray amputation of R foot. Received IV abx with Vanc and Zosyn. C/c/b margins growing Corynebacterium and Staph species. PICC placed via IR. Discharged on Vancomycin 1g QD, and Cefazolin 2g q8hrs for 6 weeks total until 5/9/2024. Following with ID (Dr. Gallardo).  Labs notable for uncontrolled T2DM and he was continued on Lantus 15 qhs, Lispro 7 TID with meals.  Broke his thumb skiing.

## 2024-04-10 DIAGNOSIS — I25.10 ATHEROSCLEROTIC HEART DISEASE OF NATIVE CORONARY ARTERY WITHOUT ANGINA PECTORIS: ICD-10-CM

## 2024-04-10 DIAGNOSIS — Z71.3 DIETARY COUNSELING AND SURVEILLANCE: ICD-10-CM

## 2024-04-10 DIAGNOSIS — E11.22 TYPE 2 DIABETES MELLITUS WITH DIABETIC CHRONIC KIDNEY DISEASE: ICD-10-CM

## 2024-04-10 DIAGNOSIS — M86.9 OSTEOMYELITIS, UNSPECIFIED: ICD-10-CM

## 2024-04-10 DIAGNOSIS — Z95.5 PRESENCE OF CORONARY ANGIOPLASTY IMPLANT AND GRAFT: ICD-10-CM

## 2024-04-10 DIAGNOSIS — E87.5 HYPERKALEMIA: ICD-10-CM

## 2024-04-10 DIAGNOSIS — F41.9 ANXIETY DISORDER, UNSPECIFIED: ICD-10-CM

## 2024-04-10 DIAGNOSIS — N18.31 CHRONIC KIDNEY DISEASE, STAGE 3A: ICD-10-CM

## 2024-04-10 DIAGNOSIS — Z79.899 OTHER LONG TERM (CURRENT) DRUG THERAPY: ICD-10-CM

## 2024-04-10 DIAGNOSIS — I13.0 HYPERTENSIVE HEART AND CHRONIC KIDNEY DISEASE WITH HEART FAILURE AND STAGE 1 THROUGH STAGE 4 CHRONIC KIDNEY DISEASE, OR UNSPECIFIED CHRONIC KIDNEY DISEASE: ICD-10-CM

## 2024-04-10 DIAGNOSIS — Z79.4 LONG TERM (CURRENT) USE OF INSULIN: ICD-10-CM

## 2024-04-10 DIAGNOSIS — Z79.82 LONG TERM (CURRENT) USE OF ASPIRIN: ICD-10-CM

## 2024-04-10 DIAGNOSIS — Z41.8 ENCOUNTER FOR OTHER PROCEDURES FOR PURPOSES OTHER THAN REMEDYING HEALTH STATE: ICD-10-CM

## 2024-04-10 DIAGNOSIS — Z91.199 PATIENT'S NONCOMPLIANCE WITH OTHER MEDICAL TREATMENT AND REGIMEN DUE TO UNSPECIFIED REASON: ICD-10-CM

## 2024-04-10 DIAGNOSIS — E78.5 HYPERLIPIDEMIA, UNSPECIFIED: ICD-10-CM

## 2024-04-10 DIAGNOSIS — Z79.02 LONG TERM (CURRENT) USE OF ANTITHROMBOTICS/ANTIPLATELETS: ICD-10-CM

## 2024-04-10 DIAGNOSIS — Z88.5 ALLERGY STATUS TO NARCOTIC AGENT: ICD-10-CM

## 2024-04-10 DIAGNOSIS — I50.32 CHRONIC DIASTOLIC (CONGESTIVE) HEART FAILURE: ICD-10-CM

## 2024-04-10 DIAGNOSIS — E11.69 TYPE 2 DIABETES MELLITUS WITH OTHER SPECIFIED COMPLICATION: ICD-10-CM

## 2024-04-15 ENCOUNTER — APPOINTMENT (OUTPATIENT)
Dept: INFECTIOUS DISEASE | Facility: CLINIC | Age: 59
End: 2024-04-15
Payer: COMMERCIAL

## 2024-04-15 ENCOUNTER — NON-APPOINTMENT (OUTPATIENT)
Age: 59
End: 2024-04-15

## 2024-04-15 VITALS
TEMPERATURE: 97.9 F | HEART RATE: 50 BPM | HEIGHT: 72 IN | DIASTOLIC BLOOD PRESSURE: 66 MMHG | SYSTOLIC BLOOD PRESSURE: 163 MMHG | BODY MASS INDEX: 28.85 KG/M2 | RESPIRATION RATE: 16 BRPM | OXYGEN SATURATION: 98 % | WEIGHT: 213 LBS

## 2024-04-15 PROCEDURE — 99213 OFFICE O/P EST LOW 20 MIN: CPT

## 2024-04-15 NOTE — PHYSICAL EXAM
[General Appearance - Alert] : alert [Sclera] : the sclera and conjunctiva were normal [Outer Ear] : the ears and nose were normal in appearance [] : no respiratory distress [Heart Rate And Rhythm] : heart rate was normal and rhythm regular [Edema] : there was no peripheral edema [Abdomen Soft] : soft [No Palpable Adenopathy] : no palpable adenopathy [Musculoskeletal - Swelling] : no joint swelling [FreeTextEntry1] : right foot without pain, tenderness, erythema, s/p 5th toe amputation; no dehiscence or purulent drainage [Motor Exam] : the motor exam was normal [Oriented To Time, Place, And Person] : oriented to person, place, and time

## 2024-04-15 NOTE — HISTORY OF PRESENT ILLNESS
[FreeTextEntry1] : 58 year old male with diabetes here with osteomyelitis in right 5th toe s/p amputation.  Clean margin cultures with corynebacterium and MSSA for which he is on Vancomycin and Cefazolin,  He is tolerating the medications well and compliant with doses (missed one dose of cefazolin in [place] due to clotted PICC line which he went to the ER for; it now flushes).  Reports his foot is healing well.  No fever, chills, pain, discharge

## 2024-04-16 ENCOUNTER — NON-APPOINTMENT (OUTPATIENT)
Age: 59
End: 2024-04-16

## 2024-04-17 ENCOUNTER — EMERGENCY (EMERGENCY)
Facility: HOSPITAL | Age: 59
LOS: 1 days | Discharge: HOME | End: 2024-04-17
Attending: EMERGENCY MEDICINE | Admitting: EMERGENCY MEDICINE
Payer: COMMERCIAL

## 2024-04-17 VITALS
RESPIRATION RATE: 18 BRPM | HEART RATE: 74 BPM | DIASTOLIC BLOOD PRESSURE: 74 MMHG | SYSTOLIC BLOOD PRESSURE: 172 MMHG | TEMPERATURE: 98 F | OXYGEN SATURATION: 99 %

## 2024-04-17 VITALS
TEMPERATURE: 99 F | SYSTOLIC BLOOD PRESSURE: 146 MMHG | HEIGHT: 72 IN | DIASTOLIC BLOOD PRESSURE: 93 MMHG | OXYGEN SATURATION: 96 % | RESPIRATION RATE: 18 BRPM | HEART RATE: 48 BPM

## 2024-04-17 DIAGNOSIS — E11.9 TYPE 2 DIABETES MELLITUS WITHOUT COMPLICATIONS: ICD-10-CM

## 2024-04-17 DIAGNOSIS — I25.10 ATHEROSCLEROTIC HEART DISEASE OF NATIVE CORONARY ARTERY WITHOUT ANGINA PECTORIS: ICD-10-CM

## 2024-04-17 DIAGNOSIS — Z98.890 OTHER SPECIFIED POSTPROCEDURAL STATES: Chronic | ICD-10-CM

## 2024-04-17 DIAGNOSIS — I50.30 UNSPECIFIED DIASTOLIC (CONGESTIVE) HEART FAILURE: ICD-10-CM

## 2024-04-17 DIAGNOSIS — E87.5 HYPERKALEMIA: ICD-10-CM

## 2024-04-17 DIAGNOSIS — I11.0 HYPERTENSIVE HEART DISEASE WITH HEART FAILURE: ICD-10-CM

## 2024-04-17 DIAGNOSIS — R00.1 BRADYCARDIA, UNSPECIFIED: ICD-10-CM

## 2024-04-17 DIAGNOSIS — E78.5 HYPERLIPIDEMIA, UNSPECIFIED: ICD-10-CM

## 2024-04-17 LAB
ANION GAP SERPL CALC-SCNC: 10 MMOL/L — SIGNIFICANT CHANGE UP (ref 5–17)
BASE EXCESS BLDV CALC-SCNC: -4.5 MMOL/L — LOW (ref -2–3)
BASOPHILS # BLD AUTO: 0.04 K/UL — SIGNIFICANT CHANGE UP (ref 0–0.2)
BASOPHILS NFR BLD AUTO: 0.5 % — SIGNIFICANT CHANGE UP (ref 0–2)
BUN SERPL-MCNC: 34 MG/DL — HIGH (ref 7–23)
CA-I SERPL-SCNC: 1.28 MMOL/L — SIGNIFICANT CHANGE UP (ref 1.15–1.33)
CALCIUM SERPL-MCNC: 9.6 MG/DL — SIGNIFICANT CHANGE UP (ref 8.4–10.5)
CHLORIDE SERPL-SCNC: 107 MMOL/L — SIGNIFICANT CHANGE UP (ref 96–108)
CO2 BLDV-SCNC: 22.3 MMOL/L — SIGNIFICANT CHANGE UP (ref 22–26)
CO2 SERPL-SCNC: 19 MMOL/L — LOW (ref 22–31)
CREAT SERPL-MCNC: 1.4 MG/DL — HIGH (ref 0.5–1.3)
EGFR: 59 ML/MIN/1.73M2 — LOW
EOSINOPHIL # BLD AUTO: 0.58 K/UL — HIGH (ref 0–0.5)
EOSINOPHIL NFR BLD AUTO: 7.5 % — HIGH (ref 0–6)
GAS PNL BLDV: 134 MMOL/L — LOW (ref 136–145)
GAS PNL BLDV: SIGNIFICANT CHANGE UP
GAS PNL BLDV: SIGNIFICANT CHANGE UP
GLUCOSE SERPL-MCNC: 188 MG/DL — HIGH (ref 70–99)
HCO3 BLDV-SCNC: 21 MMOL/L — LOW (ref 22–29)
HCT VFR BLD CALC: 33 % — LOW (ref 39–50)
HGB BLD-MCNC: 10.5 G/DL — LOW (ref 13–17)
IMM GRANULOCYTES NFR BLD AUTO: 0.3 % — SIGNIFICANT CHANGE UP (ref 0–0.9)
LYMPHOCYTES # BLD AUTO: 1.44 K/UL — SIGNIFICANT CHANGE UP (ref 1–3.3)
LYMPHOCYTES # BLD AUTO: 18.5 % — SIGNIFICANT CHANGE UP (ref 13–44)
MAGNESIUM SERPL-MCNC: 2.2 MG/DL — SIGNIFICANT CHANGE UP (ref 1.6–2.6)
MCHC RBC-ENTMCNC: 29.7 PG — SIGNIFICANT CHANGE UP (ref 27–34)
MCHC RBC-ENTMCNC: 31.8 GM/DL — LOW (ref 32–36)
MCV RBC AUTO: 93.5 FL — SIGNIFICANT CHANGE UP (ref 80–100)
MONOCYTES # BLD AUTO: 0.57 K/UL — SIGNIFICANT CHANGE UP (ref 0–0.9)
MONOCYTES NFR BLD AUTO: 7.3 % — SIGNIFICANT CHANGE UP (ref 2–14)
NEUTROPHILS # BLD AUTO: 5.13 K/UL — SIGNIFICANT CHANGE UP (ref 1.8–7.4)
NEUTROPHILS NFR BLD AUTO: 65.9 % — SIGNIFICANT CHANGE UP (ref 43–77)
NRBC # BLD: 0 /100 WBCS — SIGNIFICANT CHANGE UP (ref 0–0)
PCO2 BLDV: 40 MMHG — LOW (ref 42–55)
PH BLDV: 7.33 — SIGNIFICANT CHANGE UP (ref 7.32–7.43)
PHOSPHATE SERPL-MCNC: 3.7 MG/DL — SIGNIFICANT CHANGE UP (ref 2.5–4.5)
PLATELET # BLD AUTO: 255 K/UL — SIGNIFICANT CHANGE UP (ref 150–400)
PO2 BLDV: 37 MMHG — SIGNIFICANT CHANGE UP (ref 25–45)
POTASSIUM BLDV-SCNC: 5.5 MMOL/L — HIGH (ref 3.5–5.1)
POTASSIUM SERPL-MCNC: 5.3 MMOL/L — SIGNIFICANT CHANGE UP (ref 3.5–5.3)
POTASSIUM SERPL-SCNC: 5.3 MMOL/L — SIGNIFICANT CHANGE UP (ref 3.5–5.3)
POTASSIUM SERPL-SCNC: 6.4 MMOL/L
RBC # BLD: 3.53 M/UL — LOW (ref 4.2–5.8)
RBC # FLD: 13.9 % — SIGNIFICANT CHANGE UP (ref 10.3–14.5)
SAO2 % BLDV: 61.8 % — LOW (ref 67–88)
SODIUM SERPL-SCNC: 136 MMOL/L — SIGNIFICANT CHANGE UP (ref 135–145)
WBC # BLD: 7.78 K/UL — SIGNIFICANT CHANGE UP (ref 3.8–10.5)
WBC # FLD AUTO: 7.78 K/UL — SIGNIFICANT CHANGE UP (ref 3.8–10.5)

## 2024-04-17 PROCEDURE — 83735 ASSAY OF MAGNESIUM: CPT

## 2024-04-17 PROCEDURE — 84132 ASSAY OF SERUM POTASSIUM: CPT

## 2024-04-17 PROCEDURE — 82330 ASSAY OF CALCIUM: CPT

## 2024-04-17 PROCEDURE — 84100 ASSAY OF PHOSPHORUS: CPT

## 2024-04-17 PROCEDURE — 96374 THER/PROPH/DIAG INJ IV PUSH: CPT

## 2024-04-17 PROCEDURE — 80048 BASIC METABOLIC PNL TOTAL CA: CPT

## 2024-04-17 PROCEDURE — 99284 EMERGENCY DEPT VISIT MOD MDM: CPT | Mod: 25

## 2024-04-17 PROCEDURE — 99285 EMERGENCY DEPT VISIT HI MDM: CPT

## 2024-04-17 PROCEDURE — 82803 BLOOD GASES ANY COMBINATION: CPT

## 2024-04-17 PROCEDURE — 84295 ASSAY OF SERUM SODIUM: CPT

## 2024-04-17 PROCEDURE — 82962 GLUCOSE BLOOD TEST: CPT

## 2024-04-17 PROCEDURE — 36415 COLL VENOUS BLD VENIPUNCTURE: CPT

## 2024-04-17 PROCEDURE — 96375 TX/PRO/DX INJ NEW DRUG ADDON: CPT

## 2024-04-17 PROCEDURE — 94640 AIRWAY INHALATION TREATMENT: CPT

## 2024-04-17 PROCEDURE — 85025 COMPLETE CBC W/AUTO DIFF WBC: CPT

## 2024-04-17 RX ORDER — DEXTROSE 50 % IN WATER 50 %
50 SYRINGE (ML) INTRAVENOUS ONCE
Refills: 0 | Status: COMPLETED | OUTPATIENT
Start: 2024-04-17 | End: 2024-04-17

## 2024-04-17 RX ORDER — INSULIN HUMAN 100 [IU]/ML
5 INJECTION, SOLUTION SUBCUTANEOUS ONCE
Refills: 0 | Status: COMPLETED | OUTPATIENT
Start: 2024-04-17 | End: 2024-04-17

## 2024-04-17 RX ORDER — ALBUTEROL 90 UG/1
10 AEROSOL, METERED ORAL ONCE
Refills: 0 | Status: COMPLETED | OUTPATIENT
Start: 2024-04-17 | End: 2024-04-17

## 2024-04-17 RX ORDER — CALCIUM GLUCONATE 100 MG/ML
1 VIAL (ML) INTRAVENOUS ONCE
Refills: 0 | Status: COMPLETED | OUTPATIENT
Start: 2024-04-17 | End: 2024-04-17

## 2024-04-17 RX ADMIN — Medication 50 MILLILITER(S): at 15:51

## 2024-04-17 RX ADMIN — ALBUTEROL 10 MILLIGRAM(S): 90 AEROSOL, METERED ORAL at 15:51

## 2024-04-17 RX ADMIN — INSULIN HUMAN 5 UNIT(S): 100 INJECTION, SOLUTION SUBCUTANEOUS at 15:51

## 2024-04-17 RX ADMIN — Medication 100 GRAM(S): at 15:07

## 2024-04-17 NOTE — ED PROVIDER NOTE - OTHER FINDINGS
No obvious P waves - but subsequent rhythm strip (prior to Calcium) shows obvious p waves. Grossly unchanged from prior ekg.

## 2024-04-17 NOTE — ED PROVIDER NOTE - PATIENT PORTAL LINK FT
You can access the FollowMyHealth Patient Portal offered by [facility] by registering at the following website: [email or web address]. By joining [facility]’s FollowMyHealth portal, you will also be able to view your health information using other applications (apps) compatible with our system.

## 2024-04-17 NOTE — ED ADULT NURSE NOTE - NSFALLUNIVINTERV_ED_ALL_ED
Bed/Stretcher in lowest position, wheels locked, appropriate side rails in place/Call bell, personal items and telephone in reach/Instruct patient to call for assistance before getting out of bed/chair/stretcher/Non-slip footwear applied when patient is off stretcher/[place] to call system/Physically safe environment - no spills, clutter or unnecessary equipment/Purposeful proactive rounding/Room/bathroom lighting operational, light cord in reach

## 2024-04-17 NOTE — ED PROVIDER NOTE - PROGRESS NOTE DETAILS
[place]: Hgb 10.5, K 5.3, bicarb 19, BUN/cr 34/1.4, (grossly at baseline), other labs grossly wnl. Pt remains asymptomatic. Will give albuterol/insulin then likely outpt f/u. Updated Dr. Gallardo. Pt gets weekly monday labs. [place]: remains asymptomatic. Discussed importance of outpt follow up and return precautions. Clinically no indication for further emergent ED workup or hospitalization at this time. Stable for dc, outpt f/u.

## 2024-04-17 NOTE — ED PROVIDER NOTE - CLINICAL SUMMARY MEDICAL DECISION MAKING FREE TEXT BOX
58M PMH HfpEF, CAD, HTN, HLD, DM, OM s/p recent partial R foot amputation currently on IV abx, p/w hyperkalemia. Pt gets weekly outpt labs. Recent labs reportedly w/ elevated K, referred to ED. Pt is completely asymptomatic.   Bradycardic, other vitals wnl. Exam as above.   ddx: Possible hyperkalemia, unclear etiology.   Initial EKG was concerning for possible junctional fran and so w/ possible hyperkalemia and possible EKg changes, Ca ordered - however pt states he has baseline fran to high 40s/low 50s (prior EKGs confirm this) and subsequent rhythm strip (prior to calcium) shows obvious p waves.   Will recheck labs, reassess.

## 2024-04-17 NOTE — ED ADULT TRIAGE NOTE - CHIEF COMPLAINT QUOTE
+ sent for K 6.2 on routine labs yesterday  Denies cp/sob.   PICC to RUE for LLE infection, L wrist in cast.

## 2024-04-17 NOTE — ED PROVIDER NOTE - NSFOLLOWUPINSTRUCTIONS_ED_ALL_ED_FT
Stay well hydrated.    Take 10grams of lokelma every  hours for 1 more day.     Return for fevers, persistent vomit, uncontrolled pain, worsening breathing, worsening lightheaded, worsening palpitations or numbness.    Follow up with primary doctor within 1-2 days. You will need repeat labs.    Follow up with your nephrologist as soon as possible.     Continue to follow up with your infectious disease doctor.     Hyperkalemia    Hyperkalemia occurs when the level of potassium in your blood is too high. Potassium is an important nutrient that helps the muscles and nerves function normally. It affects how the heart works, and it helps keep fluids and minerals balanced in the body. If there is too much potassium in your blood, it can affect your heart's ability to function normally.    Potassium is normally removed (excreted) from the body by the kidneys. Hyperkalemia can result from various conditions. It can range from mild to severe.    What are the causes?    This condition may be caused by:  Taking in too much potassium. You can do this by:  Using salt substitutes. They contain large amounts of potassium.  Taking potassium supplements.  Eating foods that are high in potassium.    Excreting too little potassium.   This can happen if:  Your kidneys are not working properly. Kidney (renal) disease, including short-term or long-term renal failure, is a common cause of hyperkalemia.  You are taking medicines that lower your excretion of potassium.  You have Mahesh's disease.  You have a urinary tract blockage, such as kidney stones.  You are on treatment to mechanically clean your blood (dialysis) and you skip a treatment.   Releasing a high amount of potassium from your cells into your blood. This can happen with:  Injury to muscles (rhabdomyolysis) or other tissues. Most potassium is stored in your muscles.   Severe burns or infections.  Acidic blood plasma (acidosis). Acidosis can result from many diseases, such as uncontrolled diabetes.    What increases the risk?  The following factors may make you more likely to develop this condition:  Kidney disease. This puts you at the highest risk.  Mahesh's disease. This is a condition where the adrenal glands do not produce enough hormones.  Alcoholism or heavy drug use.  Using certain blood pressure medicines, such as ACE inhibitors, angiotensin II receptor blockers (ARBs), or potassium-sparing diuretics such as spironolactone.  Severe injury or burn.    What are the signs or symptoms?  In many cases, there are no symptoms. However, when your potassium level becomes high enough, you may have symptoms such as:  An irregular or very slow heartbeat. Nausea. Tiredness (fatigue). Confusion. Tingling of your skin or numbness of your hands or feet. Muscle cramps. Muscle weakness. Not being able to move (paralysis).    How is this diagnosed?  This condition may be diagnosed based on:  Your symptoms and medical history. Your health care provider will ask about your use of prescription and non-prescription drugs. A physical exam. Blood tests. An electrocardiogram (ECG).    How is this treated?  Treatment depends on the cause and severity of your condition. Treatment may need to be done in the hospital setting. Treatment may include:  IV glucose (sugar) along with insulin to shift potassium out of your blood and into your cells.  A medicine called albuterol to shift potassium out of your blood and into your cells.  Medicines to remove the potassium from your body.  Dialysis to remove the potassium from your body.  Calcium to protect your heart from the effects of high potassium, such as irregular rhythms (arrhythmias).    Follow these instructions at home:     Take over-the-counter and prescription medicines only as told by your health care provider.  Do not take any supplements, natural products, herbs, or vitamins without reviewing them with your health care provider. Certain supplements and natural food products contain high amounts of potassium. Limit your alcohol intake as told by your health care provider. Do not use drugs. If you need help quitting, ask your health care provider.If you have kidney disease, you may need to follow a low-potassium diet. A dietitian can help you learn which foods have high or low amounts of potassium. Keep all follow-up visits as told by your health care provider. This is important.    Contact a health care provider if you:  Have an irregular or very slow heartbeat.Feel light-headed.Feel weak.Are nauseous.Have tingling or numbness in your hands or feet.Get help right away if you:  Have shortness of breath.Have chest pain or discomfort.Pass out.Have muscle paralysis.

## 2024-04-17 NOTE — ED PROVIDER NOTE - OBJECTIVE STATEMENT
58M PMH HfpEF, CAD, HTN, HLD, DM, OM s/p recent partial R foot amputation currently on IV abx, p/w hyperkalemia. Pt gets weekly outpt labs. Recent labs reportedly w/ elevated K, referred to ED. Pt is completely asymptomatic.   Denies associated SOB, CP, nausea, vomiting, diarrhea, lightheaded, diaphoresis, palpitations, cough, rhinorrhea, black stool, bloody stool, abd pain, urinary complaints, f/c.  No recent med changes.

## 2024-04-17 NOTE — ED ADULT NURSE NOTE - OBJECTIVE STATEMENT
58 yr old male c/o abnormal lab result. Pt states his dr sent him to the ED for a high K. Pt has a cast on the left forearm and a PICC line in the right arm for a left foot infection. Hx of DM. Pt denies chest pain, SOB, fevers, chills, N/V. Respirations spontaneous and unlabored. A&Ox4. NAD. KEELEY.

## 2024-04-28 ENCOUNTER — INPATIENT (INPATIENT)
Facility: HOSPITAL | Age: 59
LOS: 2 days | Discharge: LEFT BEFORE DISCHARGE | End: 2024-05-01
Attending: STUDENT IN AN ORGANIZED HEALTH CARE EDUCATION/TRAINING PROGRAM | Admitting: STUDENT IN AN ORGANIZED HEALTH CARE EDUCATION/TRAINING PROGRAM
Payer: COMMERCIAL

## 2024-04-28 VITALS
SYSTOLIC BLOOD PRESSURE: 162 MMHG | HEIGHT: 72 IN | RESPIRATION RATE: 16 BRPM | TEMPERATURE: 100 F | DIASTOLIC BLOOD PRESSURE: 65 MMHG | HEART RATE: 76 BPM | OXYGEN SATURATION: 98 %

## 2024-04-28 DIAGNOSIS — F41.9 ANXIETY DISORDER, UNSPECIFIED: ICD-10-CM

## 2024-04-28 DIAGNOSIS — I25.10 ATHEROSCLEROTIC HEART DISEASE OF NATIVE CORONARY ARTERY WITHOUT ANGINA PECTORIS: ICD-10-CM

## 2024-04-28 DIAGNOSIS — I50.32 CHRONIC DIASTOLIC (CONGESTIVE) HEART FAILURE: ICD-10-CM

## 2024-04-28 DIAGNOSIS — Z29.9 ENCOUNTER FOR PROPHYLACTIC MEASURES, UNSPECIFIED: ICD-10-CM

## 2024-04-28 DIAGNOSIS — N18.9 CHRONIC KIDNEY DISEASE, UNSPECIFIED: ICD-10-CM

## 2024-04-28 DIAGNOSIS — I10 ESSENTIAL (PRIMARY) HYPERTENSION: ICD-10-CM

## 2024-04-28 DIAGNOSIS — Z98.890 OTHER SPECIFIED POSTPROCEDURAL STATES: Chronic | ICD-10-CM

## 2024-04-28 DIAGNOSIS — M86.9 OSTEOMYELITIS, UNSPECIFIED: ICD-10-CM

## 2024-04-28 DIAGNOSIS — E11.9 TYPE 2 DIABETES MELLITUS WITHOUT COMPLICATIONS: ICD-10-CM

## 2024-04-28 DIAGNOSIS — E78.5 HYPERLIPIDEMIA, UNSPECIFIED: ICD-10-CM

## 2024-04-28 LAB
ADD ON TEST-SPECIMEN IN LAB: SIGNIFICANT CHANGE UP
ALBUMIN SERPL ELPH-MCNC: 3.6 G/DL — SIGNIFICANT CHANGE UP (ref 3.3–5)
ALP SERPL-CCNC: 90 U/L — SIGNIFICANT CHANGE UP (ref 40–120)
ALT FLD-CCNC: 6 U/L — LOW (ref 10–45)
ANION GAP SERPL CALC-SCNC: 14 MMOL/L — SIGNIFICANT CHANGE UP (ref 5–17)
ANISOCYTOSIS BLD QL: SLIGHT — SIGNIFICANT CHANGE UP
APPEARANCE UR: CLEAR — SIGNIFICANT CHANGE UP
APTT BLD: 27.6 SEC — SIGNIFICANT CHANGE UP (ref 24.5–35.6)
AST SERPL-CCNC: 20 U/L — SIGNIFICANT CHANGE UP (ref 10–40)
BACTERIA # UR AUTO: NEGATIVE /HPF — SIGNIFICANT CHANGE UP
BASOPHILS # BLD AUTO: 0.06 K/UL — SIGNIFICANT CHANGE UP (ref 0–0.2)
BASOPHILS NFR BLD AUTO: 0.9 % — SIGNIFICANT CHANGE UP (ref 0–2)
BILIRUB SERPL-MCNC: 0.3 MG/DL — SIGNIFICANT CHANGE UP (ref 0.2–1.2)
BILIRUB UR-MCNC: NEGATIVE — SIGNIFICANT CHANGE UP
BUN SERPL-MCNC: 44 MG/DL — HIGH (ref 7–23)
CALCIUM SERPL-MCNC: 8.6 MG/DL — SIGNIFICANT CHANGE UP (ref 8.4–10.5)
CAST: 1 /LPF — SIGNIFICANT CHANGE UP (ref 0–4)
CHLORIDE SERPL-SCNC: 101 MMOL/L — SIGNIFICANT CHANGE UP (ref 96–108)
CO2 SERPL-SCNC: 22 MMOL/L — SIGNIFICANT CHANGE UP (ref 22–31)
COLOR SPEC: YELLOW — SIGNIFICANT CHANGE UP
CREAT SERPL-MCNC: 1.35 MG/DL — HIGH (ref 0.5–1.3)
CRP SERPL-MCNC: 10.4 MG/L — HIGH (ref 0–4)
DIFF PNL FLD: ABNORMAL
EGFR: 62 ML/MIN/1.73M2 — SIGNIFICANT CHANGE UP
EOSINOPHIL # BLD AUTO: 0.16 K/UL — SIGNIFICANT CHANGE UP (ref 0–0.5)
EOSINOPHIL NFR BLD AUTO: 2.6 % — SIGNIFICANT CHANGE UP (ref 0–6)
ERYTHROCYTE [SEDIMENTATION RATE] IN BLOOD: 28 MM/HR — HIGH
GIANT PLATELETS BLD QL SMEAR: PRESENT — SIGNIFICANT CHANGE UP
GLUCOSE SERPL-MCNC: 160 MG/DL — HIGH (ref 70–99)
GLUCOSE UR QL: NEGATIVE MG/DL — SIGNIFICANT CHANGE UP
HCT VFR BLD CALC: 28.2 % — LOW (ref 39–50)
HGB BLD-MCNC: 9.4 G/DL — LOW (ref 13–17)
HYPOCHROMIA BLD QL: SLIGHT — SIGNIFICANT CHANGE UP
INR BLD: 1.13 — SIGNIFICANT CHANGE UP (ref 0.85–1.18)
KETONES UR-MCNC: NEGATIVE MG/DL — SIGNIFICANT CHANGE UP
LACTATE SERPL-SCNC: 0.8 MMOL/L — SIGNIFICANT CHANGE UP (ref 0.5–2)
LEUKOCYTE ESTERASE UR-ACNC: ABNORMAL
LYMPHOCYTES # BLD AUTO: 0.38 K/UL — LOW (ref 1–3.3)
LYMPHOCYTES # BLD AUTO: 6.1 % — LOW (ref 13–44)
MANUAL SMEAR VERIFICATION: SIGNIFICANT CHANGE UP
MCHC RBC-ENTMCNC: 30 PG — SIGNIFICANT CHANGE UP (ref 27–34)
MCHC RBC-ENTMCNC: 33.3 GM/DL — SIGNIFICANT CHANGE UP (ref 32–36)
MCV RBC AUTO: 90.1 FL — SIGNIFICANT CHANGE UP (ref 80–100)
MONOCYTES # BLD AUTO: 0.06 K/UL — SIGNIFICANT CHANGE UP (ref 0–0.9)
MONOCYTES NFR BLD AUTO: 0.9 % — LOW (ref 2–14)
NEUTROPHILS # BLD AUTO: 5.57 K/UL — SIGNIFICANT CHANGE UP (ref 1.8–7.4)
NEUTROPHILS NFR BLD AUTO: 86.9 % — HIGH (ref 43–77)
NEUTS BAND # BLD: 2.6 % — SIGNIFICANT CHANGE UP (ref 0–8)
NITRITE UR-MCNC: NEGATIVE — SIGNIFICANT CHANGE UP
OVALOCYTES BLD QL SMEAR: SLIGHT — SIGNIFICANT CHANGE UP
PH UR: 5.5 — SIGNIFICANT CHANGE UP (ref 5–8)
PLAT MORPH BLD: ABNORMAL
PLATELET # BLD AUTO: 189 K/UL — SIGNIFICANT CHANGE UP (ref 150–400)
POLYCHROMASIA BLD QL SMEAR: SLIGHT — SIGNIFICANT CHANGE UP
POTASSIUM SERPL-MCNC: 3.5 MMOL/L — SIGNIFICANT CHANGE UP (ref 3.5–5.3)
POTASSIUM SERPL-SCNC: 3.5 MMOL/L — SIGNIFICANT CHANGE UP (ref 3.5–5.3)
PROT SERPL-MCNC: 6.2 G/DL — SIGNIFICANT CHANGE UP (ref 6–8.3)
PROT UR-MCNC: 300 MG/DL
PROTHROM AB SERPL-ACNC: 12.8 SEC — SIGNIFICANT CHANGE UP (ref 9.5–13)
RAPID RVP RESULT: SIGNIFICANT CHANGE UP
RBC # BLD: 3.13 M/UL — LOW (ref 4.2–5.8)
RBC # FLD: 13.8 % — SIGNIFICANT CHANGE UP (ref 10.3–14.5)
RBC BLD AUTO: ABNORMAL
RBC CASTS # UR COMP ASSIST: 0 /HPF — SIGNIFICANT CHANGE UP (ref 0–4)
SARS-COV-2 RNA SPEC QL NAA+PROBE: SIGNIFICANT CHANGE UP
SODIUM SERPL-SCNC: 137 MMOL/L — SIGNIFICANT CHANGE UP (ref 135–145)
SP GR SPEC: 1.01 — SIGNIFICANT CHANGE UP (ref 1–1.03)
SQUAMOUS # UR AUTO: 2 /HPF — SIGNIFICANT CHANGE UP (ref 0–5)
UROBILINOGEN FLD QL: 0.2 MG/DL — SIGNIFICANT CHANGE UP (ref 0.2–1)
VANCOMYCIN TROUGH SERPL-MCNC: 15.3 UG/ML — SIGNIFICANT CHANGE UP (ref 10–20)
WBC # BLD: 6.22 K/UL — SIGNIFICANT CHANGE UP (ref 3.8–10.5)
WBC # FLD AUTO: 6.22 K/UL — SIGNIFICANT CHANGE UP (ref 3.8–10.5)
WBC UR QL: 2 /HPF — SIGNIFICANT CHANGE UP (ref 0–5)

## 2024-04-28 PROCEDURE — 99223 1ST HOSP IP/OBS HIGH 75: CPT | Mod: GC

## 2024-04-28 PROCEDURE — 73630 X-RAY EXAM OF FOOT: CPT | Mod: 26,RT

## 2024-04-28 PROCEDURE — 71046 X-RAY EXAM CHEST 2 VIEWS: CPT | Mod: 26

## 2024-04-28 PROCEDURE — 99285 EMERGENCY DEPT VISIT HI MDM: CPT

## 2024-04-28 RX ORDER — LANOLIN ALCOHOL/MO/W.PET/CERES
3 CREAM (GRAM) TOPICAL AT BEDTIME
Refills: 0 | Status: DISCONTINUED | OUTPATIENT
Start: 2024-04-28 | End: 2024-05-01

## 2024-04-28 RX ORDER — SODIUM ZIRCONIUM CYCLOSILICATE 10 G/10G
10 POWDER, FOR SUSPENSION ORAL
Refills: 0 | DISCHARGE

## 2024-04-28 RX ORDER — ONDANSETRON 8 MG/1
4 TABLET, FILM COATED ORAL EVERY 8 HOURS
Refills: 0 | Status: DISCONTINUED | OUTPATIENT
Start: 2024-04-28 | End: 2024-05-01

## 2024-04-28 RX ORDER — CEFEPIME 1 G/1
2000 INJECTION, POWDER, FOR SOLUTION INTRAMUSCULAR; INTRAVENOUS ONCE
Refills: 0 | Status: COMPLETED | OUTPATIENT
Start: 2024-04-28 | End: 2024-04-28

## 2024-04-28 RX ORDER — ACETAMINOPHEN 500 MG
650 TABLET ORAL ONCE
Refills: 0 | Status: COMPLETED | OUTPATIENT
Start: 2024-04-28 | End: 2024-04-28

## 2024-04-28 RX ORDER — SODIUM CHLORIDE 9 MG/ML
1000 INJECTION INTRAMUSCULAR; INTRAVENOUS; SUBCUTANEOUS ONCE
Refills: 0 | Status: COMPLETED | OUTPATIENT
Start: 2024-04-28 | End: 2024-04-28

## 2024-04-28 RX ORDER — ACETAMINOPHEN 500 MG
650 TABLET ORAL EVERY 6 HOURS
Refills: 0 | Status: DISCONTINUED | OUTPATIENT
Start: 2024-04-28 | End: 2024-05-01

## 2024-04-28 RX ORDER — OLMESARTAN MEDOXOMIL 5 MG/1
1 TABLET, FILM COATED ORAL
Refills: 0 | DISCHARGE

## 2024-04-28 RX ADMIN — CEFEPIME 100 MILLIGRAM(S): 1 INJECTION, POWDER, FOR SOLUTION INTRAMUSCULAR; INTRAVENOUS at 22:05

## 2024-04-28 RX ADMIN — SODIUM CHLORIDE 1000 MILLILITER(S): 9 INJECTION INTRAMUSCULAR; INTRAVENOUS; SUBCUTANEOUS at 18:13

## 2024-04-28 RX ADMIN — Medication 650 MILLIGRAM(S): at 18:14

## 2024-04-28 NOTE — ED PROVIDER NOTE - PROGRESS NOTE DETAILS
Case d/w Dr. Gallardo.  Advises admission and cefepime. Case d/w Dr. Gallardo.  Advises admission and cefepime 2g q12.  Case d/w CURTIS who accepts Case d/w Podiatry team who will see patient in the ER

## 2024-04-28 NOTE — ED ADULT NURSE NOTE - OBJECTIVE STATEMENT
Patient presents to the ED complaining of chills since 2PM this afternoon. Patient with a PICC line to right upper arm for antibiotic infusion for osteomyelitis to his right pinky toe. Patient reports cough. History of HTN, DM.

## 2024-04-28 NOTE — H&P ADULT - PROBLEM SELECTOR PLAN 5
Cr 1.35, at baseline  (baseline 1.3-1.4).  Follows with Dr. Efrain Castillo. Recent  ED visit for hyperkalemia iso Lokelma non compliance. Now reports better compliance.    - c/w lokelma 10g qd Cr 1.35, at baseline  (baseline 1.3-1.4).  Follows with Dr. Efrain Castillo. Recent  ED visit for hyperkalemia iso Lokelma non compliance. Now reports better compliance. K 3.5 on arrival    - [facility] PRN for elevated K Hx of HFpEF with last EF 71% 12/22. On exam has b/l  2+ pitting edema to knees. Notes 15lbs weight gain over last 2 months, mild ROBBINS and dry non productive cough. CXR shows no signs of overload. Has been taking torsemide 20mg daily for last 1 week.     - c/w torsemide 20mg qd

## 2024-04-28 NOTE — ED ADULT NURSE NOTE - NSFALLUNIVINTERV_ED_ALL_ED
Bed/Stretcher in lowest position, wheels locked, appropriate side rails in place/Call bell, personal items and telephone in reach/Instruct patient to call for assistance before getting out of bed/chair/stretcher/Non-slip footwear applied when patient is off stretcher/[place] to call system/Physically safe environment - no spills, clutter or unnecessary equipment/Purposeful proactive rounding/Room/bathroom lighting operational, light cord in reach

## 2024-04-28 NOTE — H&P ADULT - ATTENDING COMMENTS
#Fever: p/w fevers/chills for 2 days, currently on Vanc/cefazolin for RLE OM. Via RUE PICC. No s/s SSTI on exam, wound appears to be healing well. Xra w/o ruslan. Known to podiatry/ID service. ID recommends vanc/cefepime pending work up. ROS + dry cough, nasal congestion. New fever possibly viral URI despite neg RVP. PICC site does not appear infected. F/up blood cx, CT RLE per podiatry recs, ID consulted in ED f/up recs.    #OM: c/w vanc/cefepime. F/up RLE CT per podiatry recs. Serial exams.    #T2DM: c/w home insulin regimen. Monitor FSG, ISS

## 2024-04-28 NOTE — H&P ADULT - NSHPPHYSICALEXAM_GEN_ALL_CORE
VITAL SIGNS:  T(C): 36.9 (04-28-24 @ 21:53), Max: 39.2 (04-28-24 @ 17:44)  T(F): 98.4 (04-28-24 @ 21:53), Max: 102.6 (04-28-24 @ 17:44)  HR: 60 (04-28-24 @ 21:53) (60 - 86)  BP: 141/63 (04-28-24 @ 21:53) (141/63 - 162/65)  BP(mean): --  RR: 18 (04-28-24 @ 21:53) (16 - 18)  SpO2: 98% (04-28-24 @ 21:53) (97% - 98%)  Wt(kg): --    PHYSICAL EXAM:  Constitutional: WDWN resting comfortably in bed; NAD  Head: NC/AT  Eyes: PERRL, EOMI, anicteric sclera  ENT: no nasal discharge; uvula midline, no oropharyngeal erythema or exudates; MMM  Neck: supple; no JVD or thyromegaly  Respiratory: CTA B/L; no W/R/R, no retractions  Cardiac: +S1/S2; RRR; no M/R/G; PMI non-displaced  Gastrointestinal: abdomen soft, NT/ND; no rebound or guarding  Back: spine midline, no bony tenderness or step-offs; no CVAT B/L  Extremities: WWP, no clubbing or cyanosis; no peripheral edema  Musculoskeletal: NROM x4; no joint swelling, tenderness or erythema  Vascular: 2+ radial, DP pulses B/L  Dermatologic: skin warm, dry and intact; no rashes, wounds, or scars  Neurologic: AAOx3; CNII-XII grossly intact; no focal deficits  Psychiatric: affect and characteristics of appearance, verbalizations, behaviors are appropriate VITAL SIGNS:  T(C): 36.9 (04-28-24 @ 21:53), Max: 39.2 (04-28-24 @ 17:44)  T(F): 98.4 (04-28-24 @ 21:53), Max: 102.6 (04-28-24 @ 17:44)  HR: 60 (04-28-24 @ 21:53) (60 - 86)  BP: 141/63 (04-28-24 @ 21:53) (141/63 - 162/65)  BP(mean): --  RR: 18 (04-28-24 @ 21:53) (16 - 18)  SpO2: 98% (04-28-24 @ 21:53) (97% - 98%)  Wt(kg): --    PHYSICAL EXAM:  Constitutional: resting comfortably in bed; NAD  Head: NC/AT  Eyes: PERRL, EOMI, anicteric sclera  ENT: no oropharyngeal erythema or exudates; MMM  Neck: supple; no JVD or thyromegaly  Respiratory: CTA B/L; no W/R/R  Cardiac: +S1/S2; RRR; no M/R/G  Gastrointestinal: abdomen soft, NT/ND; no rebound or guarding  Extremities: +2 pitting edema to b/l knees, s/p R 3rd (partial) and 5th (complete) digit amputation,   Musculoskeletal: no joint swelling, tenderness or erythema  Vascular: 2+ radial, DP pulses B/L  Dermatologic: no R foot  erythema, swelling, tenderness, or drainage. Surgical site healing well, dry.   Neurologic: AAOx3; peripheral neuropathy b/l feet

## 2024-04-28 NOTE — H&P ADULT - PROBLEM SELECTOR PLAN 9
F: none   E: replete as needed  N: DASH  GI: none  DVT: lovenox   Code: FULL Home med: Citalopram 40    - C/w home meds.

## 2024-04-28 NOTE — H&P ADULT - PROBLEM SELECTOR PLAN 2
Hx of poorly controlled IDDM with A1C 14.2 (2/6/24). Seen by Endo consult service on previous visit. Discharged on Lantus 15 and Lispro 7 TID; compliant.     - c/w lantus 15u nightly, lispro 7u TID with meals  - mISS Hx of R foot OM with prior partial 3rd digit amputation, most recently had R 5th metatarsal amputation 3/28. Surgical cultures grew MSSA and Corynebacterium, with positive residual margins. Discharged with 6 week course Cefazolin 2g q8 and Vanc 1g qd via PICC. Reports good compliance and follow up with ID/Podiatry. Presenting with fever to 102.6, no other infectious sxs. HDS. WBC 6.2,  CRP 10.4, ESR 28 on arrival, both down trending from last visit. No s/s SSTI on exam, wound appears to be healing well. Podiatry consulted. Low likelihood of worsening foot infection given downtrend in inflammatory labs and unchanged foot exam; however will broaden abx coverage until ID consult.     - R foot XR Wet read - No GAS, OM or acute fx seen on XR; f/u official read  - obtain Right foot CT non con  - c/w Cefepime 2g IV 12hr  - c/w Vancomycin 1g IV qd  - f/u Podiatry recs  - consult ID in AM (Dr Gallardo's patient)

## 2024-04-28 NOTE — CONSULT NOTE ADULT - SUBJECTIVE AND OBJECTIVE BOX
Attending: Dr. Chakraborty     Patient is a 58y old  Male who presents with a chief complaint of fever (28 Apr 2024 21:50)      HPI:  58M PMHx CAD, CHF, HLD, CKD, DM, baseline bradycardia 40s-50s, right foot OM (currently on IV Vanco, Cefazolin via PICC since 3/27), presents with fever and chills.    Podiatry Addendum: Podiatry was consulted for the evaluation of possible SSTI of R foot. Pt has been seen by podiatry in the past for R foot infection(last admission 3/27). Pt last saw Dr. Chakraborty podiatrist on Tuesday at which time he notes his symptoms have been the same. Today he noticed increased edema and erythema on the dorsal aspect of his foot. pt is currently on a PICC line for OM of right 5th digit. Noticed chills and fever earlier this afternoon which prompted him to come to the ED. Denies active drainage, cough, congestion, CP, SOB, N/V/D  In the ED:  Initial vital signs: T: 102.6F, HR: 76, BP: 162/65, R: 16, SpO2: 98% on RA  Labs: significant for WBC 6.2 (2.6% bands), lactate 0.8, Cr 1.45 ESR __, CRP __, UA neg, RVP neg  Imaging:  CXR: no acute infiltrates, effusions. Unchanged from 3/27  EKG: NSR 70  Medications: cefepime 2g, tylenol 650mg, 1L NS  Consults: Podiatry  (28 Apr 2024 21:50)      Review of systems negative except per HPI and as stated below  General:	 no weakness; no fevers, no chills  Skin/Breast: no rash  Respiratory and Thorax: no SOB, no cough  Cardiovascular:	No chest pain  Gastrointestinal:	 no nausea, vomiting , diarrhea  Genitourinary:	no dysuria, no difficulty urinating, no hematuria  Musculoskeletal:	no weakness, no joint swelling/pain  Neurological:	no focal weakness/numbness  Endocrine:	no polyuria, no polydipsia    PAST MEDICAL & SURGICAL HISTORY:  Osteomyelitis      Hypertension      Hyperlipidemia      Diabetes mellitus      CAD (coronary artery disease)      Chronic diastolic congestive heart failure      CKD (chronic kidney disease)      S/P release of urethral stricture        Home Medications:  olmesartan 40 mg oral tablet: 1 tab(s) orally once a day (27 Mar 2024 18:50)  torsemide 40 mg oral tablet: 1 tab(s) orally once a day (27 Mar 2024 19:14)    Allergies    No Known Allergies    Intolerances    carvedilol (Hypotension)    FAMILY HISTORY:  FH: myocardial infarction (Father, Grandparent)      Social History:       LABS                        9.4    6.22  )-----------( 189      ( 28 Apr 2024 17:45 )             28.2     04-28    137  |  101  |  44<H>  ----------------------------<  160<H>  3.5   |  22  |  1.35<H>    Ca    8.6      28 Apr 2024 17:45    TPro  6.2  /  Alb  3.6  /  TBili  0.3  /  DBili  x   /  AST  20  /  ALT  6<L>  /  AlkPhos  90  04-28    PT/INR - ( 28 Apr 2024 17:45 )   PT: 12.8 sec;   INR: 1.13          PTT - ( 28 Apr 2024 17:45 )  PTT:27.6 sec  ESR: 28  CRP: --  04-28 @ 21:45    Vital Signs Last 24 Hrs  T(C): 37.1 (28 Apr 2024 21:55), Max: 39.2 (28 Apr 2024 17:44)  T(F): 98.7 (28 Apr 2024 21:55), Max: 102.6 (28 Apr 2024 17:44)  HR: 58 (28 Apr 2024 21:55) (58 - 86)  BP: 146/72 (28 Apr 2024 21:55) (141/63 - 162/65)  BP(mean): 97 (28 Apr 2024 21:55) (97 - 97)  RR: 18 (28 Apr 2024 21:55) (16 - 18)  SpO2: 96% (28 Apr 2024 21:55) (96% - 98%)    Parameters below as of 28 Apr 2024 21:55  Patient On (Oxygen Delivery Method): room air        PHYSICAL EXAM  General: NAD, AA0x3    Lower Extremity Focused (right):  Vasc: DP 1/4 PT 2/4. Tg: Warm to Warm.  mild erythema to dorsal foot, +2 edema   Derm:  No drainage noted, mild erythema noted on the dorsal aspect of foot, dry ayo brown crust formed over 5th ray surgical site. No open wound noted, (-)PTB, (-)Malodor, (-) fluctuance, (-) crepitus  Neuro: Protective sensation intact  MSK: Partial R 5th ray amputation      RADIOLOGY  XR: Pending                      Attending: Dr. Chakraborty     Patient is a 58y old  Male who presents with a chief complaint of fever (28 Apr 2024 21:50)      HPI:  58M PMHx CAD, CHF, HLD, CKD, DM, baseline bradycardia 40s-50s, right foot OM (currently on IV Vanco, Cefazolin via PICC since 3/27), presents with fever and chills.    Podiatry Addendum: Podiatry was consulted for the evaluation of possible SSTI of R foot. Pt has been seen by podiatry in the past for R foot infection(last admission 3/27). Pt last saw Dr. Chakraborty podiatrist on Tuesday at which time he notes his symptoms have been the same. Today he noticed increased edema and erythema on the dorsal aspect of his foot. pt is currently on a PICC line for OM of right 5th digit. Noticed chills and fever earlier this afternoon which prompted him to come to the ED. Denies active drainage, cough, congestion, CP, SOB, N/V/D  In the ED:  Initial vital signs: T: 102.6F, HR: 76, BP: 162/65, R: 16, SpO2: 98% on RA  Labs: significant for WBC 6.2 (2.6% bands), lactate 0.8, Cr 1.45 ESR __, CRP __, UA neg, RVP neg  Imaging:  CXR: no acute infiltrates, effusions. Unchanged from 3/27  EKG: NSR 70  Medications: cefepime 2g, tylenol 650mg, 1L NS  Consults: Podiatry  (28 Apr 2024 21:50)      Review of systems negative except per HPI and as stated below  General:	 no weakness; no fevers, no chills  Skin/Breast: no rash  Respiratory and Thorax: no SOB, no cough  Cardiovascular:	No chest pain  Gastrointestinal:	 no nausea, vomiting , diarrhea  Genitourinary:	no dysuria, no difficulty urinating, no hematuria  Musculoskeletal:	no weakness, no joint swelling/pain  Neurological:	no focal weakness/numbness  Endocrine:	no polyuria, no polydipsia    PAST MEDICAL & SURGICAL HISTORY:  Osteomyelitis      Hypertension      Hyperlipidemia      Diabetes mellitus      CAD (coronary artery disease)      Chronic diastolic congestive heart failure      CKD (chronic kidney disease)      S/P release of urethral stricture        Home Medications:  olmesartan 40 mg oral tablet: 1 tab(s) orally once a day (27 Mar 2024 18:50)  torsemide 40 mg oral tablet: 1 tab(s) orally once a day (27 Mar 2024 19:14)    Allergies    No Known Allergies    Intolerances    carvedilol (Hypotension)    FAMILY HISTORY:  FH: myocardial infarction (Father, Grandparent)      Social History:       LABS                        9.4    6.22  )-----------( 189      ( 28 Apr 2024 17:45 )             28.2     04-28    137  |  101  |  44<H>  ----------------------------<  160<H>  3.5   |  22  |  1.35<H>    Ca    8.6      28 Apr 2024 17:45    TPro  6.2  /  Alb  3.6  /  TBili  0.3  /  DBili  x   /  AST  20  /  ALT  6<L>  /  AlkPhos  90  04-28    PT/INR - ( 28 Apr 2024 17:45 )   PT: 12.8 sec;   INR: 1.13          PTT - ( 28 Apr 2024 17:45 )  PTT:27.6 sec  ESR: 28  CRP: --  04-28 @ 21:45    Vital Signs Last 24 Hrs  T(C): 37.1 (28 Apr 2024 21:55), Max: 39.2 (28 Apr 2024 17:44)  T(F): 98.7 (28 Apr 2024 21:55), Max: 102.6 (28 Apr 2024 17:44)  HR: 58 (28 Apr 2024 21:55) (58 - 86)  BP: 146/72 (28 Apr 2024 21:55) (141/63 - 162/65)  BP(mean): 97 (28 Apr 2024 21:55) (97 - 97)  RR: 18 (28 Apr 2024 21:55) (16 - 18)  SpO2: 96% (28 Apr 2024 21:55) (96% - 98%)    Parameters below as of 28 Apr 2024 21:55  Patient On (Oxygen Delivery Method): room air        PHYSICAL EXAM  General: NAD, AA0x3    Lower Extremity Focused (right):  Vasc: DP 1/4 PT 2/4. Tg: Warm to Warm.  mild erythema to dorsal foot, +2 edema   Derm:  No drainage noted, mild erythema noted on the dorsal aspect of foot, dry ayo brown crust formed over 5th ray surgical site. No open wound noted, (-)PTB, (-)Malodor, (-) fluctuance, (-) crepitus  Neuro: Protective sensation intact  MSK: Partial R 5th ray amputation      RADIOLOGY  XR: Wet read- No GAS, OM or acute fx seen on XR

## 2024-04-28 NOTE — ED PROVIDER NOTE - PHYSICAL EXAMINATION
General:  Well appearing, no distress  HEENT:  No conjunctival injection, neck supple, no congestion   Chest:  Non-tender, no crepitance  Lungs:  Clear to auscultation bilaterally   Heart:  s1s2 normal, no murmur  Abdomen:  soft, non-tender, non-distended  :  Deferred  Rectal:  Deferred  Extremities: Right foot with mild swelling distal aspect.  There is erythema and induration adjacent to the amputation wound of the 5th digit that extends onto the distal dorsal foot.    Neuro:  Alert, conversant, motor/sensory grossly intact

## 2024-04-28 NOTE — H&P ADULT - NSHPREVIEWOFSYSTEMS_GEN_ALL_CORE
REVIEW OF SYSTEMS:  CONSTITUTIONAL: No weakness, fevers, chills, changes in weight  EYES/ENT: No visual changes;  No vertigo or throat pain   NECK: No pain or stiffness  RESPIRATORY: No cough, wheezing, hemoptysis; No shortness of breath  CARDIOVASCULAR: no chest pain or palpitations  GASTROINTESTINAL: No abdominal or epigastric pain. No nausea, vomiting, or hematemesis; No diarrhea or constipation. No melena or hematochezia.  GENITOURINARY: No dysuria, frequency or hematuria  NEUROLOGICAL: No numbness or weakness  SKIN: No itching, rashes REVIEW OF SYSTEMS:  CONSTITUTIONAL: ++ fever, chills. No weakness, changes in weight  EYES/ENT: No visual changes;  No vertigo or throat pain   NECK: No pain or stiffness  RESPIRATORY: +chronic cough, mild ROBBINS. No wheezing, hemoptysis  CARDIOVASCULAR: No chest pain or palpitations  GASTROINTESTINAL: No abdominal or epigastric pain. No nausea, vomiting, No diarrhea  GENITOURINARY: No dysuria, frequency or hematuria  NEUROLOGICAL: +b/l peripheral neuropathy. No weakness  SKIN: No itching, rashes

## 2024-04-28 NOTE — H&P ADULT - PROBLEM SELECTOR PLAN 3
S/p stent placement 12/9/2022. EGK today NSR 70.  Currently on ASA/Plavix, Atorvastatin 40    - c/w ASA 81  - c/w Plavix 75 qd   - c/w Lipitor 40 qd Hx of poorly controlled IDDM with A1C 14.2 (2/6/24). Seen by Endo consult service on previous visit. Discharged on Lantus 15 and Lispro 7 TID; compliant.     - c/w lantus 15u nightly, lispro 7u TID with meals  - mISS

## 2024-04-28 NOTE — CONSULT NOTE ADULT - CONSULT REQUESTED BY NAME
FYI  
Patient's  called. States he has been having headaches for last 3 to 4 days. Has chest tigntness with cough at times. Achy joints. Tired. No fever or chills. No GI symptoms. No loss of taste or smell. Wanting COVID 19 testing but doesn't want to have to pay for appointment to have one done. Patient having same symptoms (she was in background).     Called urgent care to see if just testing could be done and they would be charged for appointment. Community testing is not available right now.   
ED

## 2024-04-28 NOTE — CONSULT NOTE ADULT - ASSESSMENT
58M PMH HFpEF, CAD w/ stent, HTN, HLD, DM, prior metatarsal OM, prior visits for NOLAN/hyerpakalemia, p/w concern for foot infection. S/p R partial 5th ray amputation on 3/27.  Podiatry was consulted to evaluate possible SSTI of the R foot. At the time of consult WBC 6.22, ESR 28, CRP 10 VSS. Based on clinical presentation, there is low suspicion for RLE SSTI at this time.     -Recommend MRI with contrast if pt's kidney function is stable. If not please order CT.   -Recommend ID consult to evaluate current IV abx coverage  -No culture taken because there is no active drainage at this time  -C/w broad spectrum IV abx  -F/u X-ray imaging and final read   -Dressing changes, local wound care, and monitoring of cardinal signs of infection  -Offloading/WB status: WBAT R foot  -Dressed with betadine and DSD  -Rest of care up to primary team    Podiatry following, Plan d/w with attending   58M PMH HFpEF, CAD w/ stent, HTN, HLD, DM, prior metatarsal OM, prior visits for NOLAN/hyerpakalemia, p/w concern for foot infection. S/p R partial 5th ray amputation on 3/27.  Podiatry was consulted to evaluate possible SSTI of the R foot. At the time of consult WBC 6.22, ESR 28, CRP 10 VSS. Based on clinical presentation, there is low suspicion for RLE SSTI at this time.     -Recommend MRI with contrast if pt's kidney function is stable. If not please order CT.   -Recommend ID consult to evaluate current IV abx coverage  -No culture taken because there is no active drainage at this time  -C/w broad spectrum IV abx  -F/u X-ray imaging and final read   -C/w monitoring of cardinal signs of infection  -Offloading/WB status: WBAT R foot  -Dressed with betadine, DSD, ACE   -Rest of care up to primary team    Podiatry following, Plan d/w with attending

## 2024-04-28 NOTE — H&P ADULT - ASSESSMENT
58M PMHx CAD, CHF, HLD, CKD, DM, osteomyelitis in right 5th toe s/p amputation (currently on IV Vanco, Cefazolin via PICC 3/27-5/8), previously growing MSSA and corynebacteria, presents with fever (102.6), chills, right foot erythema/pain, concerning for uncontrolled infection, being admitted for ID/podiatry evaluation and IV antibiotics. .   58M PMHx CAD, CHF, HTN, HLD, CKD, DM2, osteomyelitis in right 5th toe s/p recent amputation 3/27 (currently on IV Vanco, Cefazolin via PICC until 5/8) with surgical cultures growing MSSA and corynebacteria, presenting with fever (102.6), chills x1-2 days, concerning for underlying OM, being admitted for ID/podiatry evaluation and IV antibiotics.   58M PMHx CAD, CHF, HTN, HLD, CKD, DM2, osteomyelitis in right 5th toe s/p recent amputation 3/27 (currently on IV Vanco, Cefazolin via PICC until 5/8) with surgical cultures growing MSSA and corynebacteria, presenting with fever (tmax 102.6), chills x1-2 days and nasal/sinus  congestion, with no changes to foot exam, concerning for viral URI vs less likely chronic OM, being admitted for ID/podiatry evaluation and continuation of IV antibiotics.

## 2024-04-28 NOTE — H&P ADULT - PROBLEM SELECTOR PLAN 6
Home meds: Amlodipine 10mg, olmesartan 40mg     - c/w amlodipine 10, losartan 100mg (TIC)  - DASH diet Cr 1.35, at baseline  (baseline 1.3-1.4).  Follows with Dr. Efrain Castillo. Recent  ED visit for hyperkalemia iso Lokelma non compliance. Now reports better compliance. K 3.5 on arrival    - [facility] PRN for elevated K

## 2024-04-28 NOTE — ED PROVIDER NOTE - CLINICAL SUMMARY MEDICAL DECISION MAKING FREE TEXT BOX
59 yo M PMH DM currently on abx for treatment of osteomyelitis presenting with fever and signs of cellulitis on exam.  Will check labs, assess for other source including viral illness/pnuemoinia/uti and will discuss with ID.

## 2024-04-28 NOTE — H&P ADULT - PROBLEM SELECTOR PLAN 4
Hx of HFpEF with last EF 71% 12/22. On exam has b/l  2+ pitting edema to knees. Notes 15lbs weight gain over last 2 months, mild ROBBINS and dry non productive cough. CXR shows no signs of overload. Has been taking torsemide 20mg daily for last 1 week.     - c/w torsemide 20mg qd S/p stent placement 12/9/2022. EGK today NSR 70.  Currently on ASA/Plavix, Atorvastatin 40    - c/w ASA 81  - c/w Plavix 75 qd   - c/w Lipitor 40 qd

## 2024-04-28 NOTE — H&P ADULT - NSHPLABSRESULTS_GEN_ALL_CORE
LABS:                        9.4    6.22  )-----------( 189      ( 28 Apr 2024 17:45 )             28.2     04-28    137  |  101  |  44<H>  ----------------------------<  160<H>  3.5   |  22  |  1.35<H>    Ca    8.6      28 Apr 2024 17:45    TPro  6.2  /  Alb  3.6  /  TBili  0.3  /  DBili  x   /  AST  20  /  ALT  6<L>  /  AlkPhos  90  04-28    PT/INR - ( 28 Apr 2024 17:45 )   PT: 12.8 sec;   INR: 1.13          PTT - ( 28 Apr 2024 17:45 )  PTT:27.6 sec  Fingerstick  glucose:       RADIOLOGY & ADDITIONAL TESTS: Reviewed.

## 2024-04-28 NOTE — ED PROVIDER NOTE - OBJECTIVE STATEMENT
57 yo M PMH CAD, CHF, HLD, DM, baseline bradycardia 40s-50s who is currently on IV vanco via PICC for OM right foot presents for chills.  Was in the ER 4/17 for hyperkalemia and given lokelma.

## 2024-04-28 NOTE — H&P ADULT - HISTORY OF PRESENT ILLNESS
58M PMHx CAD, CHF, HLD, CKD, DM, baseline bradycardia 40s-50s, right foot OM (currently on IV Vanco via PICC since 3/27), presents with fever and chills.    In the ED:  Initial vital signs: T: 102.6F, HR: 76, BP: 162/65, R: 16, SpO2: 98% on RA  Labs: significant for WBC 6.2 (2.6% bands), lactate 0.8, Cr 1.45 ESR __, CRP __, UA neg, RVP neg  Imaging:  CXR: no acute infiltrates, effusions. Unchanged from 3/27  EKG: NSR 70  Medications: cefepime 2g, tylenol 650mg, 1L NS  Consults: Podiatry  58M PMHx CAD, CHF, HLD, CKD, DM, baseline bradycardia 40s-50s, right foot OM (currently on IV Vanco, Cefazolin via PICC since 3/27), presents with fever and chills.    In the ED:  Initial vital signs: T: 102.6F, HR: 76, BP: 162/65, R: 16, SpO2: 98% on RA  Labs: significant for WBC 6.2 (2.6% bands), lactate 0.8, Cr 1.45 ESR __, CRP __, UA neg, RVP neg  Imaging:  CXR: no acute infiltrates, effusions. Unchanged from 3/27  EKG: NSR 70  Medications: cefepime 2g, tylenol 650mg, 1L NS  Consults: Podiatry  58M PMHx CAD, CHF, HLD, CKD, DM, baseline bradycardia 40s-50s, right foot OM (currently on IV Vanco, Cefazolin via PICC since 3/27), presents with fever and chills x2 days. Notes that yesterday he had mild chills after taking a hot shower but self resolved. However earlier today, patient developed sudden chills and measured fever of 102F at home, at which point he and his wife decided to come to ED. Denies any new R foot pain, swelling, erythema, and drainage. No new foot wounds. Notes increased nasal/sinus congestion and HA over last few days, with mostly clear mucus, and unchanged baseline dry cough; denies any sore throat, worsening/productive cough, SOB, CP, abd pain, diarrhea, dysuria, rash.  Last saw podiatrist Dr Chakraborty 4/23 and ID Dr Gallardo 4/15. Notes he has been compliant with both IV Vanc and Cefazolin, only missing 1-2 doses of cefazolin over last few weeks. Notes being mostly physically inactive and home bound for last 2 months to avoid exacerbating foot wound; noticed 15lbs weight gain over last 2 months and increased b/l LE edema. Wife notes increased ROBBINS.     In the ED:  Initial vital signs: T: 102.6F, HR: 76, BP: 162/65, R: 16, SpO2: 98% on RA  Labs: significant for WBC 6.2 (2.6% bands), lactate 0.8, Cr 1.45 ESR 28, CRP 10.4, Vanc level 15.4, UA neg, RVP neg  Imaging:  CXR: no acute infiltrates, effusions. Unchanged from 3/27  EKG: NSR 70  Medications: cefepime 2g, tylenol 650mg, 1L NS  Consults: Podiatry

## 2024-04-28 NOTE — ED ADULT TRIAGE NOTE - CHIEF COMPLAINT QUOTE
Pt co chills since this AM. Currently has RUE PICC line in place for IV Vancomycin for R foot wound. Darya cough, congestion, CP, SOB, N/V/D.

## 2024-04-28 NOTE — H&P ADULT - PROBLEM SELECTOR PLAN 7
Home meds: Atorvastatin 40    - C/w home meds. Home meds: Amlodipine 10mg, olmesartan 40mg     - c/w amlodipine 10, losartan 100mg (TIC)  - DASH diet

## 2024-04-28 NOTE — H&P ADULT - PROBLEM SELECTOR PLAN 1
Hx of R foot OM with prior partial 3rd digit amputation, most recently had R 5th metatarsal amputation 3/28. Surgical cultures grew MSSA and Corynebacterium, with positive residual margins. Discharged with 6 week course Cefazolin 2g q8 and Vanc 1g qd via PICC. Reports good compliance and follow up with ID/Podiatry. Presenting with fever to 102.6, no other infectious sxs. HDS. WBC 6.2,  CRP 10.4, ESR 28 on arrival, both down trending from last visit. No s/s SSTI on exam, wound appears to be healing well. Podiatry consulted. Will r/o underlying chronic OM and broaden abx coverage for now.     - R foot XR Wet read - No GAS, OM or acute fx seen on XR; f/u official read  - obtain Right foot CT non con  - c/w Cefepime 2g IV 12hr  - c/w Vancomycin 1g IV qd  - f/u BCX  - f/u Podiatry recs  - consult ID in AM (Dr Gallardo's patient) P/w 1 days of fever at home, had 102.6 rectal temp in ED. A/w nasal/sinus congestion and intermittent HA x3-4 days and chills x2 days. Chronic dry cough appears at baseline, no increase phlegm. Denies sore throat, rhinorrhea, SOB, CP, n/v/d, dysuria. CXR unimpressive, unchanged from prior. RVP neg. Lungs CTABL. PICC site appears c/d/i without tenderness or erythema. DDX viral URI vs less likely R foot SSTI/OM vs bacteremia.     - f/u BCX  - CTM fever curve and URI sxs   - symptomatic support   - see w/u and abx plan below P/w 1 days of fever at home, had 102.6 rectal temp in ED. A/w nasal/sinus congestion and intermittent HA x3-4 days and chills x2 days. Chronic dry cough appears at baseline, no increase phlegm. Denies sore throat, rhinorrhea, SOB, CP, n/v/d, dysuria. CXR unimpressive, unchanged from prior. WBC 6.2, no bandemia. RVP neg. Lungs CTABL. PICC site appears c/d/i without tenderness or erythema. DDX viral URI vs less likely R foot SSTI/OM vs bacteremia.     - f/u BCX  - CTM fever curve and URI sxs   - symptomatic support   - see w/u and abx plan below

## 2024-04-29 DIAGNOSIS — N17.9 ACUTE KIDNEY FAILURE, UNSPECIFIED: ICD-10-CM

## 2024-04-29 DIAGNOSIS — D64.9 ANEMIA, UNSPECIFIED: ICD-10-CM

## 2024-04-29 DIAGNOSIS — N18.30 CHRONIC KIDNEY DISEASE, STAGE 3 UNSPECIFIED: ICD-10-CM

## 2024-04-29 DIAGNOSIS — Z86.39 PERSONAL HISTORY OF OTHER ENDOCRINE, NUTRITIONAL AND METABOLIC DISEASE: ICD-10-CM

## 2024-04-29 DIAGNOSIS — R50.9 FEVER, UNSPECIFIED: ICD-10-CM

## 2024-04-29 DIAGNOSIS — M86.60 OTHER CHRONIC OSTEOMYELITIS, UNSPECIFIED SITE: ICD-10-CM

## 2024-04-29 LAB
ADD ON TEST-SPECIMEN IN LAB: SIGNIFICANT CHANGE UP
ALBUMIN SERPL ELPH-MCNC: 3.3 G/DL — SIGNIFICANT CHANGE UP (ref 3.3–5)
ALP SERPL-CCNC: 113 U/L — SIGNIFICANT CHANGE UP (ref 40–120)
ALT FLD-CCNC: 39 U/L — SIGNIFICANT CHANGE UP (ref 10–45)
ANION GAP SERPL CALC-SCNC: 11 MMOL/L — SIGNIFICANT CHANGE UP (ref 5–17)
AST SERPL-CCNC: 100 U/L — HIGH (ref 10–40)
BASOPHILS # BLD AUTO: 0.02 K/UL — SIGNIFICANT CHANGE UP (ref 0–0.2)
BASOPHILS NFR BLD AUTO: 0.3 % — SIGNIFICANT CHANGE UP (ref 0–2)
BILIRUB SERPL-MCNC: 0.3 MG/DL — SIGNIFICANT CHANGE UP (ref 0.2–1.2)
BUN SERPL-MCNC: 41 MG/DL — HIGH (ref 7–23)
CALCIUM SERPL-MCNC: 8 MG/DL — LOW (ref 8.4–10.5)
CHLORIDE SERPL-SCNC: 103 MMOL/L — SIGNIFICANT CHANGE UP (ref 96–108)
CO2 SERPL-SCNC: 22 MMOL/L — SIGNIFICANT CHANGE UP (ref 22–31)
CREAT SERPL-MCNC: 1.47 MG/DL — HIGH (ref 0.5–1.3)
EGFR: 56 ML/MIN/1.73M2 — LOW
EOSINOPHIL # BLD AUTO: 0.02 K/UL — SIGNIFICANT CHANGE UP (ref 0–0.5)
EOSINOPHIL NFR BLD AUTO: 0.3 % — SIGNIFICANT CHANGE UP (ref 0–6)
GLUCOSE BLDC GLUCOMTR-MCNC: 177 MG/DL — HIGH (ref 70–99)
GLUCOSE BLDC GLUCOMTR-MCNC: 245 MG/DL — HIGH (ref 70–99)
GLUCOSE BLDC GLUCOMTR-MCNC: 277 MG/DL — HIGH (ref 70–99)
GLUCOSE BLDC GLUCOMTR-MCNC: 98 MG/DL — SIGNIFICANT CHANGE UP (ref 70–99)
GLUCOSE SERPL-MCNC: 195 MG/DL — HIGH (ref 70–99)
HCT VFR BLD CALC: 26.7 % — LOW (ref 39–50)
HGB BLD-MCNC: 8.7 G/DL — LOW (ref 13–17)
IMM GRANULOCYTES NFR BLD AUTO: 0.3 % — SIGNIFICANT CHANGE UP (ref 0–0.9)
LYMPHOCYTES # BLD AUTO: 0.25 K/UL — LOW (ref 1–3.3)
LYMPHOCYTES # BLD AUTO: 3.2 % — LOW (ref 13–44)
MAGNESIUM SERPL-MCNC: 1.5 MG/DL — LOW (ref 1.6–2.6)
MCHC RBC-ENTMCNC: 29.7 PG — SIGNIFICANT CHANGE UP (ref 27–34)
MCHC RBC-ENTMCNC: 32.6 GM/DL — SIGNIFICANT CHANGE UP (ref 32–36)
MCV RBC AUTO: 91.1 FL — SIGNIFICANT CHANGE UP (ref 80–100)
MONOCYTES # BLD AUTO: 0.35 K/UL — SIGNIFICANT CHANGE UP (ref 0–0.9)
MONOCYTES NFR BLD AUTO: 4.4 % — SIGNIFICANT CHANGE UP (ref 2–14)
NEUTROPHILS # BLD AUTO: 7.25 K/UL — SIGNIFICANT CHANGE UP (ref 1.8–7.4)
NEUTROPHILS NFR BLD AUTO: 91.5 % — HIGH (ref 43–77)
NRBC # BLD: 0 /100 WBCS — SIGNIFICANT CHANGE UP (ref 0–0)
NT-PROBNP SERPL-SCNC: 1537 PG/ML — HIGH (ref 0–300)
PHOSPHATE SERPL-MCNC: 3.4 MG/DL — SIGNIFICANT CHANGE UP (ref 2.5–4.5)
PLATELET # BLD AUTO: 157 K/UL — SIGNIFICANT CHANGE UP (ref 150–400)
POTASSIUM SERPL-MCNC: 4 MMOL/L — SIGNIFICANT CHANGE UP (ref 3.5–5.3)
POTASSIUM SERPL-SCNC: 4 MMOL/L — SIGNIFICANT CHANGE UP (ref 3.5–5.3)
PROT SERPL-MCNC: 5.9 G/DL — LOW (ref 6–8.3)
RBC # BLD: 2.93 M/UL — LOW (ref 4.2–5.8)
RBC # FLD: 13.7 % — SIGNIFICANT CHANGE UP (ref 10.3–14.5)
SODIUM SERPL-SCNC: 136 MMOL/L — SIGNIFICANT CHANGE UP (ref 135–145)
WBC # BLD: 7.91 K/UL — SIGNIFICANT CHANGE UP (ref 3.8–10.5)
WBC # FLD AUTO: 7.91 K/UL — SIGNIFICANT CHANGE UP (ref 3.8–10.5)

## 2024-04-29 PROCEDURE — 99222 1ST HOSP IP/OBS MODERATE 55: CPT

## 2024-04-29 PROCEDURE — 73720 MRI LWR EXTREMITY W/O&W/DYE: CPT | Mod: 26,RT

## 2024-04-29 PROCEDURE — 99233 SBSQ HOSP IP/OBS HIGH 50: CPT | Mod: GC

## 2024-04-29 PROCEDURE — 93010 ELECTROCARDIOGRAM REPORT: CPT

## 2024-04-29 RX ORDER — DEXTROSE 50 % IN WATER 50 %
12.5 SYRINGE (ML) INTRAVENOUS ONCE
Refills: 0 | Status: DISCONTINUED | OUTPATIENT
Start: 2024-04-29 | End: 2024-05-01

## 2024-04-29 RX ORDER — DEXTROSE 50 % IN WATER 50 %
15 SYRINGE (ML) INTRAVENOUS ONCE
Refills: 0 | Status: DISCONTINUED | OUTPATIENT
Start: 2024-04-29 | End: 2024-05-01

## 2024-04-29 RX ORDER — ATORVASTATIN CALCIUM 80 MG/1
40 TABLET, FILM COATED ORAL DAILY
Refills: 0 | Status: DISCONTINUED | OUTPATIENT
Start: 2024-04-29 | End: 2024-05-01

## 2024-04-29 RX ORDER — GLUCAGON INJECTION, SOLUTION 0.5 MG/.1ML
1 INJECTION, SOLUTION SUBCUTANEOUS ONCE
Refills: 0 | Status: DISCONTINUED | OUTPATIENT
Start: 2024-04-29 | End: 2024-05-01

## 2024-04-29 RX ORDER — LOSARTAN POTASSIUM 100 MG/1
100 TABLET, FILM COATED ORAL EVERY 24 HOURS
Refills: 0 | Status: DISCONTINUED | OUTPATIENT
Start: 2024-04-30 | End: 2024-05-01

## 2024-04-29 RX ORDER — INSULIN LISPRO 100/ML
7 VIAL (ML) SUBCUTANEOUS
Refills: 0 | Status: DISCONTINUED | OUTPATIENT
Start: 2024-04-29 | End: 2024-04-30

## 2024-04-29 RX ORDER — SODIUM CHLORIDE 9 MG/ML
1000 INJECTION, SOLUTION INTRAVENOUS
Refills: 0 | Status: DISCONTINUED | OUTPATIENT
Start: 2024-04-29 | End: 2024-05-01

## 2024-04-29 RX ORDER — DAPTOMYCIN 500 MG/10ML
700 INJECTION, POWDER, LYOPHILIZED, FOR SOLUTION INTRAVENOUS EVERY 24 HOURS
Refills: 0 | Status: DISCONTINUED | OUTPATIENT
Start: 2024-04-29 | End: 2024-04-29

## 2024-04-29 RX ORDER — INSULIN LISPRO 100/ML
VIAL (ML) SUBCUTANEOUS
Refills: 0 | Status: DISCONTINUED | OUTPATIENT
Start: 2024-04-29 | End: 2024-05-01

## 2024-04-29 RX ORDER — DEXTROSE 10 % IN WATER 10 %
125 INTRAVENOUS SOLUTION INTRAVENOUS ONCE
Refills: 0 | Status: DISCONTINUED | OUTPATIENT
Start: 2024-04-29 | End: 2024-05-01

## 2024-04-29 RX ORDER — CLOPIDOGREL BISULFATE 75 MG/1
75 TABLET, FILM COATED ORAL DAILY
Refills: 0 | Status: DISCONTINUED | OUTPATIENT
Start: 2024-04-29 | End: 2024-05-01

## 2024-04-29 RX ORDER — INFLUENZA VIRUS VACCINE 15; 15; 15; 15 UG/.5ML; UG/.5ML; UG/.5ML; UG/.5ML
0.5 SUSPENSION INTRAMUSCULAR ONCE
Refills: 0 | Status: DISCONTINUED | OUTPATIENT
Start: 2024-04-29 | End: 2024-05-01

## 2024-04-29 RX ORDER — AMLODIPINE BESYLATE 2.5 MG/1
10 TABLET ORAL DAILY
Refills: 0 | Status: DISCONTINUED | OUTPATIENT
Start: 2024-04-29 | End: 2024-05-01

## 2024-04-29 RX ORDER — INSULIN GLARGINE 100 [IU]/ML
15 INJECTION, SOLUTION SUBCUTANEOUS AT BEDTIME
Refills: 0 | Status: DISCONTINUED | OUTPATIENT
Start: 2024-04-29 | End: 2024-04-30

## 2024-04-29 RX ORDER — CEFEPIME 1 G/1
2000 INJECTION, POWDER, FOR SOLUTION INTRAMUSCULAR; INTRAVENOUS EVERY 12 HOURS
Refills: 0 | Status: DISCONTINUED | OUTPATIENT
Start: 2024-04-29 | End: 2024-04-29

## 2024-04-29 RX ORDER — VANCOMYCIN HCL 1 G
1000 VIAL (EA) INTRAVENOUS EVERY 24 HOURS
Refills: 0 | Status: DISCONTINUED | OUTPATIENT
Start: 2024-04-29 | End: 2024-04-29

## 2024-04-29 RX ORDER — LOSARTAN POTASSIUM 100 MG/1
100 TABLET, FILM COATED ORAL DAILY
Refills: 0 | Status: DISCONTINUED | OUTPATIENT
Start: 2024-04-29 | End: 2024-04-29

## 2024-04-29 RX ORDER — ASPIRIN/CALCIUM CARB/MAGNESIUM 324 MG
81 TABLET ORAL DAILY
Refills: 0 | Status: DISCONTINUED | OUTPATIENT
Start: 2024-04-29 | End: 2024-05-01

## 2024-04-29 RX ORDER — DAPTOMYCIN 500 MG/10ML
700 INJECTION, POWDER, LYOPHILIZED, FOR SOLUTION INTRAVENOUS EVERY 24 HOURS
Refills: 0 | Status: DISCONTINUED | OUTPATIENT
Start: 2024-04-30 | End: 2024-05-01

## 2024-04-29 RX ORDER — ENOXAPARIN SODIUM 100 MG/ML
40 INJECTION SUBCUTANEOUS EVERY 24 HOURS
Refills: 0 | Status: DISCONTINUED | OUTPATIENT
Start: 2024-04-29 | End: 2024-05-01

## 2024-04-29 RX ORDER — DEXTROSE 50 % IN WATER 50 %
25 SYRINGE (ML) INTRAVENOUS ONCE
Refills: 0 | Status: DISCONTINUED | OUTPATIENT
Start: 2024-04-29 | End: 2024-05-01

## 2024-04-29 RX ORDER — MAGNESIUM SULFATE 500 MG/ML
2 VIAL (ML) INJECTION ONCE
Refills: 0 | Status: COMPLETED | OUTPATIENT
Start: 2024-04-29 | End: 2024-04-29

## 2024-04-29 RX ORDER — SODIUM ZIRCONIUM CYCLOSILICATE 10 G/10G
10 POWDER, FOR SUSPENSION ORAL DAILY
Refills: 0 | Status: DISCONTINUED | OUTPATIENT
Start: 2024-04-29 | End: 2024-04-29

## 2024-04-29 RX ORDER — CITALOPRAM 10 MG/1
40 TABLET, FILM COATED ORAL DAILY
Refills: 0 | Status: DISCONTINUED | OUTPATIENT
Start: 2024-04-29 | End: 2024-05-01

## 2024-04-29 RX ADMIN — CITALOPRAM 40 MILLIGRAM(S): 10 TABLET, FILM COATED ORAL at 11:47

## 2024-04-29 RX ADMIN — ENOXAPARIN SODIUM 40 MILLIGRAM(S): 100 INJECTION SUBCUTANEOUS at 06:40

## 2024-04-29 RX ADMIN — Medication 650 MILLIGRAM(S): at 06:12

## 2024-04-29 RX ADMIN — AMLODIPINE BESYLATE 10 MILLIGRAM(S): 2.5 TABLET ORAL at 06:40

## 2024-04-29 RX ADMIN — Medication 20 MILLIGRAM(S): at 06:40

## 2024-04-29 RX ADMIN — CLOPIDOGREL BISULFATE 75 MILLIGRAM(S): 75 TABLET, FILM COATED ORAL at 11:47

## 2024-04-29 RX ADMIN — Medication 7 UNIT(S): at 13:03

## 2024-04-29 RX ADMIN — CEFEPIME 100 MILLIGRAM(S): 1 INJECTION, POWDER, FOR SOLUTION INTRAMUSCULAR; INTRAVENOUS at 11:47

## 2024-04-29 RX ADMIN — LOSARTAN POTASSIUM 100 MILLIGRAM(S): 100 TABLET, FILM COATED ORAL at 06:39

## 2024-04-29 RX ADMIN — Medication 25 GRAM(S): at 13:02

## 2024-04-29 RX ADMIN — ATORVASTATIN CALCIUM 40 MILLIGRAM(S): 80 TABLET, FILM COATED ORAL at 11:47

## 2024-04-29 RX ADMIN — Medication 2: at 18:30

## 2024-04-29 RX ADMIN — INSULIN GLARGINE 15 UNIT(S): 100 INJECTION, SOLUTION SUBCUTANEOUS at 22:10

## 2024-04-29 RX ADMIN — Medication 81 MILLIGRAM(S): at 11:48

## 2024-04-29 RX ADMIN — Medication 250 MILLIGRAM(S): at 06:39

## 2024-04-29 RX ADMIN — Medication 6: at 09:05

## 2024-04-29 RX ADMIN — Medication 7 UNIT(S): at 18:30

## 2024-04-29 RX ADMIN — Medication 7 UNIT(S): at 09:06

## 2024-04-29 RX ADMIN — Medication 650 MILLIGRAM(S): at 05:12

## 2024-04-29 NOTE — PROGRESS NOTE ADULT - PROBLEM SELECTOR PLAN 6
Home meds: Amlodipine 10mg, olmesartan 40mg   - C/w amlodipine, losartan 100mg  - resume home torsemide on discharge Home med: Citalopram 40  - C/w home meds Creatinine up from 1.35 to 1.47 overnight. Baseline Creatinine seems to be 1.3-1.4 per past admissions. Follows with nephrologist [facility] Dr. Efrain Castillo.   -Urine lytes  -Avoid nephrotoxic agents

## 2024-04-29 NOTE — PROGRESS NOTE ADULT - SUBJECTIVE AND OBJECTIVE BOX
INFECTIOUS DISEASES INITIAL CONSULT NOTE    HPI:  58M PMHx CAD, CHF, HLD, CKD, DM, baseline bradycardia 40s-50s, right foot OM (currently on IV Vanco, Cefazolin via PICC since 3/27), presents with fever and chills x2 days. Notes that yesterday he had mild chills after taking a hot shower but self resolved. However earlier today, patient developed sudden chills and measured fever of 102F at home, at which point he and his wife decided to come to ED. Denies any new R foot pain, swelling, erythema, and drainage. No new foot wounds. Notes increased nasal/sinus congestion and HA over last few days, with mostly clear mucus, and unchanged baseline dry cough; denies any sore throat, worsening/productive cough, SOB, CP, abd pain, diarrhea, dysuria, rash.  Last saw podiatrist Dr Chakraborty 4/23 and ID Dr Gallardo 4/15. Notes he has been compliant with both IV Vanc and Cefazolin, only missing 1-2 doses of cefazolin over last few weeks. Notes being mostly physically inactive and home bound for last 2 months to avoid exacerbating foot wound; noticed 15lbs weight gain over last 2 months and increased b/l LE edema. Wife notes increased ROBBINS.     In the ED:  Initial vital signs: T: 102.6F, HR: 76, BP: 162/65, R: 16, SpO2: 98% on RA  Labs: significant for WBC 6.2 (2.6% bands), lactate 0.8, Cr 1.45 ESR 28, CRP 10.4, Vanc level 15.4, UA neg, RVP neg  Imaging:  CXR: no acute infiltrates, effusions. Unchanged from 3/27  EKG: NSR 70  Medications: cefepime 2g, tylenol 650mg, 1L NS  Consults: Podiatry  (28 Apr 2024 21:50)      PAST MEDICAL & SURGICAL HISTORY:  Osteomyelitis      Hypertension      Hyperlipidemia      Diabetes mellitus      CAD (coronary artery disease)      Chronic diastolic congestive heart failure      CKD (chronic kidney disease)      S/P release of urethral stricture            Review of Systems:   Constitutional, eyes, ENT, cardiovascular, respiratory, gastrointestinal, genitourinary, integumentary, neurological, psychiatric and heme/lymph are otherwise negative other than noted above       ANTIBIOTICS:  MEDICATIONS  (STANDING):  amLODIPine   Tablet 10 milliGRAM(s) Oral daily  aspirin  chewable 81 milliGRAM(s) Oral daily  atorvastatin 40 milliGRAM(s) Oral daily  cefepime   IVPB 2000 milliGRAM(s) IV Intermittent every 12 hours  citalopram 40 milliGRAM(s) Oral daily  clopidogrel Tablet 75 milliGRAM(s) Oral daily  dextrose 10% Bolus 125 milliLiter(s) IV Bolus once  dextrose 5%. 1000 milliLiter(s) (50 mL/Hr) IV Continuous <Continuous>  dextrose 5%. 1000 milliLiter(s) (100 mL/Hr) IV Continuous <Continuous>  dextrose 50% Injectable 25 Gram(s) IV Push once  dextrose 50% Injectable 12.5 Gram(s) IV Push once  enoxaparin Injectable 40 milliGRAM(s) SubCutaneous every 24 hours  glucagon  Injectable 1 milliGRAM(s) IntraMuscular once  influenza   Vaccine 0.5 milliLiter(s) IntraMuscular once  insulin glargine Injectable (LANTUS) 15 Unit(s) SubCutaneous at bedtime  insulin lispro (ADMELOG) corrective regimen sliding scale   SubCutaneous three times a day before meals  insulin lispro Injectable (ADMELOG) 7 Unit(s) SubCutaneous three times a day before meals  magnesium sulfate  IVPB 2 Gram(s) IV Intermittent once  torsemide 20 milliGRAM(s) Oral daily  vancomycin  IVPB 1000 milliGRAM(s) IV Intermittent every 24 hours    MEDICATIONS  (PRN):  acetaminophen     Tablet .. 650 milliGRAM(s) Oral every 6 hours PRN Temp greater or equal to 38C (100.4F), Mild Pain (1 - 3)  aluminum hydroxide/magnesium hydroxide/simethicone Suspension 30 milliLiter(s) Oral every 4 hours PRN Dyspepsia  dextrose Oral Gel 15 Gram(s) Oral once PRN Blood Glucose LESS THAN 70 milliGRAM(s)/deciliter  melatonin 3 milliGRAM(s) Oral at bedtime PRN Insomnia  ondansetron Injectable 4 milliGRAM(s) IV Push every 8 hours PRN Nausea and/or Vomiting      Allergies    No Known Allergies    Intolerances    carvedilol (Hypotension)      SOCIAL HISTORY:    FAMILY HISTORY:  FH: myocardial infarction (Father, Grandparent)     no FH leading to current infection    Vital Signs Last 24 Hrs  T(C): 37.4 (29 Apr 2024 05:47), Max: 39.2 (28 Apr 2024 17:44)  T(F): 99.3 (29 Apr 2024 05:47), Max: 102.6 (28 Apr 2024 17:44)  HR: 64 (29 Apr 2024 05:47) (58 - 86)  BP: 172/72 (29 Apr 2024 05:47) (141/63 - 172/72)  BP(mean): 97 (28 Apr 2024 21:55) (97 - 97)  RR: 18 (29 Apr 2024 05:47) (16 - 18)  SpO2: 93% (29 Apr 2024 05:47) (93% - 98%)    Parameters below as of 29 Apr 2024 05:47  Patient On (Oxygen Delivery Method): room air          PHYSICAL EXAM:  Constitutional: alert, NAD  Eyes: the sclera and conjunctiva were normal.   ENT: the ears and nose were normal in appearance.   Neck: the appearance of the neck was normal and the neck was supple.   Pulmonary: no respiratory distress and lungs were clear to auscultation bilaterally.   Heart: heart rate was normal and rhythm regular, normal S1 and S2  Vascular:. there was no peripheral edema  Abdomen: normal bowel sounds, soft, non-tender  Neurological: no focal deficits.   Psychiatric: the affect was normal      LABS:                        8.7    7.91  )-----------( 157      ( 29 Apr 2024 05:30 )             26.7     04-29    136  |  103  |  41<H>  ----------------------------<  195<H>  4.0   |  22  |  1.47<H>    Ca    8.0<L>      29 Apr 2024 05:30  Phos  3.4     04-29  Mg     1.5     04-29    TPro  5.9<L>  /  Alb  3.3  /  TBili  0.3  /  DBili  x   /  AST  100<H>  /  ALT  39  /  AlkPhos  113  04-29    PT/INR - ( 28 Apr 2024 17:45 )   PT: 12.8 sec;   INR: 1.13          PTT - ( 28 Apr 2024 17:45 )  PTT:27.6 sec  Urinalysis Basic - ( 29 Apr 2024 05:30 )    Color: x / Appearance: x / SG: x / pH: x  Gluc: 195 mg/dL / Ketone: x  / Bili: x / Urobili: x   Blood: x / Protein: x / Nitrite: x   Leuk Esterase: x / RBC: x / WBC x   Sq Epi: x / Non Sq Epi: x / Bacteria: x        MICROBIOLOGY:    RADIOLOGY & ADDITIONAL STUDIES:   INFECTIOUS DISEASES INITIAL CONSULT NOTE    HPI:  58M PMHx CAD, CHF, HLD, CKD, DM, baseline bradycardia 40s-50s, right foot OM (currently on IV Vanco, Cefazolin via PICC since 3/27), presents with fever and chills x2 days. Notes that yesterday he had mild chills after taking a hot shower but self resolved. However earlier today, patient developed sudden chills and measured fever of 102F at home, at which point he and his wife decided to come to ED. Denies any new R foot pain, swelling, erythema, and drainage. No new foot wounds. Notes increased nasal/sinus congestion and HA over last few days, with mostly clear mucus, and unchanged baseline dry cough; denies any sore throat, worsening/productive cough, SOB, CP, abd pain, diarrhea, dysuria, rash.  Last saw podiatrist Dr Chakraborty 4/23 and ID Dr Gallardo 4/15. Notes he has been compliant with both IV Vanc and Cefazolin, only missing 1-2 doses of cefazolin over last few weeks. Notes being mostly physically inactive and home bound for last 2 months to avoid exacerbating foot wound; noticed 15lbs weight gain over last 2 months and increased b/l LE edema. Wife notes increased ROBBINS. 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 Pt was put on 6 weeks IV abx for residual OM of Vanc and Cefazolin. Podiatry following and states there is a low suspicion for SSTI and recc a MRI to r/o deeper abscess. Of note, Pt SARS, RVP, and UA are negative.     In the ED:  Initial vital signs: T: 102.6F, HR: 76, BP: 162/65, R: 16, SpO2: 98% on RA  Labs: significant for WBC 6.2 (2.6% bands), lactate 0.8, Cr 1.45 ESR 28, CRP 10.4, Vanc level 15.4, UA neg, RVP neg  Imaging:  CXR: no acute infiltrates, effusions. Unchanged from 3/27  EKG: NSR 70  Medications: cefepime 2g, tylenol 650mg, 1L NS  Consults: Podiatry  (28 Apr 2024 21:50)      PAST MEDICAL & SURGICAL HISTORY:  Osteomyelitis      Hypertension      Hyperlipidemia      Diabetes mellitus      CAD (coronary artery disease)      Chronic diastolic congestive heart failure      CKD (chronic kidney disease)      S/P release of urethral stricture            Review of Systems:   Constitutional, eyes, ENT, cardiovascular, respiratory, gastrointestinal, genitourinary, integumentary, neurological, psychiatric and heme/lymph are otherwise negative other than noted above       ANTIBIOTICS:  MEDICATIONS  (STANDING):  amLODIPine   Tablet 10 milliGRAM(s) Oral daily  aspirin  chewable 81 milliGRAM(s) Oral daily  atorvastatin 40 milliGRAM(s) Oral daily  cefepime   IVPB 2000 milliGRAM(s) IV Intermittent every 12 hours  citalopram 40 milliGRAM(s) Oral daily  clopidogrel Tablet 75 milliGRAM(s) Oral daily  dextrose 10% Bolus 125 milliLiter(s) IV Bolus once  dextrose 5%. 1000 milliLiter(s) (50 mL/Hr) IV Continuous <Continuous>  dextrose 5%. 1000 milliLiter(s) (100 mL/Hr) IV Continuous <Continuous>  dextrose 50% Injectable 25 Gram(s) IV Push once  dextrose 50% Injectable 12.5 Gram(s) IV Push once  enoxaparin Injectable 40 milliGRAM(s) SubCutaneous every 24 hours  glucagon  Injectable 1 milliGRAM(s) IntraMuscular once  influenza   Vaccine 0.5 milliLiter(s) IntraMuscular once  insulin glargine Injectable (LANTUS) 15 Unit(s) SubCutaneous at bedtime  insulin lispro (ADMELOG) corrective regimen sliding scale   SubCutaneous three times a day before meals  insulin lispro Injectable (ADMELOG) 7 Unit(s) SubCutaneous three times a day before meals  magnesium sulfate  IVPB 2 Gram(s) IV Intermittent once  torsemide 20 milliGRAM(s) Oral daily  vancomycin  IVPB 1000 milliGRAM(s) IV Intermittent every 24 hours    MEDICATIONS  (PRN):  acetaminophen     Tablet .. 650 milliGRAM(s) Oral every 6 hours PRN Temp greater or equal to 38C (100.4F), Mild Pain (1 - 3)  aluminum hydroxide/magnesium hydroxide/simethicone Suspension 30 milliLiter(s) Oral every 4 hours PRN Dyspepsia  dextrose Oral Gel 15 Gram(s) Oral once PRN Blood Glucose LESS THAN 70 milliGRAM(s)/deciliter  melatonin 3 milliGRAM(s) Oral at bedtime PRN Insomnia  ondansetron Injectable 4 milliGRAM(s) IV Push every 8 hours PRN Nausea and/or Vomiting      Allergies    No Known Allergies    Intolerances    carvedilol (Hypotension)      SOCIAL HISTORY:    FAMILY HISTORY:  FH: myocardial infarction (Father, Grandparent)     no FH leading to current infection    Vital Signs Last 24 Hrs  T(C): 37.4 (29 Apr 2024 05:47), Max: 39.2 (28 Apr 2024 17:44)  T(F): 99.3 (29 Apr 2024 05:47), Max: 102.6 (28 Apr 2024 17:44)  HR: 64 (29 Apr 2024 05:47) (58 - 86)  BP: 172/72 (29 Apr 2024 05:47) (141/63 - 172/72)  BP(mean): 97 (28 Apr 2024 21:55) (97 - 97)  RR: 18 (29 Apr 2024 05:47) (16 - 18)  SpO2: 93% (29 Apr 2024 05:47) (93% - 98%)    Parameters below as of 29 Apr 2024 05:47  Patient On (Oxygen Delivery Method): room air          PHYSICAL EXAM:  Constitutional: alert, NAD  Eyes: the sclera and conjunctiva were normal.   ENT: the ears and nose were normal in appearance.   Neck: the appearance of the neck was normal and the neck was supple.   Pulmonary: no respiratory distress and lungs were clear to auscultation bilaterally.   Heart: heart rate was normal and rhythm regular, normal S1 and S2  Vascular:. there was no peripheral edema  Abdomen: normal bowel sounds, soft, non-tender  Neurological: no focal deficits.   Psychiatric: the affect was normal      LABS:                        8.7    7.91  )-----------( 157      ( 29 Apr 2024 05:30 )             26.7     04-29    136  |  103  |  41<H>  ----------------------------<  195<H>  4.0   |  22  |  1.47<H>    Ca    8.0<L>      29 Apr 2024 05:30  Phos  3.4     04-29  Mg     1.5     04-29    TPro  5.9<L>  /  Alb  3.3  /  TBili  0.3  /  DBili  x   /  AST  100<H>  /  ALT  39  /  AlkPhos  113  04-29    PT/INR - ( 28 Apr 2024 17:45 )   PT: 12.8 sec;   INR: 1.13          PTT - ( 28 Apr 2024 17:45 )  PTT:27.6 sec  Urinalysis Basic - ( 29 Apr 2024 05:30 )    Color: x / Appearance: x / SG: x / pH: x  Gluc: 195 mg/dL / Ketone: x  / Bili: x / Urobili: x   Blood: x / Protein: x / Nitrite: x   Leuk Esterase: x / RBC: x / WBC x   Sq Epi: x / Non Sq Epi: x / Bacteria: x        MICROBIOLOGY:  Culture - Tissue with Gram Stain (03.27.24 @ 21:53)    Gram Stain:   No organisms seen  No WBC's seen.   -  Clindamycin: R 0.5 This isolate is presumed to be clindamycin resistant based on detection of inducible resistance. Clindamycin may still be effective in some patients.   -  Erythromycin: R >4   -  Linezolid: S 2   -  Oxacillin: S <=0.25 Oxacillin predicts susceptibility for dicloxacillin, methicillin, and nafcillin   -  Rifampin: S <=1 Should not be used as monotherapy   -  Tetracycline: S <=1   -  Trimethoprim/Sulfamethoxazole: S <=0.5/9.5   -  Vancomycin: S 2   Specimen Source: .Tissue right foot 5th metatarsal clean margin or spec   Culture Results:   Rare Staphylococcus aureus  Rare Corynebacterium amycolatum  Result called to and read back byKimberlee Olivarez RN  03/29/2024 14:28:11   Organism Identification: Staphylococcus aureus   Organism: Staphylococcus aureus   Method Type: PALMA        RADIOLOGY & ADDITIONAL STUDIES:      < from: Xray Foot AP + Lateral + Oblique, Right (04.28.24 @ 23:43) >  Findings/  impression:Redemonstration status post partial fifth ray resection of   distal metatarsal shaft, postsurgical changes, soft tissue swelling. No   radiographic findings to suggest presence of osteomyelitis.   Redemonstration third middle and distal phalangeal resection and partial   proximal phalangeal resection. Redemonstration second toe PIP arthrodesis   screw/surrounding lucency in satisfactory position. Medial tibial soft   tissue calcification.    --- End of Report ---      < end of copied text >         INFECTIOUS DISEASES INITIAL CONSULT NOTE    HPI:  58M PMHx CAD, CHF, HLD, CKD, DM, baseline bradycardia 40s-50s, right foot OM (currently on IV Vanco, Cefazolin via PICC since 3/27), presents with fever and chills x2 days. Notes that yesterday he had mild chills after taking a hot shower but self resolved. However earlier today, patient developed sudden chills and measured fever of 102F at home, at which point he and his wife decided to come to ED. Denies any new R foot pain, swelling, erythema, and drainage. No new foot wounds. Notes increased nasal/sinus congestion and HA over last few days, with mostly clear mucus, and unchanged baseline dry cough; denies any sore throat, worsening/productive cough, SOB, CP, abd pain, diarrhea, dysuria, rash. Pt had mild nausea last night. Has been compliant with dressing changes and footcare and has not had any pedal complaints.  Last saw podiatrist Dr Chakraborty 4/23 and ID Dr Gallardo 4/15. Notes he has been compliant with both IV Vanc and Cefazolin, only missing 1-2 doses of cefazolin over last few weeks. Notes being mostly physically inactive and home bound for last 2 months to avoid exacerbating foot wound; noticed 15lbs weight gain over last 2 months and increased b/l LE edema. Wife notes increased ROBBINS. 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 Pt was put on 6 weeks IV abx for residual OM of Vanc and Cefazolin. Podiatry following and states there is a low suspicion for SSTI and recc a MRI to r/o deeper abscess. Of note, Pt SARS, RVP, and UA are negative.     In the ED:  Initial vital signs: T: 102.6F, HR: 76, BP: 162/65, R: 16, SpO2: 98% on RA  Labs: significant for WBC 6.2 (2.6% bands), lactate 0.8, Cr 1.45 ESR 28, CRP 10.4, Vanc level 15.4, UA neg, RVP neg  Imaging:  CXR: no acute infiltrates, effusions. Unchanged from 3/27  EKG: NSR 70  Medications: cefepime 2g, tylenol 650mg, 1L NS  Consults: Podiatry  (28 Apr 2024 21:50)      PAST MEDICAL & SURGICAL HISTORY:  Osteomyelitis      Hypertension      Hyperlipidemia      Diabetes mellitus      CAD (coronary artery disease)      Chronic diastolic congestive heart failure      CKD (chronic kidney disease)      S/P release of urethral stricture            Review of Systems:   Constitutional, eyes, ENT, cardiovascular, respiratory, gastrointestinal, genitourinary, integumentary, neurological, psychiatric and heme/lymph are otherwise negative other than noted above       ANTIBIOTICS:  MEDICATIONS  (STANDING):  amLODIPine   Tablet 10 milliGRAM(s) Oral daily  aspirin  chewable 81 milliGRAM(s) Oral daily  atorvastatin 40 milliGRAM(s) Oral daily  cefepime   IVPB 2000 milliGRAM(s) IV Intermittent every 12 hours  citalopram 40 milliGRAM(s) Oral daily  clopidogrel Tablet 75 milliGRAM(s) Oral daily  dextrose 10% Bolus 125 milliLiter(s) IV Bolus once  dextrose 5%. 1000 milliLiter(s) (50 mL/Hr) IV Continuous <Continuous>  dextrose 5%. 1000 milliLiter(s) (100 mL/Hr) IV Continuous <Continuous>  dextrose 50% Injectable 25 Gram(s) IV Push once  dextrose 50% Injectable 12.5 Gram(s) IV Push once  enoxaparin Injectable 40 milliGRAM(s) SubCutaneous every 24 hours  glucagon  Injectable 1 milliGRAM(s) IntraMuscular once  influenza   Vaccine 0.5 milliLiter(s) IntraMuscular once  insulin glargine Injectable (LANTUS) 15 Unit(s) SubCutaneous at bedtime  insulin lispro (ADMELOG) corrective regimen sliding scale   SubCutaneous three times a day before meals  insulin lispro Injectable (ADMELOG) 7 Unit(s) SubCutaneous three times a day before meals  magnesium sulfate  IVPB 2 Gram(s) IV Intermittent once  torsemide 20 milliGRAM(s) Oral daily  vancomycin  IVPB 1000 milliGRAM(s) IV Intermittent every 24 hours    MEDICATIONS  (PRN):  acetaminophen     Tablet .. 650 milliGRAM(s) Oral every 6 hours PRN Temp greater or equal to 38C (100.4F), Mild Pain (1 - 3)  aluminum hydroxide/magnesium hydroxide/simethicone Suspension 30 milliLiter(s) Oral every 4 hours PRN Dyspepsia  dextrose Oral Gel 15 Gram(s) Oral once PRN Blood Glucose LESS THAN 70 milliGRAM(s)/deciliter  melatonin 3 milliGRAM(s) Oral at bedtime PRN Insomnia  ondansetron Injectable 4 milliGRAM(s) IV Push every 8 hours PRN Nausea and/or Vomiting      Allergies    No Known Allergies    Intolerances    carvedilol (Hypotension)      SOCIAL HISTORY:    FAMILY HISTORY:  FH: myocardial infarction (Father, Grandparent)     no FH leading to current infection    Vital Signs Last 24 Hrs  T(C): 37.4 (29 Apr 2024 05:47), Max: 39.2 (28 Apr 2024 17:44)  T(F): 99.3 (29 Apr 2024 05:47), Max: 102.6 (28 Apr 2024 17:44)  HR: 64 (29 Apr 2024 05:47) (58 - 86)  BP: 172/72 (29 Apr 2024 05:47) (141/63 - 172/72)  BP(mean): 97 (28 Apr 2024 21:55) (97 - 97)  RR: 18 (29 Apr 2024 05:47) (16 - 18)  SpO2: 93% (29 Apr 2024 05:47) (93% - 98%)    Parameters below as of 29 Apr 2024 05:47  Patient On (Oxygen Delivery Method): room air          PHYSICAL EXAM:  Constitutional: alert, NAD  Eyes: the sclera and conjunctiva were normal.   ENT: the ears and nose were normal in appearance.   Neck: the appearance of the neck was normal and the neck was supple.   Pulmonary: no respiratory distress and lungs were clear to auscultation bilaterally.   Heart: heart rate was normal and rhythm regular, normal S1 and S2  Vascular:. there was no peripheral edema LLE. Mild dorsal foot edema R foot.  Abdomen: normal bowel sounds, soft, non-tender  Derm: R foot  No drainage noted, eschar over 5th ray surgical site. No open wound noted. Mild erythema dorsally  Neurological: no focal deficits.   Psychiatric: the affect was normal      LABS:                        8.7    7.91  )-----------( 157      ( 29 Apr 2024 05:30 )             26.7     04-29    136  |  103  |  41<H>  ----------------------------<  195<H>  4.0   |  22  |  1.47<H>    Ca    8.0<L>      29 Apr 2024 05:30  Phos  3.4     04-29  Mg     1.5     04-29    TPro  5.9<L>  /  Alb  3.3  /  TBili  0.3  /  DBili  x   /  AST  100<H>  /  ALT  39  /  AlkPhos  113  04-29    PT/INR - ( 28 Apr 2024 17:45 )   PT: 12.8 sec;   INR: 1.13          PTT - ( 28 Apr 2024 17:45 )  PTT:27.6 sec  Urinalysis Basic - ( 29 Apr 2024 05:30 )    Color: x / Appearance: x / SG: x / pH: x  Gluc: 195 mg/dL / Ketone: x  / Bili: x / Urobili: x   Blood: x / Protein: x / Nitrite: x   Leuk Esterase: x / RBC: x / WBC x   Sq Epi: x / Non Sq Epi: x / Bacteria: x        MICROBIOLOGY:  Culture - Tissue with Gram Stain (03.27.24 @ 21:53)    Gram Stain:   No organisms seen  No WBC's seen.   -  Clindamycin: R 0.5 This isolate is presumed to be clindamycin resistant based on detection of inducible resistance. Clindamycin may still be effective in some patients.   -  Erythromycin: R >4   -  Linezolid: S 2   -  Oxacillin: S <=0.25 Oxacillin predicts susceptibility for dicloxacillin, methicillin, and nafcillin   -  Rifampin: S <=1 Should not be used as monotherapy   -  Tetracycline: S <=1   -  Trimethoprim/Sulfamethoxazole: S <=0.5/9.5   -  Vancomycin: S 2   Specimen Source: .Tissue right foot 5th metatarsal clean margin or spec   Culture Results:   Rare Staphylococcus aureus  Rare Corynebacterium amycolatum  Result called to and read back byKimberlee Olivarez RN  03/29/2024 14:28:11   Organism Identification: Staphylococcus aureus   Organism: Staphylococcus aureus   Method Type: PALMA        RADIOLOGY & ADDITIONAL STUDIES:      < from: Xray Foot AP + Lateral + Oblique, Right (04.28.24 @ 23:43) >  Findings/  impression:Redemonstration status post partial fifth ray resection of   distal metatarsal shaft, postsurgical changes, soft tissue swelling. No   radiographic findings to suggest presence of osteomyelitis.   Redemonstration third middle and distal phalangeal resection and partial   proximal phalangeal resection. Redemonstration second toe PIP arthrodesis   screw/surrounding lucency in satisfactory position. Medial tibial soft   tissue calcification.    --- End of Report ---      < end of copied text >

## 2024-04-29 NOTE — PROGRESS NOTE ADULT - PROBLEM SELECTOR PLAN 2
Hx of poorly controlled IDDM with A1C 14.2 (2/6/24). Pt believes he sees an endocrinologist but isn't sure. Says he recently became more compliant with insulin regimen but has made his own changes iso seeing low glucose readings in the morning (mid-50s)  Home meds: Lantus 40, Humalog 15 TID - says he takes closer to 25 units of Lantus  - Endo consulted, appreciate recs  - mISS  - C/w lantus 15, lispro 7  - cont CC diet Hx of OM with prior amputation, px for foot infection. Follows with Dr. Brunner weekly. Was admitted here for management of OM 2/5-2/8 and dc'd on Augmentin and linezolid, was started on unknown abx last week. Referred to ED by Dr. Brunner for worsening infection.  Podiatry consulted, c/f deep SSTI vs OM, s/p partial 5th ray amp of R foot, uncomplicated procedure  Margins Margins with presumed methicillin sensitive staph and corynebacterium  - ID consulted and following. For now, c/w vanc and cefazolin; final vanc dose pending trough  - ID recs today to start daptomycin 700 mg daily with CK tomorrow and weekly; d/c vanc + cefazolin  - Podiatry consulted; pending MR right foot to assess progression of OM treatment

## 2024-04-29 NOTE — PROGRESS NOTE ADULT - ASSESSMENT
58M with PMH HfpEF, CAD, HTN, HLD, DM, OM, CKD3 presenting for foot infection, admitted for OM s/p partial 5th ray amputation of R foot found to have residual OM positive margins)

## 2024-04-29 NOTE — PROGRESS NOTE ADULT - ASSESSMENT
Plan:    -f/u MRI  -f/u Podiatry reccs  *INCOMPLETE* 58M PMHx CAD, CHF, HLD, CKD, DM, baseline bradycardia 40s-50s, right foot OM (currently on IV Vanco, Cefazolin via PICC since 3/27), presents with fever and chills x2 days.  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 Pt was put on 6 weeks IV abx for residual OM of Vanc and Cefazolin. Low concern for R foot SSTI and PICC line infection. Fevers most likely 2/2 to abx reaction.        Plan:    -f/u MRI  -f/u Podiatry reccs  -Start Dapto 800mg Q24  -DC cefepime and vanc  -CPK added on for AM labs    ID Team 1 following, Plan dw with attending       58M PMHx CAD, CHF, HLD, CKD, DM, baseline bradycardia 40s-50s, right foot OM (currently on IV Vanco, Cefazolin via PICC since 3/27), presents with fever and chills x2 days.  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 Pt was put on 6 weeks IV abx for residual OM of Vanc and Cefazolin. Low concern for R foot SSTI and PICC line infection. Fevers most likely 2/2 to abx reaction.        Plan:    -f/u MRI  -f/u Podiatry reccs  -Start Dapto 700mg Q24  -DC cefepime and vanc  -CPK added on for AM labs    ID Team 1 following, Plan dw with attending

## 2024-04-29 NOTE — PROGRESS NOTE ADULT - PROBLEM SELECTOR PLAN 4
Hx of HFpEF with last EF 71% 12/22. On exam has b/l 2+ pitting edema and complains of cough but denies SOB, orthopnea, ROBBINS. Says he started taking Torsemide today (was prescribed previously)  - C/w home meds  - Pt had stent placed 12/9/2022 iso SOB and CP. Symptoms have resolved since. Unclear why he is still on Plavix.   Home meds: ASA/Plavix, Atorvastatin 40  - C/w statin  - C/w DAPT Hx of HFpEF with last TTE 12/2022 with EF 65-77%.On exam has b/l 2+ pitting edema and complains of cough but denies SOB, orthopnea, ROBBINS. Says he started taking Torsemide last week for edema and he intermittently takes its when edematous.   - C/w Torsemide  - proBNP 1541; pending TTE for re-evaluation.

## 2024-04-29 NOTE — PROGRESS NOTE ADULT - SUBJECTIVE AND OBJECTIVE BOX
***Note in progress***    OVERNIGHT EVENTS: NAEO    SUBJECTIVE / INTERVAL HPI: Patient seen and examined at bedside. Patient denying chest pain, SOB, palpitations, cough. Patient denies fever, chills, HA, Dizziness, N/V, abdominal pain, diarrhea, constipation, hematochezia/melena, dysuria, hematuria, new onset weakness/numbness, LE pain and/or swelling. Remaining ROS negative     PHYSICAL EXAM:  General:NAD.   HEENT: NC/AT; PERRL, anicteric sclera; MMM  Neck: supple  Cardiovascular: +S1/S2, RRR  Respiratory: CTA B/L; no W/R/R  Gastrointestinal: soft, NT/ND; +BSx4  Extremities: WWP; no edema, clubbing or cyanosis  Vascular: 2+ radial, DP/PT pulses B/L  Neurological: AAOx3; no focal deficits  Psychiatric: pleasant mood and affect  Dermatologic: no appreciable wounds or damage to the skin    VITAL SIGNS:  Vital Signs Last 24 Hrs  T(C): 37.4 (29 Apr 2024 05:47), Max: 39.2 (28 Apr 2024 17:44)  T(F): 99.3 (29 Apr 2024 05:47), Max: 102.6 (28 Apr 2024 17:44)  HR: 64 (29 Apr 2024 05:47) (58 - 86)  BP: 172/72 (29 Apr 2024 05:47) (141/63 - 172/72)  BP(mean): 97 (28 Apr 2024 21:55) (97 - 97)  RR: 18 (29 Apr 2024 05:47) (16 - 18)  SpO2: 93% (29 Apr 2024 05:47) (93% - 98%)  Parameters below as of 29 Apr 2024 05:47  Patient On (Oxygen Delivery Method): room air    MEDICATIONS:  MEDICATIONS  (STANDING):  amLODIPine   Tablet 10 milliGRAM(s) Oral daily  aspirin  chewable 81 milliGRAM(s) Oral daily  atorvastatin 40 milliGRAM(s) Oral daily  cefepime   IVPB 2000 milliGRAM(s) IV Intermittent every 12 hours  citalopram 40 milliGRAM(s) Oral daily  clopidogrel Tablet 75 milliGRAM(s) Oral daily  dextrose 10% Bolus 125 milliLiter(s) IV Bolus once  dextrose 5%. 1000 milliLiter(s) (50 mL/Hr) IV Continuous <Continuous>  dextrose 5%. 1000 milliLiter(s) (100 mL/Hr) IV Continuous <Continuous>  dextrose 50% Injectable 12.5 Gram(s) IV Push once  dextrose 50% Injectable 25 Gram(s) IV Push once  enoxaparin Injectable 40 milliGRAM(s) SubCutaneous every 24 hours  glucagon  Injectable 1 milliGRAM(s) IntraMuscular once  insulin glargine Injectable (LANTUS) 15 Unit(s) SubCutaneous at bedtime  insulin lispro (ADMELOG) corrective regimen sliding scale   SubCutaneous three times a day before meals  insulin lispro Injectable (ADMELOG) 7 Unit(s) SubCutaneous three times a day before meals  losartan 100 milliGRAM(s) Oral daily  torsemide 20 milliGRAM(s) Oral daily  vancomycin  IVPB 1000 milliGRAM(s) IV Intermittent every 24 hours    MEDICATIONS  (PRN):  acetaminophen     Tablet .. 650 milliGRAM(s) Oral every 6 hours PRN Temp greater or equal to 38C (100.4F), Mild Pain (1 - 3)  aluminum hydroxide/magnesium hydroxide/simethicone Suspension 30 milliLiter(s) Oral every 4 hours PRN Dyspepsia  dextrose Oral Gel 15 Gram(s) Oral once PRN Blood Glucose LESS THAN 70 milliGRAM(s)/deciliter  melatonin 3 milliGRAM(s) Oral at bedtime PRN Insomnia  ondansetron Injectable 4 milliGRAM(s) IV Push every 8 hours PRN Nausea and/or Vomiting    ALLERGIES:  Allergies  No Known Allergies  Intolerances  carvedilol (Hypotension)    LABS:                        8.7    7.91  )-----------( 157      ( 29 Apr 2024 05:30 )             26.7     04-29    136  |  103  |  41<H>  ----------------------------<  195<H>  4.0   |  22  |  1.47<H>    Ca    8.0<L>      29 Apr 2024 05:30  Phos  3.4     04-29  Mg     1.5     04-29  TPro  5.9<L>  /  Alb  3.3  /  TBili  0.3  /  DBili  x   /  AST  100<H>  /  ALT  39  /  AlkPhos  113  04-29  PT/INR - ( 28 Apr 2024 17:45 )   PT: 12.8 sec;   INR: 1.13     PTT - ( 28 Apr 2024 17:45 )  PTT:27.6 sec  Urinalysis Basic - ( 29 Apr 2024 05:30 )  Color: x / Appearance: x / SG: x / pH: x  Gluc: 195 mg/dL / Ketone: x  / Bili: x / Urobili: x   Blood: x / Protein: x / Nitrite: x   Leuk Esterase: x / RBC: x / WBC x   Sq Epi: x / Non Sq Epi: x / Bacteria: x  CAPILLARY BLOOD GLUCOSE  RADIOLOGY & ADDITIONAL TESTS: Reviewed. OVERNIGHT EVENTS: NAEO    SUBJECTIVE / INTERVAL HPI: Patient seen and examined at bedside. Patient denying chest pain, SOB, palpitations. He denies any further fevers or chills. He endorses an intermittent dry cough, worse when he awakes. He confirmed a med rec and that he only for the last 2 months has been consistent with taking all the medications as he is tired of his ongoing right foot osteomyelitis. He confirms he uses  [address] and takes daily: Lokelma clopidogrel aspirin, atorvastatin, torseamide PRN for edema, citalopram, amlodipine, and losartan. Remaining ROS negative     PHYSICAL EXAM:  General: NAD.   HEENT: NC/AT; PERRL, anicteric sclera; MMM  Neck: supple  Cardiovascular: +S1/S2, RRR  Respiratory: RLL crackles, CT upper lobes bilaterally; no wheezing  Gastrointestinal: soft, NT/ND; +BSx4  Extremities: WWP; 2+ pitting edema bilateral to below knee; right erythema below OM of 4th/5th digit region; partial amputation of 4th right digit  Vascular: 2+ radial, DP/PT pulses B/L  Neurological: AAOx3; no focal deficits  Psychiatric: pleasant mood and affect  Dermatologic: no appreciable wounds or damage to the skin    VITAL SIGNS:  Vital Signs Last 24 Hrs  T(C): 37.4 (29 Apr 2024 05:47), Max: 39.2 (28 Apr 2024 17:44)  T(F): 99.3 (29 Apr 2024 05:47), Max: 102.6 (28 Apr 2024 17:44)  HR: 64 (29 Apr 2024 05:47) (58 - 86)  BP: 172/72 (29 Apr 2024 05:47) (141/63 - 172/72)  BP(mean): 97 (28 Apr 2024 21:55) (97 - 97)  RR: 18 (29 Apr 2024 05:47) (16 - 18)  SpO2: 93% (29 Apr 2024 05:47) (93% - 98%)  Parameters below as of 29 Apr 2024 05:47  Patient On (Oxygen Delivery Method): room air    MEDICATIONS:  MEDICATIONS  (STANDING):  amLODIPine   Tablet 10 milliGRAM(s) Oral daily  aspirin  chewable 81 milliGRAM(s) Oral daily  atorvastatin 40 milliGRAM(s) Oral daily  cefepime   IVPB 2000 milliGRAM(s) IV Intermittent every 12 hours  citalopram 40 milliGRAM(s) Oral daily  clopidogrel Tablet 75 milliGRAM(s) Oral daily  dextrose 10% Bolus 125 milliLiter(s) IV Bolus once  dextrose 5%. 1000 milliLiter(s) (50 mL/Hr) IV Continuous <Continuous>  dextrose 5%. 1000 milliLiter(s) (100 mL/Hr) IV Continuous <Continuous>  dextrose 50% Injectable 12.5 Gram(s) IV Push once  dextrose 50% Injectable 25 Gram(s) IV Push once  enoxaparin Injectable 40 milliGRAM(s) SubCutaneous every 24 hours  glucagon  Injectable 1 milliGRAM(s) IntraMuscular once  insulin glargine Injectable (LANTUS) 15 Unit(s) SubCutaneous at bedtime  insulin lispro (ADMELOG) corrective regimen sliding scale   SubCutaneous three times a day before meals  insulin lispro Injectable (ADMELOG) 7 Unit(s) SubCutaneous three times a day before meals  losartan 100 milliGRAM(s) Oral daily  torsemide 20 milliGRAM(s) Oral daily  vancomycin  IVPB 1000 milliGRAM(s) IV Intermittent every 24 hours    MEDICATIONS  (PRN):  acetaminophen     Tablet .. 650 milliGRAM(s) Oral every 6 hours PRN Temp greater or equal to 38C (100.4F), Mild Pain (1 - 3)  aluminum hydroxide/magnesium hydroxide/simethicone Suspension 30 milliLiter(s) Oral every 4 hours PRN Dyspepsia  dextrose Oral Gel 15 Gram(s) Oral once PRN Blood Glucose LESS THAN 70 milliGRAM(s)/deciliter  melatonin 3 milliGRAM(s) Oral at bedtime PRN Insomnia  ondansetron Injectable 4 milliGRAM(s) IV Push every 8 hours PRN Nausea and/or Vomiting    ALLERGIES:  Allergies  No Known Allergies  Intolerances  carvedilol (Hypotension)    LABS:                        8.7    7.91  )-----------( 157      ( 29 Apr 2024 05:30 )             26.7     04-29    136  |  103  |  41<H>  ----------------------------<  195<H>  4.0   |  22  |  1.47<H>    Ca    8.0<L>      29 Apr 2024 05:30  Phos  3.4     04-29  Mg     1.5     04-29  TPro  5.9<L>  /  Alb  3.3  /  TBili  0.3  /  DBili  x   /  AST  100<H>  /  ALT  39  /  AlkPhos  113  04-29  PT/INR - ( 28 Apr 2024 17:45 )   PT: 12.8 sec;   INR: 1.13     PTT - ( 28 Apr 2024 17:45 )  PTT:27.6 sec  Urinalysis Basic - ( 29 Apr 2024 05:30 )  Color: x / Appearance: x / SG: x / pH: x  Gluc: 195 mg/dL / Ketone: x  / Bili: x / Urobili: x   Blood: x / Protein: x / Nitrite: x   Leuk Esterase: x / RBC: x / WBC x   Sq Epi: x / Non Sq Epi: x / Bacteria: x  CAPILLARY BLOOD GLUCOSE  RADIOLOGY & ADDITIONAL TESTS: Reviewed.

## 2024-04-29 NOTE — PROGRESS NOTE ADULT - PROBLEM SELECTOR PLAN 7
Home meds: Atorvastatin 40  - C/w home meds F: Encourage PO  E: Replete as necessary K>4 Mg>2  N: CC diet    DVT Prophylaxis: Hep SubQ  GI prophylaxis: None   CODE STATUS: FULL Hg 10.5 down to 8.7, MCV 90. Normocytic anemia likely i/s/o AOCD due to ongoing OM; cannot rule out other etiologies. Denies GI bleed symptoms. No symptomatic anemia. Component of HF may be contributing.  -CTM, maintain active T&S, tranfuse Hg<7

## 2024-04-29 NOTE — PROGRESS NOTE ADULT - PROBLEM SELECTOR PLAN 3
Pt had stent placed 12/9/2022 iso SOB and CP. Symptoms have resolved since. Unclear why he is still on plavix.   Home meds: ASA/Plavix, Atorvastatin 40  - C/w statin  - C/w DAPT Hx of poorly controlled IDDM with A1C 14.2 (2/6/24). Pt believes he sees an endocrinologist but isn't sure. Says he recently became more compliant with insulin regimen but has made his own changes iso seeing low glucose readings in the morning (mid-50s)  Home meds: Lantus 40, Humalog 15 TID - says he takes closer to 25 units of Lantus  - Endo consulted, appreciate recs  - mISS  - C/w lantus 15, lispro 7  - Continue CC diet Follows with nephrologist [facility] Dr. Efrain Castillo. History of hyperkalemia and pseudo-hyperkalemia with past arrhythmias. Home med: lokelma  -CTM with BMPs, caution with repletion  -continue with home med lokelma

## 2024-04-29 NOTE — PROGRESS NOTE ADULT - SUBJECTIVE AND OBJECTIVE BOX
Patient is a 58y old  Male who presents with a chief complaint of fever (29 Apr 2024 11:16)      INTERVAL HPI/ OVERNIGHT EVENTS Patient seen and examined bedside with Attending. Denies pedal complaints at this time. Removed his dressing as he stated it was too tight.       LABS                        8.7    7.91  )-----------( 157      ( 29 Apr 2024 05:30 )             26.7     04-29    136  |  103  |  41<H>  ----------------------------<  195<H>  4.0   |  22  |  1.47<H>    Ca    8.0<L>      29 Apr 2024 05:30  Phos  3.4     04-29  Mg     1.5     04-29    TPro  5.9<L>  /  Alb  3.3  /  TBili  0.3  /  DBili  x   /  AST  100<H>  /  ALT  39  /  AlkPhos  113  04-29    PT/INR - ( 28 Apr 2024 17:45 )   PT: 12.8 sec;   INR: 1.13          PTT - ( 28 Apr 2024 17:45 )  PTT:27.6 sec  ESR: 28  CRP: --  04-28 @ 21:45    ICU Vital Signs Last 24 Hrs  T(C): 37.4 (29 Apr 2024 05:47), Max: 39.2 (28 Apr 2024 17:44)  T(F): 99.3 (29 Apr 2024 05:47), Max: 102.6 (28 Apr 2024 17:44)  HR: 64 (29 Apr 2024 05:47) (58 - 86)  BP: 172/72 (29 Apr 2024 05:47) (141/63 - 172/72)  BP(mean): 97 (28 Apr 2024 21:55) (97 - 97)  ABP: --  ABP(mean): --  RR: 18 (29 Apr 2024 05:47) (16 - 18)  SpO2: 93% (29 Apr 2024 05:47) (93% - 98%)    O2 Parameters below as of 29 Apr 2024 05:47  Patient On (Oxygen Delivery Method): room air        RADIOLOGY  < from: Xray Foot AP + Lateral + Oblique, Right (04.28.24 @ 23:43) >  Findings/  impression:Redemonstration status post partial fifth ray resection of   distal metatarsal shaft, postsurgical changes, soft tissue swelling. No   radiographic findings to suggest presence of osteomyelitis.   Redemonstration third middle and distal phalangeal resection and partial   proximal phalangeal resection. Redemonstration second toe PIP arthrodesis   screw/surrounding lucency in satisfactory position. Medial tibial soft   tissue calcification.  < end of copied text >      MICROBIOLOGY  Culture - Tissue with Gram Stain (03.27.24 @ 21:53)    Gram Stain:   No organisms seen  No WBC's seen.   -  Clindamycin: R 0.5 This isolate is presumed to be clindamycin resistant based on detection of inducible resistance. Clindamycin may still be effective in some patients.   -  Erythromycin: R >4   -  Linezolid: S 2   -  Oxacillin: S <=0.25 Oxacillin predicts susceptibility for dicloxacillin, methicillin, and nafcillin   -  Rifampin: S <=1 Should not be used as monotherapy   -  Tetracycline: S <=1   -  Trimethoprim/Sulfamethoxazole: S <=0.5/9.5   -  Vancomycin: S 2   Specimen Source: .Tissue right foot 5th metatarsal clean margin or spec   Culture Results:   Rare Staphylococcus aureus  Rare Corynebacterium amycolatum  Result called to and read back byKimberlee Olivarez RN  03/29/2024 14:28:11   Organism Identification: Staphylococcus aureus   Organism: Staphylococcus aureus   Method Type: PALMA        PHYSICAL EXAM  Right Lower Extremity Focused  Vasc: DP 1/4 PT 2/4. Tg: Warm to Warm.  mild erythema to dorsal foot, +1 edema   Derm:  No drainage noted, eschar over 5th ray surgical site. No open wound noted, (-)PTB, (-)Malodor, (-) fluctuance, (-) crepitus  Neuro: Protective sensation intact  MSK: Partial R 5th ray amputation

## 2024-04-29 NOTE — PROGRESS NOTE ADULT - PROBLEM SELECTOR PLAN 9
F: Encourage PO  E: Replete as necessary K>4 Mg>2  N: CC diet    DVT Prophylaxis: Hep SubQ  GI prophylaxis: None   CODE STATUS: FULL Home med: Citalopram 40  - C/w home meds

## 2024-04-29 NOTE — PROGRESS NOTE ADULT - PROBLEM SELECTOR PLAN 8
Home med: Citalopram 40  - C/w home meds Pt had stent placed 12/9/2022 iso SOB and CP. Symptoms have resolved since. Unclear why he is still on Plavix.   Home meds: ASA/Plavix, Atorvastatin 40  - C/w statin  - C/w DAPT

## 2024-04-29 NOTE — PATIENT PROFILE ADULT - FLU SEASON?
Recorded as Task  Date: 02/16/2017 05:02 PM, Created By: ЕЛЕНА KELLOGG  Task Name: Follow Up  Assigned To: Elisabeth Finn  Regarding Patient: TANNA WISE, Status: Active  Comment:   ЕЛЕНА KELLOGG - 16 Feb 2017 5:02 PM    TASK CREATED  schedule HSC/polypectomy  ЕЛЕНА KELLOGG - 28 Feb 2017 3:43 PM    TASK IN PROGRESS  ЕЛЕНА KELLOGG - 28 Feb 2017 10:24 PM    TASK REASSIGNED: Previously Assigned To ЕЛЕНА KELLOGG  Pls contact pt to scheduled HSC/D&C, possible polypectomy. Dx: postmenopausal bleeding.  Elisabeth Finn - 01 Mar 2017 9:48 AM    TASK EDITED  Elisabeth Finn - 07 Mar 2017 4:03 PM    TASK REASSIGNED: Previously Assigned To Elisabeth Finn  Detailed message left on vm regarding scheduling HSC/Polypectomy  Elisabeth Finn - 09 Mar 2017 8:45 AM    TASK EDITED  Elisabeth Finn - 15 Mar 2017 11:41 AM    TASK REASSIGNED: Previously Assigned To Elisabeth Finn  Another detailed message left on vm re: scheduling HSC, Polypec  Elisabeth Finn - 16 Mar 2017 9:02 AM    TASK EDITED  Elisabeth Finn - 22 Mar 2017 10:50 AM    TASK REASSIGNED: Previously Assigned To Elisabeth Finn  Letter mailed.  Elisabeth Finn - 22 Mar 2017 2:38 PM    TASK COMPLETED  Elisabeth Finn - 21 Jun 2017 4:32 PM    TASK REACTIVATED  Elisabeth Finn - 21 Jun 2017 4:35 PM    TASK REASSIGNED: Previously Assigned To Elisabeth Finn  Phone call from patient, she'd like to move forward and schedule sx, HSC/Polypectomy. I advised her that I would speak with Dr. Kellogg or she can make an appt to see her for repeat exam, last exam(EMB) in 2/2017, to discuss it again. Pt states she works at the [place] location, will try and schedule on a Monday. She does have an upcoming pre op exam with PCP on 7/15/17. Discussed possible sx dates of end of July/August.      Electronically signed by:Elisabeth Finn L.P.N.  Jun 21 2017  4:36PM CST     Yes...

## 2024-04-29 NOTE — PROGRESS NOTE ADULT - PROBLEM SELECTOR PLAN 1
Hx of OM with prior amputation, px for foot infection. Follows with Dr. Brunner weekly. Was admitted here for management of OM 2/5-2/8 and dc'd on augmentin and linezolid, was started on unknown abx last week. Referred to ED by Dr. Brunner for worsening infection.  Podiatry consulted, c/f deep SSTI vs OM, s/p partial 5th ray amp of R foot, uncomplicated procedure  Margins Margins with presumed methicillin sensitive staph and corynebacterium  - ID consulted, appreciate recs --> c/w vanc and cefazolin; final vanc dose pending trough  - Appreciate podiatry recs  - C/w Vanc 1000mg q24h, check trough before 4th dose (4/1 @ 6pm)  - c/w cefazolin 2g q8h  - PICC team to place PICC line 4/1 Presented with fever, chills x 2 days. Etiology likely  drug fever from IV antibiotics, vs. PICC line infection prior to arrival vs. worsening OM. Also likely, but less acute: hay fever, as patient endorses seasonal allergies and is currently not on medications. Less likely viral URI as RVP negative. CXR c/f right basilar opacity. Patient had normal inflammatory markers recently outpatient so elevation seen shows signs of active inflammation, so r/o OM worsening.  -Trend ESR/CRP  -Tylenol PRN for fevers; currently afebrile  -Rest of plan per below

## 2024-04-29 NOTE — PROGRESS NOTE ADULT - PROBLEM SELECTOR PLAN 5
Hx of CKD stage 3. Follows with Dr. Efrain Castillo. Previously prescribed lokelma but no longer taking. LCrt 1.31 (baseline 1.3-1.4).  - Renally dose meds  - Avoid nephrotoxic agents  - Monitor K/Crt    #Hyperkalemia Previously prescribed lokelma but no longer taking.  - lokelma 10g TID x1 day, repeat BMP prior to discharge Hx of HFpEF with last TTE 12/2022 with EF 65-77%.On exam has b/l 2+ pitting edema and complains of cough but denies SOB, orthopnea, ROBBINS. Says he started taking Torsemide last week for edema and he intermittently takes its when edematous.   - C/w Torsemide  - proBNP 1541; pending TTE for re-evaluation. Hx of poorly controlled IDDM with A1C 14.2 (2/6/24). Pt believes he sees an endocrinologist but isn't sure. Says he recently became more compliant with insulin regimen but has made his own changes iso seeing low glucose readings in the morning (mid-50s)  Home meds: Lantus 40, Humalog 15 TID - says he takes closer to 25 units of Lantus  - Endo consulted, appreciate recs  - mISS  - C/w lantus 15, lispro 7  - Continue CC diet

## 2024-04-29 NOTE — PATIENT PROFILE ADULT - FUNCTIONAL ASSESSMENT - BASIC MOBILITY SECTION LABEL
CANDIDO Ramesh LPN        Caller: Unspecified (Yesterday,  2:59 PM)                     Please have her follow as Dr. Damian De León instructed during their office visit on 1/10:   \"- Coreg to 12.5mg BID   - Diltiazem 180 mg daily   - Clonidine 0.1 mg QHS,     **keep her off Micardis\". I know that in the past we have had her take half Micardis or hold it AND she has been instructed to take PRN Clonidine for SBP >180, but this is getting a little bit out of hand with the amount of fluctuations in her regimen that she is doing. Its no wonder that her numbers are all over the place, there is no consistent medication administration. Please have her continue Dilt, Coreg and Clonidine as I have re-iterated from his note. I need to see what her BP does with a stable routine.  The only time that I want her to take anything more than what is prescribed (Micardis, Coreg, etc) is if her SBP is greater than 180, she can give an extra Clonidine, to prevent accelerated HTN and ER visit.       Her 8am or early morning BP's (before meds) are always going to be high. She should take the prescribed medications and try to go about her day.  I would suggest that she only then take her BP again before her evening medications. I think she is stressing over this and that only makes her BP increase.  She needs to try to relax, do some deep breathing, take a walk, etc.       We can regroup on Friday morning to review her BP trend again OR she can call prior if she has any questions or concerns. Patient notified. She voices understanding. .

## 2024-04-29 NOTE — PROGRESS NOTE ADULT - ASSESSMENT
58M PMH HFpEF, CAD w/ stent, HTN, HLD, DM, prior metatarsal OM, prior visits for NOLAN/hyerpakalemia, p/w concern for foot infection. S/p R partial 5th ray amputation on 3/27.  Podiatry was consulted to evaluate possible SSTI of the R foot. At the time of consult WBC 6.22, ESR 28, CRP 10 VSS. Based on clinical presentation, there is low suspicion for RLE SSTI at this time.     - Recommend MRI with contrast to evaluate for deeper abscess   - F/u ID reccs  - C/w abx per ID  - C/w monitoring of cardinal signs of infection  - Offloading/WB status: WBAT R foot  - Dressed with betadine, DSD, ACE   - Rest of care up to primary team    Podiatry following, Plan d/w with attending

## 2024-04-30 ENCOUNTER — RESULT REVIEW (OUTPATIENT)
Age: 59
End: 2024-04-30

## 2024-04-30 ENCOUNTER — TRANSCRIPTION ENCOUNTER (OUTPATIENT)
Age: 59
End: 2024-04-30

## 2024-04-30 LAB
A1C WITH ESTIMATED AVERAGE GLUCOSE RESULT: 8.3 % — HIGH (ref 4–5.6)
ADD ON TEST-SPECIMEN IN LAB: SIGNIFICANT CHANGE UP
ANION GAP SERPL CALC-SCNC: 12 MMOL/L — SIGNIFICANT CHANGE UP (ref 5–17)
BUN SERPL-MCNC: 36 MG/DL — HIGH (ref 7–23)
CALCIUM SERPL-MCNC: 8.7 MG/DL — SIGNIFICANT CHANGE UP (ref 8.4–10.5)
CHLORIDE SERPL-SCNC: 101 MMOL/L — SIGNIFICANT CHANGE UP (ref 96–108)
CK SERPL-CCNC: 140 U/L — SIGNIFICANT CHANGE UP (ref 30–200)
CO2 SERPL-SCNC: 23 MMOL/L — SIGNIFICANT CHANGE UP (ref 22–31)
CREAT SERPL-MCNC: 1.48 MG/DL — HIGH (ref 0.5–1.3)
CRP SERPL-MCNC: 51.6 MG/L — HIGH (ref 0–4)
EGFR: 55 ML/MIN/1.73M2 — LOW
ERYTHROCYTE [SEDIMENTATION RATE] IN BLOOD: 37 MM/HR — HIGH
ESTIMATED AVERAGE GLUCOSE: 192 MG/DL — HIGH (ref 68–114)
GLUCOSE BLDC GLUCOMTR-MCNC: 173 MG/DL — HIGH (ref 70–99)
GLUCOSE BLDC GLUCOMTR-MCNC: 181 MG/DL — HIGH (ref 70–99)
GLUCOSE BLDC GLUCOMTR-MCNC: 200 MG/DL — HIGH (ref 70–99)
GLUCOSE BLDC GLUCOMTR-MCNC: 275 MG/DL — HIGH (ref 70–99)
GLUCOSE SERPL-MCNC: 192 MG/DL — HIGH (ref 70–99)
HCT VFR BLD CALC: 27.9 % — LOW (ref 39–50)
HGB BLD-MCNC: 9.3 G/DL — LOW (ref 13–17)
MAGNESIUM SERPL-MCNC: 1.9 MG/DL — SIGNIFICANT CHANGE UP (ref 1.6–2.6)
MCHC RBC-ENTMCNC: 29.7 PG — SIGNIFICANT CHANGE UP (ref 27–34)
MCHC RBC-ENTMCNC: 33.3 GM/DL — SIGNIFICANT CHANGE UP (ref 32–36)
MCV RBC AUTO: 89.1 FL — SIGNIFICANT CHANGE UP (ref 80–100)
NRBC # BLD: 0 /100 WBCS — SIGNIFICANT CHANGE UP (ref 0–0)
PHOSPHATE SERPL-MCNC: 3.3 MG/DL — SIGNIFICANT CHANGE UP (ref 2.5–4.5)
PLATELET # BLD AUTO: 143 K/UL — LOW (ref 150–400)
POTASSIUM SERPL-MCNC: 3.7 MMOL/L — SIGNIFICANT CHANGE UP (ref 3.5–5.3)
POTASSIUM SERPL-SCNC: 3.7 MMOL/L — SIGNIFICANT CHANGE UP (ref 3.5–5.3)
RBC # BLD: 3.13 M/UL — LOW (ref 4.2–5.8)
RBC # FLD: 13.7 % — SIGNIFICANT CHANGE UP (ref 10.3–14.5)
SODIUM SERPL-SCNC: 136 MMOL/L — SIGNIFICANT CHANGE UP (ref 135–145)
VANCOMYCIN FLD-MCNC: 8.6 UG/ML — SIGNIFICANT CHANGE UP
WBC # BLD: 5.58 K/UL — SIGNIFICANT CHANGE UP (ref 3.8–10.5)
WBC # FLD AUTO: 5.58 K/UL — SIGNIFICANT CHANGE UP (ref 3.8–10.5)

## 2024-04-30 PROCEDURE — 99233 SBSQ HOSP IP/OBS HIGH 50: CPT | Mod: GC

## 2024-04-30 PROCEDURE — 93306 TTE W/DOPPLER COMPLETE: CPT | Mod: 26

## 2024-04-30 PROCEDURE — 99232 SBSQ HOSP IP/OBS MODERATE 35: CPT

## 2024-04-30 RX ORDER — ACETAMINOPHEN 500 MG
1000 TABLET ORAL ONCE
Refills: 0 | Status: COMPLETED | OUTPATIENT
Start: 2024-04-30 | End: 2024-04-30

## 2024-04-30 RX ORDER — INSULIN LISPRO 100/ML
8 VIAL (ML) SUBCUTANEOUS
Refills: 0 | Status: DISCONTINUED | OUTPATIENT
Start: 2024-04-30 | End: 2024-05-01

## 2024-04-30 RX ORDER — INSULIN GLARGINE 100 [IU]/ML
19 INJECTION, SOLUTION SUBCUTANEOUS AT BEDTIME
Refills: 0 | Status: DISCONTINUED | OUTPATIENT
Start: 2024-04-30 | End: 2024-05-01

## 2024-04-30 RX ADMIN — Medication 650 MILLIGRAM(S): at 13:03

## 2024-04-30 RX ADMIN — Medication 7 UNIT(S): at 13:11

## 2024-04-30 RX ADMIN — Medication 650 MILLIGRAM(S): at 23:19

## 2024-04-30 RX ADMIN — Medication 650 MILLIGRAM(S): at 14:08

## 2024-04-30 RX ADMIN — Medication 100 MILLIGRAM(S): at 22:36

## 2024-04-30 RX ADMIN — ENOXAPARIN SODIUM 40 MILLIGRAM(S): 100 INJECTION SUBCUTANEOUS at 06:15

## 2024-04-30 RX ADMIN — Medication 7 UNIT(S): at 09:12

## 2024-04-30 RX ADMIN — Medication 2: at 18:19

## 2024-04-30 RX ADMIN — Medication 6: at 13:11

## 2024-04-30 RX ADMIN — CITALOPRAM 40 MILLIGRAM(S): 10 TABLET, FILM COATED ORAL at 12:51

## 2024-04-30 RX ADMIN — CLOPIDOGREL BISULFATE 75 MILLIGRAM(S): 75 TABLET, FILM COATED ORAL at 12:51

## 2024-04-30 RX ADMIN — Medication 100 MILLIGRAM(S): at 13:03

## 2024-04-30 RX ADMIN — DAPTOMYCIN 128 MILLIGRAM(S): 500 INJECTION, POWDER, LYOPHILIZED, FOR SOLUTION INTRAVENOUS at 10:07

## 2024-04-30 RX ADMIN — Medication 81 MILLIGRAM(S): at 12:51

## 2024-04-30 RX ADMIN — ATORVASTATIN CALCIUM 40 MILLIGRAM(S): 80 TABLET, FILM COATED ORAL at 12:51

## 2024-04-30 RX ADMIN — INSULIN GLARGINE 19 UNIT(S): 100 INJECTION, SOLUTION SUBCUTANEOUS at 22:35

## 2024-04-30 RX ADMIN — LOSARTAN POTASSIUM 100 MILLIGRAM(S): 100 TABLET, FILM COATED ORAL at 06:15

## 2024-04-30 RX ADMIN — Medication 2: at 09:13

## 2024-04-30 RX ADMIN — Medication 8 UNIT(S): at 18:19

## 2024-04-30 RX ADMIN — Medication 1000 MILLIGRAM(S): at 18:50

## 2024-04-30 RX ADMIN — Medication 20 MILLIGRAM(S): at 06:16

## 2024-04-30 RX ADMIN — AMLODIPINE BESYLATE 10 MILLIGRAM(S): 2.5 TABLET ORAL at 06:15

## 2024-04-30 RX ADMIN — Medication 400 MILLIGRAM(S): at 17:57

## 2024-04-30 NOTE — DISCHARGE NOTE NURSING/CASE MANAGEMENT/SOCIAL WORK - NSDCPEFALRISK_GEN_ALL_CORE
For information on Fall & Injury Prevention, visit: [email or web address] OR  [email or web address] OR  https://www.cdc.gov/steadi/patient.html

## 2024-04-30 NOTE — DISCHARGE NOTE PROVIDER - ATTENDING DISCHARGE PHYSICAL EXAMINATION:
General: NAD.   HEENT: NC/AT; PERRL, anicteric sclera; MMM  Neck: supple  Cardiovascular: +S1/S2, RRR  Respiratory: RLL crackles, CT upper lobes bilaterally; no wheezing  Gastrointestinal: soft, NT/ND; +BSx4  Extremities: WWP; 2+ pitting edema bilateral to below knee; right erythema below OM of 4th/5th digit region; partial amputation of 4th right digit  Vascular: 2+ radial, DP/PT pulses B/L  Neurological: AAOx3; no focal deficits  Psychiatric: pleasant mood and affect  Dermatologic: no appreciable wounds or damage to the skin

## 2024-04-30 NOTE — DISCHARGE NOTE PROVIDER - NSDCMRMEDTOKEN_GEN_ALL_CORE_FT
amLODIPine 10 mg oral tablet: 1 tab(s) orally once a day  Aspirin Enteric Coated 81 mg oral delayed release tablet: 1 tab(s) orally once a day   ceFAZolin 2 g intravenous injection: 2 gram(s) intravenous every 8 hours  citalopram 40 mg oral tablet: 1 tab(s) orally once a day  clopidogrel 75 mg oral tablet: 1 tab(s) orally once a day   HumaLOG 100 units/mL injectable solution: 7 unit(s) injectable 3 times a day (with meals)  insulin glargine 100 units/mL subcutaneous solution: 15 unit(s) subcutaneous once a day (at bedtime)  Lipitor 40 mg oral tablet: 1 tab(s) orally once a day  Lokelma 10 g oral powder for reconstitution: 10 gram(s) orally once a day  olmesartan 40 mg oral tablet: 1 tab(s) orally once a day  torsemide 40 mg oral tablet: 0.5 tab(s) orally once a day  vancomycin 1 g intravenous injection: 1 gram(s) intravenous every 24 hours   amLODIPine 10 mg oral tablet: 1 tab(s) orally once a day  Aspirin Enteric Coated 81 mg oral delayed release tablet: 1 tab(s) orally once a day   cefadroxil 500 mg oral capsule: 1 cap(s) orally every 12 hours  citalopram 40 mg oral tablet: 1 tab(s) orally once a day  clopidogrel 75 mg oral tablet: 1 tab(s) orally once a day   doxycycline hyclate 100 mg oral tablet: 1 tab(s) orally every 12 hours  HumaLOG 100 units/mL injectable solution: 7 unit(s) injectable 3 times a day (with meals)  insulin glargine 100 units/mL subcutaneous solution: 15 unit(s) subcutaneous once a day (at bedtime)  Lipitor 40 mg oral tablet: 1 tab(s) orally once a day  Lokelma 10 g oral powder for reconstitution: 10 gram(s) orally once a day  olmesartan 40 mg oral tablet: 1 tab(s) orally once a day  torsemide 40 mg oral tablet: 0.5 tab(s) orally once a day

## 2024-04-30 NOTE — PROGRESS NOTE ADULT - SUBJECTIVE AND OBJECTIVE BOX
Patient is a 58y old  Male who presents with a chief complaint of fever (30 Apr 2024 06:55)      INTERVAL HPI/ OVERNIGHT EVENTS: Pt seen and examined bedside with Attending. Patient with no pedal complaints this morning.       LABS                        8.7    7.91  )-----------( 157      ( 29 Apr 2024 05:30 )             26.7     04-29    136  |  103  |  41<H>  ----------------------------<  195<H>  4.0   |  22  |  1.47<H>    Ca    8.0<L>      29 Apr 2024 05:30  Phos  3.4     04-29  Mg     1.5     04-29    TPro  5.9<L>  /  Alb  3.3  /  TBili  0.3  /  DBili  x   /  AST  100<H>  /  ALT  39  /  AlkPhos  113  04-29    PT/INR - ( 28 Apr 2024 17:45 )   PT: 12.8 sec;   INR: 1.13          PTT - ( 28 Apr 2024 17:45 )  PTT:27.6 sec    ICU Vital Signs Last 24 Hrs  T(C): 37.3 (30 Apr 2024 05:35), Max: 37.3 (30 Apr 2024 05:35)  T(F): 99.1 (30 Apr 2024 05:35), Max: 99.1 (30 Apr 2024 05:35)  HR: 65 (30 Apr 2024 05:35) (65 - 69)  BP: 174/73 (30 Apr 2024 05:35) (161/69 - 174/73)  BP(mean): --  ABP: --  ABP(mean): --  RR: 17 (30 Apr 2024 05:35) (17 - 18)  SpO2: 94% (30 Apr 2024 05:35) (94% - 94%)    O2 Parameters below as of 30 Apr 2024 05:35  Patient On (Oxygen Delivery Method): room air            RADIOLOGY  < from: MR Foot w/wo IV Cont, Right (04.29.24 @ 20:49) >  IMPRESSION:  1.  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  2.  Focal cortical defect involves the lateral cortex of the 4th proximal   phalanx diaphysis with focal marrow edema. This may be postsurgical or   infectious in the appropriate setting.  3.  Mild subchondral edema involves the 2nd through 4th   metatarsophalangeal joints that is presumably reactive in the absence of   T1 marrow replacement to suggest osteomyelitis.  < end of copied text >    MICROBIOLOGY  Culture - Tissue with Gram Stain (03.27.24 @ 21:53)    Gram Stain:   No organisms seen  No WBC's seen.   -  Clindamycin: R 0.5 This isolate is presumed to be clindamycin resistant based on detection of inducible resistance. Clindamycin may still be effective in some patients.   -  Erythromycin: R >4   -  Linezolid: S 2   -  Oxacillin: S <=0.25 Oxacillin predicts susceptibility for dicloxacillin, methicillin, and nafcillin   -  Rifampin: S <=1 Should not be used as monotherapy   -  Tetracycline: S <=1   -  Trimethoprim/Sulfamethoxazole: S <=0.5/9.5   -  Vancomycin: S 2   Specimen Source: .Tissue right foot 5th metatarsal clean margin or spec   Culture Results:   Rare Staphylococcus aureus  Rare Corynebacterium amycolatum  Result called to and read back byKimberlee Olivarez RN  03/29/2024 14:28:11   Organism Identification: Staphylococcus aureus   Organism: Staphylococcus aureus   Method Type: PALMA      PHYSICAL EXAM  Lower Extremity Focused  Vasc: DP 1/4 PT 2/4. Tg: Warm to Warm.  mild erythema to dorsal foot, (+1) edema   Derm:  No drainage noted, eschar over 5th ray surgical site. No open wound noted, (-)PTB, (-)Malodor, (-) fluctuance, (-) crepitus  Neuro: Protective sensation intact  MSK: Partial R 5th ray amputation

## 2024-04-30 NOTE — PROGRESS NOTE ADULT - SUBJECTIVE AND OBJECTIVE BOX
OVERNIGHT EVENTS: NAEO    SUBJECTIVE / INTERVAL HPI: Patient seen and examined at bedside. Patient denying chest pain, SOB, palpitations, cough. Patient denies fever, chills, HA, Dizziness, N/V, abdominal pain, diarrhea, constipation, hematochezia/melena, dysuria, hematuria, new onset weakness/numbness, LE pain and/or swelling. Remaining ROS negative     PHYSICAL EXAM:  General: NAD.   HEENT: NC/AT; PERRL, anicteric sclera; MMM  Neck: supple  Cardiovascular: +S1/S2, RRR  Respiratory: RLL crackles, CT upper lobes bilaterally; no wheezing  Gastrointestinal: soft, NT/ND; +BSx4  Extremities: WWP; 2+ pitting edema bilateral to below knee; right erythema below OM of 4th/5th digits  Vascular: 2+ radial, DP/PT pulses B/L  Neurological: AAOx3; no focal deficits  Psychiatric: pleasant mood and affect  Dermatologic: no appreciable wounds or damage to the skin    VITAL SIGNS:  Vital Signs Last 24 Hrs  T(C): 37.3 (30 Apr 2024 05:35), Max: 37.3 (30 Apr 2024 05:35)  T(F): 99.1 (30 Apr 2024 05:35), Max: 99.1 (30 Apr 2024 05:35)  HR: 65 (30 Apr 2024 05:35) (65 - 69)  BP: 174/73 (30 Apr 2024 05:35) (161/69 - 174/73)  BP(mean): --  RR: 17 (30 Apr 2024 05:35) (17 - 18)  SpO2: 94% (30 Apr 2024 05:35) (94% - 94%)  Parameters below as of 30 Apr 2024 05:35  Patient On (Oxygen Delivery Method): room air    MEDICATIONS:  MEDICATIONS  (STANDING):  amLODIPine   Tablet 10 milliGRAM(s) Oral daily  aspirin  chewable 81 milliGRAM(s) Oral daily  atorvastatin 40 milliGRAM(s) Oral daily  citalopram 40 milliGRAM(s) Oral daily  clopidogrel Tablet 75 milliGRAM(s) Oral daily  DAPTOmycin IVPB 700 milliGRAM(s) IV Intermittent every 24 hours  dextrose 10% Bolus 125 milliLiter(s) IV Bolus once  dextrose 5%. 1000 milliLiter(s) (50 mL/Hr) IV Continuous <Continuous>  dextrose 5%. 1000 milliLiter(s) (100 mL/Hr) IV Continuous <Continuous>  dextrose 50% Injectable 12.5 Gram(s) IV Push once  dextrose 50% Injectable 25 Gram(s) IV Push once  enoxaparin Injectable 40 milliGRAM(s) SubCutaneous every 24 hours  glucagon  Injectable 1 milliGRAM(s) IntraMuscular once  influenza   Vaccine 0.5 milliLiter(s) IntraMuscular once  insulin glargine Injectable (LANTUS) 15 Unit(s) SubCutaneous at bedtime  insulin lispro (ADMELOG) corrective regimen sliding scale   SubCutaneous three times a day before meals  insulin lispro Injectable (ADMELOG) 7 Unit(s) SubCutaneous three times a day before meals  losartan 100 milliGRAM(s) Oral every 24 hours  torsemide 20 milliGRAM(s) Oral daily    MEDICATIONS  (PRN):  acetaminophen     Tablet .. 650 milliGRAM(s) Oral every 6 hours PRN Temp greater or equal to 38C (100.4F), Mild Pain (1 - 3)  aluminum hydroxide/magnesium hydroxide/simethicone Suspension 30 milliLiter(s) Oral every 4 hours PRN Dyspepsia  dextrose Oral Gel 15 Gram(s) Oral once PRN Blood Glucose LESS THAN 70 milliGRAM(s)/deciliter  melatonin 3 milliGRAM(s) Oral at bedtime PRN Insomnia  ondansetron Injectable 4 milliGRAM(s) IV Push every 8 hours PRN Nausea and/or Vomiting    ALLERGIES:  Allergies  No Known Allergies  Intolerances  carvedilol (Hypotension)    LABS:                        8.7    7.91  )-----------( 157      ( 29 Apr 2024 05:30 )             26.7     04-29    136  |  103  |  41<H>  ----------------------------<  195<H>  4.0   |  22  |  1.47<H>    Ca    8.0<L>      29 Apr 2024 05:30  Phos  3.4     04-29  Mg     1.5     04-29  TPro  5.9<L>  /  Alb  3.3  /  TBili  0.3  /  DBili  x   /  AST  100<H>  /  ALT  39  /  AlkPhos  113  04-29  PT/INR - ( 28 Apr 2024 17:45 )   PT: 12.8 sec;   INR: 1.13     PTT - ( 28 Apr 2024 17:45 )  PTT:27.6 sec  Urinalysis Basic - ( 29 Apr 2024 05:30 )  Color: x / Appearance: x / SG: x / pH: x  Gluc: 195 mg/dL / Ketone: x  / Bili: x / Urobili: x   Blood: x / Protein: x / Nitrite: x   Leuk Esterase: x / RBC: x / WBC x   Sq Epi: x / Non Sq Epi: x / Bacteria: x  CAPILLARY BLOOD GLUCOSE  POCT Blood Glucose.: 245 mg/dL (29 Apr 2024 22:05)  RADIOLOGY & ADDITIONAL TESTS: Reviewed.    MR Right Foot 4/29/24:  1.  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  2.  Focal cortical defect involves the lateral cortex of the 4th proximal   phalanx diaphysis with focal marrow edema. This may be postsurgical or   infectious in the appropriate setting.  3.  Mild subchondral edema involves the 2nd through 4th   metatarsophalangeal joints that is presumably reactive in the absence of   T1 marrow replacement to suggest osteomyelitis.    XR Right Foot 4/28/24:  Redemonstration status post partial fifth ray resection of   distal metatarsal shaft, postsurgical changes, soft tissue swelling. No   radiographic findings to suggest presence of osteomyelitis.   Redemonstration third middle and distal phalangeal resection and partial   proximal phalangeal resection. Redemonstration second toe PIP arthrodesis   screw/surrounding lucency in satisfactory position. Medial tibial soft   tissue calcification.    CXR 4/28/24:  Right basilar opacity/pleural effusion..: Interim right   subclavian PICC line tip at the cavoatrial junction. Heart,, mediastinum   and thorax are unremarkable..         OVERNIGHT EVENTS: NAEO    SUBJECTIVE / INTERVAL HPI: Patient seen and examined at bedside. Patient denying chest pain, SOB, palpitations, but endorses ongoing dry intermittent cough. Patient denies fever, chills, HA, Dizziness, new onset weakness/numbness, LE pain and/or swelling. Remaining ROS negative.    PHYSICAL EXAM:  General: NAD.   HEENT: NC/AT; PERRL, anicteric sclera; MMM  Neck: supple  Cardiovascular: +S1/S2, RRR  Respiratory: RLL crackles, CT upper lobes bilaterally; no wheezing  Gastrointestinal: soft, NT/ND; +BSx4  Extremities: WWP; 2+ pitting edema bilateral to below knee; right erythema below OM of 4th/5th digits  Vascular: 2+ radial, DP/PT pulses B/L  Neurological: AAOx3; no focal deficits  Psychiatric: pleasant mood and affect  Dermatologic: no appreciable wounds or damage to the skin    VITAL SIGNS:  Vital Signs Last 24 Hrs  T(C): 37.3 (30 Apr 2024 05:35), Max: 37.3 (30 Apr 2024 05:35)  T(F): 99.1 (30 Apr 2024 05:35), Max: 99.1 (30 Apr 2024 05:35)  HR: 65 (30 Apr 2024 05:35) (65 - 69)  BP: 174/73 (30 Apr 2024 05:35) (161/69 - 174/73)  BP(mean): --  RR: 17 (30 Apr 2024 05:35) (17 - 18)  SpO2: 94% (30 Apr 2024 05:35) (94% - 94%)  Parameters below as of 30 Apr 2024 05:35  Patient On (Oxygen Delivery Method): room air    MEDICATIONS:  MEDICATIONS  (STANDING):  amLODIPine   Tablet 10 milliGRAM(s) Oral daily  aspirin  chewable 81 milliGRAM(s) Oral daily  atorvastatin 40 milliGRAM(s) Oral daily  citalopram 40 milliGRAM(s) Oral daily  clopidogrel Tablet 75 milliGRAM(s) Oral daily  DAPTOmycin IVPB 700 milliGRAM(s) IV Intermittent every 24 hours  dextrose 10% Bolus 125 milliLiter(s) IV Bolus once  dextrose 5%. 1000 milliLiter(s) (50 mL/Hr) IV Continuous <Continuous>  dextrose 5%. 1000 milliLiter(s) (100 mL/Hr) IV Continuous <Continuous>  dextrose 50% Injectable 12.5 Gram(s) IV Push once  dextrose 50% Injectable 25 Gram(s) IV Push once  enoxaparin Injectable 40 milliGRAM(s) SubCutaneous every 24 hours  glucagon  Injectable 1 milliGRAM(s) IntraMuscular once  influenza   Vaccine 0.5 milliLiter(s) IntraMuscular once  insulin glargine Injectable (LANTUS) 15 Unit(s) SubCutaneous at bedtime  insulin lispro (ADMELOG) corrective regimen sliding scale   SubCutaneous three times a day before meals  insulin lispro Injectable (ADMELOG) 7 Unit(s) SubCutaneous three times a day before meals  losartan 100 milliGRAM(s) Oral every 24 hours  torsemide 20 milliGRAM(s) Oral daily    MEDICATIONS  (PRN):  acetaminophen     Tablet .. 650 milliGRAM(s) Oral every 6 hours PRN Temp greater or equal to 38C (100.4F), Mild Pain (1 - 3)  aluminum hydroxide/magnesium hydroxide/simethicone Suspension 30 milliLiter(s) Oral every 4 hours PRN Dyspepsia  dextrose Oral Gel 15 Gram(s) Oral once PRN Blood Glucose LESS THAN 70 milliGRAM(s)/deciliter  melatonin 3 milliGRAM(s) Oral at bedtime PRN Insomnia  ondansetron Injectable 4 milliGRAM(s) IV Push every 8 hours PRN Nausea and/or Vomiting    ALLERGIES:  Allergies  No Known Allergies  Intolerances  carvedilol (Hypotension)    LABS:                        8.7    7.91  )-----------( 157      ( 29 Apr 2024 05:30 )             26.7     04-29    136  |  103  |  41<H>  ----------------------------<  195<H>  4.0   |  22  |  1.47<H>    Ca    8.0<L>      29 Apr 2024 05:30  Phos  3.4     04-29  Mg     1.5     04-29  TPro  5.9<L>  /  Alb  3.3  /  TBili  0.3  /  DBili  x   /  AST  100<H>  /  ALT  39  /  AlkPhos  113  04-29  PT/INR - ( 28 Apr 2024 17:45 )   PT: 12.8 sec;   INR: 1.13     PTT - ( 28 Apr 2024 17:45 )  PTT:27.6 sec  Urinalysis Basic - ( 29 Apr 2024 05:30 )  Color: x / Appearance: x / SG: x / pH: x  Gluc: 195 mg/dL / Ketone: x  / Bili: x / Urobili: x   Blood: x / Protein: x / Nitrite: x   Leuk Esterase: x / RBC: x / WBC x   Sq Epi: x / Non Sq Epi: x / Bacteria: x  CAPILLARY BLOOD GLUCOSE  POCT Blood Glucose.: 245 mg/dL (29 Apr 2024 22:05)  RADIOLOGY & ADDITIONAL TESTS: Reviewed.    MR Right Foot 4/29/24:  1.  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  2.  Focal cortical defect involves the lateral cortex of the 4th proximal   phalanx diaphysis with focal marrow edema. This may be postsurgical or   infectious in the appropriate setting.  3.  Mild subchondral edema involves the 2nd through 4th   metatarsophalangeal joints that is presumably reactive in the absence of   T1 marrow replacement to suggest osteomyelitis.    XR Right Foot 4/28/24:  Redemonstration status post partial fifth ray resection of   distal metatarsal shaft, postsurgical changes, soft tissue swelling. No   radiographic findings to suggest presence of osteomyelitis.   Redemonstration third middle and distal phalangeal resection and partial   proximal phalangeal resection. Redemonstration second toe PIP arthrodesis   screw/surrounding lucency in satisfactory position. Medial tibial soft   tissue calcification.    CXR 4/28/24:  Right basilar opacity/pleural effusion. Interim right subclavian PICC line tip at the cavoatrial junction. Heart, mediastinum and thorax are unremarkable. OVERNIGHT EVENTS: NAEO    SUBJECTIVE / INTERVAL HPI: Patient seen and examined at bedside. Patient denying chest pain, SOB, palpitations, but endorses ongoing dry intermittent cough. Patient denies fever, chills, HA, Dizziness, new onset weakness/numbness, LE pain and/or swelling. Remaining ROS negative.    PHYSICAL EXAM:  General: NAD.   HEENT: NC/AT; PERRL, anicteric sclera; MMM  Neck: supple  Cardiovascular: +S1/S2, RRR  Respiratory: RLL crackles, CT upper lobes bilaterally; no wheezing  Gastrointestinal: soft, NT/ND; +BSx4  Extremities: WWP; 2+ pitting edema bilateral to below knee; right erythema below OM of 4th/5th digits  Vascular: 2+ radial, DP/PT pulses B/L  Neurological: AAOx3; no focal deficits  Psychiatric: pleasant mood and affect  Dermatologic: no appreciable wounds or damage to the skin    VITAL SIGNS:  Vital Signs Last 24 Hrs  T(C): 37.3 (30 Apr 2024 05:35), Max: 37.3 (30 Apr 2024 05:35)  T(F): 99.1 (30 Apr 2024 05:35), Max: 99.1 (30 Apr 2024 05:35)  HR: 65 (30 Apr 2024 05:35) (65 - 69)  BP: 174/73 (30 Apr 2024 05:35) (161/69 - 174/73)  BP(mean): --  RR: 17 (30 Apr 2024 05:35) (17 - 18)  SpO2: 94% (30 Apr 2024 05:35) (94% - 94%)  Parameters below as of 30 Apr 2024 05:35  Patient On (Oxygen Delivery Method): room air    MEDICATIONS:  MEDICATIONS  (STANDING):  amLODIPine   Tablet 10 milliGRAM(s) Oral daily  aspirin  chewable 81 milliGRAM(s) Oral daily  atorvastatin 40 milliGRAM(s) Oral daily  citalopram 40 milliGRAM(s) Oral daily  clopidogrel Tablet 75 milliGRAM(s) Oral daily  DAPTOmycin IVPB 700 milliGRAM(s) IV Intermittent every 24 hours  dextrose 10% Bolus 125 milliLiter(s) IV Bolus once  dextrose 5%. 1000 milliLiter(s) (50 mL/Hr) IV Continuous <Continuous>  dextrose 5%. 1000 milliLiter(s) (100 mL/Hr) IV Continuous <Continuous>  dextrose 50% Injectable 12.5 Gram(s) IV Push once  dextrose 50% Injectable 25 Gram(s) IV Push once  enoxaparin Injectable 40 milliGRAM(s) SubCutaneous every 24 hours  glucagon  Injectable 1 milliGRAM(s) IntraMuscular once  influenza   Vaccine 0.5 milliLiter(s) IntraMuscular once  insulin glargine Injectable (LANTUS) 15 Unit(s) SubCutaneous at bedtime  insulin lispro (ADMELOG) corrective regimen sliding scale   SubCutaneous three times a day before meals  insulin lispro Injectable (ADMELOG) 7 Unit(s) SubCutaneous three times a day before meals  losartan 100 milliGRAM(s) Oral every 24 hours  torsemide 20 milliGRAM(s) Oral daily    MEDICATIONS  (PRN):  acetaminophen     Tablet .. 650 milliGRAM(s) Oral every 6 hours PRN Temp greater or equal to 38C (100.4F), Mild Pain (1 - 3)  aluminum hydroxide/magnesium hydroxide/simethicone Suspension 30 milliLiter(s) Oral every 4 hours PRN Dyspepsia  dextrose Oral Gel 15 Gram(s) Oral once PRN Blood Glucose LESS THAN 70 milliGRAM(s)/deciliter  melatonin 3 milliGRAM(s) Oral at bedtime PRN Insomnia  ondansetron Injectable 4 milliGRAM(s) IV Push every 8 hours PRN Nausea and/or Vomiting    ALLERGIES:  Allergies  No Known Allergies  Intolerances  carvedilol (Hypotension)    LABS:                        8.7    7.91  )-----------( 157      ( 29 Apr 2024 05:30 )             26.7     04-29    136  |  103  |  41<H>  ----------------------------<  195<H>  4.0   |  22  |  1.47<H>    Ca    8.0<L>      29 Apr 2024 05:30  Phos  3.4     04-29  Mg     1.5     04-29  TPro  5.9<L>  /  Alb  3.3  /  TBili  0.3  /  DBili  x   /  AST  100<H>  /  ALT  39  /  AlkPhos  113  04-29  PT/INR - ( 28 Apr 2024 17:45 )   PT: 12.8 sec;   INR: 1.13     PTT - ( 28 Apr 2024 17:45 )  PTT:27.6 sec  Urinalysis Basic - ( 29 Apr 2024 05:30 )  Color: x / Appearance: x / SG: x / pH: x  Gluc: 195 mg/dL / Ketone: x  / Bili: x / Urobili: x   Blood: x / Protein: x / Nitrite: x   Leuk Esterase: x / RBC: x / WBC x   Sq Epi: x / Non Sq Epi: x / Bacteria: x  CAPILLARY BLOOD GLUCOSE  POCT Blood Glucose.: 245 mg/dL (29 Apr 2024 22:05)  RADIOLOGY & ADDITIONAL TESTS: Reviewed.    TTE 4/30/24:  The left ventricle is normal in size, wall thickness, and systolic   function with a calculated ejection fraction of 60-65%.   1. Normal left ventricular size and systolic function.   2. Normal right ventricular size and systolic function.   3. Mildly dilated left atrium.   4. Aortic sclerosis without significant stenosis.   5. Mild mitral regurgitation.   6. Pulmonary artery systolic pressure is 35 mmHg.   7. No pericardial effusion.   8. Compared to the previous TTE performed on 12/5/2022, there have been   no significant interval changes.    MR Right Foot 4/29/24:  1.  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  2.  Focal cortical defect involves the lateral cortex of the 4th proximal   phalanx diaphysis with focal marrow edema. This may be postsurgical or   infectious in the appropriate setting.  3.  Mild subchondral edema involves the 2nd through 4th   metatarsophalangeal joints that is presumably reactive in the absence of   T1 marrow replacement to suggest osteomyelitis.    XR Right Foot 4/28/24:  Redemonstration status post partial fifth ray resection of   distal metatarsal shaft, postsurgical changes, soft tissue swelling. No   radiographic findings to suggest presence of osteomyelitis.   Redemonstration third middle and distal phalangeal resection and partial   proximal phalangeal resection. Redemonstration second toe PIP arthrodesis   screw/surrounding lucency in satisfactory position. Medial tibial soft   tissue calcification.    CXR 4/28/24:  Right basilar opacity/pleural effusion. Interim right subclavian PICC line tip at the cavoatrial junction. Heart, mediastinum and thorax are unremarkable.

## 2024-04-30 NOTE — DISCHARGE NOTE NURSING/CASE MANAGEMENT/SOCIAL WORK - PATIENT PORTAL LINK FT
You can access the FollowMyHealth Patient Portal offered by [facility] by registering at the following website: [email or web address]. By joining [facility]’s FollowMyHealth portal, you will also be able to view your health information using other applications (apps) compatible with our system.

## 2024-04-30 NOTE — PROGRESS NOTE ADULT - PROBLEM SELECTOR PLAN 3
Follows with nephrologist [facility] Dr. Efrain Castillo. History of hyperkalemia and pseudo-hyperkalemia with past arrhythmias. Home med: lokelma  -CTM with BMPs, caution with repletion  -continue with home med lokelma

## 2024-04-30 NOTE — DISCHARGE NOTE PROVIDER - HOSPITAL COURSE
58M with PMH HfpEF, CAD, HTN, HLD, DM, OM, CKD3 presenting for foot infection, admitted for OM s/p partial 5th ray amputation of R foot found to have residual OM positive margins)    #Fever  Presented with fever, chills x 2 days. Etiology likely  drug fever from IV antibiotics, vs. PICC line infection prior to arrival vs. worsening OM. Also likely, but less acute: hay fever, as patient endorses seasonal allergies and is currently not on medications. Less likely viral URI as RVP negative. CXR c/f right basilar opacity. Patient had normal inflammatory markers recently outpatient so elevation seen shows signs of active inflammation, so r/o OM worsening.  -Trend ESR/CRP  -Tylenol PRN for fevers; currently afebrile  -Rest of plan per below.    #Osteomyelitis  Hx of OM with prior amputation, px for foot infection. Follows with Dr. Brunner weekly. Was admitted here for management of OM 2/5-2/8 and dc'd on Augmentin and linezolid, was started on unknown abx last week. Referred to ED by Dr. Brunner for worsening infection.  Podiatry consulted, c/f deep SSTI vs OM, s/p partial 5th ray amp of R foot, uncomplicated procedure  Margins with presumed methicillin sensitive staph and corynebacterium  - Discharge patient with Daptomycin 700mg q24h  - Duration of antibiotics is until May 10th  - Weekly labs: CMP, CBC, ESR, CRP, CPK faxed to ID office at [phone]  - Patient to follow up with Dr. Gallardo in 2 weeks ([address] [place] [address], [phone]), ID office will call patient to schedule.    #History of hyperkalemia  Follows with nephrologist [facility] Dr. Efrain Castillo. History of hyperkalemia and pseudo-hyperkalemia with past arrhythmias. Continued home med lokelma.    #Chronic diastolic congestive heart failure  Hx of HFpEF with last TTE 12/2022 with EF 65-77%.On exam has bilateral 2+ pitting edema and complains of cough but denies SOB, orthopnea, ROBBINS. He intermittently takes Torsemide when edematous and has been for the last week. This admission, high pro-BNP was elevated at 1541with TTE showing EF 60-65%.    #Diabetes mellitus  Hx of poorly controlled IDDM with A1C 14.2 (2/6/24) now 8.3. Pt believes he sees an endocrinologist but isn't sure. Says he recently became more compliant with insulin regimen but has made his own changes iso seeing low glucose readings in the morning (mid-50s)  Home meds: Lantus 40, Humalog 15 TID - says he takes closer to 25 units of Lantus  - Endo consulted, appreciate recs  - mISS  - C/w lantus 15, lispro 7  - Continue CC diet.    #Stage 3 chronic kidney disease  Creatinine up from 1.35 to 1.48 Baseline Creatinine seems to be 1.3-1.4 per past admissions. Follows with nephrologist [facility] Dr. Efrain Castillo.   -Urine lytes  -Avoid nephrotoxic agents.    #Normocytic anemia  Hg 10.5 down to 8.7, MCV 90. Normocytic anemia likely i/s/o AOCD due to ongoing OM; cannot rule out other etiologies. Denies GI bleed symptoms. No symptomatic anemia. Component of HF may be contributing.  -CTM, maintain active T&S, tranfuse Hg<7.    #Coronary artery disease  Pt had stent placed 12/9/2022 iso SOB and CP. Symptoms have resolved since. Unclear why he is still on Plavix.   Home meds: ASA/Plavix, Atorvastatin 40  - C/w statin  - C/w DAPT.    #Anxiety  Continued home med Citalopram 40    Patient was discharged to: home  New medications: n/a  Changes to old medications: n/a  Medications that were stopped: n/a  Items to follow up as outpatient: PCP    Physical exam at the time of discharge:  General: NAD.   HEENT: NC/AT; PERRL, anicteric sclera; MMM  Neck: supple  Cardiovascular: +S1/S2, RRR  Respiratory: RLL crackles, CT upper lobes bilaterally; no wheezing  Gastrointestinal: soft, NT/ND; +BSx4  Extremities: WWP; 2+ pitting edema bilateral to below knee; right erythema below OM of 4th/5th digit region; partial amputation of 4th right digit  Vascular: 2+ radial, DP/PT pulses B/L  Neurological: AAOx3; no focal deficits  Psychiatric: pleasant mood and affect  Dermatologic: no appreciable wounds or damage to the skin 58M with PMH HfpEF, CAD, HTN, HLD, DM, OM, CKD3 presenting for foot infection, admitted for OM s/p partial 5th ray amputation of R foot found to have residual OM positive margins)    #Fever  Presented with fever, chills x 2 days. Etiology likely  drug fever from IV antibiotics, vs. PICC line infection prior to arrival vs. worsening OM. Also likely, but less acute: hay fever, as patient endorses seasonal allergies and is currently not on medications. Less likely viral URI as RVP negative. CXR c/f right basilar opacity. Patient had normal inflammatory markers recently outpatient so elevation seen shows signs of active inflammation, so r/o OM worsening.  -Trend ESR/CRP  -Tylenol PRN for fevers; currently afebrile  -Rest of plan per below.    #Osteomyelitis  Hx of OM with prior amputation, px for foot infection. Follows with Dr. Brunner weekly. Was admitted here for management of OM 2/5-2/8 and dc'd on Augmentin and linezolid, was started on unknown abx last week. Referred to ED by Dr. Brunner for worsening infection.  Podiatry consulted, c/f deep SSTI vs OM, s/p partial 5th ray amp of R foot, uncomplicated procedure  Margins with presumed methicillin sensitive staph and corynebacterium  - Discharge patient with Daptomycin 700mg q24h  - Duration of antibiotics is until May 10th  - Weekly labs: CMP, CBC, ESR, CRP, CPK faxed to ID office at [phone]  - Patient to follow up with Dr. Gallardo in 2 weeks ([address] [place] [address], [phone]), ID office will call patient to schedule.    #History of hyperkalemia  Follows with nephrologist [facility] Dr. Efrain Castillo. History of hyperkalemia and pseudo-hyperkalemia with past arrhythmias. Continued home med Lokelma.    #Chronic diastolic congestive heart failure  Hx of HFpEF with last TTE 12/2022 with EF 65-77%.On exam has bilateral 2+ pitting edema and complains of cough but denies SOB, orthopnea, ROBBINS. He intermittently takes Torsemide when edematous and has been for the last week. This admission, high pro-BNP was elevated at 1541 with TTE showing EF 60-65%.    #Diabetes mellitus  Hx of poorly controlled IDDM with A1C 14.2 (2/6/24) now 8.3. Pt believes he sees an endocrinologist but isn't sure. Says he recently became more compliant with insulin regimen but has made his own changes iso seeing low glucose readings in the morning (mid-50s)  Home meds: Lantus 40, Humalog 15 TID - says he takes closer to 25 units of Lantus  - Endo consulted, appreciate recs  - mISS  - C/w lantus 15, lispro 7  - Continue CC diet.    #Stage 3 chronic kidney disease  Creatinine up from 1.35 to 1.48 Baseline Creatinine seems to be 1.3-1.4 per past admissions. Follows with nephrologist [facility] Dr. Efrain Castillo.     #Normocytic anemia  Hg 10.5 down to 8.7, MCV 90. Normocytic anemia likely i/s/o AOCD due to ongoing OM; cannot rule out other etiologies. Denies GI bleed symptoms. No symptomatic anemia. Component of HF may be contributing.    #Coronary artery disease  Pt had stent placed 12/9/2022 i/s/o SOB and CP. Symptoms have resolved since. Continued home meds ASA/Plavix, Atorvastatin 40    #Anxiety  Continued home med Citalopram 40    Patient was discharged to: home  New medications: n/a  Changes to old medications: n/a  Medications that were stopped: n/a  Items to follow up as outpatient: PCP    Physical exam at the time of discharge:  General: NAD.   HEENT: NC/AT; PERRL, anicteric sclera; MMM  Neck: supple  Cardiovascular: +S1/S2, RRR  Respiratory: RLL crackles, CT upper lobes bilaterally; no wheezing  Gastrointestinal: soft, NT/ND; +BSx4  Extremities: WWP; 2+ pitting edema bilateral to below knee; right erythema below OM of 4th/5th digit region; partial amputation of 4th right digit  Vascular: 2+ radial, DP/PT pulses B/L  Neurological: AAOx3; no focal deficits  Psychiatric: pleasant mood and affect  Dermatologic: no appreciable wounds or damage to the skin 58M with PMH HfpEF, CAD, HTN, HLD, DM, OM, CKD3 presenting for foot infection, admitted for OM s/p partial 5th ray amputation of R foot found to have residual OM positive margins)    #Fever  Presented with fever, chills x 2 days. Etiology likely  drug fever from IV vancomycin, vs. PICC line infection prior to arrival vs. worsening OM. Also likely, but less acute: hay fever, as patient endorses seasonal allergies and is currently not on medications. Less likely viral URI as RVP negative. CXR c/f right basilar opacity. Patient had normal inflammatory markers recently outpatient so elevation seen shows signs of active inflammation, so r/o OM worsening.  -Trend ESR/CRP  -Tylenol PRN for fevers; currently afebrile  -Rest of plan per below.    #Osteomyelitis  Hx of OM with prior amputation, px for foot infection. Follows with Dr. Brunner weekly. Was admitted here for management of OM 2/5-2/8 and dc'd on Augmentin and linezolid, was started on IV vancomycin last week. Referred to ED by Dr. Brunner for worsening infection.  Podiatry consulted, c/f deep SSTI vs OM, s/p partial 5th ray amp of R foot, uncomplicated procedure  Margins with presumed methicillin sensitive staph and corynebacterium  Vancomycin discontinued and switched to IV daptomycin 700mg q24h while inpatient. PICC line removed as thought to contribute to fevers.   - Discharge patient wit  Doxycycline 100 mg PO q12 + Cefadroxil 500 mg PO q12 (ends 5/10/24)  - Duration of antibiotics is until May 10th  - Weekly labs: CMP, CBC, ESR, CRP, CPK faxed to ID office at [phone]  - Patient to follow up with Dr. Gallardo in 2 weeks ([address] [place] [address], [phone]), ID office will call patient to schedule.    #History of hyperkalemia  Follows with nephrologist [facility] Dr. Efrain Castillo. History of hyperkalemia and pseudo-hyperkalemia with past arrhythmias. Continued home med Lokelma.    #Chronic diastolic congestive heart failure  Hx of HFpEF with last TTE 12/2022 with EF 65-77%.On exam has bilateral 2+ pitting edema and complains of cough but denies SOB, orthopnea, ROBBINS. He intermittently takes Torsemide when edematous and has been for the last week. This admission, high pro-BNP was elevated at 1541 with TTE showing EF 60-65%.    #Diabetes mellitus  Hx of poorly controlled IDDM with A1C 14.2 (2/6/24) now 8.3. Pt believes he sees an endocrinologist but isn't sure. Says he recently became more compliant with insulin regimen but has made his own changes iso seeing low glucose readings in the morning (mid-50s)  Home meds: Lantus 40, Humalog 15 TID - says he takes closer to 25 units of Lantus  - Endo consulted, appreciate recs  - mISS  - C/w lantus 15, lispro 7  - Continue CC diet.    #Stage 3 chronic kidney disease  Creatinine up from 1.35 to 1.83 Baseline Creatinine seems to be 1.3-1.4 per past admissions. Follows with nephrologist [facility] Dr. Efrain Castillo. Received LR bolus without improvement in creatinine. Ordered urinalysis and urine studies.   - f/u UA and urinalysis     #Normocytic anemia  Hg 10.5 down to 8.7, MCV 90. Normocytic anemia likely i/s/o AOCD due to ongoing OM; cannot rule out other etiologies. Denies GI bleed symptoms. No symptomatic anemia. Component of HF may be contributing.    #Coronary artery disease  Pt had stent placed 12/9/2022 i/s/o SOB and CP. Symptoms have resolved since. Continued home meds ASA/Plavix, Atorvastatin 40    #Anxiety  Continued home med Citalopram 40    Patient was discharged to: home  New medications: n/a  Changes to old medications: n/a  Medications that were stopped: n/a  Items to follow up as outpatient: PCP    Physical exam at the time of discharge:  General: NAD.   HEENT: NC/AT; PERRL, anicteric sclera; MMM  Neck: supple  Cardiovascular: +S1/S2, RRR  Respiratory: RLL crackles, CT upper lobes bilaterally; no wheezing  Gastrointestinal: soft, NT/ND; +BSx4  Extremities: WWP; 2+ pitting edema bilateral to below knee; right erythema below OM of 4th/5th digit region; partial amputation of 4th right digit  Vascular: 2+ radial, DP/PT pulses B/L  Neurological: AAOx3; no focal deficits  Psychiatric: pleasant mood and affect  Dermatologic: no appreciable wounds or damage to the skin The patient wishes to leave against medical advice.  I have discussed the risks, benefits and alternatives (including the possibility of worsening of disease, pain, permanent disability, and/or death) with the patient and his/her family (if available).  The patient voices understanding of these risks, benefits, and alternatives and still wishes to sign out against medical advice.  The patient is awake, alert, oriented  x 3 and has demonstrated capacity to refuse/direct care.  I have advised the patient that they can and should return immediately should they develop any worse/different/additional symptoms, or if they change their mind and want to continue their care.    58M with PMH HfpEF, CAD, HTN, HLD, DM, OM, CKD3 presenting for foot infection, admitted for OM s/p partial 5th ray amputation of R foot found to have residual OM positive margins)    #Fever  Presented with fever, chills x 2 days. Etiology likely  drug fever from IV vancomycin, vs. PICC line infection prior to arrival vs. worsening OM. Also likely, but less acute: hay fever, as patient endorses seasonal allergies and is currently not on medications. Less likely viral URI as RVP negative. CXR c/f right basilar opacity. Patient had normal inflammatory markers recently outpatient so elevation seen shows signs of active inflammation, so r/o OM worsening.  -Trend ESR/CRP  -Tylenol PRN for fevers; currently afebrile  -Rest of plan per below.    #Osteomyelitis  Hx of OM with prior amputation, px for foot infection. Follows with Dr. Brunner weekly. Was admitted here for management of OM 2/5-2/8 and dc'd on Augmentin and linezolid, was started on IV vancomycin last week. Referred to ED by Dr. Brunner for worsening infection.  Podiatry consulted, c/f deep SSTI vs OM, s/p partial 5th ray amp of R foot, uncomplicated procedure  Margins with presumed methicillin sensitive staph and corynebacterium  Vancomycin discontinued and switched to IV daptomycin 700mg q24h while inpatient. PICC line removed as thought to contribute to fevers.   - Discharge patient wit  Doxycycline 100 mg PO q12 + Cefadroxil 500 mg PO q12 (ends 5/10/24)  - Duration of antibiotics is until May 10th  - Weekly labs: CMP, CBC, ESR, CRP, CPK faxed to ID office at [phone]  - Patient to follow up with Dr. Gallardo in 2 weeks ([address] [place] [address] 4F, [phone]), ID office will call patient to schedule.    #History of hyperkalemia  Follows with nephrologist [facility] Dr. Efrain Castillo. History of hyperkalemia and pseudo-hyperkalemia with past arrhythmias. Continued home med Lokelma.    #Chronic diastolic congestive heart failure  Hx of HFpEF with last TTE 12/2022 with EF 65-77%.On exam has bilateral 2+ pitting edema and complains of cough but denies SOB, orthopnea, ROBBINS. He intermittently takes Torsemide when edematous and has been for the last week. This admission, high pro-BNP was elevated at 1541 with TTE showing EF 60-65%.    #Diabetes mellitus  Hx of poorly controlled IDDM with A1C 14.2 (2/6/24) now 8.3. Pt believes he sees an endocrinologist but isn't sure. Says he recently became more compliant with insulin regimen but has made his own changes iso seeing low glucose readings in the morning (mid-50s)  Home meds: Lantus 40, Humalog 15 TID - says he takes closer to 25 units of Lantus  - Endo consulted, appreciate recs  - mISS  - C/w lantus 19, lispro 8  - Continue CC diet.    #Stage 3 chronic kidney disease  Creatinine up from 1.35 to 1.83 Baseline Creatinine seems to be 1.3-1.4 per past admissions. Follows with nephrologist [facility] Dr. Efrain Castillo. Received LR bolus without improvement in creatinine. Ordered urinalysis and urine studies.   - f/u UA and urinalysis     #Normocytic anemia  Hg 10.5 down to 8.7, MCV 90. Normocytic anemia likely i/s/o AOCD due to ongoing OM; cannot rule out other etiologies. Denies GI bleed symptoms. No symptomatic anemia. Component of HF may be contributing.    #Coronary artery disease  Pt had stent placed 12/9/2022 i/s/o SOB and CP. Symptoms have resolved since. Continued home meds ASA/Plavix, Atorvastatin 40    #Anxiety  Continued home med Citalopram 40    Patient was discharged to: home  New medications: n/a  Changes to old medications: n/a  Medications that were stopped: n/a  Items to follow up as outpatient: PCP    Physical exam at the time of discharge:  General: NAD.   HEENT: NC/AT; PERRL, anicteric sclera; MMM  Neck: supple  Cardiovascular: +S1/S2, RRR  Respiratory: RLL crackles, CT upper lobes bilaterally; no wheezing  Gastrointestinal: soft, NT/ND; +BSx4  Extremities: WWP; 2+ pitting edema bilateral to below knee; right erythema below OM of 4th/5th digit region; partial amputation of 4th right digit  Vascular: 2+ radial, DP/PT pulses B/L  Neurological: AAOx3; no focal deficits  Psychiatric: pleasant mood and affect  Dermatologic: no appreciable wounds or damage to the skin

## 2024-04-30 NOTE — PROGRESS NOTE ADULT - ASSESSMENT
58M PMH HFpEF, CAD w/ stent, HTN, HLD, DM, prior metatarsal OM, prior visits for NOLAN/hyerpakalemia, p/w concern for foot infection. S/p R partial 5th ray amputation on 3/27.  Podiatry was consulted to evaluate possible SSTI of the R foot. At the time of consult WBC 6.22, ESR 28, CRP 10 VSS. Based on clinical presentation, there is low suspicion for RLE SSTI at this time. MRI performed and revealed no abscess, concern for OM. Low clinical suspicion for OM as pt has been on long-term IV abx, with decreasing inflammatory markers and clinical improvement    - MRI reviewed and d/w pt   - F/u ID reccs  - C/w abx per ID  - C/w monitoring of cardinal signs of infection  - Offloading/WB status: WBAT R foot  - Dressed with betadine, DSD, ACE   - Rest of care up to primary team    Podiatry following, Plan d/w with attending 58M PMH HFpEF, CAD w/ stent, HTN, HLD, DM, prior metatarsal OM, prior visits for NOLAN/hyerpakalemia, p/w concern for foot infection. S/p R partial 5th ray amputation on 3/27.  Podiatry was consulted to evaluate possible SSTI of the R foot. At the time of consult WBC 6.22, ESR 28, CRP 10 VSS. Based on clinical presentation, there is low suspicion for RLE SSTI at this time. MRI performed and revealed no abscess, concern for OM. Low clinical suspicion for OM as pt has been on long-term IV abx, with decreasing inflammatory markers and clinical improvement    - MRI reviewed and d/w pt   - abx per ID  - Offloading/WB status: WBAT R foot  - Dressed with betadine, DSD, ACE   - Rest of care per primary team  - Pt should follow up with his outpatient podiatrist Dr. Chakraborty within 1 week of discharge    Discharge dressing instructions: Cleanse wound with normal sailine daily, pat dry with sterile guaze, apply  betadine soaked gauze to wound base, cover with dry sterile gauze, ABD pad, wrap with Kerlix and light ACE bandage.     Podiatry following, Plan d/w with attending

## 2024-04-30 NOTE — PROGRESS NOTE ADULT - PROBLEM SELECTOR PLAN 2
Hx of OM with prior amputation, px for foot infection. Follows with Dr. Brunner weekly. Was admitted here for management of OM 2/5-2/8 and dc'd on Augmentin and linezolid, was started on unknown abx last week. Referred to ED by Dr. Brunner for worsening infection.  Podiatry consulted, c/f deep SSTI vs OM, s/p partial 5th ray amp of R foot, uncomplicated procedure  Margins Margins with presumed methicillin sensitive staph and corynebacterium  - ID consulted and following. For now, c/w vanc and cefazolin; final vanc dose pending trough  - ID recs today to start daptomycin 700 mg daily with CK tomorrow and weekly; d/c vanc + cefazolin  - Podiatry consulted; pending MR right foot to assess progression of OM treatment Hx of OM with prior amputation, px for foot infection. Follows with Dr. Brunner weekly. Was admitted here for management of OM 2/5-2/8 and dc'd on Augmentin and linezolid, was started on unknown abx last week. Referred to ED by Dr. Brunner for worsening infection.  Podiatry consulted, c/f deep SSTI vs OM, s/p partial 5th ray amp of R foot, uncomplicated procedure  Margins with presumed methicillin sensitive staph and corynebacterium    - Discharge patient with Daptomycin 700mg q24h  - Duration of antibiotics is until May 10th  - Weekly labs: CMP, CBC, ESR, CRP, CPK faxed to ID office at [phone]  - Patient to follow up with Dr. Gallardo in 2 weeks ([address] [place] [address], [phone]), ID office will call patient to schedule

## 2024-04-30 NOTE — PROGRESS NOTE ADULT - PROBLEM SELECTOR PLAN 1
Presented with fever, chills x 2 days. Etiology likely  drug fever from IV antibiotics, vs. PICC line infection prior to arrival vs. worsening OM. Also likely, but less acute: hay fever, as patient endorses seasonal allergies and is currently not on medications. Less likely viral URI as RVP negative. CXR c/f right basilar opacity. Patient had normal inflammatory markers recently outpatient so elevation seen shows signs of active inflammation, so r/o OM worsening.  -Trend ESR/CRP  -Tylenol PRN for fevers; currently afebrile  -Rest of plan per below

## 2024-04-30 NOTE — PROGRESS NOTE ADULT - PROBLEM SELECTOR PLAN 7
Hg 10.5 down to 8.7, MCV 90. Normocytic anemia likely i/s/o AOCD due to ongoing OM; cannot rule out other etiologies. Denies GI bleed symptoms. No symptomatic anemia. Component of HF may be contributing.  -CTM, maintain active T&S, tranfuse Hg<7

## 2024-04-30 NOTE — PROGRESS NOTE ADULT - PROBLEM SELECTOR PLAN 4
Hx of HFpEF with last TTE 12/2022 with EF 65-77%.On exam has b/l 2+ pitting edema and complains of cough but denies SOB, orthopnea, ROBBINS. Says he started taking Torsemide last week for edema and he intermittently takes its when edematous.   - C/w Torsemide  - proBNP 1541; pending TTE for re-evaluation. Hx of HFpEF with last TTE 12/2022 with EF 65-77%.On exam has b/l 2+ pitting edema and complains of cough but denies SOB, orthopnea, ROBBINS. Says he started taking Torsemide last week for edema and he intermittently takes its when edematous.   - C/w Torsemide  - proBNP 1541; TTE shows EF 60-65%

## 2024-04-30 NOTE — PROGRESS NOTE ADULT - SUBJECTIVE AND OBJECTIVE BOX
INFECTIOUS DISEASES CONSULT FOLLOW-UP NOTE    INTERVAL HPI/OVERNIGHT EVENTS:        ROS:   Constitutional, eyes, ENT, cardiovascular, respiratory, gastrointestinal, genitourinary, integumentary, neurological, psychiatric and heme/lymph are otherwise negative other than noted above       ANTIBIOTICS/RELEVANT:    MEDICATIONS  (STANDING):  amLODIPine   Tablet 10 milliGRAM(s) Oral daily  aspirin  chewable 81 milliGRAM(s) Oral daily  atorvastatin 40 milliGRAM(s) Oral daily  citalopram 40 milliGRAM(s) Oral daily  clopidogrel Tablet 75 milliGRAM(s) Oral daily  DAPTOmycin IVPB 700 milliGRAM(s) IV Intermittent every 24 hours  dextrose 10% Bolus 125 milliLiter(s) IV Bolus once  dextrose 5%. 1000 milliLiter(s) (50 mL/Hr) IV Continuous <Continuous>  dextrose 5%. 1000 milliLiter(s) (100 mL/Hr) IV Continuous <Continuous>  dextrose 50% Injectable 12.5 Gram(s) IV Push once  dextrose 50% Injectable 25 Gram(s) IV Push once  enoxaparin Injectable 40 milliGRAM(s) SubCutaneous every 24 hours  glucagon  Injectable 1 milliGRAM(s) IntraMuscular once  influenza   Vaccine 0.5 milliLiter(s) IntraMuscular once  insulin glargine Injectable (LANTUS) 15 Unit(s) SubCutaneous at bedtime  insulin lispro (ADMELOG) corrective regimen sliding scale   SubCutaneous three times a day before meals  insulin lispro Injectable (ADMELOG) 7 Unit(s) SubCutaneous three times a day before meals  losartan 100 milliGRAM(s) Oral every 24 hours  torsemide 20 milliGRAM(s) Oral daily    MEDICATIONS  (PRN):  acetaminophen     Tablet .. 650 milliGRAM(s) Oral every 6 hours PRN Temp greater or equal to 38C (100.4F), Mild Pain (1 - 3)  aluminum hydroxide/magnesium hydroxide/simethicone Suspension 30 milliLiter(s) Oral every 4 hours PRN Dyspepsia  dextrose Oral Gel 15 Gram(s) Oral once PRN Blood Glucose LESS THAN 70 milliGRAM(s)/deciliter  melatonin 3 milliGRAM(s) Oral at bedtime PRN Insomnia  ondansetron Injectable 4 milliGRAM(s) IV Push every 8 hours PRN Nausea and/or Vomiting        Vital Signs Last 24 Hrs  T(C): 37.3 (30 Apr 2024 05:35), Max: 37.3 (30 Apr 2024 05:35)  T(F): 99.1 (30 Apr 2024 05:35), Max: 99.1 (30 Apr 2024 05:35)  HR: 65 (30 Apr 2024 05:35) (65 - 69)  BP: 174/73 (30 Apr 2024 05:35) (161/69 - 174/73)  BP(mean): --  RR: 17 (30 Apr 2024 05:35) (17 - 18)  SpO2: 94% (30 Apr 2024 05:35) (94% - 94%)    Parameters below as of 30 Apr 2024 05:35  Patient On (Oxygen Delivery Method): room air        04-29-24 @ 07:01  -  04-30-24 @ 07:00  --------------------------------------------------------  IN: 0 mL / OUT: 700 mL / NET: -700 mL      PHYSICAL EXAM:  Constitutional: alert, NAD  Eyes: the sclera and conjunctiva were normal.   ENT: the ears and nose were normal in appearance.   Neck: the appearance of the neck was normal and the neck was supple.   Pulmonary: no respiratory distress and lungs were clear to auscultation bilaterally.   Heart: heart rate was normal and rhythm regular, normal S1 and S2  Vascular:. there was no peripheral edema  Abdomen: normal bowel sounds, soft, non-tender  Neurological: no focal deficits.   Psychiatric: the affect was normal        LABS:                        9.3    5.58  )-----------( 143      ( 30 Apr 2024 08:31 )             27.9     04-29    136  |  103  |  41<H>  ----------------------------<  195<H>  4.0   |  22  |  1.47<H>    Ca    8.0<L>      29 Apr 2024 05:30  Phos  3.4     04-29  Mg     1.5     04-29    TPro  5.9<L>  /  Alb  3.3  /  TBili  0.3  /  DBili  x   /  AST  100<H>  /  ALT  39  /  AlkPhos  113  04-29    PT/INR - ( 28 Apr 2024 17:45 )   PT: 12.8 sec;   INR: 1.13          PTT - ( 28 Apr 2024 17:45 )  PTT:27.6 sec  Urinalysis Basic - ( 29 Apr 2024 05:30 )    Color: x / Appearance: x / SG: x / pH: x  Gluc: 195 mg/dL / Ketone: x  / Bili: x / Urobili: x   Blood: x / Protein: x / Nitrite: x   Leuk Esterase: x / RBC: x / WBC x   Sq Epi: x / Non Sq Epi: x / Bacteria: x        MICROBIOLOGY:      RADIOLOGY & ADDITIONAL STUDIES:      < from: MR Foot w/wo IV Cont, Right (04.29.24 @ 20:49) >  IMPRESSION:  1.  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  2.  Focal cortical defect involves the lateral cortex of the 4th proximal   phalanx diaphysis with focal marrow edema. This may be postsurgical or   infectious in the appropriate setting.  3.  Mild subchondral edema involves the 2nd through 4th   metatarsophalangeal joints that is presumably reactive in the absence of   T1 marrow replacement to suggest osteomyelitis.    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        ROS:   Constitutional, eyes, ENT, cardiovascular, respiratory, gastrointestinal, genitourinary, integumentary, neurological, psychiatric and heme/lymph are otherwise negative other than noted above       ANTIBIOTICS/RELEVANT:    MEDICATIONS  (STANDING):  amLODIPine   Tablet 10 milliGRAM(s) Oral daily  aspirin  chewable 81 milliGRAM(s) Oral daily  atorvastatin 40 milliGRAM(s) Oral daily  citalopram 40 milliGRAM(s) Oral daily  clopidogrel Tablet 75 milliGRAM(s) Oral daily  DAPTOmycin IVPB 700 milliGRAM(s) IV Intermittent every 24 hours  dextrose 10% Bolus 125 milliLiter(s) IV Bolus once  dextrose 5%. 1000 milliLiter(s) (50 mL/Hr) IV Continuous <Continuous>  dextrose 5%. 1000 milliLiter(s) (100 mL/Hr) IV Continuous <Continuous>  dextrose 50% Injectable 12.5 Gram(s) IV Push once  dextrose 50% Injectable 25 Gram(s) IV Push once  enoxaparin Injectable 40 milliGRAM(s) SubCutaneous every 24 hours  glucagon  Injectable 1 milliGRAM(s) IntraMuscular once  influenza   Vaccine 0.5 milliLiter(s) IntraMuscular once  insulin glargine Injectable (LANTUS) 15 Unit(s) SubCutaneous at bedtime  insulin lispro (ADMELOG) corrective regimen sliding scale   SubCutaneous three times a day before meals  insulin lispro Injectable (ADMELOG) 7 Unit(s) SubCutaneous three times a day before meals  losartan 100 milliGRAM(s) Oral every 24 hours  torsemide 20 milliGRAM(s) Oral daily    MEDICATIONS  (PRN):  acetaminophen     Tablet .. 650 milliGRAM(s) Oral every 6 hours PRN Temp greater or equal to 38C (100.4F), Mild Pain (1 - 3)  aluminum hydroxide/magnesium hydroxide/simethicone Suspension 30 milliLiter(s) Oral every 4 hours PRN Dyspepsia  dextrose Oral Gel 15 Gram(s) Oral once PRN Blood Glucose LESS THAN 70 milliGRAM(s)/deciliter  melatonin 3 milliGRAM(s) Oral at bedtime PRN Insomnia  ondansetron Injectable 4 milliGRAM(s) IV Push every 8 hours PRN Nausea and/or Vomiting        Vital Signs Last 24 Hrs  T(C): 37.3 (30 Apr 2024 05:35), Max: 37.3 (30 Apr 2024 05:35)  T(F): 99.1 (30 Apr 2024 05:35), Max: 99.1 (30 Apr 2024 05:35)  HR: 65 (30 Apr 2024 05:35) (65 - 69)  BP: 174/73 (30 Apr 2024 05:35) (161/69 - 174/73)  BP(mean): --  RR: 17 (30 Apr 2024 05:35) (17 - 18)  SpO2: 94% (30 Apr 2024 05:35) (94% - 94%)    Parameters below as of 30 Apr 2024 05:35  Patient On (Oxygen Delivery Method): room air        04-29-24 @ 07:01  -  04-30-24 @ 07:00  --------------------------------------------------------  IN: 0 mL / OUT: 700 mL / NET: -700 mL      PHYSICAL EXAM:  Constitutional: alert, NAD  Eyes: the sclera and conjunctiva were normal.   ENT: the ears and nose were normal in appearance.   Neck: the appearance of the neck was normal and the neck was supple.   Pulmonary: no respiratory distress and lungs were clear to auscultation bilaterally.   Heart: heart rate was normal and rhythm regular, normal S1 and S2  Vascular:. there was no peripheral edema  Abdomen: normal bowel sounds, soft, non-tender  Neurological: no focal deficits.   Psychiatric: the affect was normal        LABS:                        9.3    5.58  )-----------( 143      ( 30 Apr 2024 08:31 )             27.9     04-29    136  |  103  |  41<H>  ----------------------------<  195<H>  4.0   |  22  |  1.47<H>    Ca    8.0<L>      29 Apr 2024 05:30  Phos  3.4     04-29  Mg     1.5     04-29    TPro  5.9<L>  /  Alb  3.3  /  TBili  0.3  /  DBili  x   /  AST  100<H>  /  ALT  39  /  AlkPhos  113  04-29    PT/INR - ( 28 Apr 2024 17:45 )   PT: 12.8 sec;   INR: 1.13          PTT - ( 28 Apr 2024 17:45 )  PTT:27.6 sec  Urinalysis Basic - ( 29 Apr 2024 05:30 )    Color: x / Appearance: x / SG: x / pH: x  Gluc: 195 mg/dL / Ketone: x  / Bili: x / Urobili: x   Blood: x / Protein: x / Nitrite: x   Leuk Esterase: x / RBC: x / WBC x   Sq Epi: x / Non Sq Epi: x / Bacteria: x        MICROBIOLOGY:    Culture - Tissue with Gram Stain (03.27.24 @ 21:53)    Gram Stain:   No organisms seen  No WBC's seen.   -  Clindamycin: R 0.5 This isolate is presumed to be clindamycin resistant based on detection of inducible resistance. Clindamycin may still be effective in some patients.   -  Erythromycin: R >4   -  Linezolid: S 2   -  Oxacillin: S <=0.25 Oxacillin predicts susceptibility for dicloxacillin, methicillin, and nafcillin   -  Rifampin: S <=1 Should not be used as monotherapy   -  Tetracycline: S <=1   -  Trimethoprim/Sulfamethoxazole: S <=0.5/9.5   -  Vancomycin: S 2   Specimen Source: .Tissue right foot 5th metatarsal clean margin or spec   Culture Results:   Rare Staphylococcus aureus  Rare Corynebacterium amycolatum  Result called to and read back byKimberlee Olivarez RN  03/29/2024 14:28:11   Organism Identification: Staphylococcus aureus   Organism: Staphylococcus aureus   Method Type: PALMA        RADIOLOGY & ADDITIONAL STUDIES:      < from: MR Foot w/wo IV Cont, Right (04.29.24 @ 20:49) >  IMPRESSION:  1.  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  2.  Focal cortical defect involves the lateral cortex of the 4th proximal   phalanx diaphysis with focal marrow edema. This may be postsurgical or   infectious in the appropriate setting.  3.  Mild subchondral edema involves the 2nd through 4th   metatarsophalangeal joints that is presumably reactive in the absence of   T1 marrow replacement to suggest osteomyelitis.    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        ROS:   Constitutional, eyes, ENT, cardiovascular, respiratory, gastrointestinal, genitourinary, integumentary, neurological, psychiatric and heme/lymph are otherwise negative other than noted above       ANTIBIOTICS/RELEVANT:    MEDICATIONS  (STANDING):  amLODIPine   Tablet 10 milliGRAM(s) Oral daily  aspirin  chewable 81 milliGRAM(s) Oral daily  atorvastatin 40 milliGRAM(s) Oral daily  citalopram 40 milliGRAM(s) Oral daily  clopidogrel Tablet 75 milliGRAM(s) Oral daily  DAPTOmycin IVPB 700 milliGRAM(s) IV Intermittent every 24 hours  dextrose 10% Bolus 125 milliLiter(s) IV Bolus once  dextrose 5%. 1000 milliLiter(s) (50 mL/Hr) IV Continuous <Continuous>  dextrose 5%. 1000 milliLiter(s) (100 mL/Hr) IV Continuous <Continuous>  dextrose 50% Injectable 12.5 Gram(s) IV Push once  dextrose 50% Injectable 25 Gram(s) IV Push once  enoxaparin Injectable 40 milliGRAM(s) SubCutaneous every 24 hours  glucagon  Injectable 1 milliGRAM(s) IntraMuscular once  influenza   Vaccine 0.5 milliLiter(s) IntraMuscular once  insulin glargine Injectable (LANTUS) 15 Unit(s) SubCutaneous at bedtime  insulin lispro (ADMELOG) corrective regimen sliding scale   SubCutaneous three times a day before meals  insulin lispro Injectable (ADMELOG) 7 Unit(s) SubCutaneous three times a day before meals  losartan 100 milliGRAM(s) Oral every 24 hours  torsemide 20 milliGRAM(s) Oral daily    MEDICATIONS  (PRN):  acetaminophen     Tablet .. 650 milliGRAM(s) Oral every 6 hours PRN Temp greater or equal to 38C (100.4F), Mild Pain (1 - 3)  aluminum hydroxide/magnesium hydroxide/simethicone Suspension 30 milliLiter(s) Oral every 4 hours PRN Dyspepsia  dextrose Oral Gel 15 Gram(s) Oral once PRN Blood Glucose LESS THAN 70 milliGRAM(s)/deciliter  melatonin 3 milliGRAM(s) Oral at bedtime PRN Insomnia  ondansetron Injectable 4 milliGRAM(s) IV Push every 8 hours PRN Nausea and/or Vomiting        Vital Signs Last 24 Hrs  T(C): 37.3 (30 Apr 2024 05:35), Max: 37.3 (30 Apr 2024 05:35)  T(F): 99.1 (30 Apr 2024 05:35), Max: 99.1 (30 Apr 2024 05:35)  HR: 65 (30 Apr 2024 05:35) (65 - 69)  BP: 174/73 (30 Apr 2024 05:35) (161/69 - 174/73)  BP(mean): --  RR: 17 (30 Apr 2024 05:35) (17 - 18)  SpO2: 94% (30 Apr 2024 05:35) (94% - 94%)    Parameters below as of 30 Apr 2024 05:35  Patient On (Oxygen Delivery Method): room air        04-29-24 @ 07:01  -  04-30-24 @ 07:00  --------------------------------------------------------  IN: 0 mL / OUT: 700 mL / NET: -700 mL      PHYSICAL EXAM:  Constitutional: alert, NAD  Eyes: the sclera and conjunctiva were normal.   ENT: the ears and nose were normal in appearance.   Neck: the appearance of the neck was normal and the neck was supple.   Pulmonary: no respiratory distress and lungs were clear to auscultation bilaterally.   Heart: heart rate was normal and rhythm regular, normal S1 and S2  Vascular:. there was no peripheral edema LLE. Mild dorsal foot edema R foot.  Abdomen: normal bowel sounds, soft, non-tender  Derm: R foot  No drainage noted, eschar over 5th ray surgical site. No open wound noted. Mild erythema dorsally  Neurological: no focal deficits.   Psychiatric: the affect was         LABS:                        9.3    5.58  )-----------( 143      ( 30 Apr 2024 08:31 )             27.9     04-29    136  |  103  |  41<H>  ----------------------------<  195<H>  4.0   |  22  |  1.47<H>    Ca    8.0<L>      29 Apr 2024 05:30  Phos  3.4     04-29  Mg     1.5     04-29    TPro  5.9<L>  /  Alb  3.3  /  TBili  0.3  /  DBili  x   /  AST  100<H>  /  ALT  39  /  AlkPhos  113  04-29    PT/INR - ( 28 Apr 2024 17:45 )   PT: 12.8 sec;   INR: 1.13          PTT - ( 28 Apr 2024 17:45 )  PTT:27.6 sec  Urinalysis Basic - ( 29 Apr 2024 05:30 )    Color: x / Appearance: x / SG: x / pH: x  Gluc: 195 mg/dL / Ketone: x  / Bili: x / Urobili: x   Blood: x / Protein: x / Nitrite: x   Leuk Esterase: x / RBC: x / WBC x   Sq Epi: x / Non Sq Epi: x / Bacteria: x        MICROBIOLOGY:    Culture - Tissue with Gram Stain (03.27.24 @ 21:53)    Gram Stain:   No organisms seen  No WBC's seen.   -  Clindamycin: R 0.5 This isolate is presumed to be clindamycin resistant based on detection of inducible resistance. Clindamycin may still be effective in some patients.   -  Erythromycin: R >4   -  Linezolid: S 2   -  Oxacillin: S <=0.25 Oxacillin predicts susceptibility for dicloxacillin, methicillin, and nafcillin   -  Rifampin: S <=1 Should not be used as monotherapy   -  Tetracycline: S <=1   -  Trimethoprim/Sulfamethoxazole: S <=0.5/9.5   -  Vancomycin: S 2   Specimen Source: .Tissue right foot 5th metatarsal clean margin or spec   Culture Results:   Rare Staphylococcus aureus  Rare Corynebacterium amycolatum  Result called to and read back byKimberlee Olivarez RN  03/29/2024 14:28:11   Organism Identification: Staphylococcus aureus   Organism: Staphylococcus aureus   Method Type: PALMA        RADIOLOGY & ADDITIONAL STUDIES:      < from: MR Foot w/wo IV Cont, Right (04.29.24 @ 20:49) >  IMPRESSION:  1.  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  2.  Focal cortical defect involves the lateral cortex of the 4th proximal   phalanx diaphysis with focal marrow edema. This may be postsurgical or   infectious in the appropriate setting.  3.  Mild subchondral edema involves the 2nd through 4th   metatarsophalangeal joints that is presumably reactive in the absence of   T1 marrow replacement to suggest osteomyelitis.    --- End of Report ---    < end of copied text >

## 2024-04-30 NOTE — PROGRESS NOTE ADULT - PROBLEM SELECTOR PLAN 5
Hx of poorly controlled IDDM with A1C 14.2 (2/6/24). Pt believes he sees an endocrinologist but isn't sure. Says he recently became more compliant with insulin regimen but has made his own changes iso seeing low glucose readings in the morning (mid-50s)  Home meds: Lantus 40, Humalog 15 TID - says he takes closer to 25 units of Lantus  - Endo consulted, appreciate recs  - mISS  - C/w lantus 15, lispro 7  - Continue CC diet Hx of poorly controlled IDDM with A1C 14.2 (2/6/24) now 8.3. Pt believes he sees an endocrinologist but isn't sure. Says he recently became more compliant with insulin regimen but has made his own changes iso seeing low glucose readings in the morning (mid-50s)  Home meds: Lantus 40, Humalog 15 TID - says he takes closer to 25 units of Lantus  - Endo consulted, appreciate recs  - mISS  - C/w lantus 15, lispro 7  - Continue CC diet

## 2024-04-30 NOTE — DISCHARGE NOTE PROVIDER - NSDCCPCAREPLAN_GEN_ALL_CORE_FT
PRINCIPAL DISCHARGE DIAGNOSIS  Diagnosis: Fever  Assessment and Plan of Treatment: You were admitted for concern for fever, that likely was related to the antibiotic, vancomycin you were receiving for your osteomyelitis. Sometimes a "drug fever" can be induced and varies upon different patient's reactions to medications. We ruled out other causes of fever during your admission. If however you have a prolonged fever, please see your primary care provider or return to urgent care.      SECONDARY DISCHARGE DIAGNOSES  Diagnosis: Osteomyelitis  Assessment and Plan of Treatment: Osteomyelitis is an infection in a bone. Infections can reach a bone by traveling through the bloodstream or spreading from nearby tissue. Infections can also begin in the bone itself if an injury exposes the bone to germs.  Smokers and people with chronic health conditions, such as diabetes or kidney failure, are more at risk of developing osteomyelitis. People who have diabetes may develop osteomyelitis in their feet if they have foot ulcers.  Although once considered incurable, osteomyelitis can now be successfully treated. Most people need surgery to remove areas of the bone that have . After surgery, strong intravenous antibiotics are typically needed.  You will continue intravenous daptomycin antibiotic treatment until May 10th, 2024 with weekly labwork and outpatient follow up with Dr. Gallardo of Infectious Diseases for follow-up.

## 2024-04-30 NOTE — PROGRESS NOTE ADULT - ASSESSMENT
58M PMHx CAD, CHF, HLD, CKD, DM, baseline bradycardia 40s-50s, right foot OM (currently on IV Vanco, Cefazolin via PICC since 3/27), presents with fever and chills x2 days.  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 Pt was put on 6 weeks IV abx for residual OM of Vanc and Cefazolin. Low concern for R foot SSTI and PICC line infection. Fevers most likely 2/2 to abx reaction.        Plan:    *INCOMPLETE*  -MRI reviewed  -f/u Podiatry reccs  -c/w Dapto 700mg Q24  -CPK added on for AM labs    ID Team 1 following, Plan dw with attending 58M PMHx CAD, CHF, HLD, CKD, DM, baseline bradycardia 40s-50s, right foot OM (currently on IV Vanco, Cefazolin via PICC since 3/27), presents with fever and chills x2 days.  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 Pt was put on 6 weeks IV abx for residual OM of Vanc and Cefazolin. Low concern for R foot SSTI and PICC line infection. Fevers most likely 2/2 to abx reaction.        Plan:    *INCOMPLETE*  -MRI reviewed  -f/u TTE  -f/u Podiatry reccs  -c/w Dapto 700mg Q24  -CPK added on for AM labs    ID Team 1 following, Plan dw with attending 58M PMHx CAD, CHF, HLD, CKD, DM, baseline bradycardia 40s-50s, right foot OM (currently on IV Vanco, Cefazolin via PICC since 3/27), presents with fever and chills x2 days.  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 Pt was put on 6 weeks IV abx for residual OM of Vanc and Cefazolin. Low concern for R foot SSTI and PICC line infection. Fevers most likely 2/2 to abx reaction.        Plan:    -MRI reviewed  -f/u TTE  -appreciate Podiatry reccs  -c/w Dapto 700mg Q24    ID Team 1 following, Plan dw with attending    - Discharge patient with Daptomycin 700mg q24h  - Duration of antibiotics is until May 10th  - Weekly labs: CMP, CBC, ESR, CRP, CPK faxed to ID office at [phone]  - Patient to follow up with Dr. Gallardo in 2 weeks ([address] [place] [address], [phone]), ID office will call patient to schedule

## 2024-04-30 NOTE — PROGRESS NOTE ADULT - PROBLEM SELECTOR PLAN 6
Creatinine up from 1.35 to 1.47 overnight. Baseline Creatinine seems to be 1.3-1.4 per past admissions. Follows with nephrologist [facility] Dr. Efrain Castillo.   -Urine lytes  -Avoid nephrotoxic agents Creatinine up from 1.35 to 1.48 Baseline Creatinine seems to be 1.3-1.4 per past admissions. Follows with nephrologist [facility] Dr. Efrain Castillo.   -Urine lytes  -Avoid nephrotoxic agents

## 2024-05-01 ENCOUNTER — TRANSCRIPTION ENCOUNTER (OUTPATIENT)
Age: 59
End: 2024-05-01

## 2024-05-01 VITALS — OXYGEN SATURATION: 95 % | RESPIRATION RATE: 18 BRPM

## 2024-05-01 DIAGNOSIS — N10 ACUTE PYELONEPHRITIS: ICD-10-CM

## 2024-05-01 DIAGNOSIS — R50.2 DRUG INDUCED FEVER: ICD-10-CM

## 2024-05-01 LAB
ADD ON TEST-SPECIMEN IN LAB: SIGNIFICANT CHANGE UP
ANION GAP SERPL CALC-SCNC: 11 MMOL/L — SIGNIFICANT CHANGE UP (ref 5–17)
ANION GAP SERPL CALC-SCNC: 13 MMOL/L — SIGNIFICANT CHANGE UP (ref 5–17)
APPEARANCE UR: ABNORMAL
BACTERIA # UR AUTO: NEGATIVE /HPF — SIGNIFICANT CHANGE UP
BASOPHILS # BLD AUTO: 0.01 K/UL — SIGNIFICANT CHANGE UP (ref 0–0.2)
BASOPHILS # BLD AUTO: 0.02 K/UL — SIGNIFICANT CHANGE UP (ref 0–0.2)
BASOPHILS NFR BLD AUTO: 0.1 % — SIGNIFICANT CHANGE UP (ref 0–2)
BASOPHILS NFR BLD AUTO: 0.3 % — SIGNIFICANT CHANGE UP (ref 0–2)
BILIRUB UR-MCNC: NEGATIVE — SIGNIFICANT CHANGE UP
BUN SERPL-MCNC: 40 MG/DL — HIGH (ref 7–23)
BUN SERPL-MCNC: 42 MG/DL — HIGH (ref 7–23)
CALCIUM SERPL-MCNC: 8.4 MG/DL — SIGNIFICANT CHANGE UP (ref 8.4–10.5)
CALCIUM SERPL-MCNC: 8.9 MG/DL — SIGNIFICANT CHANGE UP (ref 8.4–10.5)
CAST: 5 /LPF — HIGH (ref 0–4)
CHLORIDE SERPL-SCNC: 98 MMOL/L — SIGNIFICANT CHANGE UP (ref 96–108)
CHLORIDE SERPL-SCNC: 98 MMOL/L — SIGNIFICANT CHANGE UP (ref 96–108)
CK SERPL-CCNC: 141 U/L — SIGNIFICANT CHANGE UP (ref 30–200)
CLOSURE TME COLL+EPINEP BLD: 125 K/UL — LOW (ref 150–400)
CO2 SERPL-SCNC: 22 MMOL/L — SIGNIFICANT CHANGE UP (ref 22–31)
CO2 SERPL-SCNC: 24 MMOL/L — SIGNIFICANT CHANGE UP (ref 22–31)
COLOR SPEC: YELLOW — SIGNIFICANT CHANGE UP
CREAT ?TM UR-MCNC: 101 MG/DL — SIGNIFICANT CHANGE UP
CREAT SERPL-MCNC: 1.76 MG/DL — HIGH (ref 0.5–1.3)
CREAT SERPL-MCNC: 1.83 MG/DL — HIGH (ref 0.5–1.3)
DIFF PNL FLD: ABNORMAL
EGFR: 43 ML/MIN/1.73M2 — LOW
EGFR: 45 ML/MIN/1.73M2 — LOW
EOSINOPHIL # BLD AUTO: 0.02 K/UL — SIGNIFICANT CHANGE UP (ref 0–0.5)
EOSINOPHIL # BLD AUTO: 0.03 K/UL — SIGNIFICANT CHANGE UP (ref 0–0.5)
EOSINOPHIL NFR BLD AUTO: 0.3 % — SIGNIFICANT CHANGE UP (ref 0–6)
EOSINOPHIL NFR BLD AUTO: 0.5 % — SIGNIFICANT CHANGE UP (ref 0–6)
GLUCOSE BLDC GLUCOMTR-MCNC: 162 MG/DL — HIGH (ref 70–99)
GLUCOSE BLDC GLUCOMTR-MCNC: 218 MG/DL — HIGH (ref 70–99)
GLUCOSE SERPL-MCNC: 173 MG/DL — HIGH (ref 70–99)
GLUCOSE SERPL-MCNC: 192 MG/DL — HIGH (ref 70–99)
GLUCOSE UR QL: NEGATIVE MG/DL — SIGNIFICANT CHANGE UP
HCT VFR BLD CALC: 26.1 % — LOW (ref 39–50)
HCT VFR BLD CALC: 27.2 % — LOW (ref 39–50)
HGB BLD-MCNC: 8.8 G/DL — LOW (ref 13–17)
HGB BLD-MCNC: 9.1 G/DL — LOW (ref 13–17)
IMM GRANULOCYTES NFR BLD AUTO: 0.2 % — SIGNIFICANT CHANGE UP (ref 0–0.9)
IMM GRANULOCYTES NFR BLD AUTO: 0.4 % — SIGNIFICANT CHANGE UP (ref 0–0.9)
KETONES UR-MCNC: NEGATIVE MG/DL — SIGNIFICANT CHANGE UP
LEUKOCYTE ESTERASE UR-ACNC: NEGATIVE — SIGNIFICANT CHANGE UP
LYMPHOCYTES # BLD AUTO: 0.55 K/UL — LOW (ref 1–3.3)
LYMPHOCYTES # BLD AUTO: 0.63 K/UL — LOW (ref 1–3.3)
LYMPHOCYTES # BLD AUTO: 10.5 % — LOW (ref 13–44)
LYMPHOCYTES # BLD AUTO: 7.1 % — LOW (ref 13–44)
MAGNESIUM SERPL-MCNC: 1.9 MG/DL — SIGNIFICANT CHANGE UP (ref 1.6–2.6)
MCHC RBC-ENTMCNC: 29.3 PG — SIGNIFICANT CHANGE UP (ref 27–34)
MCHC RBC-ENTMCNC: 29.9 PG — SIGNIFICANT CHANGE UP (ref 27–34)
MCHC RBC-ENTMCNC: 33.5 GM/DL — SIGNIFICANT CHANGE UP (ref 32–36)
MCHC RBC-ENTMCNC: 33.7 GM/DL — SIGNIFICANT CHANGE UP (ref 32–36)
MCV RBC AUTO: 87.5 FL — SIGNIFICANT CHANGE UP (ref 80–100)
MCV RBC AUTO: 88.8 FL — SIGNIFICANT CHANGE UP (ref 80–100)
MONOCYTES # BLD AUTO: 0.59 K/UL — SIGNIFICANT CHANGE UP (ref 0–0.9)
MONOCYTES # BLD AUTO: 0.74 K/UL — SIGNIFICANT CHANGE UP (ref 0–0.9)
MONOCYTES NFR BLD AUTO: 9.5 % — SIGNIFICANT CHANGE UP (ref 2–14)
MONOCYTES NFR BLD AUTO: 9.8 % — SIGNIFICANT CHANGE UP (ref 2–14)
NEUTROPHILS # BLD AUTO: 4.73 K/UL — SIGNIFICANT CHANGE UP (ref 1.8–7.4)
NEUTROPHILS # BLD AUTO: 6.42 K/UL — SIGNIFICANT CHANGE UP (ref 1.8–7.4)
NEUTROPHILS NFR BLD AUTO: 78.7 % — HIGH (ref 43–77)
NEUTROPHILS NFR BLD AUTO: 82.6 % — HIGH (ref 43–77)
NITRITE UR-MCNC: NEGATIVE — SIGNIFICANT CHANGE UP
NRBC # BLD: 0 /100 WBCS — SIGNIFICANT CHANGE UP (ref 0–0)
NRBC # BLD: 0 /100 WBCS — SIGNIFICANT CHANGE UP (ref 0–0)
OSMOLALITY UR: 337 MOSM/KG — SIGNIFICANT CHANGE UP (ref 300–900)
PH UR: 5.5 — SIGNIFICANT CHANGE UP (ref 5–8)
PHOSPHATE SERPL-MCNC: 3.5 MG/DL — SIGNIFICANT CHANGE UP (ref 2.5–4.5)
PLATELET # BLD AUTO: 144 K/UL — LOW (ref 150–400)
PLATELET # BLD AUTO: 149 K/UL — LOW (ref 150–400)
POTASSIUM SERPL-MCNC: 3.4 MMOL/L — LOW (ref 3.5–5.3)
POTASSIUM SERPL-MCNC: 3.4 MMOL/L — LOW (ref 3.5–5.3)
POTASSIUM SERPL-SCNC: 3.4 MMOL/L — LOW (ref 3.5–5.3)
POTASSIUM SERPL-SCNC: 3.4 MMOL/L — LOW (ref 3.5–5.3)
POTASSIUM UR-SCNC: 22 MMOL/L — SIGNIFICANT CHANGE UP
PROT ?TM UR-MCNC: 179 MG/DL — HIGH (ref 0–12)
PROT UR-MCNC: 300 MG/DL
PROT/CREAT UR-RTO: 1.8 RATIO — HIGH (ref 0–0.2)
RBC # BLD: 2.94 M/UL — LOW (ref 4.2–5.8)
RBC # BLD: 3.11 M/UL — LOW (ref 4.2–5.8)
RBC # FLD: 13.7 % — SIGNIFICANT CHANGE UP (ref 10.3–14.5)
RBC # FLD: 13.7 % — SIGNIFICANT CHANGE UP (ref 10.3–14.5)
RBC CASTS # UR COMP ASSIST: 0 /HPF — SIGNIFICANT CHANGE UP (ref 0–4)
SODIUM SERPL-SCNC: 133 MMOL/L — LOW (ref 135–145)
SODIUM SERPL-SCNC: 133 MMOL/L — LOW (ref 135–145)
SODIUM UR-SCNC: 42 MMOL/L — SIGNIFICANT CHANGE UP
SP GR SPEC: 1.01 — SIGNIFICANT CHANGE UP (ref 1–1.03)
SQUAMOUS # UR AUTO: 2 /HPF — SIGNIFICANT CHANGE UP (ref 0–5)
UROBILINOGEN FLD QL: 1 MG/DL — SIGNIFICANT CHANGE UP (ref 0.2–1)
UUN UR-MCNC: 466 MG/DL — SIGNIFICANT CHANGE UP
VANCOMYCIN FLD-MCNC: 5.6 UG/ML — SIGNIFICANT CHANGE UP
WBC # BLD: 6.01 K/UL — SIGNIFICANT CHANGE UP (ref 3.8–10.5)
WBC # BLD: 7.23 K/UL — SIGNIFICANT CHANGE UP (ref 3.8–10.5)
WBC # FLD AUTO: 6.01 K/UL — SIGNIFICANT CHANGE UP (ref 3.8–10.5)
WBC # FLD AUTO: 7.23 K/UL — SIGNIFICANT CHANGE UP (ref 3.8–10.5)
WBC UR QL: 1 /HPF — SIGNIFICANT CHANGE UP (ref 0–5)

## 2024-05-01 PROCEDURE — 85025 COMPLETE CBC W/AUTO DIFF WBC: CPT

## 2024-05-01 PROCEDURE — 73720 MRI LWR EXTREMITY W/O&W/DYE: CPT | Mod: MC

## 2024-05-01 PROCEDURE — 0225U NFCT DS DNA&RNA 21 SARSCOV2: CPT

## 2024-05-01 PROCEDURE — 84100 ASSAY OF PHOSPHORUS: CPT

## 2024-05-01 PROCEDURE — 85730 THROMBOPLASTIN TIME PARTIAL: CPT

## 2024-05-01 PROCEDURE — 71046 X-RAY EXAM CHEST 2 VIEWS: CPT

## 2024-05-01 PROCEDURE — 93005 ELECTROCARDIOGRAM TRACING: CPT

## 2024-05-01 PROCEDURE — 86140 C-REACTIVE PROTEIN: CPT

## 2024-05-01 PROCEDURE — 84540 ASSAY OF URINE/UREA-N: CPT

## 2024-05-01 PROCEDURE — 82962 GLUCOSE BLOOD TEST: CPT

## 2024-05-01 PROCEDURE — 99232 SBSQ HOSP IP/OBS MODERATE 35: CPT

## 2024-05-01 PROCEDURE — 81001 URINALYSIS AUTO W/SCOPE: CPT

## 2024-05-01 PROCEDURE — 84133 ASSAY OF URINE POTASSIUM: CPT

## 2024-05-01 PROCEDURE — 83880 ASSAY OF NATRIURETIC PEPTIDE: CPT

## 2024-05-01 PROCEDURE — 87040 BLOOD CULTURE FOR BACTERIA: CPT

## 2024-05-01 PROCEDURE — 85049 AUTOMATED PLATELET COUNT: CPT

## 2024-05-01 PROCEDURE — 85652 RBC SED RATE AUTOMATED: CPT

## 2024-05-01 PROCEDURE — 85610 PROTHROMBIN TIME: CPT

## 2024-05-01 PROCEDURE — 36415 COLL VENOUS BLD VENIPUNCTURE: CPT

## 2024-05-01 PROCEDURE — 83605 ASSAY OF LACTIC ACID: CPT

## 2024-05-01 PROCEDURE — 84156 ASSAY OF PROTEIN URINE: CPT

## 2024-05-01 PROCEDURE — 83935 ASSAY OF URINE OSMOLALITY: CPT

## 2024-05-01 PROCEDURE — 84300 ASSAY OF URINE SODIUM: CPT

## 2024-05-01 PROCEDURE — 80202 ASSAY OF VANCOMYCIN: CPT

## 2024-05-01 PROCEDURE — 83735 ASSAY OF MAGNESIUM: CPT

## 2024-05-01 PROCEDURE — 99285 EMERGENCY DEPT VISIT HI MDM: CPT

## 2024-05-01 PROCEDURE — C8929: CPT

## 2024-05-01 PROCEDURE — 82550 ASSAY OF CK (CPK): CPT

## 2024-05-01 PROCEDURE — 80048 BASIC METABOLIC PNL TOTAL CA: CPT

## 2024-05-01 PROCEDURE — 80053 COMPREHEN METABOLIC PANEL: CPT

## 2024-05-01 PROCEDURE — 83036 HEMOGLOBIN GLYCOSYLATED A1C: CPT

## 2024-05-01 PROCEDURE — A9585: CPT

## 2024-05-01 PROCEDURE — 85027 COMPLETE CBC AUTOMATED: CPT

## 2024-05-01 PROCEDURE — 73630 X-RAY EXAM OF FOOT: CPT

## 2024-05-01 PROCEDURE — 99239 HOSP IP/OBS DSCHRG MGMT >30: CPT | Mod: GC

## 2024-05-01 PROCEDURE — 96374 THER/PROPH/DIAG INJ IV PUSH: CPT

## 2024-05-01 PROCEDURE — 96375 TX/PRO/DX INJ NEW DRUG ADDON: CPT

## 2024-05-01 PROCEDURE — 82570 ASSAY OF URINE CREATININE: CPT

## 2024-05-01 RX ORDER — CHLORHEXIDINE GLUCONATE 213 G/1000ML
1 SOLUTION TOPICAL DAILY
Refills: 0 | Status: DISCONTINUED | OUTPATIENT
Start: 2024-05-01 | End: 2024-05-01

## 2024-05-01 RX ORDER — SODIUM CHLORIDE 9 MG/ML
1000 INJECTION, SOLUTION INTRAVENOUS ONCE
Refills: 0 | Status: COMPLETED | OUTPATIENT
Start: 2024-05-01 | End: 2024-05-01

## 2024-05-01 RX ORDER — MAGNESIUM SULFATE 500 MG/ML
1 VIAL (ML) INJECTION ONCE
Refills: 0 | Status: COMPLETED | OUTPATIENT
Start: 2024-05-01 | End: 2024-05-01

## 2024-05-01 RX ORDER — POTASSIUM CHLORIDE 20 MEQ
40 PACKET (EA) ORAL EVERY 4 HOURS
Refills: 0 | Status: COMPLETED | OUTPATIENT
Start: 2024-05-01 | End: 2024-05-01

## 2024-05-01 RX ADMIN — CLOPIDOGREL BISULFATE 75 MILLIGRAM(S): 75 TABLET, FILM COATED ORAL at 11:22

## 2024-05-01 RX ADMIN — Medication 650 MILLIGRAM(S): at 00:19

## 2024-05-01 RX ADMIN — Medication 650 MILLIGRAM(S): at 07:11

## 2024-05-01 RX ADMIN — Medication 4: at 12:58

## 2024-05-01 RX ADMIN — Medication 81 MILLIGRAM(S): at 11:23

## 2024-05-01 RX ADMIN — Medication 40 MILLIEQUIVALENT(S): at 08:54

## 2024-05-01 RX ADMIN — Medication 100 GRAM(S): at 09:00

## 2024-05-01 RX ADMIN — ATORVASTATIN CALCIUM 40 MILLIGRAM(S): 80 TABLET, FILM COATED ORAL at 11:22

## 2024-05-01 RX ADMIN — Medication 8 UNIT(S): at 12:58

## 2024-05-01 RX ADMIN — Medication 20 MILLIGRAM(S): at 06:12

## 2024-05-01 RX ADMIN — Medication 650 MILLIGRAM(S): at 06:11

## 2024-05-01 RX ADMIN — Medication 40 MILLIEQUIVALENT(S): at 11:22

## 2024-05-01 RX ADMIN — AMLODIPINE BESYLATE 10 MILLIGRAM(S): 2.5 TABLET ORAL at 06:12

## 2024-05-01 RX ADMIN — CITALOPRAM 40 MILLIGRAM(S): 10 TABLET, FILM COATED ORAL at 11:23

## 2024-05-01 RX ADMIN — Medication 100 MILLIGRAM(S): at 06:12

## 2024-05-01 RX ADMIN — Medication 2: at 09:16

## 2024-05-01 RX ADMIN — SODIUM CHLORIDE 250 MILLILITER(S): 9 INJECTION, SOLUTION INTRAVENOUS at 08:59

## 2024-05-01 RX ADMIN — DAPTOMYCIN 128 MILLIGRAM(S): 500 INJECTION, POWDER, LYOPHILIZED, FOR SOLUTION INTRAVENOUS at 13:06

## 2024-05-01 RX ADMIN — LOSARTAN POTASSIUM 100 MILLIGRAM(S): 100 TABLET, FILM COATED ORAL at 06:12

## 2024-05-01 RX ADMIN — ENOXAPARIN SODIUM 40 MILLIGRAM(S): 100 INJECTION SUBCUTANEOUS at 06:12

## 2024-05-01 RX ADMIN — Medication 8 UNIT(S): at 09:16

## 2024-05-01 NOTE — PROGRESS NOTE ADULT - PROBLEM SELECTOR PLAN 1
Presented with fever, chills x 2 days. Etiology likely  drug fever from IV antibiotics, vs. PICC line infection prior to arrival vs. worsening OM. Also likely, but less acute: hay fever, as patient endorses seasonal allergies and is currently not on medications. Less likely viral URI as RVP negative. CXR c/f right basilar opacity. Patient had normal inflammatory markers recently outpatient so elevation seen shows signs of active inflammation, so r/o OM worsening.  -Trend ESR/CRP  -Tylenol PRN for fevers; currently afebrile  -Rest of plan per below Relapse fever of 101.2 on 4/30 5PM after being afebrile for 3 days. Etiology likely drug fever from IV antibiotics, vs. PICC line infection prior to arrival vs. worsening OM. Less likely viral URI as RVP negative. CXR c/f right basilar opacity. Patient had normal inflammatory markers recently outpatient so elevation seen shows signs of active inflammation, so r/o OM worsening.  -Remove PICC line  -Tylenol PRN for fevers; currently afebrile  -Rest of plan per below

## 2024-05-01 NOTE — PROGRESS NOTE ADULT - PROBLEM SELECTOR PLAN 10
F: Encourage PO  E: Replete as necessary K>4 Mg>2  N: CC diet    DVT Prophylaxis: Hep SubQ  GI prophylaxis: None   CODE STATUS: FULL F: Encourage PO  E: Replete as necessary K>4 Mg>2  N: CC diet    GI prophylaxis: None   CODE STATUS: FULL

## 2024-05-01 NOTE — CHART NOTE - NSCHARTNOTEFT_GEN_A_CORE
Notified by RN that patient no longer wishes to remain in the hospital for further workup of his elevated Cr and downtrending Plt's. Patient provided urine sample but reported that he was leaving regardless of the result. Went to evaluate patient at bedside and patient stated he wants "to get a good nights sleep" and he believes a workup like this could end up lasting days. Explained to patient our concerns with his worsening Cr and decreasing Plt count and that medical advice would be for him to remain inpatient however, patient expressed preference for leaving AMA. Explained to patient the risks associated with leaving the hospital but patient reports he will follow up with his primary care doctor, cardiologist, and nephrologist. Patient signed AMA paperwork and was notified that his antibiotics were sent to [facility] (Cefadroxil 500mg BID and Doxycycline 100mg BID until 5/10/24) and offered to  medications for patient, however he chose to  the medications himself on his way out. Notified by RN that patient no longer wishes to remain in the hospital for further workup of his elevated Cr and downtrending Plt's. Patient provided urine sample but reported that he was leaving regardless of the result. Went to evaluate patient at bedside and patient stated he wants "to get a good night's sleep" and he believes a workup like this could end up lasting days. Explained to patient our concerns with his worsening Cr and decreasing Plt count and that medical advice would be for him to remain inpatient however, patient expressed preference for leaving AMA. Explained to patient the risks associated with leaving the hospital but patient reports he will follow up with his primary care doctor, cardiologist, and nephrologist. Patient signed AMA paperwork and was notified that his antibiotics were sent to [facility] pharmacy (Cefadroxil 500mg BID and Doxycycline 100mg BID until 5/10/24). Offered to  medications for patient, however he chose to  the medications himself on his way out.

## 2024-05-01 NOTE — PROGRESS NOTE ADULT - PROBLEM SELECTOR PLAN 8
Pt had stent placed 12/9/2022 iso SOB and CP. Symptoms have resolved since. Unclear why he is still on Plavix.   Home meds: ASA/Plavix, Atorvastatin 40  - C/w statin  - C/w DAPT Pt had stent placed 12/9/2022 iso SOB and CP. Symptoms have resolved since. Unclear why he is still on Plavix.   Home meds: ASA/Plavix, Atorvastatin 40  - C/w statin  - C/w DAPT  - C/w losartan, amlodipine, torsemide to slowly decrease BP to target goal of <130/80

## 2024-05-01 NOTE — PROGRESS NOTE ADULT - SUBJECTIVE AND OBJECTIVE BOX
Patient is a 58y old  Male who presents with a chief complaint of fever (30 Apr 2024 15:13)      INTERVAL HPI/ OVERNIGHT EVENTS  Febrile yesterday. Dressing off this AM.       LABS                        8.8    7.23  )-----------( 144      ( 01 May 2024 05:30 )             26.1     05-01    133<L>  |  98  |  40<H>  ----------------------------<  173<H>  3.4<L>   |  22  |  1.76<H>    Ca    8.4      01 May 2024 05:30  Phos  3.5     05-01  Mg     1.9     05-01          ICU Vital Signs Last 24 Hrs  T(C): 37.7 (01 May 2024 05:31), Max: 38.4 (30 Apr 2024 13:03)  T(F): 99.9 (01 May 2024 05:31), Max: 101.2 (30 Apr 2024 13:03)  HR: 69 (01 May 2024 05:31) (65 - 84)  BP: 172/73 (01 May 2024 05:31) (151/62 - 197/80)  BP(mean): --  ABP: --  ABP(mean): --  RR: 18 (01 May 2024 06:16) (17 - 18)  SpO2: 95% (01 May 2024 06:16) (88% - 95%)    O2 Parameters below as of 01 May 2024 06:16  Patient On (Oxygen Delivery Method): room air            RADIOLOGY    < from: MR Foot w/wo IV Cont, Right (04.29.24 @ 20:49) >  IMPRESSION:  1.  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  2.  Focal cortical defect involves the lateral cortex of the 4th proximal   phalanx diaphysis with focal marrow edema. This may be postsurgical or   infectious in the appropriate setting.  3.  Mild subchondral edema involves the 2nd through 4th   metatarsophalangeal joints that is presumably reactive in the absence of   T1 marrow replacement to suggest osteomyelitis.  < end of copied text >      MICROBIOLOGY    Culture - Tissue with Gram Stain (03.27.24 @ 21:53)    Gram Stain:   No organisms seen  No WBC's seen.   -  Clindamycin: R 0.5 This isolate is presumed to be clindamycin resistant based on detection of inducible resistance. Clindamycin may still be effective in some patients.   -  Erythromycin: R >4   -  Linezolid: S 2   -  Oxacillin: S <=0.25 Oxacillin predicts susceptibility for dicloxacillin, methicillin, and nafcillin   -  Rifampin: S <=1 Should not be used as monotherapy   -  Tetracycline: S <=1   -  Trimethoprim/Sulfamethoxazole: S <=0.5/9.5   -  Vancomycin: S 2   Specimen Source: .Tissue right foot 5th metatarsal clean margin or spec   Culture Results:   Rare Staphylococcus aureus  Rare Corynebacterium amycolatum  Result called to and read back byKimberlee Oilvarez RN  03/29/2024 14:28:11   Organism Identification: Staphylococcus aureus   Organism: Staphylococcus aureus   Method Type: PALMA    PHYSICAL EXAM  Lower Extremity Focused  Vasc: DP 1/4 PT 2/4. Tg: Warm to Warm.  mild erythema to dorsal foot, (+1) edema   Derm:  No drainage noted, eschar over 5th ray surgical site. No open wound noted, (-)PTB, (-)Malodor, (-) fluctuance, (-) crepitus  Neuro: Protective sensation intact  MSK: Partial R 5th ray amputation   Patient is a 58y old  Male who presents with a chief complaint of fever (30 Apr 2024 15:13)      INTERVAL HPI/ OVERNIGHT EVENTS  Febrile yesterday. Dressing off this AM. Seen bedside with attending.       LABS                        8.8    7.23  )-----------( 144      ( 01 May 2024 05:30 )             26.1     05-01    133<L>  |  98  |  40<H>  ----------------------------<  173<H>  3.4<L>   |  22  |  1.76<H>    Ca    8.4      01 May 2024 05:30  Phos  3.5     05-01  Mg     1.9     05-01          ICU Vital Signs Last 24 Hrs  T(C): 37.7 (01 May 2024 05:31), Max: 38.4 (30 Apr 2024 13:03)  T(F): 99.9 (01 May 2024 05:31), Max: 101.2 (30 Apr 2024 13:03)  HR: 69 (01 May 2024 05:31) (65 - 84)  BP: 172/73 (01 May 2024 05:31) (151/62 - 197/80)  BP(mean): --  ABP: --  ABP(mean): --  RR: 18 (01 May 2024 06:16) (17 - 18)  SpO2: 95% (01 May 2024 06:16) (88% - 95%)    O2 Parameters below as of 01 May 2024 06:16  Patient On (Oxygen Delivery Method): room air            RADIOLOGY    < from: MR Foot w/wo IV Cont, Right (04.29.24 @ 20:49) >  IMPRESSION:  1.  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  2.  Focal cortical defect involves the lateral cortex of the 4th proximal   phalanx diaphysis with focal marrow edema. This may be postsurgical or   infectious in the appropriate setting.  3.  Mild subchondral edema involves the 2nd through 4th   metatarsophalangeal joints that is presumably reactive in the absence of   T1 marrow replacement to suggest osteomyelitis.  < end of copied text >      MICROBIOLOGY    Culture - Tissue with Gram Stain (03.27.24 @ 21:53)    Gram Stain:   No organisms seen  No WBC's seen.   -  Clindamycin: R 0.5 This isolate is presumed to be clindamycin resistant based on detection of inducible resistance. Clindamycin may still be effective in some patients.   -  Erythromycin: R >4   -  Linezolid: S 2   -  Oxacillin: S <=0.25 Oxacillin predicts susceptibility for dicloxacillin, methicillin, and nafcillin   -  Rifampin: S <=1 Should not be used as monotherapy   -  Tetracycline: S <=1   -  Trimethoprim/Sulfamethoxazole: S <=0.5/9.5   -  Vancomycin: S 2   Specimen Source: .Tissue right foot 5th metatarsal clean margin or spec   Culture Results:   Rare Staphylococcus aureus  Rare Corynebacterium amycolatum  Result called to and read back byKimberlee Olivarez RN  03/29/2024 14:28:11   Organism Identification: Staphylococcus aureus   Organism: Staphylococcus aureus   Method Type: PALMA    PHYSICAL EXAM  Lower Extremity Focused  Vasc: DP 1/4 PT 2/4. Tg: Warm to Warm.  mild erythema to dorsal foot, (+1) edema   Derm:  No drainage noted, eschar over 5th ray surgical site. No open wound noted, (-)PTB, (-)Malodor, (-) fluctuance, (-) crepitus  Neuro: Protective sensation intact  MSK: Partial R 5th ray amputation

## 2024-05-01 NOTE — PROGRESS NOTE ADULT - SUBJECTIVE AND OBJECTIVE BOX
******INCOMPLETE******    OVERNIGHT EVENTS/INTERVAL HPI: NAEO    SUBJECTIVE: Patient seen and examined at bedside. Patient developed relapse fever of 101.2 on 4/30 5PM, for which was given 1g tylenol. Patients admits to intermittent dry cough, and denies chest pain, SOB, palpitations, dizziness, headache, LE pain and/or swelling. Remaining ROS negative.     OBJECTIVE:  Vital Signs Last 24 Hrs  T(C): 37.7 (01 May 2024 05:31), Max: 38.4 (30 Apr 2024 13:03)  T(F): 99.9 (01 May 2024 05:31), Max: 101.2 (30 Apr 2024 13:03)  HR: 69 (01 May 2024 05:31) (65 - 84)  BP: 172/73 (01 May 2024 05:31) (151/62 - 197/80)  BP(mean): --  RR: 18 (01 May 2024 06:16) (17 - 18)  SpO2: 95% (01 May 2024 06:16) (88% - 95%)    Parameters below as of 01 May 2024 06:16  Patient On (Oxygen Delivery Method): room air      I&O's Detail    Physical Exam:  GENERAL: Awake, alert and interactive, no acute distress, appears comfortable  NEURO: A&Ox3, no focal deficits, 5/5 strength in all ext, reflexes 2+ throughout, CN 2-12 intact  HEENT: Normocephalic, atraumatic, no conjunctivitis or scleral icterus, oral mucosa moist, no oral lesions noted  NECK: Supple, no LAD, no JVD, thyroid not palpable  CARDIAC: Regular rate and rhythm, +S1/S2, no murmurs/rubs/gallops  PULM: Breathing comfortably on RA, RLL crackles  ABDOMEN: Soft, nontender, nondistended, +bs, no hepatosplenomegaly, no rebound tenderness or fluid wave, no CVA tenderness  : Deferred  Extremities: WWW, 2+ pitting edema bilateral to below the knee; partial 5th ray amputation of R foot  SKIN: Warm and dry, no rashes, lesions  VASC: Cap refil < 2 sec, 2+ peripheral pulses, no LE tenderness  Psych: Appropriate affect    Medications:  MEDICATIONS  (STANDING):  amLODIPine   Tablet 10 milliGRAM(s) Oral daily  aspirin  chewable 81 milliGRAM(s) Oral daily  atorvastatin 40 milliGRAM(s) Oral daily  benzonatate 100 milliGRAM(s) Oral every 8 hours  chlorhexidine 2% Cloths 1 Application(s) Topical daily  citalopram 40 milliGRAM(s) Oral daily  clopidogrel Tablet 75 milliGRAM(s) Oral daily  DAPTOmycin IVPB 700 milliGRAM(s) IV Intermittent every 24 hours  dextrose 10% Bolus 125 milliLiter(s) IV Bolus once  dextrose 5%. 1000 milliLiter(s) (50 mL/Hr) IV Continuous <Continuous>  dextrose 5%. 1000 milliLiter(s) (100 mL/Hr) IV Continuous <Continuous>  dextrose 50% Injectable 25 Gram(s) IV Push once  dextrose 50% Injectable 12.5 Gram(s) IV Push once  enoxaparin Injectable 40 milliGRAM(s) SubCutaneous every 24 hours  glucagon  Injectable 1 milliGRAM(s) IntraMuscular once  influenza   Vaccine 0.5 milliLiter(s) IntraMuscular once  insulin glargine Injectable (LANTUS) 19 Unit(s) SubCutaneous at bedtime  insulin lispro (ADMELOG) corrective regimen sliding scale   SubCutaneous Before meals and at bedtime  insulin lispro Injectable (ADMELOG) 8 Unit(s) SubCutaneous three times a day before meals  losartan 100 milliGRAM(s) Oral every 24 hours  potassium chloride   Powder 40 milliEquivalent(s) Oral every 4 hours  torsemide 20 milliGRAM(s) Oral daily    MEDICATIONS  (PRN):  acetaminophen     Tablet .. 650 milliGRAM(s) Oral every 6 hours PRN Temp greater or equal to 38C (100.4F), Mild Pain (1 - 3)  aluminum hydroxide/magnesium hydroxide/simethicone Suspension 30 milliLiter(s) Oral every 4 hours PRN Dyspepsia  dextrose Oral Gel 15 Gram(s) Oral once PRN Blood Glucose LESS THAN 70 milliGRAM(s)/deciliter  melatonin 3 milliGRAM(s) Oral at bedtime PRN Insomnia  ondansetron Injectable 4 milliGRAM(s) IV Push every 8 hours PRN Nausea and/or Vomiting      Labs:                          8.8    7.23  )-----------( 144      ( 01 May 2024 05:30 )             26.1     05-01    133<L>  |  98  |  40<H>  ----------------------------<  173<H>  3.4<L>   |  22  |  1.76<H>    Ca    8.4      01 May 2024 05:30  Phos  3.5     05-01  Mg     1.9     05-01          Urinalysis Basic - ( 01 May 2024 05:30 )    Color: x / Appearance: x / SG: x / pH: x  Gluc: 173 mg/dL / Ketone: x  / Bili: x / Urobili: x   Blood: x / Protein: x / Nitrite: x   Leuk Esterase: x / RBC: x / WBC x   Sq Epi: x / Non Sq Epi: x / Bacteria: x      SARS-CoV-2: NotDetec (28 Apr 2024 17:45)      Radiology: Reviewed ******INCOMPLETE******    OVERNIGHT EVENTS/INTERVAL HPI: NAEO    SUBJECTIVE: Patient seen and examined at bedside. Patient developed relapse fever of 101.2 on 4/30 5PM, for which was given 1g tylenol. Patients admits to intermittent dry cough, and denies chest pain, SOB, palpitations, dizziness, headache, LE pain and/or swelling. Remaining ROS negative.     OBJECTIVE:  Vital Signs Last 24 Hrs  T(C): 37.7 (01 May 2024 05:31), Max: 38.4 (30 Apr 2024 13:03)  T(F): 99.9 (01 May 2024 05:31), Max: 101.2 (30 Apr 2024 13:03)  HR: 69 (01 May 2024 05:31) (65 - 84)  BP: 172/73 (01 May 2024 05:31) (151/62 - 197/80)  BP(mean): --  RR: 18 (01 May 2024 06:16) (17 - 18)  SpO2: 95% (01 May 2024 06:16) (88% - 95%)    Parameters below as of 01 May 2024 06:16  Patient On (Oxygen Delivery Method): room air      I&O's Detail    Physical Exam:  GENERAL: Awake, alert and interactive, no acute distress, appears comfortable  NEURO: A&Ox3, no focal deficits, 5/5 strength in all ext, reflexes 2+ throughout, CN 2-12 intact  HEENT: Normocephalic, atraumatic, no conjunctivitis or scleral icterus, oral mucosa moist, no oral lesions noted  NECK: Supple, no LAD, no JVD, thyroid not palpable  CARDIAC: Regular rate and rhythm, +S1/S2, no murmurs/rubs/gallops  PULM: Lungs clear to ausculation bilateral, Breathing comfortably on RA  ABDOMEN: Soft, nontender, nondistended, +bs, no hepatosplenomegaly, no rebound tenderness or fluid wave, no CVA tenderness  : Deferred  Extremities: WWW, 2+ pitting edema bilateral to below the knee; partial 5th ray amputation of R foot  SKIN: Warm and dry, no rashes, lesions  VASC: Cap refil < 2 sec, 2+ peripheral pulses, no LE tenderness  Psych: Appropriate affect    Medications:  MEDICATIONS  (STANDING):  amLODIPine   Tablet 10 milliGRAM(s) Oral daily  aspirin  chewable 81 milliGRAM(s) Oral daily  atorvastatin 40 milliGRAM(s) Oral daily  benzonatate 100 milliGRAM(s) Oral every 8 hours  chlorhexidine 2% Cloths 1 Application(s) Topical daily  citalopram 40 milliGRAM(s) Oral daily  clopidogrel Tablet 75 milliGRAM(s) Oral daily  DAPTOmycin IVPB 700 milliGRAM(s) IV Intermittent every 24 hours  dextrose 10% Bolus 125 milliLiter(s) IV Bolus once  dextrose 5%. 1000 milliLiter(s) (50 mL/Hr) IV Continuous <Continuous>  dextrose 5%. 1000 milliLiter(s) (100 mL/Hr) IV Continuous <Continuous>  dextrose 50% Injectable 25 Gram(s) IV Push once  dextrose 50% Injectable 12.5 Gram(s) IV Push once  enoxaparin Injectable 40 milliGRAM(s) SubCutaneous every 24 hours  glucagon  Injectable 1 milliGRAM(s) IntraMuscular once  influenza   Vaccine 0.5 milliLiter(s) IntraMuscular once  insulin glargine Injectable (LANTUS) 19 Unit(s) SubCutaneous at bedtime  insulin lispro (ADMELOG) corrective regimen sliding scale   SubCutaneous Before meals and at bedtime  insulin lispro Injectable (ADMELOG) 8 Unit(s) SubCutaneous three times a day before meals  losartan 100 milliGRAM(s) Oral every 24 hours  potassium chloride   Powder 40 milliEquivalent(s) Oral every 4 hours  torsemide 20 milliGRAM(s) Oral daily    MEDICATIONS  (PRN):  acetaminophen     Tablet .. 650 milliGRAM(s) Oral every 6 hours PRN Temp greater or equal to 38C (100.4F), Mild Pain (1 - 3)  aluminum hydroxide/magnesium hydroxide/simethicone Suspension 30 milliLiter(s) Oral every 4 hours PRN Dyspepsia  dextrose Oral Gel 15 Gram(s) Oral once PRN Blood Glucose LESS THAN 70 milliGRAM(s)/deciliter  melatonin 3 milliGRAM(s) Oral at bedtime PRN Insomnia  ondansetron Injectable 4 milliGRAM(s) IV Push every 8 hours PRN Nausea and/or Vomiting      Labs:                          8.8    7.23  )-----------( 144      ( 01 May 2024 05:30 )             26.1     05-01    133<L>  |  98  |  40<H>  ----------------------------<  173<H>  3.4<L>   |  22  |  1.76<H>    Ca    8.4      01 May 2024 05:30  Phos  3.5     05-01  Mg     1.9     05-01          Urinalysis Basic - ( 01 May 2024 05:30 )    Color: x / Appearance: x / SG: x / pH: x  Gluc: 173 mg/dL / Ketone: x  / Bili: x / Urobili: x   Blood: x / Protein: x / Nitrite: x   Leuk Esterase: x / RBC: x / WBC x   Sq Epi: x / Non Sq Epi: x / Bacteria: x      SARS-CoV-2: Filipetec (28 Apr 2024 17:45)      Radiology: Reviewed ******INCOMPLETE******    OVERNIGHT EVENTS/INTERVAL HPI: NAEO    SUBJECTIVE: Patient seen and examined at bedside. Patient developed relapse fever of 101.2 on 4/30 5PM, for which was given 1g tylenol. Patients admits to intermittent dry cough, and denies chest pain, SOB, palpitations, dizziness, headache, LE pain and/or swelling. Remaining ROS negative.     OBJECTIVE:  Vital Signs Last 24 Hrs  T(C): 37.7 (01 May 2024 05:31), Max: 38.4 (30 Apr 2024 13:03)  T(F): 99.9 (01 May 2024 05:31), Max: 101.2 (30 Apr 2024 13:03)  HR: 69 (01 May 2024 05:31) (65 - 84)  BP: 172/73 (01 May 2024 05:31) (151/62 - 197/80)  BP(mean): --  RR: 18 (01 May 2024 06:16) (17 - 18)  SpO2: 95% (01 May 2024 06:16) (88% - 95%)    Parameters below as of 01 May 2024 06:16  Patient On (Oxygen Delivery Method): room air      Physical Exam:  GENERAL: Awake, alert and interactive, no acute distress, appears comfortable  NEURO: A&Ox3, no focal deficits, 5/5 strength in all ext, reflexes 2+ throughout, CN 2-12 intact  HEENT: Normocephalic, atraumatic, no conjunctivitis or scleral icterus, oral mucosa moist, no oral lesions noted  NECK: Supple, no LAD, no JVD, thyroid not palpable  CARDIAC: Regular rate and rhythm, +S1/S2, no murmurs/rubs/gallops  PULM: Lungs clear to ausculation bilateral, Breathing comfortably on RA  ABDOMEN: Soft, nontender, nondistended, +bs, no hepatosplenomegaly, no rebound tenderness or fluid wave, no CVA tenderness  : Deferred  Extremities: WWW, 2+ pitting edema bilateral to below the knee; partial 5th ray amputation of R foot  SKIN: Warm and dry, no rashes, lesions  VASC: Cap refil < 2 sec, 2+ peripheral pulses, no LE tenderness  Psych: Appropriate affect    Medications:  MEDICATIONS  (STANDING):  amLODIPine   Tablet 10 milliGRAM(s) Oral daily  aspirin  chewable 81 milliGRAM(s) Oral daily  atorvastatin 40 milliGRAM(s) Oral daily  benzonatate 100 milliGRAM(s) Oral every 8 hours  chlorhexidine 2% Cloths 1 Application(s) Topical daily  citalopram 40 milliGRAM(s) Oral daily  clopidogrel Tablet 75 milliGRAM(s) Oral daily  DAPTOmycin IVPB 700 milliGRAM(s) IV Intermittent every 24 hours  dextrose 10% Bolus 125 milliLiter(s) IV Bolus once  dextrose 5%. 1000 milliLiter(s) (50 mL/Hr) IV Continuous <Continuous>  dextrose 5%. 1000 milliLiter(s) (100 mL/Hr) IV Continuous <Continuous>  dextrose 50% Injectable 25 Gram(s) IV Push once  dextrose 50% Injectable 12.5 Gram(s) IV Push once  enoxaparin Injectable 40 milliGRAM(s) SubCutaneous every 24 hours  glucagon  Injectable 1 milliGRAM(s) IntraMuscular once  influenza   Vaccine 0.5 milliLiter(s) IntraMuscular once  insulin glargine Injectable (LANTUS) 19 Unit(s) SubCutaneous at bedtime  insulin lispro (ADMELOG) corrective regimen sliding scale   SubCutaneous Before meals and at bedtime  insulin lispro Injectable (ADMELOG) 8 Unit(s) SubCutaneous three times a day before meals  losartan 100 milliGRAM(s) Oral every 24 hours  potassium chloride   Powder 40 milliEquivalent(s) Oral every 4 hours  torsemide 20 milliGRAM(s) Oral daily    MEDICATIONS  (PRN):  acetaminophen     Tablet .. 650 milliGRAM(s) Oral every 6 hours PRN Temp greater or equal to 38C (100.4F), Mild Pain (1 - 3)  aluminum hydroxide/magnesium hydroxide/simethicone Suspension 30 milliLiter(s) Oral every 4 hours PRN Dyspepsia  dextrose Oral Gel 15 Gram(s) Oral once PRN Blood Glucose LESS THAN 70 milliGRAM(s)/deciliter  melatonin 3 milliGRAM(s) Oral at bedtime PRN Insomnia  ondansetron Injectable 4 milliGRAM(s) IV Push every 8 hours PRN Nausea and/or Vomiting      Labs:                          8.8    7.23  )-----------( 144      ( 01 May 2024 05:30 )             26.1     05-01    133<L>  |  98  |  40<H>  ----------------------------<  173<H>  3.4<L>   |  22  |  1.76<H>    Ca    8.4      01 May 2024 05:30  Phos  3.5     05-01  Mg     1.9     05-01          Urinalysis Basic - ( 01 May 2024 05:30 )    Color: x / Appearance: x / SG: x / pH: x  Gluc: 173 mg/dL / Ketone: x  / Bili: x / Urobili: x   Blood: x / Protein: x / Nitrite: x   Leuk Esterase: x / RBC: x / WBC x   Sq Epi: x / Non Sq Epi: x / Bacteria: x      SARS-CoV-2: NotDetec (28 Apr 2024 17:45)      Radiology: Reviewed

## 2024-05-01 NOTE — PROGRESS NOTE ADULT - ATTENDING COMMENTS
Patient had another fever yesterday afternoon.  He still has vancomycin in his system.  This could be drug fever. I am still concerned the PICC line can be causing an infection. Recommend pulling PICC today.  Can continue Daptomycin via peripheral IV. On discharge he can complete the course with Doxycycline 100 mg PO q12 + Cefadroxil 500 mg PO q12 (ends 5/10/24). Unclear why platelets and hemoglobin are dropping.  I don't expect this from vancomycin
Agree with above.  Differential diagnosis of fevers is drug fever vs recurrent osteomyelitis vs PICC line infection.  His foot does not appear infected and he does not endorse new symptoms in the RLE making this less likely.  His PICC also appears clean.  I suspect fever is a drug fever (vanco).  Can stop Vancomycin and Cefazolin and start Daptomycin 700 mg IV daily (can start tomorrow since vancomycin was received today).  Check serum CK level in AM on 4/30.  Cefazolin is being stopped because Daptomycin will cover MSSA well, not because I think he has a drug fever from this.
Patient's fevers have resolved.  Blood cultures negative.  MRI shows osteomyelitis in the right 5th toe.  This is an expected finding given recent surgery and given that he is being actively treated for OM in that same toe. MRI findings lag behind clinical improvement.  Foot appears well on exam and inflammatory markers much improved.  Suspect drug fever.  Can stop Vancomycin/Cefazolin/Cefepime and discharge with Daptomycin 700 mg IV daily. End date is 5/10/24.  Will need weekly CBC, CMP, ESR, CRP, CK as outpatient.  Fax to me:  [phone].   Ok to discharge from ID standpoint.
58M with PMH HfpEF, CAD, HTN, HLD, DM, OM, CKD3 presenting for foot infection, admitted for OM s/p partial 5th ray amputation of R foot found to have residual OM positive margins)    Labs and imaging reviewed    Problem List  #Fever/chills - concern for drug fever vs worsening OM vs Line infection POA -- patient had normal inflammatory markers recently outpatient so elevation seen shows signs of active inflammation  #OM - Eval with MRI to assess for abscess  #HFpEF - monitor fluid status daily   #CKD3  #HTN    Rest as above
58M with PMH HfpEF, CAD, HTN, HLD, DM, OM, CKD3 presenting for foot infection, admitted for OM s/p partial 5th ray amputation of R foot found to have residual OM positive margins)    Labs and imaging reviewed    Problem List  #Fever/chills - concern for drug fever vs worsening OM vs Line infection POA -- patient had normal inflammatory markers recently outpatient so elevation seen shows signs of active inflammation  #OM - Eval with MRI to assess for abscess  #HFpEF - monitor fluid status daily   #CKD3  #HTN    Rest as above

## 2024-05-01 NOTE — PROGRESS NOTE ADULT - SUBJECTIVE AND OBJECTIVE BOX
INFECTIOUS DISEASES CONSULT FOLLOW-UP NOTE    INTERVAL HPI/OVERNIGHT EVENTS: Pt was febrile overnight (highest 101.2). No other complaints at this time.       ROS:   Constitutional, eyes, ENT, cardiovascular, respiratory, gastrointestinal, genitourinary, integumentary, neurological, psychiatric and heme/lymph are otherwise negative other than noted above       ANTIBIOTICS/RELEVANT:    MEDICATIONS  (STANDING):  amLODIPine   Tablet 10 milliGRAM(s) Oral daily  aspirin  chewable 81 milliGRAM(s) Oral daily  atorvastatin 40 milliGRAM(s) Oral daily  benzonatate 100 milliGRAM(s) Oral every 8 hours  chlorhexidine 2% Cloths 1 Application(s) Topical daily  citalopram 40 milliGRAM(s) Oral daily  clopidogrel Tablet 75 milliGRAM(s) Oral daily  DAPTOmycin IVPB 700 milliGRAM(s) IV Intermittent every 24 hours  dextrose 10% Bolus 125 milliLiter(s) IV Bolus once  dextrose 5%. 1000 milliLiter(s) (100 mL/Hr) IV Continuous <Continuous>  dextrose 5%. 1000 milliLiter(s) (50 mL/Hr) IV Continuous <Continuous>  dextrose 50% Injectable 25 Gram(s) IV Push once  dextrose 50% Injectable 12.5 Gram(s) IV Push once  enoxaparin Injectable 40 milliGRAM(s) SubCutaneous every 24 hours  glucagon  Injectable 1 milliGRAM(s) IntraMuscular once  influenza   Vaccine 0.5 milliLiter(s) IntraMuscular once  insulin glargine Injectable (LANTUS) 19 Unit(s) SubCutaneous at bedtime  insulin lispro (ADMELOG) corrective regimen sliding scale   SubCutaneous Before meals and at bedtime  insulin lispro Injectable (ADMELOG) 8 Unit(s) SubCutaneous three times a day before meals  losartan 100 milliGRAM(s) Oral every 24 hours  torsemide 20 milliGRAM(s) Oral daily    MEDICATIONS  (PRN):  acetaminophen     Tablet .. 650 milliGRAM(s) Oral every 6 hours PRN Temp greater or equal to 38C (100.4F), Mild Pain (1 - 3)  aluminum hydroxide/magnesium hydroxide/simethicone Suspension 30 milliLiter(s) Oral every 4 hours PRN Dyspepsia  dextrose Oral Gel 15 Gram(s) Oral once PRN Blood Glucose LESS THAN 70 milliGRAM(s)/deciliter  melatonin 3 milliGRAM(s) Oral at bedtime PRN Insomnia  ondansetron Injectable 4 milliGRAM(s) IV Push every 8 hours PRN Nausea and/or Vomiting        Vital Signs Last 24 Hrs  T(C): 37.7 (01 May 2024 05:31), Max: 38.4 (30 Apr 2024 13:03)  T(F): 99.9 (01 May 2024 05:31), Max: 101.2 (30 Apr 2024 13:03)  HR: 69 (01 May 2024 05:31) (65 - 84)  BP: 172/73 (01 May 2024 05:31) (151/62 - 197/80)  BP(mean): --  RR: 18 (01 May 2024 06:16) (17 - 18)  SpO2: 95% (01 May 2024 06:16) (88% - 95%)    Parameters below as of 01 May 2024 06:16  Patient On (Oxygen Delivery Method): room air        PHYSICAL EXAM:  Constitutional: alert, NAD  Eyes: the sclera and conjunctiva were normal.   ENT: the ears and nose were normal in appearance.   Neck: the appearance of the neck was normal and the neck was supple.   Pulmonary: no respiratory distress and lungs were clear to auscultation bilaterally.   Heart: heart rate was normal and rhythm regular, normal S1 and S2  Vascular: there was no peripheral edema  Abdomen: normal bowel sounds, soft, non-tender  Derm: R foot  No drainage noted, eschar over 5th ray surgical site. No open wound noted. Mild erythema dorsally  Neurological: no focal deficits.   Psychiatric: the affect was normal        LABS:                        8.8    7.23  )-----------( 144      ( 01 May 2024 05:30 )             26.1     05-01    133<L>  |  98  |  40<H>  ----------------------------<  173<H>  3.4<L>   |  22  |  1.76<H>    Ca    8.4      01 May 2024 05:30  Phos  3.5     05-01  Mg     1.9     05-01        Urinalysis Basic - ( 01 May 2024 05:30 )    Color: x / Appearance: x / SG: x / pH: x  Gluc: 173 mg/dL / Ketone: x  / Bili: x / Urobili: x   Blood: x / Protein: x / Nitrite: x   Leuk Esterase: x / RBC: x / WBC x   Sq Epi: x / Non Sq Epi: x / Bacteria: x        MICROBIOLOGY:  Urine Microscopic-Add On (NC) (04.28.24 @ 17:45)    Red Blood Cell - Urine: 0 /HPF   White Blood Cell - Urine: 2 /HPF   Bacteria: Negative /HPF   Epithelial Cells: 2 /HPF   Cast: 1 /LPF      Culture - Blood (04.28.24 @ 17:45)    Specimen Source: .Blood Blood-Peripheral   Culture Results:   No growth at 48 Hours    Culture - Blood (04.28.24 @ 17:45)    Specimen Source: .Blood Blood-Peripheral   Culture Results:   No growth at 48 Hours    Respiratory Viral Panel with COVID-19 by DUANE (04.28.24 @ 17:45)    Rapid RVP Result: NotDetec   SARS-CoV-2: NotDetec: This Respiratory Panel uses polymerase chain reaction (PCR) to detect for  adenovirus; coronavirus (HKU1, NL63, 229E, OC43); human metapneumovirus  (hMPV); human enterovirus/rhinovirus (Entero/RV); influenza A; influenza  A/H1; influenza A/H3; influenza A/H1-2009; influenza B; parainfluenza  viruses 1, 2, 3, 4; respiratory syncytial virus; Mycoplasma pneumoniae;  Chlamydophila pneumoniae; and SARS-CoV-2.  For SARS-CoV-2: Testing is performed using polymerase chain reaction  (PCR) or transcriptionmediated amplification (TMA). This COVID-19  (SARS-CoV-2) nucleic acid amplification test was validated by [facility] and is in use under the FDA Emergency Use  Authorization (EUA) for clinical labs CLIA-certified to perform high  complexity testing. Test results should be correlated with clinical  presentation, patient history, and epidemiology.        RADIOLOGY & ADDITIONAL STUDIES:  < from: MR Foot w/wo IV Cont, Right (04.29.24 @ 20:49) >  NTERPRETATION:  MR FOOT WITHOUT AND WITH IV CONTRAST RIGHT    HISTORY: History of osteomyelitis. Evaluate for abscess. Fever.    TECHNIQUE:  Multiplanar MRI of the right forefoot was performed without   and with 9 cc administered Gadavist intravenous contrast.    COMPARISON:  Right hand x-rays dated 4/28/2024. Right foot x-rays dated   3/27/2024.    FINDINGS:    OSSEOUS STRUCTURES    Fractures:  None.    Postoperative Changes: 2nd proximal interphalangeal joint arthrodesis   hardware is again seen, joint remains patent. Partial 3rd toe amputation   is again seen through the 3rd proximal phalanx neck. Slight cortical   defect involves the lateral margin of the 4th proximal phalanx proximal   diaphysis as illustrated on sagittal image 26. Partial 5th ray amputation   is again seen through the distal diaphysis of the 5th metatarsal.    Marrow: There is mild presumed subchondral reactive edema involving the   2nd through 4th metatarsophalangeal joints with mild enhancement but no   T1 marrow replacement. Focal marrow edema is seen around the 4th proximal   phalanx cortical defect with enhancement. There is marrow edema and T1   marrow replacement involving the remaining 5th metatarsal diaphysis with   enhancement. Mild edema extends into the 5th metatarsal base.    INTERPHALANGEAL JOINTS    Articular Surfaces:  Otherwise preserved.    Effusions/Synovitis:  None.    1ST METATARSOPHALANGEAL JOINT    Articular Surfaces:  Preserved.    Effusions/Synovitis:  There is mild synovial enhancement.    Sesamoids:  Located with mild subchondral reactive changes.    LESSER METATARSOPHALANGEAL JOINTS    Articular Surfaces: Mild presumed subchondral reactive changes involve   the 2nd through 4th metatarsal phalangeal joints without T1 marrow   replacement to suggest infection.    Effusions/Synovitis:  None.    TARSOMETATARSAL JOINTS    Articular Surfaces:  Preserved.   Effusions/Synovitis:  None.    INTERTARSAL JOINTS    Articular Surfaces:  Preserved.    Effusions/Synovitis:  None.    INTERMETATARSAL SPACES    Neuromas:  None.    Bursa:  None.    LISFRANC LIGAMENT  Intact.    TENDONS    Flexor Tendons:  Intact with alteration from the prior amputations.    Extensor Tendons:  Intact with alteration from the prior amputations.    DISTAL PLANTAR FASCIA  Intact.    SOFT TISSUES    Musculature:  Mild muscle atrophy is present with generalized increased   T2 signalsuggesting diabetic neuropathy.    Subcutaneous Tissues:  Mild-to-moderate subcutaneous edema is present.   No abscess or definite gas is seen. There are presumed postsurgical   susceptibility artifacts at the partial 5th ray amputation.    IMPRESSION:  1.  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  2.  Focal cortical defect involves the lateral cortex of the 4th proximal   phalanx diaphysis with focal marrow edema. This may be postsurgical or   infectious in the appropriate setting.  3.  Mild subchondral edema involves the 2nd through 4th   metatarsophalangeal joints that is presumably reactive in the absence of   T1 marrow replacement to suggest osteomyelitis.    --- End of Report ---      < end of copied text >

## 2024-05-01 NOTE — PROGRESS NOTE ADULT - ASSESSMENT
58M PMHx CAD, CHF, HLD, CKD, DM, baseline bradycardia 40s-50s, right foot OM (currently on IV Vanco, Cefazolin via PICC since 3/27), presents with fever and chills x2 days.  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 Pt was put on 6 weeks IV abx for residual OM of Vanc and Cefazolin. Low concern for R foot SSTI. Fevers most likely 2/2 to abx reaction vs PICC line infection.    Plan:  -May d/c PICC line   -C/w Dapto 700mg Q24 while inpatient   -Appreciate Podiatry reccs    ID Team 1 following, Plan dw with attending    - Discharge patient with Cefadroxil 500mg q12 & Doxycycline 100mg q12   - Duration of antibiotics is until May 10th  - Weekly labs: CMP, CBC, ESR, CRP, CPK faxed to ID office at [phone]  - Patient to follow up with Dr. Gallardo in 2 weeks ([address] [place] [address], [phone]), ID office will call patient to schedule      58M PMHx CAD, CHF, HLD, CKD, DM, baseline bradycardia 40s-50s, right foot OM (currently on IV Vanco, Cefazolin via PICC since 3/27), presents with fever and chills x2 days.  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 Pt was put on 6 weeks IV abx for residual OM of Vanc and Cefazolin. Low concern for R foot SSTI. Fevers most likely 2/2 to abx reaction vs PICC line infection.    Plan:  -May d/c PICC line   -C/w Dapto 700mg Q24 while inpatient   -Appreciate Podiatry reccs    ID Team 1 following, Plan dw with attending    When he is ready for discharge, will discharge with oral antibiotics to complete course:  Cefadroxil 500mg q12 & Doxycycline 100mg q12.  Duration of antibiotics is until May 10th

## 2024-05-01 NOTE — PROGRESS NOTE ADULT - TIME BILLING
evaluation of fever and treatment of osteomyelitis
evaluation of fever in patient with diabetic foot infection
evaluation of fever

## 2024-05-01 NOTE — PROGRESS NOTE ADULT - PROBLEM SELECTOR PLAN 2
Hx of OM with prior amputation, px for foot infection. Follows with Dr. Brunner weekly. Was admitted here for management of OM 2/5-2/8 and dc'd on Augmentin and linezolid, was started on unknown abx last week. Referred to ED by Dr. Brunner for worsening infection.  Podiatry consulted, c/f deep SSTI vs OM, s/p partial 5th ray amp of R foot, uncomplicated procedure  Margins with presumed methicillin sensitive staph and corynebacterium    - Discharge patient with Daptomycin 700mg q24h  - Duration of antibiotics is until May 10th  - Weekly labs: CMP, CBC, ESR, CRP, CPK faxed to ID office at [phone]  - Patient to follow up with Dr. Gallardo in 2 weeks ([address] [place] [address], [phone]), ID office will call patient to schedule

## 2024-05-01 NOTE — PROGRESS NOTE ADULT - ASSESSMENT
58M PMH HFpEF, CAD w/ stent, HTN, HLD, DM, prior metatarsal OM, prior visits for NOLAN/hyerpakalemia, p/w concern for foot infection. S/p R partial 5th ray amputation on 3/27.  Podiatry was consulted to evaluate possible SSTI of the R foot. At the time of consult WBC 6.22, ESR 28, CRP 10 VSS. Based on clinical presentation, there is low suspicion for RLE SSTI at this time. MRI performed and revealed no abscess, concern for OM. Low clinical suspicion for OM as pt has been on long-term IV abx, with decreasing inflammatory markers and clinical improvement    - abx per ID  - Offloading/WB status: WBAT R foot  - Dressed with DSD, ACE   - Rest of care per primary team  - Pt should follow up with his outpatient podiatrist Dr. Chakraborty within 1 week of discharge    Discharge dressing instructions: Cleanse wound with normal sailine daily, pat dry with sterile guaze, apply  betadine soaked gauze to wound base, cover with dry sterile gauze, ABD pad, wrap with Kerlix and light ACE bandage.     Podiatry following, Plan d/w with attending

## 2024-05-01 NOTE — PROGRESS NOTE ADULT - PROBLEM SELECTOR PLAN 3
Follows with nephrologist [facility] Dr. Efrain Castillo. History of hyperkalemia and pseudo-hyperkalemia with past arrhythmias. Home med: lokelma  -CTM with BMPs, caution with repletion  -continue with home med lokelma

## 2024-05-01 NOTE — PROGRESS NOTE ADULT - PROBLEM SELECTOR PLAN 4
Hx of HFpEF with last TTE 12/2022 with EF 65-77%.On exam has b/l 2+ pitting edema and complains of cough but denies SOB, orthopnea, ROBBINS. Says he started taking Torsemide last week for edema and he intermittently takes its when edematous.   - C/w Torsemide  - proBNP 1541; TTE shows EF 60-65%

## 2024-05-01 NOTE — PROGRESS NOTE ADULT - PROBLEM SELECTOR PLAN 6
Creatinine up from 1.35 to 1.48 Baseline Creatinine seems to be 1.3-1.4 per past admissions. Follows with nephrologist [facility] Dr. Efrain Castillo.   -Urine lytes  -Avoid nephrotoxic agents Creatinine up from 1.35 to 1.76. Thrombocytopenia, electrolyte abnormalities, increasing creatinine and decreasing eGFR concern for worsening kidney function, possibly attributed to vanco or cephalosporin. Baseline Creatinine seems to be 1.3-1.4 per past admissions. Follow with nephrologist [facility] Dr. Efrain Castillo.   -Urine lytes  -UA microscopy  -Avoid nephrotoxic agents

## 2024-05-01 NOTE — PROGRESS NOTE ADULT - ASSESSMENT
58M with PMH HfpEF, CAD, HTN, HLD, DM, OM, CKD3 presenting for foot infection, admitted for OM s/p partial 5th ray amputation of R foot found to have residual OM positive margins)       58M with PMH HfpEF, CAD, HTN, HLD, DM, OM, CKD3 presenting for foot infection, admitted for OM s/p partial 5th ray amputation of R foot found to have residual OM positive margins

## 2024-05-01 NOTE — PROGRESS NOTE ADULT - PROBLEM SELECTOR PLAN 5
Hx of poorly controlled IDDM with A1C 14.2 (2/6/24) now 8.3. Pt believes he sees an endocrinologist but isn't sure. Says he recently became more compliant with insulin regimen but has made his own changes iso seeing low glucose readings in the morning (mid-50s)  Home meds: Lantus 40, Humalog 15 TID - says he takes closer to 25 units of Lantus  - Endo consulted, appreciate recs  - mISS  - C/w lantus 15, lispro 7  - Continue CC diet Hx of poorly controlled IDDM with A1C 14.2 (2/6/24) now 8.3. Pt believes he sees an endocrinologist but isn't sure. Says he recently became more compliant with insulin regimen but has made his own changes iso seeing low glucose readings in the morning (mid-50s)  Home meds: Lantus 40, Humalog 15 TID - says he takes closer to 25 units of Lantus  - Endo consulted, appreciate recs  - mISS  - C/w lantus 19, lispro 7  - Continue CC diet

## 2024-05-01 NOTE — PROGRESS NOTE ADULT - PROVIDER SPECIALTY LIST ADULT
Infectious Disease
Infectious Disease
Podiatry
Infectious Disease
Podiatry
Podiatry
Internal Medicine

## 2024-05-02 ENCOUNTER — APPOINTMENT (OUTPATIENT)
Dept: HEART AND VASCULAR | Facility: CLINIC | Age: 59
End: 2024-05-02
Payer: COMMERCIAL

## 2024-05-02 ENCOUNTER — NON-APPOINTMENT (OUTPATIENT)
Age: 59
End: 2024-05-02

## 2024-05-02 ENCOUNTER — TRANSCRIPTION ENCOUNTER (OUTPATIENT)
Age: 59
End: 2024-05-02

## 2024-05-02 VITALS
WEIGHT: 217 LBS | BODY MASS INDEX: 29.39 KG/M2 | DIASTOLIC BLOOD PRESSURE: 64 MMHG | HEIGHT: 72 IN | TEMPERATURE: 98.4 F | OXYGEN SATURATION: 95 % | SYSTOLIC BLOOD PRESSURE: 140 MMHG | HEART RATE: 61 BPM

## 2024-05-02 DIAGNOSIS — I35.8 OTHER NONRHEUMATIC AORTIC VALVE DISORDERS: ICD-10-CM

## 2024-05-02 DIAGNOSIS — G47.33 OBSTRUCTIVE SLEEP APNEA (ADULT) (PEDIATRIC): ICD-10-CM

## 2024-05-02 DIAGNOSIS — E78.5 HYPERLIPIDEMIA, UNSPECIFIED: ICD-10-CM

## 2024-05-02 PROCEDURE — 99205 OFFICE O/P NEW HI 60 MIN: CPT | Mod: 25

## 2024-05-02 PROCEDURE — 93000 ELECTROCARDIOGRAM COMPLETE: CPT

## 2024-05-02 RX ORDER — ATORVASTATIN CALCIUM 80 MG/1
80 TABLET, FILM COATED ORAL
Qty: 90 | Refills: 3 | Status: ACTIVE | COMMUNITY
Start: 2021-11-23 | End: 1900-01-01

## 2024-05-02 RX ORDER — SODIUM BICARBONATE 650 MG/1
650 TABLET ORAL TWICE DAILY
Qty: 180 | Refills: 3 | Status: DISCONTINUED | COMMUNITY
Start: 2023-04-02 | End: 2024-05-02

## 2024-05-02 NOTE — REASON FOR VISIT
[FreeTextEntry1] : New patient. Here to establish care following recent hospitalization for infected toe. Longstanding history of DM, with CKD, CHD, retinopathy and PAD. Also has hx of HFpEF. Has had infection in 5th toe on R foot for years, leading to amputation for osteomyelitis about 5 weeks ago. Went home on IV abx via PICC and readmitted recently with chills/fever. No evidence of bacteremia. Line removed. Fever resolved with change in abx. Left hospital yesterday AMA. Reviewed history of heart disease with patient and his wife. Reviewed records from [facility]. Had echocardiogram 4/30/24 that found normal LV size and systolic function, aortic sclerosis.

## 2024-05-02 NOTE — PHYSICAL EXAM
[No Acute Distress] : no acute distress [Normal Conjunctiva] : normal conjunctiva [Elevated JVD ____cm] : elevated JVD ~Vcm [Carotid Bruit] : carotid bruit [Normal S1, S2] : normal S1, S2 [Clear Lung Fields] : clear lung fields [Soft] : abdomen soft [Non Tender] : non-tender [Normal Radial B/L] : normal radial B/L [de-identified] : right  [de-identified] : 2/6 JORDANA at base [de-identified] : +HJRAY [de-identified] : 1+ edema bilaterally

## 2024-05-03 ENCOUNTER — TRANSCRIPTION ENCOUNTER (OUTPATIENT)
Age: 59
End: 2024-05-03

## 2024-05-09 ENCOUNTER — APPOINTMENT (OUTPATIENT)
Dept: INFECTIOUS DISEASE | Facility: CLINIC | Age: 59
End: 2024-05-09

## 2024-05-10 ENCOUNTER — NON-APPOINTMENT (OUTPATIENT)
Age: 59
End: 2024-05-10

## 2024-05-13 ENCOUNTER — APPOINTMENT (OUTPATIENT)
Dept: PULMONOLOGY | Facility: CLINIC | Age: 59
End: 2024-05-13
Payer: COMMERCIAL

## 2024-05-13 ENCOUNTER — APPOINTMENT (OUTPATIENT)
Dept: INFECTIOUS DISEASE | Facility: CLINIC | Age: 59
End: 2024-05-13
Payer: COMMERCIAL

## 2024-05-13 VITALS
HEART RATE: 63 BPM | WEIGHT: 202 LBS | HEIGHT: 72 IN | OXYGEN SATURATION: 100 % | DIASTOLIC BLOOD PRESSURE: 59 MMHG | BODY MASS INDEX: 27.36 KG/M2 | SYSTOLIC BLOOD PRESSURE: 101 MMHG | TEMPERATURE: 97.88 F

## 2024-05-13 VITALS
OXYGEN SATURATION: 95 % | RESPIRATION RATE: 14 BRPM | WEIGHT: 202 LBS | HEART RATE: 64 BPM | TEMPERATURE: 95.5 F | DIASTOLIC BLOOD PRESSURE: 62 MMHG | BODY MASS INDEX: 27.36 KG/M2 | HEIGHT: 72 IN | SYSTOLIC BLOOD PRESSURE: 120 MMHG

## 2024-05-13 DIAGNOSIS — M86.9 OSTEOMYELITIS, UNSPECIFIED: ICD-10-CM

## 2024-05-13 PROCEDURE — 99214 OFFICE O/P EST MOD 30 MIN: CPT

## 2024-05-13 PROCEDURE — 99213 OFFICE O/P EST LOW 20 MIN: CPT

## 2024-05-13 NOTE — PHYSICAL EXAM
[General Appearance - Well Developed] : well developed [Normal Appearance] : normal appearance [Well Groomed] : well groomed [General Appearance - Well Nourished] : well nourished [No Deformities] : no deformities [General Appearance - In No Acute Distress] : no acute distress [Normal Oropharynx] : normal oropharynx [III] : III [Heart Rate And Rhythm] : heart rate was normal and rhythm regular [Heart Sounds] : normal S1 and S2 [Heart Sounds Gallop] : no gallops [Heart Sounds Pericardial Friction Rub] : no pericardial rub [FreeTextEntry1] : 3/6 systolic murmur at base [] : no respiratory distress [Auscultation Breath Sounds / Voice Sounds] : lungs were clear to auscultation bilaterally [Abnormal Walk] : normal gait [Nail Clubbing] : no clubbing  or cyanosis of the fingernails [Musculoskeletal - Swelling] : no joint swelling seen [Motor Tone] : muscle strength and tone were normal [2+ Pitting] : 2+  pitting

## 2024-05-13 NOTE — PHYSICAL EXAM
[General Appearance - Alert] : alert [Sclera] : the sclera and conjunctiva were normal [Outer Ear] : the ears and nose were normal in appearance [Heart Rate And Rhythm] : heart rate was normal and rhythm regular [Edema] : there was no peripheral edema [Abdomen Soft] : soft [No Palpable Adenopathy] : no palpable adenopathy [Range of Motion to Joints] : range of motion to joints [] : no rash [Motor Exam] : the motor exam was normal [Oriented To Time, Place, And Person] : oriented to person, place, and time

## 2024-05-13 NOTE — HISTORY OF PRESENT ILLNESS
[FreeTextEntry1] : 58 year old male with diabetes here with osteomyelitis in right 5th toe s/p amputation. Clean margin cultures with corynebacterium and MSSA for which he  was on Vancomycin and Cefazolin,  He was admitted to [facility] on 4/28/2024 with fever.  Evaluation including MRI foot and blood cultures were negative.  It was believed that the PICC line was the cause of the fever so it was pulled.  He was discharged on Doxycycline and Cefadroxil to complete the course.  He finished antibiotics on 5/10/2024.  Reports his foot is healing well. No fever, chills, pain, discharge

## 2024-05-13 NOTE — HISTORY OF PRESENT ILLNESS
[FreeTextEntry1] : 01/13/2023 :  VALENTIN CABALLERO is a 57 year old male who is being evaluated for possible sleep disordered breathing.  He has a history of heart failure with preserved ejection fraction and had coronary stenting December 9.  He was admitted again December 28 with hyperkalemia and acute renal failure.  During his recent hospitalization was noted to be snoring severely and was treated with nasal CPAP empirically for at least 1 night.  He found that relatively comfortable and thought it improved his sleep.  His usual bedtime is 10 PM, sleep latency 15 minutes, awakens 4-5 times on a typical night before getting up at 8 AM.  He has been told of severe snoring apnea has been observed and he wakes up choking or gasping on occasion.  He denies significant daytime sleepiness.  There is no history of morning headache, nasal or sinus congestion, sleep paralysis, parasomnias, or cataplexy.  There is no family history of sleep apnea or other sleep problem.  No recent change in weight.  3/24/23: Since last seen he did not follow-up with a home sleep study.  However, during that time he has lost 20 pounds.  He no longer has much snoring.  He goes to bed about 10 PM, falls asleep quickly, gets up about 8 AM after brief awakenings.  Does not report daytime sleepiness, and his wife who is here confirms this.  He has had aggressive management for congestive heart failure and fluid overload, at one point evidently becoming dehydrated, but now feels well.  He is here mostly because of a chronic nonproductive cough.  He is not coughing in the office today.  He does not complain of dyspnea.  Does not wheeze.  Most recent chest x-ray available for review from December 2022 shows pulmonary vascular congestion, normal heart size.  He has not been on an ACE inhibitor.  He does not have symptoms of gastroesophageal reflux, he does have some nasal congestion.  4/6/23: Since last seen his diuretics were evidently stopped and he has now gained about 20 pounds.  His cough is worse, and he has symptoms of orthopnea and paroxysmal nocturnal dyspnea.  He also has noted bilateral leg swelling.  5/13/24: After all he was last seen home sleep study was ordered on 2 occasions but was not delivered because of confusion about his address.  He tells me he once again retain fluid and gained about 15 pounds with leg swelling and worsening breathing.  He is now back to 2 a better weight with the use of diuretics.  He goes to bed about 9, sleep latency is up to 60 minutes, gets up at 7 AM after 2 or 3 awakenings.  Is unclear how much he snores.  He is not complaining of orthopnea or nocturnal dyspnea.

## 2024-05-13 NOTE — ASSESSMENT
[FreeTextEntry1] : Obstructive sleep apnea remains a possibility, as well as central sleep apnea when he is fluid overloaded.  We have clarified his home address and will try to get him sleep study as soon as possible.

## 2024-05-14 DIAGNOSIS — Z79.4 LONG TERM (CURRENT) USE OF INSULIN: ICD-10-CM

## 2024-05-14 DIAGNOSIS — F41.9 ANXIETY DISORDER, UNSPECIFIED: ICD-10-CM

## 2024-05-14 DIAGNOSIS — D64.9 ANEMIA, UNSPECIFIED: ICD-10-CM

## 2024-05-14 DIAGNOSIS — Z79.899 OTHER LONG TERM (CURRENT) DRUG THERAPY: ICD-10-CM

## 2024-05-14 DIAGNOSIS — R50.2 DRUG INDUCED FEVER: ICD-10-CM

## 2024-05-14 DIAGNOSIS — R50.9 FEVER, UNSPECIFIED: ICD-10-CM

## 2024-05-14 DIAGNOSIS — E11.22 TYPE 2 DIABETES MELLITUS WITH DIABETIC CHRONIC KIDNEY DISEASE: ICD-10-CM

## 2024-05-14 DIAGNOSIS — Z79.82 LONG TERM (CURRENT) USE OF ASPIRIN: ICD-10-CM

## 2024-05-14 DIAGNOSIS — N18.30 CHRONIC KIDNEY DISEASE, STAGE 3 UNSPECIFIED: ICD-10-CM

## 2024-05-14 DIAGNOSIS — I13.0 HYPERTENSIVE HEART AND CHRONIC KIDNEY DISEASE WITH HEART FAILURE AND STAGE 1 THROUGH STAGE 4 CHRONIC KIDNEY DISEASE, OR UNSPECIFIED CHRONIC KIDNEY DISEASE: ICD-10-CM

## 2024-05-14 DIAGNOSIS — E87.5 HYPERKALEMIA: ICD-10-CM

## 2024-05-14 DIAGNOSIS — E11.65 TYPE 2 DIABETES MELLITUS WITH HYPERGLYCEMIA: ICD-10-CM

## 2024-05-14 DIAGNOSIS — I50.32 CHRONIC DIASTOLIC (CONGESTIVE) HEART FAILURE: ICD-10-CM

## 2024-05-14 DIAGNOSIS — E78.5 HYPERLIPIDEMIA, UNSPECIFIED: ICD-10-CM

## 2024-05-14 DIAGNOSIS — I25.10 ATHEROSCLEROTIC HEART DISEASE OF NATIVE CORONARY ARTERY WITHOUT ANGINA PECTORIS: ICD-10-CM

## 2024-05-14 DIAGNOSIS — T36.8X5A ADVERSE EFFECT OF OTHER SYSTEMIC ANTIBIOTICS, INITIAL ENCOUNTER: ICD-10-CM

## 2024-05-15 LAB
BASOPHILS # BLD AUTO: 0.06 K/UL
BASOPHILS NFR BLD AUTO: 0.6 %
CRP SERPL-MCNC: <3 MG/L
EOSINOPHIL # BLD AUTO: 0.39 K/UL
EOSINOPHIL NFR BLD AUTO: 4.1 %
ERYTHROCYTE [SEDIMENTATION RATE] IN BLOOD BY WESTERGREN METHOD: 51 MM/HR
HCT VFR BLD CALC: 32.5 %
HGB BLD-MCNC: 10.9 G/DL
IMM GRANULOCYTES NFR BLD AUTO: 0.1 %
LYMPHOCYTES # BLD AUTO: 1.35 K/UL
LYMPHOCYTES NFR BLD AUTO: 14.3 %
MAN DIFF?: NORMAL
MCHC RBC-ENTMCNC: 29.8 PG
MCHC RBC-ENTMCNC: 33.5 GM/DL
MCV RBC AUTO: 88.8 FL
MONOCYTES # BLD AUTO: 0.67 K/UL
MONOCYTES NFR BLD AUTO: 7.1 %
NEUTROPHILS # BLD AUTO: 6.96 K/UL
NEUTROPHILS NFR BLD AUTO: 73.8 %
PLATELET # BLD AUTO: 516 K/UL
RBC # BLD: 3.66 M/UL
RBC # FLD: 13.2 %
WBC # FLD AUTO: 9.44 K/UL

## 2024-06-04 NOTE — ED ADULT NURSE NOTE - ED CARDIAC HEART SOUNDS
Render Note In Bullet Format When Appropriate: No
Show Applicator Variable?: Yes
Post-Care Instructions: I reviewed with the patient in detail post-care instructions. Patient may apply Vaseline or Aquaphor to crusted or scabbing areas.\\nPatient to return to clinic for evaluation if lesion is persistent after 6 weeks.
Detail Level: Detailed
Consent: The patient's verbal consent was obtained including but not limited to risks of crusting, scabbing, blistering, scarring, darker or lighter pigmentary change, recurrence, incomplete removal and infection.
Number Of Freeze-Thaw Cycles: 1 freeze-thaw cycle
Duration Of Freeze Thaw-Cycle (Seconds): 8
normal S1, S2 heard

## 2024-06-06 ENCOUNTER — APPOINTMENT (OUTPATIENT)
Dept: HEART AND VASCULAR | Facility: CLINIC | Age: 59
End: 2024-06-06
Payer: COMMERCIAL

## 2024-06-06 VITALS
HEIGHT: 72 IN | DIASTOLIC BLOOD PRESSURE: 60 MMHG | WEIGHT: 210 LBS | HEART RATE: 55 BPM | SYSTOLIC BLOOD PRESSURE: 140 MMHG | OXYGEN SATURATION: 98 % | TEMPERATURE: 208.76 F | BODY MASS INDEX: 28.44 KG/M2

## 2024-06-06 DIAGNOSIS — I10 ESSENTIAL (PRIMARY) HYPERTENSION: ICD-10-CM

## 2024-06-06 DIAGNOSIS — I25.10 ATHEROSCLEROTIC HEART DISEASE OF NATIVE CORONARY ARTERY W/OUT ANGINA PECTORIS: ICD-10-CM

## 2024-06-06 PROCEDURE — 99214 OFFICE O/P EST MOD 30 MIN: CPT

## 2024-06-06 RX ORDER — METOPROLOL SUCCINATE 25 MG/1
25 TABLET, EXTENDED RELEASE ORAL
Qty: 30 | Refills: 5 | Status: ACTIVE | COMMUNITY
Start: 2024-06-06 | End: 1900-01-01

## 2024-06-06 NOTE — REASON FOR VISIT
[FreeTextEntry1] : F/u for HFpEF, CHD and HTN Feels better. Increased atorvastatin as previously prescribed. Also increased torsemide but then cut back when he dropped significant weight. Becoming more active -- walking regularly without symptoms. Intends to hire a . Still has some pedal edema but reports breathing better. BP at home typically 135-145/65-70 mmHg.

## 2024-06-06 NOTE — PHYSICAL EXAM
[No Acute Distress] : no acute distress [Clear Lung Fields] : clear lung fields [de-identified] : venous pressure mildly elevated [de-identified] : systolic murmur [de-identified] : 1+ edema bilaterally

## 2024-06-13 ENCOUNTER — APPOINTMENT (OUTPATIENT)
Dept: NEPHROLOGY | Facility: CLINIC | Age: 59
End: 2024-06-13
Payer: COMMERCIAL

## 2024-06-13 VITALS
WEIGHT: 206 LBS | SYSTOLIC BLOOD PRESSURE: 134 MMHG | DIASTOLIC BLOOD PRESSURE: 63 MMHG | BODY MASS INDEX: 27.9 KG/M2 | HEIGHT: 72 IN

## 2024-06-13 DIAGNOSIS — R80.9 PROTEINURIA, UNSPECIFIED: ICD-10-CM

## 2024-06-13 DIAGNOSIS — E87.20 ACIDOSIS, UNSPECIFIED: ICD-10-CM

## 2024-06-13 DIAGNOSIS — E87.5 HYPERKALEMIA: ICD-10-CM

## 2024-06-13 LAB
CYSTATIN C SERPL-MCNC: 1.73 MG/L
GFR/BSA.PRED SERPLBLD CYS-BASED-ARV: 38 ML/MIN/1.73M2
POTASSIUM SERPL-SCNC: 6 MMOL/L

## 2024-06-13 PROCEDURE — G2211 COMPLEX E/M VISIT ADD ON: CPT | Mod: NC

## 2024-06-13 PROCEDURE — 99215 OFFICE O/P EST HI 40 MIN: CPT

## 2024-06-13 NOTE — ASSESSMENT
[FreeTextEntry1] : 59-year-old man with stage III CKD, heavy proteinuria due to diabetic nephropathy, hyperkalemia, metabolic acidosis, and healing osteomyelitis of the foot.  For now, he will continue torsemide at 20 mg daily, olmesartan at 40 mg daily, amlodipine at 10 mg daily.  I am ordering labs to include BMP, Cystatin C, UACR, plasma potassium, PTH.  I have given him more Lokelma and will instruct him once labs are back as to how much Lokelma and/or sodium bicarb he needs.  I would like to keep his K under 5 and his CO2 above 22.  He will return in 3 to 4 months.   Time spent 45 minutes

## 2024-06-13 NOTE — HISTORY OF PRESENT ILLNESS
patient
[FreeTextEntry1] : 59-year-old man with a history of hypertension, longstanding type 2 diabetes that has been very difficult to control and resulted in microvascular complications including retinopathy, neuropathy, and nephropathy.  He has CAD/HFpEF, and multiple episodes of NOLAN in the setting of nephrotic and near nephrotic range proteinuria.  Creatinine was generally stable at 1.3-1.4 with a GFR 50-60, but sheila slightly in April to a creatinine of 1.47, perhaps as a result of the need for multiple antibiotics including vancomycin for treatment of osteomyelitis of the foot.  That is healing.  His BP has been variable.  It was 161/68 on April 9 visit, 140/64 on May 2 visit, but 120/62 on May 13 infectious disease visit, and 140/60 on a cardiology visit of June 6.  He is on olmesartan 40 mg daily, torsemide 20 mg daily, Farxiga 10 mg daily.  I would love to add Kerendia as the third pillar of renal protection, but his K was 5.8 in April with a CO2 of 19.  I will repeat labs today, and decide whether to restart Lokelma and/or sodium bicarb.
Mela

## 2024-06-16 LAB
ANION GAP SERPL CALC-SCNC: 10 MMOL/L
BUN SERPL-MCNC: 49 MG/DL
CALCIUM SERPL-MCNC: 9.7 MG/DL
CALCIUM SERPL-MCNC: 9.7 MG/DL
CHLORIDE SERPL-SCNC: 103 MMOL/L
CO2 SERPL-SCNC: 22 MMOL/L
CREAT SERPL-MCNC: 1.7 MG/DL
CREAT SPEC-SCNC: 67 MG/DL
EGFR: 46 ML/MIN/1.73M2
ESTIMATED AVERAGE GLUCOSE: 272 MG/DL
GLUCOSE SERPL-MCNC: 267 MG/DL
HBA1C MFR BLD HPLC: 11.1 %
MICROALBUMIN 24H UR DL<=1MG/L-MCNC: 92.8 MG/DL
MICROALBUMIN/CREAT 24H UR-RTO: 1386 MG/G
PARATHYROID HORMONE INTACT: 45 PG/ML
POTASSIUM SERPL-SCNC: 6.2 MMOL/L
SODIUM SERPL-SCNC: 135 MMOL/L

## 2024-06-23 ENCOUNTER — NON-APPOINTMENT (OUTPATIENT)
Age: 59
End: 2024-06-23

## 2024-06-24 ENCOUNTER — APPOINTMENT (OUTPATIENT)
Dept: INTERNAL MEDICINE | Facility: CLINIC | Age: 59
End: 2024-06-24
Payer: COMMERCIAL

## 2024-06-24 VITALS
WEIGHT: 208.25 LBS | HEART RATE: 45 BPM | OXYGEN SATURATION: 98 % | DIASTOLIC BLOOD PRESSURE: 68 MMHG | SYSTOLIC BLOOD PRESSURE: 156 MMHG | BODY MASS INDEX: 28.21 KG/M2 | HEIGHT: 72 IN | TEMPERATURE: 208.04 F

## 2024-06-24 DIAGNOSIS — Z79.4 TYPE 2 DIABETES MELLITUS WITH OTHER SKIN COMPLICATIONS: ICD-10-CM

## 2024-06-24 DIAGNOSIS — E11.628 TYPE 2 DIABETES MELLITUS WITH OTHER SKIN COMPLICATIONS: ICD-10-CM

## 2024-06-24 DIAGNOSIS — I50.30 UNSPECIFIED DIASTOLIC (CONGESTIVE) HEART FAILURE: ICD-10-CM

## 2024-06-24 DIAGNOSIS — N18.30 CHRONIC KIDNEY DISEASE, STAGE 3 UNSPECIFIED: ICD-10-CM

## 2024-06-24 PROCEDURE — 99214 OFFICE O/P EST MOD 30 MIN: CPT

## 2024-06-24 PROCEDURE — G2211 COMPLEX E/M VISIT ADD ON: CPT | Mod: NC

## 2024-06-24 RX ORDER — DAPAGLIFLOZIN 10 MG/1
10 TABLET, FILM COATED ORAL
Qty: 90 | Refills: 1 | Status: ACTIVE | COMMUNITY
Start: 2023-10-11 | End: 1900-01-01

## 2024-06-25 PROBLEM — E11.628: Status: ACTIVE | Noted: 2022-11-18

## 2024-06-25 PROBLEM — N18.30 CKD (CHRONIC KIDNEY DISEASE), STAGE III: Status: ACTIVE | Noted: 2023-03-22

## 2024-06-25 PROBLEM — I50.30 (HFPEF) HEART FAILURE WITH PRESERVED EJECTION FRACTION: Status: ACTIVE | Noted: 2023-01-13

## 2024-07-18 ENCOUNTER — NON-APPOINTMENT (OUTPATIENT)
Age: 59
End: 2024-07-18

## 2024-07-18 ENCOUNTER — APPOINTMENT (OUTPATIENT)
Dept: INTERNAL MEDICINE | Facility: CLINIC | Age: 59
End: 2024-07-18
Payer: COMMERCIAL

## 2024-07-18 VITALS
HEART RATE: 54 BPM | HEIGHT: 72 IN | SYSTOLIC BLOOD PRESSURE: 122 MMHG | WEIGHT: 202 LBS | OXYGEN SATURATION: 92 % | DIASTOLIC BLOOD PRESSURE: 70 MMHG | TEMPERATURE: 207.14 F | BODY MASS INDEX: 27.36 KG/M2

## 2024-07-18 DIAGNOSIS — Z01.818 ENCOUNTER FOR OTHER PREPROCEDURAL EXAMINATION: ICD-10-CM

## 2024-07-18 PROCEDURE — 99215 OFFICE O/P EST HI 40 MIN: CPT

## 2024-07-18 PROCEDURE — 93000 ELECTROCARDIOGRAM COMPLETE: CPT

## 2024-07-18 PROCEDURE — G2211 COMPLEX E/M VISIT ADD ON: CPT | Mod: NC

## 2024-07-18 PROCEDURE — 36415 COLL VENOUS BLD VENIPUNCTURE: CPT

## 2024-07-19 ENCOUNTER — NON-APPOINTMENT (OUTPATIENT)
Age: 59
End: 2024-07-19

## 2024-07-19 LAB
ALBUMIN SERPL ELPH-MCNC: 4.4 G/DL
ALP BLD-CCNC: 82 U/L
ALT SERPL-CCNC: 17 U/L
ANION GAP SERPL CALC-SCNC: 13 MMOL/L
AST SERPL-CCNC: 14 U/L
BASOPHILS # BLD AUTO: 0.05 K/UL
BASOPHILS NFR BLD AUTO: 0.8 %
BILIRUB SERPL-MCNC: 0.2 MG/DL
BUN SERPL-MCNC: 66 MG/DL
CALCIUM SERPL-MCNC: 9.7 MG/DL
CHLORIDE SERPL-SCNC: 99 MMOL/L
CO2 SERPL-SCNC: 20 MMOL/L
CREAT SERPL-MCNC: 1.96 MG/DL
EGFR: 39 ML/MIN/1.73M2
EOSINOPHIL # BLD AUTO: 0.37 K/UL
EOSINOPHIL NFR BLD AUTO: 6.1 %
GLUCOSE SERPL-MCNC: 482 MG/DL
HCT VFR BLD CALC: 39 %
HGB BLD-MCNC: 13 G/DL
IMM GRANULOCYTES NFR BLD AUTO: 0.3 %
LYMPHOCYTES # BLD AUTO: 1.15 K/UL
LYMPHOCYTES NFR BLD AUTO: 18.8 %
MAN DIFF?: NORMAL
MCHC RBC-ENTMCNC: 28.8 PG
MCHC RBC-ENTMCNC: 33.3 GM/DL
MCV RBC AUTO: 86.5 FL
MONOCYTES # BLD AUTO: 0.52 K/UL
MONOCYTES NFR BLD AUTO: 8.5 %
NEUTROPHILS # BLD AUTO: 4 K/UL
NEUTROPHILS NFR BLD AUTO: 65.5 %
PLATELET # BLD AUTO: 282 K/UL
POTASSIUM SERPL-SCNC: 5.8 MMOL/L
PROT SERPL-MCNC: 7.7 G/DL
RBC # BLD: 4.51 M/UL
RBC # FLD: 13.8 %
SODIUM SERPL-SCNC: 132 MMOL/L
WBC # FLD AUTO: 6.11 K/UL

## 2024-07-24 ENCOUNTER — TRANSCRIPTION ENCOUNTER (OUTPATIENT)
Age: 59
End: 2024-07-24

## 2024-09-17 ENCOUNTER — APPOINTMENT (OUTPATIENT)
Dept: INTERNAL MEDICINE | Facility: CLINIC | Age: 59
End: 2024-09-17

## 2024-09-17 NOTE — HISTORY OF PRESENT ILLNESS
[de-identified] : Mr. VALENTIN CABALLERO is a 59-year-old male with history of HFpEF, CAD s/p PEPE OM1 '22, T2DM, HTN, HLD, OM and anxiety disorder presenting for follow up visit.

## 2024-09-24 ENCOUNTER — APPOINTMENT (OUTPATIENT)
Dept: NEPHROLOGY | Facility: CLINIC | Age: 59
End: 2024-09-24

## 2024-09-26 ENCOUNTER — NON-APPOINTMENT (OUTPATIENT)
Age: 59
End: 2024-09-26

## 2024-09-26 ENCOUNTER — APPOINTMENT (OUTPATIENT)
Dept: HEART AND VASCULAR | Facility: CLINIC | Age: 59
End: 2024-09-26
Payer: COMMERCIAL

## 2024-09-26 VITALS
HEART RATE: 57 BPM | SYSTOLIC BLOOD PRESSURE: 118 MMHG | DIASTOLIC BLOOD PRESSURE: 55 MMHG | TEMPERATURE: 208.76 F | BODY MASS INDEX: 27.76 KG/M2 | OXYGEN SATURATION: 98 % | HEIGHT: 72 IN | WEIGHT: 204.99 LBS

## 2024-09-26 DIAGNOSIS — I50.30 UNSPECIFIED DIASTOLIC (CONGESTIVE) HEART FAILURE: ICD-10-CM

## 2024-09-26 DIAGNOSIS — I10 ESSENTIAL (PRIMARY) HYPERTENSION: ICD-10-CM

## 2024-09-26 DIAGNOSIS — Z01.818 ENCOUNTER FOR OTHER PREPROCEDURAL EXAMINATION: ICD-10-CM

## 2024-09-26 DIAGNOSIS — I25.10 ATHEROSCLEROTIC HEART DISEASE OF NATIVE CORONARY ARTERY W/OUT ANGINA PECTORIS: ICD-10-CM

## 2024-09-26 PROCEDURE — 99214 OFFICE O/P EST MOD 30 MIN: CPT | Mod: 25

## 2024-09-26 PROCEDURE — 93000 ELECTROCARDIOGRAM COMPLETE: CPT

## 2024-09-26 NOTE — REASON FOR VISIT
[FreeTextEntry1] : Here for f/u of HFpEF and CHD. Has had some changes to meds since previous visit. Now on torsemide daily. Also discontinued clopidogrel. Still struggling to get his glucose under control. BP at home typically around 125/70 mmHg. Doing some walking each day but feeling generally low energy (although has been able to travel extensively and has 3 week trip to [place] planned in near future). No chest pain or symptoms similar to those that he had prior to PCI.

## 2024-09-26 NOTE — PHYSICAL EXAM
[No Acute Distress] : no acute distress [Normal Venous Pressure] : normal venous pressure [Clear Lung Fields] : clear lung fields [No Edema] : no edema [de-identified] : soft systolic murmur

## 2024-09-27 ENCOUNTER — NON-APPOINTMENT (OUTPATIENT)
Age: 59
End: 2024-09-27

## 2024-09-27 LAB
ALBUMIN SERPL ELPH-MCNC: 4.4 G/DL
ALP BLD-CCNC: 65 U/L
ALT SERPL-CCNC: 17 U/L
ANION GAP SERPL CALC-SCNC: 16 MMOL/L
AST SERPL-CCNC: 15 U/L
BILIRUB SERPL-MCNC: 0.2 MG/DL
BUN SERPL-MCNC: 58 MG/DL
CALCIUM SERPL-MCNC: 9.3 MG/DL
CHLORIDE SERPL-SCNC: 101 MMOL/L
CO2 SERPL-SCNC: 20 MMOL/L
CREAT SERPL-MCNC: 2.1 MG/DL
EGFR: 36 ML/MIN/1.73M2
ESTIMATED AVERAGE GLUCOSE: 289 MG/DL
GLUCOSE SERPL-MCNC: 295 MG/DL
HBA1C MFR BLD HPLC: 11.7 %
POTASSIUM SERPL-SCNC: 5.4 MMOL/L
PROT SERPL-MCNC: 7.2 G/DL
SODIUM SERPL-SCNC: 137 MMOL/L

## 2024-10-21 ENCOUNTER — RX RENEWAL (OUTPATIENT)
Age: 59
End: 2024-10-21

## 2025-01-07 NOTE — ED ADULT NURSE NOTE - NS ED NURSE LEVEL OF CONSCIOUSNESS ORIENTATION
PRE-OP DATA and Check List - GI:  Procedure:  EGD (37934)  Diagnosis:   Epigastric pain  (primary encounter diagnosis)  Gastroesophageal reflux disease, unspecified whether esophagitis present  Vitamin D deficiency, unspecified  Tentative Date:  Semi Urgent  Tentative Time:  To be determined  Anesthesia:  MAC-no pcp, no asc     Hold your losartan for 24 hours prior to the procedure.      Hold your hydrochlorothiazide the morning of the procedure.      Hold all herbal supplements and multivitamins including iron supplements for 7 days before procedure.      Hold all NSAIDs/Aspirin 3 days before procedure.     Can take all other medications with a sip of water the morning of the procedure or wait until after the procedure.     
Patient is re-scheduled for a/an EGD (08243), under MAC, with Iram Phillips MD on 1/27/25 at [facility].    Prep instructions with the updated info were re-sent to patient via live well account if applicable, as well as mailed to patient.  
Oriented - self; Oriented - place; Oriented - time

## 2025-02-10 ENCOUNTER — TRANSCRIPTION ENCOUNTER (OUTPATIENT)
Age: 60
End: 2025-02-10

## 2025-02-19 ENCOUNTER — TRANSCRIPTION ENCOUNTER (OUTPATIENT)
Age: 60
End: 2025-02-19

## 2025-03-03 ENCOUNTER — TRANSCRIPTION ENCOUNTER (OUTPATIENT)
Age: 60
End: 2025-03-03

## 2025-03-05 ENCOUNTER — TRANSCRIPTION ENCOUNTER (OUTPATIENT)
Age: 60
End: 2025-03-05

## 2025-03-24 NOTE — ED PROVIDER NOTE - MUSCULOSKELETAL, MLM
Spine appears normal, range of motion is not limited, no muscle or joint tenderness
Imaging Studies/Medications

## 2025-03-31 NOTE — ED PROVIDER NOTE - BIRTH SEX
Male 39 year old male received to intake4, c/o fever, chills, body aches, headache since wednesday. pt denies sick contacts. pt denies dizziness, chest pain, shortness of breath, cough. pt endorses 1 episode of nausea/vomiting this morning. pt states he had diarrhea but has resolved. PIV#20 left AC labs drawn and sent. vs as noted. pending ua sample

## 2025-04-10 ENCOUNTER — TRANSCRIPTION ENCOUNTER (OUTPATIENT)
Age: 60
End: 2025-04-10

## 2025-04-10 NOTE — HISTORY OF PRESENT ILLNESS
Orders:    tirzepatide, weight loss, (Zepbound) 2.5 mg/0.5 mL injection; Inject 2.5 mg under the skin every 7 days for 28 days.     [FreeTextEntry8] : Mr. VALENTIN CABALLERO is a 57 year old male with history of  HFpEF, CAD, T2DM, HTN, HLD and anxiety disorder presenting for acute visit. He is concerned about LE edema and HTN. SBP was 180s early this morning when he checked it. HIs weight increased to 220 lb, but has come down over the past few days. He has had difficulty with lying flat at night.

## 2025-04-16 ENCOUNTER — RX RENEWAL (OUTPATIENT)
Age: 60
End: 2025-04-16

## 2025-04-24 ENCOUNTER — RX RENEWAL (OUTPATIENT)
Age: 60
End: 2025-04-24

## 2025-04-24 RX ORDER — ASPIRIN 81 MG/1
81 TABLET, COATED ORAL
Qty: 30 | Refills: 0 | Status: ACTIVE | COMMUNITY
Start: 2025-04-24 | End: 1900-01-01

## 2025-05-07 ENCOUNTER — RX RENEWAL (OUTPATIENT)
Age: 60
End: 2025-05-07

## 2025-05-22 ENCOUNTER — RX RENEWAL (OUTPATIENT)
Age: 60
End: 2025-05-22

## 2025-05-27 ENCOUNTER — RX RENEWAL (OUTPATIENT)
Age: 60
End: 2025-05-27

## 2025-06-04 ENCOUNTER — RX RENEWAL (OUTPATIENT)
Age: 60
End: 2025-06-04

## 2025-06-13 ENCOUNTER — TRANSCRIPTION ENCOUNTER (OUTPATIENT)
Age: 60
End: 2025-06-13

## 2025-06-23 ENCOUNTER — RX RENEWAL (OUTPATIENT)
Age: 60
End: 2025-06-23

## 2025-07-14 ENCOUNTER — RX RENEWAL (OUTPATIENT)
Age: 60
End: 2025-07-14

## 2025-07-21 ENCOUNTER — RX RENEWAL (OUTPATIENT)
Age: 60
End: 2025-07-21

## 2025-08-11 ENCOUNTER — TRANSCRIPTION ENCOUNTER (OUTPATIENT)
Age: 60
End: 2025-08-11

## 2025-08-11 ENCOUNTER — RX RENEWAL (OUTPATIENT)
Age: 60
End: 2025-08-11

## 2025-08-13 ENCOUNTER — TRANSCRIPTION ENCOUNTER (OUTPATIENT)
Age: 60
End: 2025-08-13

## 2025-09-03 ENCOUNTER — NON-APPOINTMENT (OUTPATIENT)
Age: 60
End: 2025-09-03

## 2025-09-03 ENCOUNTER — APPOINTMENT (OUTPATIENT)
Dept: INTERNAL MEDICINE | Facility: CLINIC | Age: 60
End: 2025-09-03
Payer: SELF-PAY

## 2025-09-03 VITALS
HEIGHT: 72 IN | HEART RATE: 68 BPM | SYSTOLIC BLOOD PRESSURE: 100 MMHG | DIASTOLIC BLOOD PRESSURE: 62 MMHG | BODY MASS INDEX: 26.41 KG/M2 | OXYGEN SATURATION: 96 % | WEIGHT: 195 LBS

## 2025-09-03 DIAGNOSIS — E78.5 HYPERLIPIDEMIA, UNSPECIFIED: ICD-10-CM

## 2025-09-03 DIAGNOSIS — F41.9 ANXIETY DISORDER, UNSPECIFIED: ICD-10-CM

## 2025-09-03 DIAGNOSIS — I50.30 UNSPECIFIED DIASTOLIC (CONGESTIVE) HEART FAILURE: ICD-10-CM

## 2025-09-03 DIAGNOSIS — Z79.4 TYPE 2 DIABETES MELLITUS WITH OTHER SKIN COMPLICATIONS: ICD-10-CM

## 2025-09-03 DIAGNOSIS — E11.628 TYPE 2 DIABETES MELLITUS WITH OTHER SKIN COMPLICATIONS: ICD-10-CM

## 2025-09-03 DIAGNOSIS — Z00.00 ENCOUNTER FOR GENERAL ADULT MEDICAL EXAMINATION W/OUT ABNORMAL FINDINGS: ICD-10-CM

## 2025-09-03 DIAGNOSIS — N18.30 CHRONIC KIDNEY DISEASE, STAGE 3 UNSPECIFIED: ICD-10-CM

## 2025-09-03 DIAGNOSIS — I10 ESSENTIAL (PRIMARY) HYPERTENSION: ICD-10-CM

## 2025-09-03 PROCEDURE — 36415 COLL VENOUS BLD VENIPUNCTURE: CPT

## 2025-09-03 PROCEDURE — 99396 PREV VISIT EST AGE 40-64: CPT

## 2025-09-03 RX ORDER — SEMAGLUTIDE 1.34 MG/ML
4 INJECTION, SOLUTION SUBCUTANEOUS
Refills: 0 | Status: ACTIVE | COMMUNITY

## 2025-09-04 LAB
ALBUMIN SERPL ELPH-MCNC: 5 G/DL
ALP BLD-CCNC: 78 U/L
ALT SERPL-CCNC: 25 U/L
ANION GAP SERPL CALC-SCNC: 16 MMOL/L
AST SERPL-CCNC: 20 U/L
BILIRUB SERPL-MCNC: 0.3 MG/DL
BUN SERPL-MCNC: 50 MG/DL
CALCIUM SERPL-MCNC: 10.1 MG/DL
CHLORIDE SERPL-SCNC: 102 MMOL/L
CO2 SERPL-SCNC: 20 MMOL/L
CREAT SERPL-MCNC: 2.17 MG/DL
CYSTATIN C SERPL-MCNC: 2.42 MG/L
EGFRCR SERPLBLD CKD-EPI 2021: 34 ML/MIN/1.73M2
GFR/BSA.PRED SERPLBLD CYS-BASED-ARV: 24 ML/MIN/1.73M2
GLUCOSE SERPL-MCNC: 155 MG/DL
POTASSIUM SERPL-SCNC: 6.1 MMOL/L
POTASSIUM SERPL-SCNC: 6.3 MMOL/L
PROT SERPL-MCNC: 8.1 G/DL
SODIUM SERPL-SCNC: 138 MMOL/L

## 2025-09-04 RX ORDER — SODIUM ZIRCONIUM CYCLOSILICATE 10 G/10G
10 POWDER, FOR SUSPENSION ORAL
Qty: 1 | Refills: 5 | Status: ACTIVE | COMMUNITY
Start: 2025-09-04 | End: 1900-01-01

## 2025-09-05 LAB
ALBUMIN, RANDOM URINE: 7.9 MG/DL
BASOPHILS # BLD AUTO: 0.04 K/UL
BASOPHILS NFR BLD AUTO: 0.6 %
CHOLEST SERPL-MCNC: 119 MG/DL
CREAT SPEC-SCNC: 120 MG/DL
CREAT SPEC-SCNC: 120 MG/DL
CREAT/PROT UR: 0.1 RATIO
DRE ABNORMAL+AA+ARIS: 8 %
DRE ABNORMAL+AFRICAN ANCESTRY: 4 %
DRE ABNORMAL+ARIS: 6 %
DRE ABNORMAL+FAM HIST+AA+ARIS: 13 %
DRE ABNORMAL+FAM HIST+AA: 7 %
DRE ABNORMAL+FAM HIST+ARIS: 10 %
DRE ABNORMAL+FAM HIST: 5 %
DRE ABNORMAL: 3 %
DRE NORMAL+AA+ARIS: 4 %
DRE NORMAL+AFRICAN ANCESTRY: 2 %
DRE NORMAL+ARIS: 3 %
DRE NORMAL+FAM HIST+AA+ARIS: 6 %
DRE NORMAL+FAM HIST+AA: 3 %
DRE NORMAL+FAM HIST+ARIS: 4 %
DRE NORMAL+FAM HIST: 2 %
DRE NORMAL: 1 %
EOSINOPHIL # BLD AUTO: 0.18 K/UL
EOSINOPHIL NFR BLD AUTO: 2.7 %
ESTIMATED AVERAGE GLUCOSE: 263 MG/DL
HBA1C MFR BLD HPLC: 10.8 %
HCT VFR BLD CALC: 44.3 %
HDLC SERPL-MCNC: 44 MG/DL
HGB BLD-MCNC: 13.8 G/DL
IMM GRANULOCYTES NFR BLD AUTO: 0.2 %
LDLC SERPL-MCNC: 56 MG/DL
LYMPHOCYTES # BLD AUTO: 1.09 K/UL
LYMPHOCYTES NFR BLD AUTO: 16.5 %
MAN DIFF?: NORMAL
MCHC RBC-ENTMCNC: 29.5 PG
MCHC RBC-ENTMCNC: 31.2 G/DL
MCV RBC AUTO: 94.7 FL
MICROALBUMIN/CREAT 24H UR-RTO: 65 MG/G
MONOCYTES # BLD AUTO: 0.53 K/UL
MONOCYTES NFR BLD AUTO: 8 %
NEUTROPHILS # BLD AUTO: 4.74 K/UL
NEUTROPHILS NFR BLD AUTO: 72 %
NONHDLC SERPL-MCNC: 75 MG/DL
NT-PROBNP SERPL-MCNC: 212 PG/ML
PLATELET # BLD AUTO: 295 K/UL
PROT UR-MCNC: 17 MG/DL
PSA RISK STRATIFICATION INTERP: NORMAL
PSA RISK STRATIFICATION: NORMAL
PSA SERPL-MCNC: 0.39 NG/ML
RBC # BLD: 4.68 M/UL
RBC # FLD: 13.7 %
TRIGL SERPL-MCNC: 98 MG/DL
WBC # FLD AUTO: 6.59 K/UL

## 2025-09-08 ENCOUNTER — RX RENEWAL (OUTPATIENT)
Age: 60
End: 2025-09-08

## 2025-09-11 ENCOUNTER — RX RENEWAL (OUTPATIENT)
Age: 60
End: 2025-09-11

## 2025-09-16 ENCOUNTER — RX RENEWAL (OUTPATIENT)
Age: 60
End: 2025-09-16

## 2025-09-17 ENCOUNTER — TRANSCRIPTION ENCOUNTER (OUTPATIENT)
Age: 60
End: 2025-09-17

## 2025-09-18 ENCOUNTER — TRANSCRIPTION ENCOUNTER (OUTPATIENT)
Age: 60
End: 2025-09-18

## (undated) DEVICE — PACK ORTHO FOOT ANKLE

## (undated) DEVICE — NDL T HANDLE JAMSHIDI 11G 6"

## (undated) DEVICE — GLV 7.5 PROTEXIS (WHITE)

## (undated) DEVICE — WARMING BLANKET UPPER ADULT

## (undated) DEVICE — IRR SYS BONE CLNNG INTERPULSE

## (undated) DEVICE — VENODYNE/SCD SLEEVE CALF MEDIUM